# Patient Record
Sex: MALE | Race: WHITE | NOT HISPANIC OR LATINO | Employment: OTHER | URBAN - METROPOLITAN AREA
[De-identification: names, ages, dates, MRNs, and addresses within clinical notes are randomized per-mention and may not be internally consistent; named-entity substitution may affect disease eponyms.]

---

## 2017-01-28 ENCOUNTER — GENERIC CONVERSION - ENCOUNTER (OUTPATIENT)
Dept: OTHER | Facility: OTHER | Age: 72
End: 2017-01-28

## 2017-01-28 ENCOUNTER — APPOINTMENT (EMERGENCY)
Dept: NON INVASIVE DIAGNOSTICS | Facility: HOSPITAL | Age: 72
DRG: 247 | End: 2017-01-28
Payer: MEDICARE

## 2017-01-28 ENCOUNTER — APPOINTMENT (EMERGENCY)
Dept: RADIOLOGY | Facility: HOSPITAL | Age: 72
DRG: 247 | End: 2017-01-28
Payer: MEDICARE

## 2017-01-28 ENCOUNTER — HOSPITAL ENCOUNTER (INPATIENT)
Facility: HOSPITAL | Age: 72
LOS: 6 days | Discharge: HOME | DRG: 247 | End: 2017-02-03
Attending: EMERGENCY MEDICINE | Admitting: INTERNAL MEDICINE
Payer: MEDICARE

## 2017-01-28 DIAGNOSIS — I21.3 STEMI (ST ELEVATION MYOCARDIAL INFARCTION) (HCC): Primary | ICD-10-CM

## 2017-01-28 PROBLEM — Z86.79 S/P AAA (ABDOMINAL AORTIC ANEURYSM) REPAIR: Chronic | Status: ACTIVE | Noted: 2017-01-28

## 2017-01-28 PROBLEM — I10 HTN (HYPERTENSION): Chronic | Status: ACTIVE | Noted: 2017-01-28

## 2017-01-28 PROBLEM — Z98.890 S/P AAA (ABDOMINAL AORTIC ANEURYSM) REPAIR: Chronic | Status: ACTIVE | Noted: 2017-01-28

## 2017-01-28 PROBLEM — E78.5 HLD (HYPERLIPIDEMIA): Chronic | Status: ACTIVE | Noted: 2017-01-28

## 2017-01-28 PROBLEM — F17.200 SMOKING: Chronic | Status: ACTIVE | Noted: 2017-01-28

## 2017-01-28 PROBLEM — G40.909 SEIZURE DISORDER (HCC): Chronic | Status: ACTIVE | Noted: 2017-01-28

## 2017-01-28 PROBLEM — Z91.148 NON COMPLIANCE W MEDICATION REGIMEN: Chronic | Status: ACTIVE | Noted: 2017-01-28

## 2017-01-28 PROBLEM — I25.10 CAD (CORONARY ARTERY DISEASE): Status: ACTIVE | Noted: 2017-01-28

## 2017-01-28 PROBLEM — Z91.14 NON COMPLIANCE W MEDICATION REGIMEN: Chronic | Status: ACTIVE | Noted: 2017-01-28

## 2017-01-28 LAB
ANION GAP BLD CALC-SCNC: 16 MMOL/L (ref 4–13)
ANION GAP SERPL CALCULATED.3IONS-SCNC: 8 MMOL/L (ref 4–13)
APTT PPP: 28 SECONDS (ref 24–36)
ATRIAL RATE: 340 BPM
ATRIAL RATE: 340 BPM
BASOPHILS # BLD AUTO: 0.01 THOUSANDS/ΜL (ref 0–0.1)
BASOPHILS NFR BLD AUTO: 0 % (ref 0–1)
BUN BLD-MCNC: 13 MG/DL (ref 5–25)
BUN SERPL-MCNC: 12 MG/DL (ref 5–25)
CA-I BLD-SCNC: 1.04 MMOL/L (ref 1.12–1.32)
CALCIUM SERPL-MCNC: 8.3 MG/DL (ref 8.3–10.1)
CHLORIDE BLD-SCNC: 107 MMOL/L (ref 100–108)
CHLORIDE SERPL-SCNC: 109 MMOL/L (ref 100–108)
CO2 SERPL-SCNC: 26 MMOL/L (ref 21–32)
CREAT BLD-MCNC: 1 MG/DL (ref 0.6–1.3)
CREAT SERPL-MCNC: 1.09 MG/DL (ref 0.6–1.3)
EOSINOPHIL # BLD AUTO: 0.14 THOUSAND/ΜL (ref 0–0.61)
EOSINOPHIL NFR BLD AUTO: 3 % (ref 0–6)
ERYTHROCYTE [DISTWIDTH] IN BLOOD BY AUTOMATED COUNT: 13.9 % (ref 11.6–15.1)
GFR SERPL CREATININE-BSD FRML MDRD: >60 ML/MIN/1.73SQ M
GFR SERPL CREATININE-BSD FRML MDRD: >60 ML/MIN/1.73SQ M
GLUCOSE SERPL-MCNC: 120 MG/DL (ref 65–140)
GLUCOSE SERPL-MCNC: 125 MG/DL (ref 65–140)
HCT VFR BLD AUTO: 40.1 % (ref 36.5–49.3)
HCT VFR BLD CALC: 38 % (ref 36.5–49.3)
HGB BLD-MCNC: 13.3 G/DL (ref 12–17)
HGB BLDA-MCNC: 12.9 G/DL (ref 12–17)
HOLD SPECIMEN: NORMAL
INR PPP: 1.04 (ref 0.86–1.16)
LYMPHOCYTES # BLD AUTO: 2.06 THOUSANDS/ΜL (ref 0.6–4.47)
LYMPHOCYTES NFR BLD AUTO: 38 % (ref 14–44)
MAGNESIUM SERPL-MCNC: 2 MG/DL (ref 1.6–2.6)
MCH RBC QN AUTO: 33 PG (ref 26.8–34.3)
MCHC RBC AUTO-ENTMCNC: 33.2 G/DL (ref 31.4–37.4)
MCV RBC AUTO: 100 FL (ref 82–98)
MONOCYTES # BLD AUTO: 0.47 THOUSAND/ΜL (ref 0.17–1.22)
MONOCYTES NFR BLD AUTO: 9 % (ref 4–12)
NEUTROPHILS # BLD AUTO: 2.75 THOUSANDS/ΜL (ref 1.85–7.62)
NEUTS SEG NFR BLD AUTO: 50 % (ref 43–75)
NRBC BLD AUTO-RTO: 0 /100 WBCS
NT-PROBNP SERPL-MCNC: 110 PG/ML
PCO2 BLD: 25 MMOL/L (ref 21–32)
PLATELET # BLD AUTO: 130 THOUSANDS/UL (ref 149–390)
PLATELET # BLD AUTO: 144 THOUSANDS/UL (ref 149–390)
PMV BLD AUTO: 10 FL (ref 8.9–12.7)
PMV BLD AUTO: 10.2 FL (ref 8.9–12.7)
POTASSIUM BLD-SCNC: 4.2 MMOL/L (ref 3.5–5.3)
POTASSIUM SERPL-SCNC: 4 MMOL/L (ref 3.5–5.3)
PROTHROMBIN TIME: 13.7 SECONDS (ref 12–14.3)
QRS AXIS: 51 DEGREES
QRS AXIS: 51 DEGREES
QRSD INTERVAL: 76 MS
QRSD INTERVAL: 78 MS
QT INTERVAL: 458 MS
QT INTERVAL: 482 MS
QTC INTERVAL: 405 MS
QTC INTERVAL: 421 MS
RBC # BLD AUTO: 4.03 MILLION/UL (ref 3.88–5.62)
SODIUM BLD-SCNC: 143 MMOL/L (ref 136–145)
SODIUM SERPL-SCNC: 143 MMOL/L (ref 136–145)
SPECIMEN SOURCE: ABNORMAL
SPECIMEN SOURCE: NORMAL
T WAVE AXIS: 120 DEGREES
T WAVE AXIS: 92 DEGREES
TROPONIN I BLD-MCNC: 0 NG/ML (ref 0–0.08)
VENTRICULAR RATE: 46 BPM
VENTRICULAR RATE: 47 BPM
WBC # BLD AUTO: 5.43 THOUSAND/UL (ref 4.31–10.16)

## 2017-01-28 PROCEDURE — 80048 BASIC METABOLIC PNL TOTAL CA: CPT | Performed by: EMERGENCY MEDICINE

## 2017-01-28 PROCEDURE — C1725 CATH, TRANSLUMIN NON-LASER: HCPCS | Performed by: STUDENT IN AN ORGANIZED HEALTH CARE EDUCATION/TRAINING PROGRAM

## 2017-01-28 PROCEDURE — 85730 THROMBOPLASTIN TIME PARTIAL: CPT | Performed by: EMERGENCY MEDICINE

## 2017-01-28 PROCEDURE — 83880 ASSAY OF NATRIURETIC PEPTIDE: CPT | Performed by: EMERGENCY MEDICINE

## 2017-01-28 PROCEDURE — 85014 HEMATOCRIT: CPT

## 2017-01-28 PROCEDURE — 93005 ELECTROCARDIOGRAM TRACING: CPT | Performed by: EMERGENCY MEDICINE

## 2017-01-28 PROCEDURE — 84484 ASSAY OF TROPONIN QUANT: CPT

## 2017-01-28 PROCEDURE — C1769 GUIDE WIRE: HCPCS | Performed by: STUDENT IN AN ORGANIZED HEALTH CARE EDUCATION/TRAINING PROGRAM

## 2017-01-28 PROCEDURE — 93005 ELECTROCARDIOGRAM TRACING: CPT | Performed by: STUDENT IN AN ORGANIZED HEALTH CARE EDUCATION/TRAINING PROGRAM

## 2017-01-28 PROCEDURE — 96374 THER/PROPH/DIAG INJ IV PUSH: CPT

## 2017-01-28 PROCEDURE — 85049 AUTOMATED PLATELET COUNT: CPT | Performed by: STUDENT IN AN ORGANIZED HEALTH CARE EDUCATION/TRAINING PROGRAM

## 2017-01-28 PROCEDURE — 93454 CORONARY ARTERY ANGIO S&I: CPT | Performed by: STUDENT IN AN ORGANIZED HEALTH CARE EDUCATION/TRAINING PROGRAM

## 2017-01-28 PROCEDURE — 85610 PROTHROMBIN TIME: CPT | Performed by: EMERGENCY MEDICINE

## 2017-01-28 PROCEDURE — C9600 PERC DRUG-EL COR STENT SING: HCPCS | Performed by: STUDENT IN AN ORGANIZED HEALTH CARE EDUCATION/TRAINING PROGRAM

## 2017-01-28 PROCEDURE — C1894 INTRO/SHEATH, NON-LASER: HCPCS | Performed by: STUDENT IN AN ORGANIZED HEALTH CARE EDUCATION/TRAINING PROGRAM

## 2017-01-28 PROCEDURE — C1874 STENT, COATED/COV W/DEL SYS: HCPCS

## 2017-01-28 PROCEDURE — 85025 COMPLETE CBC W/AUTO DIFF WBC: CPT | Performed by: EMERGENCY MEDICINE

## 2017-01-28 PROCEDURE — 96376 TX/PRO/DX INJ SAME DRUG ADON: CPT

## 2017-01-28 PROCEDURE — B2111ZZ FLUOROSCOPY OF MULTIPLE CORONARY ARTERIES USING LOW OSMOLAR CONTRAST: ICD-10-PCS | Performed by: INTERNAL MEDICINE

## 2017-01-28 PROCEDURE — 99285 EMERGENCY DEPT VISIT HI MDM: CPT

## 2017-01-28 PROCEDURE — 93005 ELECTROCARDIOGRAM TRACING: CPT

## 2017-01-28 PROCEDURE — 80047 BASIC METABLC PNL IONIZED CA: CPT

## 2017-01-28 PROCEDURE — 83735 ASSAY OF MAGNESIUM: CPT | Performed by: STUDENT IN AN ORGANIZED HEALTH CARE EDUCATION/TRAINING PROGRAM

## 2017-01-28 PROCEDURE — 36415 COLL VENOUS BLD VENIPUNCTURE: CPT | Performed by: EMERGENCY MEDICINE

## 2017-01-28 PROCEDURE — 027034Z DILATION OF CORONARY ARTERY, ONE ARTERY WITH DRUG-ELUTING INTRALUMINAL DEVICE, PERCUTANEOUS APPROACH: ICD-10-PCS | Performed by: INTERNAL MEDICINE

## 2017-01-28 PROCEDURE — 96375 TX/PRO/DX INJ NEW DRUG ADDON: CPT

## 2017-01-28 PROCEDURE — C1887 CATHETER, GUIDING: HCPCS | Performed by: STUDENT IN AN ORGANIZED HEALTH CARE EDUCATION/TRAINING PROGRAM

## 2017-01-28 RX ORDER — ASPIRIN 81 MG/1
81 TABLET, CHEWABLE ORAL DAILY
Status: DISCONTINUED | OUTPATIENT
Start: 2017-01-29 | End: 2017-02-03 | Stop reason: HOSPADM

## 2017-01-28 RX ORDER — CLOPIDOGREL BISULFATE 75 MG/1
75 TABLET ORAL DAILY
Status: DISCONTINUED | OUTPATIENT
Start: 2017-01-29 | End: 2017-02-01

## 2017-01-28 RX ORDER — FENTANYL CITRATE 50 UG/ML
INJECTION, SOLUTION INTRAMUSCULAR; INTRAVENOUS
Status: DISCONTINUED
Start: 2017-01-28 | End: 2017-01-28 | Stop reason: WASHOUT

## 2017-01-28 RX ORDER — MAGNESIUM HYDROXIDE/ALUMINUM HYDROXICE/SIMETHICONE 120; 1200; 1200 MG/30ML; MG/30ML; MG/30ML
30 SUSPENSION ORAL EVERY 6 HOURS PRN
Status: DISCONTINUED | OUTPATIENT
Start: 2017-01-28 | End: 2017-02-03 | Stop reason: HOSPADM

## 2017-01-28 RX ORDER — HEPARIN SODIUM 5000 [USP'U]/ML
5000 INJECTION, SOLUTION INTRAVENOUS; SUBCUTANEOUS EVERY 8 HOURS SCHEDULED
Status: DISCONTINUED | OUTPATIENT
Start: 2017-01-29 | End: 2017-02-03 | Stop reason: HOSPADM

## 2017-01-28 RX ORDER — NITROGLYCERIN 0.4 MG/1
0.4 TABLET SUBLINGUAL
Status: DISCONTINUED | OUTPATIENT
Start: 2017-01-28 | End: 2017-02-03 | Stop reason: HOSPADM

## 2017-01-28 RX ORDER — NITROGLYCERIN 20 MG/100ML
INJECTION INTRAVENOUS CODE/TRAUMA/SEDATION MEDICATION
Status: COMPLETED | OUTPATIENT
Start: 2017-01-28 | End: 2017-01-28

## 2017-01-28 RX ORDER — SODIUM CHLORIDE 9 MG/ML
75 INJECTION, SOLUTION INTRAVENOUS CONTINUOUS
Status: DISPENSED | OUTPATIENT
Start: 2017-01-28 | End: 2017-01-29

## 2017-01-28 RX ORDER — PHENOBARBITAL 64.8 MG/1
64.8 TABLET ORAL 2 TIMES DAILY
Status: DISCONTINUED | OUTPATIENT
Start: 2017-01-28 | End: 2017-02-03 | Stop reason: HOSPADM

## 2017-01-28 RX ORDER — HEPARIN SODIUM 1000 [USP'U]/ML
INJECTION, SOLUTION INTRAVENOUS; SUBCUTANEOUS CODE/TRAUMA/SEDATION MEDICATION
Status: COMPLETED | OUTPATIENT
Start: 2017-01-28 | End: 2017-01-28

## 2017-01-28 RX ORDER — SODIUM CHLORIDE 9 MG/ML
250 INJECTION, SOLUTION INTRAVENOUS CONTINUOUS
Status: DISPENSED | OUTPATIENT
Start: 2017-01-28 | End: 2017-01-28

## 2017-01-28 RX ORDER — LIDOCAINE HYDROCHLORIDE 10 MG/ML
INJECTION, SOLUTION INFILTRATION; PERINEURAL CODE/TRAUMA/SEDATION MEDICATION
Status: COMPLETED | OUTPATIENT
Start: 2017-01-28 | End: 2017-01-28

## 2017-01-28 RX ORDER — PHENOBARBITAL 64.8 MG/1
64.8 TABLET ORAL 2 TIMES DAILY
COMMUNITY
End: 2018-02-27 | Stop reason: SDUPTHER

## 2017-01-28 RX ORDER — ATORVASTATIN CALCIUM 80 MG/1
80 TABLET, FILM COATED ORAL
Status: DISCONTINUED | OUTPATIENT
Start: 2017-01-28 | End: 2017-02-02

## 2017-01-28 RX ORDER — HEPARIN SODIUM 5000 [USP'U]/ML
5000 INJECTION, SOLUTION INTRAVENOUS; SUBCUTANEOUS EVERY 8 HOURS SCHEDULED
Status: CANCELLED | OUTPATIENT
Start: 2017-01-29

## 2017-01-28 RX ADMIN — IOHEXOL 100 ML: 350 INJECTION, SOLUTION INTRAVENOUS at 17:59

## 2017-01-28 RX ADMIN — NITROGLYCERIN 200 MCG: 20 INJECTION INTRAVENOUS at 17:31

## 2017-01-28 RX ADMIN — SODIUM CHLORIDE 250 ML/HR: 0.9 INJECTION, SOLUTION INTRAVENOUS at 18:25

## 2017-01-28 RX ADMIN — PHENOBARBITAL 64.8 MG: 64.8 TABLET ORAL at 21:36

## 2017-01-28 RX ADMIN — HEPARIN SODIUM 8000 UNITS: 1000 INJECTION INTRAVENOUS; SUBCUTANEOUS at 17:33

## 2017-01-28 RX ADMIN — BIVALIRUDIN 1.75 MG/KG/HR: 250 INJECTION, POWDER, LYOPHILIZED, FOR SOLUTION INTRAVENOUS at 17:41

## 2017-01-28 RX ADMIN — ATORVASTATIN CALCIUM 80 MG: 80 TABLET, FILM COATED ORAL at 20:34

## 2017-01-28 RX ADMIN — LIDOCAINE HYDROCHLORIDE 1 ML: 10 INJECTION, SOLUTION INFILTRATION; PERINEURAL at 17:30

## 2017-01-28 RX ADMIN — SODIUM CHLORIDE 75 ML/HR: 0.9 INJECTION, SOLUTION INTRAVENOUS at 22:37

## 2017-01-29 ENCOUNTER — GENERIC CONVERSION - ENCOUNTER (OUTPATIENT)
Dept: OTHER | Facility: OTHER | Age: 72
End: 2017-01-29

## 2017-01-29 ENCOUNTER — APPOINTMENT (INPATIENT)
Dept: NON INVASIVE DIAGNOSTICS | Facility: HOSPITAL | Age: 72
DRG: 247 | End: 2017-01-29
Payer: MEDICARE

## 2017-01-29 LAB
ANION GAP SERPL CALCULATED.3IONS-SCNC: 9 MMOL/L (ref 4–13)
BUN SERPL-MCNC: 12 MG/DL (ref 5–25)
CALCIUM SERPL-MCNC: 7.7 MG/DL (ref 8.3–10.1)
CHLORIDE SERPL-SCNC: 111 MMOL/L (ref 100–108)
CHOLEST SERPL-MCNC: 219 MG/DL (ref 50–200)
CO2 SERPL-SCNC: 22 MMOL/L (ref 21–32)
CREAT SERPL-MCNC: 0.76 MG/DL (ref 0.6–1.3)
ERYTHROCYTE [DISTWIDTH] IN BLOOD BY AUTOMATED COUNT: 13.9 % (ref 11.6–15.1)
GFR SERPL CREATININE-BSD FRML MDRD: >60 ML/MIN/1.73SQ M
GLUCOSE SERPL-MCNC: 102 MG/DL (ref 65–140)
HCT VFR BLD AUTO: 39.8 % (ref 36.5–49.3)
HDLC SERPL-MCNC: 45 MG/DL (ref 40–60)
HGB BLD-MCNC: 13.2 G/DL (ref 12–17)
LDLC SERPL CALC-MCNC: 155 MG/DL (ref 0–100)
MAGNESIUM SERPL-MCNC: 2.5 MG/DL (ref 1.6–2.6)
MCH RBC QN AUTO: 32.8 PG (ref 26.8–34.3)
MCHC RBC AUTO-ENTMCNC: 33.2 G/DL (ref 31.4–37.4)
MCV RBC AUTO: 99 FL (ref 82–98)
PLATELET # BLD AUTO: 127 THOUSANDS/UL (ref 149–390)
PMV BLD AUTO: 10 FL (ref 8.9–12.7)
POTASSIUM SERPL-SCNC: 3.8 MMOL/L (ref 3.5–5.3)
RBC # BLD AUTO: 4.02 MILLION/UL (ref 3.88–5.62)
SODIUM SERPL-SCNC: 142 MMOL/L (ref 136–145)
TRIGL SERPL-MCNC: 97 MG/DL
WBC # BLD AUTO: 7.78 THOUSAND/UL (ref 4.31–10.16)

## 2017-01-29 PROCEDURE — 83735 ASSAY OF MAGNESIUM: CPT | Performed by: STUDENT IN AN ORGANIZED HEALTH CARE EDUCATION/TRAINING PROGRAM

## 2017-01-29 PROCEDURE — 85027 COMPLETE CBC AUTOMATED: CPT | Performed by: STUDENT IN AN ORGANIZED HEALTH CARE EDUCATION/TRAINING PROGRAM

## 2017-01-29 PROCEDURE — 93306 TTE W/DOPPLER COMPLETE: CPT

## 2017-01-29 PROCEDURE — 80061 LIPID PANEL: CPT | Performed by: STUDENT IN AN ORGANIZED HEALTH CARE EDUCATION/TRAINING PROGRAM

## 2017-01-29 PROCEDURE — 80048 BASIC METABOLIC PNL TOTAL CA: CPT | Performed by: STUDENT IN AN ORGANIZED HEALTH CARE EDUCATION/TRAINING PROGRAM

## 2017-01-29 RX ORDER — DIPHENHYDRAMINE HCL 25 MG
12.5 TABLET ORAL ONCE
Status: COMPLETED | OUTPATIENT
Start: 2017-01-29 | End: 2017-01-29

## 2017-01-29 RX ORDER — MAGNESIUM SULFATE HEPTAHYDRATE 40 MG/ML
2 INJECTION, SOLUTION INTRAVENOUS ONCE
Status: COMPLETED | OUTPATIENT
Start: 2017-01-29 | End: 2017-01-29

## 2017-01-29 RX ORDER — MAGNESIUM SULFATE HEPTAHYDRATE 40 MG/ML
INJECTION, SOLUTION INTRAVENOUS
Status: COMPLETED
Start: 2017-01-29 | End: 2017-01-29

## 2017-01-29 RX ORDER — POTASSIUM CHLORIDE 14.9 MG/ML
20 INJECTION INTRAVENOUS ONCE
Status: COMPLETED | OUTPATIENT
Start: 2017-01-29 | End: 2017-01-29

## 2017-01-29 RX ADMIN — DIPHENHYDRAMINE HCL 12.5 MG: 25 TABLET ORAL at 18:10

## 2017-01-29 RX ADMIN — MAGNESIUM SULFATE IN WATER 2 G: 40 INJECTION, SOLUTION INTRAVENOUS at 02:24

## 2017-01-29 RX ADMIN — ATORVASTATIN CALCIUM 80 MG: 80 TABLET, FILM COATED ORAL at 16:34

## 2017-01-29 RX ADMIN — MAGNESIUM SULFATE HEPTAHYDRATE 2 G: 40 INJECTION, SOLUTION INTRAVENOUS at 02:24

## 2017-01-29 RX ADMIN — CLOPIDOGREL BISULFATE 75 MG: 75 TABLET ORAL at 09:26

## 2017-01-29 RX ADMIN — PHENOBARBITAL 64.8 MG: 64.8 TABLET ORAL at 17:44

## 2017-01-29 RX ADMIN — HEPARIN SODIUM 5000 UNITS: 5000 INJECTION, SOLUTION INTRAVENOUS; SUBCUTANEOUS at 13:24

## 2017-01-29 RX ADMIN — PHENOBARBITAL 64.8 MG: 64.8 TABLET ORAL at 09:26

## 2017-01-29 RX ADMIN — POTASSIUM CHLORIDE 20 MEQ: 200 INJECTION, SOLUTION INTRAVENOUS at 20:48

## 2017-01-29 RX ADMIN — HEPARIN SODIUM 5000 UNITS: 5000 INJECTION, SOLUTION INTRAVENOUS; SUBCUTANEOUS at 22:04

## 2017-01-29 RX ADMIN — HEPARIN SODIUM 5000 UNITS: 5000 INJECTION, SOLUTION INTRAVENOUS; SUBCUTANEOUS at 05:15

## 2017-01-29 RX ADMIN — ASPIRIN 81 MG 81 MG: 81 TABLET ORAL at 09:26

## 2017-01-30 LAB
ATRIAL RATE: 61 BPM
P AXIS: 68 DEGREES
PR INTERVAL: 200 MS
QRS AXIS: -32 DEGREES
QRSD INTERVAL: 83 MS
QT INTERVAL: 404 MS
QTC INTERVAL: 407 MS
T WAVE AXIS: 51 DEGREES
VENTRICULAR RATE: 61 BPM

## 2017-01-30 PROCEDURE — 87040 BLOOD CULTURE FOR BACTERIA: CPT | Performed by: INTERNAL MEDICINE

## 2017-01-30 RX ORDER — DIPHENHYDRAMINE HCL 25 MG
25 TABLET ORAL EVERY 6 HOURS PRN
Status: DISCONTINUED | OUTPATIENT
Start: 2017-01-30 | End: 2017-01-31

## 2017-01-30 RX ORDER — ACETAMINOPHEN 325 MG/1
650 TABLET ORAL EVERY 6 HOURS PRN
Status: DISCONTINUED | OUTPATIENT
Start: 2017-01-30 | End: 2017-02-03 | Stop reason: HOSPADM

## 2017-01-30 RX ORDER — SODIUM CHLORIDE 9 MG/ML
100 INJECTION, SOLUTION INTRAVENOUS CONTINUOUS
Status: DISCONTINUED | OUTPATIENT
Start: 2017-01-30 | End: 2017-01-31

## 2017-01-30 RX ADMIN — HEPARIN SODIUM 5000 UNITS: 5000 INJECTION, SOLUTION INTRAVENOUS; SUBCUTANEOUS at 21:51

## 2017-01-30 RX ADMIN — PHENOBARBITAL 64.8 MG: 64.8 TABLET ORAL at 08:51

## 2017-01-30 RX ADMIN — HEPARIN SODIUM 5000 UNITS: 5000 INJECTION, SOLUTION INTRAVENOUS; SUBCUTANEOUS at 13:04

## 2017-01-30 RX ADMIN — ACETAMINOPHEN 650 MG: 325 TABLET, FILM COATED ORAL at 23:27

## 2017-01-30 RX ADMIN — CLOPIDOGREL BISULFATE 75 MG: 75 TABLET ORAL at 08:51

## 2017-01-30 RX ADMIN — ASPIRIN 81 MG 81 MG: 81 TABLET ORAL at 08:51

## 2017-01-30 RX ADMIN — ATORVASTATIN CALCIUM 80 MG: 80 TABLET, FILM COATED ORAL at 16:53

## 2017-01-30 RX ADMIN — PHENOBARBITAL 64.8 MG: 64.8 TABLET ORAL at 16:54

## 2017-01-30 RX ADMIN — ACETAMINOPHEN 650 MG: 325 TABLET, FILM COATED ORAL at 12:31

## 2017-01-30 RX ADMIN — SODIUM CHLORIDE 100 ML/HR: 0.9 INJECTION, SOLUTION INTRAVENOUS at 15:22

## 2017-01-30 RX ADMIN — DIPHENHYDRAMINE HCL 25 MG: 25 TABLET ORAL at 13:00

## 2017-01-30 RX ADMIN — HEPARIN SODIUM 5000 UNITS: 5000 INJECTION, SOLUTION INTRAVENOUS; SUBCUTANEOUS at 05:56

## 2017-01-30 RX ADMIN — ACETAMINOPHEN 650 MG: 325 TABLET, FILM COATED ORAL at 00:48

## 2017-01-31 LAB — GLUCOSE SERPL-MCNC: 119 MG/DL (ref 65–140)

## 2017-01-31 PROCEDURE — 87493 C DIFF AMPLIFIED PROBE: CPT | Performed by: INTERNAL MEDICINE

## 2017-01-31 PROCEDURE — 82948 REAGENT STRIP/BLOOD GLUCOSE: CPT

## 2017-01-31 RX ORDER — PREDNISONE 10 MG/1
10 TABLET ORAL ONCE
Status: COMPLETED | OUTPATIENT
Start: 2017-01-31 | End: 2017-01-31

## 2017-01-31 RX ORDER — DIPHENHYDRAMINE HCL 25 MG
25 TABLET ORAL EVERY 6 HOURS PRN
Status: DISCONTINUED | OUTPATIENT
Start: 2017-01-31 | End: 2017-02-03 | Stop reason: HOSPADM

## 2017-01-31 RX ADMIN — ATORVASTATIN CALCIUM 80 MG: 80 TABLET, FILM COATED ORAL at 17:23

## 2017-01-31 RX ADMIN — HEPARIN SODIUM 5000 UNITS: 5000 INJECTION, SOLUTION INTRAVENOUS; SUBCUTANEOUS at 13:27

## 2017-01-31 RX ADMIN — CLOPIDOGREL BISULFATE 75 MG: 75 TABLET ORAL at 08:02

## 2017-01-31 RX ADMIN — SODIUM CHLORIDE 100 ML/HR: 0.9 INJECTION, SOLUTION INTRAVENOUS at 20:52

## 2017-01-31 RX ADMIN — HEPARIN SODIUM 5000 UNITS: 5000 INJECTION, SOLUTION INTRAVENOUS; SUBCUTANEOUS at 05:28

## 2017-01-31 RX ADMIN — PHENOBARBITAL 64.8 MG: 64.8 TABLET ORAL at 08:02

## 2017-01-31 RX ADMIN — SODIUM CHLORIDE 100 ML/HR: 0.9 INJECTION, SOLUTION INTRAVENOUS at 10:34

## 2017-01-31 RX ADMIN — SODIUM CHLORIDE 100 ML/HR: 0.9 INJECTION, SOLUTION INTRAVENOUS at 00:44

## 2017-01-31 RX ADMIN — ASPIRIN 81 MG 81 MG: 81 TABLET ORAL at 08:02

## 2017-01-31 RX ADMIN — PHENOBARBITAL 64.8 MG: 64.8 TABLET ORAL at 17:23

## 2017-01-31 RX ADMIN — PREDNISONE 10 MG: 10 TABLET ORAL at 10:33

## 2017-02-01 LAB
C DIFF TOX GENS STL QL NAA+PROBE: NORMAL
GLUCOSE SERPL-MCNC: 117 MG/DL (ref 65–140)

## 2017-02-01 PROCEDURE — 82948 REAGENT STRIP/BLOOD GLUCOSE: CPT

## 2017-02-01 RX ORDER — FUROSEMIDE 10 MG/ML
40 INJECTION INTRAMUSCULAR; INTRAVENOUS
Status: DISCONTINUED | OUTPATIENT
Start: 2017-02-01 | End: 2017-02-03

## 2017-02-01 RX ORDER — METHYLPREDNISOLONE SODIUM SUCCINATE 125 MG/2ML
125 INJECTION, POWDER, LYOPHILIZED, FOR SOLUTION INTRAMUSCULAR; INTRAVENOUS ONCE
Status: COMPLETED | OUTPATIENT
Start: 2017-02-01 | End: 2017-02-01

## 2017-02-01 RX ORDER — PRASUGREL 10 MG/1
10 TABLET, FILM COATED ORAL DAILY
Status: DISCONTINUED | OUTPATIENT
Start: 2017-02-02 | End: 2017-02-03 | Stop reason: HOSPADM

## 2017-02-01 RX ADMIN — FUROSEMIDE 40 MG: 10 INJECTION, SOLUTION INTRAMUSCULAR; INTRAVENOUS at 11:45

## 2017-02-01 RX ADMIN — HEPARIN SODIUM 5000 UNITS: 5000 INJECTION, SOLUTION INTRAVENOUS; SUBCUTANEOUS at 21:56

## 2017-02-01 RX ADMIN — HEPARIN SODIUM 5000 UNITS: 5000 INJECTION, SOLUTION INTRAVENOUS; SUBCUTANEOUS at 14:02

## 2017-02-01 RX ADMIN — PHENOBARBITAL 64.8 MG: 64.8 TABLET ORAL at 08:36

## 2017-02-01 RX ADMIN — PHENOBARBITAL 64.8 MG: 64.8 TABLET ORAL at 17:38

## 2017-02-01 RX ADMIN — METHYLPREDNISOLONE SODIUM SUCCINATE 125 MG: 125 INJECTION, POWDER, FOR SOLUTION INTRAMUSCULAR; INTRAVENOUS at 11:44

## 2017-02-01 RX ADMIN — ASPIRIN 81 MG 81 MG: 81 TABLET ORAL at 08:36

## 2017-02-01 RX ADMIN — FUROSEMIDE 40 MG: 10 INJECTION, SOLUTION INTRAMUSCULAR; INTRAVENOUS at 17:37

## 2017-02-01 RX ADMIN — HEPARIN SODIUM 5000 UNITS: 5000 INJECTION, SOLUTION INTRAVENOUS; SUBCUTANEOUS at 05:12

## 2017-02-01 RX ADMIN — CLOPIDOGREL BISULFATE 75 MG: 75 TABLET ORAL at 08:36

## 2017-02-02 LAB
ANION GAP SERPL CALCULATED.3IONS-SCNC: 10 MMOL/L (ref 4–13)
ANION GAP SERPL CALCULATED.3IONS-SCNC: 9 MMOL/L (ref 4–13)
BASOPHILS # BLD AUTO: 0.02 THOUSANDS/ΜL (ref 0–0.1)
BASOPHILS NFR BLD AUTO: 0 % (ref 0–1)
BUN SERPL-MCNC: 15 MG/DL (ref 5–25)
BUN SERPL-MCNC: 16 MG/DL (ref 5–25)
CALCIUM SERPL-MCNC: 7.8 MG/DL (ref 8.3–10.1)
CALCIUM SERPL-MCNC: 8.2 MG/DL (ref 8.3–10.1)
CHLORIDE SERPL-SCNC: 106 MMOL/L (ref 100–108)
CHLORIDE SERPL-SCNC: 107 MMOL/L (ref 100–108)
CO2 SERPL-SCNC: 25 MMOL/L (ref 21–32)
CO2 SERPL-SCNC: 27 MMOL/L (ref 21–32)
CREAT SERPL-MCNC: 0.85 MG/DL (ref 0.6–1.3)
CREAT SERPL-MCNC: 1.08 MG/DL (ref 0.6–1.3)
EOSINOPHIL # BLD AUTO: 0.04 THOUSAND/ΜL (ref 0–0.61)
EOSINOPHIL NFR BLD AUTO: 1 % (ref 0–6)
ERYTHROCYTE [DISTWIDTH] IN BLOOD BY AUTOMATED COUNT: 14.1 % (ref 11.6–15.1)
GFR SERPL CREATININE-BSD FRML MDRD: >60 ML/MIN/1.73SQ M
GFR SERPL CREATININE-BSD FRML MDRD: >60 ML/MIN/1.73SQ M
GLUCOSE SERPL-MCNC: 102 MG/DL (ref 65–140)
GLUCOSE SERPL-MCNC: 145 MG/DL (ref 65–140)
HCT VFR BLD AUTO: 32.9 % (ref 36.5–49.3)
HGB BLD-MCNC: 11.1 G/DL (ref 12–17)
LYMPHOCYTES # BLD AUTO: 1.19 THOUSANDS/ΜL (ref 0.6–4.47)
LYMPHOCYTES NFR BLD AUTO: 18 % (ref 14–44)
MAGNESIUM SERPL-MCNC: 2.1 MG/DL (ref 1.6–2.6)
MCH RBC QN AUTO: 33 PG (ref 26.8–34.3)
MCHC RBC AUTO-ENTMCNC: 33.7 G/DL (ref 31.4–37.4)
MCV RBC AUTO: 98 FL (ref 82–98)
MONOCYTES # BLD AUTO: 0.59 THOUSAND/ΜL (ref 0.17–1.22)
MONOCYTES NFR BLD AUTO: 9 % (ref 4–12)
NEUTROPHILS # BLD AUTO: 4.91 THOUSANDS/ΜL (ref 1.85–7.62)
NEUTS SEG NFR BLD AUTO: 72 % (ref 43–75)
NRBC BLD AUTO-RTO: 0 /100 WBCS
PLATELET # BLD AUTO: 160 THOUSANDS/UL (ref 149–390)
PMV BLD AUTO: 10.6 FL (ref 8.9–12.7)
POTASSIUM SERPL-SCNC: 2.9 MMOL/L (ref 3.5–5.3)
POTASSIUM SERPL-SCNC: 3.5 MMOL/L (ref 3.5–5.3)
RBC # BLD AUTO: 3.36 MILLION/UL (ref 3.88–5.62)
SODIUM SERPL-SCNC: 142 MMOL/L (ref 136–145)
SODIUM SERPL-SCNC: 142 MMOL/L (ref 136–145)
WBC # BLD AUTO: 6.77 THOUSAND/UL (ref 4.31–10.16)

## 2017-02-02 PROCEDURE — 80048 BASIC METABOLIC PNL TOTAL CA: CPT | Performed by: INTERNAL MEDICINE

## 2017-02-02 PROCEDURE — 85025 COMPLETE CBC W/AUTO DIFF WBC: CPT | Performed by: INTERNAL MEDICINE

## 2017-02-02 PROCEDURE — 83735 ASSAY OF MAGNESIUM: CPT | Performed by: INTERNAL MEDICINE

## 2017-02-02 RX ORDER — POTASSIUM CHLORIDE 20 MEQ/1
40 TABLET, EXTENDED RELEASE ORAL ONCE
Status: COMPLETED | OUTPATIENT
Start: 2017-02-02 | End: 2017-02-02

## 2017-02-02 RX ORDER — POTASSIUM CHLORIDE 14.9 MG/ML
20 INJECTION INTRAVENOUS
Status: DISPENSED | OUTPATIENT
Start: 2017-02-02 | End: 2017-02-02

## 2017-02-02 RX ORDER — METHYLPREDNISOLONE SODIUM SUCCINATE 125 MG/2ML
125 INJECTION, POWDER, LYOPHILIZED, FOR SOLUTION INTRAMUSCULAR; INTRAVENOUS ONCE
Status: COMPLETED | OUTPATIENT
Start: 2017-02-02 | End: 2017-02-02

## 2017-02-02 RX ADMIN — POTASSIUM CHLORIDE 20 MEQ: 200 INJECTION, SOLUTION INTRAVENOUS at 08:53

## 2017-02-02 RX ADMIN — POTASSIUM CHLORIDE 40 MEQ: 1500 TABLET, EXTENDED RELEASE ORAL at 08:52

## 2017-02-02 RX ADMIN — HEPARIN SODIUM 5000 UNITS: 5000 INJECTION, SOLUTION INTRAVENOUS; SUBCUTANEOUS at 22:01

## 2017-02-02 RX ADMIN — HEPARIN SODIUM 5000 UNITS: 5000 INJECTION, SOLUTION INTRAVENOUS; SUBCUTANEOUS at 14:36

## 2017-02-02 RX ADMIN — PHENOBARBITAL 64.8 MG: 64.8 TABLET ORAL at 17:55

## 2017-02-02 RX ADMIN — ASPIRIN 81 MG 81 MG: 81 TABLET ORAL at 08:53

## 2017-02-02 RX ADMIN — METHYLPREDNISOLONE SODIUM SUCCINATE 125 MG: 125 INJECTION, POWDER, FOR SOLUTION INTRAMUSCULAR; INTRAVENOUS at 11:58

## 2017-02-02 RX ADMIN — POTASSIUM CHLORIDE 40 MEQ: 1500 TABLET, EXTENDED RELEASE ORAL at 17:55

## 2017-02-02 RX ADMIN — PRASUGREL HYDROCHLORIDE 10 MG: 10 TABLET, FILM COATED ORAL at 08:57

## 2017-02-02 RX ADMIN — HEPARIN SODIUM 5000 UNITS: 5000 INJECTION, SOLUTION INTRAVENOUS; SUBCUTANEOUS at 05:27

## 2017-02-02 RX ADMIN — FUROSEMIDE 40 MG: 10 INJECTION, SOLUTION INTRAMUSCULAR; INTRAVENOUS at 08:53

## 2017-02-02 RX ADMIN — PHENOBARBITAL 64.8 MG: 64.8 TABLET ORAL at 08:52

## 2017-02-02 RX ADMIN — FUROSEMIDE 40 MG: 10 INJECTION, SOLUTION INTRAMUSCULAR; INTRAVENOUS at 17:55

## 2017-02-03 VITALS
HEART RATE: 66 BPM | SYSTOLIC BLOOD PRESSURE: 107 MMHG | OXYGEN SATURATION: 94 % | BODY MASS INDEX: 28.31 KG/M2 | RESPIRATION RATE: 18 BRPM | WEIGHT: 209 LBS | DIASTOLIC BLOOD PRESSURE: 54 MMHG | HEIGHT: 72 IN | TEMPERATURE: 98 F

## 2017-02-03 LAB
ANION GAP SERPL CALCULATED.3IONS-SCNC: 10 MMOL/L (ref 4–13)
BASOPHILS # BLD AUTO: 0.06 THOUSANDS/ΜL (ref 0–0.1)
BASOPHILS NFR BLD AUTO: 1 % (ref 0–1)
BUN SERPL-MCNC: 17 MG/DL (ref 5–25)
CALCIUM SERPL-MCNC: 7.9 MG/DL (ref 8.3–10.1)
CHLORIDE SERPL-SCNC: 107 MMOL/L (ref 100–108)
CO2 SERPL-SCNC: 22 MMOL/L (ref 21–32)
CREAT SERPL-MCNC: 0.83 MG/DL (ref 0.6–1.3)
EOSINOPHIL # BLD AUTO: 0.04 THOUSAND/ΜL (ref 0–0.61)
EOSINOPHIL NFR BLD AUTO: 1 % (ref 0–6)
ERYTHROCYTE [DISTWIDTH] IN BLOOD BY AUTOMATED COUNT: 14.1 % (ref 11.6–15.1)
GFR SERPL CREATININE-BSD FRML MDRD: >60 ML/MIN/1.73SQ M
GLUCOSE SERPL-MCNC: 77 MG/DL (ref 65–140)
HCT VFR BLD AUTO: 32.8 % (ref 36.5–49.3)
HGB BLD-MCNC: 11.2 G/DL (ref 12–17)
LYMPHOCYTES # BLD AUTO: 2.11 THOUSANDS/ΜL (ref 0.6–4.47)
LYMPHOCYTES NFR BLD AUTO: 29 % (ref 14–44)
MAGNESIUM SERPL-MCNC: 2.3 MG/DL (ref 1.6–2.6)
MCH RBC QN AUTO: 33.4 PG (ref 26.8–34.3)
MCHC RBC AUTO-ENTMCNC: 34.1 G/DL (ref 31.4–37.4)
MCV RBC AUTO: 98 FL (ref 82–98)
MONOCYTES # BLD AUTO: 0.74 THOUSAND/ΜL (ref 0.17–1.22)
MONOCYTES NFR BLD AUTO: 10 % (ref 4–12)
NEUTROPHILS # BLD AUTO: 4.18 THOUSANDS/ΜL (ref 1.85–7.62)
NEUTS SEG NFR BLD AUTO: 59 % (ref 43–75)
NRBC BLD AUTO-RTO: 0 /100 WBCS
PLATELET # BLD AUTO: 165 THOUSANDS/UL (ref 149–390)
PMV BLD AUTO: 10 FL (ref 8.9–12.7)
POTASSIUM SERPL-SCNC: 3.5 MMOL/L (ref 3.5–5.3)
RBC # BLD AUTO: 3.35 MILLION/UL (ref 3.88–5.62)
SODIUM SERPL-SCNC: 139 MMOL/L (ref 136–145)
WBC # BLD AUTO: 7.25 THOUSAND/UL (ref 4.31–10.16)

## 2017-02-03 PROCEDURE — 83735 ASSAY OF MAGNESIUM: CPT | Performed by: INTERNAL MEDICINE

## 2017-02-03 PROCEDURE — 85025 COMPLETE CBC W/AUTO DIFF WBC: CPT | Performed by: INTERNAL MEDICINE

## 2017-02-03 PROCEDURE — 80048 BASIC METABOLIC PNL TOTAL CA: CPT | Performed by: INTERNAL MEDICINE

## 2017-02-03 RX ORDER — PRASUGREL 10 MG/1
10 TABLET, FILM COATED ORAL DAILY
Qty: 30 TABLET | Refills: 0 | Status: SHIPPED | OUTPATIENT
Start: 2017-02-03 | End: 2018-02-27 | Stop reason: ALTCHOICE

## 2017-02-03 RX ORDER — FUROSEMIDE 20 MG/1
20 TABLET ORAL DAILY
Qty: 3 TABLET | Refills: 0 | Status: SHIPPED | OUTPATIENT
Start: 2017-02-04 | End: 2017-06-25

## 2017-02-03 RX ORDER — ASPIRIN 81 MG/1
81 TABLET, CHEWABLE ORAL DAILY
Qty: 30 TABLET | Refills: 0 | Status: SHIPPED | OUTPATIENT
Start: 2017-02-03 | End: 2018-02-27 | Stop reason: SDUPTHER

## 2017-02-03 RX ORDER — POTASSIUM CHLORIDE 20 MEQ/1
20 TABLET, EXTENDED RELEASE ORAL DAILY
Status: DISCONTINUED | OUTPATIENT
Start: 2017-02-03 | End: 2017-02-03 | Stop reason: HOSPADM

## 2017-02-03 RX ORDER — FUROSEMIDE 20 MG/1
20 TABLET ORAL DAILY
Status: DISCONTINUED | OUTPATIENT
Start: 2017-02-04 | End: 2017-02-03 | Stop reason: HOSPADM

## 2017-02-03 RX ORDER — POTASSIUM CHLORIDE 20 MEQ/1
20 TABLET, EXTENDED RELEASE ORAL DAILY
Qty: 30 TABLET | Refills: 0 | Status: SHIPPED | OUTPATIENT
Start: 2017-02-03 | End: 2017-06-25

## 2017-02-03 RX ORDER — POTASSIUM CHLORIDE 20 MEQ/1
20 TABLET, EXTENDED RELEASE ORAL DAILY
Status: DISCONTINUED | OUTPATIENT
Start: 2017-02-03 | End: 2017-02-03

## 2017-02-03 RX ORDER — NITROGLYCERIN 0.4 MG/1
0.4 TABLET SUBLINGUAL
Qty: 90 TABLET | Refills: 0 | Status: SHIPPED | OUTPATIENT
Start: 2017-02-03 | End: 2017-06-25

## 2017-02-03 RX ORDER — FUROSEMIDE 20 MG/1
20 TABLET ORAL DAILY
Status: DISCONTINUED | OUTPATIENT
Start: 2017-02-03 | End: 2017-02-03

## 2017-02-03 RX ORDER — PREDNISONE 1 MG/1
5 TABLET ORAL DAILY
Qty: 3 TABLET | Refills: 0 | Status: SHIPPED | OUTPATIENT
Start: 2017-02-03 | End: 2017-02-06

## 2017-02-03 RX ORDER — PREDNISONE 1 MG/1
5 TABLET ORAL DAILY
Status: DISCONTINUED | OUTPATIENT
Start: 2017-02-03 | End: 2017-02-03 | Stop reason: HOSPADM

## 2017-02-03 RX ORDER — PREDNISONE 1 MG/1
5 TABLET ORAL DAILY
Status: DISCONTINUED | OUTPATIENT
Start: 2017-02-03 | End: 2017-02-03

## 2017-02-03 RX ADMIN — PREDNISONE 5 MG: 5 TABLET ORAL at 11:22

## 2017-02-03 RX ADMIN — ASPIRIN 81 MG 81 MG: 81 TABLET ORAL at 09:29

## 2017-02-03 RX ADMIN — PRASUGREL HYDROCHLORIDE 10 MG: 10 TABLET, FILM COATED ORAL at 09:29

## 2017-02-03 RX ADMIN — POTASSIUM CHLORIDE 20 MEQ: 1500 TABLET, EXTENDED RELEASE ORAL at 11:22

## 2017-02-03 RX ADMIN — PHENOBARBITAL 64.8 MG: 64.8 TABLET ORAL at 09:29

## 2017-02-03 RX ADMIN — PHENOBARBITAL 64.8 MG: 64.8 TABLET ORAL at 17:40

## 2017-02-03 RX ADMIN — FUROSEMIDE 40 MG: 10 INJECTION, SOLUTION INTRAMUSCULAR; INTRAVENOUS at 07:56

## 2017-02-03 RX ADMIN — HEPARIN SODIUM 5000 UNITS: 5000 INJECTION, SOLUTION INTRAVENOUS; SUBCUTANEOUS at 05:19

## 2017-02-04 LAB
BACTERIA BLD CULT: NORMAL
BACTERIA BLD CULT: NORMAL

## 2017-02-09 ENCOUNTER — ALLSCRIPTS OFFICE VISIT (OUTPATIENT)
Dept: OTHER | Facility: OTHER | Age: 72
End: 2017-02-09

## 2017-02-09 DIAGNOSIS — I25.10 ATHEROSCLEROTIC HEART DISEASE OF NATIVE CORONARY ARTERY WITHOUT ANGINA PECTORIS: ICD-10-CM

## 2017-02-09 DIAGNOSIS — I21.3 ST ELEVATION (STEMI) MYOCARDIAL INFARCTION (HCC): ICD-10-CM

## 2017-02-09 DIAGNOSIS — Z95.5 PRESENCE OF CORONARY ANGIOPLASTY IMPLANT AND GRAFT: ICD-10-CM

## 2017-02-14 ENCOUNTER — APPOINTMENT (OUTPATIENT)
Dept: LAB | Facility: CLINIC | Age: 72
End: 2017-02-14
Payer: MEDICARE

## 2017-02-14 DIAGNOSIS — Z95.5 PRESENCE OF CORONARY ANGIOPLASTY IMPLANT AND GRAFT: ICD-10-CM

## 2017-02-14 DIAGNOSIS — I25.10 ATHEROSCLEROTIC HEART DISEASE OF NATIVE CORONARY ARTERY WITHOUT ANGINA PECTORIS: ICD-10-CM

## 2017-02-14 DIAGNOSIS — I21.3 ST ELEVATION (STEMI) MYOCARDIAL INFARCTION (HCC): ICD-10-CM

## 2017-02-14 LAB
ANION GAP SERPL CALCULATED.3IONS-SCNC: 9 MMOL/L (ref 4–13)
BUN SERPL-MCNC: 10 MG/DL (ref 5–25)
CALCIUM SERPL-MCNC: 8.7 MG/DL (ref 8.3–10.1)
CHLORIDE SERPL-SCNC: 107 MMOL/L (ref 100–108)
CO2 SERPL-SCNC: 25 MMOL/L (ref 21–32)
CREAT SERPL-MCNC: 0.98 MG/DL (ref 0.6–1.3)
ERYTHROCYTE [DISTWIDTH] IN BLOOD BY AUTOMATED COUNT: 14.2 % (ref 11.6–15.1)
GFR SERPL CREATININE-BSD FRML MDRD: >60 ML/MIN/1.73SQ M
GLUCOSE SERPL-MCNC: 111 MG/DL (ref 65–140)
HCT VFR BLD AUTO: 38.9 % (ref 36.5–49.3)
HGB BLD-MCNC: 12.7 G/DL (ref 12–17)
MAGNESIUM SERPL-MCNC: 2.3 MG/DL (ref 1.6–2.6)
MCH RBC QN AUTO: 32.8 PG (ref 26.8–34.3)
MCHC RBC AUTO-ENTMCNC: 32.6 G/DL (ref 31.4–37.4)
MCV RBC AUTO: 101 FL (ref 82–98)
PLATELET # BLD AUTO: 300 THOUSANDS/UL (ref 149–390)
PMV BLD AUTO: 9.9 FL (ref 8.9–12.7)
POTASSIUM SERPL-SCNC: 4 MMOL/L (ref 3.5–5.3)
RBC # BLD AUTO: 3.87 MILLION/UL (ref 3.88–5.62)
SODIUM SERPL-SCNC: 141 MMOL/L (ref 136–145)
WBC # BLD AUTO: 5.89 THOUSAND/UL (ref 4.31–10.16)

## 2017-02-14 PROCEDURE — 80048 BASIC METABOLIC PNL TOTAL CA: CPT

## 2017-02-14 PROCEDURE — 36415 COLL VENOUS BLD VENIPUNCTURE: CPT

## 2017-02-14 PROCEDURE — 83735 ASSAY OF MAGNESIUM: CPT

## 2017-02-14 PROCEDURE — 85027 COMPLETE CBC AUTOMATED: CPT

## 2017-02-17 ENCOUNTER — GENERIC CONVERSION - ENCOUNTER (OUTPATIENT)
Dept: OTHER | Facility: OTHER | Age: 72
End: 2017-02-17

## 2017-02-21 ENCOUNTER — GENERIC CONVERSION - ENCOUNTER (OUTPATIENT)
Dept: OTHER | Facility: OTHER | Age: 72
End: 2017-02-21

## 2017-03-02 ENCOUNTER — ALLSCRIPTS OFFICE VISIT (OUTPATIENT)
Dept: OTHER | Facility: OTHER | Age: 72
End: 2017-03-02

## 2017-03-03 ENCOUNTER — GENERIC CONVERSION - ENCOUNTER (OUTPATIENT)
Dept: OTHER | Facility: OTHER | Age: 72
End: 2017-03-03

## 2017-04-06 ENCOUNTER — ALLSCRIPTS OFFICE VISIT (OUTPATIENT)
Dept: OTHER | Facility: OTHER | Age: 72
End: 2017-04-06

## 2017-06-08 ENCOUNTER — ALLSCRIPTS OFFICE VISIT (OUTPATIENT)
Dept: OTHER | Facility: OTHER | Age: 72
End: 2017-06-08

## 2017-06-25 ENCOUNTER — HOSPITAL ENCOUNTER (EMERGENCY)
Facility: HOSPITAL | Age: 72
Discharge: HOME | End: 2017-06-25
Attending: EMERGENCY MEDICINE
Payer: MEDICARE

## 2017-06-25 VITALS
RESPIRATION RATE: 17 BRPM | SYSTOLIC BLOOD PRESSURE: 118 MMHG | OXYGEN SATURATION: 96 % | BODY MASS INDEX: 27.94 KG/M2 | TEMPERATURE: 97.6 F | WEIGHT: 206 LBS | HEART RATE: 85 BPM | DIASTOLIC BLOOD PRESSURE: 56 MMHG

## 2017-06-25 DIAGNOSIS — L30.2 ID REACTION: ICD-10-CM

## 2017-06-25 DIAGNOSIS — B35.3 TINEA PEDIS OF BOTH FEET: Primary | ICD-10-CM

## 2017-06-25 PROCEDURE — 99282 EMERGENCY DEPT VISIT SF MDM: CPT

## 2017-06-25 RX ORDER — PREDNISONE 20 MG/1
TABLET ORAL
Qty: 20 TABLET | Refills: 0 | Status: SHIPPED | OUTPATIENT
Start: 2017-06-25 | End: 2018-02-27

## 2017-06-25 RX ORDER — CLOTRIMAZOLE 1 %
CREAM (GRAM) TOPICAL
Qty: 15 G | Refills: 0 | Status: SHIPPED | OUTPATIENT
Start: 2017-06-25 | End: 2020-01-23 | Stop reason: SDUPTHER

## 2017-07-20 ENCOUNTER — ALLSCRIPTS OFFICE VISIT (OUTPATIENT)
Dept: OTHER | Facility: OTHER | Age: 72
End: 2017-07-20

## 2017-09-21 ENCOUNTER — GENERIC CONVERSION - ENCOUNTER (OUTPATIENT)
Dept: OTHER | Facility: OTHER | Age: 72
End: 2017-09-21

## 2018-01-10 NOTE — PROGRESS NOTES
History of Present Illness  Care Coordination Encounter Information:   Type of Encounter: Telephonic    Spoke to Patient   Called number in chart, it's to [facility]  Care Coordination SL Nurse [employer]:   The reason for call is to discuss outreach for follow up/needed services  Attempted to make a follow up call to patient for hospital discharge 2/3 and cardiology office visit 2/9  Will mail out a letter  Active Problems    1  Acute MI (410 90) (I21 3)   2  Afib (427 31) (I48 91)   3  Atherosclerosis (440 9) (I70 90)   4  Colonoscopy refused (V64 2) (Z53 20)   5  Coronary artery disease (414 00) (I25 10)   6  Eczema (692 9) (L30 9)   7  Hyperlipemia (272 4) (E78 5)   8  Refused influenza vaccine (V64 06) (Z28 21)   9  Refused pneumococcal vaccination (V49 89) (Z78 9)   10  S/P drug eluting coronary stent placement (V45 82) (Z95 5)   11  STEMI (ST elevation myocardial infarction) (410 90) (I21 3)   12  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)   13  Ventral hernia (553 20) (K43 9)    Past Medical History    1  Past myocardial infarction (412) (I25 2)    Surgical History    1  History of Abdominal Aortic Aneurysm Repair For Dilation Or Occlusion    Family History  Father    1  Family history of hypertension (V17 49) (Z82 49)    Social History    · Former smoker (T82 87) (Z80 783)    Current Meds    1  Nitrostat 0 4 MG Sublingual Tablet Sublingual (Nitroglycerin); PLACE 1 TABLET UNDER   THE TONGUE EVERY 5 MINUTES FOR UP TO 3 DOSES AS NEEDED   FOR CHEST PAIN  CALL 911 IF PAIN PERSISTS; Therapy: 88MNE6421 to (Brianne Hawthorne)  Requested for: 06HFI9354; Last   Rx:06Qxy0151 Ordered    2  Pravastatin Sodium 40 MG Oral Tablet; TAKE 1 TABLET AT BEDTIME; Therapy: 40OQQ7922 to (Evaluate:54Shn6589)  Requested for: 47IMT0324; Last   Rx:62Suf7148 Ordered    3  Effient 10 MG Oral Tablet; TAKE 1 TABLET EVERY OTHER  DAIY  Requested for:   12OQZ3609; Last Rx:76Xuj8334 Ordered    4   Nitroglycerin 0 4 MG Sublingual Tablet Sublingual; place 1 tablet under the tongue every   5 minutes for up to 3 doses as needed for chest pain  call 911 if pain   persists; Therapy: 11GFM2642 to (Evaluate:09Jun2017); Last Rx:25Ymp3176 Ordered    5  Metoprolol Tartrate 25 MG Oral Tablet; TAKE 0 5 TABLET Every twelve hours; Therapy: 73MDY5521 to (Evaluate:04Feb2018)  Requested for: 56LNY1995; Last   Rx:09Feb2017 Ordered    6  PHENobarbital 64 8 MG Oral Tablet; one tablet twice a day; Therapy: 27XHA5045 to (Evaluate:23Oct2016)  Requested for: 23Qki0330; Last   Rx:62Rve6523 Ordered    7  Aspirin 81 MG Oral Tablet Delayed Release; TAKE 1 TABLET DAILY; Therapy: (Recorded:09Feb2017) to Recorded    Allergies    1  atorvastatin   2  Plavix TABS    End of Encounter Meds    1  Nitrostat 0 4 MG Sublingual Tablet Sublingual (Nitroglycerin); PLACE 1 TABLET UNDER   THE TONGUE EVERY 5 MINUTES FOR UP TO 3 DOSES AS NEEDED   FOR CHEST PAIN  CALL 911 IF PAIN PERSISTS; Therapy: 59KVE1851 to (Hosey Lesch)  Requested for: 84KJU2293; Last   Rx:51Msr3435 Ordered    2  Pravastatin Sodium 40 MG Oral Tablet; TAKE 1 TABLET AT BEDTIME; Therapy: 10KVB0582 to (Evaluate:09Jun2017)  Requested for: 00CTD3485; Last   Rx:95Xzt9818 Ordered    3  Effient 10 MG Oral Tablet; TAKE 1 TABLET EVERY OTHER  DAIY  Requested for:   31XWD4679; Last Rx:48Dsd5351 Ordered    4  Nitroglycerin 0 4 MG Sublingual Tablet Sublingual; place 1 tablet under the tongue every   5 minutes for up to 3 doses as needed for chest pain  call 911 if pain   persists; Therapy: 70SWL4071 to (Evaluate:09Jun2017); Last Rx:64Oqk5681 Ordered    5  Metoprolol Tartrate 25 MG Oral Tablet; TAKE 0 5 TABLET Every twelve hours; Therapy: 87URX7650 to (Evaluate:04Feb2018)  Requested for: 28ENE9119; Last   Rx:61Jwz1129 Ordered    6  PHENobarbital 64 8 MG Oral Tablet; one tablet twice a day; Therapy: 89QKT4515 to (Evaluate:23Oct2016)  Requested for: 26Apr2016; Last   Rx:26Apr2016 Ordered    7  Aspirin 81 MG Oral Tablet Delayed Release; TAKE 1 TABLET DAILY; Therapy: (Recorded:32Jxl5427) to Recorded    Future Appointments    Date/Time Provider Specialty Site   03/02/2017 10:40 AM ROSITA Fernandez   Cardiology [facility]   04/06/2017 03:00 PM Tor Litten, DO Internal Medicine [address] [place] [address] PCP     Patient Care Team    Care Team Member Role Specialty Office Number   Verona Mathis  Resident Internal Medicine [phone] [address]  Certified Clinical Nurse Specialist [phone]     Signatures   Electronically signed by : Doc Israel, ; Feb 17 2017 10:36AM EST                       (Author)

## 2018-01-11 NOTE — PROGRESS NOTES
History of Present Illness  Care Coordination Encounter Information:   Type of Encounter: Telephonic    Spoke to Patient  Care Coordination SL Nurse Luis Dickson: Patient called me due to letter being sent to home to contact me  He states he is doing well, offers no complaints  We reviewed his medications and dosages: pravastatin, metoprolol, aspirin, and effient  He reports he is taking them as prescribed  Blood work required before cardiology office visit 3/2 has been completed  No further questions or concerns at the moment  Active Problems    1  Acute MI (410 90) (I21 3)   2  Afib (427 31) (I48 91)   3  Atherosclerosis (440 9) (I70 90)   4  Colonoscopy refused (V64 2) (Z53 20)   5  Coronary artery disease (414 00) (I25 10)   6  Eczema (692 9) (L30 9)   7  Hyperlipemia (272 4) (E78 5)   8  Refused influenza vaccine (V64 06) (Z28 21)   9  Refused pneumococcal vaccination (V49 89) (Z78 9)   10  S/P drug eluting coronary stent placement (V45 82) (Z95 5)   11  STEMI (ST elevation myocardial infarction) (410 90) (I21 3)   12  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)   13  Ventral hernia (553 20) (K43 9)    Past Medical History    1  Past myocardial infarction (412) (I25 2)    Surgical History    1  History of Abdominal Aortic Aneurysm Repair For Dilation Or Occlusion    Family History  Father    1  Family history of hypertension (V17 49) (Z82 49)    Social History    · Former smoker (O73 20) (S85 962)    Current Meds    1  Nitrostat 0 4 MG Sublingual Tablet Sublingual (Nitroglycerin); PLACE 1 TABLET UNDER   THE TONGUE EVERY 5 MINUTES FOR UP TO 3 DOSES AS NEEDED   FOR CHEST PAIN  CALL 911 IF PAIN PERSISTS; Therapy: 08PYM9998 to ([facility])  Requested for: 55VRV9690; Last   Rx:26Rmu2884 Ordered    2  Pravastatin Sodium 40 MG Oral Tablet; TAKE 1 TABLET AT BEDTIME; Therapy: 36MJK5942 to (Evaluate:09Jun2017)  Requested for: 30WNL4669; Last   Rx:25Tnb0983 Ordered    3   Effient 10 MG Oral Tablet; TAKE 1 TABLET EVERY OTHER  DAIY  Requested for:   67UBT4478; Last Rx:99Iuj1614 Ordered    4  Nitroglycerin 0 4 MG Sublingual Tablet Sublingual; place 1 tablet under the tongue every   5 minutes for up to 3 doses as needed for chest pain  call 911 if pain   persists; Therapy: 95SAZ4970 to (Evaluate:09Jun2017); Last Rx:09Feb2017 Ordered    5  Metoprolol Tartrate 25 MG Oral Tablet; TAKE 0 5 TABLET Every twelve hours; Therapy: 51SPH3953 to (Evaluate:04Feb2018)  Requested for: 51NWB7575; Last   Rx:09Feb2017 Ordered    6  PHENobarbital 64 8 MG Oral Tablet; one tablet twice a day; Therapy: 44GYR5112 to (Evaluate:23Oct2016)  Requested for: 71Idp3604; Last   Rx:23Ngf0334 Ordered    7  Aspirin 81 MG Oral Tablet Delayed Release; TAKE 1 TABLET DAILY; Therapy: (Recorded:09Feb2017) to Recorded    Allergies    1  atorvastatin   2  Plavix TABS    End of Encounter Meds    1  Nitrostat 0 4 MG Sublingual Tablet Sublingual (Nitroglycerin); PLACE 1 TABLET UNDER   THE TONGUE EVERY 5 MINUTES FOR UP TO 3 DOSES AS NEEDED   FOR CHEST PAIN  CALL 911 IF PAIN PERSISTS; Therapy: 78YWK2085 to (Franklin Erwin)  Requested for: 83AOZ4632; Last   Rx:34Det1028 Ordered    2  Pravastatin Sodium 40 MG Oral Tablet; TAKE 1 TABLET AT BEDTIME; Therapy: 00TRZ0164 to (Evaluate:09Jun2017)  Requested for: 11FXI9374; Last   Rx:09Feb2017 Ordered    3  Effient 10 MG Oral Tablet; TAKE 1 TABLET EVERY OTHER  DAIY  Requested for:   56XBB3475; Last Rx:54Oin8223 Ordered    4  Nitroglycerin 0 4 MG Sublingual Tablet Sublingual; place 1 tablet under the tongue every   5 minutes for up to 3 doses as needed for chest pain  call 911 if pain   persists; Therapy: 78VXT2208 to (Evaluate:09Jun2017); Last Rx:09Feb2017 Ordered    5  Metoprolol Tartrate 25 MG Oral Tablet; TAKE 0 5 TABLET Every twelve hours; Therapy: 17BLS8389 to (Evaluate:04Feb2018)  Requested for: 22MDU9390; Last   Rx:09Feb2017 Ordered    6   PHENobarbital 64 8 MG Oral Tablet; one tablet twice a day; Therapy: 17OOZ3805 to (Evaluate:23Oct2016)  Requested for: 26Apr2016; Last   Rx:26Apr2016 Ordered    7  Aspirin 81 MG Oral Tablet Delayed Release; TAKE 1 TABLET DAILY; Therapy: (Recorded:35Grk9839) to Recorded    Future Appointments    Date/Time Provider Specialty Site   03/02/2017 10:40 AM ROSITA Estevez   Cardiology [facility]   04/06/2017 03:00 PM Julia Amato DO Internal Medicine [address] PCP     Patient Care Team    Care Team Member Role Specialty Office Number   Lykens Tung  Resident Internal Medicine [phone]   [address]  Certified Clinical Nurse Specialist [phone]     Signatures   Electronically signed by : Eva Lee, ; Feb 21 2017  3:59PM EST                       (Author)

## 2018-01-12 VITALS
SYSTOLIC BLOOD PRESSURE: 104 MMHG | HEART RATE: 71 BPM | HEIGHT: 69 IN | BODY MASS INDEX: 30.36 KG/M2 | DIASTOLIC BLOOD PRESSURE: 68 MMHG | WEIGHT: 205 LBS

## 2018-01-12 VITALS
DIASTOLIC BLOOD PRESSURE: 64 MMHG | HEIGHT: 69 IN | TEMPERATURE: 97.5 F | WEIGHT: 205.47 LBS | HEART RATE: 64 BPM | BODY MASS INDEX: 30.43 KG/M2 | SYSTOLIC BLOOD PRESSURE: 102 MMHG

## 2018-01-12 NOTE — PROGRESS NOTES
History of Present Illness  Care Coordination Encounter Information:   Type of Encounter: Telephonic    Spoke to Patient  Care Coordination SL Nurse [facility]: Patient placed a call to me, he was having difficulty getting Effient savings card activated  I looked online on the web site he gave me off of the card  He stated he didn't understand the opening questions and wanted to speak to an actual person  Reviewed questions as on the web site for Mi  He thinks he might have answered incorrectly, he will call the phone number provided back  He also will seek his pharmacist help with this reduction card  Active Problems    1  Acute MI (410 90) (I21 3)   2  Afib (427 31) (I48 91)   3  Atherosclerosis (440 9) (I70 90)   4  Colonoscopy refused (V64 2) (Z53 20)   5  Coronary artery disease (414 00) (I25 10)   6  Eczema (692 9) (L30 9)   7  Hyperlipemia (272 4) (E78 5)   8  Refused influenza vaccine (V64 06) (Z28 21)   9  Refused pneumococcal vaccination (V64 06) (Z28 21)   10  S/P drug eluting coronary stent placement (V45 82) (Z95 5)   11  STEMI (ST elevation myocardial infarction) (410 90) (I21 3)   12  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)   13  Ventral hernia (553 20) (K43 9)    Past Medical History    1  Past myocardial infarction (412) (I25 2)    Surgical History    1  History of Abdominal Aortic Aneurysm Repair For Dilation Or Occlusion    Family History  Father    1  Family history of hypertension (V17 49) (Z82 49)    Social History    · Former smoker (W41 17) (Z61 925)    Current Meds    1  Nitrostat 0 4 MG Sublingual Tablet Sublingual (Nitroglycerin); PLACE 1 TABLET UNDER   THE TONGUE EVERY 5 MINUTES FOR UP TO 3 DOSES AS NEEDED   FOR CHEST PAIN  CALL 911 IF PAIN PERSISTS; Therapy: 59DKT7195 to (Humble Coffey)  Requested for: 66IJG8078; Last   Rx:64Mba6028 Ordered    2  Pravastatin Sodium 40 MG Oral Tablet; TAKE 1 TABLET AT BEDTIME;    Therapy: 18PJP6591 to (Evaluate:09Jun2017) Requested for: 58NFA9840; Last   Rx:80Xey7555 Ordered    3  Effient 10 MG Oral Tablet; TAKE 1 TABLET EVERY OTHER  DAIY  Requested for:   37WFJ2059; Last Rx:49Wpw6202 Ordered    4  Nitroglycerin 0 4 MG Sublingual Tablet Sublingual; place 1 tablet under the tongue every   5 minutes for up to 3 doses as needed for chest pain  call 911 if pain   persists; Therapy: 77AWE0077 to (Evaluate:09Jun2017); Last Rx:09Feb2017 Ordered    5  Metoprolol Tartrate 25 MG Oral Tablet; TAKE 0 5 TABLET Every twelve hours; Therapy: 83UFR6878 to (Evaluate:04Feb2018)  Requested for: 48ZRX8373; Last   Rx:09Feb2017 Ordered    6  PHENobarbital 64 8 MG Oral Tablet; one tablet twice a day; Therapy: 95IOK3420 to (Evaluate:23Oct2016)  Requested for: 01Hxo8194; Last   Rx:09Vgr2108 Ordered    7  Aspirin 81 MG Oral Tablet Delayed Release; TAKE 1 TABLET DAILY; Therapy: (Recorded:09Feb2017) to Recorded    Allergies    1  atorvastatin   2  Plavix TABS    End of Encounter Meds    1  Nitrostat 0 4 MG Sublingual Tablet Sublingual (Nitroglycerin); PLACE 1 TABLET UNDER   THE TONGUE EVERY 5 MINUTES FOR UP TO 3 DOSES AS NEEDED   FOR CHEST PAIN  CALL 911 IF PAIN PERSISTS; Therapy: 68SVQ2381 to (Charles Lundborg)  Requested for: 42PCM1706; Last   Rx:51Vcy8015 Ordered    2  Pravastatin Sodium 40 MG Oral Tablet; TAKE 1 TABLET AT BEDTIME; Therapy: 81AVS8411 to (Evaluate:09Jun2017)  Requested for: 00ULO0967; Last   Rx:09Feb2017 Ordered    3  Effient 10 MG Oral Tablet; TAKE 1 TABLET EVERY OTHER  DAIY  Requested for:   01WXO3108; Last Rx:78Sej1917 Ordered    4  Nitroglycerin 0 4 MG Sublingual Tablet Sublingual; place 1 tablet under the tongue every   5 minutes for up to 3 doses as needed for chest pain  call 911 if pain   persists; Therapy: 73IJC7802 to (Evaluate:09Jun2017); Last Rx:55Rfv6934 Ordered    5  Metoprolol Tartrate 25 MG Oral Tablet; TAKE 0 5 TABLET Every twelve hours;    Therapy: 31BBR3325 to (Evaluate:93Ugq5553)  Requested for: 25UZP0586; Last   Rx:09Feb2017 Ordered    6  PHENobarbital 64 8 MG Oral Tablet; one tablet twice a day; Therapy: 42XHS6997 to (Evaluate:23Oct2016)  Requested for: 26Apr2016; Last   Rx:26Apr2016 Ordered    7  Aspirin 81 MG Oral Tablet Delayed Release; TAKE 1 TABLET DAILY;    Therapy: (Recorded:09Feb2017) to Recorded    Future Appointments    Date/Time Provider Specialty Site   04/06/2017 03:00 PM Chloe Roca, DO Internal Medicine [address] PCP     Patient Care Team    Care Team Member Role Specialty Office Number   Christopher Ellison  Resident Internal Medicine [phone]  Certified Clinical Nurse Specialist [phone]     Signatures   Electronically signed by : Evon Chamberlain, ; Mar  3 2017  1:06PM EST                       (Author)

## 2018-01-12 NOTE — PROCEDURES
Procedures by Eden Heart MD at 1/28/2017   6:07 PM      Author:  Eden Heart MD  Service:  Cardiology  Author Type:  Fellow     Filed:  1/28/2017  6:18 PM  Date of Service:  1/28/2017  6:07 PM  Status:  Attested Addendum     :  Eden Heart MD (Fellow)         Related Notes: Original Note by Eden Heart MD (Fellow) filed at 1/28/2017  6:16 PM        Cosigner:  Eli Vasquez MD at 1/29/2017  3:42 PM         Attestation signed by Eli Vasquez MD at 1/29/2017  3:42 PM                  Teaching Physician Statement  I performed history and exam of patient  I discussed with the Resident  I agree with the resident's documented findings and plan of care  Procedures  Procedure: Left heart catheterization with PCI in the setting of STEMI  Attending: Eli Vasquez    Fellow: Eden Heart    Indication: STEMI Inferior    Findings:    Left main: normal-sized, 30% ostial stenosis    LAD: medium-sized, luminal irregularities throughout with 30% proximal in stent restenosis  CX: normal size, luminal irregularities throughout with 40% proximal and 40% mid vessel lesions  RCA: dominant, with proximal 100% stenosis  This lesion was successfully treated with a 3 25 x 38 mm Xience AGUSTIN and post dilated with 3 5 x20 mm balloon with excellent fluoroscopic result  Patient was hypotensive in the beginning of the procedure  He was fluid resuscitated with 2L of NS through the procedure with improvement in hemodynamic status  He also was found to be in atrial fibrillation which is new per records  Access: Right radial access,  Hemostasis achieved with radial band  Complications: None    Impression: Patient was treated for acute inferior STEMI with AGUSTIN to LAD  He will be started on dual antiplatelet therapy with ASA and Plavix for at least 1 year  Patient will be started on high potency statin   He had episodes of bradycardia and given  hypotension we will avoid beta blockade at this time  He will be evaluated for new onset AFib for need for chronic anticoagulation  Plan: Admit to CCU for observation  DC in am if patients status is satisfactory  The findings of the catheterization were discussed with the patient and attempt was made to inform his family members but there was no answer to phone call                 Received for:Lauro Keane MD  Jan 29 2017  3:42PM [facility] Standard Time

## 2018-01-13 VITALS
WEIGHT: 208.19 LBS | DIASTOLIC BLOOD PRESSURE: 80 MMHG | BODY MASS INDEX: 28.2 KG/M2 | HEART RATE: 66 BPM | SYSTOLIC BLOOD PRESSURE: 120 MMHG | HEIGHT: 72 IN

## 2018-01-13 VITALS
OXYGEN SATURATION: 96 % | SYSTOLIC BLOOD PRESSURE: 120 MMHG | DIASTOLIC BLOOD PRESSURE: 70 MMHG | HEART RATE: 80 BPM | HEIGHT: 69 IN | BODY MASS INDEX: 30.81 KG/M2 | WEIGHT: 208 LBS

## 2018-01-14 VITALS
SYSTOLIC BLOOD PRESSURE: 122 MMHG | BODY MASS INDEX: 27.63 KG/M2 | WEIGHT: 204 LBS | DIASTOLIC BLOOD PRESSURE: 70 MMHG | HEIGHT: 72 IN | HEART RATE: 70 BPM

## 2018-01-18 ENCOUNTER — ALLSCRIPTS OFFICE VISIT (OUTPATIENT)
Dept: OTHER | Facility: OTHER | Age: 73
End: 2018-01-18

## 2018-01-20 NOTE — PROGRESS NOTES
Assessment   Assessed    1  Coronary artery disease (414 00) (I25 10)   2  Hyperlipemia (272 4) (E78 5)   3  S/P drug eluting coronary stent placement (V45 82) (Z95 5)    Plan   Afib    · EKG/ECG- POC; Status:Complete;   Done: 62QQN0094   Perform: In Office; [facility]:13TKK1382; Last Updated By:Mario Tuttle; 1/18/2018 9:59:31 AM;Ordered; For:Afib; Ordered By:Elsa Aguilera;  S/P drug eluting coronary stent placement    · Follow-up visit in 6 months Evaluation and Treatment  Follow-up  Status: Hold For -    Scheduling  Requested for: 58YMV7529   Ordered; For: S/P drug eluting coronary stent placement; Ordered By: Olivia Williamson Performed:  Due: 72XWM3228    Discussion/Summary   Cardiology Discussion Summary Free Text Note Form [facility]:    Asymptomatic in functional class 1  Approximately 1 year since his drug-eluting stent right coronary artery  History of drug-eluting stent in the LAD also  Continues on aspirin and Effient  He can discontinue Effient at this time  I would increase aspirin to 325 mg daily  He refuses statin  He had previously been on Plavix and metoprolol and developed a rash to discontinue both  He does not desire to take metoprolol as blood pressure is controlled  We will continue him on office  I have made no other changes will see him again in 6 months  Chief Complaint   Chief Complaint Free Text Note Form: patient at the office for 6 months follow up, denied any symptoms  History of Present Illness   Cardiology HPI Free Text Note Form [facility]: Jakejorge alberto Yousifch is no complaints  He denies chest pain shortness of breath orthopnea paroxysmal nocturnal dyspnea or syncope  Review of Systems   Cardiology Male ROS:         Cardiac: as noted in HPI  Skin: no rashes seen      Genitourinary: No complaints of recurrent urinary tract infections, frequent urination at night, difficult urination, blood in urine, kidney stones, loss of bladder control, no kidney or prostate problems, no erectile dysfunction  Psychological: No complaints of feeling depressed, anxiety, panic attacks, or difficulty concentrating  General: No complaints of trouble sleeping, lack of energy, fatigue, appetite changes, weight changes, fever, frequent infections, or night sweats  Respiratory: No complaints of shortness of breath, cough with sputum, or wheezing  HEENT: No complaints of serious problems, hearing problems, nose problems, throat problems, or snoring  Gastrointestinal: No complaints of liver problems, nausea, vomiting, heartburn, constipation, bloody stools, diarrhea, problems swallowing, adbominal pain, or rectal bleeding  Hematologic: No complaints of bleeding disorders, anemia, blood clots, or excessive brusing  Neurological: No complaints of numbness, tingling, dizziness, weakness, seizures, headaches, syncope or fainting, AM fatigue, daytime sleepiness, no witnessed apnea episodes  Musculoskeletal: No complaints of arthritis, back pain, or painfull swelling  ROS Reviewed:    ROS reviewed  Active Problems   Problems    1  Acute MI (410 90) (I21 9)   2  Afib (427 31) (I48 91)   3  Atherosclerosis (440 9) (I70 90)   4  Colonoscopy refused (V64 2) (Z53 20)   5  Coronary artery disease (414 00) (I25 10)   6  Eczema (692 9) (L30 9)   7  Hyperlipemia (272 4) (E78 5)   8  Refused influenza vaccine (V64 06) (Z28 21)   9  Refused pneumococcal vaccination (V64 06) (Z28 21)   10  S/P drug eluting coronary stent placement (V45 82) (Z95 5)   11  Screening for AAA (aortic abdominal aneurysm) (V81 2) (Z13 6)   12  STEMI (ST elevation myocardial infarction) (410 90) (I21 3)   13  Tinea pedis of both feet (110 4) (B35 3)   14  Tonic-clonic seizures (345 10) (G40 409)   15  Ventral hernia (553 20) (K43 9)    Past Medical History   Problems    1  Past myocardial infarction (412) (I25 2)  Active Problems And Past Medical History Reviewed:     The active problems and past medical history were reviewed and updated today  Surgical History   Problems    1  History of Abdominal Aortic Aneurysm Repair For Dilation Or Occlusion  Surgical History Reviewed: The surgical history was reviewed and updated today  Family History   Father    1  Family history of hypertension (V17 49) (Z82 49)  Family History Reviewed: The family history was reviewed and updated today  Social History   Problems    · Former smoker (X91 16) (B98 846)  Social History Reviewed: The social history was reviewed and updated today  The social history was reviewed and is unchanged  Current Meds    1  Aspirin 81 MG Oral Tablet Delayed Release; TAKE 1 TABLET DAILY; Therapy: (Recorded:91Sin0147) to Recorded   2  Clotrimazole 1 % External Cream; APPLY SPARINGLY TO AFFECTED AREA(S) 2 TO 3     TIMES DAILY; Therapy: 77PSL0828 to (Crystal Iroquois)  Requested for: 87RER3309; Last     Rx:88Ldu3407 Ordered   3  Effient 10 MG Oral Tablet; take 1 tablet by mouth every morning  Requested for:     02EVL5876; Last Rx:14Oju0932 Ordered   4  Nitroglycerin 0 4 MG Sublingual Tablet Sublingual; place 1 tablet under the tongue every     5 minutes for up to 3 doses as needed for chest pain  call 911 if pain     persists; Therapy: 42MYT8411 to (Evaluate:00Duy7759)  Requested for: 27Oct2017; Last     Rx:18Skv1950 Ordered   5  PHENobarbital 64 8 MG Oral Tablet; one tablet twice a day; Therapy: 05LVN2303 to (Nancy Rajput)  Requested for: 66Des4390; Last     Rx:62Uec2821 Ordered  Medication List Reviewed: The medication list was reviewed and updated today  Allergies   Medication    1  atorvastatin   2   Plavix TABS    Vitals   Vital Signs    Recorded: 73DQE6820 09:51AM   Heart Rate 87   Systolic 801, RUE, Sitting   Diastolic 80, RUE, Sitting   Height 6 ft    Weight 216 lb 9 oz   BMI Calculated 29 37   BSA Calculated 2 2     Physical Exam        Constitutional - General appearance: No acute distress, well appearing and well nourished  Eyes - Conjunctiva and Sclera examination: Conjunctiva pink, sclera anicteric  Neck - Normal, no JVD   Pulmonary - Respiratory effort: No signs of respiratory distress  -- Auscultation of lungs: Clear to auscultation  Cardiovascular - Auscultation of heart: Normal rate and rhythm, normal S1 and S2, no murmurs  -- Pedal pulses: Normal, 2+ bilaterally  -- Examination of extremities for edema and/or varicosities: Normal        Abdomen - Soft  Musculoskeletal - Gait and station: Normal gait  Skin -      Skin and subcutaneous tissue: Abnormal-- rash  Neurologic - Speech normal  No focal deficits        Psychiatric - Orientation to person, place, and time: Normal       Signatures    Electronically signed by : ROSITA Berg ; Jan 19 2018  4:33PM EST                       (Author)

## 2018-01-22 VITALS
WEIGHT: 211.2 LBS | HEIGHT: 72 IN | TEMPERATURE: 97.4 F | SYSTOLIC BLOOD PRESSURE: 118 MMHG | DIASTOLIC BLOOD PRESSURE: 62 MMHG | HEART RATE: 64 BPM | BODY MASS INDEX: 28.61 KG/M2

## 2018-01-22 VITALS
DIASTOLIC BLOOD PRESSURE: 80 MMHG | HEART RATE: 87 BPM | SYSTOLIC BLOOD PRESSURE: 120 MMHG | WEIGHT: 216.56 LBS | BODY MASS INDEX: 29.33 KG/M2 | HEIGHT: 72 IN

## 2018-02-26 RX ORDER — ASPIRIN 81 MG/1
1 TABLET ORAL DAILY
COMMUNITY
End: 2018-02-27

## 2018-02-26 RX ORDER — PRASUGREL 10 MG/1
1 TABLET, FILM COATED ORAL
COMMUNITY
End: 2018-05-22 | Stop reason: ALTCHOICE

## 2018-02-26 RX ORDER — NITROGLYCERIN 0.4 MG/1
1 TABLET SUBLINGUAL
COMMUNITY
Start: 2017-02-09 | End: 2018-02-27 | Stop reason: SDUPTHER

## 2018-02-26 RX ORDER — CLOTRIMAZOLE 1 %
CREAM (GRAM) TOPICAL
COMMUNITY
Start: 2017-09-21 | End: 2018-02-27

## 2018-02-26 RX ORDER — PHENOBARBITAL 64.8 MG/1
1 TABLET ORAL 2 TIMES DAILY
COMMUNITY
Start: 2013-05-08 | End: 2018-02-27 | Stop reason: SDUPTHER

## 2018-02-27 ENCOUNTER — OFFICE VISIT (OUTPATIENT)
Dept: INTERNAL MEDICINE CLINIC | Facility: CLINIC | Age: 73
End: 2018-02-27
Payer: MEDICARE

## 2018-02-27 VITALS
DIASTOLIC BLOOD PRESSURE: 60 MMHG | SYSTOLIC BLOOD PRESSURE: 108 MMHG | WEIGHT: 218.26 LBS | TEMPERATURE: 97.5 F | HEART RATE: 72 BPM | BODY MASS INDEX: 29.6 KG/M2

## 2018-02-27 DIAGNOSIS — Z23 NEED FOR PROPHYLACTIC VACCINATION AND INOCULATION AGAINST INFLUENZA: Primary | ICD-10-CM

## 2018-02-27 DIAGNOSIS — G40.909 SEIZURE DISORDER (HCC): Chronic | ICD-10-CM

## 2018-02-27 DIAGNOSIS — I25.10 CAD (CORONARY ARTERY DISEASE): ICD-10-CM

## 2018-02-27 DIAGNOSIS — I10 ESSENTIAL HYPERTENSION: Chronic | ICD-10-CM

## 2018-02-27 DIAGNOSIS — E78.2 MIXED HYPERLIPIDEMIA: Chronic | ICD-10-CM

## 2018-02-27 DIAGNOSIS — Z23 NEED FOR DIPHTHERIA-TETANUS-PERTUSSIS (TDAP) VACCINE: ICD-10-CM

## 2018-02-27 PROBLEM — F17.200 SMOKING: Chronic | Status: RESOLVED | Noted: 2017-01-28 | Resolved: 2018-02-27

## 2018-02-27 PROBLEM — Z91.148 NON COMPLIANCE W MEDICATION REGIMEN: Chronic | Status: RESOLVED | Noted: 2017-01-28 | Resolved: 2018-02-27

## 2018-02-27 PROBLEM — Z91.14 NON COMPLIANCE W MEDICATION REGIMEN: Chronic | Status: RESOLVED | Noted: 2017-01-28 | Resolved: 2018-02-27

## 2018-02-27 PROCEDURE — 99213 OFFICE O/P EST LOW 20 MIN: CPT | Performed by: INTERNAL MEDICINE

## 2018-02-27 PROCEDURE — 90471 IMMUNIZATION ADMIN: CPT | Performed by: INTERNAL MEDICINE

## 2018-02-27 PROCEDURE — 90715 TDAP VACCINE 7 YRS/> IM: CPT | Performed by: INTERNAL MEDICINE

## 2018-02-27 RX ORDER — PHENOBARBITAL 64.8 MG/1
64.8 TABLET ORAL 2 TIMES DAILY
Qty: 60 TABLET | Refills: 2 | Status: SHIPPED | OUTPATIENT
Start: 2018-02-27 | End: 2018-05-24 | Stop reason: SDUPTHER

## 2018-02-27 RX ORDER — NITROGLYCERIN 0.4 MG/1
0.4 TABLET SUBLINGUAL
Qty: 90 TABLET | Refills: 5 | Status: SHIPPED | OUTPATIENT
Start: 2018-02-27 | End: 2018-05-22 | Stop reason: SDUPTHER

## 2018-02-27 RX ORDER — ASPIRIN 81 MG/1
81 TABLET ORAL DAILY
Qty: 30 TABLET | Refills: 5 | Status: CANCELLED | OUTPATIENT
Start: 2018-02-27 | End: 2018-08-26

## 2018-02-27 RX ORDER — PRASUGREL 10 MG/1
10 TABLET, FILM COATED ORAL DAILY
Qty: 30 TABLET | Refills: 5 | Status: CANCELLED | OUTPATIENT
Start: 2018-02-27 | End: 2018-08-26

## 2018-02-27 RX ORDER — ASPIRIN 325 MG
325 TABLET, DELAYED RELEASE (ENTERIC COATED) ORAL DAILY
Qty: 30 TABLET | Refills: 0 | Status: SHIPPED | OUTPATIENT
Start: 2018-02-27 | End: 2020-01-23

## 2018-02-27 NOTE — ASSESSMENT & PLAN NOTE
CAD w/ STEMI (1/28/17) s/p PCI with AGUSTIN to LAD  Completed 1 year ASA and Effient (Plavix caused rash), Effient DC by Cardiology last month now on ASA 325mg daily  Keep regular follow-up with Dr Barbee Frankel, next due 7/2018

## 2018-02-27 NOTE — PROGRESS NOTES
INTERNAL MEDICINE FOLLOW-UP OFFICE VISIT  [facility]  [address] [place], [place] [address]    NAME: Tiffany Lorenzana  AGE: 67 y o   SEX: male    DATE OF ENCOUNTER: 2/27/2018    Assessment and Plan     Problem List Items Addressed This Visit        Cardiovascular and Mediastinum    HTN (hypertension) (Chronic)     Well controlled off medication, previously on Metoprolol, d/c due to rash, per Cardiology ok to monitor off meds as BP at goal         CAD (coronary artery disease)     CAD w/ STEMI (1/28/17) s/p PCI with AGUSTIN to LAD  Completed 1 year ASA and Effient (Plavix caused rash), Effient DC by Cardiology last month now on ASA 325mg daily  Keep regular follow-up with Dr Tim Andrade, next due 7/2018         Relevant Medications    prasugrel (EFFIENT) tablet    nitroglycerin (NITROSTAT) 0 4 mg SL tablet    aspirin (ECOTRIN) 325 mg EC tablet       Nervous and Auditory    Seizure disorder (HCC) (Chronic)     Last seizure >10 years ago   Patient wishes to continue current Phenobarb regimen         Relevant Medications    PHENobarbital 64 8 mg tablet       Other    HLD (hyperlipidemia) (Chronic)     Patient declines statin, continue dietary control            Other Visit Diagnoses     Need for prophylactic vaccination and inoculation against influenza    -  Primary    Need for diphtheria-tetanus-pertussis (Tdap) vaccine        Relevant Orders    TDAP VACCINE GREATER THAN OR EQUAL TO 8YO IM (Completed)        Orders Placed This Encounter   Procedures    TDAP VACCINE GREATER THAN OR EQUAL TO 8YO IM     - Counseling Documentation: patient was counseled regarding: diagnostic results, instructions for management, risk factor reductions, prognosis, patient and family education, impressions, risks and benefits of treatment options and importance of compliance with treatment    Chief Complaint     Chief Complaint   Patient presents with    Follow-up     routine     History of Present Illness Micha Ochoa is here today for follow-up of chronic conditions including HTN, HLD, CAD with STEMI s/p PCI (AGUSTIN)  He was recently seen by Dr Javier Garcia who advised him to stop Effient and increase ASA to 325mg daily  He reports that he has been feeling well, has no chest pain and does not use his SL nitro frequently and cannot recall the last time he required its use  He denies no N/V/diarrhea or other complaints today  He is feeling well overall  He has been seizure free for over ten years and would like to stay on his Phenobarbital regimen as this has helped keep his seizures at [place] [id number]  Heart Problem   This is a chronic problem  The current episode started more than 1 year ago  The problem occurs rarely  The problem has been resolved  Pertinent negatives include no abdominal pain, chest pain, fatigue or fever  Nothing aggravates the symptoms  Hypertension   This is a chronic problem  The current episode started more than 1 year ago  The problem is unchanged  The problem is controlled  Pertinent negatives include no chest pain  There are no associated agents to hypertension  Risk factors for coronary artery disease include dyslipidemia, male gender and smoking/tobacco exposure  Past treatments include beta blockers  The current treatment provides mild improvement  There are no compliance problems  Hypertensive end-organ damage includes CAD/MI  The following portions of the patient's history were reviewed and updated as appropriate: allergies, current medications, past family history, past medical history, past social history, past surgical history and problem list     Review of Systems     Review of Systems   Constitutional: Negative for fatigue and fever  HENT: Negative  Eyes: Negative  Respiratory: Negative  Cardiovascular: Negative for chest pain  Gastrointestinal: Negative for abdominal pain  Endocrine: Negative  Genitourinary: Negative  Musculoskeletal: Negative  Skin: Negative  Allergic/Immunologic: Negative  Neurological: Negative  Hematological: Negative  All other systems reviewed and are negative  Active Problem List     Patient Active Problem List   Diagnosis    CAD (coronary artery disease)    HTN (hypertension)    HLD (hyperlipidemia)    [facility] ([facility])    S/P AAA (abdominal aortic aneurysm) repair    STEMI (ST elevation myocardial infarction) ([facility])     Objective     /60   Pulse 72   Temp 97 5 °F (36 4 °C)   Wt 99 kg (218 lb 4 1 oz)   BMI 29 60 kg/m²     Physical Exam   Constitutional: He is oriented to person, place, and time  He appears well-developed and well-nourished  No distress  HENT:   Head: Normocephalic and atraumatic  Right Ear: External ear normal    Mouth/Throat: Oropharynx is clear and moist  No oropharyngeal exudate  Eyes: Pupils are equal, round, and reactive to light  Right eye exhibits no discharge  Left eye exhibits no discharge  No scleral icterus  Neck: Normal range of motion  No JVD present  No tracheal deviation present  No thyromegaly present  Cardiovascular: Normal rate, regular rhythm, normal heart sounds and intact distal pulses  Exam reveals no gallop and no friction rub  No murmur heard  Pulmonary/Chest: Effort normal  He has no wheezes  Abdominal: Soft  Ventral hernia, readily reducible, no pain    Musculoskeletal: Normal range of motion  He exhibits no edema or deformity  Neurological: He is alert and oriented to person, place, and time  Skin: Skin is warm and dry  He is not diaphoretic  Psychiatric: He has a normal mood and affect  Nursing note and vitals reviewed      Pertinent Laboratory/Diagnostic Studies:    Current Medications     Current Outpatient Prescriptions:     clotrimazole (LOTRIMIN) 1 % cream, Apply to feet/toes 2 times daily, Disp: 15 g, Rfl: 0    nitroglycerin (NITROSTAT) 0 4 mg SL tablet, Place 1 tablet (0 4 mg total) under the tongue every 5 (five) minutes as needed for chest pain for up to 30 days, Disp: 90 tablet, Rfl: 5    PHENobarbital 64 8 mg tablet, Take 1 tablet (64 8 mg total) by mouth 2 (two) times a day for 180 days, Disp: 60 tablet, Rfl: 2    prasugrel (EFFIENT) tablet, Take 1 tablet by mouth, Disp: , Rfl:     aspirin (ECOTRIN) 325 mg EC tablet, Take 1 tablet (325 mg total) by mouth daily, Disp: 30 tablet, Rfl: 0    Health Maintenance     There are no preventive care reminders to display for this patient  Immunization History   Administered Date(s) Administered    Influenza TIV (IM) 10/22/2009    Td (adult), adsorbed 10/16/2007    Tdap 02/27/2018     Jamari NAGEL    Internal Medicine PGY-3  2/27/2018 11:18 AM

## 2018-03-07 NOTE — PROGRESS NOTES
February 17, 2017    Dear  Jake,       My name is Briant Goldberg  Im a registered nurse with [employer]  I have been trying to reach you via phone to do a follow up on your recent hospital discharge on February 3 rd 2017  The reason for my call is to be a resource to you  Im making sure everything is going well with your medications and physician instructions at home   Please contact me with any questions or concerns, Im available Monday  to Friday from 8am to 430pm     Sincerely,   Briant Goldberg, [facility]  [phone]        Electronically signed by:Lexy  [facility]   Feb 17 2017 10:49AM EST

## 2018-05-03 NOTE — ASSESSMENT & PLAN NOTE
Daily Note     Today's date: 5/3/2018  Patient name: Jie Hendrickson  : 1993  MRN: [id number]  Referring provider: Sruthi Carmona PA-C  Dx:   Encounter Diagnosis     ICD-10-CM    1  Cervicalgia M54 2                   Subjective: Pt reports no change in pain  He brought in MRI results which revealed decreased cervical lordosis and mild bulges in several cervical segments  Objective: See treatment diary below  Precautions: none     Daily Treatment Diary      Manual  4/24  4/30  5/3                 IASTM L Ut, scalenes performed  performed  10 mins                 R mid cervical side glides grade 2 performed    NP                 Manual cervical distraction performed  performed  5 mins                                                                       Exercise Diary  4/24  4/30  5/3                 L UT stretch 30"x3  30"x3  30"x3                 L levator stretch 30"x3  30"x3  3-"x3                 ube bwd nv  5'  5'                 Rhomboid stretch nv  10"x10  NP                 Tband rows nv  GTB 2x10  GTB 2x10                 Tband extensions nv  GTB 2x10  GTB x20                                                                                                                                                                                                                                                                                                                                                                       Modalities       5/3                        MHP pre tx 10 mins                                                                        Assessment: Tolerated treatment well, even with newly added exercises  Patient exhibited good technique with therapeutic exercises and would benefit from continued PT  Trialed IASTM today with sig redness and restriction noted in L UT region and scalenes  Pt reports relief of pain during manual traction however results are short lived   Pt reported feeling "a Well controlled off medication, previously on Metoprolol, d/c due to rash, per Cardiology ok to monitor off meds as BP at goal little looser afterward"  Plan: Continue per plan of care

## 2018-05-22 ENCOUNTER — OFFICE VISIT (OUTPATIENT)
Dept: INTERNAL MEDICINE CLINIC | Facility: CLINIC | Age: 73
End: 2018-05-22
Payer: MEDICARE

## 2018-05-22 VITALS
BODY MASS INDEX: 29.12 KG/M2 | SYSTOLIC BLOOD PRESSURE: 114 MMHG | DIASTOLIC BLOOD PRESSURE: 70 MMHG | TEMPERATURE: 97.3 F | WEIGHT: 214.73 LBS | HEART RATE: 68 BPM

## 2018-05-22 DIAGNOSIS — G40.909 SEIZURE DISORDER (HCC): Chronic | ICD-10-CM

## 2018-05-22 DIAGNOSIS — E78.2 MIXED HYPERLIPIDEMIA: Chronic | ICD-10-CM

## 2018-05-22 DIAGNOSIS — I10 ESSENTIAL HYPERTENSION: Chronic | ICD-10-CM

## 2018-05-22 DIAGNOSIS — Z12.11 SCREENING FOR COLON CANCER: Primary | ICD-10-CM

## 2018-05-22 DIAGNOSIS — I21.02 ST ELEVATION MYOCARDIAL INFARCTION INVOLVING LEFT ANTERIOR DESCENDING (LAD) CORONARY ARTERY (HCC): ICD-10-CM

## 2018-05-22 DIAGNOSIS — I25.10 CAD (CORONARY ARTERY DISEASE): ICD-10-CM

## 2018-05-22 PROCEDURE — 99213 OFFICE O/P EST LOW 20 MIN: CPT | Performed by: INTERNAL MEDICINE

## 2018-05-22 RX ORDER — NITROGLYCERIN 0.4 MG/1
0.4 TABLET SUBLINGUAL
Qty: 90 TABLET | Refills: 0 | Status: SHIPPED | OUTPATIENT
Start: 2018-05-22 | End: 2019-01-10 | Stop reason: SDUPTHER

## 2018-05-22 NOTE — ASSESSMENT & PLAN NOTE
CAD w/ STEMI (1/28/17) s/p PCI with AGUSTIN to LAD  Completed 1 year ASA and Effient (Plavix caused rash), Effient DC by Cardiology last month now on ASA 325mg daily

## 2018-05-22 NOTE — PATIENT INSTRUCTIONS
Coronary Artery Disease, Ambulatory Care   GENERAL INFORMATION:   Coronary artery disease (CAD)  is narrowing of the arteries to your heart caused by a buildup of plaque  Plaque is made up of cholesterol and other substances  The narrowing in your arteries decreases the amount of blood that can flow to your heart  This causes your heart to get less oxygen  You may not have any symptoms of CAD  It is important for you to manage CAD even if you feel well  CAD can lead to a heart attack if it is not managed  Common symptoms include the following:   · Chest pain (angina), causing burning, squeezing, or crushing tightness in your chest    · Pain that spreads to your neck, jaw, or shoulder blade    · Nausea, vomiting, sweating, fainting, and hands and feet that are cold to the touch  Seek immediate care for any of the following symptoms of a heart attack:   · Squeezing, pressure, fullness, or pain in your chest that lasts longer than a few minutes or returns     · Discomfort or pain in your back, neck, jaw, stomach, or arm    · Shortness of breath or breathing problems    · A sudden cold sweat, lightheadedness, dizziness, or nausea, especially with chest pain or trouble breathing  Treatment for CAD  may include any of the following:  · Blood pressure medicines  are given to lower your blood pressure  ACE inhibitors help keep your blood vessels relaxed and open to help keep blood flowing into your heart  Beta-blockers keep your heart pumping strongly and regularly so it does not work too hard to get oxygen  · Cholesterol medicines  help lower high blood cholesterol levels  · Nitrates , such as nitroglycerin, relax the arteries of your heart so it gets more oxygen  They help to relieve your chest pain  · Antiplatelet medicines , such as aspirin, keep platelets from sticking to a damaged part of your artery  Platelets are a part of your blood that stick together to help heal injuries   Platelets may cause a blockage in your artery and keep blood from flowing to your heart  · Blood thinners  keep clots from forming in your blood  Clots may cause heart attacks, strokes, or death  This medicine makes it more likely for you to bleed or bruise  · Angioplasty  may be done to open an artery blocked by plaque  A tube with a balloon on the end is threaded into the blocked artery  Once the tube is in the artery, the balloon is inflated  As the balloon inflates, it presses the plaque against the artery wall to open the artery  A stent may be placed in your artery to keep it open  · Coronary artery bypass graft surgery (CABG)  is open heart surgery  Caregivers take arteries or veins from other areas in your body and use them to bypass or go around the blocked arteries of your heart  Cardiac rehabilitation (rehab): Your healthcare provider may recommend that you attend cardiac rehab  This is a program run by specialists who will help you safely strengthen your heart and reduce the risk of more heart disease  The plan includes exercise, relaxation, stress management, and heart-healthy nutrition  The specialists will also check to make sure any medicines you are taking are working  Manage CAD to prevent a heart attack:   · Exercise regularly  Follow the exercise plan created during cardiac rehab  Your plan may include exercise for at least 30 minutes each day, on most days of the week  Exercise helps to lower high cholesterol and high blood pressure  It can also help you maintain a healthy weight  · Maintain a healthy weight  If you are overweight, talk to your healthcare provider about how to lose weight  A weight loss of 10% can improve your heart health  · Eat heart-healthy foods  Include fresh fruits and vegetables in your meal plan  Choose low-fat foods, such as skim or 1% fat milk, low-fat cheese and yogurt, fish, chicken (without skin), and lean meats   Eat two 4-ounce servings of fish high in omega-3 fats each week, such as salmon, fresh tuna, and herring  Do not eat foods that are high in sodium, such as canned foods, potato chips, salty snacks, and cold cuts  Put less table salt on your food  · Do not smoke  Smoking increases your risk of a heart attack  If you smoke, it is never too late to quit  Ask your healthcare provider for information if you need help quitting  · Limit alcohol  A drink of alcohol is 12 ounces of beer, 5 ounces of wine, or 1½ ounces of liquor  · Manage other health conditions  Follow your healthcare provider's advice on how to manage other conditions that can affect your heart health  These include diabetes, high blood pressure, and high cholesterol  You may need to take medicines for these conditions and make other lifestyle changes  Talk to your healthcare provider if you are depressed  He may recommend treatment for your depression  · Ask if you should have a flu vaccine  The flu can be dangerous for a person who has CAD  The flu vaccine is available every year in the fall  Follow up with your healthcare provider as directed:  Write down your questions so you remember to ask them during your visits  CARE AGREEMENT:   You have the right to help plan your care  Learn about your health condition and how it may be treated  Discuss treatment options with your caregivers to decide what care you want to receive  You always have the right to refuse treatment  The above information is an  only  It is not intended as medical advice for individual conditions or treatments  Talk to your doctor, nurse or pharmacist before following any medical regimen to see if it is safe and effective for you  © 2014 [address] is for End User's use only and may not be sold, redistributed or otherwise used for commercial purposes   All illustrations and images included in CareNotes® are the copyrighted property of A D A M [employer] Inc  or [facility] [employer]

## 2018-05-22 NOTE — ASSESSMENT & PLAN NOTE
Well controlled off medication, previously on Metoprolol, d/c due to rash, per Cardiology ok to monitor off meds as BP at goal

## 2018-05-22 NOTE — PROGRESS NOTES
INTERNAL MEDICINE FOLLOW-UP OFFICE VISIT  [facility]  [address] [place] [address]    NAME: Dale Cristina  AGE: 67 y o  SEX: male    DATE OF ENCOUNTER: 5/21/2018    Assessment and Plan     Problem List Items Addressed This Visit        Cardiovascular and Mediastinum    HTN (hypertension) (Chronic)     Well controlled off medication, previously on Metoprolol, d/c due to rash, per Cardiology ok to monitor off meds as BP at goal         CAD (coronary artery disease)     CAD w/ STEMI (1/28/17) s/p PCI with AGUSTIN to LAD  Completed 1 year ASA and Effient (Plavix caused rash), Effient DC by Cardiology last month now on ASA 325mg daily         STEMI (ST elevation myocardial infarction) ([facility] [address] )       Nervous and Auditory    Seizure disorder ([facility] [address] ) (Chronic)     Last seizure >10 years ago   Patient wishes to continue current Phenobarb regimen            Other    HLD (hyperlipidemia) (Chronic)     Patient defers statin           Other Visit Diagnoses     Screening for colon cancer    -  Primary        No orders of the defined types were placed in this encounter     - Counseling Documentation: patient was counseled regarding: diagnostic results, instructions for management, risk factor reductions, prognosis, patient and family education, impressions, risks and benefits of treatment options and importance of compliance with treatment    Chief Complaint     No chief complaint on file  History of Present Illness     Jese Velez is here today for follow-up of chronic conditions including HTN, HLD, CAD s/p PCI and history of seizures well controlled on Phenobarb  He is feeling well overall today and has no acute complaints  Hypertension   This is a chronic problem  The current episode started more than 1 year ago  The problem is unchanged  The problem is controlled  Pertinent negatives include no chest pain or shortness of breath  There are no associated agents to hypertension   Risk factors for coronary artery disease include dyslipidemia, male gender and sedentary lifestyle  Past treatments include beta blockers and lifestyle changes  The current treatment provides significant improvement  There are no compliance problems  Hypertensive end-organ damage includes CAD/MI  Hyperlipidemia   This is a chronic problem  The current episode started more than 1 year ago  The problem is uncontrolled  Recent lipid tests were reviewed and are high  There are no known factors aggravating his hyperlipidemia  Pertinent negatives include no chest pain, myalgias or shortness of breath  He is currently on no antihyperlipidemic treatment (patient refuses)  There are no compliance problems  Risk factors for coronary artery disease include dyslipidemia, male sex and a sedentary lifestyle  The following portions of the patient's history were reviewed and updated as appropriate: allergies, current medications, past family history, past medical history, past social history, past surgical history and problem list     Review of Systems     Review of Systems   Constitutional: Negative for chills and fever  HENT: Negative  Eyes: Negative  Respiratory: Negative  Negative for shortness of breath  Cardiovascular: Negative for chest pain  Gastrointestinal: Negative  Endocrine: Negative  Musculoskeletal: Negative for myalgias  Skin: Negative  Neurological: Negative  Psychiatric/Behavioral: Negative  All other systems reviewed and are negative  Active Problem List     Patient Active Problem List   Diagnosis    CAD (coronary artery disease)    HTN (hypertension)    HLD (hyperlipidemia)    Seizure disorder (HCC)    S/P AAA (abdominal aortic aneurysm) repair    STEMI (ST elevation myocardial infarction) ([facility] [address] )       Objective     There were no vitals taken for this visit  Physical Exam   Constitutional: He is oriented to person, place, and time   He appears well-developed and well-nourished  No distress  HENT:   Head: Normocephalic and atraumatic  Right Ear: External ear normal    Left Ear: External ear normal    Mouth/Throat: Oropharynx is clear and moist    Eyes: Conjunctivae and EOM are normal  Pupils are equal, round, and reactive to light  No scleral icterus  Neck: Normal range of motion  Neck supple  No tracheal deviation present  Cardiovascular: Normal rate, regular rhythm, normal heart sounds and intact distal pulses  Exam reveals no gallop and no friction rub  No murmur heard  Pulmonary/Chest: Effort normal and breath sounds normal  No respiratory distress  He has no wheezes  Abdominal: Soft  Bowel sounds are normal    Large midline abdominal hernia, no tenderness, stable from prior exams   Musculoskeletal: Normal range of motion  He exhibits no edema  Neurological: He is alert and oriented to person, place, and time  Skin: Skin is warm and dry  He is not diaphoretic  Psychiatric: He has a normal mood and affect  Nursing note and vitals reviewed      Pertinent Laboratory/Diagnostic Studies:    Current Medications     Current Outpatient Prescriptions:     aspirin (ECOTRIN) 325 mg EC tablet, Take 1 tablet (325 mg total) by mouth daily, Disp: 30 tablet, Rfl: 0    clotrimazole (LOTRIMIN) 1 % cream, Apply to feet/toes 2 times daily, Disp: 15 g, Rfl: 0    nitroglycerin (NITROSTAT) 0 4 mg SL tablet, Place 1 tablet (0 4 mg total) under the tongue every 5 (five) minutes as needed for chest pain for up to 30 days, Disp: 90 tablet, Rfl: 5    PHENobarbital 64 8 mg tablet, Take 1 tablet (64 8 mg total) by mouth 2 (two) times a day for 180 days, Disp: 60 tablet, Rfl: 2    prasugrel (EFFIENT) tablet, Take 1 tablet by mouth, Disp: , Rfl:     Health Maintenance     Health Maintenance   Topic Date Due    COLONOSCOPY  1945    Depression Screening PHQ-9  12/28/1957    Fall Risk  12/28/2009    PNEUMOCOCCAL POLYSACCHARIDE VACCINE AGE 65 AND OVER  12/28/2009    GLAUCOMA SCREENING 67+ YR  12/28/2011    INFLUENZA VACCINE  09/01/2018    DTaP,Tdap,and Td Vaccines (2 - Td) 02/27/2028     Immunization History   Administered Date(s) Administered    Influenza TIV (IM) 10/22/2009    Td (adult), adsorbed 10/16/2007    Tdap 02/27/2018     Jose NAGEL    Internal Medicine PGY-3  5/21/2018 9:07 PM

## 2018-05-23 DIAGNOSIS — G40.909 SEIZURE DISORDER (HCC): Chronic | ICD-10-CM

## 2018-05-23 RX ORDER — PHENOBARBITAL 64.8 MG/1
64.8 TABLET ORAL 2 TIMES DAILY
Qty: 60 TABLET | Refills: 0 | Status: CANCELLED | OUTPATIENT
Start: 2018-05-23 | End: 2018-11-19

## 2018-05-24 DIAGNOSIS — G40.909 SEIZURE DISORDER (HCC): Chronic | ICD-10-CM

## 2018-05-24 RX ORDER — PHENOBARBITAL 64.8 MG/1
64.8 TABLET ORAL 2 TIMES DAILY
Qty: 60 TABLET | Refills: 5 | Status: SHIPPED | OUTPATIENT
Start: 2018-05-24 | End: 2018-08-23 | Stop reason: SDUPTHER

## 2018-07-26 ENCOUNTER — OFFICE VISIT (OUTPATIENT)
Dept: CARDIOLOGY CLINIC | Facility: CLINIC | Age: 73
End: 2018-07-26
Payer: MEDICARE

## 2018-07-26 VITALS
HEART RATE: 67 BPM | SYSTOLIC BLOOD PRESSURE: 128 MMHG | WEIGHT: 213 LBS | HEIGHT: 72 IN | BODY MASS INDEX: 28.85 KG/M2 | OXYGEN SATURATION: 95 % | DIASTOLIC BLOOD PRESSURE: 82 MMHG

## 2018-07-26 DIAGNOSIS — I25.10 CORONARY ARTERY DISEASE INVOLVING NATIVE CORONARY ARTERY OF NATIVE HEART WITHOUT ANGINA PECTORIS: Primary | ICD-10-CM

## 2018-07-26 DIAGNOSIS — E78.2 MIXED HYPERLIPIDEMIA: ICD-10-CM

## 2018-07-26 PROCEDURE — 99214 OFFICE O/P EST MOD 30 MIN: CPT | Performed by: INTERNAL MEDICINE

## 2018-07-26 RX ORDER — ATORVASTATIN CALCIUM 40 MG/1
40 TABLET, FILM COATED ORAL DAILY
Qty: 3 TABLET | Refills: 11 | Status: SHIPPED | OUTPATIENT
Start: 2018-07-26 | End: 2019-03-21 | Stop reason: ALTCHOICE

## 2018-07-26 RX ORDER — PRASUGREL 10 MG/1
10 TABLET, FILM COATED ORAL DAILY
COMMUNITY
End: 2018-08-23 | Stop reason: ALTCHOICE

## 2018-07-26 NOTE — PROGRESS NOTES
Cardiology Follow Up    Alvin Chaney  1945  [id number]  [address] [facility] [address] [place] [id number] [phone]    1  Coronary artery disease involving native coronary artery of native heart without angina pectoris     2  Mixed hyperlipidemia  atorvastatin (LIPITOR) 40 mg tablet    Hepatic function panel    Lipid panel       Interval History: Feels well  Denies chest pain shortness of breath orthopnea paroxysmal nocturnal dyspnea or syncope    Patient Active Problem List   Diagnosis    CAD (coronary artery disease)    HTN (hypertension)    HLD (hyperlipidemia)    Seizure disorder (HCC)    S/P AAA (abdominal aortic aneurysm) repair    STEMI (ST elevation myocardial infarction) ([facility] [address] )     Past Medical History:   Diagnosis Date    Cardiac disease     CHF (congestive heart failure) ([facility] )     Myocardial infarction ([facility] )     last assessed 7/31/14, past, Pt had MI s/p AGUSTIN placed in 2001, refuses to take any other medications for his cardiac disease, only will take seizure med  Understands possible complications from this decision     Social History     Social History    Marital status: Single     Spouse name: N/A    Number of children: N/A    Years of education: N/A     Occupational History    Not on file       Social History Main Topics    Smoking status: Former Smoker    Smokeless tobacco: Never Used    Alcohol use No    Drug use: No    Sexual activity: No     Other Topics Concern    Not on file     Social History Narrative    No narrative on file      Family History   Problem Relation Age of Onset    Hypertension Father     Kidney disease Brother      Past Surgical History:   Procedure Laterality Date    ABDOMINAL AORTIC ANEURYSM REPAIR      for dialation or occulsion    CATARACT EXTRACTION         Current Outpatient Prescriptions:     aspirin (ECOTRIN) 325 mg EC tablet, Take 1 tablet (325 mg total) by mouth daily (Patient taking differently: Take 81 mg by mouth  ), Disp: 30 tablet, Rfl: 0    PHENobarbital 64 8 mg tablet, Take 1 tablet (64 8 mg total) by mouth 2 (two) times a day for 180 days, Disp: 60 tablet, Rfl: 5    prasugrel (EFFIENT) tablet, Take 10 mg by mouth daily, Disp: , Rfl:     atorvastatin (LIPITOR) 40 mg tablet, Take 1 tablet (40 mg total) by mouth daily, Disp: 3 tablet, Rfl: 11    clotrimazole (LOTRIMIN) 1 % cream, Apply to feet/toes 2 times daily, Disp: 15 g, Rfl: 0    nitroglycerin (NITROSTAT) 0 4 mg SL tablet, Place 1 tablet (0 4 mg total) under the tongue every 5 (five) minutes as needed for chest pain for up to 30 days, Disp: 90 tablet, Rfl: 0  Allergies   Allergen Reactions    Iodinated Diagnostic Agents Rash     Pt's skin get very red and he gets hot and chills    Atorvastatin     Clopidogrel        Labs:  No visits with results within 2 Month(s) from this visit  Latest known visit with results is:   Appointment on 02/14/2017   Component Date Value    Sodium 02/14/2017 141     Potassium 02/14/2017 4 0     Chloride 02/14/2017 107     CO2 02/14/2017 25     Anion Gap 02/14/2017 9     BUN 02/14/2017 10     Creatinine 02/14/2017 0 98     Glucose 02/14/2017 111     Calcium 02/14/2017 8 7     eGFR 02/14/2017 >60 0     Magnesium 02/14/2017 2 3     WBC 02/14/2017 5 89     RBC 02/14/2017 3 87*    Hemoglobin 02/14/2017 12 7     Hematocrit 02/14/2017 38 9     MCV 02/14/2017 101*    MCH 02/14/2017 32 8     MCHC 02/14/2017 32 6     RDW 02/14/2017 14 2     Platelets 31/84/0735 300     MPV 02/14/2017 9 9      Imaging: No results found  Review of Systems:  Review of Systems   Constitutional: Negative for fatigue  HENT: Negative for nosebleeds  Eyes: Negative for redness  Respiratory: Negative for chest tightness and shortness of breath  Cardiovascular: Negative for chest pain, palpitations and leg swelling     Gastrointestinal: Negative for abdominal pain  Endocrine: Negative for polyuria  Genitourinary: Negative for urgency  Musculoskeletal: Negative for arthralgias  Skin: Negative for rash  Neurological: Negative for dizziness and syncope  Psychiatric/Behavioral: Negative for confusion and sleep disturbance  The patient is not nervous/anxious  Physical Exam:  Physical Exam   Constitutional: He is oriented to person, place, and time  He appears well-developed and well-nourished  HENT:   Head: Normocephalic and atraumatic  Nose: Nose normal    Mouth/Throat: Oropharynx is clear and moist    Eyes: Pupils are equal, round, and reactive to light  Neck: Neck supple  Cardiovascular: Normal rate, regular rhythm, normal heart sounds and intact distal pulses  Pulmonary/Chest: Effort normal and breath sounds normal    Abdominal: Soft  Bowel sounds are normal    Musculoskeletal: Normal range of motion  Neurological: He is alert and oriented to person, place, and time  Skin: Skin is warm and dry  Psychiatric: He has a normal mood and affect  His behavior is normal  Judgment and thought content normal        Discussion/Summary:  Stenting of the right coronary artery February of 2017 in the setting of acute inferior wall myocardial infarction  Continues on aspirin and Effient  I told him he can discontinue his Effient  Blood pressure is controlled  Refuses beta blockade  In the past he has review statin in the was a question about statin causing diarrhea  In the setting of his coronary artery disease and elevated LDL we will it reinstitute Lipitor 40 mg daily  If he does not tolerate this he will notify us  Otherwise I will check a fasting lipid profile and LFTs in 6 weeks    I will see him again in 6 month

## 2018-08-23 ENCOUNTER — OFFICE VISIT (OUTPATIENT)
Dept: INTERNAL MEDICINE CLINIC | Facility: CLINIC | Age: 73
End: 2018-08-23
Payer: MEDICARE

## 2018-08-23 VITALS
HEART RATE: 72 BPM | HEIGHT: 72 IN | DIASTOLIC BLOOD PRESSURE: 60 MMHG | SYSTOLIC BLOOD PRESSURE: 110 MMHG | BODY MASS INDEX: 28.67 KG/M2 | WEIGHT: 211.64 LBS | TEMPERATURE: 97.4 F

## 2018-08-23 DIAGNOSIS — G40.909 SEIZURE DISORDER (HCC): Chronic | ICD-10-CM

## 2018-08-23 DIAGNOSIS — I10 ESSENTIAL HYPERTENSION: Chronic | ICD-10-CM

## 2018-08-23 DIAGNOSIS — E78.2 MIXED HYPERLIPIDEMIA: Chronic | ICD-10-CM

## 2018-08-23 DIAGNOSIS — I25.10 CORONARY ARTERY DISEASE INVOLVING NATIVE CORONARY ARTERY OF NATIVE HEART WITHOUT ANGINA PECTORIS: Primary | ICD-10-CM

## 2018-08-23 PROCEDURE — 99213 OFFICE O/P EST LOW 20 MIN: CPT | Performed by: INTERNAL MEDICINE

## 2018-08-23 RX ORDER — PHENOBARBITAL 64.8 MG/1
64.8 TABLET ORAL 2 TIMES DAILY
Qty: 60 TABLET | Refills: 5 | Status: SHIPPED | OUTPATIENT
Start: 2018-08-23 | End: 2019-03-11 | Stop reason: SDUPTHER

## 2018-08-23 NOTE — PROGRESS NOTES
INTERNAL MEDICINE FOLLOW-UP OFFICE VISIT  [facility]  [address] [place] [address]  [address]    NAME: Mckenzie Caldera  AGE: 67 y o  SEX: male    DATE OF ENCOUNTER: 8/23/2018    Assessment and Plan     Problem List Items Addressed This Visit        Cardiovascular and Mediastinum    HTN (hypertension) (Chronic)     Moderate off medications per Cardiology  · Check routine lab CBC, CMP and A1c screen         Relevant Orders    CBC and differential    Comprehensive metabolic panel    Hemoglobin A1C    CAD (coronary artery disease) - Primary     · History of inferior wall MI and stenting to RCA and LAD in January 2017  · The patient was on ASA and Effient for 1 year  He is not on beta-blocker and Plavix as cause rash  Patient was recently seen in Cardiology GI office in July and now he is just on ASA as he was instructed to discontinue Effient  Lipitor 40 mg was restarted at that time  · Follow-up with Cardiology in 6 months  · Continue medical management as outlined above            Nervous and Auditory    Seizure disorder ([address] ) (Chronic)     Stable  Maintained on phenobarbital 64 8 mg twice daily         Relevant Medications    PHENobarbital 64 8 mg tablet       Other    HLD (hyperlipidemia) (Chronic)     Continue statin  Needs repeat lipid panel and LFTs S statin was recently restarted at last cardiology visit in July 2018         Relevant Orders    Comprehensive metabolic panel          Orders Placed This Encounter   Procedures    CBC and differential    Comprehensive metabolic panel    Hemoglobin A1C       - Counseling Documentation: patient was counseled regarding: diagnostic results, instructions for management, risk factor reductions, prognosis, patient and family education, impressions, risks and benefits of treatment options and importance of compliance with treatment  - Counseling Time: not applicable  - Barriers to treatment: None  - Medication Side Effects:  Adverse side effects of medications were reviewed with the patient/guardian today  - Self Referrals: No    Chief Complaint     Chief Complaint   Patient presents with    Follow-up    Medication Refill       History of Present Illness     Patient is here for follow-up today  Patient is doing well  He denies any chest pain  He reports compliant with his aspirin and Plavix  His Effient was stopped last month per Cardiology  He is not on beta-blocker as cause rash in the past   He is having no side effects from starting atorvastatin  He has no lower extremity edema, shortness of breath  He denies fevers, chills, nausea or vomiting  Patient is requesting refill for his phenobarbital today  The following portions of the patient's history were reviewed and updated as appropriate: allergies, current medications, past family history, past medical history, past social history, past surgical history and problem list     Review of Systems     Review of Systems   Constitutional: Negative for activity change, chills, fatigue and fever  HENT: Negative  Eyes: Negative  Respiratory: Negative for shortness of breath and wheezing  Cardiovascular: Negative for chest pain, palpitations and leg swelling  Gastrointestinal: Negative for abdominal distention, constipation, diarrhea, nausea and vomiting  Endocrine: Negative  Genitourinary: Negative  Musculoskeletal: Negative for arthralgias and back pain  Skin: Negative  Neurological: Negative for dizziness, tremors, seizures, syncope, weakness and headaches  Psychiatric/Behavioral: Negative          Active Problem List     Patient Active Problem List   Diagnosis    CAD (coronary artery disease)    HTN (hypertension)    HLD (hyperlipidemia)    Seizure disorder (HCC)    S/P AAA (abdominal aortic aneurysm) repair    STEMI (ST elevation myocardial infarction) ([facility] [address] )       Objective     /60 (BP Location: Right arm, Patient Position: Sitting, Cuff Size: Adult)   Pulse 72   Temp (!) 97 4 °F (36 3 °C) (Oral)   Ht 6' (1 829 m)   Wt 96 kg (211 lb 10 3 oz)   BMI 28 70 kg/m²     Physical Exam   Constitutional: He is oriented to person, place, and time  He appears well-developed and well-nourished  HENT:   Head: Normocephalic and atraumatic  Eyes: Pupils are equal, round, and reactive to light  Neck: Normal range of motion  Cardiovascular: Normal rate and regular rhythm  Exam reveals no gallop and no friction rub  No murmur heard  Pulmonary/Chest: Effort normal  No respiratory distress  He has no wheezes  Abdominal: Soft  Bowel sounds are normal  He exhibits no distension  There is no tenderness  Musculoskeletal: Normal range of motion  He exhibits no edema  Neurological: He is alert and oriented to person, place, and time  No cranial nerve deficit  Skin: Skin is warm and dry  No erythema  Vitals reviewed        Pertinent Laboratory/Diagnostic Studies:  CBC:   Lab Results   Component Value Date/Time    WBC 5 89 02/14/2017 08:52 AM    RBC 3 87 (L) 02/14/2017 08:52 AM    HGB 12 7 02/14/2017 08:52 AM    HCT 38 9 02/14/2017 08:52 AM     (H) 02/14/2017 08:52 AM    MCH 32 8 02/14/2017 08:52 AM    MCHC 32 6 02/14/2017 08:52 AM    RDW 14 2 02/14/2017 08:52 AM    MPV 9 9 02/14/2017 08:52 AM     02/14/2017 08:52 AM    NRBC 0 02/03/2017 05:16 AM    NEUTOPHILPCT 59 02/03/2017 05:16 AM    LYMPHOPCT 29 02/03/2017 05:16 AM    MONOPCT 10 02/03/2017 05:16 AM    EOSPCT 1 02/03/2017 05:16 AM    BASOPCT 1 02/03/2017 05:16 AM    NEUTROABS 4 18 02/03/2017 05:16 AM    LYMPHSABS 2 11 02/03/2017 05:16 AM    MONOSABS 0 74 02/03/2017 05:16 AM    EOSABS 0 04 02/03/2017 05:16 AM     Chemistry Profile:   Lab Results   Component Value Date/Time     02/14/2017 08:52 AM    K 4 0 02/14/2017 08:52 AM     02/14/2017 08:52 AM    CO2 25 02/14/2017 08:52 AM    ANIONGAP 9 02/14/2017 08:52 AM    BUN 10 02/14/2017 08:52 AM    CREATININE 0 98 02/14/2017 08:52 AM    GLUCOSE 111 02/14/2017 08:52 AM    GLUCOSE 125 01/28/2017 05:08 PM    CALCIUM 8 7 02/14/2017 08:52 AM    MG 2 3 02/14/2017 08:52 AM    EGFR >60 0 02/14/2017 08:52 AM    EGFR >60 0 01/28/2017 05:08 PM     Coagulation Studies:   Lab Results   Component Value Date/Time    PROTIME 13 7 01/28/2017 05:23 PM    INR 1 04 01/28/2017 05:23 PM    PTT 28 01/28/2017 05:23 PM         Current Medications     Current Outpatient Prescriptions:     aspirin (ECOTRIN) 325 mg EC tablet, Take 1 tablet (325 mg total) by mouth daily (Patient taking differently: Take 81 mg by mouth  ), Disp: 30 tablet, Rfl: 0    atorvastatin (LIPITOR) 40 mg tablet, Take 1 tablet (40 mg total) by mouth daily, Disp: 3 tablet, Rfl: 11    clotrimazole (LOTRIMIN) 1 % cream, Apply to feet/toes 2 times daily, Disp: 15 g, Rfl: 0    PHENobarbital 64 8 mg tablet, Take 1 tablet (64 8 mg total) by mouth 2 (two) times a day for 180 days, Disp: 60 tablet, Rfl: 5    nitroglycerin (NITROSTAT) 0 4 mg SL tablet, Place 1 tablet (0 4 mg total) under the tongue every 5 (five) minutes as needed for chest pain for up to 30 days, Disp: 90 tablet, Rfl: 0    Health Maintenance     Health Maintenance   Topic Date Due    Depression Screening PHQ-9  1945    Fall Risk  12/28/2009    Pneumococcal PPSV23/PCV13 65+ Years / Low and Medium Risk (1 of 2 - PCV13) 08/23/2019 (Originally 12/28/2009)    INFLUENZA VACCINE  09/01/2018    DTaP,Tdap,and Td Vaccines (2 - Td) 02/27/2028    CRC Screening: Colonoscopy  05/22/2028     Immunization History   Administered Date(s) Administered    Influenza TIV (IM) 10/22/2009    Td (adult), adsorbed 10/16/2007    Tdap 02/27/2018       [facility]yen Clore [facility]  8/23/2018 1:32 PM

## 2018-08-23 NOTE — ASSESSMENT & PLAN NOTE
· History of inferior wall MI and stenting to RCA and LAD in January 2017  · The patient was on ASA and Effient for 1 year  He is not on beta-blocker and Plavix as cause rash  Patient was recently seen in Cardiology GI office in July and now he is just on ASA as he was instructed to discontinue Effient    Lipitor 40 mg was restarted at that time  · Follow-up with Cardiology in 6 months  · Continue medical management as outlined above

## 2018-08-23 NOTE — ASSESSMENT & PLAN NOTE
Continue statin    Needs repeat lipid panel and LFTs S statin was recently restarted at last cardiology visit in July 2018

## 2018-11-27 ENCOUNTER — APPOINTMENT (OUTPATIENT)
Dept: LAB | Facility: CLINIC | Age: 73
End: 2018-11-27
Payer: MEDICARE

## 2018-11-27 DIAGNOSIS — E78.2 MIXED HYPERLIPIDEMIA: ICD-10-CM

## 2018-11-27 DIAGNOSIS — I10 ESSENTIAL HYPERTENSION: ICD-10-CM

## 2018-11-27 LAB
ALBUMIN SERPL BCP-MCNC: 3.6 G/DL (ref 3.5–5)
ALP SERPL-CCNC: 152 U/L (ref 46–116)
ALT SERPL W P-5'-P-CCNC: 12 U/L (ref 12–78)
ANION GAP SERPL CALCULATED.3IONS-SCNC: 6 MMOL/L (ref 4–13)
AST SERPL W P-5'-P-CCNC: 7 U/L (ref 5–45)
BASOPHILS # BLD AUTO: 0.04 THOUSANDS/ΜL (ref 0–0.1)
BASOPHILS NFR BLD AUTO: 1 % (ref 0–1)
BILIRUB DIRECT SERPL-MCNC: 0.12 MG/DL (ref 0–0.2)
BILIRUB SERPL-MCNC: 0.26 MG/DL (ref 0.2–1)
BUN SERPL-MCNC: 16 MG/DL (ref 5–25)
CALCIUM SERPL-MCNC: 9.1 MG/DL (ref 8.3–10.1)
CHLORIDE SERPL-SCNC: 105 MMOL/L (ref 100–108)
CHOLEST SERPL-MCNC: 232 MG/DL (ref 50–200)
CO2 SERPL-SCNC: 26 MMOL/L (ref 21–32)
CREAT SERPL-MCNC: 0.9 MG/DL (ref 0.6–1.3)
EOSINOPHIL # BLD AUTO: 0.14 THOUSAND/ΜL (ref 0–0.61)
EOSINOPHIL NFR BLD AUTO: 3 % (ref 0–6)
ERYTHROCYTE [DISTWIDTH] IN BLOOD BY AUTOMATED COUNT: 15.1 % (ref 11.6–15.1)
GFR SERPL CREATININE-BSD FRML MDRD: 84 ML/MIN/1.73SQ M
GLUCOSE P FAST SERPL-MCNC: 90 MG/DL (ref 65–99)
HCT VFR BLD AUTO: 41.8 % (ref 36.5–49.3)
HDLC SERPL-MCNC: 52 MG/DL (ref 40–60)
HGB BLD-MCNC: 13.5 G/DL (ref 12–17)
IMM GRANULOCYTES # BLD AUTO: 0.01 THOUSAND/UL (ref 0–0.2)
IMM GRANULOCYTES NFR BLD AUTO: 0 % (ref 0–2)
LDLC SERPL CALC-MCNC: 167 MG/DL (ref 0–100)
LYMPHOCYTES # BLD AUTO: 1.14 THOUSANDS/ΜL (ref 0.6–4.47)
LYMPHOCYTES NFR BLD AUTO: 25 % (ref 14–44)
MCH RBC QN AUTO: 33 PG (ref 26.8–34.3)
MCHC RBC AUTO-ENTMCNC: 32.3 G/DL (ref 31.4–37.4)
MCV RBC AUTO: 102 FL (ref 82–98)
MONOCYTES # BLD AUTO: 0.37 THOUSAND/ΜL (ref 0.17–1.22)
MONOCYTES NFR BLD AUTO: 8 % (ref 4–12)
NEUTROPHILS # BLD AUTO: 2.83 THOUSANDS/ΜL (ref 1.85–7.62)
NEUTS SEG NFR BLD AUTO: 63 % (ref 43–75)
NONHDLC SERPL-MCNC: 180 MG/DL
NRBC BLD AUTO-RTO: 0 /100 WBCS
PLATELET # BLD AUTO: 158 THOUSANDS/UL (ref 149–390)
PMV BLD AUTO: 10.4 FL (ref 8.9–12.7)
POTASSIUM SERPL-SCNC: 4.2 MMOL/L (ref 3.5–5.3)
PROT SERPL-MCNC: 7.9 G/DL (ref 6.4–8.2)
RBC # BLD AUTO: 4.09 MILLION/UL (ref 3.88–5.62)
SODIUM SERPL-SCNC: 137 MMOL/L (ref 136–145)
TRIGL SERPL-MCNC: 63 MG/DL
WBC # BLD AUTO: 4.53 THOUSAND/UL (ref 4.31–10.16)

## 2018-11-27 PROCEDURE — 82248 BILIRUBIN DIRECT: CPT

## 2018-11-27 PROCEDURE — 83036 HEMOGLOBIN GLYCOSYLATED A1C: CPT

## 2018-11-27 PROCEDURE — 85025 COMPLETE CBC W/AUTO DIFF WBC: CPT

## 2018-11-27 PROCEDURE — 80053 COMPREHEN METABOLIC PANEL: CPT

## 2018-11-27 PROCEDURE — 36415 COLL VENOUS BLD VENIPUNCTURE: CPT

## 2018-11-27 PROCEDURE — 80061 LIPID PANEL: CPT

## 2018-11-28 LAB
EST. AVERAGE GLUCOSE BLD GHB EST-MCNC: 114 MG/DL
HBA1C MFR BLD: 5.6 % (ref 4.2–6.3)

## 2019-01-10 ENCOUNTER — OFFICE VISIT (OUTPATIENT)
Dept: CARDIOLOGY CLINIC | Facility: CLINIC | Age: 74
End: 2019-01-10
Payer: MEDICARE

## 2019-01-10 VITALS
HEART RATE: 76 BPM | SYSTOLIC BLOOD PRESSURE: 112 MMHG | BODY MASS INDEX: 29.16 KG/M2 | DIASTOLIC BLOOD PRESSURE: 62 MMHG | OXYGEN SATURATION: 99 % | HEIGHT: 72 IN | WEIGHT: 215.3 LBS

## 2019-01-10 DIAGNOSIS — E78.2 MIXED HYPERLIPIDEMIA: Chronic | ICD-10-CM

## 2019-01-10 DIAGNOSIS — R07.9 CHEST PAIN, UNSPECIFIED TYPE: Primary | ICD-10-CM

## 2019-01-10 DIAGNOSIS — I25.10 CORONARY ARTERY DISEASE INVOLVING NATIVE CORONARY ARTERY OF NATIVE HEART WITHOUT ANGINA PECTORIS: Primary | ICD-10-CM

## 2019-01-10 DIAGNOSIS — I10 ESSENTIAL HYPERTENSION: Chronic | ICD-10-CM

## 2019-01-10 PROCEDURE — 99214 OFFICE O/P EST MOD 30 MIN: CPT | Performed by: INTERNAL MEDICINE

## 2019-01-10 RX ORDER — NITROGLYCERIN 0.4 MG/1
0.4 TABLET SUBLINGUAL
Qty: 90 TABLET | Refills: 0 | Status: SHIPPED | OUTPATIENT
Start: 2019-01-10 | End: 2019-07-30 | Stop reason: SDUPTHER

## 2019-01-10 RX ORDER — ROSUVASTATIN CALCIUM 5 MG/1
5 TABLET, COATED ORAL DAILY
Qty: 30 TABLET | Refills: 5 | Status: SHIPPED | OUTPATIENT
Start: 2019-01-10 | End: 2019-07-17

## 2019-01-10 NOTE — PROGRESS NOTES
Cardiology Follow Up    Angle Hogan  1945  [id number]  [address] [facility] [address] [address] [place], [place] [id number] [phone]    1  Coronary artery disease involving native coronary artery of native heart without angina pectoris     2  Essential hypertension     3  Mixed hyperlipidemia  rosuvastatin (CRESTOR) 5 mg tablet    Hepatic function panel    Lipid panel       Interval History:  No complaints  Tolerates all activity  Denies chest pain shortness of breath orthopnea paroxysmal nocturnal dyspnea or syncope  Discontinued atorvastatin secondary to leg pain that resolved quickly with discontinue was a shin  Currently is not on a statin  Patient Active Problem List   Diagnosis    CAD (coronary artery disease)    HTN (hypertension)    HLD (hyperlipidemia)    Seizure disorder (HCC)    S/P AAA (abdominal aortic aneurysm) repair    STEMI (ST elevation myocardial infarction) ([facility] [address] )     Past Medical History:   Diagnosis Date    Cardiac disease     CHF (congestive heart failure) ([facility] [address] )     Myocardial infarction ([facility] )     last assessed 7/31/14, past, Pt had MI s/p AGUSTIN placed in 2001, refuses to take any other medications for his cardiac disease, only will take seizure med  Understands possible complications from this decision     Social History     Social History    Marital status: Single     Spouse name: N/A    Number of children: N/A    Years of education: N/A     Occupational History    Not on file       Social History Main Topics    Smoking status: Former Smoker     Quit date: 6/5/2005    Smokeless tobacco: Never Used    Alcohol use No    Drug use: No    Sexual activity: No     Other Topics Concern    Not on file     Social History Narrative    No narrative on file      Family History   Problem Relation Age of Onset    Hypertension Father     Kidney disease Brother      Past Surgical History: Procedure Laterality Date    ABDOMINAL AORTIC ANEURYSM REPAIR      for dialation or occulsion    CATARACT EXTRACTION         Current Outpatient Prescriptions:     aspirin (ECOTRIN) 325 mg EC tablet, Take 1 tablet (325 mg total) by mouth daily (Patient taking differently: Take 81 mg by mouth daily  ), Disp: 30 tablet, Rfl: 0    clotrimazole (LOTRIMIN) 1 % cream, Apply to feet/toes 2 times daily, Disp: 15 g, Rfl: 0    nitroglycerin (NITROSTAT) 0 4 mg SL tablet, Place 1 tablet (0 4 mg total) under the tongue every 5 (five) minutes as needed for chest pain for up to 30 days, Disp: 90 tablet, Rfl: 0    PHENobarbital 64 8 mg tablet, Take 1 tablet (64 8 mg total) by mouth 2 (two) times a day for 180 days, Disp: 60 tablet, Rfl: 5    atorvastatin (LIPITOR) 40 mg tablet, Take 1 tablet (40 mg total) by mouth daily (Patient not taking: Reported on 1/10/2019 ), Disp: 3 tablet, Rfl: 11    rosuvastatin (CRESTOR) 5 mg tablet, Take 1 tablet (5 mg total) by mouth daily, Disp: 30 tablet, Rfl: 5  Allergies   Allergen Reactions    Iodinated Diagnostic Agents Rash     Pt's skin get very red and he gets hot and chills    Clopidogrel        Labs:  Appointment on 11/27/2018   Component Date Value    WBC 11/27/2018 4 53     RBC 11/27/2018 4 09     Hemoglobin 11/27/2018 13 5     Hematocrit 11/27/2018 41 8     MCV 11/27/2018 102*    MCH 11/27/2018 33 0     MCHC 11/27/2018 32 3     RDW 11/27/2018 15 1     MPV 11/27/2018 10 4     Platelets 68/04/4032 158     nRBC 11/27/2018 0     Neutrophils Relative 11/27/2018 63     Immat GRANS % 11/27/2018 0     Lymphocytes Relative 11/27/2018 25     Monocytes Relative 11/27/2018 8     Eosinophils Relative 11/27/2018 3     Basophils Relative 11/27/2018 1     Neutrophils Absolute 11/27/2018 2 83     Immature Grans Absolute 11/27/2018 0 01     Lymphocytes Absolute 11/27/2018 1 14     Monocytes Absolute 11/27/2018 0 37     Eosinophils Absolute 11/27/2018 0 14     Basophils Absolute 11/27/2018 0 04     Sodium 11/27/2018 137     Potassium 11/27/2018 4 2     Chloride 11/27/2018 105     CO2 11/27/2018 26     ANION GAP 11/27/2018 6     BUN 11/27/2018 16     Creatinine 11/27/2018 0 90     Glucose, Fasting 11/27/2018 90     Calcium 11/27/2018 9 1     AST 11/27/2018 7     ALT 11/27/2018 12     Alkaline Phosphatase 11/27/2018 152*    Total Protein 11/27/2018 7 9     Albumin 11/27/2018 3 6     Total Bilirubin 11/27/2018 0 26     eGFR 11/27/2018 84     Hemoglobin A1C 11/27/2018 5 6     EAG 11/27/2018 114     Cholesterol 11/27/2018 232*    Triglycerides 11/27/2018 63     HDL, Direct 11/27/2018 52     LDL Calculated 11/27/2018 167*    Non-HDL-Chol (CHOL-HDL) 11/27/2018 180     Bilirubin, Direct 11/27/2018 0 12      Imaging: No results found  Review of Systems:  Review of Systems   Constitutional: Negative for fatigue  HENT: Negative for nosebleeds  Eyes: Negative for redness  Respiratory: Negative for chest tightness and shortness of breath  Cardiovascular: Negative for chest pain, palpitations and leg swelling  Gastrointestinal: Negative for abdominal pain  Endocrine: Negative for polyuria  Genitourinary: Negative for urgency  Musculoskeletal: Negative for arthralgias  Skin: Negative for rash  Neurological: Negative for dizziness and syncope  Psychiatric/Behavioral: Negative for confusion and sleep disturbance  The patient is not nervous/anxious  Physical Exam:  Physical Exam   Constitutional: He is oriented to person, place, and time  He appears well-developed and well-nourished  HENT:   Head: Normocephalic and atraumatic  Nose: Nose normal    Mouth/Throat: Oropharynx is clear and moist    Eyes: Pupils are equal, round, and reactive to light  Neck: Neck supple  Cardiovascular: Normal rate, regular rhythm, normal heart sounds and intact distal pulses  Pulmonary/Chest: Effort normal and breath sounds normal    Abdominal: Soft  Bowel sounds are normal    Musculoskeletal: Normal range of motion  Neurological: He is alert and oriented to person, place, and time  Skin: Skin is warm and dry  Psychiatric: He has a normal mood and affect  His behavior is normal  Judgment and thought content normal        Discussion/Summary:  Asymptomatic in functional class 1  Continues on aspirin  Did not tolerate atorvastatin  I will begin him on Crestor 5 mg 3 times weekly which if he tolerates we will increase to once daily  If he is not able stay on it secondary to symptoms he will call us for other options  If he does continue on he will check a fasting lipid profile and LFTs in 3 months  Blood pressure controlled currently normal without medication  I will see him again in 6 months

## 2019-02-13 DIAGNOSIS — G40.909 SEIZURE DISORDER (HCC): Chronic | ICD-10-CM

## 2019-02-13 RX ORDER — PHENOBARBITAL 64.8 MG/1
64.8 TABLET ORAL 2 TIMES DAILY
Qty: 60 TABLET | Refills: 5 | OUTPATIENT
Start: 2019-02-13 | End: 2019-08-12

## 2019-02-13 NOTE — TELEPHONE ENCOUNTER
LMOM THAT RX WAS PICKED UP ON 2/2/19 PER THE PHARMACIST AND HE SHOULD NOT NEED A REFILL UNTIL MARCH   ANY QUESTIONS TO C/B

## 2019-02-13 NOTE — TELEPHONE ENCOUNTER
According to [address] last filled 30 day supply 2-1-2019, so next due 3-3-2019 by my calculations      Dr Monika Og

## 2019-02-13 NOTE — TELEPHONE ENCOUNTER
Hey, I cannot refill his phenobarbital as it is a scheduled drug and needs an attending with [facility] number to prescribe    Thanks

## 2019-03-09 DIAGNOSIS — G40.909 SEIZURE DISORDER (HCC): Chronic | ICD-10-CM

## 2019-03-11 DIAGNOSIS — G40.909 SEIZURE DISORDER (HCC): Chronic | ICD-10-CM

## 2019-03-11 RX ORDER — PHENOBARBITAL 64.8 MG/1
64.8 TABLET ORAL 2 TIMES DAILY
Qty: 60 TABLET | Refills: 5 | Status: SHIPPED | OUTPATIENT
Start: 2019-03-11 | End: 2019-03-11 | Stop reason: SDUPTHER

## 2019-03-11 RX ORDER — PHENOBARBITAL 64.8 MG/1
64.8 TABLET ORAL 2 TIMES DAILY
Qty: 60 TABLET | Refills: 5 | Status: SHIPPED | OUTPATIENT
Start: 2019-03-11 | End: 2019-09-04 | Stop reason: SDUPTHER

## 2019-03-11 RX ORDER — PHENOBARBITAL 64.8 MG/1
TABLET ORAL
Qty: 60 TABLET | OUTPATIENT
Start: 2019-03-11

## 2019-03-21 ENCOUNTER — OFFICE VISIT (OUTPATIENT)
Dept: INTERNAL MEDICINE CLINIC | Facility: CLINIC | Age: 74
End: 2019-03-21

## 2019-03-21 VITALS
HEIGHT: 72 IN | WEIGHT: 221.34 LBS | TEMPERATURE: 97.6 F | BODY MASS INDEX: 29.98 KG/M2 | DIASTOLIC BLOOD PRESSURE: 76 MMHG | SYSTOLIC BLOOD PRESSURE: 120 MMHG | HEART RATE: 72 BPM

## 2019-03-21 DIAGNOSIS — I10 ESSENTIAL HYPERTENSION: Chronic | ICD-10-CM

## 2019-03-21 DIAGNOSIS — E78.2 MIXED HYPERLIPIDEMIA: Primary | Chronic | ICD-10-CM

## 2019-03-21 DIAGNOSIS — I25.10 CORONARY ARTERY DISEASE INVOLVING NATIVE CORONARY ARTERY OF NATIVE HEART WITHOUT ANGINA PECTORIS: ICD-10-CM

## 2019-03-21 PROBLEM — I21.3 STEMI (ST ELEVATION MYOCARDIAL INFARCTION) (HCC): Status: RESOLVED | Noted: 2017-01-28 | Resolved: 2019-03-21

## 2019-03-21 PROCEDURE — 99213 OFFICE O/P EST LOW 20 MIN: CPT | Performed by: INTERNAL MEDICINE

## 2019-03-21 NOTE — ASSESSMENT & PLAN NOTE
Needs repeat lipid panel in 3 months, already ordered by Cardiology    Due next month    Continue Crestor

## 2019-03-21 NOTE — PROGRESS NOTES
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 January 2017  Patient was on ASA nephew x1 year now was just maintained on ASA  No Plavix and beta-blocker per Cardiology as caused rash inpatient  Patient follows with Cardiology and had a recent appointment in January    Continue ASA and statin            Other    HLD (hyperlipidemia) - Primary (Chronic)     Needs repeat lipid panel in 3 months, already ordered by Cardiology  Due next month    Continue Crestor               No orders of the defined types were placed in this encounter       - Counseling Documentation: patient was counseled regarding: diagnostic results, instructions for management, risk factor reductions, prognosis, patient and family education, impressions, risks and benefits of treatment options and importance of compliance with treatment  - Counseling Time: not applicable  - Barriers to treatment: None  - Medication Side Effects: Adverse side effects of medications were reviewed with the patient/guardian today  - Self Referrals: No    Chief Complaint     Chief Complaint   Patient presents with    Follow-up     routine 6 month        History of Present Illness     Mr Shar Vivar is a 19-year-old male with a history of hypertension, hyperlipidemia, coronary artery disease, seizure disorder who is here in clinic today for follow-up  Patient states he is doing well  He is compliant with his medication regimen    He is still taking his rosuvastatin every other day as instructed by Cardiology and has not had any adverse reactions with this statin  He has not increased this to once daily  Patient states he feels well  He offers no complaints  He denies any chest pain or shortness of breath  He denies any lower extremity edema  He denies fevers, chills, nausea or vomiting  Patient was recently seen by call Cardiology in January 2019  Patient had inferior wall MI with stenting to RCA and LAD in January 2017  He was maintained on ASA and Effient for a year and now is just on ASA      The following portions of the patient's history were reviewed and updated as appropriate: allergies, current medications, past family history, past medical history, past social history, past surgical history and problem list     Review of Systems     Review of Systems   Constitutional: Negative for chills, fatigue and fever  HENT: Negative  Respiratory: Negative for shortness of breath and wheezing  Cardiovascular: Negative for chest pain, palpitations and leg swelling  Gastrointestinal: Negative for abdominal pain  Musculoskeletal: Negative for arthralgias  Neurological: Negative for dizziness, tremors, seizures, weakness and light-headedness  Psychiatric/Behavioral: Negative  Active Problem List     Patient Active Problem List   Diagnosis    CAD (coronary artery disease)    HTN (hypertension)    HLD (hyperlipidemia)    Seizure disorder (HCC)    S/P AAA (abdominal aortic aneurysm) repair       Objective     /76   Pulse 72   Temp 97 6 °F (36 4 °C)   Ht 6' (1 829 m)   Wt 100 kg (221 lb 5 5 oz)   BMI 30 02 kg/m²     Physical Exam   Constitutional: He is oriented to person, place, and time  He appears well-developed and well-nourished  HENT:   Head: Normocephalic and atraumatic  Eyes: Pupils are equal, round, and reactive to light  Neck: Normal range of motion  Cardiovascular: Normal rate and regular rhythm   Exam reveals no gallop and no friction rub  No murmur heard  Pulmonary/Chest: Effort normal and breath sounds normal  No respiratory distress  He has no wheezes  Abdominal: Soft  Bowel sounds are normal  He exhibits no distension  There is no tenderness  Musculoskeletal: Normal range of motion  He exhibits no edema  Neurological: He is alert and oriented to person, place, and time  Skin: Skin is warm and dry  Vitals reviewed        Pertinent Laboratory/Diagnostic Studies:  CBC:   Lab Results   Component Value Date/Time    WBC 4 53 11/27/2018 10:24 AM    RBC 4 09 11/27/2018 10:24 AM    HGB 13 5 11/27/2018 10:24 AM    HCT 41 8 11/27/2018 10:24 AM     (H) 11/27/2018 10:24 AM    MCH 33 0 11/27/2018 10:24 AM    MCHC 32 3 11/27/2018 10:24 AM    RDW 15 1 11/27/2018 10:24 AM    MPV 10 4 11/27/2018 10:24 AM     11/27/2018 10:24 AM    NRBC 0 11/27/2018 10:24 AM    NEUTOPHILPCT 63 11/27/2018 10:24 AM    LYMPHOPCT 25 11/27/2018 10:24 AM    MONOPCT 8 11/27/2018 10:24 AM    EOSPCT 3 11/27/2018 10:24 AM    BASOPCT 1 11/27/2018 10:24 AM    NEUTROABS 2 83 11/27/2018 10:24 AM    LYMPHSABS 1 14 11/27/2018 10:24 AM    MONOSABS 0 37 11/27/2018 10:24 AM    EOSABS 0 14 11/27/2018 10:24 AM     Chemistry Profile:   Lab Results   Component Value Date/Time    K 4 2 11/27/2018 10:24 AM     11/27/2018 10:24 AM    CO2 26 11/27/2018 10:24 AM    CO2 25 01/28/2017 05:08 PM    BUN 16 11/27/2018 10:24 AM    CREATININE 0 90 11/27/2018 10:24 AM    GLUC 111 02/14/2017 08:52 AM    GLUF 90 11/27/2018 10:24 AM    GLUCOSE 125 01/28/2017 05:08 PM    CALCIUM 9 1 11/27/2018 10:24 AM    MG 2 3 02/14/2017 08:52 AM    AST 7 11/27/2018 10:24 AM    ALT 12 11/27/2018 10:24 AM    ALKPHOS 152 (H) 11/27/2018 10:24 AM    EGFR 84 11/27/2018 10:24 AM    EGFR >60 0 01/28/2017 05:08 PM     Coagulation Studies:   Lab Results   Component Value Date/Time    PROTIME 13 7 01/28/2017 05:23 PM    INR 1 04 01/28/2017 05:23 PM    PTT 28 01/28/2017 05:23 PM     Cardiac Studies: Lab Results   Component Value Date/Time    NTBNP 110 01/28/2017 05:23 PM    POCTROP 0 00 01/28/2017 05:07 PM         Current Medications     Current Outpatient Medications:     aspirin (ECOTRIN) 325 mg EC tablet, Take 1 tablet (325 mg total) by mouth daily (Patient taking differently: Take 81 mg by mouth daily  ), Disp: 30 tablet, Rfl: 0    clotrimazole (LOTRIMIN) 1 % cream, Apply to feet/toes 2 times daily, Disp: 15 g, Rfl: 0    PHENobarbital 64 8 mg tablet, Take 1 tablet (64 8 mg total) by mouth 2 (two) times a day for 180 days, Disp: 60 tablet, Rfl: 5    rosuvastatin (CRESTOR) 5 mg tablet, Take 1 tablet (5 mg total) by mouth daily, Disp: 30 tablet, Rfl: 5    nitroglycerin (NITROSTAT) 0 4 mg SL tablet, Place 1 tablet (0 4 mg total) under the tongue every 5 (five) minutes as needed for chest pain for up to 30 days, Disp: 90 tablet, Rfl: 0    Health Maintenance     Health Maintenance   Topic Date Due    Medicare Annual Wellness Visit (AWV)  1945    BMI: Followup Plan  12/28/1962    Fall Risk  12/28/2009    INFLUENZA VACCINE  07/01/2018    Pneumococcal PPSV23/PCV13 65+ Years / Low and Medium Risk (1 of 2 - PCV13) 08/23/2019 (Originally 12/28/2009)    Depression Screening PHQ  08/23/2019    BMI: Adult  03/21/2020    DTaP,Tdap,and Td Vaccines (2 - Td) 02/27/2028    CRC Screening: Colonoscopy  05/22/2028    HEPATITIS B VACCINES  Aged Out     Immunization History   Administered Date(s) Administered    Influenza TIV (IM) 10/22/2009    Td (adult), adsorbed 10/16/2007    Tdap 02/27/2018       Jose Hernandez Cardio  3/21/2019 1:55 PM

## 2019-03-21 NOTE — ASSESSMENT & PLAN NOTE
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

## 2019-03-21 NOTE — ASSESSMENT & PLAN NOTE
Blood pressure 120/76  Currently not on any antihypertensive medication regimen    Continue to monitor

## 2019-04-30 ENCOUNTER — APPOINTMENT (OUTPATIENT)
Dept: LAB | Facility: HOSPITAL | Age: 74
End: 2019-04-30
Attending: INTERNAL MEDICINE
Payer: MEDICARE

## 2019-04-30 LAB
ALBUMIN SERPL BCP-MCNC: 3.3 G/DL (ref 3.5–5)
ALP SERPL-CCNC: 136 U/L (ref 46–116)
ALT SERPL W P-5'-P-CCNC: 11 U/L (ref 12–78)
AST SERPL W P-5'-P-CCNC: 10 U/L (ref 5–45)
BILIRUB DIRECT SERPL-MCNC: 0.08 MG/DL (ref 0–0.2)
BILIRUB SERPL-MCNC: 0.33 MG/DL (ref 0.2–1)
CHOLEST SERPL-MCNC: 213 MG/DL (ref 50–200)
HDLC SERPL-MCNC: 43 MG/DL (ref 40–60)
LDLC SERPL CALC-MCNC: 147 MG/DL (ref 0–100)
NONHDLC SERPL-MCNC: 170 MG/DL
PROT SERPL-MCNC: 7.8 G/DL (ref 6.4–8.2)
TRIGL SERPL-MCNC: 117 MG/DL

## 2019-04-30 PROCEDURE — 80076 HEPATIC FUNCTION PANEL: CPT | Performed by: INTERNAL MEDICINE

## 2019-04-30 PROCEDURE — 80061 LIPID PANEL: CPT | Performed by: INTERNAL MEDICINE

## 2019-04-30 PROCEDURE — 36415 COLL VENOUS BLD VENIPUNCTURE: CPT | Performed by: INTERNAL MEDICINE

## 2019-07-17 ENCOUNTER — APPOINTMENT (EMERGENCY)
Dept: RADIOLOGY | Facility: HOSPITAL | Age: 74
DRG: 394 | End: 2019-07-17
Payer: MEDICARE

## 2019-07-17 ENCOUNTER — HOSPITAL ENCOUNTER (INPATIENT)
Facility: HOSPITAL | Age: 74
LOS: 2 days | Discharge: HOME | DRG: 394 | End: 2019-07-19
Attending: EMERGENCY MEDICINE | Admitting: SURGERY
Payer: MEDICARE

## 2019-07-17 DIAGNOSIS — K56.609 SMALL BOWEL OBSTRUCTION (HCC): Primary | ICD-10-CM

## 2019-07-17 DIAGNOSIS — I25.10 CORONARY ARTERY DISEASE INVOLVING NATIVE CORONARY ARTERY OF NATIVE HEART WITHOUT ANGINA PECTORIS: ICD-10-CM

## 2019-07-17 LAB
ALBUMIN SERPL BCP-MCNC: 3.7 G/DL (ref 3.5–5)
ALP SERPL-CCNC: 161 U/L (ref 46–116)
ALT SERPL W P-5'-P-CCNC: 13 U/L (ref 12–78)
ANION GAP SERPL CALCULATED.3IONS-SCNC: 7 MMOL/L (ref 4–13)
AST SERPL W P-5'-P-CCNC: 6 U/L (ref 5–45)
ATRIAL RATE: 74 BPM
BASOPHILS # BLD AUTO: 0.02 THOUSANDS/ΜL (ref 0–0.1)
BASOPHILS NFR BLD AUTO: 0 % (ref 0–1)
BILIRUB SERPL-MCNC: 0.47 MG/DL (ref 0.2–1)
BUN SERPL-MCNC: 13 MG/DL (ref 5–25)
CALCIUM SERPL-MCNC: 8.4 MG/DL (ref 8.3–10.1)
CHLORIDE SERPL-SCNC: 104 MMOL/L (ref 100–108)
CO2 SERPL-SCNC: 24 MMOL/L (ref 21–32)
CREAT SERPL-MCNC: 0.92 MG/DL (ref 0.6–1.3)
EOSINOPHIL # BLD AUTO: 0.08 THOUSAND/ΜL (ref 0–0.61)
EOSINOPHIL NFR BLD AUTO: 1 % (ref 0–6)
ERYTHROCYTE [DISTWIDTH] IN BLOOD BY AUTOMATED COUNT: 14.6 % (ref 11.6–15.1)
GFR SERPL CREATININE-BSD FRML MDRD: 82 ML/MIN/1.73SQ M
GLUCOSE SERPL-MCNC: 145 MG/DL (ref 65–140)
HCT VFR BLD AUTO: 43.2 % (ref 36.5–49.3)
HGB BLD-MCNC: 14.4 G/DL (ref 12–17)
IMM GRANULOCYTES # BLD AUTO: 0.04 THOUSAND/UL (ref 0–0.2)
IMM GRANULOCYTES NFR BLD AUTO: 0 % (ref 0–2)
LACTATE SERPL-SCNC: 2 MMOL/L (ref 0.5–2)
LACTATE SERPL-SCNC: 3.4 MMOL/L (ref 0.5–2)
LIPASE SERPL-CCNC: 76 U/L (ref 73–393)
LYMPHOCYTES # BLD AUTO: 0.95 THOUSANDS/ΜL (ref 0.6–4.47)
LYMPHOCYTES NFR BLD AUTO: 11 % (ref 14–44)
MCH RBC QN AUTO: 31.9 PG (ref 26.8–34.3)
MCHC RBC AUTO-ENTMCNC: 33.3 G/DL (ref 31.4–37.4)
MCV RBC AUTO: 96 FL (ref 82–98)
MONOCYTES # BLD AUTO: 0.65 THOUSAND/ΜL (ref 0.17–1.22)
MONOCYTES NFR BLD AUTO: 7 % (ref 4–12)
NEUTROPHILS # BLD AUTO: 7.32 THOUSANDS/ΜL (ref 1.85–7.62)
NEUTS SEG NFR BLD AUTO: 81 % (ref 43–75)
NRBC BLD AUTO-RTO: 0 /100 WBCS
P AXIS: 59 DEGREES
PLATELET # BLD AUTO: 157 THOUSANDS/UL (ref 149–390)
PLATELET # BLD AUTO: 158 THOUSANDS/UL (ref 149–390)
PMV BLD AUTO: 10.1 FL (ref 8.9–12.7)
PMV BLD AUTO: 10.7 FL (ref 8.9–12.7)
POTASSIUM SERPL-SCNC: 3.6 MMOL/L (ref 3.5–5.3)
PR INTERVAL: 184 MS
PROT SERPL-MCNC: 7.8 G/DL (ref 6.4–8.2)
QRS AXIS: -3 DEGREES
QRSD INTERVAL: 82 MS
QT INTERVAL: 388 MS
QTC INTERVAL: 430 MS
RBC # BLD AUTO: 4.51 MILLION/UL (ref 3.88–5.62)
SODIUM SERPL-SCNC: 135 MMOL/L (ref 136–145)
T WAVE AXIS: 38 DEGREES
TROPONIN I SERPL-MCNC: <0.02 NG/ML
VENTRICULAR RATE: 74 BPM
WBC # BLD AUTO: 9.06 THOUSAND/UL (ref 4.31–10.16)

## 2019-07-17 PROCEDURE — 83605 ASSAY OF LACTIC ACID: CPT | Performed by: SURGERY

## 2019-07-17 PROCEDURE — 99285 EMERGENCY DEPT VISIT HI MDM: CPT

## 2019-07-17 PROCEDURE — 80053 COMPREHEN METABOLIC PANEL: CPT | Performed by: EMERGENCY MEDICINE

## 2019-07-17 PROCEDURE — 93010 ELECTROCARDIOGRAM REPORT: CPT | Performed by: INTERNAL MEDICINE

## 2019-07-17 PROCEDURE — 36415 COLL VENOUS BLD VENIPUNCTURE: CPT

## 2019-07-17 PROCEDURE — 96376 TX/PRO/DX INJ SAME DRUG ADON: CPT

## 2019-07-17 PROCEDURE — 99284 EMERGENCY DEPT VISIT MOD MDM: CPT | Performed by: EMERGENCY MEDICINE

## 2019-07-17 PROCEDURE — 85049 AUTOMATED PLATELET COUNT: CPT | Performed by: SURGERY

## 2019-07-17 PROCEDURE — 85025 COMPLETE CBC W/AUTO DIFF WBC: CPT | Performed by: EMERGENCY MEDICINE

## 2019-07-17 PROCEDURE — 84484 ASSAY OF TROPONIN QUANT: CPT | Performed by: EMERGENCY MEDICINE

## 2019-07-17 PROCEDURE — 74176 CT ABD & PELVIS W/O CONTRAST: CPT

## 2019-07-17 PROCEDURE — 83690 ASSAY OF LIPASE: CPT | Performed by: EMERGENCY MEDICINE

## 2019-07-17 PROCEDURE — 1124F ACP DISCUSS-NO DSCNMKR DOCD: CPT | Performed by: SURGERY

## 2019-07-17 PROCEDURE — 96361 HYDRATE IV INFUSION ADD-ON: CPT

## 2019-07-17 PROCEDURE — 99222 1ST HOSP IP/OBS MODERATE 55: CPT | Performed by: SURGERY

## 2019-07-17 PROCEDURE — 93005 ELECTROCARDIOGRAM TRACING: CPT

## 2019-07-17 PROCEDURE — 96374 THER/PROPH/DIAG INJ IV PUSH: CPT

## 2019-07-17 RX ORDER — DEXTROSE, SODIUM CHLORIDE, AND POTASSIUM CHLORIDE 5; .45; .15 G/100ML; G/100ML; G/100ML
125 INJECTION INTRAVENOUS CONTINUOUS
Status: DISCONTINUED | OUTPATIENT
Start: 2019-07-17 | End: 2019-07-17

## 2019-07-17 RX ORDER — ONDANSETRON 2 MG/ML
4 INJECTION INTRAMUSCULAR; INTRAVENOUS ONCE
Status: COMPLETED | OUTPATIENT
Start: 2019-07-17 | End: 2019-07-17

## 2019-07-17 RX ORDER — NITROGLYCERIN 0.4 MG/1
0.4 TABLET SUBLINGUAL
Status: DISCONTINUED | OUTPATIENT
Start: 2019-07-17 | End: 2019-07-19 | Stop reason: HOSPADM

## 2019-07-17 RX ORDER — PHENOBARBITAL 64.8 MG/1
64.8 TABLET ORAL 2 TIMES DAILY
Status: DISCONTINUED | OUTPATIENT
Start: 2019-07-18 | End: 2019-07-17

## 2019-07-17 RX ORDER — HEPARIN SODIUM 5000 [USP'U]/ML
5000 INJECTION, SOLUTION INTRAVENOUS; SUBCUTANEOUS EVERY 8 HOURS SCHEDULED
Status: DISCONTINUED | OUTPATIENT
Start: 2019-07-17 | End: 2019-07-19 | Stop reason: HOSPADM

## 2019-07-17 RX ORDER — ONDANSETRON 2 MG/ML
4 INJECTION INTRAMUSCULAR; INTRAVENOUS EVERY 6 HOURS PRN
Status: DISCONTINUED | OUTPATIENT
Start: 2019-07-17 | End: 2019-07-19 | Stop reason: HOSPADM

## 2019-07-17 RX ORDER — SODIUM CHLORIDE 9 MG/ML
75 INJECTION, SOLUTION INTRAVENOUS CONTINUOUS
Status: DISCONTINUED | OUTPATIENT
Start: 2019-07-17 | End: 2019-07-19

## 2019-07-17 RX ORDER — MORPHINE SULFATE 10 MG/ML
6 INJECTION, SOLUTION INTRAMUSCULAR; INTRAVENOUS ONCE
Status: DISCONTINUED | OUTPATIENT
Start: 2019-07-17 | End: 2019-07-17

## 2019-07-17 RX ORDER — PHENOBARBITAL SODIUM 65 MG/ML
65 INJECTION INTRAMUSCULAR 2 TIMES DAILY
Status: DISCONTINUED | OUTPATIENT
Start: 2019-07-18 | End: 2019-07-19 | Stop reason: HOSPADM

## 2019-07-17 RX ADMIN — DEXTROSE, SODIUM CHLORIDE, AND POTASSIUM CHLORIDE 125 ML/HR: 5; .45; .15 INJECTION INTRAVENOUS at 21:48

## 2019-07-17 RX ADMIN — IOHEXOL 50 ML: 240 INJECTION, SOLUTION INTRATHECAL; INTRAVASCULAR; INTRAVENOUS; ORAL at 14:10

## 2019-07-17 RX ADMIN — HEPARIN SODIUM 5000 UNITS: 5000 INJECTION INTRAVENOUS; SUBCUTANEOUS at 21:45

## 2019-07-17 RX ADMIN — ONDANSETRON 4 MG: 2 INJECTION INTRAMUSCULAR; INTRAVENOUS at 14:10

## 2019-07-17 RX ADMIN — SODIUM CHLORIDE 500 ML: 0.9 INJECTION, SOLUTION INTRAVENOUS at 13:49

## 2019-07-17 RX ADMIN — ONDANSETRON 4 MG: 2 INJECTION INTRAMUSCULAR; INTRAVENOUS at 15:39

## 2019-07-17 RX ADMIN — SODIUM CHLORIDE 125 ML/HR: 0.9 INJECTION, SOLUTION INTRAVENOUS at 22:39

## 2019-07-17 NOTE — ED ATTENDING ATTESTATION
Paula Bailey MD, saw and evaluated the patient  I have discussed the patient with the resident/non-physician practitioner and agree with the resident's/non-physician practitioner's findings, Plan of Care, and MDM as documented in the resident's/non-physician practitioner's note, except where noted  All available labs and Radiology studies were reviewed  I was present for key portions of any procedure(s) performed by the resident/non-physician practitioner and I was immediately available to provide assistance  At this point I agree with the current assessment done in the Emergency Department  I have conducted an independent evaluation of this patient a history and physical is as follows:      Patient presents to the emergency department the chief complaint of abdominal pain  The patient reports he had a history of an abdominal aortic aneurysm repair in 2005  As he was rehabilitating from that injury he a constipation and was bearing down to have a bowel movement when he developed several postoperative ventral hernias  The patient has had this hernia since the surgery without complication  Yesterday at approximately 5:00 p m  The patient developed mid abdominal pain in the area of 1 of his hernias  The patient reports the pain has been continuous since then  The patient reports he had a bowel movement yesterday that was normal but has not yet had a bowel movement today  The patient had 2 episodes of vomiting yesterday but no episodes of vomiting today  He continues with mild nausea  The pain has been waxing and waning is described as mild to moderate in severity  The patient denies any fever, melena, hematochezia, chest pain, or shortness of breath  Physical exam demonstrates a male in no acute distress  HEENT exam is normal   Lungs are clear with equal breath sounds  The heart had a regular rate rhythm  The abdomen was soft with obvious postoperative ventral hernias     The hernia that was to the right of the patient's prior surgical scar was distended and very tender to palpation  The remainder of the abdomen was nontender  There is no rebound or guarding  The back was nontender  Skin had no rash      Critical Care Time  Procedures

## 2019-07-17 NOTE — ED NOTES
Patient transported to [id number] [facility] [address], RN  07/17/19 [id number]

## 2019-07-17 NOTE — H&P
H&P Exam - General Surgery   Kourtney Bonilla 75 y o  male MRN: [id number]  Unit/Bed#: ED 01 Encounter: [id number]    Assessment/Plan     Assessment:  73M with h/o multiple midline ventral hernias p/w abdominal pain, N/V  CT showing SBO with transition point at hernia #4 (of 6)  Plan:  --admit to red surgery  --NPO  --NGT decompression  --IVF  --pain control  --antiemetics  --serial abdominal exams    History of Present Illness     HPI:  Kourtney Bonilla is a 75 y o  male with h/o multiple midline ventral hernias following AAA repair in 2005, CHF, MI x2 who presents with R-sided abdominal pain, nausea/vomiting for 1 day  He states the pain began yesterday around 1700 while he was sitting on the couch  He describes the pain as a sharp, stabbing pain on the right side of his abdomen that is constant and nonradiating  Although he has had these hernias since 2005, he has never had any episodes such as this current one  He had 2 episodes of emesis last night following the onset of pain as well as 3 normal bowel movements last night  He has had multiple episodes of emesis today but no bowel movements  He has not passed flatus since yesterday  The patient states he has not eaten anything today secondary to nausea and vomiting  He denies any diarrhea, blood in his stool, or any urinary changes  CTAP done in the ED demonstrated 6 midline ventral hernias with SBO with transition point at the linea alba hernia #4 (of 6 total midline ventral hernias) as well as diastasis recti  Staffed with Dr Ladarius Davila  Review of Systems   Constitutional: Positive for appetite change and diaphoresis  Negative for activity change, chills and fever  HENT: Negative  Respiratory: Negative  Cardiovascular: Negative  Gastrointestinal: Positive for abdominal pain, nausea and vomiting  Negative for abdominal distention, anal bleeding, blood in stool, constipation, diarrhea and rectal pain     Genitourinary: Negative for decreased urine volume, difficulty urinating, dysuria, flank pain, frequency, hematuria and urgency  Musculoskeletal: Negative  Skin: Negative  Neurological: Negative  Historical Information   Past Medical History:   Diagnosis Date    Cardiac disease     [facility]est[facility] failure[facility] [address] [facility]     Hernia of abdominal cavity     [facility]     last assessed 14, past, Pt had MI s/p AGUSTIN placed in , refuses to take any other medications for his cardiac disease, only will take seizure med  Understands possible complications from this decision     Past Surgical History:   Procedure Laterality Date    ABDOMINAL AORTIC ANEURYSM REPAIR      for dialation or occulsion    CATARACT EXTRACTION       Social History   Social History     Substance and Sexual Activity   Alcohol Use No     Social History     Substance and Sexual Activity   Drug Use No     Social History     Tobacco Use   Smoking Status Former Smoker    Last attempt to quit: 2005    Years since quittin 1   Smokeless Tobacco Never Used     Family History: non-contributory    Meds/Allergies   PTA meds:   Prior to Admission Medications   Prescriptions Last Dose Informant Patient Reported? Taking?    PHENobarbital 64 8 mg tablet   No Yes   Sig: Take 1 tablet (64 8 mg total) by mouth 2 (two) times a day for 180 days   aspirin (ECOTRIN) 325 mg EC tablet  Self No Yes   Sig: Take 1 tablet (325 mg total) by mouth daily   Patient taking differently: Take 81 mg by mouth daily     clotrimazole (LOTRIMIN) 1 % cream  Self No Yes   Sig: Apply to feet/toes 2 times daily   nitroglycerin (NITROSTAT) 0 4 mg SL tablet   No Yes   Sig: Place 1 tablet (0 4 mg total) under the tongue every 5 (five) minutes as needed for chest pain for up to 30 days      Facility-Administered Medications: None     Allergies   Allergen Reactions    Iodinated Diagnostic Agents Rash     Pt's skin get very red and he gets hot and chills    Clopidogrel        Objective First Vitals:   Blood Pressure: 116/79 (07/17/19 1248)  Pulse: 80 (07/17/19 1248)  Temperature: 98 3 °F (36 8 °C) (07/17/19 1248)  Temp Source: Oral (07/17/19 1248)  Respirations: 18 (07/17/19 1248)  Weight - Scale: 100 kg (221 lb) (07/17/19 1248)  SpO2: 98 % (07/17/19 1248)    Current Vitals:   Blood Pressure: 149/81 (07/17/19 1730)  Pulse: 72 (07/17/19 1730)  Temperature: 98 3 °F (36 8 °C) (07/17/19 1248)  Temp Source: Oral (07/17/19 1248)  Respirations: 17 (07/17/19 1730)  Weight - Scale: 100 kg (221 lb) (07/17/19 1248)  SpO2: 93 % (07/17/19 1730)    No intake or output data in the 24 hours ending 07/17/19 1839    Invasive Devices     Peripheral Intravenous Line            Peripheral IV 07/17/19 Left Antecubital less than 1 day                Physical Exam   Constitutional: He appears well-developed and well-nourished  He appears distressed  HENT:   Head: Normocephalic and atraumatic  Eyes: EOM are normal    Neck: No JVD present  Cardiovascular: Normal rate, regular rhythm and intact distal pulses  Pulmonary/Chest: Effort normal  No respiratory distress  Abdominal: Soft  He exhibits no distension  There is no hepatosplenomegaly  There is tenderness in the right upper quadrant  There is no rigidity, no rebound, no guarding, no CVA tenderness, no tenderness at McBurney's point and negative Woodson's sign  A hernia is present  Hernia confirmed positive in the ventral area  Multiple large ventral hernias noted, with very irregular abdominal contouring  Ventral hernia protruding into RUQ with +TTP, mildly firm, but reducible  All other hernias easily reducible, soft, nontender  Musculoskeletal: Normal range of motion  He exhibits no edema, tenderness or deformity  Skin: Skin is warm  No rash noted  He is diaphoretic  No erythema  No pallor         Lab Results:   CBC:   Lab Results   Component Value Date    WBC 9 06 07/17/2019    HGB 14 4 07/17/2019    HCT 43 2 07/17/2019    MCV 96 07/17/2019     07/17/2019    MCH 31 9 07/17/2019    MCHC 33 3 07/17/2019    RDW 14 6 07/17/2019    MPV 10 1 07/17/2019    NRBC 0 07/17/2019   , CMP:   Lab Results   Component Value Date    SODIUM 135 (L) 07/17/2019    K 3 6 07/17/2019     07/17/2019    CO2 24 07/17/2019    BUN 13 07/17/2019    CREATININE 0 92 07/17/2019    CALCIUM 8 4 07/17/2019    AST 6 07/17/2019    ALT 13 07/17/2019    ALKPHOS 161 (H) 07/17/2019    EGFR 82 07/17/2019   , Lipase:   Lab Results   Component Value Date    LIPASE 76 07/17/2019     Imaging: I have personally reviewed pertinent reports  CT abdomen pelvis wo contrast   Final Result by Lan Bydr MD (07/17 1700)      1  Small bowel obstruction with transition point at one of the patient's linea alba hernias (hernia #4 described above)  Incarceration cannot be diagnosed on the basis of imaging  No CT evidence of bowel ischemia  2   Diastasis recti with multiple ventral abdominal wall hernias, as described  Please note that hernias #3 and #4 are closely apposed and may reflect a single complex hernia  I personally discussed this study with Yinka Harkins on 7/17/2019 at 4:56 PM       Workstation performed: [id number]             EKG, Pathology, and Other Studies: I have personally reviewed pertinent reports        Rosena Hatchet, MD   PGY1, General Surgery

## 2019-07-17 NOTE — ED NOTES
Pt rang call bell d/t having an episode of vomiting  MD notified        Loreto Delgado  07/17/19 [id number]

## 2019-07-17 NOTE — ED PROVIDER NOTES
History  Chief Complaint   Patient presents with    Abdominal Pain     Pt c/o RUQ pain beginning yesterday and vomited x2 and a BM x 3  Pt has hx of AAA repair and multiple hernias     18-year-old male with past medical history of STEMI x2 most recent 2017 with 2 stents, hypertension, seizure disorder, and AAA repair in 2005 standing with a one-day history of acute onset sharp right-sided abdominal pain  Patient stated that he was sitting at rest when he began to experience acute right-sided stabbing abdominal pain associated with vomiting x2 and diarrhea x3  Patient stated at baseline the pain is very severe and sharp but there are instances when it gets extremely sharp with movement  Patient stated he also has intermittent nausea  Patient stated the pain is relatively focal on the right side of his abdomen and does not radiate anywhere  Patient stated he has also not been taking any food or liquid since the pain started  Patient has a history he AAA repair via laparotomy with a midline incision has had trouble with hernias since then  Patient stated he experienced this pain once prior and spontaneously resolved within 24 hours  Patient denied headache, vision changes, lightheadedness, fever/chills, chest pain, shortness of breath, or any urinary changes  History provided by:  Patient   used: No    Abdominal Pain   Pain location:  RLQ  Pain quality: stabbing    Pain radiates to:  Does not radiate  Pain severity:  Severe  Onset quality:  Sudden  Duration:  1 day  Timing:  Constant  Progression:  Waxing and waning  Chronicity:  New  Context: previous surgery    Relieved by:  Nothing  Worsened by:   Movement and palpation  Associated symptoms: diarrhea, nausea and vomiting    Associated symptoms: no chest pain, no chills, no constipation, no cough, no dysuria, no fever, no hematemesis, no hematochezia, no hematuria, no melena and no shortness of breath        Prior to Admission Medications   Prescriptions Last Dose Informant Patient Reported? Taking? PHENobarbital 64 8 mg tablet   No Yes   Sig: Take 1 tablet (64 8 mg total) by mouth 2 (two) times a day for 180 days   aspirin (ECOTRIN) 325 mg EC tablet  Self No Yes   Sig: Take 1 tablet (325 mg total) by mouth daily   Patient taking differently: Take 81 mg by mouth daily     clotrimazole (LOTRIMIN) 1 % cream  Self No Yes   Sig: Apply to feet/toes 2 times daily   nitroglycerin (NITROSTAT) 0 4 mg SL tablet   No Yes   Sig: Place 1 tablet (0 4 mg total) under the tongue every 5 (five) minutes as needed for chest pain for up to 30 days      Facility-Administered Medications: None       Past Medical History:   Diagnosis Date    Cardiac disease     [facility]F [facility]congestive [facility] failure) [facility] [address] [facility]     Hernia of abdominal cavity     Myocardial infarction ([facility] [address] )     last assessed 14, past, Pt had MI s/p AGUSTIN placed in , refuses to take any other medications for his cardiac disease, only will take seizure med  Understands possible complications from this decision    Seizure [facility])        Past Surgical History:   Procedure Laterality Date    ABDOMINAL AORTIC ANEURYSM REPAIR      for dialation or occulsion    CATARACT EXTRACTION         Family History   Problem Relation Age of Onset    Hypertension Father     Kidney disease Brother      I have reviewed and agree with the history as documented  Social History     Tobacco Use    Smoking status: Former Smoker     Last attempt to quit: 2005     Years since quittin 1    Smokeless tobacco: Never Used   Substance Use Topics    Alcohol use: No    Drug use: No        Review of Systems   Constitutional: Negative for appetite change, chills, diaphoresis, fever and unexpected weight change  HENT: Negative for congestion and rhinorrhea  Eyes: Negative for photophobia and visual disturbance  Respiratory: Negative for cough, chest tightness and shortness of breath      Cardiovascular: Negative for chest pain, palpitations and leg swelling  Gastrointestinal: Positive for abdominal pain, diarrhea, nausea and vomiting  Negative for abdominal distention, blood in stool, constipation, hematemesis, hematochezia and melena  Genitourinary: Negative for dysuria and hematuria  Musculoskeletal: Negative for back pain, joint swelling, neck pain and neck stiffness  Skin: Negative for color change, pallor, rash and wound  Neurological: Negative for dizziness, syncope, weakness, light-headedness and headaches  Psychiatric/Behavioral: Negative for agitation  All other systems reviewed and are negative  Physical Exam  ED Triage Vitals [07/17/19 1248]   Temperature Pulse Respirations Blood Pressure SpO2   98 3 °F (36 8 °C) 80 18 116/79 98 %      Temp Source Heart Rate Source Patient Position - Orthostatic VS BP Location FiO2 (%)   Oral Monitor Lying Left arm --      Pain Score       No Pain             Orthostatic Vital Signs  Vitals:    07/19/19 0315 07/19/19 0320 07/19/19 0759 07/19/19 0800   BP: 111/74 111/74 120/69 120/69   Pulse: 65  74    Patient Position - Orthostatic VS: Lying          Physical Exam   Constitutional: He is oriented to person, place, and time  He appears well-developed and well-nourished  HENT:   Head: Normocephalic and atraumatic  Mouth/Throat: Oropharynx is clear and moist    Eyes: Pupils are equal, round, and reactive to light  EOM are normal    Neck: Normal range of motion  Neck supple  Cardiovascular: Normal rate, regular rhythm, normal heart sounds and intact distal pulses  Exam reveals no gallop and no friction rub  No murmur heard  Pulmonary/Chest: Effort normal and breath sounds normal  No respiratory distress  He exhibits no tenderness  Abdominal: Soft  Normal aorta and bowel sounds are normal  He exhibits no distension and no pulsatile midline mass  There is tenderness in the right lower quadrant  There is guarding   There is no rigidity and no CVA tenderness  A hernia is present  Hernia confirmed positive in the ventral area (partially reducible)  Musculoskeletal: Normal range of motion  Neurological: He is alert and oriented to person, place, and time  Skin: Skin is warm and dry  He is not diaphoretic  Psychiatric: He has a normal mood and affect  His behavior is normal  Thought content normal    Nursing note and vitals reviewed        ED Medications  Medications   sodium chloride 0 9 % bolus 500 mL (0 mL Intravenous Stopped 7/17/19 1449)   ondansetron (ZOFRAN) injection 4 mg (4 mg Intravenous Given 7/17/19 1410)   iohexol (OMNIPAQUE) 240 MG/ML solution 50 mL (50 mL Oral Given 7/17/19 1410)   ondansetron (ZOFRAN) injection 4 mg (4 mg Intravenous Given 7/17/19 1539)       Diagnostic Studies  Results Reviewed     Procedure Component Value Units Date/Time    CBC and differential [[id number]]  (Abnormal) Collected:  07/18/19 0527    Lab Status:  Final result Specimen:  Blood from Arm, Right Updated:  07/18/19 0648     WBC 6 59 Thousand/uL      RBC 4 08 Million/uL      Hemoglobin 13 0 g/dL      Hematocrit 39 9 %      MCV 98 fL      MCH 31 9 pg      MCHC 32 6 g/dL      RDW 14 6 %      MPV 11 3 fL      Platelets 854 Thousands/uL      nRBC 0 /100 WBCs      Neutrophils Relative 65 %      Immat GRANS % 0 %      Lymphocytes Relative 20 %      Monocytes Relative 11 %      Eosinophils Relative 3 %      Basophils Relative 1 %      Neutrophils Absolute 4 36 Thousands/µL      Immature Grans Absolute 0 02 Thousand/uL      Lymphocytes Absolute 1 30 Thousands/µL      Monocytes Absolute 0 70 Thousand/µL      Eosinophils Absolute 0 18 Thousand/µL      Basophils Absolute 0 03 Thousands/µL     Basic metabolic panel [[id number]]  (Abnormal) Collected:  07/18/19 0527    Lab Status:  Final result Specimen:  Blood from Arm, Right Updated:  07/18/19 0607     Sodium 138 mmol/L      Potassium 4 9 mmol/L      Chloride 108 mmol/L      CO2 24 mmol/L      ANION GAP 6 mmol/L      BUN 13 mg/dL      Creatinine 0 82 mg/dL      Glucose 101 mg/dL      Calcium 7 8 mg/dL      eGFR 87 ml/min/1 73sq m     Narrative:       [place] guidelines for Chronic Kidney Disease (CKD):     Stage 1 with normal or high GFR (GFR > 90 mL/min/1 73 square meters)    Stage 2 Mild CKD (GFR = 60-89 mL/min/1 73 square meters)    Stage 3A Moderate CKD (GFR = 45-59 mL/min/1 73 square meters)    Stage 3B Moderate CKD (GFR = 30-44 mL/min/1 73 square meters)    Stage 4 Severe CKD (GFR = 15-29 mL/min/1 73 square meters)    Stage 5 End Stage CKD (GFR <15 mL/min/1 73 square meters)  Note: GFR calculation is accurate only with a steady state creatinine    Platelet count [[id number]]  (Normal) Collected:  07/17/19 2058    Lab Status:  Final result Specimen:  Blood from Arm, Right Updated:  07/17/19 2104     Platelets 812 Thousands/uL      MPV 10 7 fL     Lactic acid, plasma [[id number]]  (Abnormal) Collected:  07/17/19 1928    Lab Status:  Final result Specimen:  Blood from Arm, Right Updated:  07/17/19 2038     LACTIC ACID 3 4 mmol/L     Narrative:       Result may be elevated if tourniquet was used during collection      Troponin I [[id number]]  (Normal) Collected:  07/17/19 1350    Lab Status:  Final result Specimen:  Blood from Arm, Left Updated:  07/17/19 1415     Troponin I <0 02 ng/mL     Comprehensive metabolic panel [[id number]]  (Abnormal) Collected:  07/17/19 1257    Lab Status:  Final result Specimen:  Blood from Arm, Left Updated:  07/17/19 1322     Sodium 135 mmol/L      Potassium 3 6 mmol/L      Chloride 104 mmol/L      CO2 24 mmol/L      ANION GAP 7 mmol/L      BUN 13 mg/dL      Creatinine 0 92 mg/dL      Glucose 145 mg/dL      Calcium 8 4 mg/dL      AST 6 U/L      ALT 13 U/L      Alkaline Phosphatase 161 U/L      Total Protein 7 8 g/dL      Albumin 3 7 g/dL      Total Bilirubin 0 47 mg/dL      eGFR 82 ml/min/1 73sq m     Narrative:       [place] guidelines for Chronic Kidney Disease (CKD):     Stage 1 with normal or high GFR (GFR > 90 mL/min/1 73 square meters)    Stage 2 Mild CKD (GFR = 60-89 mL/min/1 73 square meters)    Stage 3A Moderate CKD (GFR = 45-59 mL/min/1 73 square meters)    Stage 3B Moderate CKD (GFR = 30-44 mL/min/1 73 square meters)    Stage 4 Severe CKD (GFR = 15-29 mL/min/1 73 square meters)    Stage 5 End Stage CKD (GFR <15 mL/min/1 73 square meters)  Note: GFR calculation is accurate only with a steady state creatinine    Lipase [[id number]]  (Normal) Collected:  07/17/19 1257    Lab Status:  Final result Specimen:  Blood from Arm, Left Updated:  07/17/19 1322     Lipase 76 u/L     CBC and differential [[id number]]  (Abnormal) Collected:  07/17/19 1257    Lab Status:  Final result Specimen:  Blood from Arm, Left Updated:  07/17/19 1315     WBC 9 06 Thousand/uL      RBC 4 51 Million/uL      Hemoglobin 14 4 g/dL      Hematocrit 43 2 %      MCV 96 fL      MCH 31 9 pg      MCHC 33 3 g/dL      RDW 14 6 %      MPV 10 1 fL      Platelets 652 Thousands/uL      nRBC 0 /100 WBCs      Neutrophils Relative 81 %      Immat GRANS % 0 %      Lymphocytes Relative 11 %      Monocytes Relative 7 %      Eosinophils Relative 1 %      Basophils Relative 0 %      Neutrophils Absolute 7 32 Thousands/µL      Immature Grans Absolute 0 04 Thousand/uL      Lymphocytes Absolute 0 95 Thousands/µL      Monocytes Absolute 0 65 Thousand/µL      Eosinophils Absolute 0 08 Thousand/µL      Basophils Absolute 0 02 Thousands/µL                  CT abdomen pelvis wo contrast   Final Result by Sanchez Falk MD (07/17 1700)      1  Small bowel obstruction with transition point at one of the patient's linea alba hernias (hernia #4 described above)  Incarceration cannot be diagnosed on the basis of imaging  No CT evidence of bowel ischemia  2   Diastasis recti with multiple ventral abdominal wall hernias, as described   Please note that hernias #3 and #4 are closely apposed and may reflect a single complex hernia  I personally discussed this study with Kenyon Deluna on 7/17/2019 at 4:56 PM       Workstation performed: [id number]               Procedures  Procedures        ED Course  ED Course as of Jul 22 0111 Wed Jul 17, 2019   1847 Lactic acid, plasma           Identification of Seniors at Risk      Most Recent Value   (ISAR) Identification of Seniors at Risk   Before the illness or injury that brought you to the Emergency, did you need someone to help you on a regular basis? 0 Filed at: 07/17/2019 1250   In the last 24 hours, have you needed more help than usual?  0 Filed at: 07/17/2019 1250   Have you been hospitalized for one or more nights during the past 6 months? 1 Filed at: 07/17/2019 1250   In general, do you see well?  0 Filed at: 07/17/2019 1250   In general, do you have serious problems with your memory? 0 Filed at: 07/17/2019 1250   Do you take more than three different medications every day? 1 Filed at: 07/17/2019 1250   ISAR Score  2 Filed at: 07/17/2019 1250                          MDM  Number of Diagnoses or Management Options  Small bowel obstruction [facility]):   Diagnosis management comments: 21-year-old male with past medical history of STEMI x2 most recent 2017 with 2 stents, hypertension, seizure disorder, and AAA repair in 2005 standing with a one-day history of acute onset sharp right-sided abdominal pain  Based on history and physical exam pain is likely related to his hernia with focal tenderness and the acute onset of pain  Aortic injury is unlikely despite of the edema to the local tenderness and stable vital signs  Plan to evaluate with CBC, abdominal labs, electrolytes, and CT scan of the abdomen with oral contrast   Will also give some antinausea and pain medicine as well as some fluids  Will keep patient NPO  Patient continued to refuse pain medication  CT showing SBO in ventral hernia without signs of ischemia  Patient still refusing pain medication  NGT per red surgery  Patient admitted to red surgery for OR in a m  Amount and/or Complexity of Data Reviewed  Clinical lab tests: ordered and reviewed  Tests in the radiology section of CPT®: ordered and reviewed  Tests in the medicine section of CPT®: ordered and reviewed    Risk of Complications, Morbidity, and/or Mortality  Presenting problems: moderate  Diagnostic procedures: moderate  Management options: moderate        Disposition  Final diagnoses:   Small bowel obstruction ([address] )     Time reflects when diagnosis was documented in both MDM as applicable and the Disposition within this note     Time User Action Codes Description Comment    7/17/2019  6:01 PM Pamela Ngo Add [K56 609] Small bowel obstruction ([address] )     7/18/2019  9:06 AM Mikki Tal Add [I25 10] Coronary artery disease involving native coronary artery of native heart without angina pectoris       ED Disposition     ED Disposition Condition Date/Time Comment    Admit Stable Wed Jul 17, 2019  5:12 PM Case was discussed with red surgery and the patient's admission status was agreed to be Admission Status: inpatient status to the service of Dr Granados [facility] surgery           Follow-up Information     Follow up With Specialties Details Why Contact Info    Isael Arevalo MD General Surgery, Podiatry Schedule an appointment as soon as possible for a visit in 1 week(s) For surgical planning [address] [place] [address],[address] [facility]  [phone]            Discharge Medication List as of 7/19/2019  5:24 PM      CONTINUE these medications which have NOT CHANGED    Details   aspirin (ECOTRIN) 325 mg EC tablet Take 1 tablet (325 mg total) by mouth daily, Starting Tue 2/27/2018, Normal      clotrimazole (LOTRIMIN) 1 % cream Apply to feet/toes 2 times daily, Print      nitroglycerin (NITROSTAT) 0 4 mg SL tablet Place 1 tablet (0 4 mg total) under the tongue every 5 (five) minutes as needed for chest pain for up to 30 days, Starting Thu 1/10/2019, Until Wed 7/17/2019, Normal      PHENobarbital 64 8 mg tablet Take 1 tablet (64 8 mg total) by mouth 2 (two) times a day for 180 days, Starting Mon 3/11/2019, Until Sat 9/7/2019, Normal           Outpatient Discharge Orders   Discharge Diet     Activity as tolerated     Call provider for:  persistent nausea or vomiting     Call provider for:  severe uncontrolled pain     Call provider for:  redness, tenderness, or signs of infection (pain, swelling, redness, odor or green/yellow discharge around incision site)     No dressing needed       ED Provider  Attending physically available and evaluated Anuja Flatness  I managed the patient along with the ED Attending      Electronically Signed by         Gabe Klinefelter, MD  07/22/19 4080

## 2019-07-18 PROBLEM — K43.6 VENTRAL HERNIA WITH OBSTRUCTION AND WITHOUT GANGRENE: Status: ACTIVE | Noted: 2019-07-18

## 2019-07-18 LAB
ANION GAP SERPL CALCULATED.3IONS-SCNC: 6 MMOL/L (ref 4–13)
BASOPHILS # BLD AUTO: 0.03 THOUSANDS/ΜL (ref 0–0.1)
BASOPHILS NFR BLD AUTO: 1 % (ref 0–1)
BUN SERPL-MCNC: 13 MG/DL (ref 5–25)
CALCIUM SERPL-MCNC: 7.8 MG/DL (ref 8.3–10.1)
CHLORIDE SERPL-SCNC: 108 MMOL/L (ref 100–108)
CO2 SERPL-SCNC: 24 MMOL/L (ref 21–32)
CREAT SERPL-MCNC: 0.82 MG/DL (ref 0.6–1.3)
EOSINOPHIL # BLD AUTO: 0.18 THOUSAND/ΜL (ref 0–0.61)
EOSINOPHIL NFR BLD AUTO: 3 % (ref 0–6)
ERYTHROCYTE [DISTWIDTH] IN BLOOD BY AUTOMATED COUNT: 14.6 % (ref 11.6–15.1)
GFR SERPL CREATININE-BSD FRML MDRD: 87 ML/MIN/1.73SQ M
GLUCOSE SERPL-MCNC: 101 MG/DL (ref 65–140)
HCT VFR BLD AUTO: 39.9 % (ref 36.5–49.3)
HGB BLD-MCNC: 13 G/DL (ref 12–17)
IMM GRANULOCYTES # BLD AUTO: 0.02 THOUSAND/UL (ref 0–0.2)
IMM GRANULOCYTES NFR BLD AUTO: 0 % (ref 0–2)
LACTATE SERPL-SCNC: 0.8 MMOL/L (ref 0.5–2)
LYMPHOCYTES # BLD AUTO: 1.3 THOUSANDS/ΜL (ref 0.6–4.47)
LYMPHOCYTES NFR BLD AUTO: 20 % (ref 14–44)
MCH RBC QN AUTO: 31.9 PG (ref 26.8–34.3)
MCHC RBC AUTO-ENTMCNC: 32.6 G/DL (ref 31.4–37.4)
MCV RBC AUTO: 98 FL (ref 82–98)
MONOCYTES # BLD AUTO: 0.7 THOUSAND/ΜL (ref 0.17–1.22)
MONOCYTES NFR BLD AUTO: 11 % (ref 4–12)
NEUTROPHILS # BLD AUTO: 4.36 THOUSANDS/ΜL (ref 1.85–7.62)
NEUTS SEG NFR BLD AUTO: 65 % (ref 43–75)
NRBC BLD AUTO-RTO: 0 /100 WBCS
PLATELET # BLD AUTO: 133 THOUSANDS/UL (ref 149–390)
PMV BLD AUTO: 11.3 FL (ref 8.9–12.7)
POTASSIUM SERPL-SCNC: 4.9 MMOL/L (ref 3.5–5.3)
RBC # BLD AUTO: 4.08 MILLION/UL (ref 3.88–5.62)
SODIUM SERPL-SCNC: 138 MMOL/L (ref 136–145)
WBC # BLD AUTO: 6.59 THOUSAND/UL (ref 4.31–10.16)

## 2019-07-18 PROCEDURE — 85025 COMPLETE CBC W/AUTO DIFF WBC: CPT | Performed by: SURGERY

## 2019-07-18 PROCEDURE — 99223 1ST HOSP IP/OBS HIGH 75: CPT | Performed by: INTERNAL MEDICINE

## 2019-07-18 PROCEDURE — 80048 BASIC METABOLIC PNL TOTAL CA: CPT | Performed by: SURGERY

## 2019-07-18 PROCEDURE — 99232 SBSQ HOSP IP/OBS MODERATE 35: CPT | Performed by: SURGERY

## 2019-07-18 PROCEDURE — 83605 ASSAY OF LACTIC ACID: CPT | Performed by: SURGERY

## 2019-07-18 RX ORDER — ASPIRIN 81 MG/1
81 TABLET, CHEWABLE ORAL DAILY
Status: DISCONTINUED | OUTPATIENT
Start: 2019-07-18 | End: 2019-07-19 | Stop reason: HOSPADM

## 2019-07-18 RX ADMIN — HEPARIN SODIUM 5000 UNITS: 5000 INJECTION INTRAVENOUS; SUBCUTANEOUS at 13:23

## 2019-07-18 RX ADMIN — PHENOBARBITAL SODIUM 65 MG: 65 INJECTION INTRAMUSCULAR; INTRAVENOUS at 08:19

## 2019-07-18 RX ADMIN — SODIUM CHLORIDE 125 ML/HR: 0.9 INJECTION, SOLUTION INTRAVENOUS at 14:01

## 2019-07-18 RX ADMIN — SODIUM CHLORIDE 125 ML/HR: 0.9 INJECTION, SOLUTION INTRAVENOUS at 22:21

## 2019-07-18 RX ADMIN — HEPARIN SODIUM 5000 UNITS: 5000 INJECTION INTRAVENOUS; SUBCUTANEOUS at 22:22

## 2019-07-18 RX ADMIN — ASPIRIN 81 MG 81 MG: 81 TABLET ORAL at 08:19

## 2019-07-18 RX ADMIN — SODIUM CHLORIDE 125 ML/HR: 0.9 INJECTION, SOLUTION INTRAVENOUS at 06:09

## 2019-07-18 RX ADMIN — PHENOBARBITAL SODIUM 65 MG: 65 INJECTION INTRAMUSCULAR; INTRAVENOUS at 17:31

## 2019-07-18 NOTE — PLAN OF CARE
Problem: GASTROINTESTINAL - ADULT  Goal: Minimal or absence of nausea and/or vomiting  Description  INTERVENTIONS:  - Administer IV fluids as ordered to ensure adequate hydration  - Maintain NPO status until nausea and vomiting are resolved  - Nasogastric tube as ordered  - Administer ordered antiemetic medications as needed  - Provide nonpharmacologic comfort measures as appropriate  - Advance diet as tolerated, if ordered  - Nutrition services referral to assist patient with adequate nutrition and appropriate food choices  Outcome: Progressing  Goal: Maintains or returns to baseline bowel function  Description  INTERVENTIONS:  - Assess bowel function  - Encourage oral fluids to ensure adequate hydration  - Administer IV fluids as ordered to ensure adequate hydration  - Administer ordered medications as needed  - Encourage mobilization and activity  - Nutrition services referral to assist patient with appropriate food choices  Outcome: Progressing  Goal: Maintains adequate nutritional intake  Description  INTERVENTIONS:  - Monitor percentage of each meal consumed  - Identify factors contributing to decreased intake, treat as appropriate  - Assist with meals as needed  - Monitor I&O, WT and lab values  - Obtain nutrition services referral as needed  Outcome: Progressing     Problem: PAIN - ADULT  Goal: Verbalizes/displays adequate comfort level or baseline comfort level  Description  Interventions:  - Encourage patient to monitor pain and request assistance  - Assess pain using appropriate pain scale  - Administer analgesics based on type and severity of pain and evaluate response  - Implement non-pharmacological measures as appropriate and evaluate response  - Consider cultural and social influences on pain and pain management  - Notify physician/advanced practitioner if interventions unsuccessful or patient reports new pain  Outcome: Progressing     Problem: INFECTION - ADULT  Goal: Absence or prevention of progression during hospitalization  Description  INTERVENTIONS:  - Assess and monitor for signs and symptoms of infection  - Monitor lab/diagnostic results  - Monitor all insertion sites, i e  indwelling lines, tubes, and drains  - Monitor endotracheal (as able) and nasal secretions for changes in amount and color  - [place] appropriate cooling/warming therapies per order  - Administer medications as ordered  - Instruct and encourage patient and family to use good hand hygiene technique  - Identify and instruct in appropriate isolation precautions for identified infection/condition  Outcome: Progressing     Problem: SAFETY ADULT  Goal: Patient will remain free of falls  Description  INTERVENTIONS:  - Assess patient frequently for physical needs  -  Identify cognitive and physical deficits and behaviors that affect risk of falls    -  [place] fall precautions as indicated by assessment   - Educate patient/family on patient safety including physical limitations  - Instruct patient to call for assistance with activity based on assessment  - Modify environment to reduce risk of injury  - Consider OT/PT consult to assist with strengthening/mobility  Outcome: Progressing  Goal: Maintain or return to baseline ADL function  Description  INTERVENTIONS:  -  Assess patient's ability to carry out ADLs; assess patient's baseline for ADL function and identify physical deficits which impact ability to perform ADLs (bathing, care of mouth/teeth, toileting, grooming, dressing, etc )  - Assess/evaluate cause of self-care deficits   - Assess range of motion  - Assess patient's mobility; develop plan if impaired  - Assess patient's need for assistive devices and provide as appropriate  - Encourage maximum independence but intervene and supervise when necessary  ¯ Involve family in performance of ADLs  ¯ Assess for home care needs following discharge   ¯ Request OT consult to assist with ADL evaluation and planning for discharge  ¯ Provide patient education as appropriate  Outcome: Progressing  Goal: Maintain or return mobility status to optimal level  Description  INTERVENTIONS:  - Assess patient's baseline mobility status (ambulation, transfers, stairs, etc )    - Identify cognitive and physical deficits and behaviors that affect mobility  - Identify mobility aids required to assist with transfers and/or ambulation (gait belt, sit-to-stand, lift, walker, cane, etc )  - [place] fall precautions as indicated by assessment  - Record patient progress and toleration of activity level on Mobility SBAR; progress patient to next Phase/Stage  - Instruct patient to call for assistance with activity based on assessment  - Request Rehabilitation consult to assist with strengthening/weightbearing, etc   Outcome: Progressing     Problem: DISCHARGE PLANNING  Goal: Discharge to home or other facility with appropriate resources  Description  INTERVENTIONS:  - Identify barriers to discharge w/patient and caregiver  - Arrange for needed discharge resources and transportation as appropriate  - Identify discharge learning needs (meds, wound care, etc )  - Arrange for interpretive services to assist at discharge as needed  - Refer to Case Management Department for coordinating discharge planning if the patient needs post-hospital services based on physician/advanced practitioner order or complex needs related to functional status, cognitive ability, or social support system  Outcome: Progressing     Problem: Knowledge Deficit  Goal: Patient/family/caregiver demonstrates understanding of disease process, treatment plan, medications, and discharge instructions  Description  Complete learning assessment and assess knowledge base    Interventions:  - Provide teaching at level of understanding  - Provide teaching via preferred learning methods  Outcome: Progressing

## 2019-07-18 NOTE — CONSULTS
Consultation - Cardiology   Bev Neely 75 y o  male MRN: [id number]  Unit/Bed#: [facility] 624-01 Encounter: [id number]    Assessment/Plan     Active Problems:    * No active hospital problems  *      Assessment/Plan    1  SBO secondary to an incarcerated complex ventral hernia  No active cardiac symptoms, no angina, heart failure  No cardiac testing anticipated  Will f/u once surgical plan is known  2  CAD with history STEMI 02/2017 with drug-eluting stent to RCA  Residual nonocclusive disease left circ, left main and LAD  Normal LV function  Pt has refused BB  Only takes asa 80, reports intolerance to statins  Echocardiogram- normal LVEF, no significant valve dysfunction    3  PAF- EKG demonstrates NSR    4  History of AAA repair      History of Present Illness   Physician Requesting Consult: Crhis Llamas MD  Reason for Consult / Principal Problem: medical optimization pre op hernia repair/ CAD    HPI: Bev Neely is a 75y o  year old male CAD s/p inferior STEMI/ RCA AGUSTIN 2/2017 with normal LVEF at that time, prior LAD stent 2010, PAF during hospitalization for STEMI , AAA repair, from seizure disorder who presents with right-sided abdominal pain and emesis from SBO secondary to an incarcerated complex ventral hernia  Pt has significant output from NGT once inserted  Being hydrated with IVF  Lactic acidosis resolved  He is followed by Dr Jessica Lizama  Intolerant to atorvastatin  At the time of his MI in 2017 EF normal LV function with mild hypokinesis of the basal inferior and mid inferior walls  Cardiac catheterization at time showed 100% prox RCA for which she underwent trach with stent  Distal left main 20%  Mid LAD 30% at site of a prior stent  Prox circ 30% with mid circ 30%  He has elected not to take BB in the past  He had been advised when he discontinue Effient to increase aspirin to 325 daily  He is currently taking 81mg  He refuses statin  He is independent with his ADL's   He drives, 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  He has no exertional symptoms walking flat surfaces and slight incline  He avoid steps because of leg fatigue/weakness reportedly from previous injury  Inpatient consult to Cardiology  Consult performed by: BRIJESH Cheatham  Consult ordered by: Checo France MD          Review of Systems   HENT: Negative  Eyes: Negative  Respiratory: Negative for shortness of breath  Cardiovascular: Negative for chest pain, palpitations and leg swelling  Gastrointestinal: Positive for abdominal pain and vomiting  Genitourinary: Negative  Musculoskeletal: Negative  Skin: Negative  Neurological: Positive for weakness  Negative for syncope and light-headedness  Hematological: Negative  Psychiatric/Behavioral: Negative  Historical Information   Past Medical History:   Diagnosis Date    Cardiac disease     [facility]estive [facility] [address] [facility]     Hernia of abdominal cavity     Myocardial infarction ([facility] [address] )     last assessed 14, past, Pt had MI s/p AGUSTIN placed in , refuses to take any other medications for his cardiac disease, only will take seizure med   Understands possible complications from this decision    Seizure [facility])      Past Surgical History:   Procedure Laterality Date    ABDOMINAL AORTIC ANEURYSM REPAIR      for dialation or occulsion    CATARACT EXTRACTION       Social History     Substance and Sexual Activity   Alcohol Use No     Social History     Substance and Sexual Activity   Drug Use No     Social History     Tobacco Use   Smoking Status Former Smoker    Last attempt to quit: 2005    Years since quittin 1   Smokeless Tobacco Never Used     Family History:   Family History   Problem Relation Age of Onset    Hypertension Father     Kidney disease Brother        Meds/Allergies   current meds:   Current Facility-Administered Medications   Medication Dose Route Frequency    aspirin chewable tablet 81 mg  81 mg Oral Daily    heparin (porcine) subcutaneous injection 5,000 Units  5,000 Units Subcutaneous Q8H [address]    nitroglycerin (NITROSTAT) SL tablet 0 4 mg  0 4 mg Sublingual Q5 Min PRN    ondansetron (ZOFRAN) injection 4 mg  4 mg Intravenous Q6H PRN    PHENobarbital 65 mg/mL injection 65 mg  65 mg Intravenous BID    sodium chloride 0 9 % infusion  125 mL/hr Intravenous Continuous    and PTA meds:    Medications Prior to Admission   Medication    aspirin (ECOTRIN) 325 mg EC tablet    clotrimazole (LOTRIMIN) 1 % cream    nitroglycerin (NITROSTAT) 0 4 mg SL tablet    PHENobarbital 64 8 mg tablet     Allergies   Allergen Reactions    Iodinated Diagnostic Agents Rash     Pt's skin get very red and he gets hot and chills    Clopidogrel        Objective   Vitals: Blood pressure 112/63, pulse 72, temperature (!) 97 3 °F (36 3 °C), resp  rate 17, weight 100 kg (221 lb), SpO2 91 %    Orthostatic Blood Pressures      Most Recent Value   Blood Pressure  112/63 filed at 07/18/2019 9422   Patient Position - Orthostatic VS  Lying filed at 07/17/2019 2022            Intake/Output Summary (Last 24 hours) at 7/18/2019 1006  Last data filed at 7/18/2019 0900  Gross per 24 hour   Intake 1033 34 ml   Output 2410 ml   Net -1376 66 ml       Invasive Devices     Peripheral Intravenous Line            Peripheral IV 07/17/19 Left Antecubital less than 1 day          Drain            NG/OG/Enteral Tube Nasogastric Right nares less than 1 day                Physical Exam: /63   Pulse 72   Temp (!) 97 3 °F (36 3 °C)   Resp 17   Wt 100 kg (221 lb)   SpO2 91%   BMI 29 97 kg/m²      General Appearance:    Alert, cooperative, no distress, appears stated age   Head:    Normocephalic, no scleral icterus   Eyes:    PERRL   Nose:   Nares normal, septum midline, mucosa normal, no drainage    Throat:   Lips, mucosa, and tongue normal   Neck:   Supple, symmetrical, trachea midline     no JVD       Lungs:     Clear to auscultation bilaterally, respirations unlabored   Chest Wall:    No tenderness or deformity    Heart:    Regular rate and rhythm, S1 and S2 normal, no murmur, rub   or gallop   Abdomen:     NGT in place   Extremities:   Extremities normal, atraumatic, no cyanosis or edema   Pulses:   2+ and symmetric all extremities   Skin:   Skin color, texture, turgor normal, no rashes or lesions   Neurologic:   Alert and oriented to person place and time   No focal deficits       Lab Results:   Recent Results (from the past 72 hour(s))   CBC and differential    Collection Time: 07/17/19 12:57 PM   Result Value Ref Range    WBC 9 06 4 31 - 10 16 Thousand/uL    RBC 4 51 3 88 - 5 62 Million/uL    Hemoglobin 14 4 12 0 - 17 0 g/dL    Hematocrit 43 2 36 5 - 49 3 %    MCV 96 82 - 98 fL    MCH 31 9 26 8 - 34 3 pg    MCHC 33 3 31 4 - 37 4 g/dL    RDW 14 6 11 6 - 15 1 %    MPV 10 1 8 9 - 12 7 fL    Platelets 700 995 - 200 Thousands/uL    nRBC 0 /100 WBCs    Neutrophils Relative 81 (H) 43 - 75 %    Immat GRANS % 0 0 - 2 %    Lymphocytes Relative 11 (L) 14 - 44 %    Monocytes Relative 7 4 - 12 %    Eosinophils Relative 1 0 - 6 %    Basophils Relative 0 0 - 1 %    Neutrophils Absolute 7 32 1 85 - 7 62 Thousands/µL    Immature Grans Absolute 0 04 0 00 - 0 20 Thousand/uL    Lymphocytes Absolute 0 95 0 60 - 4 47 Thousands/µL    Monocytes Absolute 0 65 0 17 - 1 22 Thousand/µL    Eosinophils Absolute 0 08 0 00 - 0 61 Thousand/µL    Basophils Absolute 0 02 0 00 - 0 10 Thousands/µL   Comprehensive metabolic panel    Collection Time: 07/17/19 12:57 PM   Result Value Ref Range    Sodium 135 (L) 136 - 145 mmol/L    Potassium 3 6 3 5 - 5 3 mmol/L    Chloride 104 100 - 108 mmol/L    CO2 24 21 - 32 mmol/L    ANION GAP 7 4 - 13 mmol/L    BUN 13 5 - 25 mg/dL    Creatinine 0 92 0 60 - 1 30 mg/dL    Glucose 145 (H) 65 - 140 mg/dL    Calcium 8 4 8 3 - 10 1 mg/dL    AST 6 5 - 45 U/L    ALT 13 12 - 78 U/L    Alkaline Phosphatase 161 (H) 46 - 116 U/L    Total Protein 7 8 6 4 - 8 2 g/dL    Albumin 3 7 3 5 - 5 0 g/dL    Total Bilirubin 0 47 0 20 - 1 00 mg/dL    eGFR 82 ml/min/1 73sq m   Lipase    Collection Time: 07/17/19 12:57 PM   Result Value Ref Range    Lipase 76 73 - 393 u/L   Troponin I    Collection Time: 07/17/19  1:50 PM   Result Value Ref Range    Troponin I <0 02 <=0 04 ng/mL   ECG 12 lead    Collection Time: 07/17/19  1:50 PM   Result Value Ref Range    Ventricular Rate 74 BPM    Atrial Rate 74 BPM    NH Interval 184 ms    QRSD Interval 82 ms    QT Interval 388 ms    QTC Interval 430 ms    P [place] 59 degrees    QRS Axis -3 degrees    T Wave Axis 38 degrees   Lactic acid, plasma    Collection Time: 07/17/19  7:28 PM   Result Value Ref Range    LACTIC ACID 3 4 (HH) 0 5 - 2 0 mmol/L   Platelet count    Collection Time: 07/17/19  8:58 PM   Result Value Ref Range    Platelets 399 074 - 217 Thousands/uL    MPV 10 7 8 9 - 12 7 fL   Lactic acid, plasma    Collection Time: 07/17/19 11:24 PM   Result Value Ref Range    LACTIC ACID 2 0 0 5 - 2 0 mmol/L   CBC and differential    Collection Time: 07/18/19  5:27 AM   Result Value Ref Range    WBC 6 59 4 31 - 10 16 Thousand/uL    RBC 4 08 3 88 - 5 62 Million/uL    Hemoglobin 13 0 12 0 - 17 0 g/dL    Hematocrit 39 9 36 5 - 49 3 %    MCV 98 82 - 98 fL    MCH 31 9 26 8 - 34 3 pg    MCHC 32 6 31 4 - 37 4 g/dL    RDW 14 6 11 6 - 15 1 %    MPV 11 3 8 9 - 12 7 fL    Platelets 679 (L) 136 - 390 Thousands/uL    nRBC 0 /100 WBCs    Neutrophils Relative 65 43 - 75 %    Immat GRANS % 0 0 - 2 %    Lymphocytes Relative 20 14 - 44 %    Monocytes Relative 11 4 - 12 %    Eosinophils Relative 3 0 - 6 %    Basophils Relative 1 0 - 1 %    Neutrophils Absolute 4 36 1 85 - 7 62 Thousands/µL    Immature Grans Absolute 0 02 0 00 - 0 20 Thousand/uL    Lymphocytes Absolute 1 30 0 60 - 4 47 Thousands/µL    Monocytes Absolute 0 70 0 17 - 1 22 Thousand/µL    Eosinophils Absolute 0 18 0 00 - 0 61 Thousand/µL    Basophils Absolute 0 03 0 00 - 0 10 Thousands/µL   Basic metabolic panel    Collection Time: 19  5:27 AM   Result Value Ref Range    Sodium 138 136 - 145 mmol/L    Potassium 4 9 3 5 - 5 3 mmol/L    Chloride 108 100 - 108 mmol/L    CO2 24 21 - 32 mmol/L    ANION GAP 6 4 - 13 mmol/L    BUN 13 5 - 25 mg/dL    Creatinine 0 82 0 60 - 1 30 mg/dL    Glucose 101 65 - 140 mg/dL    Calcium 7 8 (L) 8 3 - 10 1 mg/dL    eGFR 87 ml/min/1 73sq m   Lactic acid, plasma    Collection Time: 19  5:27 AM   Result Value Ref Range    LACTIC ACID 0 8 0 5 - 2 0 mmol/L       St  [address] [facility] [facility]  [phone]     Transthoracic Echocardiogram  2D, M-mode, Doppler, and Color Doppler     Study date:  2017     Patient: Elda Gonsales  MR number: [id number]  Account number: [de-identified]  : 1945  Age: 66 years  Gender: Male  Status: Inpatient  Location: Bedside  Height: 72 in  Weight: 214 5 lb  BP: 91/ 48 mmHg     Indications: STEMI     Diagnoses: I21 3 - ST elevation (STEMI) myocardial infarction of unspecified site     Sonographer:  RUTH Nagy  Primary Physician:  Anusha Shah MD  Referring Physician:  Nanda Neil MD  Group:  [facility]  Interpreting Physician:  Lucina Durham MD     SUMMARY     LEFT VENTRICLE:  Systolic function was at the lower limits of normal  Ejection fraction was estimated to be 55 %  There was mild hypokinesis of the basal inferior wall(s)  There was mild hypokinesis of the mid inferior wall(s)      MITRAL VALVE:  There was mild annular calcification  There was trace regurgitation      TRICUSPID VALVE:  There was trace regurgitation      HISTORY: PRIOR HISTORY: CAD; HLD; HTN; Smoker; Seizure disorder; AAA; Non compliant with medicine regimen     PROCEDURE: The procedure was performed at the bedside  This was a routine study  The transthoracic approach was used  The study included complete 2D imaging, M-mode, complete spectral Doppler, and color Doppler   Echocardiographic views  were limited due to decreased penetration and lung interference  This was a technically difficult study      LEFT VENTRICLE: Size was normal  Systolic function was at the lower limits of normal  Ejection fraction was estimated to be 55 %  There was mild hypokinesis of the basal inferior wall(s)  There was mild hypokinesis of the mid inferior  wall(s)  Wall thickness was normal  No evidence of apical thrombus  DOPPLER: Left ventricular diastolic function parameters were normal      RIGHT VENTRICLE: The size was normal  Systolic function was normal  Wall thickness was normal      LEFT ATRIUM: Size was normal      RIGHT ATRIUM: Size was normal      MITRAL VALVE: There was mild annular calcification  Valve structure was normal  There was mild thickening  There was normal leaflet separation  DOPPLER: The transmitral velocity was within the normal range  There was no evidence for  stenosis  There was trace regurgitation      AORTIC VALVE: The valve was trileaflet  Leaflets exhibited normal thickness and normal cuspal separation  DOPPLER: Transaortic velocity was within the normal range  There was no evidence for stenosis  There was no significant  regurgitation      TRICUSPID VALVE: The valve structure was normal  There was normal leaflet separation  DOPPLER: The transtricuspid velocity was within the normal range  There was no evidence for stenosis  There was trace regurgitation      PULMONIC VALVE: Leaflets exhibited normal thickness, no calcification, and normal cuspal separation  DOPPLER: The transpulmonic velocity was within the normal range  There was no significant regurgitation      PERICARDIUM: There was no pericardial effusion   The pericardium was normal in appearance      AORTA: The root exhibited normal size      SYSTEMIC VEINS: IVC: The inferior vena cava was normal in size      SYSTEM MEASUREMENT TABLES     2D  %FS: 29 05 %  Ao Diam: 3 33 cm  EDV(Teich): 90 72 ml  EF(Teich): 55 96 %  ESV(Teich): 39 95 ml  IVSd: 0 97 cm  LA Area: 14 55 cm2  LA Diam: 2 75 cm  LVEDV MOD A4C: 107 42 ml  LVEF MOD A4C: 59 76 %  LVESV MOD A4C: 43 23 ml  LVIDd: 4 46 cm  LVIDs: 3 17 cm  LVLd A4C: 7 2 cm  LVLs A4C: 6 17 cm  LVPWd: 1 cm  RA Area: 10 49 cm2  RVIDd: 2 31 cm  SV MOD A4C: 64 2 ml  SV(Teich): 50 77 ml     MM  TAPSE: 1 56 cm     PW  E': 0 14 m/s  E/E': 5 06  MV A Brendon: 0 61 m/s  MV Dec Blanco: 3 8 m/s2  MV DecT: 185 86 ms  MV E Brendon: 0 71 m/s  MV E/A Ratio: 1 16  MV PHT: 53 9 ms  MVA By PHT: 4 08 cm2     [employer] Commission Accredited Echocardiography Laboratory     Prepared and electronically signed by     Ze Schofield MD  Signed 2017 12:41:16     [address]  [phone]     [facility]     Invasive Cardiovascular Lab Complete Report     Patient: Erick Nichols  MR number: [id number]  Account number: [de-identified]  Study date: 2017  Gender: Male  : 1945  Height: 72 in  Weight: 211 2 lb  BSA: 2 18 m squared     Allergies: IODINATED DIAGNOSTIC AGENTS     Diagnostic Cardiologist:  Ren Darling MD  Interventional Cardiologist:  Ren Darling MD     SUMMARY     CORONARY CIRCULATION:  Distal left main: There was a 20 % stenosis  The lesion was mildly calcified  Mid LAD: There was a 30 % stenosis at the site of a prior stent  Proximal circumflex: There was a 30 % stenosis  Mid circumflex: There was a 30 % stenosis  Proximal RCA: There was a 100 % stenosis  There was HELIO grade 0 flow through the vessel (no flow)      1ST LESION INTERVENTIONS:  A drug-eluting stent procedure was performed on the 100 % lesion in the proximal RCA  Following intervention there was an improved angiographic appearance with a 0 % residual stenosis  Balloon angioplasty was performed, using a NC Trek Rx 3 5 x 20mm balloon, with 2 inflations and a maximum inflation pressure of 14 antony  Post Stent placement    A Xience Alpine Rx 3 25 x 38mm drug-eluting stent was placed across the lesion and deployed at a maximum inflation pressure of 12 antony  Imaging: I have personally reviewed pertinent reports  EKG: NSR low voltage  VTE Prophylaxis: Enoxaparin (Lovenox)    Code Status: Prior  Advance Directive and Living Will:      Power of :    POLST:      Counseling / Coordination of Care  Total floor / unit time spent today 45 minutes  Greater than 50% of total time was spent with the patient and / or family counseling and / or coordination of care

## 2019-07-18 NOTE — PLAN OF CARE
Problem: GASTROINTESTINAL - ADULT  Goal: Minimal or absence of nausea and/or vomiting  Description  INTERVENTIONS:  - Administer IV fluids as ordered to ensure adequate hydration  - Maintain NPO status until nausea and vomiting are resolved  - Nasogastric tube as ordered  - Administer ordered antiemetic medications as needed  - Provide nonpharmacologic comfort measures as appropriate  - Advance diet as tolerated, if ordered  - Nutrition services referral to assist patient with adequate nutrition and appropriate food choices  Outcome: Progressing  Goal: Maintains or returns to baseline bowel function  Description  INTERVENTIONS:  - Assess bowel function  - Encourage oral fluids to ensure adequate hydration  - Administer IV fluids as ordered to ensure adequate hydration  - Administer ordered medications as needed  - Encourage mobilization and activity  - Nutrition services referral to assist patient with appropriate food choices  Outcome: Progressing  Goal: Maintains adequate nutritional intake  Description  INTERVENTIONS:  - Monitor percentage of each meal consumed  - Identify factors contributing to decreased intake, treat as appropriate  - Assist with meals as needed  - Monitor I&O, WT and lab values  - Obtain nutrition services referral as needed  Outcome: Progressing     Problem: PAIN - ADULT  Goal: Verbalizes/displays adequate comfort level or baseline comfort level  Description  Interventions:  - Encourage patient to monitor pain and request assistance  - Assess pain using appropriate pain scale  - Administer analgesics based on type and severity of pain and evaluate response  - Implement non-pharmacological measures as appropriate and evaluate response  - Consider cultural and social influences on pain and pain management  - Notify physician/advanced practitioner if interventions unsuccessful or patient reports new pain  Outcome: Progressing     Problem: INFECTION - ADULT  Goal: Absence or prevention of progression during hospitalization  Description  INTERVENTIONS:  - Assess and monitor for signs and symptoms of infection  - Monitor lab/diagnostic results  - Monitor all insertion sites, i e  indwelling lines, tubes, and drains  - Monitor endotracheal (as able) and nasal secretions for changes in amount and color  - [place] appropriate cooling/warming therapies per order  - Administer medications as ordered  - Instruct and encourage patient and family to use good hand hygiene technique  - Identify and instruct in appropriate isolation precautions for identified infection/condition  Outcome: Progressing     Problem: SAFETY ADULT  Goal: Patient will remain free of falls  Description  INTERVENTIONS:  - Assess patient frequently for physical needs  -  Identify cognitive and physical deficits and behaviors that affect risk of falls    -  [place] fall precautions as indicated by assessment   - Educate patient/family on patient safety including physical limitations  - Instruct patient to call for assistance with activity based on assessment  - Modify environment to reduce risk of injury  - Consider OT/PT consult to assist with strengthening/mobility  Outcome: Progressing  Goal: Maintain or return to baseline ADL function  Description  INTERVENTIONS:  -  Assess patient's ability to carry out ADLs; assess patient's baseline for ADL function and identify physical deficits which impact ability to perform ADLs (bathing, care of mouth/teeth, toileting, grooming, dressing, etc )  - Assess/evaluate cause of self-care deficits   - Assess range of motion  - Assess patient's mobility; develop plan if impaired  - Assess patient's need for assistive devices and provide as appropriate  - Encourage maximum independence but intervene and supervise when necessary  ¯ Involve family in performance of ADLs  ¯ Assess for home care needs following discharge   ¯ Request OT consult to assist with ADL evaluation and planning for discharge  ¯ Provide patient education as appropriate  Outcome: Progressing  Goal: Maintain or return mobility status to optimal level  Description  INTERVENTIONS:  - Assess patient's baseline mobility status (ambulation, transfers, stairs, etc )    - Identify cognitive and physical deficits and behaviors that affect mobility  - Identify mobility aids required to assist with transfers and/or ambulation (gait belt, sit-to-stand, lift, walker, cane, etc )  - [place] fall precautions as indicated by assessment  - Record patient progress and toleration of activity level on Mobility SBAR; progress patient to next Phase/Stage  - Instruct patient to call for assistance with activity based on assessment  - Request Rehabilitation consult to assist with strengthening/weightbearing, etc   Outcome: Progressing     Problem: DISCHARGE PLANNING  Goal: Discharge to home or other facility with appropriate resources  Description  INTERVENTIONS:  - Identify barriers to discharge w/patient and caregiver  - Arrange for needed discharge resources and transportation as appropriate  - Identify discharge learning needs (meds, wound care, etc )  - Arrange for interpretive services to assist at discharge as needed  - Refer to Case Management Department for coordinating discharge planning if the patient needs post-hospital services based on physician/advanced practitioner order or complex needs related to functional status, cognitive ability, or social support system  Outcome: Progressing     Problem: Knowledge Deficit  Goal: Patient/family/caregiver demonstrates understanding of disease process, treatment plan, medications, and discharge instructions  Description  Complete learning assessment and assess knowledge base    Interventions:  - Provide teaching at level of understanding  - Provide teaching via preferred learning methods  Outcome: Progressing

## 2019-07-18 NOTE — SOCIAL WORK
CM met with the patient and brother at bedside to review the CM role and discuss possible dc needs  Pt gives verbal permission to speak with brother present  At time of interview pt is AAOx4 lives in a 1st floor apartment with 2 SEEMA in [place]  Pt was IPTA  Pt has DME of commode and commode and ambulates independently   Pt denies any history of drug/etoh abuse, mental illness or inpatient psych admissions  Pt denies any history of SNF/Rehab or VNA  Pt does not have a POA/LW  Preferred Pharmacy:  [address]  Contact: Claribel Doll [phone]  PCP: [facility] on [address] [facility] has appointment on July 25    CM reviewed d/c planning process including the following: identifying help at home, patient preference for d/c planning needs, Discharge Lounge, [facility] Meds to Bed program, availability of treatment team to discuss questions or concerns patient and/or family may have regarding understanding medications and recognizing signs and symptoms once discharged  CM also encouraged patient to follow up with all recommended appointments after discharge  Patient advised of importance for patient and family to participate in managing patients medical well being

## 2019-07-18 NOTE — UTILIZATION REVIEW
Initial Clinical Review    Admission: Date/Time/Statement: 7/17/19 @ 1959    Orders Placed This Encounter   Procedures    Inpatient Admission     Standing Status:   Standing     Number of Occurrences:   1     Order Specific Question:   Admitting Physician     Answer:   Eugene Robles     Order Specific Question:   Level of Care     Answer:   Level 2 Stepdown / HOT [14]     Order Specific Question:   Estimated length of stay     Answer:   More than 2 Midnights     Order Specific Question:   Certification     Answer:   I certify that inpatient services are medically necessary for this patient for a duration of greater than two midnights  See H&P and MD Progress Notes for additional information about the patient's course of treatment  ED Arrival Information     Expected Arrival Acuity Means of Arrival Escorted By Service Admission Type    - 7/17/2019 12:44 [facility]gent [facility] [place] [facility] Surgery-General Urgent    Arrival Complaint    -        Chief Complaint   Patient presents with    Abdominal Pain     Pt c/o RUQ pain beginning yesterday and vomited x2 and a BM x 3  Pt has hx of AAA repair and multiple hernias     Assessment/Plan:   75 y o  male with h/o multiple midline ventral hernias following AAA repair in 2005, CHF, MI x2 who presents with R-sided abdominal pain, nausea/vomiting for 1 day  He states the pain began yesterday around 1700 while he was sitting on the couch  He describes the pain as a sharp, stabbing pain on the right side of his abdomen that is constant and nonradiating  Although he has had these hernias since 2005, he has never had any episodes such as this current one  He had 2 episodes of emesis last night following the onset of pain as well as 3 normal bowel movements last night  He has had multiple episodes of emesis today but no bowel movements  He has not passed flatus since yesterday    The patient states he has not eaten anything today secondary to nausea and vomiting  He denies any diarrhea, blood in his stool, or any urinary changes      CTAP done in the ED demonstrated 6 midline ventral hernias with SBO with transition point at the linea alba hernia #4 (of 6 total midline ventral hernias) as well as diastasis recti  Assessment:  73M with h/o multiple midline ventral hernias p/w abdominal pain, N/V  CT showing SBO with transition point at hernia #4 (of 6)     Plan:  --admit to red surgery  --NPO  --NGT decompression  --IVF  --pain control  --antiemetics  --serial abdominal exams     ED Triage Vitals [07/17/19 1248]   Temperature Pulse Respirations Blood Pressure SpO2   98 3 °F (36 8 °C) 80 18 116/79 98 %      Temp Source Heart Rate Source Patient Position - Orthostatic VS BP Location FiO2 (%)   Oral Monitor Lying Left arm --      Pain Score       No Pain        Wt Readings from Last 1 Encounters:   07/17/19 100 kg (221 lb)     Additional Vital Signs:   Date/Time  Temp  Pulse  Resp  BP  SpO2  O2 Device  Patient Position - Orthostatic VS   07/18/19 1133  99 3 °F (37 4 °C)  74  18  112/64  89 %Abnormal   --  --   07/18/19 0830  --  --  --  --  --  None (Room air)  --   07/18/19 0722  97 3 °F (36 3 °C)Abnormal   72  17  112/63  91 %  --  --   07/18/19 0245  99 5 °F (37 5 °C)  77  15  109/61  90 %  --  --   07/17/19 2343  --  72  17  105/81  90 %  --  --   07/17/19 2022  --  75  18  109/66  95 %  None (Room air)       Pertinent Labs/Diagnostic Test Results:   7/18 CT Abd/ Pelvis -  Small bowel obstruction with transition point at one of the patient's linea alba hernias (hernia #4 described above)  Incarceration cannot be diagnosed on the basis of imaging  No CT evidence of bowel ischemia  Diastasis recti with multiple ventral abdominal wall hernias, as described  Please note that hernias #3 and #4 are closely apposed and may reflect a single complex hernia      Results from last 7 days   Lab Units 07/18/19  0527 07/17/19 2058 07/17/19  1257   WBC Thousand/uL 6 59  -- 9 06   HEMOGLOBIN g/dL 13 0  --  14 4   HEMATOCRIT % 39 9  --  43 2   PLATELETS Thousands/uL 133* 157 158   NEUTROS ABS Thousands/µL 4 36  --  7 32         Results from last 7 days   Lab Units 07/18/19  0527 07/17/19  1257   SODIUM mmol/L 138 135*   POTASSIUM mmol/L 4 9 3 6   CHLORIDE mmol/L 108 104   CO2 mmol/L 24 24   ANION GAP mmol/L 6 7   BUN mg/dL 13 13   CREATININE mg/dL 0 82 0 92   EGFR ml/min/1 73sq m 87 82   CALCIUM mg/dL 7 8* 8 4     Results from last 7 days   Lab Units 07/17/19  1257   AST U/L 6   ALT U/L 13   ALK PHOS U/L 161*   TOTAL PROTEIN g/dL 7 8   ALBUMIN g/dL 3 7   TOTAL BILIRUBIN mg/dL 0 47         Results from last 7 days   Lab Units 07/18/19  0527 07/17/19  1257   GLUCOSE RANDOM mg/dL 101 145*     Results from last 7 days   Lab Units 07/17/19  1350   TROPONIN I ng/mL <0 02     Results from last 7 days   Lab Units 07/18/19  0527 07/17/19  2324 07/17/19  1928   LACTIC ACID mmol/L 0 8 2 0 3 4*     Results from last 7 days   Lab Units 07/17/19  1257   LIPASE u/L 76     ED Treatment:   Medication Administration from 07/17/2019 1244 to 07/17/2019 2122       Date/Time Order Dose Route Action     07/17/2019 1349 sodium chloride 0 9 % bolus 500 mL 500 mL Intravenous New Bag     07/17/2019 1410 ondansetron (ZOFRAN) injection 4 mg 4 mg Intravenous Given     07/17/2019 1410 iohexol (OMNIPAQUE) 240 MG/ML solution 50 mL 50 mL Oral Given     07/17/2019 1539 ondansetron (ZOFRAN) injection 4 mg 4 mg Intravenous Given        Past Medical History:   Diagnosis Date    Cardiac disease     [facility]congestive [facility] failure) [facility] [address] )     Hernia of abdominal cavity     Myocardial infarction ([facility] [address] )     last assessed 7/31/14, past, Pt had MI s/p AGUSTIN placed in 2001, refuses to take any other medications for his cardiac disease, only will take seizure med   Understands possible complications from this decision    [facility])      Present on Admission:   Ventral hernia with obstruction and without gangrene    Admitting Diagnosis: Small bowel obstruction (HCC) [K56 609]  Abdominal pain [R10 9]     Age/Sex: 75 y o  male     Admission Orders:  NPO; Sips with meds  NGT to LIWS  IP CONSULT TO ACUTE CARE SURGERY  IP CONSULT TO CARDIOLOGY    Current Facility-Administered Medications:  aspirin 81 mg Oral Daily    heparin (porcine) 5,000 Units Subcutaneous Q8H [facility] & assisted    nitroglycerin 0 4 mg Sublingual Q5 Min PRN    ondansetron 4 mg Intravenous Q6H PRN    PHENobarbital 65 mg Intravenous BID    sodium chloride 125 mL/hr Intravenous Continuous Last Rate: 125 mL/hr (07/18/19 1401)       Network Utilization Review Department  Phone: [phone]; Fax [phone]  [email or web address]  ATTENTION: Please call with any questions or concerns to [phone]  and carefully listen to the prompts so that you are directed to the right person  Send all requests for admission clinical reviews, approved or denied determinations and any other requests to fax [phone]   All voicemails are confidential

## 2019-07-18 NOTE — PROGRESS NOTES
Progress Note - General Surgery   Flor Anand 75 y o  male MRN: [id number]  [facility] Encounter: [id number]    Assessment:  71M with h/o multiple midline ventral hernias p/w abdominal pain, N/V  CT showing SBO with transition point at hernia #4 (of 6)  Hernia is reducible at bedside  Significant output from NGT post-placement  Lactate initially elevated 2/2 hypovolemia, now trending down  +flatus    Plan:  --NPO  --NGT  --[email or web address]  --pain control PRN  --antiemetics PRN  --continue serial abdominal exams    Subjective/Objective     Subjective:   Patient states he is feeling much better this AM  +flatus, but no BM  No abdominal pain  No N/V  No fevers/chills  Objective:     Blood pressure 109/66, pulse 75, temperature 98 3 °F (36 8 °C), temperature source Oral, resp  rate 18, weight 100 kg (221 lb), SpO2 95 %  ,Body mass index is 29 97 kg/m²  Intake/Output Summary (Last 24 hours) at 7/18/2019 0517  Last data filed at 7/18/2019 0101  Gross per 24 hour   Intake 97 92 ml   Output 1950 ml   Net -1852 08 ml       Invasive Devices     Peripheral Intravenous Line            Peripheral IV 07/17/19 Left Antecubital less than 1 day          Drain            NG/OG/Enteral Tube Nasogastric Right nares less than 1 day                Physical Exam:   Gen: NAD, resting in bed  HEENT: MMM, EOMI  CV: RRR  Lungs: nl effort  Abd: soft, NT, multiple ventral hernias with irregular abdominal contouring, all hernias reducible, well-healed midline laparotomy scar  Ext: no CCE  Skin: no rashes  Neuro: A&Ox3, motor and sensation grossly intact  Psych: appropriate     Lab, Imaging and other studies:I have personally reviewed pertinent lab results  Results Reviewed     Procedure Component Value Units Date/Time    Basic metabolic panel [[id number]] Collected:  07/18/19 0527    Lab Status:   In process Specimen:  Blood from Arm, Right Updated:  07/18/19 0534    CBC and differential [[id number]] Collected:  07/18/19 0527    Lab Status: In process Specimen:  Blood from Arm, Right Updated:  07/18/19 0533    Platelet count [[id number]]  (Normal) Collected:  07/17/19 2058    Lab Status:  Final result Specimen:  Blood from Arm, Right Updated:  07/17/19 2104     Platelets 138 Thousands/uL      MPV 10 7 fL     Lactic acid, plasma [[id number]]  (Abnormal) Collected:  07/17/19 1928    Lab Status:  Final result Specimen:  Blood from Arm, Right Updated:  07/17/19 2038     LACTIC ACID 3 4 mmol/L     Narrative:       Result may be elevated if tourniquet was used during collection      POCT urinalysis dipstick [[id number]]     Lab Status:  No result Specimen:  Urine, Other     Troponin I [[id number]]  (Normal) Collected:  07/17/19 1350    Lab Status:  Final result Specimen:  Blood from Arm, Left Updated:  07/17/19 1415     Troponin I <0 02 ng/mL     Comprehensive metabolic panel [[id number]]  (Abnormal) Collected:  07/17/19 1257    Lab Status:  Final result Specimen:  Blood from Arm, Left Updated:  07/17/19 1322     Sodium 135 mmol/L      Potassium 3 6 mmol/L      Chloride 104 mmol/L      CO2 24 mmol/L      ANION GAP 7 mmol/L      BUN 13 mg/dL      Creatinine 0 92 mg/dL      Glucose 145 mg/dL      Calcium 8 4 mg/dL      AST 6 U/L      ALT 13 U/L      Alkaline Phosphatase 161 U/L      Total Protein 7 8 g/dL      Albumin 3 7 g/dL      Total Bilirubin 0 47 mg/dL      eGFR 82 ml/min/1 73sq m     Narrative:       Anjel guidelines for Chronic Kidney Disease (CKD):     Stage 1 with normal or high GFR (GFR > 90 mL/min/1 73 square meters)    Stage 2 Mild CKD (GFR = 60-89 mL/min/1 73 square meters)    Stage 3A Moderate CKD (GFR = 45-59 mL/min/1 73 square meters)    Stage 3B Moderate CKD (GFR = 30-44 mL/min/1 73 square meters)    Stage 4 Severe CKD (GFR = 15-29 mL/min/1 73 square meters)    Stage 5 End Stage CKD (GFR <15 mL/min/1 73 square meters)  Note: GFR calculation is accurate only with a steady state creatinine    Lipase [[id number]]  (Normal) Collected:  07/17/19 1257    Lab Status:  Final result Specimen:  Blood from Arm, Left Updated:  07/17/19 1322     Lipase 76 u/L     CBC and differential [[id number]]  (Abnormal) Collected:  07/17/19 1257    Lab Status:  Final result Specimen:  Blood from Arm, Left Updated:  07/17/19 1315     WBC 9 06 Thousand/uL      RBC 4 51 Million/uL      Hemoglobin 14 4 g/dL      Hematocrit 43 2 %      MCV 96 fL      MCH 31 9 pg      MCHC 33 3 g/dL      RDW 14 6 %      MPV 10 1 fL      Platelets 924 Thousands/uL      nRBC 0 /100 WBCs      Neutrophils Relative 81 %      Immat GRANS % 0 %      Lymphocytes Relative 11 %      Monocytes Relative 7 %      Eosinophils Relative 1 %      Basophils Relative 0 %      Neutrophils Absolute 7 32 Thousands/µL      Immature Grans Absolute 0 04 Thousand/uL      Lymphocytes Absolute 0 95 Thousands/µL      Monocytes Absolute 0 65 Thousand/µL      Eosinophils Absolute 0 08 Thousand/µL      Basophils Absolute 0 02 Thousands/µL         CT abdomen pelvis wo contrast   Final Result by Doreen Martínez MD (07/17 1700)      1  Small bowel obstruction with transition point at one of the patient's linea alba hernias (hernia #4 described above)  Incarceration cannot be diagnosed on the basis of imaging  No CT evidence of bowel ischemia  2   Diastasis recti with multiple ventral abdominal wall hernias, as described  Please note that hernias #3 and #4 are closely apposed and may reflect a single complex hernia         I personally discussed this study with Georgeann Hammans on 7/17/2019 at 4:56 PM       Workstation performed: [id number]             VTE Pharmacologic Prophylaxis: Heparin  VTE Mechanical Prophylaxis: sequential compression device

## 2019-07-19 VITALS
BODY MASS INDEX: 29.97 KG/M2 | HEART RATE: 74 BPM | SYSTOLIC BLOOD PRESSURE: 120 MMHG | DIASTOLIC BLOOD PRESSURE: 69 MMHG | RESPIRATION RATE: 18 BRPM | OXYGEN SATURATION: 91 % | TEMPERATURE: 97.3 F | WEIGHT: 221 LBS

## 2019-07-19 LAB
ANION GAP SERPL CALCULATED.3IONS-SCNC: 8 MMOL/L (ref 4–13)
BASOPHILS # BLD AUTO: 0.04 THOUSANDS/ΜL (ref 0–0.1)
BASOPHILS NFR BLD AUTO: 1 % (ref 0–1)
BUN SERPL-MCNC: 13 MG/DL (ref 5–25)
CALCIUM SERPL-MCNC: 7.9 MG/DL (ref 8.3–10.1)
CHLORIDE SERPL-SCNC: 110 MMOL/L (ref 100–108)
CO2 SERPL-SCNC: 23 MMOL/L (ref 21–32)
CREAT SERPL-MCNC: 0.67 MG/DL (ref 0.6–1.3)
EOSINOPHIL # BLD AUTO: 0.21 THOUSAND/ΜL (ref 0–0.61)
EOSINOPHIL NFR BLD AUTO: 3 % (ref 0–6)
ERYTHROCYTE [DISTWIDTH] IN BLOOD BY AUTOMATED COUNT: 14.5 % (ref 11.6–15.1)
GFR SERPL CREATININE-BSD FRML MDRD: 95 ML/MIN/1.73SQ M
GLUCOSE SERPL-MCNC: 85 MG/DL (ref 65–140)
HCT VFR BLD AUTO: 38.4 % (ref 36.5–49.3)
HGB BLD-MCNC: 12.7 G/DL (ref 12–17)
IMM GRANULOCYTES # BLD AUTO: 0.02 THOUSAND/UL (ref 0–0.2)
IMM GRANULOCYTES NFR BLD AUTO: 0 % (ref 0–2)
LYMPHOCYTES # BLD AUTO: 0.86 THOUSANDS/ΜL (ref 0.6–4.47)
LYMPHOCYTES NFR BLD AUTO: 14 % (ref 14–44)
MCH RBC QN AUTO: 32.1 PG (ref 26.8–34.3)
MCHC RBC AUTO-ENTMCNC: 33.1 G/DL (ref 31.4–37.4)
MCV RBC AUTO: 97 FL (ref 82–98)
MONOCYTES # BLD AUTO: 0.54 THOUSAND/ΜL (ref 0.17–1.22)
MONOCYTES NFR BLD AUTO: 9 % (ref 4–12)
NEUTROPHILS # BLD AUTO: 4.48 THOUSANDS/ΜL (ref 1.85–7.62)
NEUTS SEG NFR BLD AUTO: 73 % (ref 43–75)
NRBC BLD AUTO-RTO: 0 /100 WBCS
PLATELET # BLD AUTO: 126 THOUSANDS/UL (ref 149–390)
PMV BLD AUTO: 11.2 FL (ref 8.9–12.7)
POTASSIUM SERPL-SCNC: 3.6 MMOL/L (ref 3.5–5.3)
RBC # BLD AUTO: 3.96 MILLION/UL (ref 3.88–5.62)
SODIUM SERPL-SCNC: 141 MMOL/L (ref 136–145)
WBC # BLD AUTO: 6.15 THOUSAND/UL (ref 4.31–10.16)

## 2019-07-19 PROCEDURE — 80048 BASIC METABOLIC PNL TOTAL CA: CPT | Performed by: SURGERY

## 2019-07-19 PROCEDURE — NS001 PR NO SIGNATURE OR ATTESTATION: Performed by: SURGERY

## 2019-07-19 PROCEDURE — 85025 COMPLETE CBC W/AUTO DIFF WBC: CPT | Performed by: SURGERY

## 2019-07-19 PROCEDURE — 87081 CULTURE SCREEN ONLY: CPT | Performed by: SURGERY

## 2019-07-19 PROCEDURE — NC001 PR NO CHARGE: Performed by: SURGERY

## 2019-07-19 RX ADMIN — PHENOBARBITAL SODIUM 65 MG: 65 INJECTION INTRAMUSCULAR; INTRAVENOUS at 08:15

## 2019-07-19 RX ADMIN — ASPIRIN 81 MG 81 MG: 81 TABLET ORAL at 08:16

## 2019-07-19 RX ADMIN — SODIUM CHLORIDE 125 ML/HR: 0.9 INJECTION, SOLUTION INTRAVENOUS at 06:29

## 2019-07-19 RX ADMIN — HEPARIN SODIUM 5000 UNITS: 5000 INJECTION INTRAVENOUS; SUBCUTANEOUS at 05:29

## 2019-07-19 RX ADMIN — HEPARIN SODIUM 5000 UNITS: 5000 INJECTION INTRAVENOUS; SUBCUTANEOUS at 13:29

## 2019-07-19 NOTE — DISCHARGE INSTRUCTIONS
Please make a follow up appointment with surgery by calling [phone]    Return to the hospital for persistent nausea, vomiting, or severe abdominal pain like you experienced before

## 2019-07-19 NOTE — DISCHARGE SUMMARY
Discharge Summary - Arpita Morgan 75 y o  male MRN: [id number]    Unit/Bed#: [facility] 624-01 Encounter: [id number]    Admission Date:   Admission Orders (From admission, onward)    Ordered        07/17/19 1959  Inpatient Admission  Once             Discharge Date: 7/19    Admitting Diagnosis: Small bowel obstruction ([address] ) [K56 609]  Abdominal pain [R10 9]    HPI: 75 y o  male with h/o multiple midline ventral hernias following AAA repair in 2005, CHF, MI x2 who presents with R-sided abdominal pain, nausea/vomiting for 1 day  He states the pain began yesterday around 1700 while he was sitting on the couch  He describes the pain as a sharp, stabbing pain on the right side of his abdomen that is constant and nonradiating  Although he has had these hernias since 2005, he has never had any episodes such as this current one  He had 2 episodes of emesis last night following the onset of pain as well as 3 normal bowel movements last night  He has had multiple episodes of emesis today but no bowel movements  He has not passed flatus since yesterday  The patient states he has not eaten anything today secondary to nausea and vomiting  He denies any diarrhea, blood in his stool, or any urinary changes        Procedures Performed: No orders of the defined types were placed in this encounter  Summary of Hospital Course: A CTAP done in the ED demonstrated 6 midline ventral hernias with SBO with transition point at the linea alba hernia #4 (of 6 total midline ventral hernias) as well as diastasis recti  The patient was admitted to the general surgery service  An NG tube was inserted, and nearly 1 6 L of gastric fluid came out immediately  The following morning, his NG tube output had slowed, and he was passing flatus  His abdominal pain had drastically improved  He heme showed no signs of further obstruction    On hospital day 2, the NG had only put out 150 mL in the past 24 hours, the patient continued to have a benign abdominal exam with flatus  The NG tube was removed, the patient was started on a clear diet  He tolerated this well, and was advanced to regular diet  He ate a regular diet and did not have any recurrent abdominal pain  He continued to have bowel function, and his belly was very soft  Therefore, he was discharged in good condition with instructions to follow up in surgical clinic for elective repair of his multiple ventral/incisional hernias  Significant Findings, Care, Treatment and Services Provided: as above    Complications: None    Discharge Diagnosis: Incisional hernia    Resolved Problems  Date Reviewed: 7/18/2019    None          Condition at Discharge: good         Discharge instructions/Information to patient and family:   See after visit summary for information provided to patient and family  Provisions for Follow-Up Care:  See after visit summary for information related to follow-up care and any pertinent home health orders  PCP: Tremayne Jacob MD    Disposition: Home    Planned Readmission: No    Discharge Statement   I spent 30 minutes discharging the patient  This time was spent on the day of discharge  I had direct contact with the patient on the day of discharge  Additional documentation is required if more than 30 minutes were spent on discharge  Discharge Medications:  See after visit summary for reconciled discharge medications provided to patient and family

## 2019-07-19 NOTE — PROGRESS NOTES
Progress Note - General Surgery   Alexander Child 75 y o  male MRN: [id number]  Unit/Bed#: [facility] 624-01 Encounter: [id number]    Assessment:  71M with multiple midline incisional hernias p/w SBO 2/2 incarcerated hernia  Hernia is reducible at bedside  Now with significant improvement in symptoms,     Plan:  --advance diet as tolerated  --Gogo@[employer]  --discontinue NGT  --if tolerating regular diet, pt stable for discharge and can schedule hernia repair electively      Subjective/Objective     Subjective:   No acute events overnight  Patient states he is feeling much better this morning  +flatus, but no BM yet  No nausea/vomiting  No abdominal pain  Objective:     Blood pressure 111/74, pulse 65, temperature 99 5 °F (37 5 °C), temperature source Oral, resp  rate 17, weight 100 kg (221 lb), SpO2 93 %  ,Body mass index is 29 97 kg/m²  Intake/Output Summary (Last 24 hours) at 7/19/2019 0917  Last data filed at 7/19/2019 2630  Gross per 24 hour   Intake 2875 ml   Output 1140 ml   Net 1735 ml       Invasive Devices     Peripheral Intravenous Line            Peripheral IV 07/17/19 Left Antecubital 1 day          Drain            NG/OG/Enteral Tube Nasogastric Right nares 1 day                Physical Exam:   Physical Exam:   Gen: NAD, resting in bed  HEENT: MMM, EOMI, NGT in place to suction  CV: RRR  Lungs: nl effort  Abd: soft, NT, multiple ventral hernias with irregular abdominal contouring, all hernias reducible, well-healed midline laparotomy scar  Ext: no CCE  Skin: no rashes  Neuro: A&Ox3, motor and sensation grossly intact  Psych: appropriate     Lab, Imaging and other studies:I have personally reviewed pertinent lab results      VTE Pharmacologic Prophylaxis: Heparin  VTE Mechanical Prophylaxis: sequential compression device

## 2019-07-20 LAB — MRSA NOSE QL CULT: NORMAL

## 2019-07-22 ENCOUNTER — TRANSITIONAL CARE MANAGEMENT (OUTPATIENT)
Dept: INTERNAL MEDICINE CLINIC | Facility: CLINIC | Age: 74
End: 2019-07-22

## 2019-07-25 ENCOUNTER — OFFICE VISIT (OUTPATIENT)
Dept: CARDIOLOGY CLINIC | Facility: CLINIC | Age: 74
End: 2019-07-25
Payer: MEDICARE

## 2019-07-25 VITALS
HEIGHT: 72 IN | HEART RATE: 72 BPM | SYSTOLIC BLOOD PRESSURE: 118 MMHG | BODY MASS INDEX: 28.92 KG/M2 | DIASTOLIC BLOOD PRESSURE: 68 MMHG | WEIGHT: 213.5 LBS

## 2019-07-25 DIAGNOSIS — E78.2 MIXED HYPERLIPIDEMIA: Chronic | ICD-10-CM

## 2019-07-25 DIAGNOSIS — Z98.890 S/P AAA (ABDOMINAL AORTIC ANEURYSM) REPAIR: Chronic | ICD-10-CM

## 2019-07-25 DIAGNOSIS — Z86.79 S/P AAA (ABDOMINAL AORTIC ANEURYSM) REPAIR: Chronic | ICD-10-CM

## 2019-07-25 DIAGNOSIS — I10 ESSENTIAL HYPERTENSION: Chronic | ICD-10-CM

## 2019-07-25 DIAGNOSIS — I25.10 CORONARY ARTERY DISEASE INVOLVING NATIVE CORONARY ARTERY OF NATIVE HEART WITHOUT ANGINA PECTORIS: Primary | ICD-10-CM

## 2019-07-25 PROCEDURE — 99214 OFFICE O/P EST MOD 30 MIN: CPT | Performed by: INTERNAL MEDICINE

## 2019-07-30 ENCOUNTER — OFFICE VISIT (OUTPATIENT)
Dept: INTERNAL MEDICINE CLINIC | Facility: CLINIC | Age: 74
End: 2019-07-30

## 2019-07-30 VITALS
BODY MASS INDEX: 29.02 KG/M2 | DIASTOLIC BLOOD PRESSURE: 64 MMHG | HEIGHT: 72 IN | TEMPERATURE: 97.7 F | HEART RATE: 76 BPM | WEIGHT: 214.29 LBS | SYSTOLIC BLOOD PRESSURE: 122 MMHG

## 2019-07-30 DIAGNOSIS — D69.6 THROMBOCYTOPENIA (HCC): ICD-10-CM

## 2019-07-30 DIAGNOSIS — K43.6 VENTRAL HERNIA WITH OBSTRUCTION AND WITHOUT GANGRENE: ICD-10-CM

## 2019-07-30 DIAGNOSIS — R07.9 CHEST PAIN, UNSPECIFIED TYPE: ICD-10-CM

## 2019-07-30 DIAGNOSIS — K56.609 SBO (SMALL BOWEL OBSTRUCTION) (HCC): Primary | ICD-10-CM

## 2019-07-30 PROCEDURE — 99495 TRANSJ CARE MGMT MOD F2F 14D: CPT | Performed by: INTERNAL MEDICINE

## 2019-07-30 RX ORDER — NITROGLYCERIN 0.4 MG/1
0.4 TABLET SUBLINGUAL
Qty: 90 TABLET | Refills: 0 | Status: SHIPPED | OUTPATIENT
Start: 2019-07-30 | End: 2019-08-27 | Stop reason: SDUPTHER

## 2019-07-30 NOTE — PROGRESS NOTES
Assessment/Plan:     Diagnoses and all orders for this visit:    SBO (small bowel obstruction) ([facility] [address] ) in the setting of Ventral hernia with obstruction and without gangrene        -     without abdominal pain, nausea, vomiting, fever, hematochezia, melena since discharge, patient passing flatus and with regular bowel movements        -     patient to follow with General surgery on 08/20/2019 for consideration of elective repair of multiple ventral hernias    Chest pain, unspecified type  -     refill nitroglycerin (NITROSTAT) 0 4 mg SL tablet; Place 1 tablet (0 4 mg total) under the tongue every 5 (five) minutes as needed for chest pain for up to 188 days    Thrombocytopenia (HCC)        -     this appears to be a chronic issue for this patient as his platelet levels have been stable in the 120s to 130s        -     consider possibly in the setting of chronic phenobarbital use        -     with monitor platelet level yearly, and monitor for signs of bruising/bleeding      Patient will need annual wellness visit with primary care provider  Patient will also need follow-up for chroni medical condition maintenance  Patient would benefit from 3 month follow-up at which time an annual wellness visit could be scheduled  Subjective:      Patient ID: Kelli Carias is a 75 y o  male  79-year-old male with past medical history significant for coronary artery disease with MI x2 with stenting to the RCA and LAD in January 2017, hypertension, seizure disorder on phenobarbital, AAA status post repair in 2005, former smoker quit in 2005  Patient was last seen in clinic on 03/21/2019  At this time patient was continued on his aspirin statin  Patient is not on Plavix or beta-blocker secondary to rash inpatient  Patient comes to the clinic this morning for transition of care management  Patient was hospitalized from 07/17/2019 through 07/19/2019 under General surgery    Patient presented to the emergency department with right abdominal pain and nausea and vomiting  CT of the abdomen pelvis at the time showed 6 midline hernias with small bowel obstruction  Patient initially with lactic acidosis of 3 4 which improved to 0 8  General surgery admitted patient in place patient on NG tube which initially put out 1 6 L  On the following day, NG tube output decreased and patient was able to pass flatus  Patient began tolerating diet and it was decided that patient would have outpatient follow-up with surgery for repair of the multiple ventral and incisional hernias  Patient has outpatient follow-up scheduled for 08/20/2019  The following portions of the patient's history were reviewed and updated as appropriate: allergies, current medications, past family history, past medical history, past social history, past surgical history and problem list     Review of Systems   Constitutional: Negative for appetite change, chills, diaphoresis, fatigue, fever and unexpected weight change  HENT: Negative for sore throat  Eyes: Negative for visual disturbance  Respiratory: Negative for cough, chest tightness, shortness of breath and wheezing  Cardiovascular: Negative for chest pain, palpitations and leg swelling  Gastrointestinal: Negative for abdominal distention, abdominal pain, blood in stool, constipation, diarrhea, nausea and vomiting  Hernias   Genitourinary: Negative for difficulty urinating, flank pain and urgency  Musculoskeletal: Negative for arthralgias and myalgias  Skin: Positive for rash (psoriasis)  Negative for pallor  Neurological: Negative for dizziness, weakness, light-headedness and headaches  Objective:      /64   Pulse 76   Temp 97 7 °F (36 5 °C)   Ht 6' (1 829 m)   Wt 97 2 kg (214 lb 4 6 oz)   BMI 29 06 kg/m²          Physical Exam   Constitutional: He is oriented to person, place, and time  He appears well-developed and well-nourished  No distress     HENT:   Head: Normocephalic and atraumatic  Mouth/Throat: Oropharynx is clear and moist  No oropharyngeal exudate  Eyes: Pupils are equal, round, and reactive to light  Conjunctivae and EOM are normal  No scleral icterus  Neck: Normal range of motion  Neck supple  Cardiovascular: Normal rate and regular rhythm  No murmur heard  Pulmonary/Chest: Effort normal and breath sounds normal  No respiratory distress  He has no wheezes  Abdominal:   Multiple large ventral and incisional hernias, bowel sounds positive in all 4 quadrant, no tenderness to palpation, guarding or rebound noted   Musculoskeletal: Normal range of motion  He exhibits no edema or deformity  Neurological: He is alert and oriented to person, place, and time  No cranial nerve deficit  Skin: Skin is warm and dry  He is not diaphoretic  No erythema  Psychiatric: He has a normal mood and affect  His behavior is normal    Nursing note and vitals reviewed  TCM Call (since 6/29/2019)     Hospital care reviewed  Records reviewed    Patient was hospitial[facility] at  [facility]    Date of Admission  07/17/19    Date of discharge  07/19/19    Diagnosis  VENTRAL HERNIA WITHOUT OBSTRUCTION OR GANGRENE - K43 6    Disposition  Home <img src='C:FILES (X86)      TCM Call (since 6/29/2019)     Post hospital issues  None    Should patient be enrolled in anticoag monitoring? No    Patients specialists  Cardiologist; Other (comment)    Cardiologist name  DR Pascual Daugherty  CARDIO    Cardiologist contact #  [phone]    Other specialists names  DR Rosetta Garcia  [facility]    Other specialists contcat #  [phone]    Did you obtain your prescribed medications  -- <img src='C:FILES (X86)    Do you need help managing your prescriptions or medications  No <img src='C:FILES (X86)    Is transportation to your appointment needed  No <img src='C:FILES (X86)    I have advised the patient to call PCP with any new or worsening symptoms Sofía Officer, LPN    Living Arrangements  Family members    Are you recieving any outpatient services  No    Are you recieving home care services  No    Are you using any community resources  No    Have you fallen in the last 12 months  No    Interperter language line needed  No

## 2019-07-31 NOTE — PROGRESS NOTES
Cardiology Follow Up    Thierry Hernandez  1945  [id number]  [facility] CARDIOLOGY [place]  [address] [place] [id number] [phone]    1  Coronary artery disease involving native coronary artery of native heart without angina pectoris     2  Mixed hyperlipidemia     3  S/P AAA (abdominal aortic aneurysm) repair     4  Essential hypertension         Interval History:  Denies chest pain shortness of breath orthopnea paroxysmal nocturnal dyspnea or syncope  Patient Active Problem List   Diagnosis    CAD (coronary artery disease)    HTN (hypertension)    HLD (hyperlipidemia)    Seizure disorder (HCC)    S/P AAA (abdominal aortic aneurysm) repair    Ventral hernia with obstruction and without gangrene     Past Medical History:   Diagnosis Date    Cardiac disease     CHF [facility]congestive heart failure) ([facility] [address] )     Hernia of abdominal cavity     Myocardial infarction ([facility] [address] )     last assessed 14, past, Pt had MI s/p AGUSTIN placed in , refuses to take any other medications for his cardiac disease, only will take seizure med   Understands possible complications from this decision    Seizure [facility])      Social History     Socioeconomic History    Marital status: Single     Spouse name: Not on file    Number of children: Not on file    Years of education: Not on file    Highest education level: Not on file   Occupational History    Not on file   Social Needs    Financial resource strain: Not on file    Food insecurity:     Worry: Not on file     Inability: Not on file    Transportation needs:     Medical: Not on file     Non-medical: Not on file   Tobacco Use    Smoking status: Former Smoker     Last attempt to quit: 2005     Years since quittin 1    Smokeless tobacco: Never Used   Substance and Sexual Activity    Alcohol use: No    Drug use: No    Sexual activity: Never   Lifestyle    Physical activity:     Days per week: Not on file     Minutes per session: Not on file    Stress: Not on file   Relationships    Social connections:     Talks on phone: Not on file     Gets together: Not on file     Attends Judaism service: Not on file     Active member of club or organization: Not on file     Attends meetings of clubs or organizations: Not on file     Relationship status: Not on file    Intimate partner violence:     Fear of current or ex partner: Not on file     Emotionally abused: Not on file     Physically abused: Not on file     Forced sexual activity: Not on file   Other Topics Concern    Not on file   Social History Narrative    Not on file      Family History   Problem Relation Age of Onset    Hypertension Father     Kidney disease Brother      Past Surgical History:   Procedure Laterality Date    ABDOMINAL AORTIC ANEURYSM REPAIR      for dialation or occulsion    CATARACT EXTRACTION         Current Outpatient Medications:     aspirin (ECOTRIN) 325 mg EC tablet, Take 1 tablet (325 mg total) by mouth daily, Disp: 30 tablet, Rfl: 0    clotrimazole (LOTRIMIN) 1 % cream, Apply to feet/toes 2 times daily, Disp: 15 g, Rfl: 0    PHENobarbital 64 8 mg tablet, Take 1 tablet (64 8 mg total) by mouth 2 (two) times a day for 180 days, Disp: 60 tablet, Rfl: 5    nitroglycerin (NITROSTAT) 0 4 mg SL tablet, Place 1 tablet (0 4 mg total) under the tongue every 5 (five) minutes as needed for chest pain for up to 188 days, Disp: 90 tablet, Rfl: 0  Allergies   Allergen Reactions    Iodinated Diagnostic Agents Rash     Pt's skin get very red and he gets hot and chills    Clopidogrel        Labs:  Admission on 07/17/2019, Discharged on 07/19/2019   Component Date Value    WBC 07/17/2019 9 06     RBC 07/17/2019 4 51     Hemoglobin 07/17/2019 14 4     Hematocrit 07/17/2019 43 2     MCV 07/17/2019 96     [facility] 07/17/2019 31 9     MCHC 07/17/2019 33 3     RDW 07/17/2019 14 6     MPV 07/17/2019 10 1     Platelets 18/07/7452 158  nRBC 07/17/2019 0     Neutrophils Relative 07/17/2019 81*    Immat GRANS % 07/17/2019 0     Lymphocytes Relative 07/17/2019 11*    Monocytes Relative 07/17/2019 7     Eosinophils Relative 07/17/2019 1     Basophils Relative 07/17/2019 0     Neutrophils Absolute 07/17/2019 7 32     Immature Grans Absolute 07/17/2019 0 04     Lymphocytes Absolute 07/17/2019 0 95     Monocytes Absolute 07/17/2019 0 65     Eosinophils Absolute 07/17/2019 0 08     Basophils Absolute 07/17/2019 0 02     Sodium 07/17/2019 135*    Potassium 07/17/2019 3 6     Chloride 07/17/2019 104     CO2 07/17/2019 24     ANION GAP 07/17/2019 7     BUN 07/17/2019 13     Creatinine 07/17/2019 0 92     Glucose 07/17/2019 145*    Calcium 07/17/2019 8 4     AST 07/17/2019 6     ALT 07/17/2019 13     Alkaline Phosphatase 07/17/2019 161*    Total Protein 07/17/2019 7 8     Albumin 07/17/2019 3 7     Total Bilirubin 07/17/2019 0 47     eGFR 07/17/2019 82     Lipase 07/17/2019 76     Troponin I 07/17/2019 <0 02     Ventricular Rate 07/17/2019 74     Atrial Rate 07/17/2019 74     CA Interval 07/17/2019 184     QRSD Interval 07/17/2019 82     QT Interval 07/17/2019 388     QTC Interval 07/17/2019 430     P Axis 07/17/2019 59     QRS Axis 07/17/2019 -3     T Wave Axis 07/17/2019 38     LACTIC ACID 07/17/2019 3 4*    Platelets 23/26/8152 157     MPV 07/17/2019 10 7     LACTIC ACID 07/17/2019 2 0     WBC 07/18/2019 6 59     RBC 07/18/2019 4 08     Hemoglobin 07/18/2019 13 0     Hematocrit 07/18/2019 39 9     MCV 07/18/2019 98     MCH 07/18/2019 31 9     MCHC 07/18/2019 32 6     RDW 07/18/2019 14 6     MPV 07/18/2019 11 3     Platelets 98/32/8004 133*    nRBC 07/18/2019 0     Neutrophils Relative 07/18/2019 65     Immat GRANS % 07/18/2019 0     Lymphocytes Relative 07/18/2019 20     Monocytes Relative 07/18/2019 11     Eosinophils Relative 07/18/2019 3     Basophils Relative 07/18/2019 1     Neutrophils Absolute 07/18/2019 4 36     Immature Grans Absolute 07/18/2019 0 02     Lymphocytes Absolute 07/18/2019 1 30     Monocytes Absolute 07/18/2019 0 70     Eosinophils Absolute 07/18/2019 0 18     Basophils Absolute 07/18/2019 0 03     Sodium 07/18/2019 138     Potassium 07/18/2019 4 9     Chloride 07/18/2019 108     CO2 07/18/2019 24     ANION GAP 07/18/2019 6     BUN 07/18/2019 13     Creatinine 07/18/2019 0 82     Glucose 07/18/2019 101     Calcium 07/18/2019 7 8*    eGFR 07/18/2019 87     LACTIC ACID 07/18/2019 0 8     WBC 07/19/2019 6 15     RBC 07/19/2019 3 96     Hemoglobin 07/19/2019 12 7     Hematocrit 07/19/2019 38 4     MCV 07/19/2019 97     MCH 07/19/2019 32 1     MCHC 07/19/2019 33 1     RDW 07/19/2019 14 5     MPV 07/19/2019 11 2     Platelets 80/25/0905 126*    nRBC 07/19/2019 0     Neutrophils Relative 07/19/2019 73     Immat GRANS % 07/19/2019 0     Lymphocytes Relative 07/19/2019 14     Monocytes Relative 07/19/2019 9     Eosinophils Relative 07/19/2019 3     Basophils Relative 07/19/2019 1     Neutrophils Absolute 07/19/2019 4 48     Immature Grans Absolute 07/19/2019 0 02     Lymphocytes Absolute 07/19/2019 0 86     Monocytes Absolute 07/19/2019 0 54     Eosinophils Absolute 07/19/2019 0 21     Basophils Absolute 07/19/2019 0 04     Sodium 07/19/2019 141     Potassium 07/19/2019 3 6     Chloride 07/19/2019 110*    CO2 07/19/2019 23     ANION GAP 07/19/2019 8     BUN 07/19/2019 13     Creatinine 07/19/2019 0 67     Glucose 07/19/2019 85     Calcium 07/19/2019 7 9*    eGFR 07/19/2019 95     MRSA Culture Only 07/19/2019 No Methicillin Resistant Staphlyococcus aureus (MRSA) isolated      Imaging: Ct Abdomen Pelvis Wo Contrast    Result Date: 7/17/2019  Narrative: CT ABDOMEN AND PELVIS WITHOUT IV CONTRAST INDICATION:   Possible hernia, abd pain  COMPARISON:  CT abdomen/pelvis 2/4/2011   TECHNIQUE:  CT examination of the abdomen and pelvis was performed without intravenous contrast   Axial, sagittal, and coronal 2D reformatted images were created from the source data and submitted for interpretation  Radiation dose length product (DLP) for this visit:  1177 mGy-cm   This examination, like all CT scans performed in the [facility], was performed utilizing techniques to minimize radiation dose exposure, including the use of iterative reconstruction and automated exposure control  Enteric contrast was administered  FINDINGS: ABDOMEN LOWER CHEST:  Subsegmental atelectasis and/or scarring at the lung bases  LIVER/BILIARY TREE:  One or more subcentimeter sharply circumscribed low-density hepatic lesion(s) are noted, too small to accurately characterize, but statistically most likely to represent subcentimeter hepatic cysts  No suspicious solid hepatic lesion is identified  Hepatic contours are normal   No biliary dilatation  GALLBLADDER:  No calcified gallstones  No pericholecystic inflammatory change  SPLEEN:  Unremarkable  PANCREAS:  Unremarkable  ADRENAL GLANDS:  Unremarkable  KIDNEYS/URETERS:  Cortical thinning in both kidneys  Stable cyst in the left kidney  No hydronephrosis  STOMACH AND BOWEL:  Small bowel obstruction with transition point at the fourth midline linea alba hernia (2/52 and 602b/51)  The distal small bowel and colon are decompressed  APPENDIX:  No findings to suggest appendicitis  ABDOMINOPELVIC CAVITY:  No ascites or free intraperitoneal air  No lymphadenopathy  VESSELS:  Atherosclerotic changes are present  No evidence of aneurysm  PELVIS REPRODUCTIVE ORGANS:  Unremarkable for patient's age  URINARY BLADDER:  Unremarkable   ABDOMINAL WALL/INGUINAL REGIONS:  There is diastasis recti with 6 separate midline ventral abdominal wall hernias through the linea alba, listed from cranial to caudal: #1 (2/32 and 602b/66): contains fat; neck measures 1 6 x 1 2 cm (transverse x CC) #2 (2/39 and 602b/66): contains fat and decompressed transverse colon; neck measures 3 6 x 3 6 cm (transverse x CC) #3 (2/48 and 602b/65): contains fat, decompressed small bowel and decompressed colon; neck measures 4 1 x 3 8 cm (transverse x CC) #4: (2/53 and 602b/55): TRANSITION POINT; contains fat, dilated small bowel and decompressed small bowel; neck measures 3 8 x 2 5 cm (transverse x CC) #5: (2/60 and 602b/64): contains fat; neck measures 2 4 x 1 3 cm (transverse x CC) #6: (2/71 and 602b/64): contains fat and decompressed small bowel to the right of midline; neck measures 4 5 x 4 4 cm (transverse x CC) OSSEOUS STRUCTURES:  No acute fracture or destructive osseous lesion  Sequelae of prior discitis/osteomyelitis at L3-L4  Other endplate compression fractures are noted, age indeterminate  Impression: 1  Small bowel obstruction with transition point at one of the patient's linea alba hernias (hernia #4 described above)  Incarceration cannot be diagnosed on the basis of imaging  No CT evidence of bowel ischemia  2   Diastasis recti with multiple ventral abdominal wall hernias, as described  Please note that hernias #3 and #4 are closely apposed and may reflect a single complex hernia  I personally discussed this study with Tasia Thompson on 7/17/2019 at 4:56 PM  Workstation performed: [id number]       Review of Systems:  Review of Systems   Constitutional: Negative for fatigue  HENT: Negative for nosebleeds  Eyes: Negative for redness  Respiratory: Negative for chest tightness and shortness of breath  Cardiovascular: Negative for chest pain, palpitations and leg swelling  Gastrointestinal: Negative for abdominal pain  Endocrine: Negative for polyuria  Genitourinary: Negative for urgency  Musculoskeletal: Negative for arthralgias  Skin: Negative for rash  Neurological: Negative for dizziness and syncope  Psychiatric/Behavioral: Negative for confusion and sleep disturbance  The patient is not nervous/anxious          Physical Exam:  Physical Exam   Constitutional: He is oriented to person, place, and time  He appears well-developed and well-nourished  HENT:   Head: Normocephalic and atraumatic  Nose: Nose normal    Mouth/Throat: Oropharynx is clear and moist    Eyes: Pupils are equal, round, and reactive to light  Neck: Neck supple  Cardiovascular: Normal rate, regular rhythm, normal heart sounds and intact distal pulses  Pulmonary/Chest: Effort normal and breath sounds normal    Abdominal: Soft  Bowel sounds are normal  A hernia is present  Hernia confirmed positive in the ventral area  Musculoskeletal: Normal range of motion  Neurological: He is alert and oriented to person, place, and time  Skin: Skin is warm and dry  Psychiatric: He has a normal mood and affect  His behavior is normal  Judgment and thought content normal        Discussion/Summary:  Stenting of the right coronary artery in 2017 into the setting of an acute inferior wall myocardial infarction  Has done well since then  Is asymptomatic from a cardiovascular standpoint  On he has no significant hypertension  He is maintained on aspirin  He is currently tolerating rosuvastatin 5 mg daily as he had been intolerant of statin the past   LDL was 147 in April  I asked him to up titrate his rosuvastatin 10 mg daily  He has a ventral hernia which appears to be in need of repair and was recently hospitalized  He would be cleared to do that  I will see him again in 6 months

## 2019-08-20 ENCOUNTER — CONSULT (OUTPATIENT)
Dept: SURGERY | Facility: CLINIC | Age: 74
End: 2019-08-20
Payer: MEDICARE

## 2019-08-20 VITALS
BODY MASS INDEX: 28.82 KG/M2 | SYSTOLIC BLOOD PRESSURE: 122 MMHG | HEART RATE: 64 BPM | WEIGHT: 212.8 LBS | TEMPERATURE: 98.1 F | DIASTOLIC BLOOD PRESSURE: 78 MMHG | HEIGHT: 72 IN

## 2019-08-20 DIAGNOSIS — K43.2 INCISIONAL HERNIA, WITHOUT OBSTRUCTION OR GANGRENE: Primary | ICD-10-CM

## 2019-08-20 PROCEDURE — 1124F ACP DISCUSS-NO DSCNMKR DOCD: CPT | Performed by: SURGERY

## 2019-08-20 PROCEDURE — 99213 OFFICE O/P EST LOW 20 MIN: CPT | Performed by: SURGERY

## 2019-08-20 NOTE — PROGRESS NOTES
Office Visit - General Surgery  Viry Puckett MRN: [id number]  Encounter: [id number]    Assessment and Plan    Problem List Items Addressed This Visit        Other    Incisional hernia, without obstruction or gangrene - Primary     He has a very large complex incisional hernia  I told him would be a complex repair and some of the risks such as infection or recurrence  He is comfortable at present and does not want to go ahead with any surgery  Will give us call should he have any other problems  Chief Complaint:  Viry Puckett is a 75 y o  male who presents for Hernia (Consult abdominal hernia )    Subjective  78-year-old white male who was recently admitted with small-bowel obstruction found to have complex incisional hernia  In abdominal aortic aneurysm repair in 2005  He had some constipation and problems after that  He has since had a bulging in the midline since that time  He had 1 episode where he was admitted recently with a bowel obstruction  No other complaints or problems    Past Medical History  Past Medical History:   Diagnosis Date    Cardiac disease     [facility]est[facility] [address] [facility]     Hernia of abdominal cavity     Myocardial infarction ([facility] [address] )     last assessed 7/31/14, past, Pt had MI s/p AGUSTIN placed in 2001, refuses to take any other medications for his cardiac disease, only will take seizure med   Understands possible complications from this decision    [facility]izure [facility])        Past Surgical History  Past Surgical History:   Procedure Laterality Date    ABDOMINAL AORTIC ANEURYSM REPAIR      for dialation or occulsion    CATARACT EXTRACTION         Family History  Family History   Problem Relation Age of Onset    Hypertension Father     Kidney disease Brother        Social History  Social History     Socioeconomic History    Marital status: Single     Spouse name: None    Number of children: None    Years of education: None    Highest education level: None Occupational History    None   Social Needs    Financial resource strain: None    Food insecurity:     Worry: None     Inability: None    Transportation needs:     Medical: None     Non-medical: None   Tobacco Use    Smoking status: Former Smoker     Last attempt to quit: 2005     Years since quittin 2    Smokeless tobacco: Never Used   Substance and Sexual Activity    Alcohol use: No    Drug use: No    Sexual activity: Never   Lifestyle    Physical activity:     Days per week: None     Minutes per session: None    Stress: None   Relationships    Social connections:     Talks on phone: None     Gets together: None     Attends Rastafarian service: None     Active member of club or organization: None     Attends meetings of clubs or organizations: None     Relationship status: None    Intimate partner violence:     Fear of current or ex partner: None     Emotionally abused: None     Physically abused: None     Forced sexual activity: None   Other Topics Concern    None   Social History Narrative    None        Medications  Current Outpatient Medications on File Prior to Visit   Medication Sig Dispense Refill    clotrimazole (LOTRIMIN) 1 % cream Apply to feet/toes 2 times daily 15 g 0    nitroglycerin (NITROSTAT) 0 4 mg SL tablet Place 1 tablet (0 4 mg total) under the tongue every 5 (five) minutes as needed for chest pain for up to 188 days 90 tablet 0    PHENobarbital 64 8 mg tablet Take 1 tablet (64 8 mg total) by mouth 2 (two) times a day for 180 days 60 tablet 5    aspirin (ECOTRIN) 325 mg EC tablet Take 1 tablet (325 mg total) by mouth daily 30 tablet 0     No current facility-administered medications on file prior to visit  Allergies  Allergies   Allergen Reactions    Iodinated Diagnostic Agents Rash     Pt's skin get very red and he gets hot and chills    Clopidogrel        Review of Systems   Constitutional: Negative for chills, fatigue and fever     HENT: Negative for congestion, ear pain, sneezing, trouble swallowing and voice change  Eyes: Negative for pain and discharge  Respiratory: Negative for cough, shortness of breath and wheezing  Cardiovascular: Negative for palpitations and leg swelling  Gastrointestinal: Negative for abdominal pain, constipation, diarrhea, nausea and vomiting  Endocrine: Negative for cold intolerance, heat intolerance and polyuria  Genitourinary: Negative for decreased urine volume, difficulty urinating and dysuria  Musculoskeletal: Negative for arthralgias and back pain  Skin: Negative for rash and wound  Allergic/Immunologic: Negative for environmental allergies and food allergies  Neurological: Negative for dizziness and weakness  Hematological: Negative for adenopathy  Does not bruise/bleed easily  Psychiatric/Behavioral: Negative for confusion and sleep disturbance  The patient is not nervous/anxious  All other systems reviewed and are negative  Objective  Vitals:    08/20/19 1107   BP: 122/78   Pulse: 64   Temp: 98 1 °F (36 7 °C)       Physical Exam   Constitutional: He is oriented to person, place, and time  He appears well-developed and well-nourished  No distress  HENT:   Head: Normocephalic and atraumatic  Right Ear: External ear normal    Left Ear: External ear normal    Eyes: Conjunctivae are normal  No scleral icterus  Neck: Normal range of motion  Neck supple  No tracheal deviation present  No thyromegaly present  Cardiovascular: Normal rate, regular rhythm and normal heart sounds  No murmur heard  Pulmonary/Chest: Effort normal and breath sounds normal  No respiratory distress  He has no wheezes  He has no rales  Abdominal: Soft  He exhibits no distension and no mass  There is no tenderness  There is no rebound and no guarding  Long well-healed midline incision  He has markedly protuberant abdomen where there are multiple hernia defects in the fascia  Soft nontender mostly reducible  Musculoskeletal: Normal range of motion  He exhibits no edema  Lymphadenopathy:     He has no cervical adenopathy  Neurological: He is alert and oriented to person, place, and time  Skin: Skin is warm and dry  He is not diaphoretic  Psychiatric: He has a normal mood and affect  His behavior is normal  Judgment and thought content normal    Nursing note and vitals reviewed

## 2019-08-20 NOTE — ASSESSMENT & PLAN NOTE
He has a very large complex incisional hernia  I told him would be a complex repair and some of the risks such as infection or recurrence  He is comfortable at present and does not want to go ahead with any surgery  Will give us call should he have any other problems

## 2019-08-27 DIAGNOSIS — R07.9 CHEST PAIN, UNSPECIFIED TYPE: ICD-10-CM

## 2019-09-02 RX ORDER — NITROGLYCERIN 0.4 MG/1
TABLET SUBLINGUAL
Qty: 100 TABLET | Refills: 0 | Status: SHIPPED | OUTPATIENT
Start: 2019-09-02 | End: 2020-01-23 | Stop reason: SDUPTHER

## 2019-09-04 DIAGNOSIS — G40.909 SEIZURE DISORDER (HCC): Chronic | ICD-10-CM

## 2019-09-04 RX ORDER — PHENOBARBITAL 64.8 MG/1
64.8 TABLET ORAL 2 TIMES DAILY
Qty: 60 TABLET | Refills: 5 | Status: SHIPPED | OUTPATIENT
Start: 2019-09-04 | End: 2020-01-23

## 2020-01-14 NOTE — TELEPHONE ENCOUNTER
Lab work collected- will f/u with the results accordingly. Flu vaccine offered- patient accepted. Influenza vaccine administered- VIS sheet given. Discussed possible side effects-  i.e. soreness at the injection site. Return as needed. Patient Education     Getting a Flu Vaccination  The flu (influenza) is caused by a virus that is easily spread. A flu vaccine is your best chance to avoid the flu. The vaccine is given in the form of a shot (injection) or a nasal spray. Itâs best to get vaccinated each year when the flu vaccine is available in your area. This can be done at your health care providerâs office or a health clinic. Drugstores, senior centers, and workplaces often offer flu vaccinations, too. If you want to know when the vaccine is available or if you have questions about getting vaccinated, ask your health care provider. Flu facts  Â· The flu vaccine will not give you the flu. Â· The flu is caused by a virus. It canât be treated with antibiotics. Â· The flu can be life-threatening, especially for people in high-risk groups. Â· Influenza is not the same as âstomach flu,â the 24-hour bug that causes vomiting and diarrhea. This is most likely due to a GI (gastrointestinal) infection--not the flu. Flu symptoms  Flu symptoms tend to come on quickly. Fever, headache, fatigue, cough, sore throat, runny nose, and muscle aches are symptoms of the flu. Children may have upset stomach or vomiting, but adults usually donât. Some symptoms, such as fatigue and cough, can last a few weeks. How a flu vaccine protects you    There are many strains (types) of flu viruses. Medical experts predict whichÂ strains are most likely to make people sick each year. Flu vaccinesÂ are made from these strains. With the shot, inactivated Ludy Castles) flu viruses are injected into your body. With the nasal spray, live and weakened viruses are sprayed into your nose. The viruses in both vaccines cannot make you sick.  But PT  SEEN YESTERDAY AND SAID HIS SCRIPT FOR PHENOBARBITAL 64 8 MG WAS NOT AT THE PHARMACY, THEY ONLY RECEIVED THE NITRO    HE SAID YOU WERE GOING TO SEND IN WITH 5 REFILLS they do prompt the body to make antibodies to fight these flu strains. If youâre exposed to the same strains later in the flu season, the antibodies will fight off the virus. Your health care provider can tell you which type of flu vaccine is right for you. Who should get the flu vaccination? The Centers for Disease Control and Prevention (CDC) recommends that infants over the age of 7 months and all children and adults should get a flu shot every year. Some people are at an increased risk of developing serious complications from the flu. It's extremely important that these people get the vaccine. They include those with:  Â· Long-term heart and lung conditions  Â· Other serious medical conditions:  Â¨ Endocrine disorders, like diabetes  Â¨ Kidney or liver disorders  Â¨ Weak immune system from disease or medical treatment, for example those with HIV or AIDS or those taking long-term steroids or medications to treat cancer  Â¨ Blood disorders, such as sickle cell disease  It is also very important that others that have an increased risk of being exposed to the flu or are around people with increased risk of complications get the vaccine. They are:  Â· Health care providers and other staff that provide care in hospitals, nursing homes, home health, and other facilities  [employer] members, including children of people in high-risk groups  Types of flu vaccines  The flu vaccine is available as a shot and as a nasal spray. Your health care provider will recommend the vaccine that is best for you. Â· The shot is available in a few different forms. There is a high-dose vaccine for those over age 72 and a vaccine for those with egg allergies. It's safe for most people. Talk with your provider if you have had:  Â¨ A severe allergic reaction to a previous flu vaccine  Â¨ Guillain-barre syndrome (a severe paralyzing condition)  Â· The nasal spray is recommended for people from 3to 52years of age.  It should not be given to adults who:  Â¨ Are pregnant  Â¨ Have weak immune systems  Â¨ Have egg allergies  Â¨ Will be in close contact with someone with a weak immune system  Â¨ Have taken antiviral medication in the past 2 days  Â© 7122-9705 [address]. All rights reserved. This information is not intended as a substitute for professional medical care. Always follow your healthcare professional's instructions.

## 2020-01-23 ENCOUNTER — OFFICE VISIT (OUTPATIENT)
Dept: INTERNAL MEDICINE CLINIC | Facility: CLINIC | Age: 75
End: 2020-01-23

## 2020-01-23 VITALS
SYSTOLIC BLOOD PRESSURE: 132 MMHG | TEMPERATURE: 97.7 F | WEIGHT: 223.11 LBS | DIASTOLIC BLOOD PRESSURE: 84 MMHG | BODY MASS INDEX: 30.22 KG/M2 | HEIGHT: 72 IN | OXYGEN SATURATION: 96 % | HEART RATE: 64 BPM

## 2020-01-23 DIAGNOSIS — G40.909 SEIZURE DISORDER (HCC): Chronic | ICD-10-CM

## 2020-01-23 DIAGNOSIS — R07.9 CHEST PAIN, UNSPECIFIED TYPE: ICD-10-CM

## 2020-01-23 DIAGNOSIS — R21 RASH: ICD-10-CM

## 2020-01-23 DIAGNOSIS — Z00.00 MEDICARE ANNUAL WELLNESS VISIT, SUBSEQUENT: ICD-10-CM

## 2020-01-23 DIAGNOSIS — I25.10 CAD (CORONARY ARTERY DISEASE): Primary | ICD-10-CM

## 2020-01-23 PROCEDURE — G0438 PPPS, INITIAL VISIT: HCPCS | Performed by: INTERNAL MEDICINE

## 2020-01-23 PROCEDURE — 1123F ACP DISCUSS/DSCN MKR DOCD: CPT | Performed by: INTERNAL MEDICINE

## 2020-01-23 RX ORDER — PHENOBARBITAL 64.8 MG/1
64.8 TABLET ORAL 2 TIMES DAILY
Qty: 60 TABLET | Refills: 5 | Status: SHIPPED | OUTPATIENT
Start: 2020-01-23 | End: 2020-03-04 | Stop reason: SDUPTHER

## 2020-01-23 RX ORDER — CLOTRIMAZOLE 1 %
CREAM (GRAM) TOPICAL
Qty: 15 G | Refills: 0 | Status: SHIPPED | OUTPATIENT
Start: 2020-01-23 | End: 2021-04-07 | Stop reason: HOSPADM

## 2020-01-23 RX ORDER — PRAVASTATIN SODIUM 40 MG
40 TABLET ORAL DAILY
Qty: 90 TABLET | Refills: 3 | Status: SHIPPED | OUTPATIENT
Start: 2020-01-23 | End: 2020-02-27 | Stop reason: SDUPTHER

## 2020-01-23 RX ORDER — NITROGLYCERIN 0.4 MG/1
0.4 TABLET SUBLINGUAL
Qty: 30 TABLET | Refills: 1 | Status: SHIPPED | OUTPATIENT
Start: 2020-01-23 | End: 2021-04-07 | Stop reason: HOSPADM

## 2020-01-23 NOTE — PROGRESS NOTES
Assessment and Plan:     Problem List Items Addressed This Visit        Cardiovascular and Mediastinum    CAD (coronary artery disease) - Primary    Relevant Medications    aspirin 81 MG tablet    pravastatin (PRAVACHOL) 40 mg tablet    nitroglycerin (NITROSTAT) 0 4 mg SL tablet       Nervous and Auditory    Seizure disorder (HCC) (Chronic)    Relevant Medications    PHENobarbital 64 8 mg tablet      Other Visit Diagnoses     Medicare annual wellness visit, subsequent        Relevant Orders    Hepatitis C antibody    HIV 1/2 AG-AB combo    Chest pain, unspecified type        Relevant Medications    nitroglycerin (NITROSTAT) 0 4 mg SL tablet    Rash        Relevant Medications    clotrimazole (LOTRIMIN) 1 % cream           Preventive health issues were discussed with patient, and age appropriate screening tests were ordered as noted in patient's After Visit Summary  Personalized health advice and appropriate referrals for health education or preventive services given if needed, as noted in patient's After Visit Summary  History of Present Illness:     Patient presents for Medicare Annual Wellness visit  41-year-old male with past medical history significant for coronary artery disease status post stenting to the RCA/LAD in 2017, hypertension, seizure disorder phenobarbital, AAA status post repair in 2005, former smoker quit in 2005, hyperlipidemia, small bowel obstruction in July 2019 secondary to large incisional hernia  Patient comes to clinic today for annual wellness visit  Patient has never had colonoscopy  Patient is due for Prevnar and would benefit from PPSV 23 in 6m-1y  Patient received POLST and 5 wishes today in clinic  Patient provided with lab orders for HIV and hepatitis-C screening  Patient will need lipid panel  Patient has follow-up appointment with Cardiology on 02/27/2020  No acute complaints today  States trouble going up and down stairs however this has been present for many years  Refuses PT  No falls  Refuses PCV13, flu, Shingrix vaccinations  Will order HIV/HCV testing  Patient Care Team:  Chiqui Lott MD as PCP - General (Internal Medicine)  BRIJESH Smith DO (Internal Medicine)     Problem List:     Patient Active Problem List   Diagnosis    CAD (coronary artery disease)    HTN (hypertension)    HLD (hyperlipidemia)    [facility] disorder [facility] [address] [facility]    S/P AAA (abdominal aortic aneurysm) repair    Ventral hernia with obstruction and without gangrene    Incisional hernia, without obstruction or gangrene      Past Medical and Surgical History:     Past Medical History:   Diagnosis Date    Cardiac disease     CHF [facility]congestive heart failure) [facility] [address] )     Hernia of abdominal cavity     Myocardial infarction ([facility] [address] )     last assessed 14, past, Pt had MI s/p AGUSTIN placed in , refuses to take any other medications for his cardiac disease, only will take seizure med   Understands possible complications from this decision    [facility]izure [facility])      Past Surgical History:   Procedure Laterality Date    ABDOMINAL AORTIC ANEURYSM REPAIR      for dialation or occulsion    CATARACT EXTRACTION        Family History:     Family History   Problem Relation Age of Onset    Hypertension Father     Kidney disease Brother       Social History:     Social History     Socioeconomic History    Marital status: Single     Spouse name: None    Number of children: None    Years of education: None    Highest education level: None   Occupational History    None   Social Needs    Financial resource strain: None    Food insecurity:     Worry: None     Inability: None    Transportation needs:     Medical: None     Non-medical: None   Tobacco Use    Smoking status: Former Smoker     Last attempt to quit: 2005     Years since quittin 6    Smokeless tobacco: Never Used   Substance and Sexual Activity    Alcohol use: No    Drug use: No    Sexual activity: Never   Lifestyle  Physical activity:     Days per week: None     Minutes per session: None    Stress: None   Relationships    Social connections:     Talks on phone: None     Gets together: None     Attends Restorationism service: None     Active member of club or organization: None     Attends meetings of clubs or organizations: None     Relationship status: None    Intimate partner violence:     Fear of current or ex partner: None     Emotionally abused: None     Physically abused: None     Forced sexual activity: None   Other Topics Concern    None   Social History Narrative    None       Medications and Allergies:     Current Outpatient Medications   Medication Sig Dispense Refill    clotrimazole (LOTRIMIN) 1 % cream Apply to feet/toes 2 times daily 15 g 0    nitroglycerin (NITROSTAT) 0 4 mg SL tablet Place 1 tablet (0 4 mg total) under the tongue every 5 (five) minutes as needed for chest pain 30 tablet 1    PHENobarbital 64 8 mg tablet Take 1 tablet (64 8 mg total) by mouth 2 (two) times a day 60 tablet 5    aspirin 81 MG tablet Take 1 tablet (81 mg total) by mouth daily 90 tablet 3    pravastatin (PRAVACHOL) 40 mg tablet Take 1 tablet (40 mg total) by mouth daily 90 tablet 3     No current facility-administered medications for this visit        Allergies   Allergen Reactions    Iodinated Diagnostic Agents Rash     Pt's skin get very red and he gets hot and chills    Clopidogrel       Immunizations:     Immunization History   Administered Date(s) Administered    Influenza TIV (IM) 10/22/2009    Td (adult), adsorbed 10/16/2007    Tdap 02/27/2018      Health Maintenance:         Topic Date Due    CRC Screening: Colonoscopy  05/22/2028         Topic Date Due    Pneumococcal Vaccine: 65+ Years (1 of 2 - PCV13) 12/28/2009    Influenza Vaccine  07/01/2019      Medicare Health Risk Assessment:     /84 (BP Location: Right arm, Patient Position: Sitting, Cuff Size: Adult)   Pulse 64   Temp 97 7 °F (36 5 °C) (Oral)   Ht 6' (1 829 m)   Wt 101 kg (223 lb 1 7 oz)   SpO2 96%   BMI 30 26 kg/m²      Marquez  is here for his Subsequent Wellness visit  Health Risk Assessment:   Patient rates overall health as very good  Patient feels that their physical health rating is same  Eyesight was rated as same  Hearing was rated as same  Patient feels that their emotional and mental health rating is same  Pain experienced in the last 7 days has been none  Patient states that he has experienced no weight loss or gain in last 6 months  Depression Screening:   PHQ-2 Score: 0      Fall Risk Screening: In the past year, patient has experienced: no history of falling in past year      Home Safety:  Patient has trouble with stairs inside or outside of their home  Patient has working smoke alarms and has working carbon monoxide detector  Home safety hazards include: none  Nutrition:   Current diet is Regular  Medications:   Patient is currently taking over-the-counter supplements  OTC medications include: see medication list  Patient is able to manage medications  Activities of Daily Living (ADLs)/Instrumental Activities of Daily Living (IADLs):   Walk and transfer into and out of bed and chair?: Yes  Dress and groom yourself?: Yes    Bathe or shower yourself?: Yes    Feed yourself?  Yes  Do your laundry/housekeeping?: Yes  Manage your money, pay your bills and track your expenses?: Yes  Make your own meals?: Yes    Do your own shopping?: Yes    Previous Hospitalizations:   Any hospitalizations or ED visits within the last 12 months?: Yes    How many hospitalizations have you had in the last year?: 1-2    Advance Care Planning:   Living will: No    Durable POA for healthcare: No    Advanced directive: No      PREVENTIVE SCREENINGS      Cardiovascular Screening:    General: Screening Not Indicated and History Lipid Disorder      Diabetes Screening:     General: Screening Current      Colorectal Cancer Screening: General: Screening Current      Prostate Cancer Screening:    General: Screening Not Indicated      Abdominal Aortic Aneurysm (AAA) Screening:    Risk factors include: age between 73-69 yo and tobacco use        General: Screening Not Indicated and History AAA    Physical Exam   Constitutional: He is oriented to person, place, and time  He appears well-developed and well-nourished  No distress  HENT:   Head: Normocephalic and atraumatic  Mouth/Throat: Oropharynx is clear and moist  No oropharyngeal exudate  Eyes: Pupils are equal, round, and reactive to light  Conjunctivae and EOM are normal  No scleral icterus  Neck: Normal range of motion  Neck supple  Cardiovascular: Normal rate and regular rhythm  No murmur heard  Pulmonary/Chest: Effort normal and breath sounds normal  He has no wheezes  Abdominal: Soft  Bowel sounds are normal    Large ventral hernia, non tender   Musculoskeletal: Normal range of motion  He exhibits no edema or deformity  Neurological: He is alert and oriented to person, place, and time  No cranial nerve deficit  Skin: Skin is warm and dry  No rash noted  He is not diaphoretic  No erythema  Psychiatric: He has a normal mood and affect  His behavior is normal    Nursing note and vitals reviewed          Thomasene Romberg, MD

## 2020-01-23 NOTE — PATIENT INSTRUCTIONS

## 2020-01-25 PROCEDURE — 99284 EMERGENCY DEPT VISIT MOD MDM: CPT

## 2020-01-26 ENCOUNTER — HOSPITAL ENCOUNTER (EMERGENCY)
Facility: HOSPITAL | Age: 75
Discharge: HOME | End: 2020-01-26
Attending: EMERGENCY MEDICINE | Admitting: EMERGENCY MEDICINE
Payer: MEDICARE

## 2020-01-26 ENCOUNTER — HOSPITAL ENCOUNTER (OUTPATIENT)
Facility: HOSPITAL | Age: 75
Setting detail: OBSERVATION
Discharge: HOME | End: 2020-01-27
Attending: EMERGENCY MEDICINE | Admitting: SURGERY
Payer: MEDICARE

## 2020-01-26 ENCOUNTER — APPOINTMENT (EMERGENCY)
Dept: RADIOLOGY | Facility: HOSPITAL | Age: 75
End: 2020-01-26
Payer: MEDICARE

## 2020-01-26 VITALS
OXYGEN SATURATION: 93 % | HEART RATE: 80 BPM | RESPIRATION RATE: 18 BRPM | TEMPERATURE: 97.8 F | DIASTOLIC BLOOD PRESSURE: 61 MMHG | WEIGHT: 212 LBS | SYSTOLIC BLOOD PRESSURE: 119 MMHG | BODY MASS INDEX: 28.75 KG/M2

## 2020-01-26 DIAGNOSIS — K43.9 VENTRAL HERNIA: Primary | ICD-10-CM

## 2020-01-26 PROBLEM — K56.609 SMALL BOWEL OBSTRUCTION (HCC): Status: ACTIVE | Noted: 2020-01-26

## 2020-01-26 PROBLEM — R10.9 ABDOMINAL PAIN: Status: ACTIVE | Noted: 2020-01-26

## 2020-01-26 LAB
ALBUMIN SERPL BCP-MCNC: 3.6 G/DL (ref 3.5–5)
ALP SERPL-CCNC: 164 U/L (ref 46–116)
ALT SERPL W P-5'-P-CCNC: 15 U/L (ref 12–78)
ANION GAP SERPL CALCULATED.3IONS-SCNC: 5 MMOL/L (ref 4–13)
AST SERPL W P-5'-P-CCNC: 7 U/L (ref 5–45)
BASOPHILS # BLD AUTO: 0.03 THOUSANDS/ΜL (ref 0–0.1)
BASOPHILS NFR BLD AUTO: 0 % (ref 0–1)
BILIRUB SERPL-MCNC: 0.3 MG/DL (ref 0.2–1)
BUN SERPL-MCNC: 11 MG/DL (ref 5–25)
CALCIUM SERPL-MCNC: 8.9 MG/DL (ref 8.3–10.1)
CHLORIDE SERPL-SCNC: 111 MMOL/L (ref 100–108)
CO2 SERPL-SCNC: 25 MMOL/L (ref 21–32)
CREAT SERPL-MCNC: 0.92 MG/DL (ref 0.6–1.3)
EOSINOPHIL # BLD AUTO: 0.09 THOUSAND/ΜL (ref 0–0.61)
EOSINOPHIL NFR BLD AUTO: 1 % (ref 0–6)
ERYTHROCYTE [DISTWIDTH] IN BLOOD BY AUTOMATED COUNT: 16.9 % (ref 11.6–15.1)
GFR SERPL CREATININE-BSD FRML MDRD: 81 ML/MIN/1.73SQ M
GLUCOSE SERPL-MCNC: 116 MG/DL (ref 65–140)
HCT VFR BLD AUTO: 42.9 % (ref 36.5–49.3)
HGB BLD-MCNC: 14 G/DL (ref 12–17)
IMM GRANULOCYTES # BLD AUTO: 0.03 THOUSAND/UL (ref 0–0.2)
IMM GRANULOCYTES NFR BLD AUTO: 0 % (ref 0–2)
LACTATE SERPL-SCNC: 1.9 MMOL/L (ref 0.5–2)
LYMPHOCYTES # BLD AUTO: 0.85 THOUSANDS/ΜL (ref 0.6–4.47)
LYMPHOCYTES NFR BLD AUTO: 11 % (ref 14–44)
MCH RBC QN AUTO: 31.9 PG (ref 26.8–34.3)
MCHC RBC AUTO-ENTMCNC: 32.6 G/DL (ref 31.4–37.4)
MCV RBC AUTO: 98 FL (ref 82–98)
MONOCYTES # BLD AUTO: 0.53 THOUSAND/ΜL (ref 0.17–1.22)
MONOCYTES NFR BLD AUTO: 7 % (ref 4–12)
NEUTROPHILS # BLD AUTO: 6.18 THOUSANDS/ΜL (ref 1.85–7.62)
NEUTS SEG NFR BLD AUTO: 81 % (ref 43–75)
NRBC BLD AUTO-RTO: 0 /100 WBCS
PLATELET # BLD AUTO: 160 THOUSANDS/UL (ref 149–390)
PMV BLD AUTO: 11.5 FL (ref 8.9–12.7)
POTASSIUM SERPL-SCNC: 4.5 MMOL/L (ref 3.5–5.3)
PROT SERPL-MCNC: 8 G/DL (ref 6.4–8.2)
RBC # BLD AUTO: 4.39 MILLION/UL (ref 3.88–5.62)
SODIUM SERPL-SCNC: 141 MMOL/L (ref 136–145)
WBC # BLD AUTO: 7.71 THOUSAND/UL (ref 4.31–10.16)

## 2020-01-26 PROCEDURE — 99285 EMERGENCY DEPT VISIT HI MDM: CPT

## 2020-01-26 PROCEDURE — 99285 EMERGENCY DEPT VISIT HI MDM: CPT | Performed by: EMERGENCY MEDICINE

## 2020-01-26 PROCEDURE — 99284 EMERGENCY DEPT VISIT MOD MDM: CPT | Performed by: EMERGENCY MEDICINE

## 2020-01-26 PROCEDURE — 96361 HYDRATE IV INFUSION ADD-ON: CPT

## 2020-01-26 PROCEDURE — NS001 PR NO SIGNATURE OR ATTESTATION: Performed by: SURGERY

## 2020-01-26 PROCEDURE — NC001 PR NO CHARGE: Performed by: SURGERY

## 2020-01-26 PROCEDURE — 80053 COMPREHEN METABOLIC PANEL: CPT | Performed by: EMERGENCY MEDICINE

## 2020-01-26 PROCEDURE — 36415 COLL VENOUS BLD VENIPUNCTURE: CPT | Performed by: EMERGENCY MEDICINE

## 2020-01-26 PROCEDURE — 96360 HYDRATION IV INFUSION INIT: CPT

## 2020-01-26 PROCEDURE — 85025 COMPLETE CBC W/AUTO DIFF WBC: CPT | Performed by: EMERGENCY MEDICINE

## 2020-01-26 PROCEDURE — 83605 ASSAY OF LACTIC ACID: CPT | Performed by: EMERGENCY MEDICINE

## 2020-01-26 PROCEDURE — 99220 PR INITIAL OBSERVATION CARE/DAY 70 MINUTES: CPT | Performed by: SURGERY

## 2020-01-26 PROCEDURE — 74176 CT ABD & PELVIS W/O CONTRAST: CPT

## 2020-01-26 PROCEDURE — 1123F ACP DISCUSS/DSCN MKR DOCD: CPT | Performed by: EMERGENCY MEDICINE

## 2020-01-26 RX ORDER — SODIUM CHLORIDE 9 MG/ML
125 INJECTION, SOLUTION INTRAVENOUS CONTINUOUS
Status: DISCONTINUED | OUTPATIENT
Start: 2020-01-26 | End: 2020-01-27 | Stop reason: HOSPADM

## 2020-01-26 RX ORDER — PHENOBARBITAL 64.8 MG/1
64.8 TABLET ORAL 2 TIMES DAILY
Status: CANCELLED | OUTPATIENT
Start: 2020-01-26

## 2020-01-26 RX ORDER — ONDANSETRON 2 MG/ML
4 INJECTION INTRAMUSCULAR; INTRAVENOUS ONCE
Status: DISCONTINUED | OUTPATIENT
Start: 2020-01-26 | End: 2020-01-26 | Stop reason: HOSPADM

## 2020-01-26 RX ORDER — HEPARIN SODIUM 5000 [USP'U]/ML
5000 INJECTION, SOLUTION INTRAVENOUS; SUBCUTANEOUS EVERY 8 HOURS SCHEDULED
Status: DISCONTINUED | OUTPATIENT
Start: 2020-01-26 | End: 2020-01-27 | Stop reason: HOSPADM

## 2020-01-26 RX ORDER — HEPARIN SODIUM 5000 [USP'U]/ML
5000 INJECTION, SOLUTION INTRAVENOUS; SUBCUTANEOUS EVERY 8 HOURS SCHEDULED
Status: CANCELLED | OUTPATIENT
Start: 2020-01-26

## 2020-01-26 RX ORDER — ONDANSETRON 2 MG/ML
4 INJECTION INTRAMUSCULAR; INTRAVENOUS EVERY 6 HOURS PRN
Status: DISCONTINUED | OUTPATIENT
Start: 2020-01-26 | End: 2020-01-27 | Stop reason: HOSPADM

## 2020-01-26 RX ORDER — SODIUM CHLORIDE, SODIUM LACTATE, POTASSIUM CHLORIDE, CALCIUM CHLORIDE 600; 310; 30; 20 MG/100ML; MG/100ML; MG/100ML; MG/100ML
100 INJECTION, SOLUTION INTRAVENOUS CONTINUOUS
Status: CANCELLED | OUTPATIENT
Start: 2020-01-26

## 2020-01-26 RX ORDER — PRAVASTATIN SODIUM 40 MG
40 TABLET ORAL DAILY
Status: CANCELLED | OUTPATIENT
Start: 2020-01-26

## 2020-01-26 RX ORDER — ONDANSETRON 2 MG/ML
4 INJECTION INTRAMUSCULAR; INTRAVENOUS EVERY 6 HOURS PRN
Status: CANCELLED | OUTPATIENT
Start: 2020-01-26

## 2020-01-26 RX ADMIN — ONDANSETRON 4 MG: 2 INJECTION INTRAMUSCULAR; INTRAVENOUS at 14:51

## 2020-01-26 RX ADMIN — SODIUM CHLORIDE 500 ML: 0.9 INJECTION, SOLUTION INTRAVENOUS at 02:22

## 2020-01-26 RX ADMIN — HEPARIN SODIUM 5000 UNITS: 5000 INJECTION INTRAVENOUS; SUBCUTANEOUS at 22:58

## 2020-01-26 RX ADMIN — SODIUM CHLORIDE 125 ML/HR: 0.9 INJECTION, SOLUTION INTRAVENOUS at 13:49

## 2020-01-26 RX ADMIN — SODIUM CHLORIDE 125 ML/HR: 0.9 INJECTION, SOLUTION INTRAVENOUS at 16:50

## 2020-01-26 NOTE — H&P
Please see General Surgery Consultation note from earlier today       Assessment: 74yM w/ SBO    Plan:  NPO  NGT if vomits  I/O  IVF  AM labs  DVT PPx SQH

## 2020-01-26 NOTE — ED ATTENDING ATTESTATION
1/25/2020  I, Janelle Jenkins DO, saw and evaluated the patient  I have discussed the patient with the resident/non-physician practitioner and agree with the resident's/non-physician practitioner's findings, Plan of Care, and MDM as documented in the resident's/non-physician practitioner's note, except where noted  All available labs and Radiology studies were reviewed  I was present for key portions of any procedure(s) performed by the resident/non-physician practitioner and I was immediately available to provide assistance  At this point I agree with the current assessment done in the Emergency Department  I have conducted an independent evaluation of this patient a history and physical is as follows:    59-year-old male presents with abdominal pain  Patient states that started today  Has ventral hernias and states they are always out but today he had some discomfort in them more than normal   Has had bowel obstructions in the past and was concerned about this  Did have bowel movement prior to coming in that made the pain slightly better  No fevers or chills, no vomiting  On exam-no acute distress, heart regular, lungs clear, abdomen soft with tenderness over hernias    Plan-CT abdomen, check labs    ED Course         Critical Care Time  Procedures

## 2020-01-26 NOTE — ED PROVIDER NOTES
History  Chief Complaint   Patient presents with    Hernia     States he woke up this morning and hernia was popped out, had BM at 2130 tonight, has been having a lot of pain today     76-year-old male with history of multiple ventral abdominal hernias after a AAA repair in 2005 presenting for abdominal pain that began this morning shortly after awakening  Patient states that has been waxing waning throughout the day  Has had a normal bowel movement this evening  Still passing flatus  Patient is not nauseous, no vomiting  No fevers or chills  His hernias are about the size they usually are  Patient is concerned he may have slept on his stomach last night and that is causing the pain  Previously seen Dr Lady Schneider, but declined surgery as he was afraid it would make things worse  History of SBO which required bowel rest and NG tube, resolved without surgery  Denies any chest pain, shortness of breath, lightheadedness, dizziness  History provided by:  Patient   used: No        Prior to Admission Medications   Prescriptions Last Dose Informant Patient Reported? Taking?    PHENobarbital 64 8 mg tablet 1/26/2020 at Unknown time  No Yes   Sig: Take 1 tablet (64 8 mg total) by mouth 2 (two) times a day   aspirin 81 MG tablet 1/26/2020 at Unknown time  No Yes   Sig: Take 1 tablet (81 mg total) by mouth daily   clotrimazole (LOTRIMIN) 1 % cream 1/26/2020 at Unknown time  No Yes   Sig: Apply to feet/toes 2 times daily   nitroglycerin (NITROSTAT) 0 4 mg SL tablet More than a month at Unknown time  No No   Sig: Place 1 tablet (0 4 mg total) under the tongue every 5 (five) minutes as needed for chest pain   pravastatin (PRAVACHOL) 40 mg tablet Not Taking at Unknown time  No No   Sig: Take 1 tablet (40 mg total) by mouth daily   Patient not taking: Reported on 1/26/2020      Facility-Administered Medications: None       Past Medical History:   Diagnosis Date    Cardiac disease     CHF [facility]congestive heart failure) ([facility] [address] )     Hernia of abdominal cavity     Myocardial infarction [facility])     last assessed 14, past, Pt had MI s/p AGUSTIN placed in , refuses to take any other medications for his cardiac disease, only will take seizure med  Understands possible complications from this decision    Seizure [facility])        Past Surgical History:   Procedure Laterality Date    ABDOMINAL AORTIC ANEURYSM REPAIR      for dialation or occulsion    CATARACT EXTRACTION         Family History   Problem Relation Age of Onset    Hypertension Father     Kidney disease Brother      I have reviewed and agree with the history as documented  Social History     Tobacco Use    Smoking status: Former Smoker     Last attempt to quit: 2005     Years since quittin 6    Smokeless tobacco: Never Used   Substance Use Topics    Alcohol use: Not Currently     Frequency: Never     Binge frequency: Never    Drug use: Never        Review of Systems   Constitutional: Positive for appetite change  Negative for chills, fatigue and fever  HENT: Negative for congestion, rhinorrhea and sore throat  Eyes: Negative for redness and visual disturbance  Respiratory: Negative for cough, chest tightness, shortness of breath and wheezing  Cardiovascular: Negative for chest pain, palpitations and leg swelling  Gastrointestinal: Positive for abdominal pain  Negative for blood in stool, constipation, diarrhea, nausea and vomiting  Genitourinary: Negative for dysuria, frequency, hematuria and urgency  Musculoskeletal: Negative for back pain and myalgias  Skin: Negative for pallor and rash  Neurological: Negative for dizziness, syncope, weakness, light-headedness, numbness and headaches  Psychiatric/Behavioral: Negative for confusion  The patient is not nervous/anxious          Physical Exam  ED Triage Vitals   Temperature Pulse Respirations Blood Pressure SpO2   20 2313 20 2313 20 2313 20 2313 01/25/20 2313   97 8 °F (36 6 °C) 78 20 142/86 94 %      Temp Source Heart Rate Source Patient Position - Orthostatic VS BP Location FiO2 (%)   01/25/20 2313 01/26/20 0030 01/26/20 0030 01/26/20 0030 --   Oral Monitor Lying Right arm       Pain Score       01/25/20 2313       5             Orthostatic Vital Signs  Vitals:    01/25/20 2313 01/26/20 0030 01/26/20 0245   BP: 142/86 138/69 119/61   Pulse: 78 78 80   Patient Position - Orthostatic VS:  Lying Lying       Physical Exam   Constitutional: He appears well-developed and well-nourished  No distress  HENT:   Head: Normocephalic and atraumatic  Nose: Nose normal    Mouth/Throat: Oropharynx is clear and moist  No oropharyngeal exudate  Eyes: Pupils are equal, round, and reactive to light  Conjunctivae are normal  Right eye exhibits no discharge  Left eye exhibits no discharge  Neck: Normal range of motion  Cardiovascular: Normal rate and regular rhythm  No murmur heard  Pulmonary/Chest: Effort normal and breath sounds normal  No respiratory distress  He has no wheezes  He has no rales  Abdominal: Soft  Bowel sounds are normal  He exhibits no distension  There is tenderness  Multiple large incisional hernia is present at abdomen  Not reducible  Pain with palpation of both right upper and left upper hernias  No visible peristalsis  Abdomen is otherwise nontender  Musculoskeletal: Normal range of motion  He exhibits no edema or deformity  Neurological: He is alert  Skin: Skin is warm and dry  No rash noted  He is not diaphoretic  No pallor  Psychiatric: He has a normal mood and affect  Nursing note and vitals reviewed        ED Medications  Medications   sodium chloride 0 9 % bolus 500 mL (0 mL Intravenous Stopped 1/26/20 0430)       Diagnostic Studies  Results Reviewed     Procedure Component Value Units Date/Time    Comprehensive metabolic panel [[id number]]  (Abnormal) Collected:  01/26/20 0032    Lab Status:  Final result Specimen: Blood from Arm, Left Updated:  01/26/20 0107     Sodium 141 mmol/L      Potassium 4 5 mmol/L      Chloride 111 mmol/L      CO2 25 mmol/L      ANION GAP 5 mmol/L      BUN 11 mg/dL      Creatinine 0 92 mg/dL      Glucose 116 mg/dL      Calcium 8 9 mg/dL      AST 7 U/L      ALT 15 U/L      Alkaline Phosphatase 164 U/L      Total Protein 8 0 g/dL      Albumin 3 6 g/dL      Total Bilirubin 0 30 mg/dL      eGFR 81 ml/min/1 73sq m     Narrative:       [place] guidelines for Chronic Kidney Disease (CKD):     Stage 1 with normal or high GFR (GFR > 90 mL/min/1 73 square meters)    Stage 2 Mild CKD (GFR = 60-89 mL/min/1 73 square meters)    Stage 3A Moderate CKD (GFR = 45-59 mL/min/1 73 square meters)    Stage 3B Moderate CKD (GFR = 30-44 mL/min/1 73 square meters)    Stage 4 Severe CKD (GFR = 15-29 mL/min/1 73 square meters)    Stage 5 End Stage CKD (GFR <15 mL/min/1 73 square meters)  Note: GFR calculation is accurate only with a steady state creatinine    Lactic acid, plasma [[id number]]  (Normal) Collected:  01/26/20 0032    Lab Status:  Final result Specimen:  Blood from Arm, Left Updated:  01/26/20 0107     LACTIC ACID 1 9 mmol/L     Narrative:       Result may be elevated if tourniquet was used during collection      CBC and differential [[id number]]  (Abnormal) Collected:  01/26/20 0032    Lab Status:  Final result Specimen:  Blood from Arm, Left Updated:  01/26/20 0041     WBC 7 71 Thousand/uL      RBC 4 39 Million/uL      Hemoglobin 14 0 g/dL      Hematocrit 42 9 %      MCV 98 fL      MCH 31 9 pg      MCHC 32 6 g/dL      RDW 16 9 %      MPV 11 5 fL      Platelets 110 Thousands/uL      nRBC 0 /100 WBCs      Neutrophils Relative 81 %      Immat GRANS % 0 %      Lymphocytes Relative 11 %      Monocytes Relative 7 %      Eosinophils Relative 1 %      Basophils Relative 0 %      Neutrophils Absolute 6 18 Thousands/µL      Immature Grans Absolute 0 03 Thousand/uL      Lymphocytes Absolute 0 85 Thousands/µL      Monocytes Absolute 0 53 Thousand/µL      Eosinophils Absolute 0 09 Thousand/µL      Basophils Absolute 0 03 Thousands/µL                  CT abdomen pelvis wo contrast   Final Result by Jerrold Cockayne, MD (01/26 0147)      Reidentified multiple supraumbilical and infraumbilical ventral hernias containing several loops of prominent to mildly dilated small bowel and nondilated colon also described on a CT abdomen/pelvis dated July 17, 2019  Within a supraumbilical hernia    just to the right of midline, the entering small bowel loop is mildly dilated and the exiting distal small bowel loop is relatively collapsed raising the suspicion for small bowel obstruction  Surgical consultation is recommended  A repeat CT scan of    the abdomen and pelvis after the administration of oral contrast could be performed for further evaluation  No free air or free fluid  I personally discussed this study with Sameer Peter on 1/26/2020 at 1:42 AM                   Workstation performed: [id number]               Procedures  Procedures      ED Course  ED Course as of Jan 28 1929   Chriss Sewell Jan 26, 2020   0151 CT: Reidentified multiple supraumbilical and infraumbilical ventral hernias containing several loops of prominent to mildly dilated small bowel and nondilated colon also described on a CT abdomen/pelvis dated July 17, 2019  Within a supraumbilical hernia   just to the right of midline, the entering small bowel loop is mildly dilated and the exiting distal small bowel loop is relatively collapsed raising the suspicion for small bowel obstruction  0302 Will PO challenge and likely discharge         0355 Pt had episode of dry heaving/vomiting in mouth immediately after               Identification of Seniors at Risk      Most Recent Value   (ISAR) Identification of Seniors at Risk   Before the illness or injury that brought you to the Emergency, did you need someone to help you on a regular basis?  0 Filed at: 01/25/2020 2315   In the last 24 hours, have you needed more help than usual?  0 Filed at: 01/25/2020 2315   Have you been hospitalized for one or more nights during the past 6 months? 0 Filed at: 01/25/2020 2315   In general, do you see well?  0 Filed at: 01/25/2020 2315   In general, do you have serious problems with your memory? 0 Filed at: 01/25/2020 2315   Do you take more than three different medications every day? 1 Filed at: 01/25/2020 2315   ISAR Score  1 Filed at: 01/25/2020 2315                          MDM  Number of Diagnoses or Management Options  Ventral hernia:   Diagnosis management comments: 79-year-old male presenting for evaluation of abdominal pain with significant history of multiple ventral hernias that are non reducible at baseline  Patient has been evaluated before for these hernias, declined surgical intervention  Patient initially was not complaining of any nausea or vomiting  Given pain and significant hernias, as well as history of SBO, CT abdomen pelvis was obtained  Patient has a contrast allergy and declined contrast   CT abdomen pelvis non con shows suggestion of possible small bowel obstruction at site of 1 of his hernias  Formal surgical evaluation was obtained in the emergency department  As patient was not currently nauseous, p o  Challenge to determine if he would be amenable for outpatient follow-up versus inpatient  Patient was given ginger ale and crackers  After 1 sip of ginger ale had immediate nausea and dry heaving  Patient states that he threw open his mouth and swallowed it  Concerning for obstruction  Patient refusing to be admitted to the hospital at this time as he says he is fine  Signed out AMA  Discussed return precautions at length with patient  He is understanding and able to make his own medical decisions  Will return if symptoms worsen or he is unable to tolerate p o  At home    Has follow-up with   Sasha         Disposition  Final diagnoses:   Ventral hernia     Time reflects when diagnosis was documented in both MDM as applicable and the Disposition within this note     Time User Action Codes Description Comment    1/26/2020  4:00 AM Ct Rivero Add [K43 9] Ventral hernia       ED Disposition     ED Disposition Condition Date/Time Comment    JESSY Bowens Jan 26, 2020  3:59 AM Date: 1/26/2020  Patient: Manda Ford  Admitted: 1/26/2020 12:01 AM  Attending Provider: Katia Winters DO    Manda Ford or his authorized caregiver has made the decision for the patient to leave the emergency department against the advice of  his attending physician  He or his authorized caregiver has been informed and understands the inherent risks, including death, bowel perforation, dead bowel, sepsis, infection, serious illness requiring surgery  He or his authorized caregiver has d ecided to accept the responsibility for this decision  Manda Ford and all necessary parties have been advised that he may return for further evaluation or treatment  His condition at time of discharge was stable  Manda Ford had current vital sig ns as follows:  /61 (BP Location: Right arm)   Pulse 80   Temp 97 8 °F (36 6 °C) (Oral)   Resp 18   Wt 96 2 kg (212 lb)         Follow-up Information     Follow up With Specialties Details Why Contact Info Additional Information    Mukesh Calloway MD Trauma Surgery, General Surgery Follow up in 1 day(s) Please call to make an appointment with Dr Balbir Rivero at the earliest mutual convenience   [address] Emergency Department Emergency Medicine Go to  As needed [facility] [id number]  [facility], [address], [place], [id number] [phone]          Discharge Medication List as of 1/26/2020  4:01 AM      CONTINUE these medications which have NOT CHANGED    Details aspirin 81 MG tablet Take 1 tablet (81 mg total) by mouth daily, Starting Thu 1/23/2020, Normal      clotrimazole (LOTRIMIN) 1 % cream Apply to feet/toes 2 times daily, Print      PHENobarbital 64 8 mg tablet Take 1 tablet (64 8 mg total) by mouth 2 (two) times a day, Starting Thu 1/23/2020, Until Tue 7/21/2020, Print      nitroglycerin (NITROSTAT) 0 4 mg SL tablet Place 1 tablet (0 4 mg total) under the tongue every 5 (five) minutes as needed for chest pain, Starting Thu 1/23/2020, Normal      pravastatin (PRAVACHOL) 40 mg tablet Take 1 tablet (40 mg total) by mouth daily, Starting Thu 1/23/2020, Normal           No discharge procedures on file  ED Provider  Attending physically available and evaluated Danuta Mazariegos I managed the patient along with the ED Attending      Electronically Signed by         Jimbo Gonzalez MD  01/28/20 9730

## 2020-01-26 NOTE — CONSULTS
Consultation - General Surgery   Manda Ford 75 y o  male MRN: [id number]  Unit/Bed#: ED 08 Encounter: [id number]    Assessment/Plan     Assessment:  75 y o  M w hx of an open AAA repair in 2005 and multiple ventral hernias, presents with epigastric abdominal pain and CT showing signs concerning for SBO      + flatus/BMs, tolerating po intake at home  Lactive acid 1 8   WBC 7    1/26 CTAP: Reidentified multiple supraumbilical and infraumbilical ventral hernias containing several loops of prominent to mildly dilated SB and nondilated colon  R sided supraumbilical hernia with the entering SB loop mildly dilated and the exiting distal small bowel loop relatively collapsed     Plan:  Plan originally was to po trial the patient in the ED and if he passed then d/c home with 2 week f/u in the office with Dr Smiley Persaud  He got nauseous after drinking gingerale in the ED and the recommendation was made to admit him  Patient refused admission and left AMA from the ED  Return precautions given  Patient will call to schedule an appointment with Dr Smiley Persaud for next week to discuss surgical options  History of Present Illness     HPI:  Manda Ford is a 75 y o  male w hx of an open AAA repair in 2005 and multiple ventral hernias, presents with one day of epigastric abdominal pain  States he is passing gas and having BMs  He tolerated all meals yesterday without nausea or vomiting  Patient had a SBO 2/2 ventral hernia in July of 2019 which was treated with NGT decompression  States he was in the hospital for 3 days  States the pain this time is not nearly as severe as the pain was before  Denies any other complaints at this time  Other PMHx includes, CHF, CAD s/p stent placement, and seizures  Inpatient consult to Acute Care Surgery  Consult performed by: Stanley Ramirez DO  Consult ordered by: Katia Winters DO          Review of Systems   Constitutional: Negative for chills and fever  HENT: Negative      Eyes: Negative  Respiratory: Negative for chest tightness, shortness of breath and wheezing  Cardiovascular: Negative for chest pain and leg swelling  Gastrointestinal: Positive for abdominal pain  Negative for blood in stool, constipation, diarrhea, nausea and vomiting  Endocrine: Negative  Genitourinary: Negative  Negative for difficulty urinating and dysuria  Musculoskeletal: Negative  Negative for myalgias  Skin: Negative  Negative for rash and wound  Allergic/Immunologic: Negative  Neurological: Negative  Negative for dizziness  Hematological: Negative  Psychiatric/Behavioral: Negative  Historical Information   Past Medical History:   Diagnosis Date    Cardiac disease     [facility]est[facility]) ([facility] [address] )     Hernia of abdominal cavity     [facility]     last assessed 14, past, Pt had MI s/p AGUSTIN placed in , refuses to take any other medications for his cardiac disease, only will take seizure med   Understands possible complications from this decision    Seizure [facility])      Past Surgical History:   Procedure Laterality Date    ABDOMINAL AORTIC ANEURYSM REPAIR      for dialation or occulsion    CATARACT EXTRACTION       Social History   Social History     Substance and Sexual Activity   Alcohol Use No     Social History     Substance and Sexual Activity   Drug Use No     Social History     Tobacco Use   Smoking Status Former Smoker    Last attempt to quit: 2005    Years since quittin 6   Smokeless Tobacco Never Used     Family History: non-contributory    Meds/Allergies   all current active meds have been reviewed  Allergies   Allergen Reactions    Iodinated Diagnostic Agents Rash     Pt's skin get very red and he gets hot and chills    Clopidogrel        Objective   First Vitals:   Blood Pressure: 142/86 (20 2313)  Pulse: 78 (20 2313)  Temperature: 97 8 °F (36 6 °C) (20 2313)  Temp Source: Oral (20 2313)  Respirations: 20 (01/25/20 2313)  Weight - Scale: 96 2 kg (212 lb) (01/25/20 2313)  SpO2: 94 % (01/25/20 2313)    Current Vitals:   Blood Pressure: 119/61 (01/26/20 0245)  Pulse: 80 (01/26/20 0245)  Temperature: 97 8 °F (36 6 °C) (01/25/20 2313)  Temp Source: Oral (01/25/20 2313)  Respirations: 18 (01/26/20 0245)  Weight - Scale: 96 2 kg (212 lb) (01/25/20 2313)  SpO2: 93 % (01/26/20 0245)    No intake or output data in the 24 hours ending 01/26/20 0312    Invasive Devices     Peripheral Intravenous Line            Peripheral IV 07/17/19 Left Antecubital 192 days    Peripheral IV 01/26/20 Left Antecubital less than 1 day                Physical Exam   Constitutional: He is oriented to person, place, and time  He appears well-developed and well-nourished  No distress  HENT:   Head: Normocephalic and atraumatic  Eyes: Pupils are equal, round, and reactive to light  EOM are normal  No scleral icterus  Neck: Normal range of motion  No JVD present  Cardiovascular: Normal rate  Pulmonary/Chest: Effort normal  No respiratory distress  Abdominal: Soft  He exhibits no distension  There is no rebound and no guarding  Supraumbilical hernia that is reducible with palpation but easily recurs  There is mild tenderness  No skin changes  He has three bugles in his anterior abdominal wall likely representing diastasis recti and possibly another ventral hernia  Musculoskeletal: Normal range of motion  He exhibits no edema or deformity  Neurological: He is alert and oriented to person, place, and time  No cranial nerve deficit  Skin: Skin is warm and dry  He is not diaphoretic  Psychiatric: He has a normal mood and affect  Nursing note and vitals reviewed        Lab Results:   CBC:   Lab Results   Component Value Date    WBC 7 71 01/26/2020    HGB 14 0 01/26/2020    HCT 42 9 01/26/2020    MCV 98 01/26/2020     01/26/2020    MCH 31 9 01/26/2020    MCHC 32 6 01/26/2020    RDW 16 9 (H) 01/26/2020 MPV 11 5 01/26/2020    NRBC 0 01/26/2020   , CMP:   Lab Results   Component Value Date    SODIUM 141 01/26/2020    K 4 5 01/26/2020     (H) 01/26/2020    CO2 25 01/26/2020    BUN 11 01/26/2020    CREATININE 0 92 01/26/2020    CALCIUM 8 9 01/26/2020    AST 7 01/26/2020    ALT 15 01/26/2020    ALKPHOS 164 (H) 01/26/2020    EGFR 81 01/26/2020     Imaging: I have personally reviewed pertinent reports  EKG, Pathology, and Other Studies: I have personally reviewed pertinent reports  Counseling / Coordination of Care  Total floor / unit time spent today 30 minutes  Greater than 50% of total time was spent with the patient and / or family counseling and / or coordination of care    A description of the counseling / coordination of care: discussion with patient and surgical team

## 2020-01-26 NOTE — ED ATTENDING ATTESTATION
1/26/2020  IKerline MD, saw and evaluated the patient  I have discussed the patient with the resident/non-physician practitioner and agree with the resident's/non-physician practitioner's findings, Plan of Care, and MDM as documented in the resident's/non-physician practitioner's note, except where noted  All available labs and Radiology studies were reviewed  I was present for key portions of any procedure(s) performed by the resident/non-physician practitioner and I was immediately available to provide assistance  At this point I agree with the current assessment done in the Emergency Department  I have conducted an independent evaluation of this patient a history and physical is as follows:    ED Course         Critical Care Time  Procedures     76 yo male recently left er [facility] after being dx with ventral hernia and was offered admission but left  Pt with increased pain and vomiting, no bm since leaving, not tolerating po  Pt symptoms have resolved currently  pmh chf, cad, seizures, previous hernia, sbo  Vss, afebrile, lungs cta, rrr, abdomen soft nontender, reducible hernia  Discuss with surgery

## 2020-01-26 NOTE — ED PROVIDER NOTES
History  Chief Complaint   Patient presents with    Abdominal Pain     pt seen yesteryday for abdominal pain from hernia  Pt left early in am, returned in afternoon for nausea/vomiting, and abdominal pain due to hernia  43-year-old male presenting with ventral hernia  Patient was just seen this morning due to ventral hernia and was offered admission by General surgery  Patient left AMA and states after he left he had multiple bouts of bilious vomiting and realized "I made a mistake by leaving"  Denies any current pain or nausea, denies fever chills, denies hematemesis, chest pain, hemoptysis, urinary or bowel symptoms  Prior to Admission Medications   Prescriptions Last Dose Informant Patient Reported? Taking? PHENobarbital 64 8 mg tablet 1/26/2020 at Unknown time  No Yes   Sig: Take 1 tablet (64 8 mg total) by mouth 2 (two) times a day   aspirin 81 MG tablet 1/26/2020 at Unknown time  No Yes   Sig: Take 1 tablet (81 mg total) by mouth daily   clotrimazole (LOTRIMIN) 1 % cream   No No   Sig: Apply to feet/toes 2 times daily   nitroglycerin (NITROSTAT) 0 4 mg SL tablet   No No   Sig: Place 1 tablet (0 4 mg total) under the tongue every 5 (five) minutes as needed for chest pain   pravastatin (PRAVACHOL) 40 mg tablet Not Taking at Unknown time  No No   Sig: Take 1 tablet (40 mg total) by mouth daily   Patient not taking: Reported on 1/26/2020      Facility-Administered Medications: None       Past Medical History:   Diagnosis Date    Cardiac disease     [facility]congestive [facility]) [facility] [address] [facility]     Hernia of abdominal cavity     Myocardial infarction ([facility] [address] )     last assessed 7/31/14, past, Pt had MI s/p AGUSTIN placed in 2001, refuses to take any other medications for his cardiac disease, only will take seizure med   Understands possible complications from this decision    Seizure [facility])        Past Surgical History:   Procedure Laterality Date    ABDOMINAL AORTIC ANEURYSM REPAIR      for dialation or occulsion    CATARACT EXTRACTION         Family History   Problem Relation Age of Onset    Hypertension Father     Kidney disease Brother      I have reviewed and agree with the history as documented  Social History     Tobacco Use    Smoking status: Former Smoker     Last attempt to quit: 2005     Years since quittin 6    Smokeless tobacco: Never Used   Substance Use Topics    Alcohol use: No    Drug use: No        Review of Systems   Constitutional: Negative for fatigue and fever  HENT: Negative for ear pain and hearing loss  Eyes: Negative for pain  Respiratory: Negative for chest tightness and shortness of breath  Cardiovascular: Negative for chest pain  Gastrointestinal: Positive for abdominal distention and vomiting  Negative for abdominal pain and nausea  Genitourinary: Negative for difficulty urinating, dysuria and urgency  Musculoskeletal: Negative for back pain  Skin: Negative for rash and wound  Neurological: Negative for syncope and headaches  Psychiatric/Behavioral: Negative for agitation  All other systems reviewed and are negative  Physical Exam  ED Triage Vitals   Temperature Pulse Respirations Blood Pressure SpO2   20 1242 20 1242 20 1242 20 1242 20 1242   98 °F (36 7 °C) 82 18 120/60 94 %      Temp Source Heart Rate Source Patient Position - Orthostatic VS BP Location FiO2 (%)   20 1242 20 1300 -- -- --   Oral Monitor         Pain Score       20 1242       7             Orthostatic Vital Signs  Vitals:    20 1330 20 1400 20 1430 20 1500   BP: 114/61 119/61 137/73 112/55   Pulse: 95 89 83 83       Physical Exam   Constitutional: He is oriented to person, place, and time  He appears well-developed and well-nourished  No distress  HENT:   Head: Normocephalic and atraumatic  Eyes: Pupils are equal, round, and reactive to light   Conjunctivae and EOM are normal  Right eye exhibits no discharge  Left eye exhibits no discharge  Neck: Normal range of motion  No JVD present  No tracheal deviation present  Cardiovascular: Normal rate, regular rhythm and normal heart sounds  No murmur heard  Pulmonary/Chest: Effort normal and breath sounds normal  He has no wheezes  Abdominal: Soft  Bowel sounds are normal  He exhibits distension  There is no tenderness  A hernia is present  Hernia confirmed positive in the ventral area  Musculoskeletal: Normal range of motion  Neurological: He is alert and oriented to person, place, and time  No sensory deficit  He exhibits normal muscle tone  Skin: Skin is warm and dry  Capillary refill takes less than 2 seconds  No rash noted  He is not diaphoretic  No erythema  No pallor  Psychiatric: He has a normal mood and affect  His speech is normal and behavior is normal  Judgment and thought content normal  Cognition and memory are normal    Nursing note and vitals reviewed  ED Medications  Medications   ondansetron (ZOFRAN) injection 4 mg (4 mg Intravenous Given 1/26/20 1451)   sodium chloride 0 9 % infusion (125 mL/hr Intravenous New Bag 1/26/20 1349)   heparin (porcine) subcutaneous injection 5,000 Units (has no administration in time range)       Diagnostic Studies  Results Reviewed     Procedure Component Value Units Date/Time    Platelet count [[id number]]     Lab Status:  No result Specimen:  Blood                  No orders to display         Procedures  Procedures      ED Course  ED Course as of Jan 26 1511   Sun Jan 26, 2020   1305 General surgery contacted, will see patient              Identification of Seniors at Risk      Most Recent Value   (ISAR) Identification of Seniors at Risk   Before the illness or injury that brought you to the Emergency, did you need someone to help you on a regular basis?   0 Filed at: 01/26/2020 1244   In the last 24 hours, have you needed more help than usual?  0 Filed at: 01/26/2020 1244   Have you been hospitalized for one or more nights during the past 6 months? 0 Filed at: 01/26/2020 1244   In general, do you see well?  --   In general, do you have serious problems with your memory? 0 Filed at: 01/26/2020 1244   Do you take more than three different medications every day? 1 Filed at: 01/26/2020 1244   ISAR Score  1 Filed at: 01/26/2020 1244                          [facility]  Number of Diagnoses or Management Options  Ventral hernia:   Diagnosis management comments: General surgery was consulted on the patient in the ED and admitted to their service        Disposition  Final diagnoses:   Ventral hernia     Time reflects when diagnosis was documented in both MDM as applicable and the Disposition within this note     Time User Action Codes Description Comment    1/26/2020  1:44 PM Mi Cost Add [K43 9] Ventral hernia       ED Disposition     ED Disposition Condition Date/Time Comment    Admit Stable Sun Jan 26, 2020  1:44 PM Case was discussed with Dr Jeffrey Ramsey and the patient's admission status was agreed to be Admission Status: observation status to the service of Dr Jeffrey Ramsey   Follow-up Information    None         Patient's Medications   Discharge Prescriptions    No medications on file     No discharge procedures on file  ED Provider  Attending physically available and evaluated Shay Sifuentes I managed the patient along with the ED Attending      Electronically Signed by         Emigdio Price DO  01/26/20 2805

## 2020-01-26 NOTE — DISCHARGE INSTRUCTIONS
Return immediately to the emergency department if you are unable to eat, drink, have severe nausea or vomiting, unable to have a bowel movement, inability to pass gas  If you have any fevers or chills please also return  If your abdominal pain gets worse return immediately  You should see Dr Tarun Abbott in his office next week to be evaluated for your hernias  You may require surgery

## 2020-01-27 VITALS
RESPIRATION RATE: 20 BRPM | TEMPERATURE: 97.9 F | BODY MASS INDEX: 28.89 KG/M2 | HEIGHT: 72 IN | HEART RATE: 72 BPM | WEIGHT: 213.3 LBS | DIASTOLIC BLOOD PRESSURE: 72 MMHG | SYSTOLIC BLOOD PRESSURE: 102 MMHG | OXYGEN SATURATION: 99 %

## 2020-01-27 PROBLEM — K56.609 SMALL BOWEL OBSTRUCTION (HCC): Status: RESOLVED | Noted: 2020-01-26 | Resolved: 2020-01-27

## 2020-01-27 LAB
ANION GAP SERPL CALCULATED.3IONS-SCNC: 4 MMOL/L (ref 4–13)
BUN SERPL-MCNC: 20 MG/DL (ref 5–25)
CALCIUM SERPL-MCNC: 7.9 MG/DL (ref 8.3–10.1)
CHLORIDE SERPL-SCNC: 114 MMOL/L (ref 100–108)
CO2 SERPL-SCNC: 25 MMOL/L (ref 21–32)
CREAT SERPL-MCNC: 0.97 MG/DL (ref 0.6–1.3)
GFR SERPL CREATININE-BSD FRML MDRD: 76 ML/MIN/1.73SQ M
GLUCOSE P FAST SERPL-MCNC: 119 MG/DL (ref 65–99)
GLUCOSE SERPL-MCNC: 119 MG/DL (ref 65–140)
MAGNESIUM SERPL-MCNC: 2.2 MG/DL (ref 1.6–2.6)
POTASSIUM SERPL-SCNC: 3.9 MMOL/L (ref 3.5–5.3)
SODIUM SERPL-SCNC: 143 MMOL/L (ref 136–145)

## 2020-01-27 PROCEDURE — 83735 ASSAY OF MAGNESIUM: CPT | Performed by: SURGERY

## 2020-01-27 PROCEDURE — NC001 PR NO CHARGE: Performed by: SURGERY

## 2020-01-27 PROCEDURE — 80048 BASIC METABOLIC PNL TOTAL CA: CPT | Performed by: SURGERY

## 2020-01-27 PROCEDURE — 99217 PR OBSERVATION CARE DISCHARGE MANAGEMENT: CPT | Performed by: SURGERY

## 2020-01-27 RX ADMIN — HEPARIN SODIUM 5000 UNITS: 5000 INJECTION INTRAVENOUS; SUBCUTANEOUS at 06:40

## 2020-01-27 NOTE — ASSESSMENT & PLAN NOTE
- Large ventral incisional hernia containing multiple loops of bowel with possible associated partial small bowel obstruction as noted    - Monitor abdominal exam   - Can discuss operative repair on elective basis if patient does not require intervention this hospitalization   - Analgesia as needed

## 2020-01-27 NOTE — PROGRESS NOTES
Pastoral Care Progress Note    2020  Patient: Lucinda Hernandez : 1945  Admission Date & Time: 2020 1244  MRN: [id number] CSN: [id number]                     Chaplaincy Interventions Utilized:   Empowerment: Encouraged self-care    Exploration: Facilitated life review        Relationship Building: Listened empathically    Ritual: Provided prayer            Chaplaincy Outcomes Achieved:  Expressed gratitude and Expressed humor          Spiritual Coping Strategies Utilized:   Spiritual comfort       20 1300   Clinical Encounter Type   Visited With Patient   Routine Visit Introduction   Continue Visiting No   Restorationist Encounters   Restorationist Needs Prayer   Patient Spiritual Encounters   Coping 5   Social Interaction DONAL

## 2020-01-27 NOTE — ASSESSMENT & PLAN NOTE
- Large ventral incisional hernia containing multiple loops of bowel with possible associated partial small bowel obstruction as noted  - Analgesia as needed  - Can discuss operative repair on elective basis if patient does not require intervention this hospitalization

## 2020-01-27 NOTE — H&P
H&P- Ace Reynolds 1945, 75 y o  male MRN: [id number]    Unit/Bed#: [facility] 915-01 Encounter: [id number]    Primary Care Provider: Caro Estes MD   Date and time admitted to hospital: 1/26/2020 12:44 PM        Abdominal pain  Assessment & Plan  - Abdominal pain, likely multifactorial in setting of large incisional ventral hernia as well as suspected partial small bowel obstruction which appears to be resolving   - Continue management of associated diagnoses as noted  - Analgesia as needed  - Monitor abdominal exam     * Small bowel obstruction ([address] )  Assessment & Plan  - Suspected partial small-bowel obstruction based on history and CT scan findings from earlier today  As patient has had improvement in his symptoms as well as bowel function in the form of flatus and bowel movements, suspect his obstruction is resolving   - Keep NPO except for ice chips and sips of liquids overnight   - Continue IV fluids for hydration while NPO   - Continue to monitor abdominal exam and for continued bowel function   - Consider advancing diet tomorrow if continues to do well  - No anticipated operative intervention at this time  Hernia, incisional  Assessment & Plan  - Large ventral incisional hernia containing multiple loops of bowel with possible associated partial small bowel obstruction as noted  - Monitor abdominal exam   - Can discuss operative repair on elective basis if patient does not require intervention this hospitalization   - Analgesia as needed    HTN (hypertension)  Assessment & Plan  - Continue home medication regimen  Acute Care Surgery  History and Physical  Ace Reynolds 75 y o  male MRN: [id number]  Unit/Bed#: [facility] 915-01 Encounter: [id number]      History of Present Illness   HPI:  Ace Reynolds is a 75 y o  male who initially presented to the emergency department complaining of abdominal pain earlier today    After his workup demonstrated findings concerning for partial bowel obstruction, the patient was evaluated by the surgical service and found to have improvement in his presenting symptoms  He underwent a trial of oral diet and developed some nausea, at which point it was recommended he be admitted, but the patient declined and opted to leave from the emergency department  He subsequently developed increasing abdominal pain associated with nausea, diaphoresis, multiple episodes of bilious emesis  Due to his recurrent and worsening symptoms, he returned to the emergency department  At this time, he offers no complaints  He no longer has abdominal pain and has had no further nausea or vomiting  He does state that since his earlier emesis, he has been able to be passing flatus and has had a bowel movement  He denies any fever, chills, chest pain, shortness of breath or difficulty breathing  He relates this episode to an episode he had in July of 2019 when he was admitted for a small-bowel obstruction, but notes he is feeling better at this point  Review of Systems   Constitutional: Positive for diaphoresis (Patient experienced some diaphoresis with episode of emesis earlier today, now resolved  )  Negative for activity change, appetite change, chills, fatigue, fever and unexpected weight change  HENT: Negative for congestion and sore throat  Eyes: Negative  Respiratory: Negative  Negative for cough, chest tightness, shortness of breath and wheezing  Cardiovascular: Negative  Negative for chest pain and leg swelling  Gastrointestinal: Positive for abdominal distention, abdominal pain, nausea and vomiting  Negative for constipation and diarrhea  Endocrine: Negative  Genitourinary: Negative  Negative for difficulty urinating, dysuria, flank pain, frequency and hematuria  Musculoskeletal: Negative  Negative for arthralgias and back pain  Skin: Negative  Negative for color change, pallor, rash and wound  Allergic/Immunologic: Negative  Neurological: Negative  Negative for dizziness, syncope, weakness, light-headedness and headaches  Hematological: Negative  Psychiatric/Behavioral: Negative  Negative for agitation and confusion  Historical Information   Past Medical History:   Diagnosis Date    Cardiac disease     [facility]congestive [facility] failure) [facility] [address] )     Hernia of abdominal cavity     Myocardial infarction ([facility] [address] )     last assessed 14, past, Pt had MI s/p AGUSTIN placed in , refuses to take any other medications for his cardiac disease, only will take seizure med   Understands possible complications from this decision    Seizure [facility])      Past Surgical History:   Procedure Laterality Date    ABDOMINAL AORTIC ANEURYSM REPAIR      for dialation or occulsion    CATARACT EXTRACTION       Social History   Social History     Substance and Sexual Activity   Alcohol Use No    Frequency: Never    Binge frequency: Never     Social History     Substance and Sexual Activity   Drug Use Never     Social History     Tobacco Use   Smoking Status Former Smoker    Last attempt to quit: 2005    Years since quittin 6   Smokeless Tobacco Never Used     Family History: non-contributory    Meds/Allergies   all medications and allergies reviewed  Allergies   Allergen Reactions    Iodinated Diagnostic Agents Rash     Pt's skin get very red and he gets hot and chills    Clopidogrel        Objective   First Vitals:   Blood Pressure: 120/60 (20 1242)  Pulse: 82 (20 1242)  Temperature: 98 °F (36 7 °C) (20 1242)  Temp Source: Oral (20 1242)  Respirations: 18 (20 1242)  Height: 6' (182 9 cm) (20 1602)  Weight - Scale: 96 8 kg (213 lb 4 8 oz) (20 1602)  SpO2: 94 % (20 1242)    Current Vitals:   Blood Pressure: 133/83 (20 1602)  Pulse: 83 (20 1602)  Temperature: 98 6 °F (37 °C) (20 1602)  Temp Source: Oral (20 1242)  Respirations: 18 (20 1602)  Height: 6' (182 9 cm) (20 1602)  Weight - Scale: 96 8 kg (213 lb 4 8 oz) (01/26/20 1602)  SpO2: 94 % (01/26/20 1602)      Intake/Output Summary (Last 24 hours) at 1/26/2020 2020  Last data filed at 1/26/2020 1900  Gross per 24 hour   Intake 0 ml   Output 0 ml   Net 0 ml       Invasive Devices     Peripheral Intravenous Line            Peripheral IV Left Forearm -- days                Physical Exam   Constitutional: He is oriented to person, place, and time  He appears well-developed and well-nourished  He is active  No distress  HENT:   Head: Normocephalic and atraumatic  Right Ear: External ear normal    Left Ear: External ear normal    Mouth/Throat: Oropharynx is clear and moist    Eyes: Pupils are equal, round, and reactive to light  EOM are normal    Neck: Normal range of motion  Neck supple  No tracheal deviation present  Cardiovascular: Normal rate, regular rhythm, normal heart sounds and intact distal pulses  Exam reveals no gallop and no friction rub  No murmur heard  Pulses:       Carotid pulses are 2+ on the right side, and 2+ on the left side  Radial pulses are 2+ on the right side, and 2+ on the left side  Femoral pulses are 2+ on the right side, and 2+ on the left side  Dorsalis pedis pulses are 2+ on the right side, and 2+ on the left side  Pulmonary/Chest: Effort normal and breath sounds normal  No accessory muscle usage or stridor  No tachypnea and no bradypnea  No respiratory distress  He has no decreased breath sounds  He has no wheezes  He has no rhonchi  He has no rales  Abdominal: Soft  Bowel sounds are normal  He exhibits distension  He exhibits no mass  There is no tenderness  There is no rebound and no guarding  A hernia (Large ventral incisional hernia without tenderness or overlying skin changes) is present  Musculoskeletal: He exhibits no edema  Neurological: He is alert and oriented to person, place, and time  GCS eye subscore is 4  GCS verbal subscore is 5  GCS motor subscore is 6     Skin: Skin is warm and dry  No rash noted  He is not diaphoretic  No erythema  No pallor  Psychiatric: He has a normal mood and affect  Nursing note and vitals reviewed  Lab Results:   I have personally reviewed pertinent lab results  , CBC:   Lab Results   Component Value Date    WBC 7 71 01/26/2020    HGB 14 0 01/26/2020    HCT 42 9 01/26/2020    MCV 98 01/26/2020     01/26/2020    MCH 31 9 01/26/2020    MCHC 32 6 01/26/2020    RDW 16 9 (H) 01/26/2020    MPV 11 5 01/26/2020    NRBC 0 01/26/2020   , CMP:   Lab Results   Component Value Date    SODIUM 141 01/26/2020    K 4 5 01/26/2020     (H) 01/26/2020    CO2 25 01/26/2020    BUN 11 01/26/2020    CREATININE 0 92 01/26/2020    CALCIUM 8 9 01/26/2020    AST 7 01/26/2020    ALT 15 01/26/2020    ALKPHOS 164 (H) 01/26/2020    EGFR 81 01/26/2020   , Coagulation: No results found for: PT, INR, APTT, Urinalysis: No results found for: COLORU, CLARITYU, SPECGRAV, PHUR, LEUKOCYTESUR, NITRITE, PROTEINUA, GLUCOSEU, KETONESU, BILIRUBINUR, BLOODU, Lipase: No results found for: LIPASE  Imaging: I have personally reviewed pertinent reports  EKG, Pathology, and Other Studies: I have personally reviewed pertinent reports  Code Status: Level 1 - Full Code  Advance Directive and Living Will:      Power of :    POLST:      Counseling / Coordination of Care  Total floor / unit time spent today 43 minutes  This involved direct patient contact where I performed a full history and physical, reviewed previous records, and reviewed laboratory and other diagnostic studies  Greater than 50% of total time was spent with the patient and / or family counseling and / or coordination of care      Lesly Marie PA-C  1/26/2020 07:20 PM

## 2020-01-27 NOTE — ASSESSMENT & PLAN NOTE
- Abdominal pain, likely multifactorial in setting of large incisional ventral hernia as well as suspected partial small bowel obstruction which has resolved  - Continue diet as tolerated  - Continue management of associated diagnoses as noted  - Analgesia as needed  - Outpatient follow-up with primary care provider

## 2020-01-27 NOTE — ASSESSMENT & PLAN NOTE
- Suspected partial small-bowel obstruction based on history and CT scan findings from earlier today  Suspected obstruction now resolved with toleration of oral diet and return of normal bowel function   - Continue diet as tolerated  - No anticipated operative intervention at this time  May follow up in the [address] [place] [address] the patient wishes to further discuss elective ventral incisional hernia repair

## 2020-01-27 NOTE — PLAN OF CARE
Problem: SAFETY ADULT  Goal: Patient will remain free of falls  Description  INTERVENTIONS:  - Assess patient frequently for physical needs  -  Identify cognitive and physical deficits and behaviors that affect risk of falls  -  [place] fall precautions as indicated by assessment   - Educate patient/family on patient safety including physical limitations  - Instruct patient to call for assistance with activity based on assessment  - Modify environment to reduce risk of injury  - Consider OT/PT consult to assist with strengthening/mobility  Outcome: Progressing     Problem: DISCHARGE PLANNING  Goal: Discharge to home or other facility with appropriate resources  Description  INTERVENTIONS:  - Identify barriers to discharge w/patient and caregiver  - Arrange for needed discharge resources and transportation as appropriate  - Identify discharge learning needs (meds, wound care, etc )  - Arrange for interpretive services to assist at discharge as needed  - Refer to Case Management Department for coordinating discharge planning if the patient needs post-hospital services based on physician/advanced practitioner order or complex needs related to functional status, cognitive ability, or social support system  Outcome: Progressing     Problem: Knowledge Deficit  Goal: Patient/family/caregiver demonstrates understanding of disease process, treatment plan, medications, and discharge instructions  Description  Complete learning assessment and assess knowledge base    Interventions:  - Provide teaching at level of understanding  - Provide teaching via preferred learning methods  Outcome: Progressing     Problem: GASTROINTESTINAL - ADULT  Goal: Minimal or absence of nausea and/or vomiting  Description  INTERVENTIONS:  - Administer IV fluids if ordered to ensure adequate hydration  - Maintain NPO status until nausea and vomiting are resolved  - Nasogastric tube if ordered  - Administer ordered antiemetic medications as needed  - Provide nonpharmacologic comfort measures as appropriate  - Advance diet as tolerated, if ordered  - Consider nutrition services referral to assist patient with adequate nutrition and appropriate food choices  Outcome: Progressing  Goal: Maintains or returns to baseline bowel function  Description  INTERVENTIONS:  - Assess bowel function  - Encourage oral fluids to ensure adequate hydration  - Administer IV fluids if ordered to ensure adequate hydration  - Administer ordered medications as needed  - Encourage mobilization and activity  - Consider nutritional services referral to assist patient with adequate nutrition and appropriate food choices  Outcome: Progressing  Goal: Maintains adequate nutritional intake  Description  INTERVENTIONS:  - Monitor percentage of each meal consumed  - Identify factors contributing to decreased intake, treat as appropriate  - Assist with meals as needed  - Monitor I&O, weight, and lab values if indicated  - Obtain nutrition services referral as needed  Outcome: Progressing

## 2020-01-27 NOTE — PROGRESS NOTES
Progress Note - Allan Cummins 1945, 75 y o  male MRN: [id number]    Unit/Bed#: [facility] 915-01 Encounter: [id number]    Primary Care Provider: Christine Olivier MD   Date and time admitted to hospital: 1/26/2020 12:44 PM        Abdominal pain  Assessment & Plan  - Abdominal pain, likely multifactorial in setting of large incisional ventral hernia as well as suspected partial small bowel obstruction which has resolved  - Continue diet as tolerated  - Continue management of associated diagnoses as noted  - Analgesia as needed  - Outpatient follow-up with primary care provider  * Small bowel obstruction (HCC)  Assessment & Plan  - Suspected partial small-bowel obstruction based on history and CT scan findings from earlier today  Suspected obstruction now resolved with toleration of oral diet and return of normal bowel function   - Continue diet as tolerated  - No anticipated operative intervention at this time  May follow up in the [address] [place] [address] the patient wishes to further discuss elective ventral incisional hernia repair  Hernia, incisional  Assessment & Plan  - Large ventral incisional hernia containing multiple loops of bowel with possible associated partial small bowel obstruction as noted  - Analgesia as needed  - Can discuss operative repair on elective basis if patient does not require intervention this hospitalization  Acute Care Surgery  Progress Note   Allan Cummins 75 y o  male MRN: [id number]  Unit/Bed#: [facility] 915-01 Encounter: [id number]    Nurse-patient-provider rounds completed today with the patient's nurseJake  Subjective/Objective     Subjective:  Patient is feeling much better today  He has no further abdominal pain, nausea and has not vomited any further  He has tolerated some sips of liquids and has had return of bowel function including both flatus and multiple bowel movements  His appetite is also returned      Meds/Allergies   Medications Prior to Admission   Medication Sig Dispense Refill Last Dose    aspirin 81 MG tablet Take 1 tablet (81 mg total) by mouth daily 90 tablet 3 2020 at Unknown time    clotrimazole (LOTRIMIN) 1 % cream Apply to feet/toes 2 times daily 15 g 0 2020 at Unknown time    PHENobarbital 64 8 mg tablet Take 1 tablet (64 8 mg total) by mouth 2 (two) times a day 60 tablet 5 2020 at Unknown time    nitroglycerin (NITROSTAT) 0 4 mg SL tablet Place 1 tablet (0 4 mg total) under the tongue every 5 (five) minutes as needed for chest pain 30 tablet 1 More than a month at Unknown time    pravastatin (PRAVACHOL) 40 mg tablet Take 1 tablet (40 mg total) by mouth daily (Patient not taking: Reported on 2020) 90 tablet 3 Not Taking at Unknown time       Current Facility-Administered Medications:     heparin (porcine) subcutaneous injection 5,000 Units, 5,000 Units, Subcutaneous, Q8H [facility], 5,000 Units at 20 0640 **AND** Platelet count, , , Once, [facility] Tae Marin    ondansetron [facility]) injection 4 mg, 4 mg, Intravenous, Q6H PRN, Coni Jarrett, 4 mg at 20 1451    sodium chloride 0 9 % infusion, 125 mL/hr, Intravenous, Continuous, Coni Jarrett, Stopped at 20 1140    Objective:     Vitals:Blood pressure 103/78, pulse 70, temperature 98 7 °F (37 1 °C), resp  rate 16, height 6' (1 829 m), weight 96 8 kg (213 lb 4 8 oz), SpO2 90 %  ,Body mass index is 28 93 kg/m²  SpO2: SpO2: 90 %    Temp (24hrs), Av °F (37 2 °C), Min:98 6 °F (37 °C), Max:99 8 °F (37 7 °C)  Current: Temperature: 98 7 °F (37 1 °C)      Intake/Output Summary (Last 24 hours) at 2020 1421  Last data filed at 2020 1140  Gross per 24 hour   Intake 2731 25 ml   Output 0 ml   Net 2731 25 ml       Invasive Devices     Peripheral Intravenous Line            Peripheral IV Left Forearm -- days                Nutrition/GI Proph/Bowel Reg:  Regular diet    Physical Exam:     GENERAL APPEARANCE: Patient in no acute distress    HEENT: NCAT; PERRL, EOMs intact; Mucous membranes moist  CV: Regular rate and rhythm; + S1, S2; no murmur/gallops/rubs appreciated  LUNGS: Clear to auscultation; no wheezes/rales/rhonci  ABD: NABS; soft; non-distended; non-tender  Large, nontender ventral abdominal hernia  EXT: +2 pulses bilaterally upper & lower extremities; no clubbing/cyanosis/edema  NEURO: GCS 15; no focal neurologic deficits; neurovascularly intact  SKIN: Warm, dry and well perfused; no rash; no jaundice  Lab Results:   Results: I have personally reviewed pertinent reports   , BMP/CMP:   Lab Results   Component Value Date    SODIUM 143 01/27/2020    K 3 9 01/27/2020     (H) 01/27/2020    CO2 25 01/27/2020    BUN 20 01/27/2020    CREATININE 0 97 01/27/2020    CALCIUM 7 9 (L) 01/27/2020    EGFR 76 01/27/2020    and CBC: No results found for: WBC, HGB, HCT, MCV, PLT, ADJUSTEDWBC, MCH, MCHC, RDW, MPV, NRBC  Imaging/EKG Studies: Results: I have personally reviewed pertinent reports     and I have personally reviewed pertinent films in PACS  Other Studies: N/A  VTE Prophylaxis: Sequential compression device (Venodyne)  and Heparin    Zechairah Valentine PA-C  1/27/2020 10:41 AM

## 2020-01-27 NOTE — ASSESSMENT & PLAN NOTE
- Suspected partial small-bowel obstruction based on history and CT scan findings from earlier today  Suspected obstruction now resolved with toleration of oral diet and return of normal bowel function   - Continue diet as tolerated  - No anticipated operative intervention at this time  May follow up in the [address] [place] [address] the patient wishes to further discuss elective ventral incisional hernia repair

## 2020-01-27 NOTE — UTILIZATION REVIEW
Initial Clinical Review    Admission: Date/Time/Statement:  OBSERVATION 1/26/20 @ 1310    Orders Placed This Encounter   Procedures    Place in Observation     Standing Status:   Standing     Number of Occurrences:   1     Order Specific Question:   Admitting Physician     Answer:   Seble Paniagua [id number]     Order Specific Question:   Level of Care     Answer:   Med Surg [16]     ED Arrival Information     Expected Arrival Acuity Means of Arrival Escorted By Service Admission Type    - 1/26/2020 12:33 Urgent Walk-In Family Member Surgery-General Urgent    Arrival Complaint    abdominal pain- ruptured hernia        Chief Complaint   Patient presents with    Abdominal Pain     pt seen yesteryday for abdominal pain from hernia  Pt left early in am, returned in afternoon for nausea/vomiting, and abdominal pain due to hernia  Assessment/Plan:   Mr Francisco Bañuelos is a 76 yo  Male who presents to the ED from home for a 2nd time on 1/26 c/o worsening abd pain with nausea, diaphoresis, bilious emesis after an earlier presentation to the ED with known partial bowel obstruction found on CT during initial ED visit  After the emesis he was able to pass flatus and have a BM  He is admitted to OBSERVATION status with SBO - NPO with IV fluids and sips, serial abd exams, no operative intervention at this time  Abd pain - analgesia PRN  Large incisional hernia containing multiple loops of bowel with poss associated partial SBO - poss surgical intervention as an OP  HTN - home meds        ED Triage Vitals   Temperature Pulse Respirations Blood Pressure SpO2   01/26/20 1242 01/26/20 1242 01/26/20 1242 01/26/20 1242 01/26/20 1242   98 °F (36 7 °C) 82 18 120/60 94 %      Temp Source Heart Rate Source Patient Position - Orthostatic VS BP Location FiO2 (%)   01/26/20 1242 01/26/20 1300 -- -- --   Oral Monitor         Pain Score       01/26/20 1242       7        Wt Readings from Last 1 Encounters:   01/26/20 96 8 kg (213 lb 4 8 oz) Additional Vital Signs:   01/27/20 06:59:55  98 7 °F (37 1 °C)  70  16  103/78  86  90 %  --   01/26/20 21:43:22  99 8 °F (37 7 °C)  99  20  121/83  96  90 %  --   01/26/20 16:02:14  98 6 °F (37 °C)  83  18  133/83  100  94 %  --   01/26/20 1530  --  85  18  113/61  --  94 %  --   01/26/20 1500  --  83  18  112/55  --  97 %  --   01/26/20 1430  --  83  18  137/73  --  98 %  --   01/26/20 1400  --  89  18  119/61  --  96 %  --   01/26/20 1330  --  95  18  114/61  --  93 %  --   01/26/20 1300  --  87  18  140/69  --  96 %  --   01/26/20 1255  --  --  --  --  --  --  None (Room air)     Pertinent Labs/Diagnostic Test Results:     1/26 CT abd, pelvis -  Reidentified multiple supraumbilical and infraumbilical ventral hernias containing several loops of prominent to mildly dilated small bowel and nondilated colon also described on a CT abdomen/pelvis dated July 17, 2019  Within a supraumbilical hernia just to the right of midline, the entering small bowel loop is mildly dilated and the exiting distal small bowel loop is relatively collapsed raising the suspicion for small bowel obstruction  Surgical consultation is recommended  A repeat CT scan of the abdomen and pelvis after the administration of oral contrast could be performed for further evaluation  No free air or free fluid      Results from last 7 days   Lab Units 01/26/20  0032   WBC Thousand/uL 7 71   HEMOGLOBIN g/dL 14 0   HEMATOCRIT % 42 9   PLATELETS Thousands/uL 160   NEUTROS ABS Thousands/µL 6 18     Results from last 7 days   Lab Units 01/27/20  0507 01/26/20  0032   SODIUM mmol/L 143 141   POTASSIUM mmol/L 3 9 4 5   CHLORIDE mmol/L 114* 111*   CO2 mmol/L 25 25   ANION GAP mmol/L 4 5   BUN mg/dL 20 11   CREATININE mg/dL 0 97 0 92   EGFR ml/min/1 73sq m 76 81   CALCIUM mg/dL 7 9* 8 9   MAGNESIUM mg/dL 2 2  --      Results from last 7 days   Lab Units 01/26/20  0032   AST U/L 7   ALT U/L 15   ALK PHOS U/L 164*   TOTAL PROTEIN g/dL 8 0   ALBUMIN g/dL 3 6 TOTAL BILIRUBIN mg/dL 0 30     Results from last 7 days   Lab Units 01/27/20  0507 01/26/20  0032   GLUCOSE RANDOM mg/dL 119 116     Results from last 7 days   Lab Units 01/26/20  0032   LACTIC ACID mmol/L 1 9     ED Treatment:   Medication Administration from 01/26/2020 1233 to 01/26/2020 1554    Date/Time Order Dose Route Action   01/26/2020 1451 ondansetron (ZOFRAN) injection 4 mg 4 mg Intravenous Given   01/26/2020 1349 sodium chloride 0 9 % infusion 125 mL/hr Intravenous New Bag        Past Medical History:   Diagnosis Date    Cardiac disease     CHF (congestive heart failure) ([address] )     Hernia of abdominal cavity     Myocardial infarction ([address] )     last assessed 7/31/14, past, Pt had MI s/p AGUSTIN placed in 2001, refuses to take any other medications for his cardiac disease, only will take seizure med  Understands possible complications from this decision    Seizure [facility])      Present on Admission:   Small bowel obstruction ([address] )   Abdominal pain   HTN (hypertension)   Hernia, incisional    Admitting Diagnosis: Abdominal pain [R10 9]  Ventral hernia [K43 9]     Age/Sex: 75 y o  male     Admission Orders:  Scheduled Medications:    Medications:  heparin (porcine) 5,000 Units Subcutaneous Q8H [address]     Continuous IV Infusions:    sodium chloride 125 mL/hr Intravenous Continuous     PRN Meds:    ondansetron 4 mg Intravenous Q6H PRN x1 1/26     SCDs  Diet NPO w/ sips  IP CONSULT TO CASE MANAGEMENT    Network Utilization Review Department  [email or web address]  ATTENTION: Please call with any questions or concerns to [phone] and carefully listen to the prompts so that you are directed to the right person  All voicemails are confidential   [employer] all requests for admission clinical reviews, approved or denied determinations and any other requests to dedicated fax number below belonging to the campus where the patient is receiving treatment   List of dedicated fax numbers for the Facilities:  FACILITY NAME UR FAX NUMBER   ADMISSION DENIALS (Administrative/Medical Necessity) [phone]   PARENT CHILD HEALTH (Maternity/NICU/Pediatrics) [phone]   [facility] [phone]     [address] [phone]   [address] [place] [address] [phone]   [address] [facility] [address] [place] [address] [phone]   Latisha Chau [phone]   [address] [facility]  [address] [facility] [address] [facility] [phone]

## 2020-01-27 NOTE — SOCIAL WORK
Met with pt and explained CM program/CM role  Resides with brother in an apartment, 0 SEEMA< main floor set up  Independent prior to admission for ADL's/ambulation  He does not drive  PCP Dr Fatuma Tran he will receive call from PCP office to schedule appt within 3 business days of disch  Has prescription plan, uses [facility] [facility]  Denies utilization DME/HHC  IP Rehab- [facility]  Denies mental health illness, IP or OP psyche care, drug and/or alcohol abuse  Primary contact is brother Noé Constantine, [phone]  No POA/Living Will  Brother will provide transport home    CM reviewed d/c planning process including the following: identifying help at home, patient preference for d/c planning needs, Discharge Lounge, [facility] Meds to Bed program, availability of treatment team to discuss questions or concerns patient and/or family may have regarding understanding medications and recognizing signs and symptoms once discharged  CM also encouraged patient to follow up with all recommended appointments after discharge  Patient advised of importance for patient and family to participate in managing patients medical well being  Patient/caregiver received discharge checklist  Content reviewed  Patient/caregiver encouraged to participate in discharge plan of care prior to discharge home

## 2020-01-27 NOTE — DISCHARGE SUMMARY
Discharge- Keya Gomez 1945, 75 y o  male MRN: [id number]    [facility] Encounter: [id number]    Primary Care Provider: Leonardo Yuen MD   Date and time admitted to hospital: 1/26/2020 12:44 PM        Abdominal pain  Assessment & Plan  - Abdominal pain, likely multifactorial in setting of large incisional ventral hernia as well as suspected partial small bowel obstruction which has resolved  - Continue diet as tolerated  - Continue management of associated diagnoses as noted  - Analgesia as needed  - Outpatient follow-up with primary care provider  * Small bowel obstruction (HCC)-resolved as of 1/27/2020  Assessment & Plan  - Suspected partial small-bowel obstruction based on history and CT scan findings from earlier today  Suspected obstruction now resolved with toleration of oral diet and return of normal bowel function   - Continue diet as tolerated  - No anticipated operative intervention at this time  May follow up in the [address] [place] [address] the patient wishes to further discuss elective ventral incisional hernia repair  Hernia, incisional  Assessment & Plan  - Large ventral incisional hernia containing multiple loops of bowel with possible associated partial small bowel obstruction as noted  - Analgesia as needed  - Can discuss operative repair on elective basis if patient does not require intervention this hospitalization  Discharge Summary - General Surgery   Keya Gomez 75 y o  male MRN: [id number]  [facility] Encounter: [id number]    Admission Date: 1/26/2020     Discharge Date: 1/27/2020    Admitting Diagnosis: Abdominal pain [R10 9]  Ventral hernia [K43 9]    Discharge Diagnosis: See above  Attending and Service: Dr Amrit Hu, Acute Care Surgical Services  Consulting Physician(s): None      Imaging and Procedures Performed:     Ct Abdomen Pelvis Wo Contrast    Result Date: 1/26/2020  Impression: Reidentified multiple supraumbilical and infraumbilical ventral hernias containing several loops of prominent to mildly dilated small bowel and nondilated colon also described on a CT abdomen/pelvis dated July 17, 2019  Within a supraumbilical hernia just to the right of midline, the entering small bowel loop is mildly dilated and the exiting distal small bowel loop is relatively collapsed raising the suspicion for small bowel obstruction  Surgical consultation is recommended  A repeat CT scan of the abdomen and pelvis after the administration of oral contrast could be performed for further evaluation  No free air or free fluid  I personally discussed this study with Paris Wilson on 1/26/2020 at 1:42 AM  Workstation performed: [id number]     Hospital Course: Yamileth Meek is a 39-year-old male who presented with recurrent abdominal pain associated with multiple episodes of nausea and emesis  He was noted to have a large ventral incisional hernia containing multiple loops of small bowel and suspected partial small bowel obstruction  By the time of his presentation, he continued to have some abdominal pain, it but his nausea and vomiting was resolved and he did have 1 bowel movement  He is admitted to the acute care surgery service with abdominal pain and suspected partial small bowel obstruction in setting of large ventral incisional hernia  He was treated conservatively with IV fluids and bowel rest, but did not require NG tube insertion  By 01/27/2020, his abdominal pain completely resolved, his appetite returned, he had no further nausea or vomiting, any did have return of bowel function including both flatus and multiple BMs  He was able to tolerate a regular diet and at this time is deemed appropriate for discharge home  Elective operative intervention for his large ventral incisional hernia was discussed, and he will consider it      On discharge, the patient is instructed to follow-up with the patient's primary care provider within the next 2 weeks to review the events of the recent hospitalization  The patient is instructed to follow-up with the Acute Care Surgical Services as needed to discuss elective incisional hernia repair  The patient is instructed to follow the provided discharge instructions  Condition at Discharge: good     Discharge instructions/Information to patient and family:   See after visit summary for information provided to patient and family  Provisions for Follow-Up Care:  See after visit summary for information related to follow-up care and any pertinent home health orders  Disposition: See After Visit Summary for discharge disposition information  Planned Readmission: No, unless he changes his mind on considering elective incisional hernia repair  Discharge Statement   I spent 21 minutes discharging the patient  This time was spent on the day of discharge  I had direct contact with the patient on the day of discharge  Additional documentation is required if more than 30 minutes were spent on discharge  Discharge Medications:  See after visit summary for reconciled discharge medications provided to patient and family      Kathy Rosa PA-C  1/27/2020  2:24 PM

## 2020-01-27 NOTE — RESTORATIVE TECHNICIAN NOTE
Restorative Specialist Mobility Note       Activity: Other (Comment)(Attempted to see pt, pt is currently being discharged )          Marisol Farrar BS, Restorative Technician, [employer]

## 2020-01-27 NOTE — ASSESSMENT & PLAN NOTE
- Abdominal pain, likely multifactorial in setting of large incisional ventral hernia as well as suspected partial small bowel obstruction which appears to be resolving   - Continue management of associated diagnoses as noted  - Analgesia as needed    - Monitor abdominal exam

## 2020-01-27 NOTE — PROGRESS NOTES
Pastoral Care Progress Note    2020  Patient: Allan Cummins : 1945  Admission Date & Time: 2020 1244  MRN: [id number] [facility]: [id number]                     Chaplaincy Interventions Utilized:   Empowerment: Encouraged self-care    Exploration: Explored hope        Relationship Building: Listened empathically    Ritual: Provided prayer            Chaplaincy Outcomes Achieved:   Identified priorities          Spiritual Coping Strategies Utilized:   Connectedness       [address] [place] Involvement Patient active with [facility]   Spiritual Beliefs/Perceptions   Concept of God Accepting   God's Role in Disease God's will   Relationship with God Close   Support Systems Family members   Stress Factors   Patient Stress Factors None identified   Coping Responses   Patient Coping Other (Comment)  (Pt coping very well)

## 2020-01-27 NOTE — ASSESSMENT & PLAN NOTE
- Suspected partial small-bowel obstruction based on history and CT scan findings from earlier today  As patient has had improvement in his symptoms as well as bowel function in the form of flatus and bowel movements, suspect his obstruction is resolving   - Keep NPO except for ice chips and sips of liquids overnight   - Continue IV fluids for hydration while NPO   - Continue to monitor abdominal exam and for continued bowel function   - Consider advancing diet tomorrow if continues to do well  - No anticipated operative intervention at this time

## 2020-01-28 ENCOUNTER — TRANSITIONAL CARE MANAGEMENT (OUTPATIENT)
Dept: INTERNAL MEDICINE CLINIC | Facility: CLINIC | Age: 75
End: 2020-01-28

## 2020-02-27 ENCOUNTER — OFFICE VISIT (OUTPATIENT)
Dept: CARDIOLOGY CLINIC | Facility: CLINIC | Age: 75
End: 2020-02-27
Payer: MEDICARE

## 2020-02-27 VITALS
BODY MASS INDEX: 30.05 KG/M2 | DIASTOLIC BLOOD PRESSURE: 78 MMHG | HEART RATE: 60 BPM | HEIGHT: 72 IN | WEIGHT: 221.9 LBS | SYSTOLIC BLOOD PRESSURE: 138 MMHG | OXYGEN SATURATION: 95 %

## 2020-02-27 DIAGNOSIS — Z86.79 S/P AAA (ABDOMINAL AORTIC ANEURYSM) REPAIR: Chronic | ICD-10-CM

## 2020-02-27 DIAGNOSIS — I25.10 CAD (CORONARY ARTERY DISEASE): ICD-10-CM

## 2020-02-27 DIAGNOSIS — I25.10 CORONARY ARTERY DISEASE INVOLVING NATIVE CORONARY ARTERY OF NATIVE HEART WITHOUT ANGINA PECTORIS: Primary | ICD-10-CM

## 2020-02-27 DIAGNOSIS — Z98.890 S/P AAA (ABDOMINAL AORTIC ANEURYSM) REPAIR: Chronic | ICD-10-CM

## 2020-02-27 DIAGNOSIS — E78.2 MIXED HYPERLIPIDEMIA: Chronic | ICD-10-CM

## 2020-02-27 DIAGNOSIS — I10 ESSENTIAL HYPERTENSION: Chronic | ICD-10-CM

## 2020-02-27 PROCEDURE — 3008F BODY MASS INDEX DOCD: CPT | Performed by: INTERNAL MEDICINE

## 2020-02-27 PROCEDURE — 3078F DIAST BP <80 MM HG: CPT | Performed by: INTERNAL MEDICINE

## 2020-02-27 PROCEDURE — 1036F TOBACCO NON-USER: CPT | Performed by: INTERNAL MEDICINE

## 2020-02-27 PROCEDURE — 99214 OFFICE O/P EST MOD 30 MIN: CPT | Performed by: INTERNAL MEDICINE

## 2020-02-27 PROCEDURE — 1160F RVW MEDS BY RX/DR IN RCRD: CPT | Performed by: INTERNAL MEDICINE

## 2020-02-27 PROCEDURE — 3075F SYST BP GE 130 - 139MM HG: CPT | Performed by: INTERNAL MEDICINE

## 2020-02-27 RX ORDER — PRAVASTATIN SODIUM 40 MG
40 TABLET ORAL DAILY
Qty: 90 TABLET | Refills: 3 | Status: SHIPPED | OUTPATIENT
Start: 2020-02-27 | End: 2021-04-07 | Stop reason: HOSPADM

## 2020-02-27 NOTE — PROGRESS NOTES
Cardiology Follow Up    Thierry Owen  1945  [id number]  [facility] [address] [facility] [address] [phone]    3  Coronary artery disease involving native coronary artery of native heart without angina pectoris     2  Essential hypertension     3  Mixed hyperlipidemia     4  S/P AAA (abdominal aortic aneurysm) repair         Interval History:  No cardiovascular complaints  No chest pain with exertion  Denies chest pain shortness of breath orthopnea paroxysmal nocturnal dyspnea or syncope  Patient Active Problem List   Diagnosis    CAD (coronary artery disease)    HTN (hypertension)    HLD (hyperlipidemia)    Seizure disorder (HCC)    S/P AAA (abdominal aortic aneurysm) repair    Ventral hernia with obstruction and without gangrene    Hernia, incisional    Abdominal pain     Past Medical History:   Diagnosis Date    Cardiac disease     [facility]est[facility] failure) [facility] [address] [facility]     Hernia of abdominal cavity     Myocardial infarction ([facility] [address] )     last assessed 14, past, Pt had MI s/p AGUSTIN placed in , refuses to take any other medications for his cardiac disease, only will take seizure med   Understands possible complications from this decision    Seizure [facility])      Social History     Socioeconomic History    Marital status: Single     Spouse name: Not on file    Number of children: Not on file    Years of education: Not on file    Highest education level: Not on file   Occupational History    Not on file   Social Needs    Financial resource strain: Not on file    Food insecurity:     Worry: Not on file     Inability: Not on file    Transportation needs:     Medical: Not on file     Non-medical: Not on file   Tobacco Use    Smoking status: Former Smoker     Last attempt to quit: 2005     Years since quittin 7    Smokeless tobacco: Never Used   Substance and Sexual Activity    Alcohol use: Not Currently     Frequency: Never     Binge frequency: Never    Drug use: Never    Sexual activity: Never   Lifestyle    Physical activity:     Days per week: Not on file     Minutes per session: Not on file    Stress: Not on file   Relationships    Social connections:     Talks on phone: Not on file     Gets together: Not on file     Attends Jainism service: Not on file     Active member of club or organization: Not on file     Attends meetings of clubs or organizations: Not on file     Relationship status: Not on file    Intimate partner violence:     Fear of current or ex partner: Not on file     Emotionally abused: Not on file     Physically abused: Not on file     Forced sexual activity: Not on file   Other Topics Concern    Not on file   Social History Narrative    Not on file      Family History   Problem Relation Age of Onset    Hypertension Father     Kidney disease Brother      Past Surgical History:   Procedure Laterality Date    ABDOMINAL AORTIC ANEURYSM REPAIR      for dialation or occulsion    CATARACT EXTRACTION         Current Outpatient Medications:     aspirin 81 MG tablet, Take 1 tablet (81 mg total) by mouth daily, Disp: 90 tablet, Rfl: 3    clotrimazole (LOTRIMIN) 1 % cream, Apply to feet/toes 2 times daily, Disp: 15 g, Rfl: 0    nitroglycerin (NITROSTAT) 0 4 mg SL tablet, Place 1 tablet (0 4 mg total) under the tongue every 5 (five) minutes as needed for chest pain, Disp: 30 tablet, Rfl: 1    PHENobarbital 64 8 mg tablet, Take 1 tablet (64 8 mg total) by mouth 2 (two) times a day, Disp: 60 tablet, Rfl: 5    pravastatin (PRAVACHOL) 40 mg tablet, Take 1 tablet (40 mg total) by mouth daily (Patient not taking: Reported on 1/26/2020), Disp: 90 tablet, Rfl: 3  Allergies   Allergen Reactions    Iodinated Diagnostic Agents Rash     Pt's skin get very red and he gets hot and chills    Clopidogrel        Labs:  Admission on 01/26/2020, Discharged on 01/27/2020   Component Date Value    Sodium 01/27/2020 143     Potassium 01/27/2020 3 9     Chloride 01/27/2020 114*    CO2 01/27/2020 25     ANION GAP 01/27/2020 4     BUN 01/27/2020 20     Creatinine 01/27/2020 0 97     Glucose 01/27/2020 119     Glucose, Fasting 01/27/2020 119*    Calcium 01/27/2020 7 9*    eGFR 01/27/2020 76     Magnesium 01/27/2020 2 2    Admission on 01/26/2020, Discharged on 01/26/2020   Component Date Value    WBC 01/26/2020 7 71     RBC 01/26/2020 4 39     Hemoglobin 01/26/2020 14 0     Hematocrit 01/26/2020 42 9     MCV 01/26/2020 98     MCH 01/26/2020 31 9     MCHC 01/26/2020 32 6     RDW 01/26/2020 16 9*    MPV 01/26/2020 11 5     Platelets 65/39/0930 160     nRBC 01/26/2020 0     Neutrophils Relative 01/26/2020 81*    Immat GRANS % 01/26/2020 0     Lymphocytes Relative 01/26/2020 11*    Monocytes Relative 01/26/2020 7     Eosinophils Relative 01/26/2020 1     Basophils Relative 01/26/2020 0     Neutrophils Absolute 01/26/2020 6 18     Immature Grans Absolute 01/26/2020 0 03     Lymphocytes Absolute 01/26/2020 0 85     Monocytes Absolute 01/26/2020 0 53     Eosinophils Absolute 01/26/2020 0 09     Basophils Absolute 01/26/2020 0 03     Sodium 01/26/2020 141     Potassium 01/26/2020 4 5     Chloride 01/26/2020 111*    CO2 01/26/2020 25     ANION GAP 01/26/2020 5     BUN 01/26/2020 11     Creatinine 01/26/2020 0 92     Glucose 01/26/2020 116     Calcium 01/26/2020 8 9     AST 01/26/2020 7     ALT 01/26/2020 15     Alkaline Phosphatase 01/26/2020 164*    Total Protein 01/26/2020 8 0     Albumin 01/26/2020 3 6     Total Bilirubin 01/26/2020 0 30     eGFR 01/26/2020 81     LACTIC ACID 01/26/2020 1 9      Imaging: No results found  Review of Systems:  Review of Systems   Constitutional: Negative for fatigue  HENT: Negative for nosebleeds  Eyes: Negative for redness  Respiratory: Negative for chest tightness and shortness of breath  Cardiovascular: Negative for chest pain, palpitations and leg swelling  Gastrointestinal: Negative for abdominal pain  Endocrine: Negative for polyuria  Genitourinary: Negative for urgency  Musculoskeletal: Negative for arthralgias  Skin: Negative for rash  Neurological: Negative for dizziness and syncope  Psychiatric/Behavioral: Negative for confusion and sleep disturbance  The patient is not nervous/anxious  Physical Exam:  Physical Exam   Constitutional: He is oriented to person, place, and time  He appears well-developed and well-nourished  HENT:   Head: Normocephalic and atraumatic  Nose: Nose normal    Mouth/Throat: Oropharynx is clear and moist    Eyes: Pupils are equal, round, and reactive to light  Neck: Neck supple  Cardiovascular: Normal rate, regular rhythm, normal heart sounds and intact distal pulses  Pulmonary/Chest: Effort normal and breath sounds normal    Abdominal: Soft  Bowel sounds are normal  A hernia is present  Hernia confirmed positive in the ventral area  Musculoskeletal: Normal range of motion  Neurological: He is alert and oriented to person, place, and time  Skin: Skin is warm and dry  Psychiatric: He has a normal mood and affect  His behavior is normal  Judgment and thought content normal        Discussion/Summary:  Asymptomatic in functional class 1  Stenting of the right coronary artery in 2017 in the setting of an acute inferior wall MI  He takes aspirin daily he has normal blood pressure  He is not on a statin at present he has had diarrhea with Lipitor in the past he was previously on Crestor but has stopped taking that  He cannot tell me why  He has pravastatin on his medication sheet but he told me he is not taking that  He has been tolerant of that in the past   I will start him on 40 milligrams daily  Check a fasting lipid profile in 3 months  I will see him again in 6 months

## 2020-03-04 DIAGNOSIS — G40.909 SEIZURE DISORDER (HCC): Chronic | ICD-10-CM

## 2020-03-04 RX ORDER — PHENOBARBITAL 64.8 MG/1
64.8 TABLET ORAL 2 TIMES DAILY
Qty: 60 TABLET | Refills: 5 | Status: SHIPPED | OUTPATIENT
Start: 2020-03-04 | End: 2020-03-04 | Stop reason: SDUPTHER

## 2020-03-04 RX ORDER — PHENOBARBITAL 64.8 MG/1
64.8 TABLET ORAL 2 TIMES DAILY
Qty: 60 TABLET | Refills: 5 | Status: SHIPPED | OUTPATIENT
Start: 2020-03-04 | End: 2020-09-02

## 2020-03-04 NOTE — TELEPHONE ENCOUNTER
Name of medication, dose, quantity and frequency:    Requested Prescriptions     Pending Prescriptions Disp Refills    PHENobarbital 64 8 mg tablet 60 tablet 5     Sig: Take 1 tablet (64 8 mg total) by mouth 2 (two) times a day       Number of refills left:  0    Amount of medication left:    Pharmacy verified and updated:  yes    Additional information:      Patient called in requesting refills  Dr Clark is not receiving tasks

## 2020-07-21 ENCOUNTER — OFFICE VISIT (OUTPATIENT)
Dept: INTERNAL MEDICINE CLINIC | Facility: CLINIC | Age: 75
End: 2020-07-21

## 2020-07-21 VITALS
WEIGHT: 220.9 LBS | BODY MASS INDEX: 29.96 KG/M2 | HEART RATE: 91 BPM | DIASTOLIC BLOOD PRESSURE: 60 MMHG | TEMPERATURE: 98 F | OXYGEN SATURATION: 95 % | SYSTOLIC BLOOD PRESSURE: 160 MMHG

## 2020-07-21 DIAGNOSIS — I25.10 CORONARY ARTERY DISEASE INVOLVING NATIVE CORONARY ARTERY OF NATIVE HEART WITHOUT ANGINA PECTORIS: Primary | ICD-10-CM

## 2020-07-21 PROCEDURE — 1036F TOBACCO NON-USER: CPT | Performed by: INTERNAL MEDICINE

## 2020-07-21 PROCEDURE — 99213 OFFICE O/P EST LOW 20 MIN: CPT | Performed by: INTERNAL MEDICINE

## 2020-07-21 PROCEDURE — 3077F SYST BP >= 140 MM HG: CPT | Performed by: INTERNAL MEDICINE

## 2020-07-21 PROCEDURE — 1160F RVW MEDS BY RX/DR IN RCRD: CPT | Performed by: INTERNAL MEDICINE

## 2020-07-21 PROCEDURE — 3078F DIAST BP <80 MM HG: CPT | Performed by: INTERNAL MEDICINE

## 2020-07-21 NOTE — PROGRESS NOTES
Assessment/Plan:    Coronary artery disease involving native coronary artery of native heart without angina pectoris         -     stable without any chest pain or shortness of breath  Patient does refer noncompliance with pravastatin as he had has diarrhea  Patient would like to discuss this with Dr Heather Mooney at next cardiology appt    Patient states that he is not interested in ventral hernia repair at this time  Discussion was held on risks and benefits of having ventral hernia repair at this point  Patient also refuses colonoscopy as he feels this is very invasive  Risks and benefits of colonoscopy were also discussed  Patient also is refusing Prevnar at this time however patient is refusing this at this point as well  Risk benefits of Prevnar were discussed  Patient's blood pressure was initially elevated 160/60 today in office however repeat blood pressure was 132/70  Patient is out of window for low-dose CT lung cancer screening  Subjective:      Patient ID: Torrie Weeks is a 75 y o  male  21-year-old male with past medical history significant for coronary artery disease status post stenting to the RCA and LAD in 2017 following with Cardiology, hypertension, seizure disorder on phenobarbital, AAA status post repair in 2005, former smoker quit in 2005, hyperlipidemia and small-bowel obstruction in 2019  Patients last seen in clinic on 01/23/2020 for annual wellness visit  HIV and hepatitis-C screens were ordered however not completed  Patient was admitted to the hospital under surgical service from 01/26/2020 through 01/27/2020 for partial small-bowel obstruction  Surgical team at the time recommended outpatient follow-up for consideration of possible elective  ventral hernia repair  Patient comes to clinic today for scheduled follow-up  Patient states he is feeling well  Patient states he has not been taking his pravastatin secondary to diarrhea    Patient denies any constipation or abdominal pain at this time  The following portions of the patient's history were reviewed and updated as appropriate: allergies, current medications, past family history, past medical history, past social history, past surgical history and problem list     Review of Systems   Constitutional: Negative for appetite change, chills, diaphoresis, fatigue, fever and unexpected weight change  HENT: Negative for sore throat  Eyes: Negative for visual disturbance  Respiratory: Negative for cough, chest tightness, shortness of breath and wheezing  Cardiovascular: Negative for chest pain, palpitations and leg swelling  Gastrointestinal: Negative for abdominal distention, abdominal pain, blood in stool, constipation, diarrhea, nausea and vomiting  Genitourinary: Negative for difficulty urinating, flank pain and urgency  Musculoskeletal: Negative for arthralgias and myalgias  Skin: Negative for pallor and rash  Neurological: Negative for dizziness, weakness, light-headedness and headaches  Objective:      /60 (BP Location: Left arm, Patient Position: Sitting, Cuff Size: Adult)   Pulse 91   Temp 98 °F (36 7 °C) (Temporal)   Wt 100 kg (220 lb 14 4 oz)   SpO2 95%   BMI 29 96 kg/m²        Physical Exam   Constitutional: He is oriented to person, place, and time  He appears well-developed and well-nourished  No distress  HENT:   Head: Normocephalic and atraumatic  Mouth/Throat: Oropharynx is clear and moist  No oropharyngeal exudate  Eyes: Pupils are equal, round, and reactive to light  Conjunctivae and EOM are normal  No scleral icterus  Neck: Normal range of motion  Neck supple  Cardiovascular: Normal rate and regular rhythm  No murmur heard  Pulmonary/Chest: Effort normal and breath sounds normal  He has no wheezes  Abdominal: Soft  Bowel sounds are normal  A hernia (Multiple large ventral hernia, reducible, without pain) is present     Musculoskeletal: Normal range of motion  He exhibits no edema or deformity  Neurological: He is alert and oriented to person, place, and time  No cranial nerve deficit  Skin: Skin is warm and dry  No rash noted  He is not diaphoretic  No erythema  Psychiatric: He has a normal mood and affect  His behavior is normal    Vitals reviewed

## 2020-09-01 DIAGNOSIS — G40.909 SEIZURE DISORDER (HCC): Chronic | ICD-10-CM

## 2020-09-01 RX ORDER — PHENOBARBITAL 64.8 MG/1
64.8 TABLET ORAL 2 TIMES DAILY
Qty: 60 TABLET | Refills: 5 | Status: CANCELLED | OUTPATIENT
Start: 2020-09-01 | End: 2021-02-28

## 2020-09-02 DIAGNOSIS — G40.909 SEIZURE DISORDER (HCC): Chronic | ICD-10-CM

## 2020-09-02 RX ORDER — PHENOBARBITAL 64.8 MG/1
64.8 TABLET ORAL 2 TIMES DAILY
Qty: 60 TABLET | Refills: 2 | Status: SHIPPED | OUTPATIENT
Start: 2020-09-02 | End: 2020-12-02

## 2020-09-24 ENCOUNTER — OFFICE VISIT (OUTPATIENT)
Dept: CARDIOLOGY CLINIC | Facility: CLINIC | Age: 75
End: 2020-09-24
Payer: MEDICARE

## 2020-09-24 VITALS
WEIGHT: 222.6 LBS | HEIGHT: 72 IN | BODY MASS INDEX: 30.15 KG/M2 | TEMPERATURE: 97.3 F | SYSTOLIC BLOOD PRESSURE: 136 MMHG | HEART RATE: 92 BPM | DIASTOLIC BLOOD PRESSURE: 70 MMHG

## 2020-09-24 DIAGNOSIS — Z86.79 S/P AAA (ABDOMINAL AORTIC ANEURYSM) REPAIR: Chronic | ICD-10-CM

## 2020-09-24 DIAGNOSIS — E78.2 MIXED HYPERLIPIDEMIA: Chronic | ICD-10-CM

## 2020-09-24 DIAGNOSIS — I25.10 CORONARY ARTERY DISEASE INVOLVING NATIVE CORONARY ARTERY OF NATIVE HEART WITHOUT ANGINA PECTORIS: Primary | ICD-10-CM

## 2020-09-24 DIAGNOSIS — Z98.890 S/P AAA (ABDOMINAL AORTIC ANEURYSM) REPAIR: Chronic | ICD-10-CM

## 2020-09-24 PROCEDURE — 99214 OFFICE O/P EST MOD 30 MIN: CPT | Performed by: INTERNAL MEDICINE

## 2020-09-24 NOTE — PROGRESS NOTES
Cardiology Follow Up    [place]age South  1945  [id number]  [facility] [address] [facility] [address] [phone]    0  Coronary artery disease involving native coronary artery of native heart without angina pectoris     2  Mixed hyperlipidemia  Lipid panel    Hepatic function panel   3  S/P AAA (abdominal aortic aneurysm) repair         Interval History:  No cardiovascular complaints  He denies chest pain shortness of breath orthopnea paroxysmal nocturnal dyspnea or syncope  Patient Active Problem List   Diagnosis    CAD (coronary artery disease)    HTN (hypertension)    HLD (hyperlipidemia)    Seizure disorder (HCC)    S/P AAA (abdominal aortic aneurysm) repair    Ventral hernia with obstruction and without gangrene    Hernia, incisional    Abdominal pain     Past Medical History:   Diagnosis Date    Cardiac disease     [facility]est[facility] failure) [facility] [address] )     Hernia of abdominal cavity     Myocardial infarction ([facility] [address] )     last assessed 7/31/14, past, Pt had MI s/p AGUSTIN placed in 2001, refuses to take any other medications for his cardiac disease, only will take seizure med   Understands possible complications from this decision    Seizure [facility])      Social History     Socioeconomic History    Marital status: Single     Spouse name: Not on file    Number of children: Not on file    Years of education: Not on file    Highest education level: Not on file   Occupational History    Not on file   Social Needs    Financial resource strain: Not on file    Food insecurity     Worry: Not on file     Inability: Not on file    Transportation needs     Medical: Not on file     Non-medical: Not on file   Tobacco Use    Smoking status: Former Smoker     Last attempt to quit: 6/5/2005     Years since quitting: 15 3    Smokeless tobacco: Never Used   Substance and Sexual Activity    Alcohol use: Not Currently     Frequency: Never     Binge frequency: Never    Drug use: Never    Sexual activity: Never   Lifestyle    Physical activity     Days per week: Not on file     Minutes per session: Not on file    Stress: Not on file   Relationships    Social connections     Talks on phone: Not on file     Gets together: Not on file     Attends Jainism service: Not on file     Active member of club or organization: Not on file     Attends meetings of clubs or organizations: Not on file     Relationship status: Not on file    Intimate partner violence     Fear of current or ex partner: Not on file     Emotionally abused: Not on file     Physically abused: Not on file     Forced sexual activity: Not on file   Other Topics Concern    Not on file   Social History Narrative    Not on file      Family History   Problem Relation Age of Onset    Hypertension Father     Kidney disease Brother      Past Surgical History:   Procedure Laterality Date    ABDOMINAL AORTIC ANEURYSM REPAIR      for dialation or occulsion    CATARACT EXTRACTION         Current Outpatient Medications:     aspirin 81 MG tablet, Take 1 tablet (81 mg total) by mouth daily, Disp: 90 tablet, Rfl: 3    PHENobarbital 64 8 mg tablet, TAKE 1 TABLET (64 8 MG TOTAL) BY MOUTH 2 (TWO) TIMES A DAY, Disp: 60 tablet, Rfl: 2    clotrimazole (LOTRIMIN) 1 % cream, Apply to feet/toes 2 times daily (Patient not taking: Reported on 9/24/2020), Disp: 15 g, Rfl: 0    nitroglycerin (NITROSTAT) 0 4 mg SL tablet, Place 1 tablet (0 4 mg total) under the tongue every 5 (five) minutes as needed for chest pain (Patient not taking: Reported on 9/24/2020), Disp: 30 tablet, Rfl: 1    pravastatin (PRAVACHOL) 40 mg tablet, Take 1 tablet (40 mg total) by mouth daily (Patient not taking: Reported on 7/21/2020), Disp: 90 tablet, Rfl: 3  Allergies   Allergen Reactions    Iodinated Diagnostic Agents Rash     Pt's skin get very red and he gets hot and chills    Clopidogrel Labs:  No visits with results within 2 Month(s) from this visit  Latest known visit with results is:   Admission on 01/26/2020, Discharged on 01/27/2020   Component Date Value    Sodium 01/27/2020 143     Potassium 01/27/2020 3 9     Chloride 01/27/2020 114*    CO2 01/27/2020 25     ANION GAP 01/27/2020 4     BUN 01/27/2020 20     Creatinine 01/27/2020 0 97     Glucose 01/27/2020 119     Glucose, Fasting 01/27/2020 119*    Calcium 01/27/2020 7 9*    eGFR 01/27/2020 76     Magnesium 01/27/2020 2 2      Imaging: No results found  Review of Systems:  Review of Systems   Constitutional: Negative for fatigue  HENT: Negative for nosebleeds  Eyes: Negative for redness  Respiratory: Negative for chest tightness and shortness of breath  Cardiovascular: Negative for chest pain, palpitations and leg swelling  Gastrointestinal: Negative for abdominal pain  Endocrine: Negative for polyuria  Genitourinary: Negative for urgency  Musculoskeletal: Negative for arthralgias  Skin: Negative for rash  Neurological: Negative for dizziness and syncope  Psychiatric/Behavioral: Negative for confusion and sleep disturbance  The patient is not nervous/anxious  Physical Exam:  Physical Exam  Constitutional:       Appearance: He is well-developed  HENT:      Head: Normocephalic and atraumatic  Nose: Nose normal    Eyes:      Pupils: Pupils are equal, round, and reactive to light  Neck:      Musculoskeletal: Neck supple  Cardiovascular:      Rate and Rhythm: Normal rate and regular rhythm  Heart sounds: Normal heart sounds  Pulmonary:      Effort: Pulmonary effort is normal       Breath sounds: Normal breath sounds  Abdominal:      General: Bowel sounds are normal       Palpations: Abdomen is soft  Hernia: A hernia is present  Hernia is present in the ventral area  Musculoskeletal: Normal range of motion  Skin:     General: Skin is warm and dry     Neurological: Mental Status: He is alert and oriented to person, place, and time  Psychiatric:         Behavior: Behavior normal          Thought Content: Thought content normal          Judgment: Judgment normal          Discussion/Summary:  Asymptomatic in functional class 1  He currently takes aspirin 81 mg daily  His blood pressure is normal   He takes no medication for blood pressure and prefers not to be on beta-blockade  He is approximately 3 years out from stenting of his right coronary artery in the setting of an inferior wall myocardial infarction  He has been intolerant to Lipitor and Crestor in the past   At his last visit I put him on pravastatin  He discontinued this secondary to diarrhea  He currently refuses lipid lowering therapy  We will will check a fasting lipid profile  Based on this I told him we can perhaps suggest another class of medication that may not have side effects as he is intolerant to statins and he said he will consider this  Otherwise I will see him again in 6 months

## 2020-11-16 ENCOUNTER — TELEPHONE (OUTPATIENT)
Dept: INTERNAL MEDICINE CLINIC | Facility: CLINIC | Age: 75
End: 2020-11-16

## 2020-12-02 DIAGNOSIS — G40.909 SEIZURE DISORDER (HCC): Chronic | ICD-10-CM

## 2020-12-02 RX ORDER — PHENOBARBITAL 64.8 MG/1
64.8 TABLET ORAL 2 TIMES DAILY
Qty: 60 TABLET | Refills: 2 | Status: SHIPPED | OUTPATIENT
Start: 2020-12-02 | End: 2021-03-04

## 2021-01-21 ENCOUNTER — OFFICE VISIT (OUTPATIENT)
Dept: INTERNAL MEDICINE CLINIC | Facility: CLINIC | Age: 76
End: 2021-01-21

## 2021-01-21 VITALS
DIASTOLIC BLOOD PRESSURE: 74 MMHG | WEIGHT: 226.4 LBS | BODY MASS INDEX: 30.71 KG/M2 | HEART RATE: 73 BPM | TEMPERATURE: 97.7 F | SYSTOLIC BLOOD PRESSURE: 131 MMHG

## 2021-01-21 DIAGNOSIS — K43.6 VENTRAL HERNIA WITH OBSTRUCTION AND WITHOUT GANGRENE: ICD-10-CM

## 2021-01-21 DIAGNOSIS — G40.909 SEIZURE DISORDER (HCC): Chronic | ICD-10-CM

## 2021-01-21 DIAGNOSIS — Z00.00 ANNUAL PHYSICAL EXAM: ICD-10-CM

## 2021-01-21 DIAGNOSIS — I25.10 CORONARY ARTERY DISEASE INVOLVING NATIVE CORONARY ARTERY OF NATIVE HEART WITHOUT ANGINA PECTORIS: Primary | ICD-10-CM

## 2021-01-21 DIAGNOSIS — E78.2 MIXED HYPERLIPIDEMIA: Chronic | ICD-10-CM

## 2021-01-21 PROCEDURE — 99213 OFFICE O/P EST LOW 20 MIN: CPT | Performed by: INTERNAL MEDICINE

## 2021-03-01 DIAGNOSIS — G40.909 SEIZURE DISORDER (HCC): Chronic | ICD-10-CM

## 2021-03-01 RX ORDER — PHENOBARBITAL 64.8 MG/1
64.8 TABLET ORAL 2 TIMES DAILY
Qty: 60 TABLET | Refills: 2 | Status: CANCELLED | OUTPATIENT
Start: 2021-03-01 | End: 2021-08-28

## 2021-03-01 NOTE — TELEPHONE ENCOUNTER
Requested Prescriptions     Pending Prescriptions Disp Refills    PHENobarbital 64 8 mg tablet 60 tablet 2     Sig: Take 1 tablet (64 8 mg total) by mouth 2 (two) times a day

## 2021-03-04 DIAGNOSIS — G40.909 SEIZURE DISORDER (HCC): Chronic | ICD-10-CM

## 2021-03-04 RX ORDER — PHENOBARBITAL 64.8 MG/1
64.8 TABLET ORAL 2 TIMES DAILY
Qty: 60 TABLET | Refills: 2 | Status: SHIPPED | OUTPATIENT
Start: 2021-03-04 | End: 2021-06-03

## 2021-03-25 ENCOUNTER — APPOINTMENT (EMERGENCY)
Dept: RADIOLOGY | Facility: HOSPITAL | Age: 76
End: 2021-03-25
Payer: MEDICARE

## 2021-03-25 ENCOUNTER — HOSPITAL ENCOUNTER (EMERGENCY)
Facility: HOSPITAL | Age: 76
Discharge: HOME | End: 2021-03-25
Attending: EMERGENCY MEDICINE
Payer: MEDICARE

## 2021-03-25 VITALS
DIASTOLIC BLOOD PRESSURE: 68 MMHG | HEIGHT: 72 IN | TEMPERATURE: 97.6 F | BODY MASS INDEX: 30.46 KG/M2 | SYSTOLIC BLOOD PRESSURE: 119 MMHG | RESPIRATION RATE: 14 BRPM | HEART RATE: 58 BPM | WEIGHT: 224.87 LBS | OXYGEN SATURATION: 96 %

## 2021-03-25 DIAGNOSIS — K43.9 VENTRAL HERNIA: ICD-10-CM

## 2021-03-25 DIAGNOSIS — R10.9 ABDOMINAL PAIN: Primary | ICD-10-CM

## 2021-03-25 LAB
ALBUMIN SERPL BCP-MCNC: 3.6 G/DL (ref 3.5–5)
ALP SERPL-CCNC: 153 U/L (ref 46–116)
ALT SERPL W P-5'-P-CCNC: 35 U/L (ref 12–78)
ANION GAP SERPL CALCULATED.3IONS-SCNC: 2 MMOL/L (ref 4–13)
AST SERPL W P-5'-P-CCNC: 67 U/L (ref 5–45)
ATRIAL RATE: 147 BPM
BASOPHILS # BLD AUTO: 0.03 THOUSANDS/ΜL (ref 0–0.1)
BASOPHILS NFR BLD AUTO: 0 % (ref 0–1)
BILIRUB DIRECT SERPL-MCNC: 0.38 MG/DL (ref 0–0.2)
BILIRUB SERPL-MCNC: 1.09 MG/DL (ref 0.2–1)
BUN SERPL-MCNC: 12 MG/DL (ref 5–25)
CALCIUM SERPL-MCNC: 8.7 MG/DL (ref 8.3–10.1)
CHLORIDE SERPL-SCNC: 107 MMOL/L (ref 100–108)
CO2 SERPL-SCNC: 29 MMOL/L (ref 21–32)
CREAT SERPL-MCNC: 0.9 MG/DL (ref 0.6–1.3)
EOSINOPHIL # BLD AUTO: 0.12 THOUSAND/ΜL (ref 0–0.61)
EOSINOPHIL NFR BLD AUTO: 2 % (ref 0–6)
ERYTHROCYTE [DISTWIDTH] IN BLOOD BY AUTOMATED COUNT: 18.6 % (ref 11.6–15.1)
GFR SERPL CREATININE-BSD FRML MDRD: 83 ML/MIN/1.73SQ M
GLUCOSE SERPL-MCNC: 103 MG/DL (ref 65–140)
HCT VFR BLD AUTO: 41.1 % (ref 36.5–49.3)
HGB BLD-MCNC: 13 G/DL (ref 12–17)
IMM GRANULOCYTES # BLD AUTO: 0.09 THOUSAND/UL (ref 0–0.2)
IMM GRANULOCYTES NFR BLD AUTO: 1 % (ref 0–2)
LACTATE SERPL-SCNC: 1.5 MMOL/L (ref 0.5–2)
LIPASE SERPL-CCNC: 130 U/L (ref 73–393)
LYMPHOCYTES # BLD AUTO: 0.79 THOUSANDS/ΜL (ref 0.6–4.47)
LYMPHOCYTES NFR BLD AUTO: 11 % (ref 14–44)
MCH RBC QN AUTO: 30.7 PG (ref 26.8–34.3)
MCHC RBC AUTO-ENTMCNC: 31.6 G/DL (ref 31.4–37.4)
MCV RBC AUTO: 97 FL (ref 82–98)
MONOCYTES # BLD AUTO: 0.63 THOUSAND/ΜL (ref 0.17–1.22)
MONOCYTES NFR BLD AUTO: 9 % (ref 4–12)
NEUTROPHILS # BLD AUTO: 5.47 THOUSANDS/ΜL (ref 1.85–7.62)
NEUTS SEG NFR BLD AUTO: 77 % (ref 43–75)
NRBC BLD AUTO-RTO: 0 /100 WBCS
PLATELET # BLD AUTO: 143 THOUSANDS/UL (ref 149–390)
PMV BLD AUTO: 10.7 FL (ref 8.9–12.7)
POTASSIUM SERPL-SCNC: 4.3 MMOL/L (ref 3.5–5.3)
PROT SERPL-MCNC: 7.6 G/DL (ref 6.4–8.2)
QRS AXIS: -22 DEGREES
QRSD INTERVAL: 90 MS
QT INTERVAL: 410 MS
QTC INTERVAL: 405 MS
RBC # BLD AUTO: 4.23 MILLION/UL (ref 3.88–5.62)
SODIUM SERPL-SCNC: 138 MMOL/L (ref 136–145)
T WAVE AXIS: 56 DEGREES
TROPONIN I SERPL-MCNC: <0.02 NG/ML
VENTRICULAR RATE: 59 BPM
WBC # BLD AUTO: 7.13 THOUSAND/UL (ref 4.31–10.16)

## 2021-03-25 PROCEDURE — 74176 CT ABD & PELVIS W/O CONTRAST: CPT

## 2021-03-25 PROCEDURE — 85025 COMPLETE CBC W/AUTO DIFF WBC: CPT | Performed by: EMERGENCY MEDICINE

## 2021-03-25 PROCEDURE — 83690 ASSAY OF LIPASE: CPT | Performed by: EMERGENCY MEDICINE

## 2021-03-25 PROCEDURE — 36415 COLL VENOUS BLD VENIPUNCTURE: CPT | Performed by: EMERGENCY MEDICINE

## 2021-03-25 PROCEDURE — 93005 ELECTROCARDIOGRAM TRACING: CPT

## 2021-03-25 PROCEDURE — 99285 EMERGENCY DEPT VISIT HI MDM: CPT | Performed by: EMERGENCY MEDICINE

## 2021-03-25 PROCEDURE — 80048 BASIC METABOLIC PNL TOTAL CA: CPT | Performed by: EMERGENCY MEDICINE

## 2021-03-25 PROCEDURE — 84484 ASSAY OF TROPONIN QUANT: CPT | Performed by: EMERGENCY MEDICINE

## 2021-03-25 PROCEDURE — 80076 HEPATIC FUNCTION PANEL: CPT | Performed by: EMERGENCY MEDICINE

## 2021-03-25 PROCEDURE — 93010 ELECTROCARDIOGRAM REPORT: CPT | Performed by: INTERNAL MEDICINE

## 2021-03-25 PROCEDURE — 83605 ASSAY OF LACTIC ACID: CPT | Performed by: EMERGENCY MEDICINE

## 2021-03-25 PROCEDURE — 99284 EMERGENCY DEPT VISIT MOD MDM: CPT

## 2021-03-25 NOTE — ED PROVIDER NOTES
History  Chief Complaint   Patient presents with    Abdominal Pain     pt presents by hospital wheelchair with c/o RUQ, since resolved      19-year-old male presents for evaluation of right upper quadrant abdominal pain  Patient has history of a AAA repair with subsequent ventral hernias and obstructions  Patient reports prior to arrival he was loading light groceries when he felt the onset of right upper quadrant abdominal pain  He describes the pain as sharp and shooting, similar to previous obstructions  Patient made it home and went to the bathroom, he had a normal bowel movement that was formed and brown  Patient reports bowel movement relieved his right upper quadrant pain however states when he sat up from the toilet he began to salivate  He denies actual nausea or vomiting, denies lightheadedness or syncope during this time  He also denies chest pain or dyspnea  Patient went to lie down for a few minutes when he began to experience diaphoresis  Patient and his brother then came to the emergency department  Upon arrival to the ER, he still has sensation of hypersalivation however once in the waiting room patient states that this stopped  Patient has been in his usual state of health recently, denies fevers, chills, cough  Prior to Admission Medications   Prescriptions Last Dose Informant Patient Reported? Taking?    PHENobarbital 64 8 mg tablet   No Yes   Sig: TAKE 1 TABLET (64 8 MG TOTAL) BY MOUTH 2 (TWO) TIMES A DAY   aspirin 81 MG tablet Not Taking at Unknown time Self No No   Sig: Take 1 tablet (81 mg total) by mouth daily   Patient not taking: Reported on 3/25/2021   clotrimazole (LOTRIMIN) 1 % cream Not Taking at Unknown time Self No No   Sig: Apply to feet/toes 2 times daily   Patient not taking: Reported on 9/24/2020   nitroglycerin (NITROSTAT) 0 4 mg SL tablet Not Taking at Unknown time Self No No   Sig: Place 1 tablet (0 4 mg total) under the tongue every 5 (five) minutes as needed for chest pain   Patient not taking: Reported on 9/24/2020   pravastatin (PRAVACHOL) 40 mg tablet Not Taking at Unknown time Self No No   Sig: Take 1 tablet (40 mg total) by mouth daily   Patient not taking: Reported on 7/21/2020      Facility-Administered Medications: None       Past Medical History:   Diagnosis Date    Cardiac disease     [facility]estive [facility] failure[facility] [address] [facility]     Hernia of abdominal cavity     Myocardial infarction ([facility] [address] )     last assessed 7/31/14, past, Pt had MI s/p AGUSTIN placed in 2001, refuses to take any other medications for his cardiac disease, only will take seizure med  Understands possible complications from this decision    Seizure [facility])        Past Surgical History:   Procedure Laterality Date    ABDOMINAL AORTIC ANEURYSM REPAIR      for dialation or occulsion    CATARACT EXTRACTION         Family History   Problem Relation Age of Onset    Hypertension Father     Kidney disease Brother      I have reviewed and agree with the history as documented  E-Cigarette/Vaping    E-Cigarette Use Never User      E-Cigarette/Vaping Substances    Nicotine No     THC No     CBD No     Flavoring No     Other No     Unknown No      Social History     Tobacco Use    Smoking status: Former Smoker     Quit date: 6/5/2005     Years since quitting: 15 8    Smokeless tobacco: Never Used   Substance Use Topics    Alcohol use: Not Currently     Frequency: Never     Binge frequency: Never    Drug use: Never        Review of Systems   Constitutional: Negative for appetite change, chills and fever  Respiratory: Negative for shortness of breath  Cardiovascular: Negative for chest pain  Gastrointestinal: Positive for abdominal pain  Negative for blood in stool, constipation, diarrhea, nausea and vomiting  Genitourinary: Negative for dysuria  Neurological: Negative for syncope, light-headedness and headaches  All other systems reviewed and are negative        Physical Exam  ED Triage Vitals [03/25/21 1337]   Temperature Pulse Respirations Blood Pressure SpO2   97 6 °F (36 4 °C) 77 18 107/70 96 %      Temp Source Heart Rate Source Patient Position - Orthostatic VS BP Location FiO2 (%)   Oral Monitor -- -- --      Pain Score       --             Orthostatic Vital Signs  Vitals:    03/25/21 1337 03/25/21 1500 03/25/21 1630   BP: 107/70 121/67 119/68   Pulse: 77 60 58       Physical Exam  Vitals signs reviewed  Constitutional:       General: He is not in acute distress  Appearance: He is well-developed  He is not ill-appearing, toxic-appearing or diaphoretic  HENT:      Head: Normocephalic and atraumatic  Right Ear: External ear normal       Left Ear: External ear normal    Eyes:      General: No scleral icterus  Right eye: No discharge  Left eye: No discharge  Cardiovascular:      Rate and Rhythm: Normal rate and regular rhythm  Comments: Mild b/l LE edema to ankles  Pulmonary:      Effort: Pulmonary effort is normal  No respiratory distress  Breath sounds: Normal breath sounds  Abdominal:      General: Abdomen is protuberant  Tenderness: There is no abdominal tenderness  There is no right CVA tenderness or left CVA tenderness  Hernia: A hernia is present  Hernia is present in the ventral area  Comments: Multiple large ventral hernias present, no tenderness, no skin discolorations   Musculoskeletal:         General: No deformity or signs of injury  Skin:     General: Skin is warm  Coloration: Skin is not jaundiced or pale  Neurological:      General: No focal deficit present  Mental Status: He is alert  Mental status is at baseline        Comments: No gross CN, motor, sensory deficits         ED Medications  Medications - No data to display    Diagnostic Studies  Results Reviewed     Procedure Component Value Units Date/Time    CBC and differential [[id number]]  (Abnormal) Collected: 03/25/21 1453    Lab Status: Final result Specimen: Blood from Arm, Right Updated: 03/25/21 1541     WBC 7 13 Thousand/uL      RBC 4 23 Million/uL      Hemoglobin 13 0 g/dL      Hematocrit 41 1 %      MCV 97 fL      MCH 30 7 pg      MCHC 31 6 g/dL      RDW 18 6 %      MPV 10 7 fL      Platelets 388 Thousands/uL      nRBC 0 /100 WBCs      Neutrophils Relative 77 %      Immat GRANS % 1 %      Lymphocytes Relative 11 %      Monocytes Relative 9 %      Eosinophils Relative 2 %      Basophils Relative 0 %      Neutrophils Absolute 5 47 Thousands/µL      Immature Grans Absolute 0 09 Thousand/uL      Lymphocytes Absolute 0 79 Thousands/µL      Monocytes Absolute 0 63 Thousand/µL      Eosinophils Absolute 0 12 Thousand/µL      Basophils Absolute 0 03 Thousands/µL     Lactic acid [[id number]]  (Normal) Collected: 03/25/21 1453    Lab Status: Final result Specimen: Blood from Arm, Right Updated: 03/25/21 1534     LACTIC ACID 1 5 mmol/L     Narrative:      Result may be elevated if tourniquet was used during collection      Basic metabolic panel [[id number]]  (Abnormal) Collected: 03/25/21 1453    Lab Status: Final result Specimen: Blood from Arm, Right Updated: 03/25/21 1533     Sodium 138 mmol/L      Potassium 4 3 mmol/L      Chloride 107 mmol/L      CO2 29 mmol/L      ANION GAP 2 mmol/L      BUN 12 mg/dL      Creatinine 0 90 mg/dL      Glucose 103 mg/dL      Calcium 8 7 mg/dL      eGFR 83 ml/min/1 73sq m     Narrative:      [place] guidelines for Chronic Kidney Disease (CKD):     Stage 1 with normal or high GFR (GFR > 90 mL/min/1 73 square meters)    Stage 2 Mild CKD (GFR = 60-89 mL/min/1 73 square meters)    Stage 3A Moderate CKD (GFR = 45-59 mL/min/1 73 square meters)    Stage 3B Moderate CKD (GFR = 30-44 mL/min/1 73 square meters)    Stage 4 Severe CKD (GFR = 15-29 mL/min/1 73 square meters)    Stage 5 End Stage CKD (GFR <15 mL/min/1 73 square meters)  Note: GFR calculation is accurate only with a steady state creatinine Hepatic function panel [[id number]]  (Abnormal) Collected: 03/25/21 1453    Lab Status: Final result Specimen: Blood from Arm, Right Updated: 03/25/21 1533     Total Bilirubin 1 09 mg/dL      Bilirubin, Direct 0 38 mg/dL      Alkaline Phosphatase 153 U/L      AST 67 U/L      ALT 35 U/L      Total Protein 7 6 g/dL      Albumin 3 6 g/dL     Lipase [[id number]]  (Normal) Collected: 03/25/21 1453    Lab Status: Final result Specimen: Blood from Arm, Right Updated: 03/25/21 1533     Lipase 130 u/L     Troponin I [[id number]]  (Normal) Collected: 03/25/21 1453    Lab Status: Final result Specimen: Blood from Arm, Right Updated: 03/25/21 1533     Troponin I <0 02 ng/mL                  CT abdomen pelvis wo contrast   Final Result by Rissa Bernal MD (03/25 1719)      Numerous ventral abdominal and pelvic wall bowel containing hernias  No distended segments of bowel to indicate obstruction  No apparent areas of bowel wall thickening to indicate strangulation  Workstation performed: [id number]               Procedures  Procedures      ED Course  ED Course as of Mar 26 1200   Thu Mar 25, 2021   1639 Dispo pending CT for obstruction/others                                SBIRT 22yo+      Most Recent Value   SBIRT (25 yo +)   In order to provide better care to our patients, we are screening all of our patients for alcohol and drug use  Would it be okay to ask you these screening questions? No Filed at: 03/25/2021 1401                MDM  Number of Diagnoses or Management Options  Abdominal pain:   Ventral hernia:   Diagnosis management comments: 50-year-old male presents for evaluation of right upper quadrant abdominal pain  Patient reports pain resolved prior to arrival with bowel movement however did experience hypersalivation and diaphoresis afterwards  Concern for ACS given patient's history of 2 previous MIs, bowel obstruction with patient's large hernias and history of obstructions    Will obtain abdominal and cardiac labs, will obtain CT abdomen pelvis  Dispo pending laboratory and imaging  Disposition  Final diagnoses:   Abdominal pain   Ventral hernia     Time reflects when diagnosis was documented in both MDM as applicable and the Disposition within this note     Time User Action Codes Description Comment    3/25/2021  4:53 PM Brian Better Add [R10 9] Abdominal pain     3/25/2021  4:54 PM Brian Better Add [K43 9] Ventral hernia       ED Disposition     ED Disposition Condition Date/Time Comment    Discharge Stable u Mar 25, 2021  6:09 PM Lucinda Hernandez discharge to home/self care  Follow-up Information     Follow up With Specialties Details Why Contact Info Additional Information     [facility] General Surgery  If symptoms worsen [address] [id number]  [facility] [address], [address]          Discharge Medication List as of 3/25/2021  6:09 PM      CONTINUE these medications which have NOT CHANGED    Details   PHENobarbital 64 8 mg tablet TAKE 1 TABLET (64 8 MG TOTAL) BY MOUTH 2 (TWO) TIMES A DAY, Starting u 3/4/2021, Until Tue 8/31/2021, Normal      aspirin 81 MG tablet Take 1 tablet (81 mg total) by mouth daily, Starting Thu 1/23/2020, Normal      clotrimazole (LOTRIMIN) 1 % cream Apply to feet/toes 2 times daily, Print      nitroglycerin (NITROSTAT) 0 4 mg SL tablet Place 1 tablet (0 4 mg total) under the tongue every 5 (five) minutes as needed for chest pain, Starting Thu 1/23/2020, Normal      pravastatin (PRAVACHOL) 40 mg tablet Take 1 tablet (40 mg total) by mouth daily, Starting Thu 2/27/2020, Normal           No discharge procedures on file  PDMP Review       Value Time User    PDMP Reviewed  Yes 1/23/2020  1:58 PM Micheline Sosa MD           ED Provider  Attending physically available and evaluated Lucinda Hernandez I managed the patient along with the ED Attending      Electronically Signed by         Kristin Negrete DO  03/26/21 1200

## 2021-03-25 NOTE — DISCHARGE INSTRUCTIONS
Continue symptomatic relief of abdominal symptoms, if abdominal pain becomes severe or persists, return immediately to the emergency department

## 2021-03-26 NOTE — ED ATTENDING ATTESTATION
3/25/2021  ILucy MD, saw and evaluated the patient  I have discussed the patient with the resident and agree with the resident's findings, Plan of Care, and MDM as documented in the resident's note, unless otherwise documented below  All available laboratory and imaging studies were reviewed by myself  I was present for key portions of any procedure(s) performed by the resident and I was immediately available to provide assistance  I agree with the current assessment done in the Emergency Department  I have conducted an independent evaluation of this patient  67year old male with PMH of AAA repair, ventral hernia repair, prior SBO, presenting with an episode of RUQ abdominal pain  Patient was shopping with his brother and was loading light groceries into the trunk of their car when he felt sharp stabbing pains in right upper abdomen  He got home, had a normal bowel movement (no diarrhea, formed stool, no blood in stool) and laid down to rest  While at home, he had an episode of diaphoresis  He also had an episode of spitting up a lot of saliva  No nausea or vomiting, just extra salivation  No chest pain or shortness of breath  Given sharp pains in upper abdomen similar to prior episode of SBO and given diaphoresis, patient is presenting for further evaluation  While in the ER waiting room, he had another episode of extra salivation, however, his abdominal pain completely resolved and he feels back to baseline at present  No fevers or chills  No nausea, vomiting or diarrhea  No chest pain  No shortness of breath  No burning with urination  Patient has not had anything for his symptoms, they gradually resolved spontaneously      ROS:  Constitutional: denies fevers, chills  Cardiac: no chest pain, no lower extremity edema  Respiratory: no shortness of breath, no cough  GI: As above  : no urinary frequency, urgency, or burning with urination  Heme/Onc: no easy bruising  Endocrine: no diabetes  Neuro: no focal weakness or numbness, no headaches    Ten systems reviewed and negative unless otherwise noted in HPI and above      Physical Exam  Vitals:    03/25/21 1337 03/25/21 1500 03/25/21 1630   BP: 107/70 121/67 119/68   TempSrc: Oral     Pulse: 77 60 58   Resp: 18 16 14   Temp: 97 6 °F (36 4 °C)       SPO2 RA Rest      ED from 3/25/2021 in [facility] [address] Emergency Department   SpO2  96 %   SpO2 Activity  At Rest   O2 Device  None (Room air)   O2 Flow Rate  --          Constitutional:  Awake, alert, oriented  No acute distress  HEENT:  Normocephalic, atraumatic  Sclera anicteric, conjunctiva not injected  Moist oral mucosa  Cardiac:  Regular rate and rhythm, no murmurs, rubs, or gallops  2+ radial pulses  Respiratory:  Lungs are clear to auscultation bilaterally, no wheezes or rales  Abdomen: Healed laparotomy, several ventral hernias are present, none erythematous  Bowel sounds present, normoactive, not high-pitched  No tenderness to palpation  No rebound or guarding  Negative Woodson's  Extremities:  No deformities, no edema  Integument:  No rashes over exposed areas, cap refill less than 2 seconds  Neurologic:  Awake, alert, and oriented x3    Nonfocal exam   Psychiatric:  Normal affect    Labs  Labs Reviewed   CBC AND DIFFERENTIAL - Abnormal       Result Value Ref Range Status    WBC 7 13  4 31 - 10 16 Thousand/uL Final    RBC 4 23  3 88 - 5 62 Million/uL Final    Hemoglobin 13 0  12 0 - 17 0 g/dL Final    Hematocrit 41 1  36 5 - 49 3 % Final    MCV 97  82 - 98 fL Final    MCH 30 7  26 8 - 34 3 pg Final    MCHC 31 6  31 4 - 37 4 g/dL Final    RDW 18 6 (*) 11 6 - 15 1 % Final    MPV 10 7  8 9 - 12 7 fL Final    Platelets 045 (*) 756 - 390 Thousands/uL Final    nRBC 0  /100 WBCs Final    Neutrophils Relative 77 (*) 43 - 75 % Final    Immat GRANS % 1  0 - 2 % Final    Lymphocytes Relative 11 (*) 14 - 44 % Final    Monocytes Relative 9  4 - 12 % Final    Eosinophils Relative 2  0 - 6 % Final    Basophils Relative 0  0 - 1 % Final    Neutrophils Absolute 5 47  1 85 - 7 62 Thousands/µL Final    Immature Grans Absolute 0 09  0 00 - 0 20 Thousand/uL Final    Lymphocytes Absolute 0 79  0 60 - 4 47 Thousands/µL Final    Monocytes Absolute 0 63  0 17 - 1 22 Thousand/µL Final    Eosinophils Absolute 0 12  0 00 - 0 61 Thousand/µL Final    Basophils Absolute 0 03  0 00 - 0 10 Thousands/µL Final   BASIC METABOLIC PANEL - Abnormal    Sodium 138  136 - 145 mmol/L Final    Potassium 4 3  3 5 - 5 3 mmol/L Final    Chloride 107  100 - 108 mmol/L Final    CO2 29  21 - 32 mmol/L Final    ANION GAP 2 (*) 4 - 13 mmol/L Final    BUN 12  5 - 25 mg/dL Final    Creatinine 0 90  0 60 - 1 30 mg/dL Final    Comment: Standardized to IDMS reference method    Glucose 103  65 - 140 mg/dL Final    Comment: If the patient is fasting, the ADA then defines impaired fasting glucose as > 100 mg/dL and diabetes as > or equal to 123 mg/dL  Specimen collection should occur prior to Sulfasalazine administration due to the potential for falsely depressed results  Specimen collection should occur prior to Sulfapyridine administration due to the potential for falsely elevated results      Calcium 8 7  8 3 - 10 1 mg/dL Final    eGFR 83  ml/min/1 73sq m Final    Narrative:     [place] guidelines for Chronic Kidney Disease (CKD):     Stage 1 with normal or high GFR (GFR > 90 mL/min/1 73 square meters)    Stage 2 Mild CKD (GFR = 60-89 mL/min/1 73 square meters)    Stage 3A Moderate CKD (GFR = 45-59 mL/min/1 73 square meters)    Stage 3B Moderate CKD (GFR = 30-44 mL/min/1 73 square meters)    Stage 4 Severe CKD (GFR = 15-29 mL/min/1 73 square meters)    Stage 5 End Stage CKD (GFR <15 mL/min/1 73 square meters)  Note: GFR calculation is accurate only with a steady state creatinine   HEPATIC FUNCTION PANEL - Abnormal    Total Bilirubin 1 09 (*) 0 20 - 1 00 mg/dL Final    Comment: Use of this assay is not recommended for patients undergoing treatment with eltrombopag due to the potential for falsely elevated results  Bilirubin, Direct 0 38 (*) 0 00 - 0 20 mg/dL Final    Alkaline Phosphatase 153 (*) 46 - 116 U/L Final    AST 67 (*) 5 - 45 U/L Final    Comment: Specimen collection should occur prior to Sulfasalazine and/or Sulfapyridine administration due to the potential for falsely depressed results  ALT 35  12 - 78 U/L Final    Comment: Specimen collection should occur prior to Sulfasalazine and/or Sulfapyridine administration due to the potential for falsely depressed results  Total Protein 7 6  6 4 - 8 2 g/dL Final    Albumin 3 6  3 5 - 5 0 g/dL Final   LIPASE - Normal    Lipase 130  73 - 393 u/L Final   TROPONIN I - Normal    Troponin I <0 02  <=0 04 ng/mL Final    Comment: Siemens Chemistry analyzer 99% cutoff is > 0 04 ng/mL in network labs     o cTnI 99% cutoff is useful only when applied to patients in the clinical setting of myocardial ischemia   o cTnI 99% cutoff should be interpreted in the context of clinical history, ECG findings and possibly cardiac imaging to establish correct diagnosis  o cTnI 99% cutoff may be suggestive but clearly not indicative of a coronary event without the clinical setting of myocardial ischemia  LACTIC ACID, PLASMA - Normal    LACTIC ACID 1 5  0 5 - 2 0 mmol/L Final    Narrative:     Result may be elevated if tourniquet was used during collection  Tests  CT abdomen pelvis wo contrast   Final Result      Numerous ventral abdominal and pelvic wall bowel containing hernias  No distended segments of bowel to indicate obstruction  No apparent areas of bowel wall thickening to indicate strangulation              Workstation performed: [id number]             Procedures  ECG 12 Lead Documentation Only    Date/Time: 3/25/2021 3:00 PM  Performed by: Pascale Elkins MD  Authorized by: Pascale Elkins MD     Comments:      Sinus bradycardia, VR 59, IL appears to be WNL, QRS 90, QTc 405, normal axis, ST flattening in aVL, no ST/T wave changes to suggest ischemia, no STEMI, other than bradycardia, no significant change from prior EKG dated 7/17/2019  ED Course  Medications - No data to display    67year old male presenting with RUQ abdominal pain and diaphoresis, now resolved  VS reviewed, afebrile, bradycardic, WNL otherwise  DDx includes transient abdominal pain such as due to peristalsis and tenesmus, early SBO, biliary colic, acute cholecystitis/choledocholithiasis, pancreatitis, colitis, versus another etiology of symptoms such as referred pain  I also considered intraabdominal catastrophe such as AAA rupture/leak, ischemia, however, patient's current exam is completely benign  Given diaphoresis and upper abdominal pain episode, EKG obtained, no ischemic changes  CMP is with intact renal function, mild AT elevation of 67 (less than 3x ULN), Alk phos 153, and tBili 1 09 with Direct Bili of 0 38, with the AST and bilirubin elevation new compared to 1/26/2020  Trop x 1 WNL  Lactate normal at 1 5  CBC is without leukocytosis or anemia  Mild thrombocytopenia of 143 is present, patient has had this previously  Lipase not c/w acute pancreatitis  Patient is allergic to CT IV contrast dye  Given current benign exam, I do not believe patient requires angiography of the abdomen  We will cautiously defer IV contrast given that we should be able to assess for SBO on a non-contrasted study in this patient  CT obtained, no calcified gallstones or pericholecystic inflammatory change, numerous ventral abdominal and pelvic wall bowel containing hernias, no distended segments of bowel to indicate obstruction, no apparent areas of bowel wall thickening to indicate strangulation  Given complete resolution of symptoms, patient deemed safe to discharge to home with strict return precautions      Patient discharged to home with return precautions, and plan for follow up       Clinical Impression  Final diagnoses:   Abdominal pain   Ventral hernia       Discharge Medication List as of 3/25/2021  6:09 PM      CONTINUE these medications which have NOT CHANGED    Details   PHENobarbital 64 8 mg tablet TAKE 1 TABLET (64 8 MG TOTAL) BY MOUTH 2 (TWO) TIMES A DAY, Starting u 3/4/2021, Until Tue 8/31/2021, Normal      aspirin 81 MG tablet Take 1 tablet (81 mg total) by mouth daily, Starting Thu 1/23/2020, Normal      clotrimazole (LOTRIMIN) 1 % cream Apply to feet/toes 2 times daily, Print      nitroglycerin (NITROSTAT) 0 4 mg SL tablet Place 1 tablet (0 4 mg total) under the tongue every 5 (five) minutes as needed for chest pain, Starting u 1/23/2020, Normal      pravastatin (PRAVACHOL) 40 mg tablet Take 1 tablet (40 mg total) by mouth daily, Starting Thu 2/27/2020, Normal

## 2021-04-02 ENCOUNTER — HOSPITAL ENCOUNTER (INPATIENT)
Facility: HOSPITAL | Age: 76
LOS: 4 days | Discharge: HOME | DRG: 247 | End: 2021-04-07
Attending: EMERGENCY MEDICINE | Admitting: HOSPITALIST
Payer: MEDICARE

## 2021-04-02 ENCOUNTER — APPOINTMENT (EMERGENCY)
Dept: RADIOLOGY | Facility: HOSPITAL | Age: 76
DRG: 247 | End: 2021-04-02
Payer: MEDICARE

## 2021-04-02 ENCOUNTER — APPOINTMENT (OUTPATIENT)
Dept: NON INVASIVE DIAGNOSTICS | Facility: HOSPITAL | Age: 76
DRG: 247 | End: 2021-04-02
Payer: MEDICARE

## 2021-04-02 DIAGNOSIS — E78.2 MIXED HYPERLIPIDEMIA: Chronic | ICD-10-CM

## 2021-04-02 DIAGNOSIS — I50.42 CHRONIC COMBINED SYSTOLIC AND DIASTOLIC CHF (CONGESTIVE HEART FAILURE) (HCC): ICD-10-CM

## 2021-04-02 DIAGNOSIS — E87.6 HYPOKALEMIA: ICD-10-CM

## 2021-04-02 DIAGNOSIS — I21.4 NSTEMI (NON-ST ELEVATED MYOCARDIAL INFARCTION) (HCC): ICD-10-CM

## 2021-04-02 DIAGNOSIS — R07.9 CHEST PAIN: Primary | ICD-10-CM

## 2021-04-02 DIAGNOSIS — I25.10 CORONARY ARTERY DISEASE INVOLVING NATIVE CORONARY ARTERY OF NATIVE HEART WITHOUT ANGINA PECTORIS: ICD-10-CM

## 2021-04-02 DIAGNOSIS — I10 ESSENTIAL HYPERTENSION: Chronic | ICD-10-CM

## 2021-04-02 DIAGNOSIS — T50.8X5A: ICD-10-CM

## 2021-04-02 DIAGNOSIS — R77.8 ELEVATED TROPONIN: ICD-10-CM

## 2021-04-02 DIAGNOSIS — E78.5 HYPERLIPIDEMIA: ICD-10-CM

## 2021-04-02 DIAGNOSIS — Z86.79 HX OF CORONARY ARTERY DISEASE: ICD-10-CM

## 2021-04-02 LAB
ALBUMIN SERPL BCP-MCNC: 3.4 G/DL (ref 3.5–5)
ALP SERPL-CCNC: 142 U/L (ref 46–116)
ALT SERPL W P-5'-P-CCNC: 18 U/L (ref 12–78)
ANION GAP SERPL CALCULATED.3IONS-SCNC: 6 MMOL/L (ref 4–13)
APTT PPP: 32 SECONDS (ref 23–37)
AST SERPL W P-5'-P-CCNC: 9 U/L (ref 5–45)
ATRIAL RATE: 87 BPM
BASOPHILS # BLD AUTO: 0.03 THOUSANDS/ΜL (ref 0–0.1)
BASOPHILS NFR BLD AUTO: 1 % (ref 0–1)
BILIRUB SERPL-MCNC: 0.36 MG/DL (ref 0.2–1)
BUN SERPL-MCNC: 10 MG/DL (ref 5–25)
CALCIUM ALBUM COR SERPL-MCNC: 9.3 MG/DL (ref 8.3–10.1)
CALCIUM SERPL-MCNC: 8.8 MG/DL (ref 8.3–10.1)
CHLORIDE SERPL-SCNC: 107 MMOL/L (ref 100–108)
CO2 SERPL-SCNC: 24 MMOL/L (ref 21–32)
CREAT SERPL-MCNC: 0.94 MG/DL (ref 0.6–1.3)
EOSINOPHIL # BLD AUTO: 0.13 THOUSAND/ΜL (ref 0–0.61)
EOSINOPHIL NFR BLD AUTO: 3 % (ref 0–6)
ERYTHROCYTE [DISTWIDTH] IN BLOOD BY AUTOMATED COUNT: 18.5 % (ref 11.6–15.1)
GFR SERPL CREATININE-BSD FRML MDRD: 78 ML/MIN/1.73SQ M
GLUCOSE SERPL-MCNC: 171 MG/DL (ref 65–140)
HCT VFR BLD AUTO: 39.4 % (ref 36.5–49.3)
HGB BLD-MCNC: 12.7 G/DL (ref 12–17)
IMM GRANULOCYTES # BLD AUTO: 0.01 THOUSAND/UL (ref 0–0.2)
IMM GRANULOCYTES NFR BLD AUTO: 0 % (ref 0–2)
INR PPP: 1.05 (ref 0.84–1.19)
LYMPHOCYTES # BLD AUTO: 0.7 THOUSANDS/ΜL (ref 0.6–4.47)
LYMPHOCYTES NFR BLD AUTO: 18 % (ref 14–44)
MCH RBC QN AUTO: 31 PG (ref 26.8–34.3)
MCHC RBC AUTO-ENTMCNC: 32.2 G/DL (ref 31.4–37.4)
MCV RBC AUTO: 96 FL (ref 82–98)
MONOCYTES # BLD AUTO: 0.37 THOUSAND/ΜL (ref 0.17–1.22)
MONOCYTES NFR BLD AUTO: 10 % (ref 4–12)
NEUTROPHILS # BLD AUTO: 2.56 THOUSANDS/ΜL (ref 1.85–7.62)
NEUTS SEG NFR BLD AUTO: 68 % (ref 43–75)
NRBC BLD AUTO-RTO: 0 /100 WBCS
NT-PROBNP SERPL-MCNC: 83 PG/ML
P AXIS: 79 DEGREES
PLATELET # BLD AUTO: 135 THOUSANDS/UL (ref 149–390)
PMV BLD AUTO: 10.2 FL (ref 8.9–12.7)
POTASSIUM SERPL-SCNC: 3.9 MMOL/L (ref 3.5–5.3)
PR INTERVAL: 160 MS
PROT SERPL-MCNC: 7.2 G/DL (ref 6.4–8.2)
PROTHROMBIN TIME: 13.7 SECONDS (ref 11.6–14.5)
QRS AXIS: 16 DEGREES
QRSD INTERVAL: 80 MS
QT INTERVAL: 336 MS
QTC INTERVAL: 404 MS
RBC # BLD AUTO: 4.1 MILLION/UL (ref 3.88–5.62)
SODIUM SERPL-SCNC: 137 MMOL/L (ref 136–145)
T WAVE AXIS: 70 DEGREES
TROPONIN I SERPL-MCNC: 0.35 NG/ML
TROPONIN I SERPL-MCNC: 10.9 NG/ML
TROPONIN I SERPL-MCNC: 2.23 NG/ML
TROPONIN I SERPL-MCNC: 5.77 NG/ML
VENTRICULAR RATE: 87 BPM
WBC # BLD AUTO: 3.8 THOUSAND/UL (ref 4.31–10.16)

## 2021-04-02 PROCEDURE — 36415 COLL VENOUS BLD VENIPUNCTURE: CPT

## 2021-04-02 PROCEDURE — 84484 ASSAY OF TROPONIN QUANT: CPT | Performed by: STUDENT IN AN ORGANIZED HEALTH CARE EDUCATION/TRAINING PROGRAM

## 2021-04-02 PROCEDURE — 93005 ELECTROCARDIOGRAM TRACING: CPT

## 2021-04-02 PROCEDURE — 85025 COMPLETE CBC W/AUTO DIFF WBC: CPT | Performed by: EMERGENCY MEDICINE

## 2021-04-02 PROCEDURE — 99284 EMERGENCY DEPT VISIT MOD MDM: CPT | Performed by: EMERGENCY MEDICINE

## 2021-04-02 PROCEDURE — 84484 ASSAY OF TROPONIN QUANT: CPT | Performed by: EMERGENCY MEDICINE

## 2021-04-02 PROCEDURE — 83880 ASSAY OF NATRIURETIC PEPTIDE: CPT | Performed by: STUDENT IN AN ORGANIZED HEALTH CARE EDUCATION/TRAINING PROGRAM

## 2021-04-02 PROCEDURE — 85730 THROMBOPLASTIN TIME PARTIAL: CPT | Performed by: INTERNAL MEDICINE

## 2021-04-02 PROCEDURE — 71045 X-RAY EXAM CHEST 1 VIEW: CPT

## 2021-04-02 PROCEDURE — 99285 EMERGENCY DEPT VISIT HI MDM: CPT

## 2021-04-02 PROCEDURE — 85610 PROTHROMBIN TIME: CPT | Performed by: INTERNAL MEDICINE

## 2021-04-02 PROCEDURE — 93010 ELECTROCARDIOGRAM REPORT: CPT | Performed by: INTERNAL MEDICINE

## 2021-04-02 PROCEDURE — 80053 COMPREHEN METABOLIC PANEL: CPT | Performed by: EMERGENCY MEDICINE

## 2021-04-02 PROCEDURE — 1124F ACP DISCUSS-NO DSCNMKR DOCD: CPT | Performed by: EMERGENCY MEDICINE

## 2021-04-02 PROCEDURE — 84484 ASSAY OF TROPONIN QUANT: CPT | Performed by: INTERNAL MEDICINE

## 2021-04-02 PROCEDURE — 93306 TTE W/DOPPLER COMPLETE: CPT

## 2021-04-02 PROCEDURE — 93306 TTE W/DOPPLER COMPLETE: CPT | Performed by: INTERNAL MEDICINE

## 2021-04-02 RX ORDER — ASPIRIN 325 MG
325 TABLET ORAL ONCE
Status: COMPLETED | OUTPATIENT
Start: 2021-04-02 | End: 2021-04-02

## 2021-04-02 RX ORDER — ATORVASTATIN CALCIUM 80 MG/1
80 TABLET, FILM COATED ORAL
Status: DISCONTINUED | OUTPATIENT
Start: 2021-04-02 | End: 2021-04-07 | Stop reason: HOSPADM

## 2021-04-02 RX ORDER — HEPARIN SODIUM 10000 [USP'U]/100ML
3-20 INJECTION, SOLUTION INTRAVENOUS
Status: DISCONTINUED | OUTPATIENT
Start: 2021-04-02 | End: 2021-04-05

## 2021-04-02 RX ORDER — HEPARIN SODIUM 1000 [USP'U]/ML
4000 INJECTION, SOLUTION INTRAVENOUS; SUBCUTANEOUS ONCE
Status: COMPLETED | OUTPATIENT
Start: 2021-04-02 | End: 2021-04-02

## 2021-04-02 RX ORDER — NITROGLYCERIN 0.4 MG/1
0.4 TABLET SUBLINGUAL
Status: DISCONTINUED | OUTPATIENT
Start: 2021-04-02 | End: 2021-04-07 | Stop reason: HOSPADM

## 2021-04-02 RX ORDER — ATORVASTATIN CALCIUM 20 MG/1
20 TABLET, FILM COATED ORAL
Status: DISCONTINUED | OUTPATIENT
Start: 2021-04-02 | End: 2021-04-02

## 2021-04-02 RX ORDER — PRAVASTATIN SODIUM 40 MG
40 TABLET ORAL DAILY
Status: DISCONTINUED | OUTPATIENT
Start: 2021-04-02 | End: 2021-04-02

## 2021-04-02 RX ORDER — HEPARIN SODIUM 1000 [USP'U]/ML
4000 INJECTION, SOLUTION INTRAVENOUS; SUBCUTANEOUS
Status: DISCONTINUED | OUTPATIENT
Start: 2021-04-02 | End: 2021-04-05

## 2021-04-02 RX ORDER — PHENOBARBITAL 64.8 MG/1
64.8 TABLET ORAL 2 TIMES DAILY
Status: DISCONTINUED | OUTPATIENT
Start: 2021-04-02 | End: 2021-04-07 | Stop reason: HOSPADM

## 2021-04-02 RX ORDER — HEPARIN SODIUM 1000 [USP'U]/ML
2000 INJECTION, SOLUTION INTRAVENOUS; SUBCUTANEOUS
Status: DISCONTINUED | OUTPATIENT
Start: 2021-04-02 | End: 2021-04-05

## 2021-04-02 RX ORDER — ASPIRIN 81 MG/1
81 TABLET ORAL DAILY
Status: DISCONTINUED | OUTPATIENT
Start: 2021-04-03 | End: 2021-04-07 | Stop reason: HOSPADM

## 2021-04-02 RX ADMIN — HEPARIN SODIUM 4000 UNITS: 1000 INJECTION INTRAVENOUS; SUBCUTANEOUS at 19:05

## 2021-04-02 RX ADMIN — ATORVASTATIN CALCIUM 20 MG: 20 TABLET, FILM COATED ORAL at 18:17

## 2021-04-02 RX ADMIN — ENOXAPARIN SODIUM 40 MG: 40 INJECTION SUBCUTANEOUS at 18:17

## 2021-04-02 RX ADMIN — ATORVASTATIN CALCIUM 80 MG: 80 TABLET, FILM COATED ORAL at 19:54

## 2021-04-02 RX ADMIN — METOPROLOL TARTRATE 25 MG: 25 TABLET, FILM COATED ORAL at 19:54

## 2021-04-02 RX ADMIN — HEPARIN SODIUM 11.1 UNITS/KG/HR: 10000 INJECTION, SOLUTION INTRAVENOUS at 19:05

## 2021-04-02 RX ADMIN — PHENOBARBITAL 64.8 MG: 64.8 TABLET ORAL at 18:17

## 2021-04-02 RX ADMIN — ASPIRIN 325 MG ORAL TABLET 325 MG: 325 PILL ORAL at 14:29

## 2021-04-02 NOTE — ASSESSMENT & PLAN NOTE
Patient presented today after right sided chest pain that he describes as dullness/being punched  He stated that that pain started around 10 am and went away at 1130 am  The pain gradually increased before it went away with no intervention  Currently patient is pain free, denies shortness of breath or palpitations      · EKG -borderline ST elevation and T-wave inversions  · Troponin elevated from 0 35-->15 70  · Echo showed EF of 40% with grade 1 diastolic dysfunction  · BNP 83  · Cardiology on board  · Continue aspirin statin  · Continue heparin drip  · Plan cardiac catheterization on Monday  · Continue to monitor on tele

## 2021-04-02 NOTE — ED ATTENDING ATTESTATION
4/2/2021  Jonas Kahn DO, saw and evaluated the patient  I have discussed the patient with the resident/non-physician practitioner and agree with the resident's/non-physician practitioner's findings, Plan of Care, and MDM as documented in the resident's/non-physician practitioner's note, except where noted  All available labs and Radiology studies were reviewed  I was present for key portions of any procedure(s) performed by the resident/non-physician practitioner and I was immediately available to provide assistance  At this point I agree with the current assessment done in the Emergency Department  I have conducted an independent evaluation of this patient a history and physical is as follows:      68 yo male c/o R sided chest pain x 1 hour earlier today  Currently pain is resolved without intervention  States it felt like a pressure/like someone punched him  Denies associated f/c/n/v, diaphoresis, dyspnea, abd pain, leg pain or swelling, focal weakness/numbness/tingling  Hx HTN, HLD, CAD, AAA s/p repair, AGUSTIN remotely  Lungs CTAB  Heart RRR +S1S2 no mcrg  abd sndnt no r/g bs+  Ext no c/c/e calf non TTP B    Imp: R chest pain, atypical plan: cardiac eval, delta ECG+trop at 3 hours   reassess    ED Course         Critical Care Time  Procedures

## 2021-04-02 NOTE — H&P
INTERNAL MEDICINE RESIDENCY ADMISSION H&P     Name: Kaylah Sesay   Age & Sex: 67 y o  male   MRN: [id number]  Unit/Bed#: CRB   Encounter: [id number]  Primary Care Provider: Zeeshan Chapa DO    Code Status: Level 3 - DNAR and DNI  Admission Status: OBSERVATION  Disposition: Patient requires Med/Surg with Telemetry    Admit to team: SOD Team A    ASSESSMENT/PLAN     Principal Problem:    Chest pain  Active Problems:    CAD (coronary artery disease)    HTN (hypertension)    HLD (hyperlipidemia)    Seizure disorder (HCC)    S/P AAA (abdominal aortic aneurysm) repair    Ventral hernia with obstruction and without gangrene      Ventral hernia with obstruction and without gangrene  Assessment & Plan  Per outpatient notes, patient refuses to have surgery  Seizure disorder ([address] )  Assessment & Plan  Continue phenobarbital 64 8 mg BID    HLD (hyperlipidemia)  Assessment & Plan  Will start on lipitor 20 mg daily    CAD (coronary artery disease)  Assessment & Plan  S/p 2 stents with the last being in 2017  * Chest pain  Assessment & Plan  Patient presented today after right sided chest pain that he describes as dullness/being punched  He stated that that pain started around 10 am and went away at 1130 am  The pain gradually increased before it went away with no intervention  Plan:  · Will admit for ACS rule out  · Trop 0 35 on admission - Will obtain troponin x3  · EKG unrevealing - continue to monitor  · Will monitor on telemetry  · Echo ordered  · BNP pending   · Patient states that statins bother him (lipitor caused him to lose control of his bladder and pravastatin made him feel unwell) - he is agreeable to starting atorvastatin 20 mg daily and increasing if he does not have any side effects     · Will obtain lipid panel and HbA1c      CHIEF COMPLAINT     Chief Complaint   Patient presents with    Pain     had a pain in the side of his right chest/rib wall that has since gone away, lasted just a few sneha       HISTORY OF PRESENT ILLNESS     Patient is a 58-year-old male with past medical history significant for coronary artery disease status post stenting to the RCA and LAD in 2017, hypertension, seizure disorder, hyperlipidemia, triple a status post repair in , former tobacco user but quit in , and large ventral hernia  He presented with chest pain that occurred this morning that started at 10:00 a m  and stopped at 11:30 a m  Pain started after he had a shower and ate breakfast which included toast and coffee  He stated that pain was on the right side of his chest and slowly increased in severity until 11:30 am before pain completely went away  He denies radiation up to his neck and jaw and down his arms  Patient denies any chest pain prior to this with the exception of his previous heart attacks  Patient had no associated symptoms  Also denies shortness of breath, nausea, vomiting, dyspnea on exertion, paroxysmal nocturnal dyspnea, diarrhea, constipation  At this time, patient denies any chest pain  He states that he feels well  REVIEW OF SYSTEMS     Review of Systems   Constitutional: Negative  HENT: Negative  Eyes: Negative  Respiratory: Negative  Cardiovascular: Negative  Gastrointestinal: Negative  Genitourinary: Negative  Musculoskeletal: Negative  OBJECTIVE     Vitals:    21 1241 21 1330 21 1430   BP: 134/69 119/58 127/68   Pulse: 77 64 58   Resp: 19 18 17   Temp: 97 9 °F (36 6 °C)     SpO2: 94% 92% 93%   Weight: 102 kg (224 lb 13 9 oz)     Height: 6' (1 829 m)        Temperature:   Temp (24hrs), Av 9 °F (36 6 °C), Min:97 9 °F (36 6 °C), Max:97 9 °F (36 6 °C)    Temperature: 97 9 °F (36 6 °C)  Intake & Output:  I/O     None        Weights:   IBW: 77 6 kg    Body mass index is 30 5 kg/m²    Weight (last 2 days)     Date/Time   Weight    21 1241   102 (224 87)            Physical Exam  Constitutional:       Appearance: Normal appearance  HENT:      Head: Normocephalic and atraumatic  Nose: Nose normal       Mouth/Throat:      Mouth: Mucous membranes are moist       Pharynx: Oropharynx is clear  Eyes:      Extraocular Movements: Extraocular movements intact  Conjunctiva/sclera: Conjunctivae normal    Neck:      Musculoskeletal: Normal range of motion  Cardiovascular:      Rate and Rhythm: Normal rate and regular rhythm  Heart sounds: No murmur  No friction rub  No gallop  Pulmonary:      Effort: Pulmonary effort is normal  No respiratory distress  Breath sounds: Normal breath sounds  No stridor  No wheezing, rhonchi or rales  Chest:      Chest wall: No tenderness  Abdominal:      General: Bowel sounds are normal  There is no distension  Palpations: Abdomen is soft  There is no mass  Tenderness: There is no abdominal tenderness  There is no guarding or rebound  Hernia: A hernia is present  Comments: Large ventral hernia   Musculoskeletal: Normal range of motion  Comments: Trace edema in bilateral lower extremities   Skin:     General: Skin is warm and dry  Neurological:      General: No focal deficit present  Mental Status: He is alert and oriented to person, place, and time  Psychiatric:         Mood and Affect: Mood normal        PAST MEDICAL HISTORY     Past Medical History:   Diagnosis Date    Cardiac disease     [facility]est[facility]) [facility] [facility]     Hernia of abdominal cavity     Myocardial infarction ([facility] [address] [facility] )     last assessed 7/31/14, past, Pt had MI s/p AUGSTIN placed in 2001, refuses to take any other medications for his cardiac disease, only will take seizure med   Understands possible complications from this decision    Seizure [facility])      PAST SURGICAL HISTORY     Past Surgical History:   Procedure Laterality Date    ABDOMINAL AORTIC ANEURYSM REPAIR      for dialation or occulsion    CATARACT EXTRACTION       SOCIAL & FAMILY HISTORY     Social History Substance and Sexual Activity   Alcohol Use Not Currently    Frequency: Never    Binge frequency: Never     Social History     Substance and Sexual Activity   Drug Use Never     Social History     Tobacco Use   Smoking Status Former Smoker    Quit date: 6/5/2005    Years since quitting: 15 8   Smokeless Tobacco Never Used     Family History   Problem Relation Age of Onset    Hypertension Father     Kidney disease Brother      LABORATORY DATA     Labs: I have personally reviewed pertinent reports  Results from last 7 days   Lab Units 04/02/21  1309   WBC Thousand/uL 3 80*   HEMOGLOBIN g/dL 12 7   HEMATOCRIT % 39 4   PLATELETS Thousands/uL 135*   NEUTROS PCT % 68   MONOS PCT % 10      Results from last 7 days   Lab Units 04/02/21  1309   POTASSIUM mmol/L 3 9   CHLORIDE mmol/L 107   CO2 mmol/L 24   BUN mg/dL 10   CREATININE mg/dL 0 94   CALCIUM mg/dL 8 8   ALK PHOS U/L 142*   ALT U/L 18   AST U/L 9                      Results from last 7 days   Lab Units 04/02/21  1309   TROPONIN I ng/mL 0 35*     Micro:  Lab Results   Component Value Date    BLOODCX No Growth After 5 Days  01/30/2017    BLOODCX No Growth After 5 Days  01/30/2017     IMAGING & DIAGNOSTIC TESTS     Imaging: I have personally reviewed pertinent reports  Xr Chest 1 View Portable    Result Date: 4/2/2021  Impression: No acute cardiopulmonary disease  Findings are stable Workstation performed: [id number]     EKG, Pathology, and Other Studies: I have personally reviewed pertinent reports  ALLERGIES     Allergies   Allergen Reactions    Iodinated Diagnostic Agents Rash     Pt's skin get very red and he gets hot and chills    Clopidogrel      MEDICATIONS PRIOR TO ARRIVAL     Prior to Admission medications    Medication Sig Start Date End Date Taking?  Authorizing Provider   aspirin 81 MG tablet Take 1 tablet (81 mg total) by mouth daily  Patient not taking: Reported on 3/25/2021 1/23/20   Joyce Villatoro MD   clotrimazole (LOTRIMIN) 1 % cream Apply to feet/toes 2 times daily  Patient not taking: Reported on 9/24/2020 1/23/20   Melvin Odom MD   nitroglycerin (NITROSTAT) 0 4 mg SL tablet Place 1 tablet (0 4 mg total) under the tongue every 5 (five) minutes as needed for chest pain  Patient not taking: Reported on 9/24/2020 1/23/20   Melvin Odom MD   PHENobarbital 64 8 mg tablet TAKE 1 TABLET (64 8 MG TOTAL) BY MOUTH 2 (TWO) TIMES A DAY 3/4/21 8/31/21  Jessica Patel DO   pravastatin (PRAVACHOL) 40 mg tablet Take 1 tablet (40 mg total) by mouth daily  Patient not taking: Reported on 7/21/2020 2/27/20   Marybeth Abbott MD     MEDICATIONS ADMINISTERED IN LAST 24 HOURS     Medication Administration - last 24 hours from 04/01/2021 1454 to 04/02/2021 1454       Date/Time Order Dose Route Action Action by     04/02/2021 1429 aspirin tablet 325 mg 325 mg Oral Given Karine Miranda RN        CURRENT MEDICATIONS     Current Facility-Administered Medications   Medication Dose Route Frequency Provider Last Rate    [START ON 4/3/2021] aspirin  81 mg Oral Daily Felix Peterson MD      atorvastatin  20 mg Oral Daily With Hyacinth Cranker, MD      enoxaparin  40 mg Subcutaneous Daily Felix Peterson MD      nitroglycerin  0 4 mg Sublingual Q5 Min PRN Felix Peterson MD      PHENobarbital  64 8 mg Oral BID Felix Peterson MD          nitroglycerin, 0 4 mg, Q5 Min PRN        Admission Time  I spent 30 minutes admitting the patient  This involved direct patient contact where I performed a full history and physical, reviewing previous records, and reviewing laboratory and other diagnostic studies  Portions of the record may have been created with voice recognition software  Occasional wrong word or "sound a like" substitutions may have occurred due to the inherent limitations of voice recognition software    Read the chart carefully and recognize, using context, where substitutions have occurred     ==  Felix Peterson MD  [facility]  Internal Medicine Residency PGY-2

## 2021-04-02 NOTE — ED PROVIDER NOTES
History  Chief Complaint   Patient presents with    Pain     had a pain in the side of his right chest/rib wall that has since gone away, lasted just a few mintues      67-year-old male past medical history significant for coronary artery disease, hyperlipidemia, drug-eluting stents in the past presents ED today for right-sided chest pain that lasted about 1 hour  Patient said that he was doing his normal daily activities and was sitting around drinking coffee with his brother when he noted right-sided chest pain  He says that it was dull in nature and lasted for about 1 hour  He does have nitroglycerin at home however he did not take any  He said that the time the ambulance arrived his pain had dissipated  He denies any associated nausea, vomiting, diaphoresis with this event  He was concerned because this was how he presented with his previous infarctions in the past   Currently in the ED he has no chest pain  Prior to Admission Medications   Prescriptions Last Dose Informant Patient Reported? Taking?    PHENobarbital 64 8 mg tablet   No No   Sig: TAKE 1 TABLET (64 8 MG TOTAL) BY MOUTH 2 (TWO) TIMES A DAY   aspirin 81 MG tablet  Self No No   Sig: Take 1 tablet (81 mg total) by mouth daily   Patient not taking: Reported on 3/25/2021   clotrimazole (LOTRIMIN) 1 % cream  Self No No   Sig: Apply to feet/toes 2 times daily   Patient not taking: Reported on 9/24/2020   nitroglycerin (NITROSTAT) 0 4 mg SL tablet  Self No No   Sig: Place 1 tablet (0 4 mg total) under the tongue every 5 (five) minutes as needed for chest pain   Patient not taking: Reported on 9/24/2020   pravastatin (PRAVACHOL) 40 mg tablet  Self No No   Sig: Take 1 tablet (40 mg total) by mouth daily   Patient not taking: Reported on 7/21/2020      Facility-Administered Medications: None       Past Medical History:   Diagnosis Date    Cardiac disease     CHF (congestive heart failure) (HCC)     Hernia of abdominal cavity     Myocardial infarction [facility])     last assessed 7/31/14, past, Pt had MI s/p AGUSTIN placed in 2001, refuses to take any other medications for his cardiac disease, only will take seizure med  Understands possible complications from this decision    Seizure [facility])        Past Surgical History:   Procedure Laterality Date    ABDOMINAL AORTIC ANEURYSM REPAIR      for dialation or occulsion    CATARACT EXTRACTION         Family History   Problem Relation Age of Onset    Hypertension Father     Kidney disease Brother      I have reviewed and agree with the history as documented  E-Cigarette/Vaping    E-Cigarette Use Never User      E-Cigarette/Vaping Substances    Nicotine No     THC No     CBD No     Flavoring No     Other No     Unknown No      Social History     Tobacco Use    Smoking status: Former Smoker     Quit date: 6/5/2005     Years since quitting: 15 8    Smokeless tobacco: Never Used   Substance Use Topics    Alcohol use: Not Currently     Frequency: Never     Binge frequency: Never    Drug use: Never        Review of Systems   Constitutional: Negative for chills and fever  HENT: Negative for hearing loss  Eyes: Negative for visual disturbance  Respiratory: Negative for shortness of breath  Cardiovascular: Negative for chest pain  Gastrointestinal: Negative for abdominal pain, constipation, diarrhea, nausea and vomiting  Genitourinary: Negative for difficulty urinating  Musculoskeletal: Negative for myalgias  Skin: Negative for rash  Neurological: Negative for dizziness  Psychiatric/Behavioral: Negative for agitation  All other systems reviewed and are negative        Physical Exam  ED Triage Vitals [04/02/21 1241]   Temperature Pulse Respirations Blood Pressure SpO2   97 9 °F (36 6 °C) 77 19 134/69 94 %      Temp src Heart Rate Source Patient Position - Orthostatic VS BP Location FiO2 (%)   -- -- -- -- --      Pain Score       --             Orthostatic Vital Signs  Vitals: 04/02/21 1241   BP: 134/69   Pulse: 77       Physical Exam  Vitals signs and nursing note reviewed  Constitutional:       General: He is not in acute distress  Appearance: Normal appearance  He is not ill-appearing  HENT:      Head: Normocephalic and atraumatic  Right Ear: External ear normal       Left Ear: External ear normal       Nose: Nose normal       Mouth/Throat:      Mouth: Mucous membranes are moist       Pharynx: Oropharynx is clear  No oropharyngeal exudate  Eyes:      Extraocular Movements: Extraocular movements intact  Conjunctiva/sclera: Conjunctivae normal       Pupils: Pupils are equal, round, and reactive to light  Neck:      Musculoskeletal: Normal range of motion and neck supple  No neck rigidity  Cardiovascular:      Rate and Rhythm: Normal rate and regular rhythm  Pulses: Normal pulses  Heart sounds: Normal heart sounds  Pulmonary:      Effort: Pulmonary effort is normal       Breath sounds: Normal breath sounds  Abdominal:      General: Abdomen is flat  Bowel sounds are normal  There is no distension  Palpations: Abdomen is soft  Tenderness: There is no abdominal tenderness  Musculoskeletal: Normal range of motion  General: No swelling  Skin:     General: Skin is warm and dry  Capillary Refill: Capillary refill takes less than 2 seconds  Neurological:      General: No focal deficit present  Mental Status: He is alert and oriented to person, place, and time  Mental status is at baseline  Motor: No weakness     Psychiatric:         Mood and Affect: Mood normal          Behavior: Behavior normal          ED Medications  Medications   aspirin tablet 325 mg (has no administration in time range)       Diagnostic Studies  Results Reviewed     Procedure Component Value Units Date/Time    Troponin I [[id number]]  (Abnormal) Collected: 04/02/21 1309    Lab Status: Final result Specimen: Blood from Arm, Left Updated: 04/02/21 2955 Troponin I 0 35 ng/mL     Comprehensive metabolic panel [[id number]]  (Abnormal) Collected: 04/02/21 1309    Lab Status: Final result Specimen: Blood from Arm, Left Updated: 04/02/21 1342     Sodium 137 mmol/L      Potassium 3 9 mmol/L      Chloride 107 mmol/L      CO2 24 mmol/L      ANION GAP 6 mmol/L      BUN 10 mg/dL      Creatinine 0 94 mg/dL      Glucose 171 mg/dL      Calcium 8 8 mg/dL      Corrected Calcium 9 3 mg/dL      AST 9 U/L      ALT 18 U/L      Alkaline Phosphatase 142 U/L      Total Protein 7 2 g/dL      Albumin 3 4 g/dL      Total Bilirubin 0 36 mg/dL      eGFR 78 ml/min/1 73sq m     Narrative:      National Kidney Disease Foundation guidelines for Chronic Kidney Disease (CKD):     Stage 1 with normal or high GFR (GFR > 90 mL/min/1 73 square meters)    Stage 2 Mild CKD (GFR = 60-89 mL/min/1 73 square meters)    Stage 3A Moderate CKD (GFR = 45-59 mL/min/1 73 square meters)    Stage 3B Moderate CKD (GFR = 30-44 mL/min/1 73 square meters)    Stage 4 Severe CKD (GFR = 15-29 mL/min/1 73 square meters)    Stage 5 End Stage CKD (GFR <15 mL/min/1 73 square meters)  Note: GFR calculation is accurate only with a steady state creatinine    CBC and differential [[id number]]  (Abnormal) Collected: 04/02/21 1309    Lab Status: Final result Specimen: Blood from Arm, Left Updated: 04/02/21 1325     WBC 3 80 Thousand/uL      RBC 4 10 Million/uL      Hemoglobin 12 7 g/dL      Hematocrit 39 4 %      MCV 96 fL      MCH 31 0 pg      MCHC 32 2 g/dL      RDW 18 5 %      MPV 10 2 fL      Platelets 701 Thousands/uL      nRBC 0 /100 WBCs      Neutrophils Relative 68 %      Immat GRANS % 0 %      Lymphocytes Relative 18 %      Monocytes Relative 10 %      Eosinophils Relative 3 %      Basophils Relative 1 %      Neutrophils Absolute 2 56 Thousands/µL      Immature Grans Absolute 0 01 Thousand/uL      Lymphocytes Absolute 0 70 Thousands/µL      Monocytes Absolute 0 37 Thousand/µL      Eosinophils Absolute 0 13 Thousand/µL      Basophils Absolute 0 03 Thousands/µL                  XR chest 1 view portable    (Results Pending)         Procedures  Procedures      ED Course  ED Course as of Apr 02 1418   Fri Apr 02, 2021   1402 Will give the patient 325 aspirin     Troponin I(!): 0 35   1416 Admitted to SOD to Dr Claudette Monas, obs with Tele  Identification of Seniors at Risk      Most Recent Value   (ISAR) Identification of Seniors at Risk   Before the illness or injury that brought you to the Emergency, did you need someone to help you on a regular basis? 0 Filed at: 04/02/2021 1244   In the last 24 hours, have you needed more help than usual?  0 Filed at: 04/02/2021 1244   Have you been hospitalized for one or more nights during the past 6 months? 0 Filed at: 04/02/2021 1244   In general, do you see well?  0 Filed at: 04/02/2021 1244   In general, do you have serious problems with your memory? 0 Filed at: 04/02/2021 1244   Do you take more than three different medications every day? 1 Filed at: 04/02/2021 1244   ISAR Score  1 Filed at: 04/02/2021 1244                              MDM  Number of Diagnoses or Management Options  Chest pain:   Elevated troponin:   Hx of coronary artery disease:   Hyperlipidemia:   Diagnosis management comments: 69-year-old male past medical history of coronary artery disease who presents ED today with 1 hour of right-sided chest pain  At this time will evaluate with a cardiac workup including a CBC, CMP, troponin, 12 lead EKG, a chest x-ray  Patient will likely require admission due to multiple cardiac risk factors  Ultimately patient was admitted to a study for elevated troponin and for ACS rule out        Disposition  Final diagnoses:   Chest pain   Elevated troponin   Hx of coronary artery disease   Hyperlipidemia     Time reflects when diagnosis was documented in both MDM as applicable and the Disposition within this note     Time User Action Codes Description Comment 4/2/2021  2:10 PM Ralph Elisa Add [R07 9] Chest pain     4/2/2021  2:10 PM Ralph Elisa Add [R77 8] Elevated troponin     4/2/2021  2:10 PM Ralph Elisa Add [Z86 79] Hx of coronary artery disease     4/2/2021  2:11 PM Ralph Elisa Add [E78 5] Hyperlipidemia       ED Disposition     ED Disposition Condition Date/Time Comment    Admit Stable Fri Apr 2, 2021  2:17 PM Case was discussed with Dr Audrey Mcfarlane of SOD and the patient's admission status was agreed to be Admission Status: observation status to the service of Dr Yariel Rice   Follow-up Information    None         Patient's Medications   Discharge Prescriptions    No medications on file     No discharge procedures on file  PDMP Review       Value Time User    PDMP Reviewed  Yes 1/23/2020  1:58 PM Aki Maya MD           ED Provider  Attending physically available and evaluated Kirk Salcido I managed the patient along with the ED Attending      Electronically Signed by         Mackenzie Das MD  04/02/21 5809 radiology results need for outpatient follow-up/radiology results

## 2021-04-02 NOTE — PLAN OF CARE
Problem: PAIN - ADULT  Goal: Verbalizes/displays adequate comfort level or baseline comfort level  Description: Interventions:  - Encourage patient to monitor pain and request assistance  - Assess pain using appropriate pain scale  - Administer analgesics based on type and severity of pain and evaluate response  - Implement non-pharmacological measures as appropriate and evaluate response  - Consider cultural and social influences on pain and pain management  - Notify physician/advanced practitioner if interventions unsuccessful or patient reports new pain  Outcome: Progressing     Problem: INFECTION - ADULT  Goal: Absence or prevention of progression during hospitalization  Description: INTERVENTIONS:  - Assess and monitor for signs and symptoms of infection  - Monitor lab/diagnostic results  - Monitor all insertion sites, i e  indwelling lines, tubes, and drains  - Monitor endotracheal if appropriate and nasal secretions for changes in amount and color  - [place] appropriate cooling/warming therapies per order  - Administer medications as ordered  - Instruct and encourage patient and family to use good hand hygiene technique  - Identify and instruct in appropriate isolation precautions for identified infection/condition  Outcome: Progressing  Goal: Absence of fever/infection during neutropenic period  Description: INTERVENTIONS:  - Monitor WBC    Outcome: Progressing     Problem: SAFETY ADULT  Goal: Patient will remain free of falls  Description: INTERVENTIONS:  - Assess patient frequently for physical needs  -  Identify cognitive and physical deficits and behaviors that affect risk of falls    -  [place] fall precautions as indicated by assessment   - Educate patient/family on patient safety including physical limitations  - Instruct patient to call for assistance with activity based on assessment  - Modify environment to reduce risk of injury  - Consider OT/PT consult to assist with strengthening/mobility  Outcome: Progressing  Goal: Maintain or return to baseline ADL function  Description: INTERVENTIONS:  -  Assess patient's ability to carry out ADLs; assess patient's baseline for ADL function and identify physical deficits which impact ability to perform ADLs (bathing, care of mouth/teeth, toileting, grooming, dressing, etc )  - Assess/evaluate cause of self-care deficits   - Assess range of motion  - Assess patient's mobility; develop plan if impaired  - Assess patient's need for assistive devices and provide as appropriate  - Encourage maximum independence but intervene and supervise when necessary  - Involve family in performance of ADLs  - Assess for home care needs following discharge   - Consider OT consult to assist with ADL evaluation and planning for discharge  - Provide patient education as appropriate  Outcome: Progressing  Goal: Maintain or return mobility status to optimal level  Description: INTERVENTIONS:  - Assess patient's baseline mobility status (ambulation, transfers, stairs, etc )    - Identify cognitive and physical deficits and behaviors that affect mobility  - Identify mobility aids required to assist with transfers and/or ambulation (gait belt, sit-to-stand, lift, walker, cane, etc )  - [place] fall precautions as indicated by assessment  - Record patient progress and toleration of activity level on Mobility SBAR; progress patient to next Phase/Stage  - Instruct patient to call for assistance with activity based on assessment  - Consider rehabilitation consult to assist with strengthening/weightbearing, etc   Outcome: Progressing     Problem: DISCHARGE PLANNING  Goal: Discharge to home or other facility with appropriate resources  Description: INTERVENTIONS:  - Identify barriers to discharge w/patient and caregiver  - Arrange for needed discharge resources and transportation as appropriate  - Identify discharge learning needs (meds, wound care, etc )  - Arrange for interpretive services to assist at discharge as needed  - Refer to Case Management Department for coordinating discharge planning if the patient needs post-hospital services based on physician/advanced practitioner order or complex needs related to functional status, cognitive ability, or social support system  Outcome: Progressing     Problem: Knowledge Deficit  Goal: Patient/family/caregiver demonstrates understanding of disease process, treatment plan, medications, and discharge instructions  Description: Complete learning assessment and assess knowledge base    Interventions:  - Provide teaching at level of understanding  - Provide teaching via preferred learning methods  Outcome: Progressing     Problem: CARDIOVASCULAR - ADULT  Goal: Maintains optimal cardiac output and hemodynamic stability  Description: INTERVENTIONS:  - Monitor I/O, vital signs and rhythm  - Monitor for S/S and trends of decreased cardiac output  - Administer and titrate ordered vasoactive medications to optimize hemodynamic stability  - Assess quality of pulses, skin color and temperature  - Assess for signs of decreased coronary artery perfusion  - Instruct patient to report change in severity of symptoms  Outcome: Progressing  Goal: Absence of cardiac dysrhythmias or at baseline rhythm  Description: INTERVENTIONS:  - Continuous cardiac monitoring, vital signs, obtain 12 lead EKG if ordered  - Administer antiarrhythmic and heart rate control medications as ordered  - Monitor electrolytes and administer replacement therapy as ordered  Outcome: Progressing

## 2021-04-03 PROBLEM — I21.4 NSTEMI (NON-ST ELEVATED MYOCARDIAL INFARCTION) (HCC): Status: ACTIVE | Noted: 2021-04-02

## 2021-04-03 LAB
APTT PPP: 53 SECONDS (ref 23–37)
APTT PPP: 79 SECONDS (ref 23–37)
APTT PPP: 83 SECONDS (ref 23–37)
ATRIAL RATE: 100 BPM
ATRIAL RATE: 61 BPM
ATRIAL RATE: 64 BPM
CHOLEST SERPL-MCNC: 228 MG/DL (ref 50–200)
EST. AVERAGE GLUCOSE BLD GHB EST-MCNC: 114 MG/DL
HBA1C MFR BLD: 5.6 %
HDLC SERPL-MCNC: 51 MG/DL
LDLC SERPL CALC-MCNC: 162 MG/DL (ref 0–100)
NONHDLC SERPL-MCNC: 177 MG/DL
P AXIS: 23 DEGREES
P AXIS: 48 DEGREES
PR INTERVAL: 184 MS
PR INTERVAL: 194 MS
QRS AXIS: -2 DEGREES
QRS AXIS: -2 DEGREES
QRS AXIS: -25 DEGREES
QRSD INTERVAL: 68 MS
QRSD INTERVAL: 82 MS
QRSD INTERVAL: 90 MS
QT INTERVAL: 342 MS
QT INTERVAL: 388 MS
QT INTERVAL: 394 MS
QTC INTERVAL: 349 MS
QTC INTERVAL: 390 MS
QTC INTERVAL: 406 MS
T WAVE AXIS: -7 DEGREES
T WAVE AXIS: 39 DEGREES
T WAVE AXIS: 64 DEGREES
TRIGL SERPL-MCNC: 77 MG/DL
TROPONIN I SERPL-MCNC: 15.7 NG/ML
TROPONIN I SERPL-MCNC: 15.8 NG/ML
VENTRICULAR RATE: 61 BPM
VENTRICULAR RATE: 63 BPM
VENTRICULAR RATE: 64 BPM

## 2021-04-03 PROCEDURE — 83036 HEMOGLOBIN GLYCOSYLATED A1C: CPT | Performed by: STUDENT IN AN ORGANIZED HEALTH CARE EDUCATION/TRAINING PROGRAM

## 2021-04-03 PROCEDURE — 93005 ELECTROCARDIOGRAM TRACING: CPT

## 2021-04-03 PROCEDURE — 84484 ASSAY OF TROPONIN QUANT: CPT | Performed by: INTERNAL MEDICINE

## 2021-04-03 PROCEDURE — 99223 1ST HOSP IP/OBS HIGH 75: CPT | Performed by: INTERNAL MEDICINE

## 2021-04-03 PROCEDURE — 85730 THROMBOPLASTIN TIME PARTIAL: CPT | Performed by: HOSPITALIST

## 2021-04-03 PROCEDURE — NC001 PR NO CHARGE: Performed by: HOSPITALIST

## 2021-04-03 PROCEDURE — 93010 ELECTROCARDIOGRAM REPORT: CPT | Performed by: INTERNAL MEDICINE

## 2021-04-03 PROCEDURE — 80061 LIPID PANEL: CPT | Performed by: STUDENT IN AN ORGANIZED HEALTH CARE EDUCATION/TRAINING PROGRAM

## 2021-04-03 PROCEDURE — 99221 1ST HOSP IP/OBS SF/LOW 40: CPT | Performed by: HOSPITALIST

## 2021-04-03 RX ADMIN — HEPARIN SODIUM 13.1 UNITS/KG/HR: 10000 INJECTION, SOLUTION INTRAVENOUS at 15:39

## 2021-04-03 RX ADMIN — HEPARIN SODIUM 2000 UNITS: 1000 INJECTION INTRAVENOUS; SUBCUTANEOUS at 01:49

## 2021-04-03 RX ADMIN — TICAGRELOR 180 MG: 90 TABLET ORAL at 14:04

## 2021-04-03 RX ADMIN — TICAGRELOR 90 MG: 90 TABLET ORAL at 22:32

## 2021-04-03 RX ADMIN — ASPIRIN 81 MG: 81 TABLET, COATED ORAL at 08:19

## 2021-04-03 RX ADMIN — PHENOBARBITAL 64.8 MG: 64.8 TABLET ORAL at 22:32

## 2021-04-03 RX ADMIN — METOPROLOL TARTRATE 25 MG: 25 TABLET, FILM COATED ORAL at 08:19

## 2021-04-03 RX ADMIN — PHENOBARBITAL 64.8 MG: 64.8 TABLET ORAL at 08:19

## 2021-04-03 RX ADMIN — ATORVASTATIN CALCIUM 80 MG: 80 TABLET, FILM COATED ORAL at 15:38

## 2021-04-03 RX ADMIN — METOPROLOL TARTRATE 25 MG: 25 TABLET, FILM COATED ORAL at 22:32

## 2021-04-03 NOTE — CONSULTS
Consultation - Cardiology   Steph Blunt 67 y o  male MRN: [id number]  Unit/Bed#: [facility] 704-01 Encounter: [id number]      Assessment and Plan:  Principal Problem:    Chest pain  Active Problems:    CAD (coronary artery disease)    HTN (hypertension)    HLD (hyperlipidemia)    Seizure disorder (HCC)    S/P AAA (abdominal aortic aneurysm) repair    Ventral hernia with obstruction and without gangrene      # CAD  # NSTEMI  Presentation of chest pain  Cardiac catheterization 2017:   20% distal left main stenosis, 30% stenosis at the side of Mid LAD stent, 30% prox and mid circ stenosis,  100% prox RCA stenosis status post AGUSTIN placement  TTE 04/02 EF 40 % with distinct regional wall motion abnormalities which is reduced from 55% from prior echo in 2017  - chest x-ray with mild pulmonary vascular congestion  - troponin peak at 15 8  EKG with dynamic ST T wave changes  New biphasic T-waves in anterior precordial leads  The above findings are concerning for LAD distribution ACS  Takotsubo is unlikely differential given the echo  - status post aspirin load on 2nd  Continue with aspirin 81 mg once a day  - Load with brillinta f/b DAPT  - continue with heparin drip  - patient was on pravastatin 40 mg at home  LDL on admission was 160 daily which is above goal   Currently on 80 mg once a day atorvastatin which should be continued  Reassess LDL outpatient  - continue metoprolol 25 mg twice daily  Escalate as tolerated  - cardiac catheterization on Monday  And P2 Sunday overnight    # abdominal aortic aneurysm status post repair  # hypertension  # dyslipidemia  # seizure disorder    History of Present Illness   Physician Requesting Consult: Stan Tanner MD  Reason for Consult / Principal Problem:  Chest pain  HPI: Steph Blunt is a 67y o  year old male with above-mentioned cardiac history start of chest pain yesterday after taking a shower    It was as if someone punched him in the chest   It was on the right side of the chest   It lasted for 45 minutes  Due to nonresolution he was prompted to come to the emergency room  He has had 2 heart attacks in the past which presented as indigestion  He lives with his brother and his generally active  He denies any active chest pain shortness of breath or palpitations  Telemetry shows infrequent NSVTs  He is hemodynamically stable  Inpatient consult to Cardiology     Performed by  Sarah Ordonez MD     Authorized by Andrea Diallo DO              Review of Systems:  Review of Systems  All negative except as mentioned above    Historical Information   Past Medical History:   Diagnosis Date    Cardiac disease     CHF (congestive heart failure) ([address] )     Hernia of abdominal cavity     Myocardial infarction [facility])     last assessed 7/31/14, past, Pt had MI s/p AGUSTIN placed in 2001, refuses to take any other medications for his cardiac disease, only will take seizure med  Understands possible complications from this decision    [facility])      Past Surgical History:   Procedure Laterality Date    ABDOMINAL AORTIC ANEURYSM REPAIR      for dialation or occulsion    CATARACT EXTRACTION       Social History     Substance and Sexual Activity   Alcohol Use Not Currently    Frequency: Never    Binge frequency: Never     Social History     Substance and Sexual Activity   Drug Use Never     Social History     Tobacco Use   Smoking Status Former Smoker    Quit date: 6/5/2005    Years since quitting: 15 8   Smokeless Tobacco Never Used     Family History: non-contributory    Meds/Allergies   all current active meds have been reviewed  Allergies   Allergen Reactions    Iodinated Diagnostic Agents Rash     Pt's skin get very red and he gets hot and chills    Clopidogrel        Objective   Vitals: Blood pressure 117/65, pulse 66, temperature 98 2 °F (36 8 °C), resp  rate 18, height 6' (1 829 m), weight 102 kg (225 lb 8 5 oz), SpO2 93 %  , Body mass index is 30 59 kg/m² ,   Orthostatic Blood Pressures      Most Recent Value   Blood Pressure  117/65 filed at 04/03/2021 0750          No intake or output data in the 24 hours ending 04/03/21 0804    Invasive Devices     Peripheral Intravenous Line            Peripheral IV 04/02/21 Right Forearm less than 1 day                    Physical Exam:  Physical Exam  General: alert, oriented X 3 , comfortable  Neck: No JVD  Cardiac: normal S1, S2, no m/r/g  Respiratory: normal breath sounds, no wheezes or crackles  Abdomen: soft and non-tender  Extremities: warm, no cyanosis, no lower extremity edema      Lab Results:     Lab Results   Component Value Date    TROPONINI 15 70 (H) 04/03/2021    TROPONINI 15 80 (H) 04/03/2021    TROPONINI 10 90 (H) 04/02/2021       Lab Results   Component Value Date    GLUCOSE 125 01/28/2017    CALCIUM 8 8 04/02/2021    K 3 9 04/02/2021    CO2 24 04/02/2021     04/02/2021    BUN 10 04/02/2021    CREATININE 0 94 04/02/2021       Lab Results   Component Value Date    WBC 3 80 (L) 04/02/2021    HGB 12 7 04/02/2021    HCT 39 4 04/02/2021    MCV 96 04/02/2021     (L) 04/02/2021       No results found for: CHOL  Lab Results   Component Value Date    HDL 51 04/03/2021    HDL 43 04/30/2019    HDL 52 11/27/2018     Lab Results   Component Value Date    LDLCALC 162 (H) 04/03/2021    LDLCALC 147 (H) 04/30/2019    LDLCALC 167 (H) 11/27/2018     Lab Results   Component Value Date    TRIG 77 04/03/2021    TRIG 117 04/30/2019    TRIG 63 11/27/2018       Lab Results   Component Value Date    ALT 18 04/02/2021    AST 9 04/02/2021       Results from last 7 days   Lab Units 04/02/21  1916   INR  1 05         Imaging: I have personally reviewed pertinent reports

## 2021-04-03 NOTE — PLAN OF CARE
Problem: PAIN - ADULT  Goal: Verbalizes/displays adequate comfort level or baseline comfort level  Description: Interventions:  - Encourage patient to monitor pain and request assistance  - Assess pain using appropriate pain scale  - Administer analgesics based on type and severity of pain and evaluate response  - Implement non-pharmacological measures as appropriate and evaluate response  - Consider cultural and social influences on pain and pain management  - Notify physician/advanced practitioner if interventions unsuccessful or patient reports new pain  Outcome: Progressing     Problem: INFECTION - ADULT  Goal: Absence or prevention of progression during hospitalization  Description: INTERVENTIONS:  - Assess and monitor for signs and symptoms of infection  - Monitor lab/diagnostic results  - Monitor all insertion sites, i e  indwelling lines, tubes, and drains  - Monitor endotracheal if appropriate and nasal secretions for changes in amount and color  - [place] appropriate cooling/warming therapies per order  - Administer medications as ordered  - Instruct and encourage patient and family to use good hand hygiene technique  - Identify and instruct in appropriate isolation precautions for identified infection/condition  Outcome: Progressing  Goal: Absence of fever/infection during neutropenic period  Description: INTERVENTIONS:  - Monitor WBC    Outcome: Progressing     Problem: SAFETY ADULT  Goal: Patient will remain free of falls  Description: INTERVENTIONS:  - Assess patient frequently for physical needs  -  Identify cognitive and physical deficits and behaviors that affect risk of falls    -  [place] fall precautions as indicated by assessment   - Educate patient/family on patient safety including physical limitations  - Instruct patient to call for assistance with activity based on assessment  - Modify environment to reduce risk of injury  - Consider OT/PT consult to assist with strengthening/mobility  Outcome: Progressing  Goal: Maintain or return to baseline ADL function  Description: INTERVENTIONS:  -  Assess patient's ability to carry out ADLs; assess patient's baseline for ADL function and identify physical deficits which impact ability to perform ADLs (bathing, care of mouth/teeth, toileting, grooming, dressing, etc )  - Assess/evaluate cause of self-care deficits   - Assess range of motion  - Assess patient's mobility; develop plan if impaired  - Assess patient's need for assistive devices and provide as appropriate  - Encourage maximum independence but intervene and supervise when necessary  - Involve family in performance of ADLs  - Assess for home care needs following discharge   - Consider OT consult to assist with ADL evaluation and planning for discharge  - Provide patient education as appropriate  Outcome: Progressing  Goal: Maintain or return mobility status to optimal level  Description: INTERVENTIONS:  - Assess patient's baseline mobility status (ambulation, transfers, stairs, etc )    - Identify cognitive and physical deficits and behaviors that affect mobility  - Identify mobility aids required to assist with transfers and/or ambulation (gait belt, sit-to-stand, lift, walker, cane, etc )  - [place] fall precautions as indicated by assessment  - Record patient progress and toleration of activity level on Mobility SBAR; progress patient to next Phase/Stage  - Instruct patient to call for assistance with activity based on assessment  - Consider rehabilitation consult to assist with strengthening/weightbearing, etc   Outcome: Progressing     Problem: DISCHARGE PLANNING  Goal: Discharge to home or other facility with appropriate resources  Description: INTERVENTIONS:  - Identify barriers to discharge w/patient and caregiver  - Arrange for needed discharge resources and transportation as appropriate  - Identify discharge learning needs (meds, wound care, etc )  - Arrange for interpretive services to assist at discharge as needed  - Refer to Case Management Department for coordinating discharge planning if the patient needs post-hospital services based on physician/advanced practitioner order or complex needs related to functional status, cognitive ability, or social support system  Outcome: Progressing     Problem: Knowledge Deficit  Goal: Patient/family/caregiver demonstrates understanding of disease process, treatment plan, medications, and discharge instructions  Description: Complete learning assessment and assess knowledge base  Interventions:  - Provide teaching at level of understanding  - Provide teaching via preferred learning methods  Outcome: Progressing     Problem: CARDIOVASCULAR - ADULT  Goal: Maintains optimal cardiac output and hemodynamic stability  Description: INTERVENTIONS:  - Monitor I/O, vital signs and rhythm  - Monitor for S/S and trends of decreased cardiac output  - Administer and titrate ordered vasoactive medications to optimize hemodynamic stability  - Assess quality of pulses, skin color and temperature  - Assess for signs of decreased coronary artery perfusion  - Instruct patient to report change in severity of symptoms  Outcome: Progressing  Goal: Absence of cardiac dysrhythmias or at baseline rhythm  Description: INTERVENTIONS:  - Continuous cardiac monitoring, vital signs, obtain 12 lead EKG if ordered  - Administer antiarrhythmic and heart rate control medications as ordered  - Monitor electrolytes and administer replacement therapy as ordered  Outcome: Progressing     Problem: Potential for Falls  Goal: Patient will remain free of falls  Description: INTERVENTIONS:  - Assess patient frequently for physical needs  -  Identify cognitive and physical deficits and behaviors that affect risk of falls    -  [place] fall precautions as indicated by assessment   - Educate patient/family on patient safety including physical limitations  - Instruct patient to call for assistance with activity based on assessment  - Modify environment to reduce risk of injury  - Consider OT/PT consult to assist with strengthening/mobility  Outcome: Progressing

## 2021-04-03 NOTE — UTILIZATION REVIEW
Initial Clinical Review    Admission: Date/Time/Statement:   Admission Orders (From admission, onward)     Ordered        04/02/21 1418  Place in Observation  Once                   Orders Placed This Encounter   Procedures    Place in Observation     Standing Status:   Standing     Number of Occurrences:   1     Order Specific Question:   Level of Care     Answer:   Med Surg [16]     ED Arrival Information     Expected Arrival Acuity Means of Arrival Escorted By Service Admission Type    - 4/2/2021 12:33 Urgent Ambulance [facility] General Medicine Urgent    Arrival Complaint    flank pain        Chief Complaint   Patient presents with    Pain     had a pain in the side of his right chest/rib wall that has since gone away, lasted just a few mintues      Assessment/Plan:  66 y/o male with PMHx for CAD s/p stenting to the RCA and LAD in 2017, hypertension, seizure disorder, hyperlipidemia, triple a status post repair in 2005, former tobacco user but quit in 2005, and large ventral hernia  He presented with chest pain that occurred this morning that started at 10:00 a m  and stopped at 11:30 a m  Pain started after he had a shower and ate breakfast which included toast and coffee  He stated that pain was on the right side of his chest and slowly increased in severity until 11:30 am before pain completely went away  He denies radiation up to his neck and jaw and down his arms  Patient denies any chest pain prior to this with the exception of his previous heart attacks  Patient had no associated symptoms  Also denies shortness of breath, nausea, vomiting, dyspnea on exertion, paroxysmal nocturnal dyspnea, diarrhea, constipation    Admit observation to M/S/Tele unit --   * Chest pain  Assessment & Plan  Patient presented today after right sided chest pain that he describes as dullness/being punched   He stated that that pain started around 10 am and went away at 1130 am  The pain gradually increased before it went away with no intervention       Plan:  · Will admit for ACS rule out  · Trop 0 35 on admission - Will obtain troponin x3  · EKG unrevealing - continue to monitor  · Will monitor on telemetry  · Echo ordered  · BNP pending   · Patient states that statins bother him (lipitor caused him to lose control of his bladder and pravastatin made him feel unwell) - he is agreeable to starting atorvastatin 20 mg daily and increasing if he does not have any side effects     · Will obtain lipid panel and HbA1c      ED Triage Vitals   Temperature Pulse Respirations Blood Pressure SpO2   04/02/21 1241 04/02/21 1241 04/02/21 1241 04/02/21 1241 04/02/21 1241   97 9 °F (36 6 °C) 77 19 134/69 94 %      Temp src Heart Rate Source Patient Position - Orthostatic VS BP Location FiO2 (%)   -- 04/02/21 1330 -- -- --    Monitor         Pain Score       04/02/21 1747       No Pain          Wt Readings from Last 1 Encounters:   04/02/21 102 kg (225 lb 8 5 oz)     Additional Vital Signs:   Date/Time  Temp  Pulse  Resp  BP  MAP (mmHg)  SpO2  O2 Device   04/03/21 07:50:13  98 2 °F (36 8 °C)  66  18  117/65  82  93 %  --   04/03/21 0735  --  --  --  --  --  --  None (Room air)   04/03/21 03:12:08  97 6 °F (36 4 °C)  64  --  118/65  83  94 %  --   04/02/21 2143  97 2 °F (36 2 °C)Abnormal   82  --  114/65  81  94 %  --   04/02/21 2000  --  --  --  --  --  --  None (Room air)   04/02/21 19:56:08  --  75  --  112/64  80  91 %  --   04/02/21 1759  --  --  --  --  --  --  None (Room air)   04/02/21 1752  --  60  17  147/63  91  96 %  --   04/02/21 1600  --  58  14  103/55  76  93 %  --   04/02/21 1430  --  58  17  127/68  89  93 %  --   04/02/21 1330  --  64  18  119/58  --  92 %  None (Room air)   04/02/21 1241  97 9 °F (36 6 °C)  77  19  134/69  --  94 %  None (Room air)       Pertinent Labs/Diagnostic Test Results:   EKG 4/2 -- Normal sinus rhythm  Low voltage QRS  Cannot rule out Anterior infarct (cited on or before 25-MAR-2021)  Abnormal ECG    CXR 4/2 -- No acute cardiopulmonary disease  Results from last 7 days   Lab Units 04/02/21  1309   WBC Thousand/uL 3 80*   HEMOGLOBIN g/dL 12 7   HEMATOCRIT % 39 4   PLATELETS Thousands/uL 135*   NEUTROS ABS Thousands/µL 2 56     Results from last 7 days   Lab Units 04/02/21  1309   SODIUM mmol/L 137   POTASSIUM mmol/L 3 9   CHLORIDE mmol/L 107   CO2 mmol/L 24   ANION GAP mmol/L 6   BUN mg/dL 10   CREATININE mg/dL 0 94   EGFR ml/min/1 73sq m 78   CALCIUM mg/dL 8 8     Results from last 7 days   Lab Units 04/02/21  1309   AST U/L 9   ALT U/L 18   ALK PHOS U/L 142*   TOTAL PROTEIN g/dL 7 2   ALBUMIN g/dL 3 4*   TOTAL BILIRUBIN mg/dL 0 36     Results from last 7 days   Lab Units 04/02/21  1309   GLUCOSE RANDOM mg/dL 171*       Results from last 7 days   Lab Units 04/03/21  0437   HEMOGLOBIN A1C % 5 6   EAG mg/dl 114     Results from last 7 days   Lab Units 04/03/21  0437 04/03/21  0103 04/02/21  2201 04/02/21  1916 04/02/21  1628 04/02/21  1309   TROPONIN I ng/mL 15 70* 15 80* 10 90* 5 77* 2 23* 0 35*     Results from last 7 days   Lab Units 04/03/21  0103 04/02/21  1916   PROTIME seconds  --  13 7   INR   --  1 05   PTT seconds 53* 32     Results from last 7 days   Lab Units 04/02/21  1309   NT-PRO BNP pg/mL 83           ED Treatment:   Medication Administration from 04/02/2021 1233 to 04/02/2021 1742       Date/Time Order Dose Route Action     04/02/2021 1429 aspirin tablet 325 mg 325 mg Oral Given        Past Medical History:   Diagnosis Date    Cardiac disease     CHF [facility]congestive heart failure) ([facility] [address] )     Hernia of abdominal cavity     Myocardial infarction ([facility] [address] )     last assessed 7/31/14, past, Pt had MI s/p AGUSTIN placed in 2001, refuses to take any other medications for his cardiac disease, only will take seizure med   Understands possible complications from this decision    [facility])      Present on Admission:   HTN (hypertension)   HLD (hyperlipidemia)   Se[facility] disorder ([facility] [address] )   CAD (coronary artery disease)   Ventral hernia with obstruction and without gangrene   Chest pain      Admitting Diagnosis: Hyperlipidemia [E78 5]  Chest pain [R07 9]  Elevated troponin [R77 8]  Flank pain [R10 9]  Hx of coronary artery disease [Z86 79]  Age/Sex: 67 y o  male  Admission Orders:  Scheduled Medications:  aspirin, 81 mg, Oral, Daily  atorvastatin, 80 mg, Oral, Daily With Dinner  metoprolol tartrate, 25 mg, Oral, Q12H ADENIKE  PHENobarbital, 64 8 mg, Oral, BID      Continuous IV Infusions:  heparin (porcine), 3-20 Units/kg/hr (Order-Specific), Intravenous, Titrated      PRN Meds:  heparin (porcine), 2,000 Units, Intravenous, Q1H PRN  heparin (porcine), 4,000 Units, Intravenous, Q1H PRN  nitroglycerin, 0 4 mg, Sublingual, Q5 Min PRN        IP CONSULT TO CASE MANAGEMENT  IP CONSULT TO CARDIOLOGY    Network Utilization Review Department  ATTENTION: Please call with any questions or concerns to [phone] and carefully listen to the prompts so that you are directed to the right person  All voicemails are confidential   [facility] all requests for admission clinical reviews, approved or denied determinations and any other requests to dedicated fax number below belonging to the campus where the patient is receiving treatment   List of dedicated fax numbers for the Facilities:  [address] [place] [address] DENIALS (Administrative/Medical Necessity) [phone]   [address] (Maternity/NICU/Pediatrics) [phone]   [address] [place] [address] 40 [id number] [facility] Aminah [place] 1274 (Aleisha Lunger) [address] [facility] [address] [place] [address] [address] [phone]   [place] [address] [phone]

## 2021-04-03 NOTE — PROGRESS NOTES
[address] [facility]  Progress Note - Angel Addison 1945, 67 y o  male MRN: [id number]  Unit/Bed#: [facility] 704-01 Encounter: [id number]  Primary Care Provider: Phil Pendleton DO   Date and time admitted to hospital: 2021 12:34 PM    * NSTEMI (non-ST elevated myocardial infarction) [facility])  Assessment & Plan  Patient presented today after right sided chest pain that he describes as dullness/being punched  He stated that that pain started around 10 am and went away at 1130 am  The pain gradually increased before it went away with no intervention  Currently patient is pain free, denies shortness of breath or palpitations  · EKG -borderline ST elevation and T-wave inversions  · Troponin elevated from 0 35-->15 70  · Echo showed EF of 40% with grade 1 diastolic dysfunction  · BNP 83  · Cardiology on board  · Continue aspirin statin  · Continue heparin drip  · Plan cardiac catheterization on Monday  · Continue to monitor on tele    Ventral hernia with obstruction and without gangrene  Assessment & Plan  Per outpatient notes, patient refuses to have surgery  Seizure disorder (HCC)  Assessment & Plan  Continue phenobarbital 64 8 mg BID    HLD (hyperlipidemia)  Assessment & Plan  Continue statin    HTN (hypertension)  Assessment & Plan  Continue metoprolol, blood pressure is stable  CAD (coronary artery disease)  Assessment & Plan  S/p 2 stents with the last being in 2017  VTE Pharmacologic Prophylaxis:   Pharmacologic: Heparin drip  Mechanical VTE Prophylaxis in Place: Yes        Current Length of Stay: 0 day(s)    Current Patient Status: Observation     Code Status: Level 3 - DNAR and DNI      Subjective: Today I saw and examined the patient bedside  Patient denied chest pain  He is breathing on room air  Vital signs stable  No acute event occurred overnight  Patient denied nausea vomiting, sweating, palpitations      Objective:     Vitals:   Temp (24hrs), Av 7 °F (36 5 °C), Min:97 2 °F (36 2 °C), Max:98 2 °F (36 8 °C)    Temp:  [97 2 °F (36 2 °C)-98 2 °F (36 8 °C)] 98 2 °F (36 8 °C)  HR:  [58-82] 66  Resp:  [14-19] 18  BP: (103-147)/(55-69) 117/65  SpO2:  [91 %-96 %] 93 %  Body mass index is 30 59 kg/m²  Input and Output Summary (last 24 hours): Intake/Output Summary (Last 24 hours) at 4/3/2021 1143  Last data filed at 4/3/2021 0750  Gross per 24 hour   Intake 0 ml   Output --   Net 0 ml       Physical Exam:     Physical Exam  Constitutional:       Appearance: Normal appearance  HENT:      Head: Normocephalic and atraumatic  Nose: Nose normal    Neck:      Musculoskeletal: Normal range of motion and neck supple  Cardiovascular:      Rate and Rhythm: Normal rate and regular rhythm  Pulmonary:      Effort: Pulmonary effort is normal       Breath sounds: Normal breath sounds  Abdominal:      General: Abdomen is flat  Bowel sounds are normal       Palpations: Abdomen is soft  Hernia: A hernia is present  Musculoskeletal: Normal range of motion  Right lower leg: Edema present  Left lower leg: Edema present  Skin:     General: Skin is warm  Capillary Refill: Capillary refill takes less than 2 seconds  Neurological:      General: No focal deficit present  Mental Status: He is alert     Psychiatric:         Mood and Affect: Mood normal          Behavior: Behavior normal              Additional Data:     Labs:    Results from last 7 days   Lab Units 04/02/21  1309   WBC Thousand/uL 3 80*   HEMOGLOBIN g/dL 12 7   HEMATOCRIT % 39 4   PLATELETS Thousands/uL 135*   NEUTROS PCT % 68   LYMPHS PCT % 18   MONOS PCT % 10   EOS PCT % 3     Results from last 7 days   Lab Units 04/02/21  1309   POTASSIUM mmol/L 3 9   CHLORIDE mmol/L 107   CO2 mmol/L 24   BUN mg/dL 10   CREATININE mg/dL 0 94   CALCIUM mg/dL 8 8   ALK PHOS U/L 142*   ALT U/L 18   AST U/L 9     Results from last 7 days   Lab Units 04/02/21  1916   INR  1 05       * I Have Reviewed All Lab Data Listed Above  * Additional Pertinent Lab Tests Reviewed: All [place] Admission Reviewed      Recent Cultures (last 7 days):           Last 24 Hours Medication List:   Current Facility-Administered Medications   Medication Dose Route Frequency Provider Last Rate    aspirin  81 mg Oral Daily Isabel Reynaga MD      atorvastatin  80 mg Oral Daily With [employer], DO      heparin (porcine)  3-20 Units/kg/hr (Order-Specific) Intravenous Titrated Nehemiah Banai, DO 13 1 Units/kg/hr (04/03/21 0150)    heparin (porcine)  2,000 Units Intravenous Q1H PRN Nehemiah Banai, DO      heparin (porcine)  4,000 Units Intravenous Q1H PRN Nehemiah Banai, DO      metoprolol tartrate  25 mg Oral Q12H [facility] Nehemiah Banai, DO      nitroglycerin  0 4 mg Sublingual Q5 Min PRN Isabel Reynaga MD      PHENobarbital  64 8 mg Oral BID Isabel Reynaga MD          Today, Patient Was Seen By: Wilhelmina Alpers, MD    ** Please Note: This note has been constructed using a voice recognition system   **

## 2021-04-04 LAB
ALBUMIN SERPL BCP-MCNC: 3.2 G/DL (ref 3.5–5)
ALP SERPL-CCNC: 139 U/L (ref 46–116)
ALT SERPL W P-5'-P-CCNC: 15 U/L (ref 12–78)
ANION GAP SERPL CALCULATED.3IONS-SCNC: 5 MMOL/L (ref 4–13)
APTT PPP: 67 SECONDS (ref 23–37)
AST SERPL W P-5'-P-CCNC: 17 U/L (ref 5–45)
ATRIAL RATE: 60 BPM
ATRIAL RATE: 69 BPM
ATRIAL RATE: 84 BPM
BASOPHILS # BLD AUTO: 0.04 THOUSANDS/ΜL (ref 0–0.1)
BASOPHILS NFR BLD AUTO: 1 % (ref 0–1)
BILIRUB SERPL-MCNC: 0.39 MG/DL (ref 0.2–1)
BUN SERPL-MCNC: 10 MG/DL (ref 5–25)
CALCIUM ALBUM COR SERPL-MCNC: 8.8 MG/DL (ref 8.3–10.1)
CALCIUM SERPL-MCNC: 8.2 MG/DL (ref 8.3–10.1)
CHLORIDE SERPL-SCNC: 111 MMOL/L (ref 100–108)
CO2 SERPL-SCNC: 26 MMOL/L (ref 21–32)
CREAT SERPL-MCNC: 0.85 MG/DL (ref 0.6–1.3)
EOSINOPHIL # BLD AUTO: 0.26 THOUSAND/ΜL (ref 0–0.61)
EOSINOPHIL NFR BLD AUTO: 5 % (ref 0–6)
ERYTHROCYTE [DISTWIDTH] IN BLOOD BY AUTOMATED COUNT: 18.8 % (ref 11.6–15.1)
GFR SERPL CREATININE-BSD FRML MDRD: 85 ML/MIN/1.73SQ M
GLUCOSE SERPL-MCNC: 92 MG/DL (ref 65–140)
HCT VFR BLD AUTO: 36.6 % (ref 36.5–49.3)
HGB BLD-MCNC: 11.7 G/DL (ref 12–17)
IMM GRANULOCYTES # BLD AUTO: 0.03 THOUSAND/UL (ref 0–0.2)
IMM GRANULOCYTES NFR BLD AUTO: 1 % (ref 0–2)
LYMPHOCYTES # BLD AUTO: 1.63 THOUSANDS/ΜL (ref 0.6–4.47)
LYMPHOCYTES NFR BLD AUTO: 32 % (ref 14–44)
MCH RBC QN AUTO: 30.9 PG (ref 26.8–34.3)
MCHC RBC AUTO-ENTMCNC: 32 G/DL (ref 31.4–37.4)
MCV RBC AUTO: 97 FL (ref 82–98)
MONOCYTES # BLD AUTO: 0.6 THOUSAND/ΜL (ref 0.17–1.22)
MONOCYTES NFR BLD AUTO: 12 % (ref 4–12)
NEUTROPHILS # BLD AUTO: 2.5 THOUSANDS/ΜL (ref 1.85–7.62)
NEUTS SEG NFR BLD AUTO: 49 % (ref 43–75)
NRBC BLD AUTO-RTO: 0 /100 WBCS
P AXIS: 34 DEGREES
P AXIS: 34 DEGREES
PLATELET # BLD AUTO: 137 THOUSANDS/UL (ref 149–390)
PMV BLD AUTO: 10.7 FL (ref 8.9–12.7)
POTASSIUM SERPL-SCNC: 3.8 MMOL/L (ref 3.5–5.3)
PR INTERVAL: 160 MS
PR INTERVAL: 184 MS
PROT SERPL-MCNC: 6.8 G/DL (ref 6.4–8.2)
QRS AXIS: -37 DEGREES
QRS AXIS: -4 DEGREES
QRS AXIS: 0 DEGREES
QRSD INTERVAL: 74 MS
QRSD INTERVAL: 86 MS
QRSD INTERVAL: 94 MS
QT INTERVAL: 384 MS
QT INTERVAL: 392 MS
QT INTERVAL: 394 MS
QTC INTERVAL: 394 MS
QTC INTERVAL: 405 MS
QTC INTERVAL: 420 MS
RBC # BLD AUTO: 3.79 MILLION/UL (ref 3.88–5.62)
SODIUM SERPL-SCNC: 142 MMOL/L (ref 136–145)
T WAVE AXIS: 38 DEGREES
T WAVE AXIS: 56 DEGREES
T WAVE AXIS: 66 DEGREES
VENTRICULAR RATE: 60 BPM
VENTRICULAR RATE: 67 BPM
VENTRICULAR RATE: 69 BPM
WBC # BLD AUTO: 5.06 THOUSAND/UL (ref 4.31–10.16)

## 2021-04-04 PROCEDURE — 99232 SBSQ HOSP IP/OBS MODERATE 35: CPT | Performed by: HOSPITALIST

## 2021-04-04 PROCEDURE — 80053 COMPREHEN METABOLIC PANEL: CPT | Performed by: INTERNAL MEDICINE

## 2021-04-04 PROCEDURE — 85730 THROMBOPLASTIN TIME PARTIAL: CPT | Performed by: HOSPITALIST

## 2021-04-04 PROCEDURE — 93010 ELECTROCARDIOGRAM REPORT: CPT | Performed by: INTERNAL MEDICINE

## 2021-04-04 PROCEDURE — 99233 SBSQ HOSP IP/OBS HIGH 50: CPT | Performed by: INTERNAL MEDICINE

## 2021-04-04 PROCEDURE — 85025 COMPLETE CBC W/AUTO DIFF WBC: CPT | Performed by: INTERNAL MEDICINE

## 2021-04-04 RX ADMIN — PHENOBARBITAL 64.8 MG: 64.8 TABLET ORAL at 22:24

## 2021-04-04 RX ADMIN — METOPROLOL TARTRATE 25 MG: 25 TABLET, FILM COATED ORAL at 09:03

## 2021-04-04 RX ADMIN — ATORVASTATIN CALCIUM 80 MG: 80 TABLET, FILM COATED ORAL at 15:51

## 2021-04-04 RX ADMIN — HEPARIN SODIUM 13.1 UNITS/KG/HR: 10000 INJECTION, SOLUTION INTRAVENOUS at 11:54

## 2021-04-04 RX ADMIN — PHENOBARBITAL 64.8 MG: 64.8 TABLET ORAL at 09:03

## 2021-04-04 RX ADMIN — TICAGRELOR 90 MG: 90 TABLET ORAL at 09:03

## 2021-04-04 RX ADMIN — TICAGRELOR 90 MG: 90 TABLET ORAL at 22:24

## 2021-04-04 RX ADMIN — ASPIRIN 81 MG: 81 TABLET, COATED ORAL at 09:03

## 2021-04-04 NOTE — PLAN OF CARE
Problem: PAIN - ADULT  Goal: Verbalizes/displays adequate comfort level or baseline comfort level  Description: Interventions:  - Encourage patient to monitor pain and request assistance  - Assess pain using appropriate pain scale  - Administer analgesics based on type and severity of pain and evaluate response  - Implement non-pharmacological measures as appropriate and evaluate response  - Consider cultural and social influences on pain and pain management  - Notify physician/advanced practitioner if interventions unsuccessful or patient reports new pain  Outcome: Progressing     Problem: INFECTION - ADULT  Goal: Absence or prevention of progression during hospitalization  Description: INTERVENTIONS:  - Assess and monitor for signs and symptoms of infection  - Monitor lab/diagnostic results  - Monitor all insertion sites, i e  indwelling lines, tubes, and drains  - Monitor endotracheal if appropriate and nasal secretions for changes in amount and color  - [place] appropriate cooling/warming therapies per order  - Administer medications as ordered  - Instruct and encourage patient and family to use good hand hygiene technique  - Identify and instruct in appropriate isolation precautions for identified infection/condition  Outcome: Progressing  Goal: Absence of fever/infection during neutropenic period  Description: INTERVENTIONS:  - Monitor WBC    Outcome: Progressing     Problem: SAFETY ADULT  Goal: Patient will remain free of falls  Description: INTERVENTIONS:  - Assess patient frequently for physical needs  -  Identify cognitive and physical deficits and behaviors that affect risk of falls    -  [place] fall precautions as indicated by assessment   - Educate patient/family on patient safety including physical limitations  - Instruct patient to call for assistance with activity based on assessment  - Modify environment to reduce risk of injury  - Consider OT/PT consult to assist with strengthening/mobility  Outcome: Progressing  Goal: Maintain or return to baseline ADL function  Description: INTERVENTIONS:  -  Assess patient's ability to carry out ADLs; assess patient's baseline for ADL function and identify physical deficits which impact ability to perform ADLs (bathing, care of mouth/teeth, toileting, grooming, dressing, etc )  - Assess/evaluate cause of self-care deficits   - Assess range of motion  - Assess patient's mobility; develop plan if impaired  - Assess patient's need for assistive devices and provide as appropriate  - Encourage maximum independence but intervene and supervise when necessary  - Involve family in performance of ADLs  - Assess for home care needs following discharge   - Consider OT consult to assist with ADL evaluation and planning for discharge  - Provide patient education as appropriate  Outcome: Progressing  Goal: Maintain or return mobility status to optimal level  Description: INTERVENTIONS:  - Assess patient's baseline mobility status (ambulation, transfers, stairs, etc )    - Identify cognitive and physical deficits and behaviors that affect mobility  - Identify mobility aids required to assist with transfers and/or ambulation (gait belt, sit-to-stand, lift, walker, cane, etc )  - [place] fall precautions as indicated by assessment  - Record patient progress and toleration of activity level on Mobility SBAR; progress patient to next Phase/Stage  - Instruct patient to call for assistance with activity based on assessment  - Consider rehabilitation consult to assist with strengthening/weightbearing, etc   Outcome: Progressing     Problem: DISCHARGE PLANNING  Goal: Discharge to home or other facility with appropriate resources  Description: INTERVENTIONS:  - Identify barriers to discharge w/patient and caregiver  - Arrange for needed discharge resources and transportation as appropriate  - Identify discharge learning needs (meds, wound care, etc )  - Arrange for interpretive services to assist at discharge as needed  - Refer to Case Management Department for coordinating discharge planning if the patient needs post-hospital services based on physician/advanced practitioner order or complex needs related to functional status, cognitive ability, or social support system  Outcome: Progressing     Problem: Knowledge Deficit  Goal: Patient/family/caregiver demonstrates understanding of disease process, treatment plan, medications, and discharge instructions  Description: Complete learning assessment and assess knowledge base  Interventions:  - Provide teaching at level of understanding  - Provide teaching via preferred learning methods  Outcome: Progressing     Problem: CARDIOVASCULAR - ADULT  Goal: Maintains optimal cardiac output and hemodynamic stability  Description: INTERVENTIONS:  - Monitor I/O, vital signs and rhythm  - Monitor for S/S and trends of decreased cardiac output  - Administer and titrate ordered vasoactive medications to optimize hemodynamic stability  - Assess quality of pulses, skin color and temperature  - Assess for signs of decreased coronary artery perfusion  - Instruct patient to report change in severity of symptoms  Outcome: Progressing  Goal: Absence of cardiac dysrhythmias or at baseline rhythm  Description: INTERVENTIONS:  - Continuous cardiac monitoring, vital signs, obtain 12 lead EKG if ordered  - Administer antiarrhythmic and heart rate control medications as ordered  - Monitor electrolytes and administer replacement therapy as ordered  Outcome: Progressing     Problem: Potential for Falls  Goal: Patient will remain free of falls  Description: INTERVENTIONS:  - Assess patient frequently for physical needs  -  Identify cognitive and physical deficits and behaviors that affect risk of falls    -  [place] fall precautions as indicated by assessment   - Educate patient/family on patient safety including physical limitations  - Instruct patient to call for assistance with activity based on assessment  - Modify environment to reduce risk of injury  - Consider OT/PT consult to assist with strengthening/mobility  Outcome: Progressing

## 2021-04-04 NOTE — PROGRESS NOTES
Cardiology Progress Note - Shelly Nicholson 67 y o  male MRN: [id number]    Unit/Bed#: [facility] 704-01 Encounter: [id number]        Subjective:    No significant events overnight  No chest pain      Review of Systems   Constitution: Negative for malaise/fatigue  Cardiovascular: Negative for chest pain  Respiratory: Negative for shortness of breath  Objective:   Vitals: Blood pressure 97/61, pulse 80, temperature 98 °F (36 7 °C), resp  rate 16, height 6' (1 829 m), weight 102 kg (225 lb 8 5 oz), SpO2 91 % , Body mass index is 30 59 kg/m² ,   Orthostatic Blood Pressures      Most Recent Value   Blood Pressure  97/61 filed at 04/04/2021 2312         Systolic (76FQZ), HGO:713 , Min:87 , SVI:505     Diastolic (91GNE), DZS:25, Min:60, Max:65      Intake/Output Summary (Last 24 hours) at 4/4/2021 1029  Last data filed at 4/4/2021 0920  Gross per 24 hour   Intake 803 18 ml   Output --   Net 803 18 ml     Weight (last 2 days)     Date/Time   Weight    04/02/21 1752   102 (225 53)    04/02/21 1241   102 (224 87)                    Physical Exam  Vitals signs reviewed  Constitutional:       Appearance: Normal appearance  HENT:      Head: Normocephalic  Nose: Nose normal       Mouth/Throat:      Mouth: Mucous membranes are moist       Pharynx: Oropharynx is clear  Eyes:      General: No scleral icterus  Conjunctiva/sclera: Conjunctivae normal    Neck:      Musculoskeletal: Normal range of motion and neck supple  Cardiovascular:      Rate and Rhythm: Normal rate and regular rhythm  Heart sounds: No murmur  No friction rub  No gallop  Pulmonary:      Effort: Pulmonary effort is normal  No respiratory distress  Breath sounds: Normal breath sounds  No wheezing or rales  Abdominal:      General: Abdomen is flat  Bowel sounds are normal  There is no distension  Palpations: Abdomen is soft  Tenderness: There is no abdominal tenderness  There is no guarding     Musculoskeletal:      Right lower leg: No edema  Left lower leg: No edema  Skin:     General: Skin is warm and dry  Neurological:      General: No focal deficit present  Mental Status: He is alert and oriented to person, place, and time  Psychiatric:         Mood and Affect: Mood normal          Behavior: Behavior normal            Laboratory Results:  Results from last 7 days   Lab Units 04/03/21  0437 04/03/21  0103 04/02/21  2201   TROPONIN I ng/mL 15 70* 15 80* 10 90*       CBC with diff:   Results from last 7 days   Lab Units 04/04/21  0446 04/02/21  1309   WBC Thousand/uL 5 06 3 80*   HEMOGLOBIN g/dL 11 7* 12 7   HEMATOCRIT % 36 6 39 4   MCV fL 97 96   PLATELETS Thousands/uL 137* 135*   MCH pg 30 9 31 0   MCHC g/dL 32 0 32 2   RDW % 18 8* 18 5*   MPV fL 10 7 10 2   NRBC AUTO /100 WBCs 0 0         CMP:  Results from last 7 days   Lab Units 04/04/21  0446 04/02/21  1309   POTASSIUM mmol/L 3 8 3 9   CHLORIDE mmol/L 111* 107   CO2 mmol/L 26 24   BUN mg/dL 10 10   CREATININE mg/dL 0 85 0 94   CALCIUM mg/dL 8 2* 8 8   AST U/L 17 9   ALT U/L 15 18   ALK PHOS U/L 139* 142*   EGFR ml/min/1 73sq m 85 78         BMP:  Results from last 7 days   Lab Units 04/04/21  0446 04/02/21  1309   POTASSIUM mmol/L 3 8 3 9   CHLORIDE mmol/L 111* 107   CO2 mmol/L 26 24   BUN mg/dL 10 10   CREATININE mg/dL 0 85 0 94   CALCIUM mg/dL 8 2* 8 8       BNP: No results for input(s): BNP in the last 72 hours      Magnesium:       Coags:   Results from last 7 days   Lab Units 04/04/21  0446 04/03/21  1443 04/03/21  0844 04/03/21  0103 04/02/21  1916   PTT seconds 67* 79* 83* 53* 32   INR   --   --   --   --  1 05       TSH: No results found for: TSH    Hemoglobin A1C   Results from last 7 days   Lab Units 04/03/21  0437   HEMOGLOBIN A1C % 5 6       Lipid Profile:   Results from last 7 days   Lab Units 04/03/21  0437   TRIGLYCERIDES mg/dL 77   HDL mg/dL 51       Cardiac testing:   Results for orders placed during the hospital encounter of 04/02/21   Echo complete with contrast if indicated    Narrative [address]  [facility], [address] [facility]  [phone]    Transthoracic Echocardiogram  2D, M-mode, Doppler, and Color Doppler    Study date:  2021    Patient: Nicholas Waddell  MR number: [id number]  Account number: [de-identified]  : 1945  Age: 67 years  Gender: Male  Status: Outpatient  Location:  Height: 72 in  Weight: 227 lb  BP: 103/ 55 mmHg    Indications: Cardiomyopathy    Diagnoses: I42 9 - Cardiomyopathy, unspecified    Sonographer:  Anastacio Johnson RDCS  Primary Physician:  Pavel Brown DO  Referring Physician:  Robert Astorga MD  Group:  [facility]  Cardiology Fellow:  Cristela Rajput MD  Interpreting Physician:  Endy Kilpatrick MD    SUMMARY    LEFT VENTRICLE:  The ventricle was mildly dilated  Systolic function was moderately to markedly reduced  Ejection fraction was estimated to be 40 %  There was akinesis of the apex and the mid to apical walls of the heart  The basal segments were preserved  The changes were consistent with eccentric hypertrophy  Doppler parameters were consistent with abnormal left ventricular relaxation (grade 1 diastolic dysfunction)  RIGHT VENTRICLE:  The size was at the upper limits of normal   Systolic function was mildly reduced  LEFT ATRIUM:  The atrium was mildly dilated  MITRAL VALVE:  There was mild annular calcification  TRICUSPID VALVE:  There was mild regurgitation  PULMONIC VALVE:  There was mild regurgitation  AORTA:  There was mild dilatation of the ascending aorta (36 mm)  RECOMMENDATIONS:  Repeat study with commercial contrast administration is recommended to rule out apical thrombus  HISTORY: PRIOR HISTORY: CAD, AAA, HTN, CHF    PROCEDURE: This was a routine study  The transthoracic approach was used  The study included complete 2D imaging, M-mode, complete spectral Doppler, and color Doppler   The heart rate was 58 bpm, at the start of the study  Images were  obtained from the parasternal, apical, subcostal, and suprasternal notch acoustic windows  Image quality was adequate  LEFT VENTRICLE: The ventricle was mildly dilated  Systolic function was moderately to markedly reduced  Ejection fraction was estimated to be 40 %  There was akinesis of the apex and the mid to apical walls of the heart  The basal segments  were preserved  Wall thickness was normal  The changes were consistent with eccentric hypertrophy  No obvious apical thrombus was noted, however this study is inadequate to rule out thrombus  DOPPLER: Doppler parameters were consistent  with abnormal left ventricular relaxation (grade 1 diastolic dysfunction)  RIGHT VENTRICLE: The size was at the upper limits of normal  Systolic function was mildly reduced  LEFT ATRIUM: The atrium was mildly dilated  RIGHT ATRIUM: Size was normal     MITRAL VALVE: There was mild annular calcification  There was normal leaflet separation  DOPPLER: The transmitral velocity was within the normal range  There was no evidence for stenosis  There was no regurgitation  AORTIC VALVE: The valve was trileaflet  Leaflets exhibited mildly increased thickness  DOPPLER: Transaortic velocity was within the normal range  There was no evidence for stenosis  There was no regurgitation  TRICUSPID VALVE: The valve structure was normal  There was normal leaflet separation  DOPPLER: The transtricuspid velocity was within the normal range  There was no evidence for stenosis  There was mild regurgitation  Estimated peak PA  pressure was 33 mmHg  PULMONIC VALVE: Not well visualized  DOPPLER: The transpulmonic velocity was within the normal range  There was mild regurgitation  PERICARDIUM: There was no pericardial effusion  AORTA: The root exhibited normal size  There was mild dilatation of the ascending aorta (36 mm)      SYSTEM MEASUREMENT TABLES    2D  %FS: 15 85 %  Ao Diam: 3 61 cm  Ao asc: 3 57 cm  EDV(Teich): 142 03 ml  EF Biplane: 42 67 %  EF(Teich): 33 06 %  ESV(Teich): 95 08 ml  IVSd: 1 cm  LA Diam: 4 27 cm  LAAs A4C: 14 41 cm2  LAESV A-L A4C: 38 83 ml  LAESV MOD A4C: 35 04 ml  LALs A4C: 4 54 cm  LVEDV MOD A2C: 105 16 ml  LVEDV MOD A4C: 82 78 ml  LVEDV MOD BP: 93 9 ml  LVEF MOD A2C: 51 2 %  LVEF MOD A4C: 34 96 %  LVESV MOD A2C: 51 31 ml  LVESV MOD A4C: 53 84 ml  LVESV MOD BP: 53 83 ml  LVIDd: 5 41 cm  LVIDs: 4 55 cm  LVLd A2C: 7 69 cm  LVLd A4C: 7 85 cm  LVLs A2C: 7 17 cm  LVLs A4C: 7 64 cm  LVPWd: 0 95 cm  RAEDV A-L: 11 75 ml  RAEDV MOD: 11 87 ml  RALd: 4 12 cm  RVIDd: 3 12 cm  SV MOD A2C: 53 85 ml  SV MOD A4C: 28 94 ml  SV(Teich): 46 95 ml    CW  AV Env  Ti: 380 59 ms  AV VTI: 25 87 cm  AV Vmax: 0 99 m/s  AV Vmean: 0 68 m/s  AV maxPG: 3 92 mmHg  AV meanP 09 mmHg  TR Vmax: 2 64 m/s  TR maxP 95 mmHg    MM  TAPSE: 1 81 cm    PW  E' Sept: 0 06 m/s  E/E' Sept: 10 18  LVOT Env  Ti: 328 26 ms  LVOT VTI: 19 16 cm  LVOT Vmax: 0 93 m/s  LVOT Vmean: 0 58 m/s  LVOT maxPG: 3 46 mmHg  LVOT meanP 61 mmHg  MV A Brendon: 0 78 m/s  MV Dec Cochran: 1 72 m/s2  MV DecT: 346 9 ms  MV E Brendon: 0 6 m/s  MV E/A Ratio: 0 77  MV PHT: 100 6 ms  MVA By PHT: 2 19 cm2    [employer] Accredited Echocardiography Laboratory    Prepared and electronically signed by    Kayla Ferreira MD  Signed 2021 17:59:49       No results found for this or any previous visit  No results found for this or any previous visit  No results found for this or any previous visit      Meds/Allergies   current meds:   Current Facility-Administered Medications   Medication Dose Route Frequency    aspirin (ECOTRIN LOW STRENGTH) EC tablet 81 mg  81 mg Oral Daily    atorvastatin (LIPITOR) tablet 80 mg  80 mg Oral Daily With Dinner    heparin (porcine) 25,000 units in 0 45% NaCl 250 mL infusion (premix)  3-20 Units/kg/hr (Order-Specific) Intravenous Titrated    heparin (porcine) injection 2,000 Units  2,000 Units Intravenous Q1H PRN    heparin (porcine) injection 4,000 Units  4,000 Units Intravenous Q1H PRN    metoprolol tartrate (LOPRESSOR) tablet 25 mg  25 mg Oral Q12H [address]    nitroglycerin (NITROSTAT) SL tablet 0 4 mg  0 4 mg Sublingual Q5 Min PRN    PHENobarbital tablet 64 8 mg  64 8 mg Oral BID    ticagrelor (BRILINTA) tablet 90 mg  90 mg Oral Q12H ADENIKE     Medications Prior to Admission   Medication    aspirin 81 MG tablet    clotrimazole (LOTRIMIN) 1 % cream    nitroglycerin (NITROSTAT) 0 4 mg SL tablet    PHENobarbital 64 8 mg tablet    pravastatin (PRAVACHOL) 40 mg tablet       heparin (porcine), 3-20 Units/kg/hr (Order-Specific), Last Rate: 13 1 Units/kg/hr (04/03/21 1539)      Assessment:  Principal Problem:    NSTEMI (non-ST elevated myocardial infarction) ([facility])  Active Problems:    CAD (coronary artery disease)    HTN (hypertension)    HLD (hyperlipidemia)    Seizure disorder (HCC)    S/P AAA (abdominal aortic aneurysm) repair    Ventral hernia with obstruction and without gangrene      Plan:    Echo EF 40%  Trop 15     Plan for cardiac cath +/- PCi on Monday       Continue dapt, IV heparin and remainder of medical therapy for CAD  Counseling / Coordination of Care  Total floor / unit time spent today 25 minutes  Greater than 50% of total time was spent with the patient and / or family counseling and / or coordination of care  A description of the counseling / coordination of care

## 2021-04-04 NOTE — ASSESSMENT & PLAN NOTE
S/p 2 stents RCA and LAD, with the last being in 2017     · Continue aspirin, ticagrelor, statin, and metoprolol

## 2021-04-04 NOTE — PLAN OF CARE
Problem: PAIN - ADULT  Goal: Verbalizes/displays adequate comfort level or baseline comfort level  Description: Interventions:  - Encourage patient to monitor pain and request assistance  - Assess pain using appropriate pain scale  - Administer analgesics based on type and severity of pain and evaluate response  - Implement non-pharmacological measures as appropriate and evaluate response  - Consider cultural and social influences on pain and pain management  - Notify physician/advanced practitioner if interventions unsuccessful or patient reports new pain  4/4/2021 1439 by Simone Ramires RN  Outcome: Progressing  4/4/2021 1032 by Simone Ramires RN  Outcome: Progressing     Problem: INFECTION - ADULT  Goal: Absence or prevention of progression during hospitalization  Description: INTERVENTIONS:  - Assess and monitor for signs and symptoms of infection  - Monitor lab/diagnostic results  - Monitor all insertion sites, i e  indwelling lines, tubes, and drains  - Monitor endotracheal if appropriate and nasal secretions for changes in amount and color  - [place] appropriate cooling/warming therapies per order  - Administer medications as ordered  - Instruct and encourage patient and family to use good hand hygiene technique  - Identify and instruct in appropriate isolation precautions for identified infection/condition  4/4/2021 1439 by Simone Ramires RN  Outcome: Progressing  4/4/2021 1032 by Simone Ramires RN  Outcome: Progressing  Goal: Absence of fever/infection during neutropenic period  Description: INTERVENTIONS:  - Monitor WBC    4/4/2021 1439 by Simone Ramires RN  Outcome: Progressing  4/4/2021 1032 by Simone Ramires RN  Outcome: Progressing     Problem: SAFETY ADULT  Goal: Patient will remain free of falls  Description: INTERVENTIONS:  - Assess patient frequently for physical needs  -  Identify cognitive and physical deficits and behaviors that affect risk of falls    -  [place] fall precautions as indicated by assessment   - Educate patient/family on patient safety including physical limitations  - Instruct patient to call for assistance with activity based on assessment  - Modify environment to reduce risk of injury  - Consider OT/PT consult to assist with strengthening/mobility  4/4/2021 1439 by Casandra Peralta RN  Outcome: Progressing  4/4/2021 1032 by Casandra Peralta RN  Outcome: Progressing  Goal: Maintain or return to baseline ADL function  Description: INTERVENTIONS:  -  Assess patient's ability to carry out ADLs; assess patient's baseline for ADL function and identify physical deficits which impact ability to perform ADLs (bathing, care of mouth/teeth, toileting, grooming, dressing, etc )  - Assess/evaluate cause of self-care deficits   - Assess range of motion  - Assess patient's mobility; develop plan if impaired  - Assess patient's need for assistive devices and provide as appropriate  - Encourage maximum independence but intervene and supervise when necessary  - Involve family in performance of ADLs  - Assess for home care needs following discharge   - Consider OT consult to assist with ADL evaluation and planning for discharge  - Provide patient education as appropriate  4/4/2021 1439 by Casandra Peralta RN  Outcome: Progressing  4/4/2021 1032 by Casandra Peralta RN  Outcome: Progressing  Goal: Maintain or return mobility status to optimal level  Description: INTERVENTIONS:  - Assess patient's baseline mobility status (ambulation, transfers, stairs, etc )    - Identify cognitive and physical deficits and behaviors that affect mobility  - Identify mobility aids required to assist with transfers and/or ambulation (gait belt, sit-to-stand, lift, walker, cane, etc )  - [place] fall precautions as indicated by assessment  - Record patient progress and toleration of activity level on Mobility SBAR; progress patient to next Phase/Stage  - Instruct patient to call for assistance with activity based on assessment  - Consider rehabilitation consult to assist with strengthening/weightbearing, etc   4/4/2021 1439 by Maryanne Schirmer, RN  Outcome: Progressing  4/4/2021 1032 by Maryanne Schirmer, RN  Outcome: Progressing     Problem: DISCHARGE PLANNING  Goal: Discharge to home or other facility with appropriate resources  Description: INTERVENTIONS:  - Identify barriers to discharge w/patient and caregiver  - Arrange for needed discharge resources and transportation as appropriate  - Identify discharge learning needs (meds, wound care, etc )  - Arrange for interpretive services to assist at discharge as needed  - Refer to Case Management Department for coordinating discharge planning if the patient needs post-hospital services based on physician/advanced practitioner order or complex needs related to functional status, cognitive ability, or social support system  4/4/2021 1439 by Maryanne Schirmer, RN  Outcome: Progressing  4/4/2021 1032 by Maryanne Schirmer, RN  Outcome: Progressing     Problem: Knowledge Deficit  Goal: Patient/family/caregiver demonstrates understanding of disease process, treatment plan, medications, and discharge instructions  Description: Complete learning assessment and assess knowledge base    Interventions:  - Provide teaching at level of understanding  - Provide teaching via preferred learning methods  4/4/2021 1439 by Maryanne Schirmer, RN  Outcome: Progressing  4/4/2021 1032 by Maryanne Schirmer, RN  Outcome: Progressing     Problem: CARDIOVASCULAR - ADULT  Goal: Maintains optimal cardiac output and hemodynamic stability  Description: INTERVENTIONS:  - Monitor I/O, vital signs and rhythm  - Monitor for S/S and trends of decreased cardiac output  - Administer and titrate ordered vasoactive medications to optimize hemodynamic stability  - Assess quality of pulses, skin color and temperature  - Assess for signs of decreased coronary artery perfusion  - Instruct patient to report change in severity of symptoms  4/4/2021 1439 by See Tyler RN  Outcome: Progressing  4/4/2021 1032 by See Tyler RN  Outcome: Progressing  Goal: Absence of cardiac dysrhythmias or at baseline rhythm  Description: INTERVENTIONS:  - Continuous cardiac monitoring, vital signs, obtain 12 lead EKG if ordered  - Administer antiarrhythmic and heart rate control medications as ordered  - Monitor electrolytes and administer replacement therapy as ordered  4/4/2021 1439 by See Tlyer RN  Outcome: Progressing  4/4/2021 1032 by See Tyler RN  Outcome: Progressing     Problem: Potential for Falls  Goal: Patient will remain free of falls  Description: INTERVENTIONS:  - Assess patient frequently for physical needs  -  Identify cognitive and physical deficits and behaviors that affect risk of falls    -  [place] fall precautions as indicated by assessment   - Educate patient/family on patient safety including physical limitations  - Instruct patient to call for assistance with activity based on assessment  - Modify environment to reduce risk of injury  - Consider OT/PT consult to assist with strengthening/mobility  4/4/2021 1439 by See Tyler RN  Outcome: Progressing  4/4/2021 1032 by See Tyler RN  Outcome: Progressing

## 2021-04-04 NOTE — ASSESSMENT & PLAN NOTE
Patient has history of adverse effects with multiple statins:  Atorvastatin, rosuvastatin, and pravastatin more recently  Has complained of myalgia, lightheadedness, headache, and diarrhea  · Continue atorvastatin for now--> will likely discharge with the different statin (simvastatin?) or 1 with the lower dose    Outpatient plan:  · We strongly recommended patient continue 80 mg of atorvastatin    However he adamantly refused due to his fear of side effects and has agreed to try simvastatin 20 mg
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
## 2021-04-05 ENCOUNTER — APPOINTMENT (INPATIENT)
Dept: NON INVASIVE DIAGNOSTICS | Facility: HOSPITAL | Age: 76
DRG: 247 | End: 2021-04-05
Payer: MEDICARE

## 2021-04-05 LAB
ALBUMIN SERPL BCP-MCNC: 3.1 G/DL (ref 3.5–5)
ALP SERPL-CCNC: 135 U/L (ref 46–116)
ALT SERPL W P-5'-P-CCNC: 15 U/L (ref 12–78)
ANION GAP SERPL CALCULATED.3IONS-SCNC: 5 MMOL/L (ref 4–13)
APTT PPP: 65 SECONDS (ref 23–37)
AST SERPL W P-5'-P-CCNC: 12 U/L (ref 5–45)
BASOPHILS # BLD AUTO: 0.06 THOUSANDS/ΜL (ref 0–0.1)
BASOPHILS NFR BLD AUTO: 1 % (ref 0–1)
BILIRUB SERPL-MCNC: 0.44 MG/DL (ref 0.2–1)
BUN SERPL-MCNC: 14 MG/DL (ref 5–25)
CALCIUM ALBUM COR SERPL-MCNC: 8.5 MG/DL (ref 8.3–10.1)
CALCIUM SERPL-MCNC: 7.8 MG/DL (ref 8.3–10.1)
CHLORIDE SERPL-SCNC: 109 MMOL/L (ref 100–108)
CO2 SERPL-SCNC: 26 MMOL/L (ref 21–32)
CREAT SERPL-MCNC: 0.93 MG/DL (ref 0.6–1.3)
EOSINOPHIL # BLD AUTO: 0.41 THOUSAND/ΜL (ref 0–0.61)
EOSINOPHIL NFR BLD AUTO: 9 % (ref 0–6)
ERYTHROCYTE [DISTWIDTH] IN BLOOD BY AUTOMATED COUNT: 18.7 % (ref 11.6–15.1)
GFR SERPL CREATININE-BSD FRML MDRD: 79 ML/MIN/1.73SQ M
GLUCOSE SERPL-MCNC: 96 MG/DL (ref 65–140)
HCT VFR BLD AUTO: 36.3 % (ref 36.5–49.3)
HGB BLD-MCNC: 11.7 G/DL (ref 12–17)
IMM GRANULOCYTES # BLD AUTO: 0.02 THOUSAND/UL (ref 0–0.2)
IMM GRANULOCYTES NFR BLD AUTO: 0 % (ref 0–2)
KCT BLD-ACNC: 267 SEC (ref 89–137)
LYMPHOCYTES # BLD AUTO: 1.58 THOUSANDS/ΜL (ref 0.6–4.47)
LYMPHOCYTES NFR BLD AUTO: 33 % (ref 14–44)
MAGNESIUM SERPL-MCNC: 2.2 MG/DL (ref 1.6–2.6)
MCH RBC QN AUTO: 31.3 PG (ref 26.8–34.3)
MCHC RBC AUTO-ENTMCNC: 32.2 G/DL (ref 31.4–37.4)
MCV RBC AUTO: 97 FL (ref 82–98)
MONOCYTES # BLD AUTO: 0.56 THOUSAND/ΜL (ref 0.17–1.22)
MONOCYTES NFR BLD AUTO: 12 % (ref 4–12)
NEUTROPHILS # BLD AUTO: 2.19 THOUSANDS/ΜL (ref 1.85–7.62)
NEUTS SEG NFR BLD AUTO: 45 % (ref 43–75)
NRBC BLD AUTO-RTO: 0 /100 WBCS
PLATELET # BLD AUTO: 138 THOUSANDS/UL (ref 149–390)
PMV BLD AUTO: 10.7 FL (ref 8.9–12.7)
POTASSIUM SERPL-SCNC: 3.6 MMOL/L (ref 3.5–5.3)
PROT SERPL-MCNC: 6.7 G/DL (ref 6.4–8.2)
RBC # BLD AUTO: 3.74 MILLION/UL (ref 3.88–5.62)
SODIUM SERPL-SCNC: 140 MMOL/L (ref 136–145)
SPECIMEN SOURCE: ABNORMAL
WBC # BLD AUTO: 4.82 THOUSAND/UL (ref 4.31–10.16)

## 2021-04-05 PROCEDURE — 85730 THROMBOPLASTIN TIME PARTIAL: CPT | Performed by: HOSPITALIST

## 2021-04-05 PROCEDURE — 027034Z DILATION OF CORONARY ARTERY, ONE ARTERY WITH DRUG-ELUTING INTRALUMINAL DEVICE, PERCUTANEOUS APPROACH: ICD-10-PCS | Performed by: INTERNAL MEDICINE

## 2021-04-05 PROCEDURE — C1887 CATHETER, GUIDING: HCPCS

## 2021-04-05 PROCEDURE — C9600 PERC DRUG-EL COR STENT SING: HCPCS

## 2021-04-05 PROCEDURE — 93454 CORONARY ARTERY ANGIO S&I: CPT

## 2021-04-05 PROCEDURE — B216YZZ FLUOROSCOPY OF RIGHT AND LEFT HEART USING OTHER CONTRAST: ICD-10-PCS | Performed by: INTERNAL MEDICINE

## 2021-04-05 PROCEDURE — 85025 COMPLETE CBC W/AUTO DIFF WBC: CPT | Performed by: STUDENT IN AN ORGANIZED HEALTH CARE EDUCATION/TRAINING PROGRAM

## 2021-04-05 PROCEDURE — C1894 INTRO/SHEATH, NON-LASER: HCPCS

## 2021-04-05 PROCEDURE — C1725 CATH, TRANSLUMIN NON-LASER: HCPCS

## 2021-04-05 PROCEDURE — 99153 MOD SED SAME PHYS/QHP EA: CPT

## 2021-04-05 PROCEDURE — C1769 GUIDE WIRE: HCPCS

## 2021-04-05 PROCEDURE — 99152 MOD SED SAME PHYS/QHP 5/>YRS: CPT

## 2021-04-05 PROCEDURE — 99232 SBSQ HOSP IP/OBS MODERATE 35: CPT | Performed by: INTERNAL MEDICINE

## 2021-04-05 PROCEDURE — 83735 ASSAY OF MAGNESIUM: CPT | Performed by: STUDENT IN AN ORGANIZED HEALTH CARE EDUCATION/TRAINING PROGRAM

## 2021-04-05 PROCEDURE — C1874 STENT, COATED/COV W/DEL SYS: HCPCS

## 2021-04-05 PROCEDURE — 80053 COMPREHEN METABOLIC PANEL: CPT | Performed by: STUDENT IN AN ORGANIZED HEALTH CARE EDUCATION/TRAINING PROGRAM

## 2021-04-05 PROCEDURE — 93454 CORONARY ARTERY ANGIO S&I: CPT | Performed by: INTERNAL MEDICINE

## 2021-04-05 PROCEDURE — 99152 MOD SED SAME PHYS/QHP 5/>YRS: CPT | Performed by: INTERNAL MEDICINE

## 2021-04-05 PROCEDURE — 92928 PRQ TCAT PLMT NTRAC ST 1 LES: CPT | Performed by: INTERNAL MEDICINE

## 2021-04-05 PROCEDURE — 85347 COAGULATION TIME ACTIVATED: CPT

## 2021-04-05 RX ORDER — DIPHENHYDRAMINE HYDROCHLORIDE 50 MG/ML
25 INJECTION INTRAMUSCULAR; INTRAVENOUS ONCE
Status: COMPLETED | OUTPATIENT
Start: 2021-04-05 | End: 2021-04-05

## 2021-04-05 RX ORDER — LIDOCAINE HYDROCHLORIDE 10 MG/ML
INJECTION, SOLUTION EPIDURAL; INFILTRATION; INTRACAUDAL; PERINEURAL CODE/TRAUMA/SEDATION MEDICATION
Status: COMPLETED | OUTPATIENT
Start: 2021-04-05 | End: 2021-04-05

## 2021-04-05 RX ORDER — FENTANYL CITRATE 50 UG/ML
INJECTION, SOLUTION INTRAMUSCULAR; INTRAVENOUS CODE/TRAUMA/SEDATION MEDICATION
Status: COMPLETED | OUTPATIENT
Start: 2021-04-05 | End: 2021-04-05

## 2021-04-05 RX ORDER — VERAPAMIL HYDROCHLORIDE 2.5 MG/ML
INJECTION, SOLUTION INTRAVENOUS CODE/TRAUMA/SEDATION MEDICATION
Status: COMPLETED | OUTPATIENT
Start: 2021-04-05 | End: 2021-04-05

## 2021-04-05 RX ORDER — POTASSIUM CHLORIDE 14.9 MG/ML
20 INJECTION INTRAVENOUS
Status: COMPLETED | OUTPATIENT
Start: 2021-04-05 | End: 2021-04-06

## 2021-04-05 RX ORDER — DEXTROSE AND SODIUM CHLORIDE 5; .9 G/100ML; G/100ML
125 INJECTION, SOLUTION INTRAVENOUS CONTINUOUS
Status: DISPENSED | OUTPATIENT
Start: 2021-04-05 | End: 2021-04-05

## 2021-04-05 RX ORDER — NITROGLYCERIN 20 MG/100ML
INJECTION INTRAVENOUS CODE/TRAUMA/SEDATION MEDICATION
Status: COMPLETED | OUTPATIENT
Start: 2021-04-05 | End: 2021-04-05

## 2021-04-05 RX ORDER — ONDANSETRON 2 MG/ML
4 INJECTION INTRAMUSCULAR; INTRAVENOUS EVERY 6 HOURS PRN
Status: DISCONTINUED | OUTPATIENT
Start: 2021-04-05 | End: 2021-04-07 | Stop reason: HOSPADM

## 2021-04-05 RX ORDER — MIDAZOLAM HYDROCHLORIDE 2 MG/2ML
INJECTION, SOLUTION INTRAMUSCULAR; INTRAVENOUS CODE/TRAUMA/SEDATION MEDICATION
Status: COMPLETED | OUTPATIENT
Start: 2021-04-05 | End: 2021-04-05

## 2021-04-05 RX ORDER — ACETAMINOPHEN 325 MG/1
650 TABLET ORAL EVERY 4 HOURS PRN
Status: DISCONTINUED | OUTPATIENT
Start: 2021-04-05 | End: 2021-04-07 | Stop reason: HOSPADM

## 2021-04-05 RX ORDER — HEPARIN SODIUM 1000 [USP'U]/ML
INJECTION, SOLUTION INTRAVENOUS; SUBCUTANEOUS CODE/TRAUMA/SEDATION MEDICATION
Status: COMPLETED | OUTPATIENT
Start: 2021-04-05 | End: 2021-04-05

## 2021-04-05 RX ADMIN — Medication 200 MCG: at 13:43

## 2021-04-05 RX ADMIN — VERAPAMIL HYDROCHLORIDE 2.5 MG: 2.5 INJECTION, SOLUTION INTRAVENOUS at 13:08

## 2021-04-05 RX ADMIN — DEXTROSE AND SODIUM CHLORIDE 125 ML/HR: 5; .9 INJECTION, SOLUTION INTRAVENOUS at 16:44

## 2021-04-05 RX ADMIN — IOHEXOL 120 ML: 350 INJECTION, SOLUTION INTRAVENOUS at 13:46

## 2021-04-05 RX ADMIN — FAMOTIDINE 20 MG: 10 INJECTION INTRAVENOUS at 12:50

## 2021-04-05 RX ADMIN — HEPARIN SODIUM 4000 UNITS: 1000 INJECTION INTRAVENOUS; SUBCUTANEOUS at 13:08

## 2021-04-05 RX ADMIN — ASPIRIN 81 MG: 81 TABLET, COATED ORAL at 09:24

## 2021-04-05 RX ADMIN — PHENOBARBITAL 64.8 MG: 64.8 TABLET ORAL at 21:38

## 2021-04-05 RX ADMIN — MIDAZOLAM 2 MG: 1 INJECTION INTRAMUSCULAR; INTRAVENOUS at 13:02

## 2021-04-05 RX ADMIN — FENTANYL CITRATE 50 MCG: 50 INJECTION INTRAMUSCULAR; INTRAVENOUS at 13:02

## 2021-04-05 RX ADMIN — TICAGRELOR 90 MG: 90 TABLET ORAL at 09:24

## 2021-04-05 RX ADMIN — ATORVASTATIN CALCIUM 80 MG: 80 TABLET, FILM COATED ORAL at 16:44

## 2021-04-05 RX ADMIN — POTASSIUM CHLORIDE 20 MEQ: 14.9 INJECTION, SOLUTION INTRAVENOUS at 14:42

## 2021-04-05 RX ADMIN — HYDROCORTISONE SODIUM SUCCINATE 100 MG: 100 INJECTION, POWDER, FOR SOLUTION INTRAMUSCULAR; INTRAVENOUS at 12:50

## 2021-04-05 RX ADMIN — PHENOBARBITAL 64.8 MG: 64.8 TABLET ORAL at 09:24

## 2021-04-05 RX ADMIN — Medication 200 MCG: at 13:08

## 2021-04-05 RX ADMIN — POTASSIUM CHLORIDE 20 MEQ: 14.9 INJECTION, SOLUTION INTRAVENOUS at 14:30

## 2021-04-05 RX ADMIN — HEPARIN SODIUM 6000 UNITS: 1000 INJECTION INTRAVENOUS; SUBCUTANEOUS at 13:21

## 2021-04-05 RX ADMIN — DIPHENHYDRAMINE HYDROCHLORIDE 25 MG: 50 INJECTION, SOLUTION INTRAMUSCULAR; INTRAVENOUS at 12:50

## 2021-04-05 RX ADMIN — LIDOCAINE HYDROCHLORIDE 1 ML: 10 INJECTION, SOLUTION EPIDURAL; INFILTRATION; INTRACAUDAL; PERINEURAL at 13:02

## 2021-04-05 NOTE — CASE MANAGEMENT
Pt is not a 30 day readmission  Pt is not a bundle  Unplanned readmission risk color- Green  CM met pt at bedside, introduce self and made aware of CM role at dc  Pt denied having a LW and POA  Primary contact is his brother Maximilian Jarquin- [phone]  Pt lives with his brother Maximilian Jarquin in a 1st flr apt with no SEEMA  Pt was IPTA with all ADL's, does not drive and retired  Pt does not use any AD but has a RW at home  Pt's brother transport pt to his appts  PCP is Dr Irish Awan  Pharmacy is [facility] [address]   Pt has hx with [facility]  Pt denied hx with [facility], alc drug and IP psych tx   Pt's brother will transport pt home when dc  CM reviewed d/c planning process including the following: identifying help at home, patient preference for d/c planning needs, Discharge Lounge, [facility] Meds to Bed program, availability of treatment team to discuss questions or concerns patient and/or family may have regarding understanding medications and recognizing signs and symptoms once discharged  CM also encouraged patient to follow up with all recommended appointments after discharge  Patient advised of importance for patient and family to participate in managing patients medical well being

## 2021-04-05 NOTE — PLAN OF CARE
Problem: PAIN - ADULT  Goal: Verbalizes/displays adequate comfort level or baseline comfort level  Description: Interventions:  - Encourage patient to monitor pain and request assistance  - Assess pain using appropriate pain scale  - Administer analgesics based on type and severity of pain and evaluate response  - Implement non-pharmacological measures as appropriate and evaluate response  - Consider cultural and social influences on pain and pain management  - Notify physician/advanced practitioner if interventions unsuccessful or patient reports new pain  Outcome: Progressing     Problem: INFECTION - ADULT  Goal: Absence or prevention of progression during hospitalization  Description: INTERVENTIONS:  - Assess and monitor for signs and symptoms of infection  - Monitor lab/diagnostic results  - Monitor all insertion sites, i e  indwelling lines, tubes, and drains  - Monitor endotracheal if appropriate and nasal secretions for changes in amount and color  - [place] appropriate cooling/warming therapies per order  - Administer medications as ordered  - Instruct and encourage patient and family to use good hand hygiene technique  - Identify and instruct in appropriate isolation precautions for identified infection/condition  Outcome: Progressing  Goal: Absence of fever/infection during neutropenic period  Description: INTERVENTIONS:  - Monitor WBC    Outcome: Progressing     Problem: SAFETY ADULT  Goal: Patient will remain free of falls  Description: INTERVENTIONS:  - Assess patient frequently for physical needs  -  Identify cognitive and physical deficits and behaviors that affect risk of falls    -  [place] fall precautions as indicated by assessment   - Educate patient/family on patient safety including physical limitations  - Instruct patient to call for assistance with activity based on assessment  - Modify environment to reduce risk of injury  - Consider OT/PT consult to assist with strengthening/mobility  Outcome: Progressing  Goal: Maintain or return to baseline ADL function  Description: INTERVENTIONS:  -  Assess patient's ability to carry out ADLs; assess patient's baseline for ADL function and identify physical deficits which impact ability to perform ADLs (bathing, care of mouth/teeth, toileting, grooming, dressing, etc )  - Assess/evaluate cause of self-care deficits   - Assess range of motion  - Assess patient's mobility; develop plan if impaired  - Assess patient's need for assistive devices and provide as appropriate  - Encourage maximum independence but intervene and supervise when necessary  - Involve family in performance of ADLs  - Assess for home care needs following discharge   - Consider OT consult to assist with ADL evaluation and planning for discharge  - Provide patient education as appropriate  Outcome: Progressing  Goal: Maintain or return mobility status to optimal level  Description: INTERVENTIONS:  - Assess patient's baseline mobility status (ambulation, transfers, stairs, etc )    - Identify cognitive and physical deficits and behaviors that affect mobility  - Identify mobility aids required to assist with transfers and/or ambulation (gait belt, sit-to-stand, lift, walker, cane, etc )  - [place] fall precautions as indicated by assessment  - Record patient progress and toleration of activity level on Mobility SBAR; progress patient to next Phase/Stage  - Instruct patient to call for assistance with activity based on assessment  - Consider rehabilitation consult to assist with strengthening/weightbearing, etc   Outcome: Progressing     Problem: DISCHARGE PLANNING  Goal: Discharge to home or other facility with appropriate resources  Description: INTERVENTIONS:  - Identify barriers to discharge w/patient and caregiver  - Arrange for needed discharge resources and transportation as appropriate  - Identify discharge learning needs (meds, wound care, etc )  - Arrange for interpretive services to assist at discharge as needed  - Refer to Case Management Department for coordinating discharge planning if the patient needs post-hospital services based on physician/advanced practitioner order or complex needs related to functional status, cognitive ability, or social support system  Outcome: Progressing     Problem: Knowledge Deficit  Goal: Patient/family/caregiver demonstrates understanding of disease process, treatment plan, medications, and discharge instructions  Description: Complete learning assessment and assess knowledge base  Interventions:  - Provide teaching at level of understanding  - Provide teaching via preferred learning methods  Outcome: Progressing     Problem: CARDIOVASCULAR - ADULT  Goal: Maintains optimal cardiac output and hemodynamic stability  Description: INTERVENTIONS:  - Monitor I/O, vital signs and rhythm  - Monitor for S/S and trends of decreased cardiac output  - Administer and titrate ordered vasoactive medications to optimize hemodynamic stability  - Assess quality of pulses, skin color and temperature  - Assess for signs of decreased coronary artery perfusion  - Instruct patient to report change in severity of symptoms  Outcome: Progressing  Goal: Absence of cardiac dysrhythmias or at baseline rhythm  Description: INTERVENTIONS:  - Continuous cardiac monitoring, vital signs, obtain 12 lead EKG if ordered  - Administer antiarrhythmic and heart rate control medications as ordered  - Monitor electrolytes and administer replacement therapy as ordered  Outcome: Progressing     Problem: Potential for Falls  Goal: Patient will remain free of falls  Description: INTERVENTIONS:  - Assess patient frequently for physical needs  -  Identify cognitive and physical deficits and behaviors that affect risk of falls    -  [place] fall precautions as indicated by assessment   - Educate patient/family on patient safety including physical limitations  - Instruct patient to call for assistance with activity based on assessment  - Modify environment to reduce risk of injury  - Consider OT/PT consult to assist with strengthening/mobility  Outcome: Progressing

## 2021-04-05 NOTE — RESTORATIVE TECHNICIAN NOTE
Restorative Specialist Mobility Note       Activity: Ambulate in regan, Ambulate in room, Bathroom privileges, Chair, Dangle, Stand at bedside(Educated/encouraged pt to ambulate with assistance 3-4 x's/day   Pt callbell, phone/tray within reach )     Assistive Device: None       Ashley CRUMP, Restorative Technician, [facility]
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
## 2021-04-06 DIAGNOSIS — I25.10 CORONARY ARTERY DISEASE INVOLVING NATIVE CORONARY ARTERY OF NATIVE HEART WITHOUT ANGINA PECTORIS: ICD-10-CM

## 2021-04-06 PROBLEM — I50.42 CHRONIC COMBINED SYSTOLIC AND DIASTOLIC CHF (CONGESTIVE HEART FAILURE) (HCC): Status: ACTIVE | Noted: 2021-04-06

## 2021-04-06 PROBLEM — T50.8X5A: Status: ACTIVE | Noted: 2021-04-06

## 2021-04-06 LAB
ANION GAP SERPL CALCULATED.3IONS-SCNC: 5 MMOL/L (ref 4–13)
ANISOCYTOSIS BLD QL SMEAR: PRESENT
ATRIAL RATE: 99 BPM
BASOPHILS # BLD MANUAL: 0 THOUSAND/UL (ref 0–0.1)
BASOPHILS NFR MAR MANUAL: 0 % (ref 0–1)
BUN SERPL-MCNC: 14 MG/DL (ref 5–25)
CALCIUM SERPL-MCNC: 8.2 MG/DL (ref 8.3–10.1)
CHLORIDE SERPL-SCNC: 109 MMOL/L (ref 100–108)
CO2 SERPL-SCNC: 23 MMOL/L (ref 21–32)
CREAT SERPL-MCNC: 1.19 MG/DL (ref 0.6–1.3)
EOSINOPHIL # BLD MANUAL: 0.56 THOUSAND/UL (ref 0–0.4)
EOSINOPHIL NFR BLD MANUAL: 4 % (ref 0–6)
ERYTHROCYTE [DISTWIDTH] IN BLOOD BY AUTOMATED COUNT: 18.8 % (ref 11.6–15.1)
GFR SERPL CREATININE-BSD FRML MDRD: 59 ML/MIN/1.73SQ M
GLUCOSE SERPL-MCNC: 140 MG/DL (ref 65–140)
HCT VFR BLD AUTO: 39.5 % (ref 36.5–49.3)
HGB BLD-MCNC: 13 G/DL (ref 12–17)
LYMPHOCYTES # BLD AUTO: 0.42 THOUSAND/UL (ref 0.6–4.47)
LYMPHOCYTES # BLD AUTO: 3 % (ref 14–44)
MCH RBC QN AUTO: 31.6 PG (ref 26.8–34.3)
MCHC RBC AUTO-ENTMCNC: 32.9 G/DL (ref 31.4–37.4)
MCV RBC AUTO: 96 FL (ref 82–98)
MONOCYTES # BLD AUTO: 0.7 THOUSAND/UL (ref 0–1.22)
MONOCYTES NFR BLD: 5 % (ref 4–12)
NEUTROPHILS # BLD MANUAL: 12.34 THOUSAND/UL (ref 1.85–7.62)
NEUTS BAND NFR BLD MANUAL: 18 % (ref 0–8)
NEUTS SEG NFR BLD AUTO: 70 % (ref 43–75)
NRBC BLD AUTO-RTO: 0 /100 WBCS
P AXIS: 74 DEGREES
PLATELET # BLD AUTO: 149 THOUSANDS/UL (ref 149–390)
PLATELET BLD QL SMEAR: ABNORMAL
PMV BLD AUTO: 11.2 FL (ref 8.9–12.7)
POTASSIUM SERPL-SCNC: 5.3 MMOL/L (ref 3.5–5.3)
PR INTERVAL: 168 MS
QRS AXIS: 35 DEGREES
QRSD INTERVAL: 82 MS
QT INTERVAL: 304 MS
QTC INTERVAL: 390 MS
RBC # BLD AUTO: 4.11 MILLION/UL (ref 3.88–5.62)
RBC MORPH BLD: PRESENT
SODIUM SERPL-SCNC: 137 MMOL/L (ref 136–145)
T WAVE AXIS: 74 DEGREES
VENTRICULAR RATE: 99 BPM
WBC # BLD AUTO: 14.02 THOUSAND/UL (ref 4.31–10.16)

## 2021-04-06 PROCEDURE — 99233 SBSQ HOSP IP/OBS HIGH 50: CPT | Performed by: INTERNAL MEDICINE

## 2021-04-06 PROCEDURE — 93010 ELECTROCARDIOGRAM REPORT: CPT | Performed by: INTERNAL MEDICINE

## 2021-04-06 PROCEDURE — 85027 COMPLETE CBC AUTOMATED: CPT | Performed by: STUDENT IN AN ORGANIZED HEALTH CARE EDUCATION/TRAINING PROGRAM

## 2021-04-06 PROCEDURE — 80048 BASIC METABOLIC PNL TOTAL CA: CPT | Performed by: STUDENT IN AN ORGANIZED HEALTH CARE EDUCATION/TRAINING PROGRAM

## 2021-04-06 PROCEDURE — 85007 BL SMEAR W/DIFF WBC COUNT: CPT | Performed by: STUDENT IN AN ORGANIZED HEALTH CARE EDUCATION/TRAINING PROGRAM

## 2021-04-06 PROCEDURE — 99232 SBSQ HOSP IP/OBS MODERATE 35: CPT | Performed by: INTERNAL MEDICINE

## 2021-04-06 PROCEDURE — 93005 ELECTROCARDIOGRAM TRACING: CPT

## 2021-04-06 RX ORDER — DIPHENHYDRAMINE HYDROCHLORIDE 50 MG/ML
25 INJECTION INTRAMUSCULAR; INTRAVENOUS EVERY 6 HOURS PRN
Status: DISCONTINUED | OUTPATIENT
Start: 2021-04-06 | End: 2021-04-07 | Stop reason: HOSPADM

## 2021-04-06 RX ORDER — PREDNISONE 20 MG/1
40 TABLET ORAL DAILY
Qty: 4 TABLET | Refills: 0 | Status: CANCELLED | OUTPATIENT
Start: 2021-04-07 | End: 2021-04-09

## 2021-04-06 RX ORDER — TICAGRELOR 90 MG/1
TABLET ORAL
Qty: 1 TABLET | Refills: 0 | OUTPATIENT
Start: 2021-04-06

## 2021-04-06 RX ORDER — PREDNISONE 20 MG/1
40 TABLET ORAL ONCE
Status: DISCONTINUED | OUTPATIENT
Start: 2021-04-07 | End: 2021-04-07

## 2021-04-06 RX ORDER — PREDNISONE 20 MG/1
40 TABLET ORAL ONCE
Status: COMPLETED | OUTPATIENT
Start: 2021-04-06 | End: 2021-04-06

## 2021-04-06 RX ADMIN — TICAGRELOR 90 MG: 90 TABLET ORAL at 08:55

## 2021-04-06 RX ADMIN — PHENOBARBITAL 64.8 MG: 64.8 TABLET ORAL at 08:51

## 2021-04-06 RX ADMIN — ASPIRIN 81 MG: 81 TABLET, COATED ORAL at 08:51

## 2021-04-06 RX ADMIN — DIPHENHYDRAMINE HYDROCHLORIDE 25 MG: 50 INJECTION, SOLUTION INTRAMUSCULAR; INTRAVENOUS at 02:08

## 2021-04-06 RX ADMIN — PREDNISONE 40 MG: 20 TABLET ORAL at 12:16

## 2021-04-06 RX ADMIN — TICAGRELOR 90 MG: 90 TABLET ORAL at 21:05

## 2021-04-06 RX ADMIN — PHENOBARBITAL 64.8 MG: 64.8 TABLET ORAL at 21:04

## 2021-04-06 RX ADMIN — SODIUM CHLORIDE 500 ML: 0.9 INJECTION, SOLUTION INTRAVENOUS at 05:15

## 2021-04-06 NOTE — PLAN OF CARE
Problem: PAIN - ADULT  Goal: Verbalizes/displays adequate comfort level or baseline comfort level  Description: Interventions:  - Encourage patient to monitor pain and request assistance  - Assess pain using appropriate pain scale  - Administer analgesics based on type and severity of pain and evaluate response  - Implement non-pharmacological measures as appropriate and evaluate response  - Consider cultural and social influences on pain and pain management  - Notify physician/advanced practitioner if interventions unsuccessful or patient reports new pain  Outcome: Progressing     Problem: INFECTION - ADULT  Goal: Absence or prevention of progression during hospitalization  Description: INTERVENTIONS:  - Assess and monitor for signs and symptoms of infection  - Monitor lab/diagnostic results  - Monitor all insertion sites, i e  indwelling lines, tubes, and drains  - Monitor endotracheal if appropriate and nasal secretions for changes in amount and color  - [place] appropriate cooling/warming therapies per order  - Administer medications as ordered  - Instruct and encourage patient and family to use good hand hygiene technique  - Identify and instruct in appropriate isolation precautions for identified infection/condition  Outcome: Progressing  Goal: Absence of fever/infection during neutropenic period  Description: INTERVENTIONS:  - Monitor WBC    Outcome: Progressing     Problem: SAFETY ADULT  Goal: Patient will remain free of falls  Description: INTERVENTIONS:  - Assess patient frequently for physical needs  -  Identify cognitive and physical deficits and behaviors that affect risk of falls    -  [place] fall precautions as indicated by assessment   - Educate patient/family on patient safety including physical limitations  - Instruct patient to call for assistance with activity based on assessment  - Modify environment to reduce risk of injury  - Consider OT/PT consult to assist with strengthening/mobility  Outcome: Progressing  Goal: Maintain or return to baseline ADL function  Description: INTERVENTIONS:  -  Assess patient's ability to carry out ADLs; assess patient's baseline for ADL function and identify physical deficits which impact ability to perform ADLs (bathing, care of mouth/teeth, toileting, grooming, dressing, etc )  - Assess/evaluate cause of self-care deficits   - Assess range of motion  - Assess patient's mobility; develop plan if impaired  - Assess patient's need for assistive devices and provide as appropriate  - Encourage maximum independence but intervene and supervise when necessary  - Involve family in performance of ADLs  - Assess for home care needs following discharge   - Consider OT consult to assist with ADL evaluation and planning for discharge  - Provide patient education as appropriate  Outcome: Progressing  Goal: Maintain or return mobility status to optimal level  Description: INTERVENTIONS:  - Assess patient's baseline mobility status (ambulation, transfers, stairs, etc )    - Identify cognitive and physical deficits and behaviors that affect mobility  - Identify mobility aids required to assist with transfers and/or ambulation (gait belt, sit-to-stand, lift, walker, cane, etc )  - [employer] fall precautions as indicated by assessment  - Record patient progress and toleration of activity level on Mobility SBAR; progress patient to next Phase/Stage  - Instruct patient to call for assistance with activity based on assessment  - Consider rehabilitation consult to assist with strengthening/weightbearing, etc   Outcome: Progressing     Problem: DISCHARGE PLANNING  Goal: Discharge to home or other facility with appropriate resources  Description: INTERVENTIONS:  - Identify barriers to discharge w/patient and caregiver  - Arrange for needed discharge resources and transportation as appropriate  - Identify discharge learning needs (meds, wound care, etc )  - Arrange for interpretive services to assist at discharge as needed  - Refer to Case Management Department for coordinating discharge planning if the patient needs post-hospital services based on physician/advanced practitioner order or complex needs related to functional status, cognitive ability, or social support system  Outcome: Progressing     Problem: Knowledge Deficit  Goal: Patient/family/caregiver demonstrates understanding of disease process, treatment plan, medications, and discharge instructions  Description: Complete learning assessment and assess knowledge base  Interventions:  - Provide teaching at level of understanding  - Provide teaching via preferred learning methods  Outcome: Progressing     Problem: CARDIOVASCULAR - ADULT  Goal: Maintains optimal cardiac output and hemodynamic stability  Description: INTERVENTIONS:  - Monitor I/O, vital signs and rhythm  - Monitor for S/S and trends of decreased cardiac output  - Administer and titrate ordered vasoactive medications to optimize hemodynamic stability  - Assess quality of pulses, skin color and temperature  - Assess for signs of decreased coronary artery perfusion  - Instruct patient to report change in severity of symptoms  Outcome: Progressing  Goal: Absence of cardiac dysrhythmias or at baseline rhythm  Description: INTERVENTIONS:  - Continuous cardiac monitoring, vital signs, obtain 12 lead EKG if ordered  - Administer antiarrhythmic and heart rate control medications as ordered  - Monitor electrolytes and administer replacement therapy as ordered  Outcome: Progressing     Problem: Potential for Falls  Goal: Patient will remain free of falls  Description: INTERVENTIONS:  - Assess patient frequently for physical needs  -  Identify cognitive and physical deficits and behaviors that affect risk of falls    -  [place] fall precautions as indicated by assessment   - Educate patient/family on patient safety including physical limitations  - Instruct patient to call for assistance with activity based on assessment  - Modify environment to reduce risk of injury  - Consider OT/PT consult to assist with strengthening/mobility  Outcome: Progressing

## 2021-04-06 NOTE — PROGRESS NOTES
INTERNAL MEDICINE RESIDENCY PROGRESS NOTE     Name: Tiffany Lorenzana   Age & Sex: 67 y o  male   MRN: [id number]  Unit/Bed#: [facility] 704-01   Encounter: [id number]  Team: SOD Team A    PATIENT INFORMATION     Name: Tiffany Lorenzana   Age & Sex: 67 y o  male   MRN: [id number]  Hospital Stay Days: 3    ASSESSMENT/PLAN     Principal Problem:    NSTEMI (non-ST elevated myocardial infarction) [facility])  Active Problems:    CAD (coronary artery disease)    HTN (hypertension)    HLD (hyperlipidemia)    Seizure disorder (HCC)    S/P AAA (abdominal aortic aneurysm) repair    Ventral hernia with obstruction and without gangrene    Chronic combined systolic and diastolic CHF (congestive heart failure) ([facility])    Allergic reaction to contrast media      * NSTEMI (non-ST elevated myocardial infarction) [facility])  Assessment & Plan  Patient presented today after right sided chest pain that he describes as dullness/being punched  He stated that that pain started around 10 am and went away at 1130 am  The pain gradually increased before it went away with no intervention  Had 1 episode of chest pain overnight 4/4-5, relieved with nitroglycerin  EKG -borderline ST elevation and T-wave inversions  Troponin elevated from 0 35-->15 70  Echo showed EF of 40% with grade 1 diastolic dysfunction  BNP 83  Cardiac catheterization done yesterday 4/5--> PCI with AGUSTIN stent placed in mid LAD    · Cardiology on board--follow their recommendations  · Continue aspirin, ticagrelor, metoprolol, and statin  · Continue to monitor on tele    Outpatient plan:  · Follow-up already scheduled with cardiologist Dr Tim Andrade 4/15  · Continued DAPT, metoprolol, and statin therapy  · Nitrostat PRN    Allergic reaction to contrast media  Assessment & Plan  Patient has a known allergy to iodinated contrast   In a prior CT scan the patient became feverish, flushed, and hypotensive    Although he was pre treated with a steroid and Benadryl prior to cardiac catheterization on 04/05, he developed diffuse red skin, fever, chills, malaise, and hypotension overnight  P O  Benadryl did not help his symptoms  · Will give a 3 day course of prednisone 40 mg daily and monitor his response    Chronic combined systolic and diastolic CHF (congestive heart failure) (HCC)  Assessment & Plan  Wt Readings from Last 3 Encounters:   04/02/21 102 kg (225 lb 8 5 oz)   03/25/21 102 kg (224 lb 13 9 oz)   01/21/21 103 kg (226 lb 6 4 oz)     Echocardiogram from 04/02 revealed left ventricle mildly dilated  Systolic function was moderately to markedly reduced  Ejection fraction was estimated to be 40 %  There was akinesis of the apex and the mid to apical walls of the heart  The basal segments were preserved  The changes were consistent with eccentric hypertrophy  Doppler parameters were consistent with abnormal left ventricular relaxation (grade 1 diastolic dysfunction)    · Will continue beta blocker and initiate lisinopril + switch to metoprolol succinate therapy upon discharge  · No symptoms of fluid overload; no diuretic prescribed      Ventral hernia with obstruction and without gangrene  Assessment & Plan  Per outpatient notes, patient refuses to have surgery  Likely second to AAA repair surgery  Seizure disorder (HCC)  Assessment & Plan  · Continue phenobarbital 64 8 mg BID    HLD (hyperlipidemia)  Assessment & Plan  Patient has history of adverse effects with multiple statins:  Atorvastatin, rosuvastatin, and pravastatin more recently  Has complained of myalgia, lightheadedness, headache, and diarrhea  · Continue atorvastatin for now--> will likely discharge with the different statin (simvastatin?) or 1 with the lower dose    Outpatient plan:  · Patient has agreed to try simvastatin    HTN (hypertension)  Assessment & Plan  Blood pressure is stable  · Continue metoprolol and to monitor  CAD (coronary artery disease)  Assessment & Plan  S/p 2 stents RCA and LAD, with the last being in 2017  · Continue aspirin, ticagrelor, statin, and metoprolol      Disposition:  Will monitor the patient overnight to see response his allergic reaction to oral steroids  Will tentatively plan for discharge tomorrow  SUBJECTIVE     Patient seen and examined  Overnight, the patient became hypotensive with associated symptoms such as flushing of the skin, fever, chills, malaise, labored breathing, and soft bowel movements  He was evaluated by the night team and was found to be tachycardic in the 110s  A dose of oral Benadryl was given without relief  This morning and speaking to the patient, he feels better, but still hot and flushed  He is breathing fine and denies any lightheadedness, chest pain, palpitations, N/V/D  He states that he has had allergic reactions to contrast in the past and that this is a similar experience with his current symptoms  My team and I took opportunity to educate him on why hypersensitivity reaction can cause when he felt  He was amenable to this  OBJECTIVE     Vitals:    21 0418 21 0713 21 0853 21 1225   BP: (!) 86/60 90/62 97/63 105/67   Pulse: 102 (!) 106 (!) 115 100   Resp:    16   Temp: 100 2 °F (37 9 °C) 99 2 °F (37 3 °C)     SpO2: 91% 92% 95% 96%   Weight:       Height:          Temperature:   Temp (24hrs), Av 5 °F (37 5 °C), Min:99 °F (37 2 °C), Max:100 2 °F (37 9 °C)    Temperature: 99 2 °F (37 3 °C)  Intake & Output:  I/O       / 0701 - 04/05 0700 04/ 07 - / 0700 04/ 07 - / 0700    P  O  1200 400 420    I V  (mL/kg) 286 2 (2 8) 783 3 (7 7)     Total Intake(mL/kg) 1486 2 (14 6) 1183 3 (11 6) 420 (4 1)    Urine (mL/kg/hr)  300 (0 1)     Total Output  300     Net +1486 2 +883 3 +420           Unmeasured Urine Occurrence   1 x        Weights:   IBW: 77 6 kg    Body mass index is 30 59 kg/m²  Weight (last 2 days)     None        Physical Exam  Vitals signs reviewed     Constitutional:       General: He is not in acute distress  Appearance: He is well-developed  He is not diaphoretic  HENT:      Head: Normocephalic and atraumatic  Mouth/Throat:      Mouth: Mucous membranes are moist       Pharynx: Oropharynx is clear  Eyes:      General: No scleral icterus  Conjunctiva/sclera: Conjunctivae normal    Neck:      Musculoskeletal: Neck supple  Thyroid: No thyromegaly  Trachea: No tracheal deviation  Cardiovascular:      Rate and Rhythm: Normal rate and regular rhythm  Heart sounds: Normal heart sounds  No murmur  No gallop  Pulmonary:      Effort: Pulmonary effort is normal       Breath sounds: Normal breath sounds  Abdominal:      General: Bowel sounds are normal  There is no distension  Palpations: Abdomen is soft  Tenderness: There is no abdominal tenderness  Hernia: A hernia (ventral) is present  Musculoskeletal:         General: No tenderness  Lymphadenopathy:      Cervical: No cervical adenopathy  Skin:     General: Skin is warm  Coloration: Skin is not pale  Findings: No erythema  Comments: Skin all over is completely flushed right red and warm to the touch   Neurological:      Mental Status: He is alert and oriented to person, place, and time  Psychiatric:         Mood and Affect: Mood normal          Behavior: Behavior normal          Thought Content: Thought content normal          Judgment: Judgment normal        LABORATORY DATA     Labs: I have personally reviewed pertinent reports    Results from last 7 days   Lab Units 04/06/21  0506 04/05/21  0637 04/04/21 0446 04/02/21  1309   WBC Thousand/uL 14 02* 4 82 5 06 3 80*   HEMOGLOBIN g/dL 13 0 11 7* 11 7* 12 7   HEMATOCRIT % 39 5 36 3* 36 6 39 4   PLATELETS Thousands/uL 149 138* 137* 135*   NEUTROS PCT %  --  45 49 68   MONOS PCT %  --  12 12 10   MONO PCT % 5  --   --   --       Results from last 7 days   Lab Units 04/06/21  0506 04/05/21  0637 04/04/21 0446 04/02/21  1309   POTASSIUM mmol/L 5 3 3 6 3 8 3 9   CHLORIDE mmol/L 109* 109* 111* 107   CO2 mmol/L 23 26 26 24   BUN mg/dL 14 14 10 10   CREATININE mg/dL 1 19 0 93 0 85 0 94   CALCIUM mg/dL 8 2* 7 8* 8 2* 8 8   ALK PHOS U/L  --  135* 139* 142*   ALT U/L  --  15 15 18   AST U/L  --  12 17 9     Results from last 7 days   Lab Units 04/05/21  0637   MAGNESIUM mg/dL 2 2          Results from last 7 days   Lab Units 04/05/21  0455 04/04/21  0446 04/03/21  1443  04/02/21  1916   INR   --   --   --   --  1 05   PTT seconds 65* 67* 79*   < > 32    < > = values in this interval not displayed  Results from last 7 days   Lab Units 04/03/21  0437 04/03/21  0103 04/02/21  2201   TROPONIN I ng/mL 15 70* 15 80* 10 90*       IMAGING & DIAGNOSTIC TESTING     Radiology Results: I have personally reviewed pertinent reports  Xr Chest 1 View Portable    Result Date: 4/2/2021  Impression: No acute cardiopulmonary disease  Findings are stable Workstation performed: [id number]     Other Diagnostic Testing: I have personally reviewed pertinent reports      ACTIVE MEDICATIONS     Current Facility-Administered Medications   Medication Dose Route Frequency    acetaminophen (TYLENOL) tablet 650 mg  650 mg Oral Q4H PRN    aspirin (ECOTRIN LOW STRENGTH) EC tablet 81 mg  81 mg Oral Daily    atorvastatin (LIPITOR) tablet 80 mg  80 mg Oral Daily With Dinner    diphenhydrAMINE (BENADRYL) injection 25 mg  25 mg Intravenous Q6H PRN    metoprolol tartrate (LOPRESSOR) tablet 25 mg  25 mg Oral Q12H [facility]    nitroglycerin (NITROSTAT) SL tablet 0 4 mg  0 4 mg Sublingual Q5 Min PRN    ondansetron (ZOFRAN) injection 4 mg  4 mg Intravenous Q6H PRN    PHENobarbital tablet 64 8 mg  64 8 mg Oral BID    [START ON 4/7/2021] predniSONE tablet 40 mg  40 mg Oral Once    ticagrelor (BRILINTA) tablet 90 mg  90 mg Oral Q12H [facility]       VTE Pharmacologic Prophylaxis: RX contraindicated due to: on DAPT  VTE Mechanical Prophylaxis: sequential compression device    Portions of the record may have been created with voice recognition software  Occasional wrong word or "sound a like" substitutions may have occurred due to the inherent limitations of voice recognition software    Read the chart carefully and recognize, using context, where substitutions have occurred   ==  Heather Mackenzie DO  [facility]  Internal Medicine Residency PGY-1

## 2021-04-06 NOTE — ASSESSMENT & PLAN NOTE
Patient has a known allergy to iodinated contrast   In a prior CT scan the patient became feverish, flushed, and hypotensive  Although he was pre treated with a steroid and Benadryl prior to cardiac catheterization on 04/05, he developed diffuse red skin, fever, chills, malaise, and hypotension overnight  P O  Benadryl did not help his symptoms  Patient's symptoms improved with prednisone, though the day of discharge, he felt dizzy and warm  Orthostatic blood pressures at that time were negative for any changes, and he was initially tachycardic in the 120s--> vital signs improved after giving prednisone    Outpatient plan:  · Patient is to continue a short 3-4 day course of prednisone 40 mg daily and steroid cream help with his symptoms  · Planning for internal medicine clinic follow-up within 1-2 weeks of discharge

## 2021-04-06 NOTE — RESTORATIVE TECHNICIAN NOTE
Restorative Specialist Mobility Note       Activity: Ambulate in regan, Ambulate in room, Bathroom privileges, Chair, Dangle, Stand at bedside(Educated/encouraged pt to ambulate with assistance 3-4 x's/day   Pt callbell, phone/tray within reach )     Assistive Device: None       Rosa CRUMP, Restorative Technician, [employer] [facility] Discharge Note

## 2021-04-06 NOTE — PROGRESS NOTES
Team 2 Cardiology - Progress Note  Lindsay Merida 67 y o  male MRN: [id number]  Unit/Bed#: [facility] 704-01 Encounter: [id number]    Assessment:  Principal Problem:    NSTEMI (non-ST elevated myocardial infarction) ([facility] [address] )  Active Problems:    CAD (coronary artery disease)    HTN (hypertension)    HLD (hyperlipidemia)    Seizure disorder (HCC)    S/P AAA (abdominal aortic aneurysm) repair    Ventral hernia with obstruction and without gangrene    # NSTEMI type 1  -[facility] 4/5: radial access; pLM 25%, mLAD 90% ISR s/p AGUSTIN, mLCX 50%, mRCA 50%  # CAD  -on ASA, Brilinta, metoprolol 25mg BID, Lipitor 80mg  # HFrEF 40%; compensated  -decreased from 55% in 2017  # HTN  # HLD  -LDL[facility] 162 but in setting of MI; needs repeat as OP in 2-3 months  # Hx of AAA repair  # Hypotension, tachycardia; fever/chills, erythroderma  -suspect slightly delayed reaction to iodinated contrast; he has had similar reactions when he had CT scans with IV contrast but not with C before      Plan:  1  Treatment of possible allergic reaction per medicine team  Recommend observing patient overnight  2  Continue DAPT, statin, BB  Will plan to add ACEi/ARB for HFrEF when BP improves  3  Will follow with you  4  Follow up scheduled with Dr Theone Bamberger 4/15  Subjective/Objective     Subjective: He states he had fever (T 100 2F) and chills overnight, nausea without vomiting and malaise  He is still feeling those symptoms  He has had relative hypotension and progressive tachycardia in the last 12-24 hours        Objective:  Vitals: BP 97/63   Pulse (!) 115   Temp 99 2 °F (37 3 °C)   Resp 17   Ht 6' (1 829 m)   Wt 102 kg (225 lb 8 5 oz)   SpO2 95%   BMI 30 59 kg/m²   Vitals:    04/02/21 1241 04/02/21 1752   Weight: 102 kg (224 lb 13 9 oz) 102 kg (225 lb 8 5 oz)     Orthostatic Blood Pressures      Most Recent Value   Blood Pressure  97/63 filed at 04/06/2021 0853            Intake/Output Summary (Last 24 hours) at 4/6/2021 1151  Last data filed at 4/6/2021 0713  Gross per 24 hour   Intake 1603 33 ml   Output 300 ml   Net 1303 33 ml       Physical Exam:   General appearance: alert, +ve distress/wrapped in blanket, +ve generalized erythroderma predominantly in face/chest/back  Head: Normocephalic, without obvious abnormality, atraumatic  Neck: no JVD and supple, symmetrical, trachea midline  Lungs: clear to auscultation bilaterally, Normal air entry, Normal effort, No rales, wheezing  Heart: S1, S2 normal and no S3 or S4, No murmur, No gallop, No rub  Extremities: extremities normal, atraumatic, no cyanosis, no edema  Pulses: 2+ and symmetric bilaterally  Skin: Skin color, texture, turgor normal; No rashes or lesions  Neurologic: Grossly normal, Alert and oriented      Medications:    Current Facility-Administered Medications:     acetaminophen (TYLENOL) tablet 650 mg, 650 mg, Oral, Q4H PRN, Maynor Quintero MD    aspirin (ECOTRIN LOW STRENGTH) EC tablet 81 mg, 81 mg, Oral, Daily, Shannan Aviles MD, 81 mg at 04/06/21 0851    atorvastatin (LIPITOR) tablet 80 mg, 80 mg, Oral, Daily With Litzy Blades, DO, 80 mg at 04/05/21 1644    diphenhydrAMINE (BENADRYL) injection 25 mg, 25 mg, Intravenous, Q6H PRN, Fabiano Adjutant, DO, 25 mg at 04/06/21 0208    metoprolol tartrate (LOPRESSOR) tablet 25 mg, 25 mg, Oral, Q12H ADENIKE, Nehemiah Banai, DO, 25 mg at 04/04/21 0903    nitroglycerin (NITROSTAT) SL tablet 0 4 mg, 0 4 mg, Sublingual, Q5 Min PRN, Shannan Aviles MD    ondansetron (ZOFRAN) injection 4 mg, 4 mg, Intravenous, Q6H PRN, Maynor Quintero MD    PHENobarbital tablet 64 8 mg, 64 8 mg, Oral, BID, Shannan Aviles MD, 64 8 mg at 04/06/21 0851    predniSONE tablet 40 mg, 40 mg, Oral, Once, Ralph Woods DO    ticagrelor (BRILINTA) tablet 90 mg, 90 mg, Oral, Q12H [facility], Maynor Quintero MD, 90 mg at 04/06/21 0855    Lab Results:  Results from last 7 days   Lab Units 04/03/21  0437 04/03/21  0103 04/02/21  2201   TROPONIN I ng/mL 15 70* 15 80* 10 90*     Results from last 7 days   Lab Units 04/06/21  0506 04/05/21  0637 04/04/21  0446   WBC Thousand/uL 14 02* 4 82 5 06   HEMOGLOBIN g/dL 13 0 11 7* 11 7*   HEMATOCRIT % 39 5 36 3* 36 6   PLATELETS Thousands/uL 149 138* 137*     Results from last 7 days   Lab Units 04/03/21  0437   TRIGLYCERIDES mg/dL 77   HDL mg/dL 51     Results from last 7 days   Lab Units 04/06/21  0506 04/05/21  0637 04/04/21  0446 04/02/21  1309   POTASSIUM mmol/L 5 3 3 6 3 8 3 9   CHLORIDE mmol/L 109* 109* 111* 107   CO2 mmol/L 23 26 26 24   BUN mg/dL 14 14 10 10   CREATININE mg/dL 1 19 0 93 0 85 0 94   CALCIUM mg/dL 8 2* 7 8* 8 2* 8 8   ALK PHOS U/L  --  135* 139* 142*   ALT U/L  --  15 15 18   AST U/L  --  12 17 9     Results from last 7 days   Lab Units 04/05/21  0455 04/04/21  0446 04/03/21  1443  04/02/21  1916   INR   --   --   --   --  1 05   PTT seconds 65* 67* 79*   < > 32    < > = values in this interval not displayed  Results from last 7 days   Lab Units 04/05/21  0637   MAGNESIUM mg/dL 2 2       Telemetry: Personally reviewed  -100 BPM  Imaging: Personally reviewed  EKG: Personally reviewed       Rozina Givens MD  Cardiology Fellow

## 2021-04-06 NOTE — QUICK NOTE
SOD notified by RN for low blood pressure 86/60 with manual repeat of 90/60  Patient asymptomatic without any changes in vision, lightheadedness, shortness of breath or chest pain  Patient evaluated at bedside  No acute complaints and physical exam unremarkable  No JVD, distant heart sounds or diminished breath sounds  Sinus tachycardia into the 110s  Saturating well on room air greater than 92%  Diffuse erythema and hot to touch on back and arms b/l    Will get stat EKG, H&H and follow-up    Concern for PE or delayed hypersensitivity reaction considering slightly labored breathing on physical exam and recent cath; consider adding CTA PE and heparin/anticoagulation held for cardiac cath and/or epinephrine

## 2021-04-06 NOTE — CASE MANAGEMENT
CM was requested to check the cost of [address]; script sent to pt's [facility] pharmacy  CM called [facility] and spoke to Nathan Dumont in pharmacy who states copay is $4  Nathan Dumont confirmed [address] is in stock and available for   Pt is agreeable to copay

## 2021-04-06 NOTE — ASSESSMENT & PLAN NOTE
Wt Readings from Last 3 Encounters:   04/02/21 102 kg (225 lb 8 5 oz)   03/25/21 102 kg (224 lb 13 9 oz)   01/21/21 103 kg (226 lb 6 4 oz)     Echocardiogram from 04/02 revealed left ventricle mildly dilated  Systolic function was moderately to markedly reduced  Ejection fraction was estimated to be 40 %  There was akinesis of the apex and the mid to apical walls of the heart  The basal segments were preserved  The changes were consistent with eccentric hypertrophy  Doppler parameters were consistent with abnormal left ventricular relaxation (grade 1 diastolic dysfunction)    · Will continue beta blocker and initiate lisinopril + switch to metoprolol succinate therapy upon discharge  · No symptoms of fluid overload; no diuretic prescribed    Outpatient plan:  · Continue metoprolol succinate 50 mg q d , lisinopril 5 mg q d    · Patient has follow-up appointment with cardiologist 4/15

## 2021-04-06 NOTE — PLAN OF CARE
Problem: PAIN - ADULT  Goal: Verbalizes/displays adequate comfort level or baseline comfort level  Description: Interventions:  - Encourage patient to monitor pain and request assistance  - Assess pain using appropriate pain scale  - Administer analgesics based on type and severity of pain and evaluate response  - Implement non-pharmacological measures as appropriate and evaluate response  - Consider cultural and social influences on pain and pain management  - Notify physician/advanced practitioner if interventions unsuccessful or patient reports new pain  Outcome: Progressing     Problem: INFECTION - ADULT  Goal: Absence or prevention of progression during hospitalization  Description: INTERVENTIONS:  - Assess and monitor for signs and symptoms of infection  - Monitor lab/diagnostic results  - Monitor all insertion sites, i e  indwelling lines, tubes, and drains  - Monitor endotracheal if appropriate and nasal secretions for changes in amount and color  - [place] appropriate cooling/warming therapies per order  - Administer medications as ordered  - Instruct and encourage patient and family to use good hand hygiene technique  - Identify and instruct in appropriate isolation precautions for identified infection/condition  Outcome: Progressing  Goal: Absence of fever/infection during neutropenic period  Description: INTERVENTIONS:  - Monitor WBC    Outcome: Progressing     Problem: SAFETY ADULT  Goal: Patient will remain free of falls  Description: INTERVENTIONS:  - Assess patient frequently for physical needs  -  Identify cognitive and physical deficits and behaviors that affect risk of falls    -  [place] fall precautions as indicated by assessment   - Educate patient/family on patient safety including physical limitations  - Instruct patient to call for assistance with activity based on assessment  - Modify environment to reduce risk of injury  - Consider OT/PT consult to assist with strengthening/mobility  Outcome: Progressing  Goal: Maintain or return to baseline ADL function  Description: INTERVENTIONS:  -  Assess patient's ability to carry out ADLs; assess patient's baseline for ADL function and identify physical deficits which impact ability to perform ADLs (bathing, care of mouth/teeth, toileting, grooming, dressing, etc )  - Assess/evaluate cause of self-care deficits   - Assess range of motion  - Assess patient's mobility; develop plan if impaired  - Assess patient's need for assistive devices and provide as appropriate  - Encourage maximum independence but intervene and supervise when necessary  - Involve family in performance of ADLs  - Assess for home care needs following discharge   - Consider OT consult to assist with ADL evaluation and planning for discharge  - Provide patient education as appropriate  Outcome: Progressing  Goal: Maintain or return mobility status to optimal level  Description: INTERVENTIONS:  - Assess patient's baseline mobility status (ambulation, transfers, stairs, etc )    - Identify cognitive and physical deficits and behaviors that affect mobility  - Identify mobility aids required to assist with transfers and/or ambulation (gait belt, sit-to-stand, lift, walker, cane, etc )  - [place] fall precautions as indicated by assessment  - Record patient progress and toleration of activity level on Mobility SBAR; progress patient to next Phase/Stage  - Instruct patient to call for assistance with activity based on assessment  - Consider rehabilitation consult to assist with strengthening/weightbearing, etc   Outcome: Progressing     Problem: DISCHARGE PLANNING  Goal: Discharge to home or other facility with appropriate resources  Description: INTERVENTIONS:  - Identify barriers to discharge w/patient and caregiver  - Arrange for needed discharge resources and transportation as appropriate  - Identify discharge learning needs (meds, wound care, etc )  - Arrange for interpretive services to assist at discharge as needed  - Refer to Case Management Department for coordinating discharge planning if the patient needs post-hospital services based on physician/advanced practitioner order or complex needs related to functional status, cognitive ability, or social support system  Outcome: Progressing     Problem: Knowledge Deficit  Goal: Patient/family/caregiver demonstrates understanding of disease process, treatment plan, medications, and discharge instructions  Description: Complete learning assessment and assess knowledge base  Interventions:  - Provide teaching at level of understanding  - Provide teaching via preferred learning methods  Outcome: Progressing     Problem: CARDIOVASCULAR - ADULT  Goal: Maintains optimal cardiac output and hemodynamic stability  Description: INTERVENTIONS:  - Monitor I/O, vital signs and rhythm  - Monitor for S/S and trends of decreased cardiac output  - Administer and titrate ordered vasoactive medications to optimize hemodynamic stability  - Assess quality of pulses, skin color and temperature  - Assess for signs of decreased coronary artery perfusion  - Instruct patient to report change in severity of symptoms  Outcome: Progressing  Goal: Absence of cardiac dysrhythmias or at baseline rhythm  Description: INTERVENTIONS:  - Continuous cardiac monitoring, vital signs, obtain 12 lead EKG if ordered  - Administer antiarrhythmic and heart rate control medications as ordered  - Monitor electrolytes and administer replacement therapy as ordered  Outcome: Progressing     Problem: Potential for Falls  Goal: Patient will remain free of falls  Description: INTERVENTIONS:  - Assess patient frequently for physical needs  -  Identify cognitive and physical deficits and behaviors that affect risk of falls    -  [place] fall precautions as indicated by assessment   - Educate patient/family on patient safety including physical limitations  - Instruct patient to call for assistance with activity based on assessment  - Modify environment to reduce risk of injury  - Consider OT/PT consult to assist with strengthening/mobility  Outcome: Progressing

## 2021-04-07 VITALS
DIASTOLIC BLOOD PRESSURE: 63 MMHG | HEIGHT: 72 IN | RESPIRATION RATE: 16 BRPM | OXYGEN SATURATION: 93 % | HEART RATE: 103 BPM | WEIGHT: 225.53 LBS | BODY MASS INDEX: 30.55 KG/M2 | TEMPERATURE: 98.9 F | SYSTOLIC BLOOD PRESSURE: 101 MMHG

## 2021-04-07 LAB
ANION GAP SERPL CALCULATED.3IONS-SCNC: 9 MMOL/L (ref 4–13)
BASOPHILS # BLD AUTO: 0.02 THOUSANDS/ΜL (ref 0–0.1)
BASOPHILS NFR BLD AUTO: 0 % (ref 0–1)
BUN SERPL-MCNC: 17 MG/DL (ref 5–25)
CALCIUM SERPL-MCNC: 8.1 MG/DL (ref 8.3–10.1)
CHLORIDE SERPL-SCNC: 110 MMOL/L (ref 100–108)
CO2 SERPL-SCNC: 23 MMOL/L (ref 21–32)
CREAT SERPL-MCNC: 0.99 MG/DL (ref 0.6–1.3)
EOSINOPHIL # BLD AUTO: 0.83 THOUSAND/ΜL (ref 0–0.61)
EOSINOPHIL NFR BLD AUTO: 6 % (ref 0–6)
ERYTHROCYTE [DISTWIDTH] IN BLOOD BY AUTOMATED COUNT: 19 % (ref 11.6–15.1)
GFR SERPL CREATININE-BSD FRML MDRD: 74 ML/MIN/1.73SQ M
GLUCOSE SERPL-MCNC: 115 MG/DL (ref 65–140)
HCT VFR BLD AUTO: 35 % (ref 36.5–49.3)
HGB BLD-MCNC: 11.5 G/DL (ref 12–17)
IMM GRANULOCYTES # BLD AUTO: 0.28 THOUSAND/UL (ref 0–0.2)
IMM GRANULOCYTES NFR BLD AUTO: 2 % (ref 0–2)
LYMPHOCYTES # BLD AUTO: 0.5 THOUSANDS/ΜL (ref 0.6–4.47)
LYMPHOCYTES NFR BLD AUTO: 4 % (ref 14–44)
MCH RBC QN AUTO: 31.8 PG (ref 26.8–34.3)
MCHC RBC AUTO-ENTMCNC: 32.9 G/DL (ref 31.4–37.4)
MCV RBC AUTO: 97 FL (ref 82–98)
MONOCYTES # BLD AUTO: 0.39 THOUSAND/ΜL (ref 0.17–1.22)
MONOCYTES NFR BLD AUTO: 3 % (ref 4–12)
NEUTROPHILS # BLD AUTO: 11.88 THOUSANDS/ΜL (ref 1.85–7.62)
NEUTS SEG NFR BLD AUTO: 85 % (ref 43–75)
NRBC BLD AUTO-RTO: 0 /100 WBCS
PLATELET # BLD AUTO: 128 THOUSANDS/UL (ref 149–390)
PMV BLD AUTO: 11.1 FL (ref 8.9–12.7)
POTASSIUM SERPL-SCNC: 3.3 MMOL/L (ref 3.5–5.3)
RBC # BLD AUTO: 3.62 MILLION/UL (ref 3.88–5.62)
SODIUM SERPL-SCNC: 142 MMOL/L (ref 136–145)
WBC # BLD AUTO: 13.9 THOUSAND/UL (ref 4.31–10.16)

## 2021-04-07 PROCEDURE — 85027 COMPLETE CBC AUTOMATED: CPT | Performed by: STUDENT IN AN ORGANIZED HEALTH CARE EDUCATION/TRAINING PROGRAM

## 2021-04-07 PROCEDURE — 80048 BASIC METABOLIC PNL TOTAL CA: CPT | Performed by: STUDENT IN AN ORGANIZED HEALTH CARE EDUCATION/TRAINING PROGRAM

## 2021-04-07 PROCEDURE — 99238 HOSP IP/OBS DSCHRG MGMT 30/<: CPT | Performed by: INTERNAL MEDICINE

## 2021-04-07 PROCEDURE — 99232 SBSQ HOSP IP/OBS MODERATE 35: CPT | Performed by: INTERNAL MEDICINE

## 2021-04-07 RX ORDER — PREDNISONE 20 MG/1
40 TABLET ORAL DAILY
Qty: 4 TABLET | Refills: 0 | Status: SHIPPED | OUTPATIENT
Start: 2021-04-07 | End: 2021-04-09

## 2021-04-07 RX ORDER — NITROGLYCERIN 0.4 MG/1
0.4 TABLET SUBLINGUAL
Qty: 30 TABLET | Refills: 1 | Status: SHIPPED | OUTPATIENT
Start: 2021-04-07 | End: 2022-07-15

## 2021-04-07 RX ORDER — LISINOPRIL 5 MG/1
5 TABLET ORAL DAILY
Qty: 30 TABLET | Refills: 2 | Status: SHIPPED | OUTPATIENT
Start: 2021-04-07 | End: 2021-06-30

## 2021-04-07 RX ORDER — TRIAMCINOLONE ACETONIDE 0.25 MG/G
CREAM TOPICAL 2 TIMES DAILY
Qty: 30 G | Refills: 0 | Status: SHIPPED | OUTPATIENT
Start: 2021-04-07 | End: 2021-12-13

## 2021-04-07 RX ORDER — PREDNISONE 20 MG/1
40 TABLET ORAL DAILY
Qty: 4 TABLET | Refills: 0 | Status: CANCELLED | OUTPATIENT
Start: 2021-04-07 | End: 2021-04-09

## 2021-04-07 RX ORDER — LISINOPRIL 5 MG/1
5 TABLET ORAL DAILY
Qty: 30 TABLET | Refills: 2 | Status: CANCELLED | OUTPATIENT
Start: 2021-04-07

## 2021-04-07 RX ORDER — PREDNISONE 20 MG/1
40 TABLET ORAL ONCE
Status: COMPLETED | OUTPATIENT
Start: 2021-04-07 | End: 2021-04-07

## 2021-04-07 RX ORDER — TRIAMCINOLONE ACETONIDE 0.25 MG/G
CREAM TOPICAL 2 TIMES DAILY
Status: DISCONTINUED | OUTPATIENT
Start: 2021-04-07 | End: 2021-04-07 | Stop reason: HOSPADM

## 2021-04-07 RX ORDER — METOPROLOL SUCCINATE 50 MG/1
50 TABLET, EXTENDED RELEASE ORAL DAILY
Qty: 30 TABLET | Refills: 2 | Status: SHIPPED | OUTPATIENT
Start: 2021-04-07 | End: 2021-06-30

## 2021-04-07 RX ORDER — POTASSIUM CHLORIDE 20 MEQ/1
40 TABLET, EXTENDED RELEASE ORAL ONCE
Qty: 2 TABLET | Refills: 0 | Status: SHIPPED | OUTPATIENT
Start: 2021-04-07 | End: 2021-11-16 | Stop reason: SDDI

## 2021-04-07 RX ORDER — POTASSIUM CHLORIDE 20 MEQ/1
40 TABLET, EXTENDED RELEASE ORAL ONCE
Status: COMPLETED | OUTPATIENT
Start: 2021-04-07 | End: 2021-04-07

## 2021-04-07 RX ORDER — SIMVASTATIN 20 MG
20 TABLET ORAL
Qty: 30 TABLET | Refills: 2 | Status: SHIPPED | OUTPATIENT
Start: 2021-04-07 | End: 2021-06-30

## 2021-04-07 RX ADMIN — POTASSIUM CHLORIDE 40 MEQ: 1500 TABLET, EXTENDED RELEASE ORAL at 10:20

## 2021-04-07 RX ADMIN — PREDNISONE 40 MG: 20 TABLET ORAL at 10:20

## 2021-04-07 RX ADMIN — PHENOBARBITAL 64.8 MG: 64.8 TABLET ORAL at 08:13

## 2021-04-07 RX ADMIN — TICAGRELOR 90 MG: 90 TABLET ORAL at 08:13

## 2021-04-07 RX ADMIN — DIPHENHYDRAMINE HYDROCHLORIDE 25 MG: 50 INJECTION, SOLUTION INTRAMUSCULAR; INTRAVENOUS at 10:20

## 2021-04-07 RX ADMIN — ASPIRIN 81 MG: 81 TABLET, COATED ORAL at 08:13

## 2021-04-07 NOTE — PROGRESS NOTES
PROGRESS NOTE - Cardiology  Chloe Summers 67 y o  male MRN: [id number]  Unit/Bed#: [facility] 704-01 Encounter: [id number]      Assessment/Plan   # NSTEMI type 1/CAD  - [facility] : radial access; pLM 25%, mLAD 90% ISR s/p AGUSTIN, mLCX 50%, mRCA 50%  - Continue DAPT, statin, BB  Started on low-dose lisinopril given depressed EF with a plan to uptitrate goal-directed therapy on outpatient basis  - Follow up scheduled with Dr Aki Berry 4/15  # HFrEF 40%; compensated  -decreased from 55% in 2017  -cont with meds as above    # HTN  # HLD  # Hx of AAA repair  # Hypotension, tachycardia; fever/chills, erythroderma  -suspect slightly delayed reaction to iodinated contrast; he has had similar reactions when he had CT scans with IV contrast but not with [facility] before  Treated with Benadryl, prednisone with improvement in the symptoms  Interval history:  No acute events overnight  Patient has no acute complaints today  TELE:  Normal sinus rhythm with episodes of sinus tachycardia    Cardiac testing:   ECHO:   Results for orders placed during the hospital encounter of 21   Echo complete with contrast if indicated    [facility] [address]  [facility], [address] [facility]  [phone]    Transthoracic Echocardiogram  2D, M-mode, Doppler, and Color Doppler    Study date:  2021    Patient: Vitor Huntley  MR number: [id number]  Account number: [de-identified]  : 1945  Age: 67 years  Gender: Male  Status: Outpatient  Location:  Height: 72 in  Weight: 227 lb  BP: 103/ 55 mmHg    Indications: Cardiomyopathy    Diagnoses: I42 9 - Cardiomyopathy, unspecified    Sonographer:  Tyrone Burton RDCS  Primary Physician:  Lamar Montes De Oca DO  Referring Physician:  Basim Bedolla MD  Group:  [id number]  Cardiology Fellow:  Yusef Hillman MD  Interpreting Physician:  Pascale Hernandez MD    SUMMARY    LEFT VENTRICLE:  The ventricle was mildly dilated    Systolic function was moderately to markedly reduced  Ejection fraction was estimated to be 40 %  There was akinesis of the apex and the mid to apical walls of the heart  The basal segments were preserved  The changes were consistent with eccentric hypertrophy  Doppler parameters were consistent with abnormal left ventricular relaxation (grade 1 diastolic dysfunction)  RIGHT VENTRICLE:  The size was at the upper limits of normal   Systolic function was mildly reduced  LEFT ATRIUM:  The atrium was mildly dilated  MITRAL VALVE:  There was mild annular calcification  TRICUSPID VALVE:  There was mild regurgitation  PULMONIC VALVE:  There was mild regurgitation  AORTA:  There was mild dilatation of the ascending aorta (36 mm)  RECOMMENDATIONS:  Repeat study with commercial contrast administration is recommended to rule out apical thrombus  HISTORY: PRIOR HISTORY: CAD, AAA, HTN, CHF    PROCEDURE: This was a routine study  The transthoracic approach was used  The study included complete 2D imaging, M-mode, complete spectral Doppler, and color Doppler  The heart rate was 58 bpm, at the start of the study  Images were  obtained from the parasternal, apical, subcostal, and suprasternal notch acoustic windows  Image quality was adequate  LEFT VENTRICLE: The ventricle was mildly dilated  Systolic function was moderately to markedly reduced  Ejection fraction was estimated to be 40 %  There was akinesis of the apex and the mid to apical walls of the heart  The basal segments  were preserved  Wall thickness was normal  The changes were consistent with eccentric hypertrophy  No obvious apical thrombus was noted, however this study is inadequate to rule out thrombus  DOPPLER: Doppler parameters were consistent  with abnormal left ventricular relaxation (grade 1 diastolic dysfunction)  RIGHT VENTRICLE: The size was at the upper limits of normal  Systolic function was mildly reduced      LEFT ATRIUM: The atrium was mildly dilated  RIGHT ATRIUM: Size was normal     MITRAL VALVE: There was mild annular calcification  There was normal leaflet separation  DOPPLER: The transmitral velocity was within the normal range  There was no evidence for stenosis  There was no regurgitation  AORTIC VALVE: The valve was trileaflet  Leaflets exhibited mildly increased thickness  DOPPLER: Transaortic velocity was within the normal range  There was no evidence for stenosis  There was no regurgitation  TRICUSPID VALVE: The valve structure was normal  There was normal leaflet separation  DOPPLER: The transtricuspid velocity was within the normal range  There was no evidence for stenosis  There was mild regurgitation  Estimated peak PA  pressure was 33 mmHg  PULMONIC VALVE: Not well visualized  DOPPLER: The transpulmonic velocity was within the normal range  There was mild regurgitation  PERICARDIUM: There was no pericardial effusion  AORTA: The root exhibited normal size  There was mild dilatation of the ascending aorta (36 mm)  SYSTEM MEASUREMENT TABLES    2D  %FS: 15 85 %  Ao Diam: 3 61 cm  Ao asc: 3 57 cm  EDV(Teich): 142 03 ml  EF Biplane: 42 67 %  EF(Teich): 33 06 %  ESV(Teich): 95 08 ml  IVSd: 1 cm  LA Diam: 4 27 cm  LAAs A4C: 14 41 cm2  LAESV A-L A4C: 38 83 ml  LAESV MOD A4C: 35 04 ml  LALs A4C: 4 54 cm  LVEDV MOD A2C: 105 16 ml  LVEDV MOD A4C: 82 78 ml  LVEDV MOD BP: 93 9 ml  LVEF MOD A2C: 51 2 %  LVEF MOD A4C: 34 96 %  LVESV MOD A2C: 51 31 ml  LVESV MOD A4C: 53 84 ml  LVESV MOD BP: 53 83 ml  LVIDd: 5 41 cm  LVIDs: 4 55 cm  LVLd A2C: 7 69 cm  LVLd A4C: 7 85 cm  LVLs A2C: 7 17 cm  LVLs A4C: 7 64 cm  LVPWd: 0 95 cm  RAEDV A-L: 11 75 ml  RAEDV MOD: 11 87 ml  RALd: 4 12 cm  RVIDd: 3 12 cm  SV MOD A2C: 53 85 ml  SV MOD A4C: 28 94 ml  SV(Teich): 46 95 ml    CW  AV Env  Ti: 380 59 ms  AV VTI: 25 87 cm  AV Vmax: 0 99 m/s  AV Vmean: 0 68 m/s  AV maxPG: 3 92 mmHg  AV meanP 09 mmHg  TR Vmax: 2 64 m/s  TR maxP 95 mmHg    MM  TAPSE: 1 81 cm    PW  E' Sept: 0 06 m/s  E/E' Sept: 10 18  LVOT Env  Ti: 328 26 ms  LVOT VTI: 19 16 cm  LVOT Vmax: 0 93 m/s  LVOT Vmean: 0 58 m/s  LVOT maxPG: 3 46 mmHg  LVOT meanP 61 mmHg  MV A [place]: 0 78 m/s  MV Dec [place]: 1 72 m/s2  MV DecT: 346 9 ms  MV E [place]: 0 6 m/s  MV E/A Ratio: 0 77  MV PHT: 100 6 ms  MVA By PHT: 2 19 cm2    [employer] Accredited Echocardiography Laboratory    Prepared and electronically signed by    Roberta Herring MD  Signed 2021 17:59:49       Review of Systems  ROS as noted above, otherwise 12 point review of systems was performed and is negative  Historical Information   Past Medical History:   Diagnosis Date    Cardiac disease     [facility]est[facility] [address] [facility]     Hernia of abdominal cavity     Myocardial infarction ([facility] [address] )     last assessed 14, past, Pt had MI s/p AGUSTIN placed in , refuses to take any other medications for his cardiac disease, only will take seizure med   Understands possible complications from this decision    Seizure [facility])      Past Surgical History:   Procedure Laterality Date    ABDOMINAL AORTIC ANEURYSM REPAIR      for dialation or occulsion    CATARACT EXTRACTION       Social History     Substance and Sexual Activity   Alcohol Use Not Currently    Frequency: Never    Binge frequency: Never     Social History     Substance and Sexual Activity   Drug Use Never     Social History     Tobacco Use   Smoking Status Former Smoker    Quit date: 2005    Years since quitting: 15 8   Smokeless Tobacco Never Used     Meds/Allergies   Hospital Medications:   Current Facility-Administered Medications   Medication Dose Route Frequency    acetaminophen (TYLENOL) tablet 650 mg  650 mg Oral Q4H PRN    aspirin (ECOTRIN LOW STRENGTH) EC tablet 81 mg  81 mg Oral Daily    atorvastatin (LIPITOR) tablet 80 mg  80 mg Oral Daily With Dinner    diphenhydrAMINE (BENADRYL) injection 25 mg  25 mg Intravenous Q6H PRN    metoprolol tartrate (LOPRESSOR) tablet 25 mg  25 mg Oral Q12H [address]    nitroglycerin (NITROSTAT) SL tablet 0 4 mg  0 4 mg Sublingual Q5 Min PRN    ondansetron (ZOFRAN) injection 4 mg  4 mg Intravenous Q6H PRN    PHENobarbital tablet 64 8 mg  64 8 mg Oral BID    ticagrelor (BRILINTA) tablet 90 mg  90 mg Oral Q12H ADENIKE    triamcinolone (KENALOG) 0 025 % cream   Topical BID     Home Medications:   No medications prior to admission  Allergies   Allergen Reactions    Iodinated Diagnostic Agents Rash     Pt's skin get very red and he gets hot and chills    Clopidogrel      Objective   Vitals: Blood pressure 101/63, pulse 103, temperature 98 9 °F (37 2 °C), resp  rate 16, height 6' (1 829 m), weight 102 kg (225 lb 8 5 oz), SpO2 93 %  Orthostatic Blood Pressures      Most Recent Value   Blood Pressure  101/63 filed at 04/07/2021 1100          Intake/Output Summary (Last 24 hours) at 4/7/2021 1305  Last data filed at 4/7/2021 0919  Gross per 24 hour   Intake 480 ml   Output 400 ml   Net 80 ml     Physical Exam   GEN: NAD, alert and oriented, well appearing  SKIN:Mild erythema on the chest and neck  HEENT: NCAT, PERRL, EOMs intact  NECK: No JVD or carotid bruits appreciated  CARDIOVASCULAR:  Regular rhythm, tachycardia, normal S1, S2 without murmurs, rubs, or gallops appreciated  LUNGS: Clear to auscultation bilaterally without wheezes, rhonchi, or rales  ABDOMEN: Soft, nontender, nondistended  EXTREMITIES/VASCULAR: perfused without clubbing, cyanosis, trace edema b/l    Lab Results: I have personally reviewed pertinent lab results      Results from last 7 days   Lab Units 04/07/21  0617 04/06/21  0506 04/05/21  0637   WBC Thousand/uL 13 90* 14 02* 4 82   HEMOGLOBIN g/dL 11 5* 13 0 11 7*   HEMATOCRIT % 35 0* 39 5 36 3*   PLATELETS Thousands/uL 128* 149 138*     Results from last 7 days   Lab Units 04/07/21  0617 04/06/21  0506 04/05/21  0637   POTASSIUM mmol/L 3 3* 5 3 3 6   CHLORIDE mmol/L 110* 109* 109*   CO2 mmol/L 23 23 26   BUN mg/dL 17 14 14   CREATININE mg/dL 0 99 1 19 0 93   CALCIUM mg/dL 8 1* 8 2* 7 8*     Results from last 7 days   Lab Units 04/05/21  0455 04/04/21  0446 04/03/21  1443  04/02/21  1916   INR   --   --   --   --  1 05   PTT seconds 65* 67* 79*   < > 32    < > = values in this interval not displayed  Results from last 7 days   Lab Units 04/05/21  0637   MAGNESIUM mg/dL 2 2     Imaging: I have personally reviewed pertinent reports

## 2021-04-07 NOTE — PROGRESS NOTES
Pastoral Care Progress Note    2021  Patient: Farhana Mac : 1945  Admission Date & Time: 2021 1234  MRN: [id number] CSN: [id number]               Chaplaincy Interventions Utilized:   Ritual: Rob Inman provided sacrament of the sick and Provided prayer       21 1200   Clinical Encounter Type   Visited With Patient   Yarsani Encounters   Yarsani Needs Prayer   Sacramental Encounters   Sacrament of Sick-Anointing Anointed

## 2021-04-07 NOTE — PLAN OF CARE
Problem: PAIN - ADULT  Goal: Verbalizes/displays adequate comfort level or baseline comfort level  Description: Interventions:  - Encourage patient to monitor pain and request assistance  - Assess pain using appropriate pain scale  - Administer analgesics based on type and severity of pain and evaluate response  - Implement non-pharmacological measures as appropriate and evaluate response  - Consider cultural and social influences on pain and pain management  - Notify physician/advanced practitioner if interventions unsuccessful or patient reports new pain  Outcome: Progressing     Problem: INFECTION - ADULT  Goal: Absence or prevention of progression during hospitalization  Description: INTERVENTIONS:  - Assess and monitor for signs and symptoms of infection  - Monitor lab/diagnostic results  - Monitor all insertion sites, i e  indwelling lines, tubes, and drains  - Monitor endotracheal if appropriate and nasal secretions for changes in amount and color  - [place] appropriate cooling/warming therapies per order  - Administer medications as ordered  - Instruct and encourage patient and family to use good hand hygiene technique  - Identify and instruct in appropriate isolation precautions for identified infection/condition  Outcome: Progressing  Goal: Absence of fever/infection during neutropenic period  Description: INTERVENTIONS:  - Monitor WBC    Outcome: Progressing     Problem: SAFETY ADULT  Goal: Patient will remain free of falls  Description: INTERVENTIONS:  - Assess patient frequently for physical needs  -  Identify cognitive and physical deficits and behaviors that affect risk of falls    -  [place] fall precautions as indicated by assessment   - Educate patient/family on patient safety including physical limitations  - Instruct patient to call for assistance with activity based on assessment  - Modify environment to reduce risk of injury  - Consider OT/PT consult to assist with strengthening/mobility  Outcome: Progressing  Goal: Maintain or return to baseline ADL function  Description: INTERVENTIONS:  -  Assess patient's ability to carry out ADLs; assess patient's baseline for ADL function and identify physical deficits which impact ability to perform ADLs (bathing, care of mouth/teeth, toileting, grooming, dressing, etc )  - Assess/evaluate cause of self-care deficits   - Assess range of motion  - Assess patient's mobility; develop plan if impaired  - Assess patient's need for assistive devices and provide as appropriate  - Encourage maximum independence but intervene and supervise when necessary  - Involve family in performance of ADLs  - Assess for home care needs following discharge   - Consider OT consult to assist with ADL evaluation and planning for discharge  - Provide patient education as appropriate  Outcome: Progressing  Goal: Maintain or return mobility status to optimal level  Description: INTERVENTIONS:  - Assess patient's baseline mobility status (ambulation, transfers, stairs, etc )    - Identify cognitive and physical deficits and behaviors that affect mobility  - Identify mobility aids required to assist with transfers and/or ambulation (gait belt, sit-to-stand, lift, walker, cane, etc )  - [place] fall precautions as indicated by assessment  - Record patient progress and toleration of activity level on Mobility SBAR; progress patient to next Phase/Stage  - Instruct patient to call for assistance with activity based on assessment  - Consider rehabilitation consult to assist with strengthening/weightbearing, etc   Outcome: Progressing     Problem: DISCHARGE PLANNING  Goal: Discharge to home or other facility with appropriate resources  Description: INTERVENTIONS:  - Identify barriers to discharge w/patient and caregiver  - Arrange for needed discharge resources and transportation as appropriate  - Identify discharge learning needs (meds, wound care, etc )  - Arrange for interpretive services to assist at discharge as needed  - Refer to Case Management Department for coordinating discharge planning if the patient needs post-hospital services based on physician/advanced practitioner order or complex needs related to functional status, cognitive ability, or social support system  Outcome: Progressing     Problem: Knowledge Deficit  Goal: Patient/family/caregiver demonstrates understanding of disease process, treatment plan, medications, and discharge instructions  Description: Complete learning assessment and assess knowledge base  Interventions:  - Provide teaching at level of understanding  - Provide teaching via preferred learning methods  Outcome: Progressing     Problem: CARDIOVASCULAR - ADULT  Goal: Maintains optimal cardiac output and hemodynamic stability  Description: INTERVENTIONS:  - Monitor I/O, vital signs and rhythm  - Monitor for S/S and trends of decreased cardiac output  - Administer and titrate ordered vasoactive medications to optimize hemodynamic stability  - Assess quality of pulses, skin color and temperature  - Assess for signs of decreased coronary artery perfusion  - Instruct patient to report change in severity of symptoms  Outcome: Progressing  Goal: Absence of cardiac dysrhythmias or at baseline rhythm  Description: INTERVENTIONS:  - Continuous cardiac monitoring, vital signs, obtain 12 lead EKG if ordered  - Administer antiarrhythmic and heart rate control medications as ordered  - Monitor electrolytes and administer replacement therapy as ordered  Outcome: Progressing     Problem: Potential for Falls  Goal: Patient will remain free of falls  Description: INTERVENTIONS:  - Assess patient frequently for physical needs  -  Identify cognitive and physical deficits and behaviors that affect risk of falls    -  [place] fall precautions as indicated by assessment   - Educate patient/family on patient safety including physical limitations  - Instruct patient to call for assistance with activity based on assessment  - Modify environment to reduce risk of injury  - Consider OT/PT consult to assist with strengthening/mobility  Outcome: Progressing

## 2021-04-07 NOTE — RESTORATIVE TECHNICIAN NOTE
Restorative Specialist Mobility Note       Activity: Ambulate in regan, Ambulate in room, Bathroom privileges, Chair, Dangle, Stand at bedside(Educated/encouraged pt to ambulate with assistance 3-4 x's/day   Pt callbell, phone/tray within reach )     Assistive Device: None       Shraddha CRUMP, Restorative Technician, [employer]

## 2021-04-07 NOTE — DISCHARGE SUMMARY
INTERNAL MEDICINE RESIDENCY DISCHARGE SUMMARY     Sang Quiroz   67 y o  male  MRN: [id number]  Room/Bed: [facility] 704/[facility] [address] [id number] [facility]    Encounter: [id number]    Principal Problem:    NSTEMI (non-ST elevated myocardial infarction) [facility])  Active Problems:    CAD (coronary artery disease)    HTN (hypertension)    HLD (hyperlipidemia)    Seizure disorder (HCC)    S/P AAA (abdominal aortic aneurysm) repair    Ventral hernia with obstruction and without gangrene    Chronic combined systolic and diastolic CHF (congestive heart failure) ([facility])    Allergic reaction to contrast media      * NSTEMI (non-ST elevated myocardial infarction) [facility])  Assessment & Plan  Patient presented today after right sided chest pain that he describes as dullness/being punched  He stated that that pain started around 10 am and went away at 1130 am  The pain gradually increased before it went away with no intervention  Had 1 episode of chest pain overnight 4/4-5, relieved with nitroglycerin  EKG -borderline ST elevation and T-wave inversions  Troponin elevated from 0 35-->15 70  Echo showed EF of 40% with grade 1 diastolic dysfunction  BNP 83  Cardiac catheterization done yesterday 4/5--> PCI with AGUSTIN stent placed in mid LAD    · Cardiology on board--follow their recommendations  · Continue aspirin, ticagrelor, metoprolol, and statin  · Continue to monitor on tele    Outpatient plan:  · Follow-up already scheduled with cardiologist Dr Mayela Nascimento 4/15  · Continued DAPT, metoprolol, and statin therapy  · Nitrostat PRN    Allergic reaction to contrast media  Assessment & Plan  Patient has a known allergy to iodinated contrast   In a prior CT scan the patient became feverish, flushed, and hypotensive  Although he was pre treated with a steroid and Benadryl prior to cardiac catheterization on 04/05, he developed diffuse red skin, fever, chills, malaise, and hypotension overnight  P O   Benadryl did not help his symptoms  Patient's symptoms improved with prednisone, though the day of discharge, he felt dizzy and warm  Orthostatic blood pressures at that time were negative for any changes, and he was initially tachycardic in the 120s--> vital signs improved after giving prednisone  Outpatient plan:  · Patient is to continue a short 3-4 day course of prednisone 40 mg daily and steroid cream help with his symptoms  · Planning for internal medicine clinic follow-up within 1-2 weeks of discharge    Chronic combined systolic and diastolic CHF (congestive heart failure) (HCC)  Assessment & Plan  Wt Readings from Last 3 Encounters:   04/02/21 102 kg (225 lb 8 5 oz)   03/25/21 102 kg (224 lb 13 9 oz)   01/21/21 103 kg (226 lb 6 4 oz)     Echocardiogram from 04/02 revealed left ventricle mildly dilated  Systolic function was moderately to markedly reduced  Ejection fraction was estimated to be 40 %  There was akinesis of the apex and the mid to apical walls of the heart  The basal segments were preserved  The changes were consistent with eccentric hypertrophy  Doppler parameters were consistent with abnormal left ventricular relaxation (grade 1 diastolic dysfunction)    · Will continue beta blocker and initiate lisinopril + switch to metoprolol succinate therapy upon discharge  · No symptoms of fluid overload; no diuretic prescribed    Outpatient plan:  · Continue metoprolol succinate 50 mg q d , lisinopril 5 mg q d  · Patient has follow-up appointment with cardiologist 4/15      Ventral hernia with obstruction and without gangrene  Assessment & Plan  Per outpatient notes, patient refuses to have surgery  Likely second to AAA repair surgery  Seizure disorder (HCC)  Assessment & Plan  · Continue phenobarbital 64 8 mg BID    HLD (hyperlipidemia)  Assessment & Plan  Patient has history of adverse effects with multiple statins:  Atorvastatin, rosuvastatin, and pravastatin more recently    Has complained of myalgia, lightheadedness, headache, and diarrhea  · Continue atorvastatin for now--> will likely discharge with the different statin (simvastatin?) or 1 with the lower dose    Outpatient plan:  · We strongly recommended patient continue 80 mg of atorvastatin  However he adamantly refused due to his fear of side effects and has agreed to try simvastatin 20 mg    HTN (hypertension)  Assessment & Plan  Blood pressure is stable  · Continue metoprolol and to monitor  CAD (coronary artery disease)  Assessment & Plan  S/p 2 stents RCA and LAD, with the last being in 2017  · Continue aspirin, ticagrelor, statin, and metoprolol      [address] COURSE     Patient is a 43-year-old male with a PMH of CAD s/p stents, hypertension, seizure disorder on phenobarbital, hyperlipidemia, and AAA s/p repair 2015 and large ventral hernia who presented to have [facility] ED on 04/02/2021 with the chief complaint of chest pain  His symptoms lasted approximately an hour and a half after eating breakfast, was located on the right side of his chest, and slowly increased in severity before completely subsiding  He denied any radiation of the pain, but felt that it was similar to his previous heart attacks  No other associated symptoms  Upon presentation to the ED, VS were stable  Lab work was significant for elevated troponin 0 35 (peak of 15), mild leukopenia 3 80, and mild thrombocytopenia 135  CXR was unremarkable and ECG found concern for anterior infarct  Echocardiogram done revealed LVEF 40% and grade 1 diastolic dysfunction  Cardiology was immediately consulted and diagnosed him with NSTEMI  He was prepped for early cardiac catheterization with IV heparin, aspirin, ticagrelor, statin, and beta-blocker  Cardiac catheterization was performed on 4/5 and a AGUSTIN stent was placed in the patient's mid LAD    Based on catheterization report, he was pretreated with Solu-Cortef IV and Benadryl IV, but experienced an allergic reaction (diffuse red skin, low-grade fever, chills, hypotension, tachycardia) overnight 4/5-6  Patient remarked that he has had reactions like this in the past to contrast media and denied any infectious symptoms  He was given a short course of oral steroids and steroid cream for his symptoms with instructions to follow-up with PCP Clinic  Medications were also optimized for CAD and chronic heart failure with plans to follow-up with cardiologist in about a week  He was medically cleared for discharge the morning of 04/07/2021  On the day of discharge, the patient felt much better  His appetite was back and had been having normal bowel and bladder movements  He denied any fever, malaise, chills, shortness of breath, chest pain, N/V  Later in the morning, he felt dizzy upon standing  However, a dose of prednisone improved his symptoms  Physical Exam  Vitals signs reviewed  Constitutional:       Appearance: Normal appearance  He is not ill-appearing, toxic-appearing or diaphoretic  HENT:      Head: Normocephalic and atraumatic  Mouth/Throat:      Mouth: Mucous membranes are moist       Pharynx: Oropharynx is clear  Eyes:      General: No scleral icterus  Conjunctiva/sclera: Conjunctivae normal    Cardiovascular:      Rate and Rhythm: Regular rhythm  Tachycardia present  Heart sounds: No murmur  No gallop  Pulmonary:      Effort: Pulmonary effort is normal       Breath sounds: Normal breath sounds  No wheezing, rhonchi or rales  Abdominal:      General: Bowel sounds are normal  There is no distension  Palpations: Abdomen is soft  Tenderness: There is no abdominal tenderness  Hernia: A hernia (Large ventral) is present  Musculoskeletal:         General: No swelling or tenderness  Right lower leg: No edema  Left lower leg: No edema  Skin:     General: Skin is warm  Capillary Refill: Capillary refill takes less than 2 seconds        Coloration: Skin is not pale  Findings: Erythema (Patient's back is still erythematous, however the rest of his body including head, and extremities were much improved and more normal in tone ) present  No rash  Neurological:      General: No focal deficit present  Mental Status: He is alert and oriented to person, place, and time  Psychiatric:         Mood and Affect: Mood normal          Behavior: Behavior normal          Thought Content: Thought content normal          Judgment: Judgment normal          DISCHARGE INFORMATION     PCP at Discharge: Alvis Closs, DO    Admitting Provider: Bonnie Ngo MD  Admission Date: 4/2/2021    Discharge Provider: No att  providers found  Discharge Date: 04/07/2021    Discharge Disposition: Home/Self Care  Discharge Condition: fair  Discharge with Lines: no    Discharge Diet: cardiac diet  Activity Restrictions: No strenuous activity  Test Results Pending at Discharge: None    Discharge Diagnoses:  Principal Problem:    NSTEMI (non-ST elevated myocardial infarction) ([facility] )  Active Problems:    CAD (coronary artery disease)    HTN (hypertension)    HLD (hyperlipidemia)    Seizure disorder (HCC)    S/P AAA (abdominal aortic aneurysm) repair    Ventral hernia with obstruction and without gangrene    Chronic combined systolic and diastolic CHF (congestive heart failure) ([facility] )    Allergic reaction to contrast media  Resolved Problems:    * No resolved hospital problems  *      Consulting Providers:      Diagnostic & Therapeutic Procedures Performed:  Xr Chest 1 View Portable    Result Date: 4/2/2021  Impression: No acute cardiopulmonary disease   Findings are stable Workstation performed: [id number]       Code Status: Level 3 - DNAR and DNI  Advance Directive & Living Will: <no information>  Power of :    POLST:      Medications:  Current Discharge Medication List      STOP taking these medications       clotrimazole (LOTRIMIN) 1 % cream Comments:   Reason for Stopping: pravastatin (PRAVACHOL) 40 mg tablet Comments:   Reason for Stopping:             Current Discharge Medication List      START taking these medications    Details   lisinopril (ZESTRIL) 5 mg tablet Take 1 tablet (5 mg total) by mouth daily  Qty: 30 tablet, Refills: 2    Associated Diagnoses: Essential hypertension; Chronic combined systolic and diastolic CHF (congestive heart failure) ([facility])      metoprolol succinate (TOPROL-XL) 50 mg 24 hr tablet Take 1 tablet (50 mg total) by mouth daily  Qty: 30 tablet, Refills: 2    Associated Diagnoses: Coronary artery disease involving native coronary artery of native heart without angina pectoris; Chronic combined systolic and diastolic CHF (congestive heart failure) ([facility])      potassium chloride (K-DUR,KLOR-CON) 20 mEq tablet Take 2 tablets (40 mEq total) by mouth once for 1 dose  Qty: 2 tablet, Refills: 0    Associated Diagnoses: Hypokalemia      predniSONE 20 mg tablet Take 2 tablets (40 mg total) by mouth daily for 2 doses  Qty: 4 tablet, Refills: 0    Associated Diagnoses: Allergic reaction to contrast media      simvastatin (ZOCOR) 20 mg tablet Take 1 tablet (20 mg total) by mouth daily at bedtime  Qty: 30 tablet, Refills: 2    Associated Diagnoses: Coronary artery disease involving native coronary artery of native heart without angina pectoris; Mixed hyperlipidemia      !! ticagrelor (Brilinta) 90 MG Take 1 tablet (90 mg total) by mouth every 12 (twelve) hours  Qty: 1 tablet, Refills: 0    Comments:  For price check only  Associated Diagnoses: Coronary artery disease involving native coronary artery of native heart without angina pectoris      !! ticagrelor (BRILINTA) 90 MG Take 1 tablet (90 mg total) by mouth every 12 (twelve) hours  Qty: 60 tablet, Refills: 2    Associated Diagnoses: Coronary artery disease involving native coronary artery of native heart without angina pectoris      triamcinolone (KENALOG) 0 025 % cream Apply topically 2 (two) times a day  Qty: 30 g, Refills: 0    Associated Diagnoses: Allergic reaction to contrast media       !! - Potential duplicate medications found  Please discuss with provider  Current Discharge Medication List      CONTINUE these medications which have NOT CHANGED    Details   aspirin 81 MG tablet Take 1 tablet (81 mg total) by mouth daily  Qty: 90 tablet, Refills: 3    Associated Diagnoses: CAD (coronary artery disease)      PHENobarbital 64 8 mg tablet TAKE 1 TABLET (64 8 MG TOTAL) BY MOUTH 2 (TWO) TIMES A DAY  Qty: 60 tablet, Refills: 2    Comments: Not to exceed 3 additional fills before 05/31/2021  Associated Diagnoses: Seizure disorder ([facility] )             Allergies: Allergies   Allergen Reactions    Iodinated Diagnostic Agents Rash     Pt's skin get very red and he gets hot and chills    Clopidogrel        FOLLOW-UP     PCP Outpatient Follow-up:  yes  with [facility] Providers Follow-up:  Has follow-up scheduled with cardiologist Dr Farzad Martinez 4/15     Active Issues Requiring Follow-up:   yes  allergic reaction to contrast dye; on prednisone    Discharge Statement:   I spent 30 minutes minutes discharging the patient  This time was spent on the day of discharge  I had direct contact with the patient on the day of discharge  Additional documentation is required if more than 30 minutes were spent on discharge  Portions of the record may have been created with voice recognition software  Occasional wrong word or "sound a like" substitutions may have occurred due to the inherent limitations of voice recognition software    Read the chart carefully and recognize, using context, where substitutions have occurred     ==  Shae Winters,   [address]  Internal Medicine Resident PGY-1

## 2021-04-08 ENCOUNTER — TRANSITIONAL CARE MANAGEMENT (OUTPATIENT)
Dept: INTERNAL MEDICINE CLINIC | Facility: CLINIC | Age: 76
End: 2021-04-08

## 2021-04-09 ENCOUNTER — PATIENT OUTREACH (OUTPATIENT)
Dept: INTERNAL MEDICINE CLINIC | Facility: CLINIC | Age: 76
End: 2021-04-09

## 2021-04-09 NOTE — PROGRESS NOTES
Outpatient Care Management Note:    Patient was inpatient at [facility] from 4/2-4/7/21 for NSTEMI  Patient did have a cardiac cath and stent placed  Patient had reaction from contrast and prednisone given  Patient was discharged home and to follow up with Dr Liliana Duval (cardiology on 4/15 @ 10 am and to follow up with PCP office  Patient was referred to cardiac rehab  Patient referred to outpatient nurse care management as patient is in a bundle episode from 4/2-7/5/21 for -AMI  Patient discharged on Brilinta/ticagrelor 90 mg every 12 hours  Patient was discharged on simvastatin 20 mg daily  Patient refused to take Atorvastatin due to fear of side effects  I called patient, no answer  Message left this is Lawyer Braxton the nurse calling with his PCP office to follow up with him regarding his hospital stay  I requested a call back and left PCP office number and my contact number

## 2021-04-12 ENCOUNTER — PATIENT OUTREACH (OUTPATIENT)
Dept: INTERNAL MEDICINE CLINIC | Facility: CLINIC | Age: 76
End: 2021-04-12

## 2021-04-12 NOTE — PROGRESS NOTES
Outpatient Care Management Note:       Patient was inpatient at [facility] from 4/2-4/7/21 for NSTEMI  Patient did have a cardiac cath and stent placed  Patient had reaction from contrast and prednisone given  Patient was discharged home and to follow up with Dr Rodolfo Perez (cardiology on 4/15 @ 10 am and to follow up with PCP office  Patient was referred to cardiac rehab  Patient referred to outpatient nurse care management as patient is in a bundle episode from 4/2-7/5/21 for -AMI       Patient discharged on Brilinta/ticagrelor 90 mg every 12 hours  Patient was discharged on simvastatin 20 mg daily  Patient refused to take Atorvastatin due to fear of side effects       I called patient, no answer  2nd attempt  Message left this is Evin Samaniego the nurse calling with his PCP office to follow up with him regarding his hospital stay  I requested a call back and left PCP office number and my contact number

## 2021-04-13 ENCOUNTER — EPISODE CHANGES (OUTPATIENT)
Dept: CASE MANAGEMENT | Facility: HOSPITAL | Age: 76
End: 2021-04-13

## 2021-04-13 ENCOUNTER — PATIENT OUTREACH (OUTPATIENT)
Dept: INTERNAL MEDICINE CLINIC | Facility: CLINIC | Age: 76
End: 2021-04-13

## 2021-04-13 NOTE — PROGRESS NOTES
Received inbasket message that patient is no longer in a bundle episode due to final DRG of 247  -  PERCUTANEOUS CARDIOVASCULAR PROCEDURES WITH DRUG-ELUTING STENT WITHOUT [place]

## 2021-04-13 NOTE — PROGRESS NOTES
Outpatient Care Management Note:       Patient was inpatient at [facility] from 4/2-4/7/21 for NSTEMI  Patient did have a cardiac cath and stent placed  Patient had reaction from contrast and prednisone given  Patient was discharged home and to follow up with Dr Marvin Boyle (cardiology on 4/15 @ 10 am and to follow up with PCP office  Patient was referred to cardiac rehab  Patient referred to outpatient nurse care management as patient is in a bundle episode from 4/2-7/5/21 for -AMI       Patient discharged on Brilinta/ticagrelor 90 mg every 12 hours  Patient was discharged on simvastatin 20 mg daily  Patient refused to take Atorvastatin due to fear of side effects       I called patient, no answer  3rd attempt  Message left this is Yojana Shaw the nurse calling with his PCP office to follow up with him regarding his hospital stay  I requested a call back and left PCP office number and my contact number      Will mail unable to reach letter

## 2021-04-15 ENCOUNTER — OFFICE VISIT (OUTPATIENT)
Dept: CARDIOLOGY CLINIC | Facility: CLINIC | Age: 76
End: 2021-04-15
Payer: MEDICARE

## 2021-04-15 VITALS
HEART RATE: 100 BPM | WEIGHT: 220.6 LBS | DIASTOLIC BLOOD PRESSURE: 60 MMHG | OXYGEN SATURATION: 97 % | SYSTOLIC BLOOD PRESSURE: 98 MMHG | HEIGHT: 72 IN | BODY MASS INDEX: 29.88 KG/M2

## 2021-04-15 DIAGNOSIS — I25.10 CORONARY ARTERY DISEASE INVOLVING NATIVE CORONARY ARTERY OF NATIVE HEART WITHOUT ANGINA PECTORIS: Primary | ICD-10-CM

## 2021-04-15 DIAGNOSIS — E78.2 MIXED HYPERLIPIDEMIA: Chronic | ICD-10-CM

## 2021-04-15 DIAGNOSIS — Z86.79 S/P AAA (ABDOMINAL AORTIC ANEURYSM) REPAIR: Chronic | ICD-10-CM

## 2021-04-15 DIAGNOSIS — Z98.890 S/P AAA (ABDOMINAL AORTIC ANEURYSM) REPAIR: Chronic | ICD-10-CM

## 2021-04-15 DIAGNOSIS — I10 ESSENTIAL HYPERTENSION: Chronic | ICD-10-CM

## 2021-04-15 PROCEDURE — 99214 OFFICE O/P EST MOD 30 MIN: CPT | Performed by: INTERNAL MEDICINE

## 2021-04-15 RX ORDER — CLOPIDOGREL BISULFATE 75 MG/1
75 TABLET ORAL DAILY
Qty: 30 TABLET | Refills: 11 | Status: SHIPPED | OUTPATIENT
Start: 2021-04-15 | End: 2021-05-07 | Stop reason: ALTCHOICE

## 2021-04-19 NOTE — PROGRESS NOTES
Cardiology Follow Up    [facility]  1945  [id number]  [facility] [address] [facility] [address] [phone]    4  Coronary artery disease involving native coronary artery of native heart without angina pectoris  clopidogrel (PLAVIX) 75 mg tablet   2  S/P AAA (abdominal aortic aneurysm) repair     3  Essential hypertension     4  Mixed hyperlipidemia         Interval History:    Erik Moulton presented to the hospital in early April with a non STEMI  He was found to have a 90% left anterior descending artery obstruction that was stented with a 3 0 stent  He has been intolerant to statins in the past and since his discharge tells me he has discontinued all his medications with the exception of phenobarbital   He denies chest pain shortness of breath orthopnea paroxysmal nocturnal dyspnea or syncope  Patient Active Problem List   Diagnosis    CAD (coronary artery disease)    HTN (hypertension)    HLD (hyperlipidemia)    Seizure disorder (HCC)    S/P AAA (abdominal aortic aneurysm) repair    STEMI [facility] elevation myocardial infarction) [facility]    Ventral hernia with obstruction and without gangrene    Hernia, incisional    Abdominal pain    NSTEMI (non-ST elevated myocardial infarction) ([address] )    Chronic combined systolic and diastolic CHF (congestive heart failure) ([facility])    Allergic reaction to contrast media     Past Medical History:   Diagnosis Date    Cardiac disease     CHF (congestive heart failure) ([facility] [address] [facility]     Hernia of abdominal cavity     Myocardial infarction ([facility] [address] )     last assessed 7/31/14, past, Pt had MI s/p AGUSTIN placed in 2001, refuses to take any other medications for his cardiac disease, only will take seizure med   Understands possible complications from this decision    Seizure [facility])      Social History     Socioeconomic History    Marital status: Single     Spouse name: Not on file  Number of children: Not on file    Years of education: Not on file    Highest education level: Not on file   Occupational History    Not on file   Social Needs    Financial resource strain: Not on file    Food insecurity     Worry: Not on file     Inability: Not on file    Transportation needs     Medical: Not on file     Non-medical: Not on file   Tobacco Use    Smoking status: Former Smoker     Quit date: 6/5/2005     Years since quitting: 15 8    Smokeless tobacco: Never Used   Substance and Sexual Activity    Alcohol use: Not Currently     Frequency: Never     Binge frequency: Never    Drug use: Never    Sexual activity: Never   Lifestyle    Physical activity     Days per week: Not on file     Minutes per session: Not on file    Stress: Not on file   Relationships    Social connections     Talks on phone: Not on file     Gets together: Not on file     Attends Yazdanism service: Not on file     Active member of club or organization: Not on file     Attends meetings of clubs or organizations: Not on file     Relationship status: Not on file    Intimate partner violence     Fear of current or ex partner: Not on file     Emotionally abused: Not on file     Physically abused: Not on file     Forced sexual activity: Not on file   Other Topics Concern    Not on file   Social History Narrative    Not on file      Family History   Problem Relation Age of Onset    Hypertension Father     Kidney disease Brother      Past Surgical History:   Procedure Laterality Date    ABDOMINAL AORTIC ANEURYSM REPAIR      for dialation or occulsion    CATARACT EXTRACTION         Current Outpatient Medications:     PHENobarbital 64 8 mg tablet, TAKE 1 TABLET (64 8 MG TOTAL) BY MOUTH 2 (TWO) TIMES A DAY, Disp: 60 tablet, Rfl: 2    aspirin 81 MG tablet, Take 1 tablet (81 mg total) by mouth daily (Patient not taking: Reported on 3/25/2021), Disp: 90 tablet, Rfl: 3    clopidogrel (PLAVIX) 75 mg tablet, Take 1 tablet (75 mg total) by mouth daily, Disp: 30 tablet, Rfl: 11    lisinopril (ZESTRIL) 5 mg tablet, Take 1 tablet (5 mg total) by mouth daily (Patient not taking: Reported on 4/15/2021), Disp: 30 tablet, Rfl: 2    metoprolol succinate (TOPROL-XL) 50 mg 24 hr tablet, Take 1 tablet (50 mg total) by mouth daily (Patient not taking: Reported on 4/15/2021), Disp: 30 tablet, Rfl: 2    nitroglycerin (NITROSTAT) 0 4 mg SL tablet, Place 1 tablet (0 4 mg total) under the tongue every 5 (five) minutes as needed for chest pain (Patient not taking: Reported on 4/15/2021), Disp: 30 tablet, Rfl: 1    potassium chloride (K-DUR,KLOR-CON) 20 mEq tablet, Take 2 tablets (40 mEq total) by mouth once for 1 dose (Patient not taking: Reported on 4/15/2021), Disp: 2 tablet, Rfl: 0    simvastatin (ZOCOR) 20 mg tablet, Take 1 tablet (20 mg total) by mouth daily at bedtime (Patient not taking: Reported on 4/15/2021), Disp: 30 tablet, Rfl: 2    ticagrelor (Brilinta) 90 MG, Take 1 tablet (90 mg total) by mouth every 12 (twelve) hours (Patient not taking: Reported on 4/15/2021), Disp: 1 tablet, Rfl: 0    ticagrelor (BRILINTA) 90 MG, Take 1 tablet (90 mg total) by mouth every 12 (twelve) hours (Patient not taking: Reported on 4/15/2021), Disp: 60 tablet, Rfl: 2    triamcinolone (KENALOG) 0 025 % cream, Apply topically 2 (two) times a day (Patient not taking: Reported on 4/15/2021), Disp: 30 g, Rfl: 0  Allergies   Allergen Reactions    Iodinated Diagnostic Agents Rash     Pt's skin get very red and he gets hot and chills    Clopidogrel        Labs:  No results displayed because visit has over 200 results        Admission on 03/25/2021, Discharged on 03/25/2021   Component Date Value    WBC 03/25/2021 7 13     RBC 03/25/2021 4 23     Hemoglobin 03/25/2021 13 0     Hematocrit 03/25/2021 41 1     MCV 03/25/2021 97     MCH 03/25/2021 30 7     MCHC 03/25/2021 31 6     RDW 03/25/2021 18 6*    MPV 03/25/2021 10 7     Platelets 33/60/6496 143*  nRBC 03/25/2021 0     Neutrophils Relative 03/25/2021 77*    Immat GRANS % 03/25/2021 1     Lymphocytes Relative 03/25/2021 11*    Monocytes Relative 03/25/2021 9     Eosinophils Relative 03/25/2021 2     Basophils Relative 03/25/2021 0     Neutrophils Absolute 03/25/2021 5 47     Immature Grans Absolute 03/25/2021 0 09     Lymphocytes Absolute 03/25/2021 0 79     Monocytes Absolute 03/25/2021 0 63     Eosinophils Absolute 03/25/2021 0 12     Basophils Absolute 03/25/2021 0 03     Sodium 03/25/2021 138     Potassium 03/25/2021 4 3     Chloride 03/25/2021 107     CO2 03/25/2021 29     ANION GAP 03/25/2021 2*    BUN 03/25/2021 12     Creatinine 03/25/2021 0 90     Glucose 03/25/2021 103     Calcium 03/25/2021 8 7     eGFR 03/25/2021 83     Total Bilirubin 03/25/2021 1 09*    Bilirubin, Direct 03/25/2021 0 38*    Alkaline Phosphatase 03/25/2021 153*    AST 03/25/2021 67*    ALT 03/25/2021 35     Total Protein 03/25/2021 7 6     Albumin 03/25/2021 3 6     Lipase 03/25/2021 130     Troponin I 03/25/2021 <0 02     LACTIC ACID 03/25/2021 1 5     Ventricular Rate 03/25/2021 59     Atrial Rate 03/25/2021 147     QRSD Interval 03/25/2021 90     QT Interval 03/25/2021 410     QTC Interval 03/25/2021 405     QRS Axis 03/25/2021 -22     T Wave Axis 03/25/2021 56      Imaging: Ct Abdomen Pelvis Wo Contrast    Result Date: 3/25/2021  Narrative: CT ABDOMEN AND PELVIS WITHOUT IV CONTRAST INDICATION:   r/o obstruction  "41-year-old male presents for evaluation of right upper quadrant abdominal pain  Patient has history of a AAA repair with subsequent ventral hernia and obstructions  Patient reports prior to arrival he was loading light  groceries into his of vehicle when he felt the onset of right upper quadrant abdominal pain   " COMPARISON:  None   TECHNIQUE:  CT examination of the abdomen and pelvis was performed without intravenous contrast   Axial, sagittal, and coronal 2D reformatted images were created from the source data and submitted for interpretation  Radiation dose length product (DLP) for this visit:  1139 72 mGy-cm   This examination, like all CT scans performed in the [facility], was [id number] utilizing techniques to minimize radiation dose exposure, including the use of iterative reconstruction and automated exposure control  Enteric contrast was administered  FINDINGS: ABDOMEN LOWER CHEST:  No clinically significant abnormality identified in the visualized lower chest  LIVER/BILIARY TREE:  Scattered simple appearing cyst  GALLBLADDER:  No calcified gallstones  No pericholecystic inflammatory change  SPLEEN:  Unremarkable  PANCREAS:  Unremarkable  ADRENAL GLANDS:  Unremarkable  KIDNEYS/URETERS:  Unremarkable  No hydronephrosis  STOMACH AND BOWEL:  There is colonic diverticulosis without evidence of acute diverticulitis  Bowel in the multiple ventral wall hernias described above without obstruction  APPENDIX:  No findings to suggest appendicitis  ABDOMINOPELVIC CAVITY:  No ascites  No pneumoperitoneum  No lymphadenopathy  VESSELS:  Atherosclerotic changes are present  Stable infrarenal abdominal aortic postoperative changes  PELVIS REPRODUCTIVE ORGANS:  Unremarkable for patient's age  URINARY BLADDER:  Unremarkable  ABDOMINAL WALL/INGUINAL REGIONS:  Multiple large bowel containing hernias: Midline superior ventral hernia measuring 11 4 x 5 6 x 7 2 cm  More inferior lobulated 20 7 x 7 7 x 11 4 cm midline ventral wall bowel containing hernia  Right anterior pelvic wall bowel containing hernia measuring 10 4 x 11 8 x 9 9 cm  None of these hernias cause bowel obstruction  No areas of apparent bowel wall thickening to indicate strangulation  OSSEOUS STRUCTURES:  No acute fracture or destructive osseous lesion  Impression: Numerous ventral abdominal and pelvic wall bowel containing hernias  No distended segments of bowel to indicate obstruction   No apparent areas of bowel wall thickening to indicate strangulation  Workstation performed: [id number]     Xr Chest 1 View Portable    Result Date: 4/2/2021  Narrative: CHEST INDICATION: Chest discomfort COMPARISON:  10/20/2011 EXAM PERFORMED/VIEWS:  XR CHEST PORTABLE Single view FINDINGS: Cardiomediastinal silhouette appears unremarkable  The lungs are clear  No pneumothorax or pleural effusion  Osseous structures appear within normal limits for patient age  Impression: No acute cardiopulmonary disease  Findings are stable Workstation performed: [id number]       Review of Systems:  Review of Systems   Constitutional: Negative for fatigue  HENT: Negative for nosebleeds  Eyes: Negative for redness  Respiratory: Negative for chest tightness and shortness of breath  Cardiovascular: Negative for chest pain, palpitations and leg swelling  Gastrointestinal: Negative for abdominal pain  Endocrine: Negative for polyuria  Genitourinary: Negative for urgency  Musculoskeletal: Negative for arthralgias  Skin: Negative for rash  Neurological: Negative for dizziness and syncope  Psychiatric/Behavioral: Negative for confusion and sleep disturbance  The patient is not nervous/anxious  Physical Exam:  Physical Exam  Constitutional:       Appearance: He is well-developed  HENT:      Head: Normocephalic and atraumatic  Nose: Nose normal    Eyes:      Pupils: Pupils are equal, round, and reactive to light  Neck:      Musculoskeletal: Neck supple  Cardiovascular:      Rate and Rhythm: Normal rate and regular rhythm  Heart sounds: Normal heart sounds  Pulmonary:      Effort: Pulmonary effort is normal       Breath sounds: Normal breath sounds  Abdominal:      General: Bowel sounds are normal       Palpations: Abdomen is soft  Hernia: A hernia is present  Hernia is present in the ventral area  Musculoskeletal: Normal range of motion  Skin:     General: Skin is warm and dry     Neurological: Mental Status: He is alert and oriented to person, place, and time  Psychiatric:         Behavior: Behavior normal          Thought Content: Thought content normal          Judgment: Judgment normal          Discussion/Summary:   Coronary artery disease  On 04/05/2021 he had a 3 mm stent placed in a 90% lad lesion  Residual 25% left main 50% circumflex and 50% RCA lesion  He in the past Fab Lopez has been noncompliant and does not like to take medications  In particular he feels statins make him nauseated and sore  Because he did want to be on medications post discharge he discontinued everything except his phenobarbital   I explained to him that following stent placement dual anti-platelet therapy with aspirin and another medication is very important  He has agreed to go home and start taking an aspirin tablet daily  He was discharged on Brilinta  I am not confident that he will take this twice daily  I did get him to agree to take Plavix once daily  There was a question of a Plavix allergy in the past but I strongly suspect it was a dye allergy following the catheterization and I will give him a prescription for Plavix  Ideally in the setting of an acute coronary syndrome he would be on dual anti-platelet therapy for a year and hopefully we can get him to do this did tell him that it was imperative at least for the 1st few months to take both of these medications and never stop aspirin  As sinus he is somewhat upset today and his blood pressure is well controlled we will hold off on any further a blood pressure medication or beta blockade  Also I will hold off on statin  And I will see him in 3 months and we will rediscuss this

## 2021-05-07 ENCOUNTER — TELEPHONE (OUTPATIENT)
Dept: CARDIOLOGY CLINIC | Facility: CLINIC | Age: 76
End: 2021-05-07

## 2021-05-07 DIAGNOSIS — I25.10 CORONARY ARTERY DISEASE INVOLVING NATIVE CORONARY ARTERY OF NATIVE HEART WITHOUT ANGINA PECTORIS: ICD-10-CM

## 2021-05-07 NOTE — TELEPHONE ENCOUNTER
Micha Ochoa called and left a message on nurse line requesting a call back  I returned his call  C/o generalized rash & itching since 4/16  Saw Dr Javier Garcia on 4/15 w/o complaints  Stated he started Plavix 4/20-- unsure if info accurate  Micha Ochoa primarily called requesting steroids for the rash & itching, in which he felt was due to a dye allergy  S/P cath 4/5  I advised Micha Ochoa most likely it was not r/t dye allergy  Plavix is on allergy list    Patient was d/c'd on Brilinta but not taking  He is non-compliant w/ medications and Dr Javier Garcia felt he would not take due to BID dosing so Plavix was ordered  I reviewed note from Dr Javier Garcia 3/2017, "He had a rash while hospitalized and was felt that it might be secondary to the Plavix thus he continues on Effient "    Can you please send a script to [facility] [address] for Effient? Patient has not taken Plavix yet today  I advised him, he can not miss any does of ASA & Plavix  If medication changed he no longer wants a steroid  Only meds he is taking is ASA, Plavix & Phenobarbital    Dr Javier Garcia is off

## 2021-05-07 NOTE — PROGRESS NOTES
Rash suspected to be to plavix, similar history of rash suspected to be from plavix in the past      Had been on Effient but now over 75 yrs (increased bleeding risk with effient)    Will prescribe Brillinta - advised compliance  No other good options

## 2021-05-09 ENCOUNTER — HOSPITAL ENCOUNTER (EMERGENCY)
Facility: HOSPITAL | Age: 76
Discharge: HOME | End: 2021-05-09
Attending: EMERGENCY MEDICINE | Admitting: EMERGENCY MEDICINE
Payer: MEDICARE

## 2021-05-09 VITALS
OXYGEN SATURATION: 95 % | BODY MASS INDEX: 29.86 KG/M2 | HEIGHT: 72 IN | HEART RATE: 87 BPM | SYSTOLIC BLOOD PRESSURE: 128 MMHG | RESPIRATION RATE: 17 BRPM | TEMPERATURE: 98 F | WEIGHT: 220.46 LBS | DIASTOLIC BLOOD PRESSURE: 58 MMHG

## 2021-05-09 DIAGNOSIS — L30.9 DERMATITIS: Primary | ICD-10-CM

## 2021-05-09 PROCEDURE — 99283 EMERGENCY DEPT VISIT LOW MDM: CPT

## 2021-05-09 PROCEDURE — 99284 EMERGENCY DEPT VISIT MOD MDM: CPT | Performed by: EMERGENCY MEDICINE

## 2021-05-09 RX ORDER — PREDNISONE 20 MG/1
40 TABLET ORAL DAILY
Qty: 4 TABLET | Refills: 0 | Status: SHIPPED | OUTPATIENT
Start: 2021-05-10 | End: 2021-08-02 | Stop reason: HOSPADM

## 2021-05-09 RX ORDER — PREDNISONE 20 MG/1
40 TABLET ORAL ONCE
Status: COMPLETED | OUTPATIENT
Start: 2021-05-09 | End: 2021-05-09

## 2021-05-09 RX ADMIN — PREDNISONE 40 MG: 20 TABLET ORAL at 13:01

## 2021-05-09 NOTE — ED PROVIDER NOTES
History  Chief Complaint   Patient presents with    Rash     Patient reports generalized rash starting 4/16 after cardiology appt where he was prescribed Plavix  Patient is a 62-year-old male with recent admission for with stent placed that presents for evaluation of rash  Patient says that during his most recent admission, he had allergic reaction after contrast dye was administered during cardiac catheterization  He says that he had turned red all over but improved after 2 days of steroids  Patient says starting on 04/15, he was given Plavix by his cardiologist   He had a previous allergy known to Plavix  He says on 04/16, he began to have a maculopapular rash diffusely across his body  He took the Plavix for another week before stopping it  He has continued to have this pruritic rash diffusely  Rash spares oropharynx, mucosal surfaces, palms or soles  He denies any sloughing of the skin, fevers, p o  Intolerance  Patient also notes that he changed his detergent during this time as well  Patient has not been taking anything for the pruritus or rash  He otherwise denies symptoms including chest pain, dyspnea, abdominal pain and feels well  Prior to Admission Medications   Prescriptions Last Dose Informant Patient Reported? Taking?    PHENobarbital 64 8 mg tablet  Self No No   Sig: TAKE 1 TABLET (64 8 MG TOTAL) BY MOUTH 2 (TWO) TIMES A DAY   aspirin 81 MG tablet  Self No No   Sig: Take 1 tablet (81 mg total) by mouth daily   Patient not taking: Reported on 3/25/2021   lisinopril (ZESTRIL) 5 mg tablet  Self No No   Sig: Take 1 tablet (5 mg total) by mouth daily   Patient not taking: Reported on 4/15/2021   metoprolol succinate (TOPROL-XL) 50 mg 24 hr tablet  Self No No   Sig: Take 1 tablet (50 mg total) by mouth daily   Patient not taking: Reported on 4/15/2021   nitroglycerin (NITROSTAT) 0 4 mg SL tablet  Self No No   Sig: Place 1 tablet (0 4 mg total) under the tongue every 5 (five) minutes as needed for chest pain   Patient not taking: Reported on 4/15/2021   potassium chloride (K-DUR,KLOR-CON) 20 mEq tablet   No No   Sig: Take 2 tablets (40 mEq total) by mouth once for 1 dose   Patient not taking: Reported on 4/15/2021   simvastatin (ZOCOR) 20 mg tablet  Self No No   Sig: Take 1 tablet (20 mg total) by mouth daily at bedtime   Patient not taking: Reported on 4/15/2021   ticagrelor (Brilinta) 90 MG   No No   Sig: Take 1 tablet (90 mg total) by mouth every 12 (twelve) hours Take 2 tablets (180 mg)for the first dose, then 90 mg (1 tablet) twice daily   triamcinolone (KENALOG) 0 025 % cream  Self No No   Sig: Apply topically 2 (two) times a day   Patient not taking: Reported on 4/15/2021      Facility-Administered Medications: None       Past Medical History:   Diagnosis Date    Cardiac disease     [facility]est[facility]) [facility] [address] [facility]     Hernia of abdominal cavity     Myocardial infarction ([facility] [address] )     last assessed 7/31/14, past, Pt had MI s/p AGUSTIN placed in 2001, refuses to take any other medications for his cardiac disease, only will take seizure med  Understands possible complications from this decision    Seizure [facility])        Past Surgical History:   Procedure Laterality Date    ABDOMINAL AORTIC ANEURYSM REPAIR      for dialation or occulsion    CATARACT EXTRACTION         Family History   Problem Relation Age of Onset    Hypertension Father     Kidney disease Brother      I have reviewed and agree with the history as documented      E-Cigarette/Vaping    E-Cigarette Use Never User      E-Cigarette/Vaping Substances    Nicotine No     THC No     CBD No     Flavoring No     Other No     Unknown No      Social History     Tobacco Use    Smoking status: Former Smoker     Quit date: 6/5/2005     Years since quitting: 15 9    Smokeless tobacco: Never Used   Substance Use Topics    Alcohol use: Not Currently     Frequency: Never     Binge frequency: Never    Drug use: Never Review of Systems   Constitutional: Negative for fever  HENT: Negative for sore throat  Eyes: Negative for photophobia  Respiratory: Negative for shortness of breath  Cardiovascular: Negative for chest pain  Gastrointestinal: Negative for abdominal pain  Genitourinary: Negative for dysuria  Musculoskeletal: Negative for back pain  Skin: Positive for rash  Neurological: Negative for light-headedness  Hematological: Negative for adenopathy  Psychiatric/Behavioral: Negative for agitation  All other systems reviewed and are negative  Physical Exam  ED Triage Vitals   Temperature Pulse Respirations Blood Pressure SpO2   05/09/21 1213 05/09/21 1135 05/09/21 1135 05/09/21 1135 05/09/21 1135   98 °F (36 7 °C) 87 17 128/58 95 %      Temp Source Heart Rate Source Patient Position - Orthostatic VS BP Location FiO2 (%)   05/09/21 1213 05/09/21 1135 05/09/21 1135 05/09/21 1135 --   Oral Monitor Sitting Left arm       Pain Score       --                    Orthostatic Vital Signs  Vitals:    05/09/21 1135   BP: 128/58   Pulse: 87   Patient Position - Orthostatic VS: Sitting       Physical Exam  Vitals signs reviewed  Constitutional:       General: He is not in acute distress  Appearance: He is well-developed  HENT:      Head: Normocephalic  Eyes:      Pupils: Pupils are equal, round, and reactive to light  Neck:      Musculoskeletal: Normal range of motion and neck supple  Cardiovascular:      Rate and Rhythm: Normal rate and regular rhythm  Heart sounds: Normal heart sounds  No murmur  No friction rub  No gallop  Pulmonary:      Effort: Pulmonary effort is normal       Breath sounds: Normal breath sounds  Abdominal:      General: Bowel sounds are normal  There is no distension  Palpations: Abdomen is soft  Tenderness: There is no abdominal tenderness  There is no guarding  Musculoskeletal: Normal range of motion     Skin:     Capillary Refill: Capillary refill takes less than 2 seconds  Comments: Maculopapular rash noted over trunk, extremities sparing palms and soles  Oropharynx clear  Blanching rash  Nikolsky sign negative  Neurological:      Mental Status: He is alert and oriented to person, place, and time  Cranial Nerves: No cranial nerve deficit  Sensory: No sensory deficit  Motor: No abnormal muscle tone  Psychiatric:         Behavior: Behavior normal          Thought Content: Thought content normal          Judgment: Judgment normal          ED Medications  Medications   predniSONE tablet 40 mg (40 mg Oral Given 5/9/21 1301)       Diagnostic Studies  Results Reviewed     None                 No orders to display         Procedures  Procedures      ED Course               Identification of Seniors at Risk      Most Recent Value   (ISAR) Identification of Seniors at Risk   Before the illness or injury that brought you to the Emergency, did you need someone to help you on a regular basis? 0 Filed at: 05/09/2021 1135   In the last 24 hours, have you needed more help than usual?  0 Filed at: 05/09/2021 1135   Have you been hospitalized for one or more nights during the past 6 months? 1 Filed at: 05/09/2021 1135   In general, do you see well?  0 Filed at: 05/09/2021 1135   In general, do you have serious problems with your memory? 0 Filed at: 05/09/2021 1135   Do you take more than three different medications every day? 1 Filed at: 05/09/2021 1135   ISAR Score  2 Filed at: 05/09/2021 1135                              MDM  Number of Diagnoses or Management Options  Dermatitis:   Diagnosis management comments: Patient is a 58-year-old male who presents with maculopapular rash that is pruritic without red flags  Patient with recent Plavix administration with known allergy, recent detergent change    Patient will be treated with several days of steroids and advised to follow up Dermatology return to care if develops any or worsening symptoms  Disposition  Final diagnoses:   Dermatitis     Time reflects when diagnosis was documented in both MDM as applicable and the Disposition within this note     Time User Action Codes Description Comment    5/9/2021  1:03 PM Anitha Simons Add [L30 9] Dermatitis       ED Disposition     ED Disposition Condition Date/Time Comment    Discharge Stable Sun May 9, 2021  1:03 PM Bard Espinosa discharge to home/self care              Follow-up Information     Follow up With Specialties Details Why Contact Info Additional [address] Dermatology Schedule an appointment as soon as possible for a visit   [address] [facility], [address], [place], [address] [facility]    [address] [facility] Emergency Medicine  If symptoms worsen [address] [address] [address]  [address] [facility] [address] [facility], [address]          Discharge Medication List as of 5/9/2021  1:07 PM      START taking these medications    Details   predniSONE 20 mg tablet Take 2 tablets (40 mg total) by mouth daily, Starting Mon 5/10/2021, Normal         CONTINUE these medications which have NOT CHANGED    Details   aspirin 81 MG tablet Take 1 tablet (81 mg total) by mouth daily, Starting Thu 1/23/2020, Normal      lisinopril (ZESTRIL) 5 mg tablet Take 1 tablet (5 mg total) by mouth daily, Starting Wed 4/7/2021, Normal      metoprolol succinate (TOPROL-XL) 50 mg 24 hr tablet Take 1 tablet (50 mg total) by mouth daily, Starting Wed 4/7/2021, Normal      nitroglycerin (NITROSTAT) 0 4 mg SL tablet Place 1 tablet (0 4 mg total) under the tongue every 5 (five) minutes as needed for chest pain, Starting Wed 4/7/2021, Normal      PHENobarbital 64 8 mg tablet TAKE 1 TABLET (64 8 MG TOTAL) BY MOUTH 2 (TWO) TIMES A DAY, Starting Thu 3/4/2021, Until Tue 8/31/2021, Normal      potassium chloride (K-DUR,KLOR-CON) 20 mEq tablet Take 2 tablets (40 mEq total) by mouth once for 1 dose, Starting Wed 4/7/2021, Normal      simvastatin (ZOCOR) 20 mg tablet Take 1 tablet (20 mg total) by mouth daily at bedtime, Starting Wed 4/7/2021, Normal      ticagrelor (Brilinta) 90 MG Take 1 tablet (90 mg total) by mouth every 12 (twelve) hours Take 2 tablets (180 mg)for the first dose, then 90 mg (1 tablet) twice daily, Starting Fri 5/7/2021, Normal      triamcinolone (KENALOG) 0 025 % cream Apply topically 2 (two) times a day, Starting Wed 4/7/2021, Normal           No discharge procedures on file  PDMP Review       Value Time User    PDMP Reviewed  Yes 1/23/2020  1:58 PM Justus Pederson MD           ED Provider  Attending physically available and evaluated Edenilson Pereira  JORGE managed the patient along with the ED Attending      Electronically Signed by         Mag Cardoza MD  05/10/21 8754

## 2021-05-09 NOTE — ED ATTENDING ATTESTATION
5/9/2021  IAmanda DO, saw and evaluated the patient  I have discussed the patient with the resident/non-physician practitioner and agree with the resident's/non-physician practitioner's findings, Plan of Care, and MDM as documented in the resident's/non-physician practitioner's note, except where noted  All available labs and Radiology studies were reviewed  I was present for key portions of any procedure(s) performed by the resident/non-physician practitioner and I was immediately available to provide assistance  At this point I agree with the current assessment done in the Emergency Department  I have conducted an independent evaluation of this patient a history and physical is as follows:    Patient is 40-year-old male accompanied by his brother who he lives with  The patient has a history of a non ST segment elevation MI, had been on Brilinta however the that was changed to Plavix by his cardiologist on April 15th out of concerns for the patient not being compliant with the purulent toe which is twice a day and Plavix orally once today  Patient had been on Plavix in the past but had a mild rash to that in the past   After starting on the Plavix April 16th patient noticed diffuse itchy rash throughout the whole body on the arms, legs and trunk  No fever, no chills  No new soaps, clothing or detergents but does say that he accidentally has been using too much detergent because he did not realize that his detergent is concentrated so he has been actually using 4 times the amount of detergent over the last month or so  His brother does not have any rash  Last week the patient called the cardiologist regarding the rash, was instructed to stop the Plavix and start taking Brilinta  He has not started taking Brilinta, and did stop the Plavix about 2 weeks ago already  No fevers or chills  No difficulty swallowing      General:  Patient is well-appearing  Head:  Atraumatic  Eyes:  Conjunctiva pink  ENT:  No or pharyngeal involvement or lesions  Extremities: Normal range of motion  Neurologic:  Awake, fluent speech, normal comprehension, AAOx3  Skin:  The patient's, arms, legs, and trunk is a diffuse irregular patchy rash, blanches, slightly erythematous  slightly excoriated from scratching  Also some slight scaling psoriasis changes on his lower ankles which are chronic per his report  There is no petechia or purpura  Psychiatric:  Alert, pleasant, cooperative      ED Course     Suspect the patient most likely has drug rash from the Plavix  Is stressed the importance of taking his Brilinta as recommended, avoidance of Plavix, following up with dermatology  Supportive care, importance of follow-up and return precautions were discussed with the patient, who expressed understanding        Critical Care Time  Procedures

## 2021-05-12 ENCOUNTER — HOSPITAL ENCOUNTER (EMERGENCY)
Facility: HOSPITAL | Age: 76
Discharge: HOME | End: 2021-05-12
Attending: EMERGENCY MEDICINE | Admitting: EMERGENCY MEDICINE
Payer: MEDICARE

## 2021-05-12 VITALS
RESPIRATION RATE: 18 BRPM | SYSTOLIC BLOOD PRESSURE: 120 MMHG | HEART RATE: 74 BPM | OXYGEN SATURATION: 94 % | DIASTOLIC BLOOD PRESSURE: 58 MMHG | TEMPERATURE: 97.9 F

## 2021-05-12 DIAGNOSIS — R21 RASH: Primary | ICD-10-CM

## 2021-05-12 PROCEDURE — 99285 EMERGENCY DEPT VISIT HI MDM: CPT | Performed by: EMERGENCY MEDICINE

## 2021-05-12 PROCEDURE — 99282 EMERGENCY DEPT VISIT SF MDM: CPT

## 2021-05-12 RX ORDER — PREDNISONE 20 MG/1
40 TABLET ORAL ONCE
Status: COMPLETED | OUTPATIENT
Start: 2021-05-12 | End: 2021-05-12

## 2021-05-12 RX ORDER — PREDNISONE 1 MG/1
TABLET ORAL
Refills: 0 | Status: CANCELLED | OUTPATIENT
Start: 2021-05-13 | End: 2021-05-21

## 2021-05-12 RX ORDER — METHYLPREDNISOLONE 4 MG/1
TABLET ORAL
Qty: 21 TABLET | Refills: 0 | Status: SHIPPED | OUTPATIENT
Start: 2021-05-13 | End: 2021-08-02 | Stop reason: HOSPADM

## 2021-05-12 RX ADMIN — TICAGRELOR 180 MG: 90 TABLET ORAL at 17:36

## 2021-05-12 RX ADMIN — PREDNISONE 40 MG: 20 TABLET ORAL at 17:35

## 2021-05-12 NOTE — ED PROVIDER NOTES
History  Chief Complaint   Patient presents with    Rash     pt reports generalized rash after starting plavix  seen 5/9 and sent home with cream      HPI  68 yo male with PMH HTN, HLD, CAD, CHF, seizure disorder, psoriasis, presents to the ED accompanied by his brother with concern for pruritic rash on his chest, back, and extremities for approximately 2 5 weeks  Pt reports he had cardiac cath at the beginning of April  Pt thinks rash may be due to allergy to contrast dye during the cath, however rash did not appear until several weeks after being discharged  He did have report hx rash with plavix in the past, so 1 week ago cardiology discontinued plavix and prescribed Brillinta  Pt has not yet picked up Linda Mitchell from the pharmacy, but he has stopped taking plavix  Pt was evaluated in the ED for his rash on 5/9 and prescribed 2 days 40mg PO prednisone  Pt reports he had significant improvement of redness and itching with prednisone, but rash returned this evening since taking last dose this morning  Pt also reports he has been putting alcohol on the rash to improve the itching  States he saw a dermatologist many years ago for his psoriasis, but does not like them so has not been recently  Pt denies chest pain, SOB, nausea, vomiting, fever, chills, mucosal involvement, other complaints at this time  States he did shed some skin from his palms and soles yesterday but did not have any rash on the hands or feet  Prior to Admission Medications   Prescriptions Last Dose Informant Patient Reported? Taking?    PHENobarbital 64 8 mg tablet  Self No No   Sig: TAKE 1 TABLET (64 8 MG TOTAL) BY MOUTH 2 (TWO) TIMES A DAY   aspirin 81 MG tablet  Self No No   Sig: Take 1 tablet (81 mg total) by mouth daily   Patient not taking: Reported on 3/25/2021   lisinopril (ZESTRIL) 5 mg tablet  Self No No   Sig: Take 1 tablet (5 mg total) by mouth daily   Patient not taking: Reported on 4/15/2021   metoprolol succinate (TOPROL-XL) 50 mg 24 hr tablet  Self No No   Sig: Take 1 tablet (50 mg total) by mouth daily   Patient not taking: Reported on 4/15/2021   nitroglycerin (NITROSTAT) 0 4 mg SL tablet  Self No No   Sig: Place 1 tablet (0 4 mg total) under the tongue every 5 (five) minutes as needed for chest pain   Patient not taking: Reported on 4/15/2021   potassium chloride (K-DUR,KLOR-CON) 20 mEq tablet   No No   Sig: Take 2 tablets (40 mEq total) by mouth once for 1 dose   Patient not taking: Reported on 4/15/2021   predniSONE 20 mg tablet   No No   Sig: Take 2 tablets (40 mg total) by mouth daily   simvastatin (ZOCOR) 20 mg tablet  Self No No   Sig: Take 1 tablet (20 mg total) by mouth daily at bedtime   Patient not taking: Reported on 4/15/2021   ticagrelor (Brilinta) 90 MG   No No   Sig: Take 1 tablet (90 mg total) by mouth every 12 (twelve) hours Take 2 tablets (180 mg)for the first dose, then 90 mg (1 tablet) twice daily   triamcinolone (KENALOG) 0 025 % cream  Self No No   Sig: Apply topically 2 (two) times a day   Patient not taking: Reported on 4/15/2021      Facility-Administered Medications: None       Past Medical History:   Diagnosis Date    Cardiac disease     [facility]est[facility]) [facility] [address] [facility]     Hernia of abdominal cavity     Myocardial infarction ([facility] [address] )     last assessed 7/31/14, past, Pt had MI s/p AGUSTIN placed in 2001, refuses to take any other medications for his cardiac disease, only will take seizure med  Understands possible complications from this decision    Seizure [facility])        Past Surgical History:   Procedure Laterality Date    ABDOMINAL AORTIC ANEURYSM REPAIR      for dialation or occulsion    CATARACT EXTRACTION         Family History   Problem Relation Age of Onset    Hypertension Father     Kidney disease Brother      I have reviewed and agree with the history as documented      E-Cigarette/Vaping    E-Cigarette Use Never User      E-Cigarette/Vaping Substances    Nicotine No     THC No     CBD No     Flavoring No     Other No     Unknown No      Social History     Tobacco Use    Smoking status: Former Smoker     Quit date: 6/5/2005     Years since quitting: 15 9    Smokeless tobacco: Never Used   Substance Use Topics    Alcohol use: Not Currently     Frequency: Never     Binge frequency: Never    Drug use: Never        Review of Systems   Constitutional: Negative for chills and fever  HENT: Negative for congestion, rhinorrhea and sore throat  Respiratory: Negative for cough and shortness of breath  Cardiovascular: Negative for chest pain and palpitations  Gastrointestinal: Negative for abdominal pain, nausea and vomiting  Genitourinary: Negative for dysuria and hematuria  Musculoskeletal: Negative for arthralgias and myalgias  Skin: Positive for rash  Neurological: Negative for dizziness, weakness, light-headedness, numbness and headaches  All other systems reviewed and are negative  Physical Exam  ED Triage Vitals [05/12/21 1628]   Temperature Pulse Respirations Blood Pressure SpO2   97 9 °F (36 6 °C) 74 18 120/58 94 %      Temp Source Heart Rate Source Patient Position - Orthostatic VS BP Location FiO2 (%)   Tympanic Monitor Sitting Left arm --      Pain Score       No Pain             Orthostatic Vital Signs  Vitals:    05/12/21 1628   BP: 120/58   Pulse: 74   Patient Position - Orthostatic VS: Sitting       Physical Exam  Constitutional:       General: He is not in acute distress  Appearance: He is well-developed  He is not diaphoretic  HENT:      Head: Normocephalic and atraumatic  Right Ear: External ear normal       Left Ear: External ear normal       Mouth/Throat:      Mouth: Mucous membranes are moist       Pharynx: No posterior oropharyngeal erythema  Eyes:      Conjunctiva/sclera: Conjunctivae normal       Pupils: Pupils are equal, round, and reactive to light  Neck:      Musculoskeletal: Normal range of motion and neck supple     Cardiovascular:      Rate and Rhythm: Normal rate and regular rhythm  Heart sounds: Normal heart sounds  No murmur  No friction rub  No gallop  Pulmonary:      Effort: Pulmonary effort is normal  No respiratory distress  Breath sounds: Normal breath sounds  No wheezing, rhonchi or rales  Abdominal:      General: Bowel sounds are normal  There is no distension  Palpations: Abdomen is soft  Tenderness: There is no abdominal tenderness  Musculoskeletal: Normal range of motion  Right lower leg: No edema  Left lower leg: No edema  Lymphadenopathy:      Cervical: No cervical adenopathy  Skin:     General: Skin is warm and dry  Findings: Rash present  Comments: Diffuse raised, blanching erythematous maculopapular rash bilateral thighs, upper arms, back, chest   Neurological:      Mental Status: He is alert and oriented to person, place, and time  Cranial Nerves: No cranial nerve deficit  Sensory: No sensory deficit  ED Medications  Medications   ticagrelor (BRILINTA) tablet 180 mg (180 mg Oral Given 5/12/21 1736)   predniSONE tablet 40 mg (40 mg Oral Given 5/12/21 1735)       Diagnostic Studies  Results Reviewed     None                 No orders to display         Procedures  Procedures      ED Course               Identification of Seniors at Risk      Most Recent Value   (ISAR) Identification of Seniors at Risk   Before the illness or injury that brought you to the Emergency, did you need someone to help you on a regular basis? 0 Filed at: 05/12/2021 1626   In the last 24 hours, have you needed more help than usual?  0 Filed at: 05/12/2021 1626   Have you been hospitalized for one or more nights during the past 6 months? 0 Filed at: 05/12/2021 1626   In general, do you see well?  0 Filed at: 05/12/2021 1626   In general, do you have serious problems with your memory? 0 Filed at: 05/12/2021 1626   Do you take more than three different medications every day?   1 Filed at: 05/12/2021 1626   ISAR Score  1 Filed at: 05/12/2021 1626                              MDM  66 yo male with PMH HTN, HLD, CAD, CHF, seizure disorder, psoriasis, presenting with pruritic rash x 2 5 weeks  Will prescribe prednisone taper and discharge with strict return precautions and dermatology follow up  Will give a dose of his brillinta and advise pt to  his prescription from his pharmacy  Disposition  Final diagnoses:   Rash     Time reflects when diagnosis was documented in both MDM as applicable and the Disposition within this note     Time User Action Codes Description Comment    5/12/2021  5:04 PM Mireille, Bowafsanehrenetta Rash       ED Disposition     ED Disposition Condition Date/Time Comment    Discharge Stable Wed May 12, 2021  5:06 PM Spike Crimes discharge to home/self care              Follow-up Information     Follow up With Specialties Details Why [id number] [facility] Call in 1 day to schedule an appointment [address], [address] [facility]    Esau Gomez MD Cardiology Call in 1 day  [place] [phone] [facility]  [phone]             Discharge Medication List as of 5/12/2021  5:06 PM      START taking these medications    Details   methylPREDNISolone 4 MG tablet therapy pack Use as directed on package, Normal         CONTINUE these medications which have NOT CHANGED    Details   aspirin 81 MG tablet Take 1 tablet (81 mg total) by mouth daily, Starting Thu 1/23/2020, Normal      lisinopril (ZESTRIL) 5 mg tablet Take 1 tablet (5 mg total) by mouth daily, Starting Wed 4/7/2021, Normal      metoprolol succinate (TOPROL-XL) 50 mg 24 hr tablet Take 1 tablet (50 mg total) by mouth daily, Starting Wed 4/7/2021, Normal      nitroglycerin (NITROSTAT) 0 4 mg SL tablet Place 1 tablet (0 4 mg total) under the tongue every 5 (five) minutes as needed for chest pain, Starting Wed 4/7/2021, Normal      PHENobarbital 64 8 mg tablet TAKE 1 TABLET (64 8 MG TOTAL) BY MOUTH 2 (TWO) TIMES A DAY, Starting Thu 3/4/2021, Until Tue 8/31/2021, Normal      potassium chloride (K-DUR,KLOR-CON) 20 mEq tablet Take 2 tablets (40 mEq total) by mouth once for 1 dose, Starting Wed 4/7/2021, Normal      predniSONE 20 mg tablet Take 2 tablets (40 mg total) by mouth daily, Starting Mon 5/10/2021, Normal      simvastatin (ZOCOR) 20 mg tablet Take 1 tablet (20 mg total) by mouth daily at bedtime, Starting Wed 4/7/2021, Normal      ticagrelor (Brilinta) 90 MG Take 1 tablet (90 mg total) by mouth every 12 (twelve) hours Take 2 tablets (180 mg)for the first dose, then 90 mg (1 tablet) twice daily, Starting Fri 5/7/2021, Normal      triamcinolone (KENALOG) 0 025 % cream Apply topically 2 (two) times a day, Starting Wed 4/7/2021, Normal           No discharge procedures on file  PDMP Review       Value Time User    PDMP Reviewed  Yes 1/23/2020  1:58 PM Effie Dumont MD           ED Provider  Attending physically available and evaluated Juana Bonillateagan PATEL managed the patient along with the ED Attending      Electronically Signed by         Kevin Cronin MD  05/12/21 3626

## 2021-05-12 NOTE — ED ATTENDING ATTESTATION
5/12/2021  I, Jeanie Billy MD, saw and evaluated the patient  I have discussed the patient with the resident/non-physician practitioner and agree with the resident's/non-physician practitioner's findings, Plan of Care, and MDM as documented in the resident's/non-physician practitioner's note, except where noted  All available labs and Radiology studies were reviewed  I was present for key portions of any procedure(s) performed by the resident/non-physician practitioner and I was immediately available to provide assistance  At this point I agree with the current assessment done in the Emergency Department  I have conducted an independent evaluation of this patient a history and physical is as follows:    ED Course         Critical Care Time  Procedures    68 yo male with cardiac cath in April and had rash soon after, possible to contrast and maybe plavix  Pt was taken off plavix and started on brylinta  Pt seen in ed few days ago due to rash persistent and took prednisone for few days with some improvement  Pt rash came back today after finishing steroids  No cp, no sob, no abdominal pain  Vss, afebrile, lungs cta, rrr, abdomen soft nontender, maculopapular rash diffuse  Steroid taper, derm follow up  Encouraged lotions rather than alcohol

## 2021-05-12 NOTE — DISCHARGE INSTRUCTIONS
Use thick moisturizing lotion to sooth rash  Start prescription tomorrow  Call your cardiologist tomorrow to confirm that your Aris Nian prescription was sent to the pharmacy  Return to the emergency department for fever, difficulty breathing, chest pain, vomiting, worsening rash, any other new or concerning symptoms

## 2021-05-13 ENCOUNTER — TELEPHONE (OUTPATIENT)
Dept: CARDIOLOGY CLINIC | Facility: CLINIC | Age: 76
End: 2021-05-13

## 2021-05-13 NOTE — TELEPHONE ENCOUNTER
P/c'd states he was back in ER yesterday for itching from rash  They switched him from Plavix to Brilinta  He took one in the hospital and  was sob when he got home  He said he will go back to Plavix  They did give him prednisone also  Will only take at last resort  He will call back if he continues with the rash and itching        Please advise

## 2021-06-02 DIAGNOSIS — G40.909 SEIZURE DISORDER (HCC): Chronic | ICD-10-CM

## 2021-06-03 RX ORDER — PHENOBARBITAL 64.8 MG/1
64.8 TABLET ORAL 2 TIMES DAILY
Qty: 60 TABLET | Refills: 5 | Status: SHIPPED | OUTPATIENT
Start: 2021-06-03 | End: 2021-11-16 | Stop reason: SDUPTHER

## 2021-06-05 DIAGNOSIS — I25.10 CORONARY ARTERY DISEASE INVOLVING NATIVE CORONARY ARTERY OF NATIVE HEART WITHOUT ANGINA PECTORIS: ICD-10-CM

## 2021-06-10 RX ORDER — TICAGRELOR 90 MG/1
TABLET ORAL
Qty: 60 TABLET | Refills: 5 | Status: SHIPPED | OUTPATIENT
Start: 2021-06-10 | End: 2021-08-02 | Stop reason: HOSPADM

## 2021-06-30 DIAGNOSIS — I25.10 CORONARY ARTERY DISEASE INVOLVING NATIVE CORONARY ARTERY OF NATIVE HEART WITHOUT ANGINA PECTORIS: ICD-10-CM

## 2021-06-30 DIAGNOSIS — I10 ESSENTIAL HYPERTENSION: Chronic | ICD-10-CM

## 2021-06-30 DIAGNOSIS — E78.2 MIXED HYPERLIPIDEMIA: Chronic | ICD-10-CM

## 2021-06-30 DIAGNOSIS — I50.42 CHRONIC COMBINED SYSTOLIC AND DIASTOLIC CHF (CONGESTIVE HEART FAILURE) (HCC): ICD-10-CM

## 2021-06-30 RX ORDER — SIMVASTATIN 20 MG
TABLET ORAL
Qty: 90 TABLET | Refills: 0 | Status: SHIPPED | OUTPATIENT
Start: 2021-06-30 | End: 2021-08-02 | Stop reason: HOSPADM

## 2021-06-30 RX ORDER — METOPROLOL SUCCINATE 50 MG/1
TABLET, EXTENDED RELEASE ORAL
Qty: 90 TABLET | Refills: 0 | Status: SHIPPED | OUTPATIENT
Start: 2021-06-30 | End: 2022-07-15

## 2021-06-30 RX ORDER — LISINOPRIL 5 MG/1
TABLET ORAL
Qty: 90 TABLET | Refills: 0 | Status: SHIPPED | OUTPATIENT
Start: 2021-06-30 | End: 2021-08-21 | Stop reason: ALTCHOICE

## 2021-07-28 ENCOUNTER — HOSPITAL ENCOUNTER (INPATIENT)
Facility: HOSPITAL | Age: 76
LOS: 3 days | Discharge: HOME | DRG: 247 | End: 2021-08-02
Attending: EMERGENCY MEDICINE | Admitting: INTERNAL MEDICINE
Payer: MEDICARE

## 2021-07-28 ENCOUNTER — APPOINTMENT (OUTPATIENT)
Dept: NON INVASIVE DIAGNOSTICS | Facility: HOSPITAL | Age: 76
DRG: 247 | End: 2021-07-28
Payer: MEDICARE

## 2021-07-28 ENCOUNTER — APPOINTMENT (EMERGENCY)
Dept: RADIOLOGY | Facility: HOSPITAL | Age: 76
DRG: 247 | End: 2021-07-28
Payer: MEDICARE

## 2021-07-28 DIAGNOSIS — I50.42 CHRONIC COMBINED SYSTOLIC AND DIASTOLIC CHF (CONGESTIVE HEART FAILURE) (HCC): ICD-10-CM

## 2021-07-28 DIAGNOSIS — I51.3 APICAL MURAL THROMBUS: ICD-10-CM

## 2021-07-28 DIAGNOSIS — R77.8 ELEVATED TROPONIN: ICD-10-CM

## 2021-07-28 DIAGNOSIS — I45.9 HEART BLOCK: ICD-10-CM

## 2021-07-28 DIAGNOSIS — I21.4 NSTEMI (NON-ST ELEVATED MYOCARDIAL INFARCTION) (HCC): ICD-10-CM

## 2021-07-28 DIAGNOSIS — I25.118 CORONARY ARTERY DISEASE OF NATIVE ARTERY OF NATIVE HEART WITH STABLE ANGINA PECTORIS (HCC): ICD-10-CM

## 2021-07-28 DIAGNOSIS — I10 ESSENTIAL HYPERTENSION: ICD-10-CM

## 2021-07-28 DIAGNOSIS — I21.02 ST ELEVATION MYOCARDIAL INFARCTION INVOLVING LEFT ANTERIOR DESCENDING (LAD) CORONARY ARTERY (HCC): ICD-10-CM

## 2021-07-28 DIAGNOSIS — Z98.890 S/P AAA (ABDOMINAL AORTIC ANEURYSM) REPAIR: ICD-10-CM

## 2021-07-28 DIAGNOSIS — R07.9 CHEST PAIN: Primary | ICD-10-CM

## 2021-07-28 DIAGNOSIS — G40.909 SEIZURE DISORDER (HCC): ICD-10-CM

## 2021-07-28 DIAGNOSIS — Z86.79 S/P AAA (ABDOMINAL AORTIC ANEURYSM) REPAIR: ICD-10-CM

## 2021-07-28 DIAGNOSIS — I25.10 CORONARY ARTERY DISEASE INVOLVING NATIVE CORONARY ARTERY OF NATIVE HEART WITHOUT ANGINA PECTORIS: ICD-10-CM

## 2021-07-28 LAB
ALBUMIN SERPL BCP-MCNC: 3 G/DL (ref 3.5–5)
ALP SERPL-CCNC: 131 U/L (ref 46–116)
ALT SERPL W P-5'-P-CCNC: 12 U/L (ref 12–78)
ANION GAP SERPL CALCULATED.3IONS-SCNC: 6 MMOL/L (ref 4–13)
APTT PPP: 28 SECONDS (ref 23–37)
AST SERPL W P-5'-P-CCNC: 23 U/L (ref 5–45)
ATRIAL RATE: 375 BPM
ATRIAL RATE: 65 BPM
BASOPHILS # BLD AUTO: 0.04 THOUSANDS/ΜL (ref 0–0.1)
BASOPHILS NFR BLD AUTO: 1 % (ref 0–1)
BILIRUB SERPL-MCNC: 0.42 MG/DL (ref 0.2–1)
BUN SERPL-MCNC: 10 MG/DL (ref 5–25)
CALCIUM ALBUM COR SERPL-MCNC: 9.1 MG/DL (ref 8.3–10.1)
CALCIUM SERPL-MCNC: 8.3 MG/DL (ref 8.3–10.1)
CHLORIDE SERPL-SCNC: 111 MMOL/L (ref 100–108)
CO2 SERPL-SCNC: 23 MMOL/L (ref 21–32)
CREAT SERPL-MCNC: 0.86 MG/DL (ref 0.6–1.3)
EOSINOPHIL # BLD AUTO: 0.17 THOUSAND/ΜL (ref 0–0.61)
EOSINOPHIL NFR BLD AUTO: 5 % (ref 0–6)
ERYTHROCYTE [DISTWIDTH] IN BLOOD BY AUTOMATED COUNT: 19.3 % (ref 11.6–15.1)
GFR SERPL CREATININE-BSD FRML MDRD: 84 ML/MIN/1.73SQ M
GLUCOSE SERPL-MCNC: 109 MG/DL (ref 65–140)
HCT VFR BLD AUTO: 37 % (ref 36.5–49.3)
HGB BLD-MCNC: 12.1 G/DL (ref 12–17)
IMM GRANULOCYTES # BLD AUTO: 0.04 THOUSAND/UL (ref 0–0.2)
IMM GRANULOCYTES NFR BLD AUTO: 1 % (ref 0–2)
INR PPP: 1.09 (ref 0.84–1.19)
KCT BLD-ACNC: 225 SEC (ref 89–137)
LYMPHOCYTES # BLD AUTO: 1.19 THOUSANDS/ΜL (ref 0.6–4.47)
LYMPHOCYTES NFR BLD AUTO: 32 % (ref 14–44)
MCH RBC QN AUTO: 31.4 PG (ref 26.8–34.3)
MCHC RBC AUTO-ENTMCNC: 32.7 G/DL (ref 31.4–37.4)
MCV RBC AUTO: 96 FL (ref 82–98)
MONOCYTES # BLD AUTO: 0.31 THOUSAND/ΜL (ref 0.17–1.22)
MONOCYTES NFR BLD AUTO: 8 % (ref 4–12)
NEUTROPHILS # BLD AUTO: 2 THOUSANDS/ΜL (ref 1.85–7.62)
NEUTS SEG NFR BLD AUTO: 53 % (ref 43–75)
NRBC BLD AUTO-RTO: 0 /100 WBCS
P AXIS: 48 DEGREES
PLATELET # BLD AUTO: 118 THOUSANDS/UL (ref 149–390)
PMV BLD AUTO: 11.4 FL (ref 8.9–12.7)
POTASSIUM SERPL-SCNC: 3.7 MMOL/L (ref 3.5–5.3)
PR INTERVAL: 256 MS
PROT SERPL-MCNC: 7.1 G/DL (ref 6.4–8.2)
PROTHROMBIN TIME: 14.1 SECONDS (ref 11.6–14.5)
QRS AXIS: 16 DEGREES
QRS AXIS: 4 DEGREES
QRSD INTERVAL: 82 MS
QRSD INTERVAL: 88 MS
QT INTERVAL: 382 MS
QT INTERVAL: 424 MS
QTC INTERVAL: 397 MS
QTC INTERVAL: 412 MS
RBC # BLD AUTO: 3.85 MILLION/UL (ref 3.88–5.62)
SODIUM SERPL-SCNC: 140 MMOL/L (ref 136–145)
SPECIMEN SOURCE: ABNORMAL
T WAVE AXIS: 22 DEGREES
T WAVE AXIS: 79 DEGREES
TROPONIN I SERPL-MCNC: 3.82 NG/ML
VENTRICULAR RATE: 57 BPM
VENTRICULAR RATE: 65 BPM
WBC # BLD AUTO: 3.75 THOUSAND/UL (ref 4.31–10.16)

## 2021-07-28 PROCEDURE — 84484 ASSAY OF TROPONIN QUANT: CPT | Performed by: EMERGENCY MEDICINE

## 2021-07-28 PROCEDURE — C1894 INTRO/SHEATH, NON-LASER: HCPCS

## 2021-07-28 PROCEDURE — 92928 PRQ TCAT PLMT NTRAC ST 1 LES: CPT | Performed by: INTERNAL MEDICINE

## 2021-07-28 PROCEDURE — B2111ZZ FLUOROSCOPY OF MULTIPLE CORONARY ARTERIES USING LOW OSMOLAR CONTRAST: ICD-10-PCS | Performed by: INTERNAL MEDICINE

## 2021-07-28 PROCEDURE — C1769 GUIDE WIRE: HCPCS

## 2021-07-28 PROCEDURE — 93010 ELECTROCARDIOGRAM REPORT: CPT | Performed by: INTERNAL MEDICINE

## 2021-07-28 PROCEDURE — 85347 COAGULATION TIME ACTIVATED: CPT

## 2021-07-28 PROCEDURE — 99291 CRITICAL CARE FIRST HOUR: CPT | Performed by: EMERGENCY MEDICINE

## 2021-07-28 PROCEDURE — 99152 MOD SED SAME PHYS/QHP 5/>YRS: CPT | Performed by: INTERNAL MEDICINE

## 2021-07-28 PROCEDURE — 93458 L HRT ARTERY/VENTRICLE ANGIO: CPT

## 2021-07-28 PROCEDURE — 85610 PROTHROMBIN TIME: CPT | Performed by: EMERGENCY MEDICINE

## 2021-07-28 PROCEDURE — 027034Z DILATION OF CORONARY ARTERY, ONE ARTERY WITH DRUG-ELUTING INTRALUMINAL DEVICE, PERCUTANEOUS APPROACH: ICD-10-PCS | Performed by: INTERNAL MEDICINE

## 2021-07-28 PROCEDURE — 99153 MOD SED SAME PHYS/QHP EA: CPT

## 2021-07-28 PROCEDURE — 93005 ELECTROCARDIOGRAM TRACING: CPT

## 2021-07-28 PROCEDURE — 96365 THER/PROPH/DIAG IV INF INIT: CPT

## 2021-07-28 PROCEDURE — C1757 CATH, THROMBECTOMY/EMBOLECT: HCPCS

## 2021-07-28 PROCEDURE — 02C03ZZ EXTIRPATION OF MATTER FROM CORONARY ARTERY, ONE ARTERY, PERCUTANEOUS APPROACH: ICD-10-PCS | Performed by: INTERNAL MEDICINE

## 2021-07-28 PROCEDURE — 99152 MOD SED SAME PHYS/QHP 5/>YRS: CPT

## 2021-07-28 PROCEDURE — C1887 CATHETER, GUIDING: HCPCS

## 2021-07-28 PROCEDURE — 99213 OFFICE O/P EST LOW 20 MIN: CPT | Performed by: INTERNAL MEDICINE

## 2021-07-28 PROCEDURE — 4A023N7 MEASUREMENT OF CARDIAC SAMPLING AND PRESSURE, LEFT HEART, PERCUTANEOUS APPROACH: ICD-10-PCS | Performed by: INTERNAL MEDICINE

## 2021-07-28 PROCEDURE — 99285 EMERGENCY DEPT VISIT HI MDM: CPT

## 2021-07-28 PROCEDURE — 93458 L HRT ARTERY/VENTRICLE ANGIO: CPT | Performed by: INTERNAL MEDICINE

## 2021-07-28 PROCEDURE — 85025 COMPLETE CBC W/AUTO DIFF WBC: CPT | Performed by: EMERGENCY MEDICINE

## 2021-07-28 PROCEDURE — C1874 STENT, COATED/COV W/DEL SYS: HCPCS

## 2021-07-28 PROCEDURE — 80053 COMPREHEN METABOLIC PANEL: CPT | Performed by: EMERGENCY MEDICINE

## 2021-07-28 PROCEDURE — C9606 PERC D-E COR REVASC W AMI S: HCPCS

## 2021-07-28 PROCEDURE — 36415 COLL VENOUS BLD VENIPUNCTURE: CPT | Performed by: EMERGENCY MEDICINE

## 2021-07-28 PROCEDURE — 1124F ACP DISCUSS-NO DSCNMKR DOCD: CPT | Performed by: INTERNAL MEDICINE

## 2021-07-28 PROCEDURE — 96375 TX/PRO/DX INJ NEW DRUG ADDON: CPT

## 2021-07-28 PROCEDURE — 85730 THROMBOPLASTIN TIME PARTIAL: CPT | Performed by: EMERGENCY MEDICINE

## 2021-07-28 RX ORDER — DIPHENHYDRAMINE HYDROCHLORIDE 50 MG/ML
50 INJECTION INTRAMUSCULAR; INTRAVENOUS EVERY 6 HOURS PRN
Status: DISCONTINUED | OUTPATIENT
Start: 2021-07-28 | End: 2021-07-29

## 2021-07-28 RX ORDER — MIDAZOLAM HYDROCHLORIDE 2 MG/2ML
INJECTION, SOLUTION INTRAMUSCULAR; INTRAVENOUS CODE/TRAUMA/SEDATION MEDICATION
Status: COMPLETED | OUTPATIENT
Start: 2021-07-28 | End: 2021-07-28

## 2021-07-28 RX ORDER — HEPARIN SODIUM 1000 [USP'U]/ML
INJECTION, SOLUTION INTRAVENOUS; SUBCUTANEOUS CODE/TRAUMA/SEDATION MEDICATION
Status: COMPLETED | OUTPATIENT
Start: 2021-07-28 | End: 2021-07-28

## 2021-07-28 RX ORDER — HEPARIN SODIUM 1000 [USP'U]/ML
2000 INJECTION, SOLUTION INTRAVENOUS; SUBCUTANEOUS
Status: DISCONTINUED | OUTPATIENT
Start: 2021-07-28 | End: 2021-07-28

## 2021-07-28 RX ORDER — NITROGLYCERIN 20 MG/100ML
INJECTION INTRAVENOUS CODE/TRAUMA/SEDATION MEDICATION
Status: COMPLETED | OUTPATIENT
Start: 2021-07-28 | End: 2021-07-28

## 2021-07-28 RX ORDER — ACETAMINOPHEN 325 MG/1
650 TABLET ORAL EVERY 4 HOURS PRN
Status: DISCONTINUED | OUTPATIENT
Start: 2021-07-28 | End: 2021-08-02 | Stop reason: HOSPADM

## 2021-07-28 RX ORDER — FENTANYL CITRATE 50 UG/ML
INJECTION, SOLUTION INTRAMUSCULAR; INTRAVENOUS CODE/TRAUMA/SEDATION MEDICATION
Status: COMPLETED | OUTPATIENT
Start: 2021-07-28 | End: 2021-07-28

## 2021-07-28 RX ORDER — ONDANSETRON 2 MG/ML
4 INJECTION INTRAMUSCULAR; INTRAVENOUS EVERY 6 HOURS PRN
Status: DISCONTINUED | OUTPATIENT
Start: 2021-07-28 | End: 2021-08-02 | Stop reason: HOSPADM

## 2021-07-28 RX ORDER — LIDOCAINE HYDROCHLORIDE 10 MG/ML
INJECTION, SOLUTION EPIDURAL; INFILTRATION; INTRACAUDAL; PERINEURAL CODE/TRAUMA/SEDATION MEDICATION
Status: COMPLETED | OUTPATIENT
Start: 2021-07-28 | End: 2021-07-28

## 2021-07-28 RX ORDER — PRASUGREL 10 MG/1
60 TABLET, FILM COATED ORAL ONCE
Status: DISCONTINUED | OUTPATIENT
Start: 2021-07-28 | End: 2021-07-28

## 2021-07-28 RX ORDER — DIPHENHYDRAMINE HYDROCHLORIDE 50 MG/ML
INJECTION INTRAMUSCULAR; INTRAVENOUS CODE/TRAUMA/SEDATION MEDICATION
Status: COMPLETED | OUTPATIENT
Start: 2021-07-28 | End: 2021-07-28

## 2021-07-28 RX ORDER — HEPARIN SODIUM 1000 [USP'U]/ML
4000 INJECTION, SOLUTION INTRAVENOUS; SUBCUTANEOUS
Status: DISCONTINUED | OUTPATIENT
Start: 2021-07-28 | End: 2021-07-28

## 2021-07-28 RX ORDER — SODIUM CHLORIDE 9 MG/ML
125 INJECTION, SOLUTION INTRAVENOUS CONTINUOUS
Status: DISPENSED | OUTPATIENT
Start: 2021-07-28 | End: 2021-07-28

## 2021-07-28 RX ORDER — VERAPAMIL HYDROCHLORIDE 2.5 MG/ML
INJECTION, SOLUTION INTRAVENOUS CODE/TRAUMA/SEDATION MEDICATION
Status: COMPLETED | OUTPATIENT
Start: 2021-07-28 | End: 2021-07-28

## 2021-07-28 RX ORDER — HEPARIN SODIUM 10000 [USP'U]/100ML
3-20 INJECTION, SOLUTION INTRAVENOUS
Status: DISCONTINUED | OUTPATIENT
Start: 2021-07-28 | End: 2021-07-28

## 2021-07-28 RX ORDER — PHENOBARBITAL 32.4 MG/1
64.8 TABLET ORAL 2 TIMES DAILY
Status: DISCONTINUED | OUTPATIENT
Start: 2021-07-28 | End: 2021-08-02 | Stop reason: HOSPADM

## 2021-07-28 RX ORDER — PHENYLEPHRINE HYDROCHLORIDE 10 MG/ML
INJECTION INTRAVENOUS CODE/TRAUMA/SEDATION MEDICATION
Status: COMPLETED | OUTPATIENT
Start: 2021-07-28 | End: 2021-07-28

## 2021-07-28 RX ORDER — HEPARIN SODIUM 1000 [USP'U]/ML
4000 INJECTION, SOLUTION INTRAVENOUS; SUBCUTANEOUS ONCE
Status: COMPLETED | OUTPATIENT
Start: 2021-07-28 | End: 2021-07-28

## 2021-07-28 RX ADMIN — FAMOTIDINE 20 MG: 10 INJECTION INTRAVENOUS at 15:06

## 2021-07-28 RX ADMIN — HEPARIN SODIUM 11.1 UNITS/KG/HR: 10000 INJECTION, SOLUTION INTRAVENOUS at 13:24

## 2021-07-28 RX ADMIN — VERAPAMIL HYDROCHLORIDE 2.5 MG: 2.5 INJECTION, SOLUTION INTRAVENOUS at 15:18

## 2021-07-28 RX ADMIN — PHENYLEPHRINE HYDROCHLORIDE 100 MCG: 10 INJECTION INTRAVENOUS at 15:39

## 2021-07-28 RX ADMIN — HEPARIN SODIUM 4000 UNITS: 1000 INJECTION INTRAVENOUS; SUBCUTANEOUS at 13:21

## 2021-07-28 RX ADMIN — LIDOCAINE HYDROCHLORIDE 1 ML: 10 INJECTION, SOLUTION EPIDURAL; INFILTRATION; INTRACAUDAL; PERINEURAL at 15:12

## 2021-07-28 RX ADMIN — MIDAZOLAM 2 MG: 1 INJECTION INTRAMUSCULAR; INTRAVENOUS at 15:08

## 2021-07-28 RX ADMIN — PHENOBARBITAL 64.8 MG: 32.4 TABLET ORAL at 21:36

## 2021-07-28 RX ADMIN — FAMOTIDINE 20 MG: 10 INJECTION INTRAVENOUS at 20:44

## 2021-07-28 RX ADMIN — IOHEXOL 100 ML: 350 INJECTION, SOLUTION INTRAVENOUS at 15:43

## 2021-07-28 RX ADMIN — TICAGRELOR 180 MG: 90 TABLET ORAL at 15:06

## 2021-07-28 RX ADMIN — HYDROCORTISONE SODIUM SUCCINATE 100 MG: 100 INJECTION, POWDER, FOR SOLUTION INTRAMUSCULAR; INTRAVENOUS at 15:06

## 2021-07-28 RX ADMIN — DIPHENHYDRAMINE HYDROCHLORIDE 50 MG: 50 INJECTION, SOLUTION INTRAMUSCULAR; INTRAVENOUS at 15:04

## 2021-07-28 RX ADMIN — HEPARIN SODIUM 8000 UNITS: 1000 INJECTION INTRAVENOUS; SUBCUTANEOUS at 15:28

## 2021-07-28 RX ADMIN — SODIUM CHLORIDE 125 ML/HR: 0.9 INJECTION, SOLUTION INTRAVENOUS at 17:01

## 2021-07-28 RX ADMIN — FENTANYL CITRATE 50 MCG: 50 INJECTION INTRAMUSCULAR; INTRAVENOUS at 15:08

## 2021-07-28 RX ADMIN — Medication 200 MCG: at 15:17

## 2021-07-28 NOTE — ED NOTES
Cath lab is ready for patient at this time      Rolando Carpenter, RN  07/28/21 [id number] returns for IVABX. Daptomycin infused as ordered. Patient tolerated well and without incident. PICC line in good condition and has positive blood return. PICC line flushed with saline per protocol, clave changed. Patient DC'd home in good condition. Returns daily.

## 2021-07-28 NOTE — ED PROVIDER NOTES
History  Chief Complaint   Patient presents with    Chest Pain     sudden osent of cp with bradycardia and hypotension  resolved upon arrival       78-year-old male history of prior heart attacks with multiple stents presents the ED for evaluation chest pain  Patient states that around 11:00 a m , began to experience severe substernal chest pressure that was associated with diaphoresis and shortness of breath  The pain was nonradiating  Patient states that his pain feels like his prior heart attacks  Patient called EMS, while in route, patient's symptoms worsened and he was noted to be in complete heart block with associated hypotension  Patient then converted back to normal sinus rhythm on his own with improvement of both his symptoms as well as blood pressure  When patient arrived to the emergency department, he states his chest pain improved and was no longer severe, but a mild 4/10 in intensity  He denies any recent fever or chills  No cough  No abdominal pain, nausea or vomiting  No leg pain or swelling  He was given 324 of aspirin prior to arrival           Prior to Admission Medications   Prescriptions Last Dose Informant Patient Reported? Taking?    Brilinta 90 MG Not Taking at Unknown time  No No   Sig: TAKE 1 TABLET (90 MG TOTAL) BY MOUTH EVERY 12 (TWELVE) HOURS TAKE 2 TABLETS (180 MG)FOR THE FIRST DOSE, THEN 90 MG (1 TABLET) TWICE DAILY   Patient not taking: Reported on 7/28/2021   PHENobarbital 64 8 mg tablet 7/28/2021 at Unknown time  No Yes   Sig: TAKE 1 TABLET (64 8 MG TOTAL) BY MOUTH 2 (TWO) TIMES A DAY   aspirin 81 MG tablet 7/28/2021 at Unknown time Self No Yes   Sig: Take 1 tablet (81 mg total) by mouth daily   lisinopril (ZESTRIL) 5 mg tablet Not Taking at Unknown time  No No   Sig: TAKE 1 TABLET BY MOUTH EVERY DAY   Patient not taking: Reported on 7/28/2021   methylPREDNISolone 4 MG tablet therapy pack Not Taking at Unknown time  No No   Sig: Use as directed on package   Patient not taking: Reported on 7/28/2021   metoprolol succinate (TOPROL-XL) 50 mg 24 hr tablet Not Taking at Unknown time  No No   Sig: TAKE 1 TABLET BY MOUTH EVERY DAY   Patient not taking: Reported on 7/28/2021   nitroglycerin (NITROSTAT) 0 4 mg SL tablet  Self No No   Sig: Place 1 tablet (0 4 mg total) under the tongue every 5 (five) minutes as needed for chest pain   Patient not taking: Reported on 4/15/2021   potassium chloride (K-DUR,KLOR-CON) 20 mEq tablet   No No   Sig: Take 2 tablets (40 mEq total) by mouth once for 1 dose   Patient not taking: Reported on 4/15/2021   predniSONE 20 mg tablet Not Taking at Unknown time  No No   Sig: Take 2 tablets (40 mg total) by mouth daily   Patient not taking: Reported on 7/28/2021   simvastatin (ZOCOR) 20 mg tablet Not Taking at Unknown time  No No   Sig: TAKE 1 TABLET BY MOUTH DAILY AT BEDTIME   Patient not taking: Reported on 7/28/2021   triamcinolone (KENALOG) 0 025 % cream Not Taking at Unknown time Self No No   Sig: Apply topically 2 (two) times a day   Patient not taking: Reported on 4/15/2021      Facility-Administered Medications: None       Past Medical History:   Diagnosis Date    Cardiac disease     [facility]congest[facility] failure) ([facility] [address] )     Hernia of abdominal cavity     Myocardial infarction ([facility] [address] )     last assessed 7/31/14, past, Pt had MI s/p AGUSTIN placed in 2001, refuses to take any other medications for his cardiac disease, only will take seizure med  Understands possible complications from this decision    Seizure [facility])        Past Surgical History:   Procedure Laterality Date    ABDOMINAL AORTIC ANEURYSM REPAIR      for dialation or occulsion    CATARACT EXTRACTION         Family History   Problem Relation Age of Onset    Hypertension Father     Kidney disease Brother      I have reviewed and agree with the history as documented      E-Cigarette/Vaping    E-Cigarette Use Never User      E-Cigarette/Vaping Substances    Nicotine No     THC No     CBD No  Flavoring No     Other No     Unknown No      Social History     Tobacco Use    Smoking status: Former Smoker     Quit date: 2005     Years since quittin 1    Smokeless tobacco: Never Used   Vaping Use    Vaping Use: Never used   Substance Use Topics    Alcohol use: Not Currently    Drug use: Never        Review of Systems   Constitutional: Positive for diaphoresis  Negative for chills and fever  Respiratory: Positive for shortness of breath  Negative for cough and chest tightness  Cardiovascular: Positive for chest pain  Negative for palpitations and leg swelling  Gastrointestinal: Negative for abdominal pain, nausea and vomiting  All other systems reviewed and are negative  Physical Exam  ED Triage Vitals   Temperature Pulse Respirations Blood Pressure SpO2   21 1159 21 1149 21 1148 21 1148 21 1149   97 6 °F (36 4 °C) 80 20 118/58 95 %      Temp Source Heart Rate Source Patient Position - Orthostatic VS BP Location FiO2 (%)   21 1159 21 1149 21 1637 21 1637 --   Oral Monitor Lying Left arm       Pain Score       21 1638       No Pain             Orthostatic Vital Signs  Vitals:    21 1500 21 1900 21 2026 21 2300   BP: (!) 82/47 121/75 (!) 84/50 (!) 87/52   Pulse: 78 77 80 93   Patient Position - Orthostatic VS: Sitting Sitting  Lying       Physical Exam  Vitals and nursing note reviewed  Constitutional:       General: He is not in acute distress  HENT:      Head: Normocephalic and atraumatic  Eyes:      Extraocular Movements: Extraocular movements intact  Cardiovascular:      Rate and Rhythm: Normal rate and regular rhythm  Pulses:           Radial pulses are 2+ on the right side and 2+ on the left side  Heart sounds: Normal heart sounds  No murmur heard  Pulmonary:      Effort: Pulmonary effort is normal  No tachypnea or respiratory distress  Breath sounds: No stridor   No wheezing  Chest:      Chest wall: No crepitus  Abdominal:      General: Bowel sounds are normal       Palpations: Abdomen is soft  Tenderness: There is no abdominal tenderness  There is no guarding or rebound  Musculoskeletal:         General: Normal range of motion  Cervical back: Normal range of motion and neck supple  Right lower leg: No tenderness  No edema  Left lower leg: No tenderness  No edema  Skin:     General: Skin is warm and dry  Capillary Refill: Capillary refill takes less than 2 seconds  Neurological:      General: No focal deficit present  Mental Status: He is alert and oriented to person, place, and time     Psychiatric:         Mood and Affect: Mood normal          Behavior: Behavior normal          ED Medications  Medications   ticagrelor (BRILINTA) tablet 180 mg (180 mg Oral Not Given 7/28/21 1703)   sodium chloride 0 9 % infusion (0 mL/hr Intravenous Stopped 7/28/21 2046)   ondansetron (ZOFRAN) injection 4 mg (4 mg Intravenous Given 7/29/21 0014)   acetaminophen (TYLENOL) tablet 650 mg (650 mg Oral Given 7/29/21 2248)   PHENobarbital tablet 64 8 mg (64 8 mg Oral Given 7/29/21 1741)   diphenhydrAMINE (BENADRYL) injection 50 mg (has no administration in time range)   EPINEPHrine PF (ADRENALIN) 1 mg/mL injection 0 3 mg (0 mg Subcutaneous Hold 7/29/21 0336)   sodium chloride 0 9 % infusion (75 mL/hr Intravenous New Bag 7/29/21 0321)   aspirin (ECOTRIN LOW STRENGTH) EC tablet 81 mg (81 mg Oral Given 7/29/21 1133)   ticagrelor (BRILINTA) tablet 90 mg (90 mg Oral Given 7/29/21 2026)   atorvastatin (LIPITOR) tablet 80 mg (80 mg Oral Not Given 7/29/21 1742)   metoprolol tartrate (LOPRESSOR) partial tablet 12 5 mg (0 mg Oral Hold 7/29/21 2026)   heparin (porcine) injection 4,000 Units (4,000 Units Intravenous Given 7/28/21 1321)   diphenhydrAMINE (BENADRYL) injection (50 mg Intravenous Given 7/28/21 1504)   hydrocortisone sodium succinate (PF) (Solu-CORTEF) injection (100 mg  Given 7/28/21 1506)   famotidine (PEPCID) injection (20 mg Intravenous Given 7/28/21 1506)   ticagrelor (BRILINTA) tablet (180 mg Oral Given 7/28/21 1506)   midazolam (VERSED) injection (2 mg Intravenous Given 7/28/21 1508)   fentanyl citrate (PF) 100 MCG/2ML (50 mcg Intravenous Given 7/28/21 1508)   lidocaine (PF) (XYLOCAINE-MPF) 1 % injection (1 mL Infiltration Given 7/28/21 1512)   nitroGLYcerin (TRIDIL) 50 mg in 250 mL infusion (premix) (200 mcg Intra-arterial Given 7/28/21 1517)   verapamil (ISOPTIN) injection (2 5 mg  Given 7/28/21 1518)   heparin (porcine) injection (8,000 Units Intravenous Given 7/28/21 1528)   phenylephrine HCl (VAZCULEP) solution (100 mcg Intravenous Given 7/28/21 1539)   iohexol (OMNIPAQUE) 350 MG/ML injection (SINGLE-DOSE) (100 mL Intravenous Given 7/28/21 1543)   famotidine (PEPCID) injection 20 mg (20 mg Intravenous Given 7/29/21 0350)   hydrocortisone sodium succinate (PF) (Solu-CORTEF) injection 100 mg (100 mg Intravenous Given 7/29/21 0350)       Diagnostic Studies  Results Reviewed     Procedure Component Value Units Date/Time    POCT activated clotting time [[id number]]  (Abnormal) Collected: 07/28/21 1538    Lab Status: Final result Specimen: Venous Updated: 07/28/21 1545     Activated Clotting Time, i-STAT 225 sec      Specimen Type VENOUS    APTT six (6) hours after Heparin bolus/drip initiation or dosing change [[id number]]  (Normal) Collected: 07/28/21 1357    Lab Status: Final result Specimen: Blood from Arm, Left Updated: 07/28/21 1429     PTT 28 seconds     Protime-INR [[id number]]  (Normal) Collected: 07/28/21 1357    Lab Status: Final result Specimen: Blood from Arm, Left Updated: 07/28/21 1429     Protime 14 1 seconds      INR 1 09    Troponin I [[id number]]  (Abnormal) Collected: 07/28/21 1212    Lab Status: Final result Specimen: Blood from Arm, Left Updated: 07/28/21 1256     Troponin I 3 82 ng/mL     Comprehensive metabolic panel [[id number]] (Abnormal) Collected: 07/28/21 1212    Lab Status: Final result Specimen: Blood from Arm, Left Updated: 07/28/21 1254     Sodium 140 mmol/L      Potassium 3 7 mmol/L      Chloride 111 mmol/L      CO2 23 mmol/L      ANION GAP 6 mmol/L      BUN 10 mg/dL      Creatinine 0 86 mg/dL      Glucose 109 mg/dL      Calcium 8 3 mg/dL      Corrected Calcium 9 1 mg/dL      AST 23 U/L      ALT 12 U/L      Alkaline Phosphatase 131 U/L      Total Protein 7 1 g/dL      Albumin 3 0 g/dL      Total Bilirubin 0 42 mg/dL      eGFR 84 ml/min/1 73sq m     Narrative:      National Kid[employer] Disease Foundation guidelines for Chronic Kidney Disease (CKD):     Stage 1 with normal or high GFR (GFR > 90 mL/min/1 73 square meters)    Stage 2 Mild CKD (GFR = 60-89 mL/min/1 73 square meters)    Stage 3A Moderate CKD (GFR = 45-59 mL/min/1 73 square meters)    Stage 3B Moderate CKD (GFR = 30-44 mL/min/1 73 square meters)    Stage 4 Severe CKD (GFR = 15-29 mL/min/1 73 square meters)    Stage 5 End Stage CKD (GFR <15 mL/min/1 73 square meters)  Note: GFR calculation is accurate only with a steady state creatinine    CBC and differential [[id number]]  (Abnormal) Collected: 07/28/21 1212    Lab Status: Final result Specimen: Blood from Arm, Left Updated: 07/28/21 1236     WBC 3 75 Thousand/uL      RBC 3 85 Million/uL      Hemoglobin 12 1 g/dL      Hematocrit 37 0 %      MCV 96 fL      MCH 31 4 pg      MCHC 32 7 g/dL      RDW 19 3 %      MPV 11 4 fL      Platelets 728 Thousands/uL      nRBC 0 /100 WBCs      Neutrophils Relative 53 %      Immat GRANS % 1 %      Lymphocytes Relative 32 %      Monocytes Relative 8 %      Eosinophils Relative 5 %      Basophils Relative 1 %      Neutrophils Absolute 2 00 Thousands/µL      Immature Grans Absolute 0 04 Thousand/uL      Lymphocytes Absolute 1 19 Thousands/µL      Monocytes Absolute 0 31 Thousand/µL      Eosinophils Absolute 0 17 Thousand/µL      Basophils Absolute 0 04 Thousands/µL                  XR chest 1 view portable   Final Result by Kiarra Melendez MD (07/28 1387)      No acute cardiopulmonary disease  Workstation performed: [id number]               Procedures  ECG 12 Lead Documentation Only    Date/Time: 7/28/2021 12:19 PM  Performed by: Zenaida Morocho MD  Authorized by: Zenaida Morocho MD     Indications / Diagnosis:  Chest pain  ECG reviewed by me, the ED Provider: yes    Patient location:  ED  Previous ECG:     Previous ECG:  Compared to current    Similarity:  Changes noted  Interpretation:     Interpretation: abnormal    Rate:     ECG rate:  65    ECG rate assessment: normal    Rhythm:     Rhythm: sinus rhythm    Ectopy:     Ectopy: none    QRS:     QRS axis:  Normal  Conduction:     Conduction: abnormal      Abnormal conduction: 1st degree    ST segments:     ST segments:  Abnormal    Depression:  V2 and V3  T waves:     T waves: inverted      Inverted:  V2 and V3          ED Course  ED Course as of Jul 30 0149   Wed Jul 28, 2021   1206 Cardiology consulted      60 233 28 25 Cardiology to evaluate      1300 Troponin I(!): 3 82             HEART Risk Score      Most Recent Value   Heart Score Risk Calculator   History  2 Filed at: 07/28/2021 1304   ECG  2 Filed at: 07/28/2021 1304   Age  2 Filed at: 07/28/2021 1304   Risk Factors  2 Filed at: 07/28/2021 1304   Troponin  0 Filed at: 07/28/2021 1304   HEART Score  8 Filed at: 07/28/2021 1304                      SBIRT 20yo+      Most Recent Value   SBIRT (25 yo +)   In order to provide better care to our patients, we are screening all of our patients for alcohol and drug use  Would it be okay to ask you these screening questions?   No Filed at: 07/28/2021 1202                MDM  Number of Diagnoses or Management Options  Chest pain  Coronary artery disease involving native coronary artery of native heart without angina pectoris  Elevated troponin  Heart block  Diagnosis management comments: 13-year-old male with multiple prior heart attacks presents to the ED for evaluation of chest pain  In route to the hospital, patient was noted to be in complete heart block with associated hypotension per EMS however converted back to normal sinus rhythm by the time he arrived into the emergency department  EKG captured by EMS was concerning as a demonstrated ST segment depressions  Initial EKG here demonstrated normal sinus rhythm with 1st degree AV block as well as ST segment depressions and low voltage  These EKGs were immediately sent to cardiologist on-call and Cardiology was officially consulted  Cardiac workup was ordered and the patient was started on heparin  Labs demonstrated elevated troponin of 3 82  Upon cardiology evaluation, decision was made to take the patient to the cath lab        Disposition  Final diagnoses:   Coronary artery disease involving native coronary artery of native heart without angina pectoris   Elevated troponin   Chest pain   Heart block     Time reflects when diagnosis was documented in both MDM as applicable and the Disposition within this note     Time User Action Codes Description Comment    7/28/2021 12:13 PM Check, Mehul Mitts Add [I25 10] Coronary artery disease involving native coronary artery of native heart without angina pectoris     7/28/2021  1:03 PM Check, Mehul Mitts Add [R77 8] Elevated troponin     7/28/2021  1:03 PM Check, Mehul Mitts Add [R07 9] Chest pain     7/28/2021  1:03 PM Check, Mehul Mitts Modify [I25 10] Coronary artery disease involving native coronary artery of native heart without angina pectoris     7/28/2021  1:03 PM Check, Mehul Mitts Modify [R07 9] Chest pain     7/28/2021  1:03 PM Check, Mehul Mitts Add [I45 9] Heart block       ED Disposition     ED Disposition Condition Date/Time Comment    Send to Cath Lab  Wed Jul 28, 2021  2:47 PM       Follow-up Information    None         Current Discharge Medication List      CONTINUE these medications which have NOT CHANGED    Details   aspirin 81 MG tablet Take 1 tablet (81 mg total) by mouth daily  Qty: 90 tablet, Refills: 3    Associated Diagnoses: CAD (coronary artery disease)      PHENobarbital 64 8 mg tablet TAKE 1 TABLET (64 8 MG TOTAL) BY MOUTH 2 (TWO) TIMES A DAY  Qty: 60 tablet, Refills: 5    Comments: Not to exceed 3 additional fills before 08/31/2021  Associated Diagnoses: Seizure disorder ([facility])      Brilinta 90 MG TAKE 1 TABLET (90 MG TOTAL) BY MOUTH EVERY 12 (TWELVE) HOURS TAKE 2 TABLETS (180 MG)FOR THE FIRST DOSE, THEN 90 MG (1 TABLET) TWICE DAILY  Qty: 60 tablet, Refills: 5    Associated Diagnoses: Coronary artery disease involving native coronary artery of native heart without angina pectoris      lisinopril (ZESTRIL) 5 mg tablet TAKE 1 TABLET BY MOUTH EVERY DAY  Qty: 90 tablet, Refills: 0    Associated Diagnoses: Essential hypertension; Chronic combined systolic and diastolic CHF (congestive heart failure) ([facility])      methylPREDNISolone 4 MG tablet therapy pack Use as directed on package  Qty: 21 tablet, Refills: 0    Associated Diagnoses: Rash      metoprolol succinate (TOPROL-XL) 50 mg 24 hr tablet TAKE 1 TABLET BY MOUTH EVERY DAY  Qty: 90 tablet, Refills: 0    Associated Diagnoses: Coronary artery disease involving native coronary artery of native heart without angina pectoris; Chronic combined systolic and diastolic CHF (congestive heart failure) ([facility])      nitroglycerin (NITROSTAT) 0 4 mg SL tablet Place 1 tablet (0 4 mg total) under the tongue every 5 (five) minutes as needed for chest pain  Qty: 30 tablet, Refills: 1    Associated Diagnoses: Coronary artery disease involving native coronary artery of native heart without angina pectoris      potassium chloride (K-DUR,KLOR-CON) 20 mEq tablet Take 2 tablets (40 mEq total) by mouth once for 1 dose  Qty: 2 tablet, Refills: 0    Associated Diagnoses: Hypokalemia      predniSONE 20 mg tablet Take 2 tablets (40 mg total) by mouth daily  Qty: 4 tablet, Refills: 0    Associated Diagnoses: Dermatitis      simvastatin (ZOCOR) 20 mg tablet TAKE 1 TABLET BY MOUTH DAILY AT BEDTIME  Qty: 90 tablet, Refills: 0    Associated Diagnoses: Coronary artery disease involving native coronary artery of native heart without angina pectoris; Mixed hyperlipidemia      triamcinolone (KENALOG) 0 025 % cream Apply topically 2 (two) times a day  Qty: 30 g, Refills: 0    Associated Diagnoses: Allergic reaction to contrast media           No discharge procedures on file  PDMP Review       Value Time User    PDMP Reviewed  Yes 1/23/2020  1:58 PM Elliot Lopez MD           ED Provider  Attending physically available and evaluated Emerita Ponce  JORGE managed the patient along with the ED Attending      Electronically Signed by         Elder Osborne MD  07/30/21 7419

## 2021-07-28 NOTE — ED NOTES
Dr Mitchell Armstrong at bedside for patient evaluation upon patients arrival      Conchis Neal  07/28/21 115

## 2021-07-28 NOTE — ED ATTENDING ATTESTATION
7/28/2021  I, Jeremiah Barbour MD, saw and evaluated the patient  I have discussed the patient with the resident/non-physician practitioner and agree with the resident's/non-physician practitioner's findings, Plan of Care, and MDM as documented in the resident's/non-physician practitioner's note, except where noted  All available labs and Radiology studies were reviewed  I was present for key portions of any procedure(s) performed by the resident/non-physician practitioner and I was immediately available to provide assistance  At this point I agree with the current assessment done in the Emergency Department  I have conducted an independent evaluation of this patient a history and physical is as follows:   The patient has history of CAD ST segment MI April he had angioplasty and stent placement in the mid LAD he has a 50% circumflex lesion is 50% RCA lesion  Patient presents with episode of chest discomfort some mild shortness of breath paramedics arrived the patient went into complete heart black for a brief period of time he became very hypotensive sweaty and lightheaded he did not pass out     Patient then converted to a sinus rhythm with subtle ST elevations in 3 AF with ST depression V1 V2 V3  At the present time his chest pain is very minimal describes as 1 to 2/10 he no longer has shortness of breath he no longer is sweaty and no longer feels lightheaded    Exam vital signs are stable he is afebrile  HEENT normal neck no JVD lungs clear heart regular abdomen soft nontender extremities nontender  Impression  Unstable angina   transient complete heart block  Pacer pads placed on patient  Cardiac heparin will be initiated   patient received full-dose aspirin by the EMS  EKG here shows sinus rhythm with very subtle oblique straightening of the ST segment in 3 and F with minor ST depression in V1 V2 V3  Cardiology is being contacted   Cardiology has evaluated the patient they have decided take patient to the catheterization lab  ED Course         Critical Care Time  Procedures  The patient presented with a condition in which there was a high probability of imminent or life-threatening deterioration, and critical care services (excluding separately billable procedures and total teaching time ) total 30 minutes

## 2021-07-29 ENCOUNTER — APPOINTMENT (OUTPATIENT)
Dept: NON INVASIVE DIAGNOSTICS | Facility: HOSPITAL | Age: 76
DRG: 247 | End: 2021-07-29
Payer: MEDICARE

## 2021-07-29 LAB
ANION GAP SERPL CALCULATED.3IONS-SCNC: 8 MMOL/L (ref 4–13)
ANISOCYTOSIS BLD QL SMEAR: PRESENT
BASOPHILS # BLD MANUAL: 0 THOUSAND/UL (ref 0–0.1)
BASOPHILS NFR MAR MANUAL: 0 % (ref 0–1)
BUN SERPL-MCNC: 15 MG/DL (ref 5–25)
CALCIUM SERPL-MCNC: 7.7 MG/DL (ref 8.3–10.1)
CHLORIDE SERPL-SCNC: 110 MMOL/L (ref 100–108)
CO2 SERPL-SCNC: 20 MMOL/L (ref 21–32)
CREAT SERPL-MCNC: 1.17 MG/DL (ref 0.6–1.3)
EOSINOPHIL # BLD MANUAL: 0 THOUSAND/UL (ref 0–0.4)
EOSINOPHIL NFR BLD MANUAL: 0 % (ref 0–6)
ERYTHROCYTE [DISTWIDTH] IN BLOOD BY AUTOMATED COUNT: 19.5 % (ref 11.6–15.1)
GFR SERPL CREATININE-BSD FRML MDRD: 60 ML/MIN/1.73SQ M
GLUCOSE P FAST SERPL-MCNC: 157 MG/DL (ref 65–99)
GLUCOSE SERPL-MCNC: 157 MG/DL (ref 65–140)
HCT VFR BLD AUTO: 36.1 % (ref 36.5–49.3)
HGB BLD-MCNC: 11.7 G/DL (ref 12–17)
LYMPHOCYTES # BLD AUTO: 0.31 THOUSAND/UL (ref 0.6–4.47)
LYMPHOCYTES # BLD AUTO: 2 % (ref 14–44)
MCH RBC QN AUTO: 32.1 PG (ref 26.8–34.3)
MCHC RBC AUTO-ENTMCNC: 32.4 G/DL (ref 31.4–37.4)
MCV RBC AUTO: 99 FL (ref 82–98)
MONOCYTES # BLD AUTO: 0.31 THOUSAND/UL (ref 0–1.22)
MONOCYTES NFR BLD: 2 % (ref 4–12)
NEUTROPHILS # BLD MANUAL: 15.07 THOUSAND/UL (ref 1.85–7.62)
NEUTS BAND NFR BLD MANUAL: 6 % (ref 0–8)
NEUTS SEG NFR BLD AUTO: 90 % (ref 43–75)
NRBC BLD AUTO-RTO: 0 /100 WBCS
PLATELET # BLD AUTO: 112 THOUSANDS/UL (ref 149–390)
PLATELET BLD QL SMEAR: ABNORMAL
PMV BLD AUTO: 11.9 FL (ref 8.9–12.7)
POIKILOCYTOSIS BLD QL SMEAR: PRESENT
POTASSIUM SERPL-SCNC: 4.2 MMOL/L (ref 3.5–5.3)
RBC # BLD AUTO: 3.65 MILLION/UL (ref 3.88–5.62)
RBC MORPH BLD: PRESENT
SODIUM SERPL-SCNC: 138 MMOL/L (ref 136–145)
WBC # BLD AUTO: 15.7 THOUSAND/UL (ref 4.31–10.16)

## 2021-07-29 PROCEDURE — 93325 DOPPLER ECHO COLOR FLOW MAPG: CPT | Performed by: INTERNAL MEDICINE

## 2021-07-29 PROCEDURE — 80048 BASIC METABOLIC PNL TOTAL CA: CPT | Performed by: INTERNAL MEDICINE

## 2021-07-29 PROCEDURE — 93308 TTE F-UP OR LMTD: CPT

## 2021-07-29 PROCEDURE — 93321 DOPPLER ECHO F-UP/LMTD STD: CPT | Performed by: INTERNAL MEDICINE

## 2021-07-29 PROCEDURE — 99213 OFFICE O/P EST LOW 20 MIN: CPT | Performed by: INTERNAL MEDICINE

## 2021-07-29 PROCEDURE — 93308 TTE F-UP OR LMTD: CPT | Performed by: INTERNAL MEDICINE

## 2021-07-29 PROCEDURE — 85027 COMPLETE CBC AUTOMATED: CPT | Performed by: INTERNAL MEDICINE

## 2021-07-29 PROCEDURE — 85007 BL SMEAR W/DIFF WBC COUNT: CPT | Performed by: INTERNAL MEDICINE

## 2021-07-29 RX ORDER — DIPHENHYDRAMINE HYDROCHLORIDE 50 MG/ML
50 INJECTION INTRAMUSCULAR; INTRAVENOUS EVERY 6 HOURS PRN
Status: DISCONTINUED | OUTPATIENT
Start: 2021-07-29 | End: 2021-08-02 | Stop reason: HOSPADM

## 2021-07-29 RX ORDER — EPINEPHRINE 1 MG/ML
0.3 INJECTION, SOLUTION, CONCENTRATE INTRAVENOUS ONCE AS NEEDED
Status: DISCONTINUED | OUTPATIENT
Start: 2021-07-29 | End: 2021-08-02 | Stop reason: HOSPADM

## 2021-07-29 RX ORDER — SODIUM CHLORIDE 9 MG/ML
100 INJECTION, SOLUTION INTRAVENOUS CONTINUOUS
Status: DISCONTINUED | OUTPATIENT
Start: 2021-07-29 | End: 2021-07-29

## 2021-07-29 RX ORDER — ATORVASTATIN CALCIUM 80 MG/1
80 TABLET, FILM COATED ORAL
Status: DISCONTINUED | OUTPATIENT
Start: 2021-07-29 | End: 2021-08-02 | Stop reason: HOSPADM

## 2021-07-29 RX ORDER — SODIUM CHLORIDE 9 MG/ML
75 INJECTION, SOLUTION INTRAVENOUS CONTINUOUS
Status: DISPENSED | OUTPATIENT
Start: 2021-07-29 | End: 2021-07-29

## 2021-07-29 RX ORDER — ASPIRIN 81 MG/1
81 TABLET ORAL DAILY
Status: DISCONTINUED | OUTPATIENT
Start: 2021-07-29 | End: 2021-08-02 | Stop reason: HOSPADM

## 2021-07-29 RX ADMIN — ASPIRIN 81 MG: 81 TABLET, COATED ORAL at 11:33

## 2021-07-29 RX ADMIN — FAMOTIDINE 20 MG: 10 INJECTION INTRAVENOUS at 03:50

## 2021-07-29 RX ADMIN — DIPHENHYDRAMINE HYDROCHLORIDE 50 MG: 50 INJECTION, SOLUTION INTRAMUSCULAR; INTRAVENOUS at 03:00

## 2021-07-29 RX ADMIN — SODIUM CHLORIDE 75 ML/HR: 0.9 INJECTION, SOLUTION INTRAVENOUS at 03:21

## 2021-07-29 RX ADMIN — PHENOBARBITAL 64.8 MG: 32.4 TABLET ORAL at 17:41

## 2021-07-29 RX ADMIN — TICAGRELOR 90 MG: 90 TABLET ORAL at 11:33

## 2021-07-29 RX ADMIN — ACETAMINOPHEN 650 MG: 325 TABLET, FILM COATED ORAL at 00:14

## 2021-07-29 RX ADMIN — SODIUM CHLORIDE 75 ML/HR: 0.9 INJECTION, SOLUTION INTRAVENOUS at 03:19

## 2021-07-29 RX ADMIN — TICAGRELOR 90 MG: 90 TABLET ORAL at 20:26

## 2021-07-29 RX ADMIN — HYDROCORTISONE SODIUM SUCCINATE 100 MG: 100 INJECTION, POWDER, FOR SOLUTION INTRAMUSCULAR; INTRAVENOUS at 03:50

## 2021-07-29 RX ADMIN — ACETAMINOPHEN 650 MG: 325 TABLET, FILM COATED ORAL at 22:48

## 2021-07-29 RX ADMIN — ONDANSETRON 4 MG: 2 INJECTION INTRAMUSCULAR; INTRAVENOUS at 00:14

## 2021-07-29 RX ADMIN — PHENOBARBITAL 64.8 MG: 32.4 TABLET ORAL at 09:54

## 2021-07-29 RX ADMIN — ACETAMINOPHEN 650 MG: 325 TABLET, FILM COATED ORAL at 05:50

## 2021-07-29 NOTE — PROGRESS NOTES
Cardiology Team 2 Progress Note - Thierry Hernandez 67 y o  male MRN: [id number]    Unit/Bed#: [facility] 520-01 Encounter: [id number]          Subjective:   Patient seen and examined  Patient underwent a catheterization yesterday with AGUSTIN to the mid RCA  Overnight, patient was febrile Tmax 102 3F, hypotensive in the 80s/40s, most likely allergic reaction to Cath dye  Pt received Solucortef, 75ml/hr NS, and later 50mg IV diphenhydramine  Pt also reports vomiting overnight  Felling much better this AM, no complaints, though with soft BPs in the 80s still  Denies lightheadedness, dizziness, syncope, headache, vision changes, diaphoresis, chest pain, palpitations, shortness of breath, PND, orthopnea, abdominal pain or lower extremity edema  Hospital Course:   Thierry Hernandez is a 67y o  year old male with a history of  cardiomyopathy, CAD, three myocardial infarctions, (2010 - AGUSTIN to the mid LAD; 2017 - AGUSTIN to RCA;  04/2021 - AGUSTIN to mid LAD at site of previous stent)  He presented to the ED 7/28/2021 with typical chest pain similar to his previous MI, with SOB and diaphoresis, troponins 3 82, ST T wave abnormalities on EKG, and noncompliance with his antiplatelet therapies as he believes he has a clopidogrel allergy and that ticegralor interacts with is phenobarbatol  He was taken to the cath for risk stratification, found to have a 99% stenosis of the mid RCA with AGUSTIN placement x1  Vitals: Blood pressure (!) 88/44, pulse 67, temperature (!) 97 2 °F (36 2 °C), temperature source Oral, resp  rate (!) 24, weight 99 8 kg (220 lb), SpO2 100 %  , Body mass index is 29 84 kg/m² ,   Orthostatic Blood Pressures      Most Recent Value   Blood Pressure  (!) 88/44 filed at 07/29/2021 1100   Patient Position - Orthostatic VS  Sitting filed at 07/29/2021 1100            Intake/Output Summary (Last 24 hours) at 7/29/2021 1235  Last data filed at 7/29/2021 0554  Gross per 24 hour   Intake 566 25 ml   Output 325 ml   Net 241 25 ml Physical Exam:    GEN: Bev Neely appears well, alert and oriented x 3, pleasant and cooperative   HEENT:  Normocephalic, atraumatic, anicteric, moist mucous membranes  NECK: No JVD or carotid bruits   HEART: regular rate and rhythm, normal S1 and S2, no murmurs, clicks, gallops or rubs   LUNGS: Clear to auscultation bilaterally; no wheezes, rales, or rhonchi; respiration nonlabored   ABDOMEN:  Normoactive bowel sounds, soft, no tenderness, no distention  EXTREMITIES: peripheral pulses palpable; no edema  NEURO: no gross focal findings; cranial nerves grossly intact   SKIN:  Dry, intact, warm to touch      Current Facility-Administered Medications:     acetaminophen (TYLENOL) tablet 650 mg, 650 mg, Oral, Q4H PRN, Michelle Daugherty DO, 650 mg at 07/29/21 0550    aspirin (ECOTRIN LOW STRENGTH) EC tablet 81 mg, 81 mg, Oral, Daily, Lashawn Cain MD, 81 mg at 07/29/21 1133    atorvastatin (LIPITOR) tablet 80 mg, 80 mg, Oral, Daily With Dinner, Lashawn Cain MD    diphenhydrAMINE (BENADRYL) injection 50 mg, 50 mg, Intravenous, Q6H PRN, Christiano Harmon DO    EPINEPHrine PF (ADRENALIN) 1 mg/mL injection 0 3 mg, 0 3 mg, Subcutaneous, Once PRN, Christine Puente MD    metoprolol tartrate (LOPRESSOR) partial tablet 12 5 mg, 12 5 mg, Oral, Q12H [address], Lashawn Cain MD    ondansetron [facility]) injection 4 mg, 4 mg, Intravenous, Q6H PRN, Michelle Daugherty DO, 4 mg at 07/29/21 0014    PHENobarbital tablet 64 8 mg, 64 8 mg, Oral, BID, Christine Puente MD, 64 8 mg at 07/29/21 0954    ticagrelor (BRILINTA) tablet 180 mg, 180 mg, Oral, Once, Lashawn Cain MD    ticagrelor [facility]) tablet 90 mg, 90 mg, Oral, Q12H [address], Lashawn Cain MD, 90 mg at 07/29/21 1133    Labs & Results:  Results from last 7 days   Lab Units 07/28/21  1212   TROPONIN I ng/mL 3 82*         Results from last 7 days   Lab Units 07/29/21  0546 07/28/21  1212   POTASSIUM mmol/L 4 2 3 7   CO2 mmol/L 20* 23   CHLORIDE mmol/L 110* 111*   BUN mg/dL 15 10   CREATININE mg/dL 1 17 0 86     Results from last 7 days   Lab Units 21  0546 21  1212   HEMOGLOBIN g/dL 11 7* 12 1   HEMATOCRIT % 36 1* 37 0   PLATELETS Thousands/uL 112* 118*           Cardiac Cath 2021:    [address] [facility], [address] [place] [address]  [phone]        Invasive Cardiovascular Lab Complete Report     Patient: Kraen Powell  MR number: [id number]  Account number: [de-identified]  Study date: 2021  Gender: Male  : 1945  Height: 72 in  Weight: 220 lb  BSA: 2 22 mï¾²     Allergies: IODINATED DIAGNOSTIC AGENTS, CLOPIDOGREL     Diagnostic Cardiologist:  Layton Valiente DO  Interventional Cardiologist:  Layton Valiente DO  Primary Physician:  Joey Batres     SUMMARY     CORONARY CIRCULATION:  Ostial left main: There was a 30 % stenosis, no pressure dampening with 6F guide  Mid LAD: There was a 10 % stenosis at the site of a prior stent  Proximal circumflex: There was a diffuse 40 % stenosis  2nd obtuse marginal: There was a 60 % stenosis at the ostium of the vessel segment  Mid RCA: There was a 99 % stenosis  The lesion was associated with a moderate filling defect consistent with thrombus and HELIO 1 flow     1ST LESION INTERVENTIONS:  A successful thrombectomy with stent and stent procedure was performed on the 99 % lesion in the mid RCA  Following intervention there was a 0 % residual stenosis  A Xience Jennifer Rx 3 5 x 28mm drug-eluting stent was placed across the lesion and deployed at a maximum inflation pressure of 16 antony      INDICATIONS:  --  Possible CAD: myocardial infarction without ST elevation (NSTEMI), acute nstemi, urgent cath lab     PROCEDURES PERFORMED     --  Left heart catheterization without ventriculogram   --  Left coronary angiography  --  Right coronary angiography  --  Acute Myocardial Infarct  --  Mod Sedation Same Physician Initial 15min    --  Mod Sedation Same Physician Add 15min  --  Mod Sedation Same Physician Add 15min  --  Coronary Catheterization (w/ LHC)  --  AMI PCI (AGUSTIN, PTCRA, PTCA) Single  --  Intervention on mid RCA: thrombectomy, stent, stent      PROCEDURE: The risks and alternatives of the procedures and conscious sedation were explained to the patient and informed consent was obtained  The patient was brought to the cath lab and placed on the table  The planned puncture sites  were prepped and draped in the usual sterile fashion      --  Right radial artery access  After performing an Vineet's test to verify adequate ulnar artery supply to the hand, the radial site was prepped  The puncture site was infiltrated with local anesthetic  The vessel was accessed using the  modified Seldinger technique, a wire was advanced into the vessel, and a sheath was advanced over the wire into the vessel      --  Left heart catheterization without ventriculogram  A catheter was advanced over a guidewire into the ascending aorta  After recording ascending aortic pressure, the catheter was advanced across the aortic valve and left ventricular  pressure was recorded  The catheter was pulled back across the aortic valve and into the ascending aorta and pullback pressures were obtained      --  Left coronary artery angiography  A catheter was advanced over a guidewire into the aorta and positioned in the left coronary artery ostium under fluoroscopic guidance  Angiography was performed      --  Right coronary artery angiography  A catheter was advanced over a guidewire into the aorta and positioned in the right coronary artery ostium under fluoroscopic guidance   Angiography was performed      --  Acute Myocardial Infarct      --  Mod Sedation Same Physician Initial 15min      --  Mod Sedation Same Physician Add 15min      --  Mod Sedation Same Physician Add 15min      --  Coronary Catheterization (w/ LHC)      LESION INTERVENTION: A successful thrombectomy with stent and stent procedure was performed on the 99 % lesion in the mid RCA  Following intervention there was a 0 % residual stenosis  There was HELIO 1 flow before the procedure and HELIO 3  flow after the procedure  There was no dissection      --  Vessel setup was performed  A 6Fr  Launcher JR 4 0 100cm guiding catheter was used to cannulate the vessel      --  Vessel setup was performed  A Runthrough NS 180cm wire was used to cross the lesion      --  Mechanical ( 6Fr  [place] AP) thrombectomy was performed, with max duration 25 sec, max volume out 10 ml, and 1 attempt(s)      --  A Xience Jennifer Rx 3 5 x 28mm drug-eluting stent was placed across the lesion and deployed at a maximum inflation pressure of 16 antony      INTERVENTIONS:  --  AMI PCI (AGUSTIN, PTCRA, PTCA) Single      PROCEDURE COMPLETION: The patient tolerated the procedure well and was discharged from the cath lab  TIMING: Test started at 15:07  Test concluded at 15:44  HEMOSTASIS: The sheath was removed  The site was compressed with a Hemoband  device  Hemostasis was obtained  MEDICATIONS GIVEN: Benadryl (50mg/ml), 50 mg, IV, at 15:02  Solucortef (100mg/2ml), 100 mg, IV, at 15:02  Pepcid, 20 mg, IV, at 15:02  Ticagrelor, 180 mg, PO, at 15:03  Versed (2mg/2ml), 2 mg, IV, at 15:08  Fentanyl (1OOmcg/2 ml), 50 mcg, IV, at 15:08  1% Lidocaine, 1 ml, subcutaneously, at 15:12  Nitroglycerin (200mcg/ml), 200 mcg, at 15:13  Verapamil (5mg/2ml), 2 5 mg, IV, at 15:13  Heparin 1000 units/ml, 8,000 units, IV, at 15:27  Neosynephrine, 100 mcg, IV, at 15:38  CONTRAST GIVEN: 100 ml Omnipaque (350mg I /ml)  RADIATION EXPOSURE: Fluoroscopy time: 9 38 min      HEMODYNAMICS: Hemodynamic assessment demonstrated normal LVEDP      CORONARY VESSELS:   --  The coronary circulation is right dominant  --  Ostial left main: There was a 30 % stenosis, no pressure dampening with 6F guide  --  Mid LAD: There was a 10 % stenosis at the site of a prior stent    --  2nd diagonal: The vessel was very small sized atherosclerotic ostium  --  Proximal circumflex: There was a diffuse 40 % stenosis  --  2nd obtuse marginal: There was a 60 % stenosis at the ostium of the vessel segment  --  Proximal RCA: There was a 10 % stenosis at the site of a prior stent  --  Mid RCA: There was a 99 % stenosis  The lesion was associated with a moderate filling defect consistent with thrombus and HELIO 1 flow     IMPRESSIONS:  PCI mid RCA with AGUSTIN  Patent LAD stents  Patient noncompliant with dapt and stopped ticagrelor after lad stent in April of this year      RECOMMENDATIONS  stressed importance of compliance to avoid mi/stent thrombosis/ death with dapt and stopping antiplatelets, recommend dapt x 1 year minimum, gdmt cad      DISPOSITION:  The patient left the catheterization laboratory in stable condition      Prepared and signed by  Alisha Luis DO  Signed 2021 16:07:38     Study diagram     Angiographic findings  Native coronary lesions:  ï¾·Ostial left main: Lesion 1: 30 % stenosis  ï¾·Mid LAD: Lesion 1: 10 % stenosis, site of prior stent  ï¾·Proximal circumflex: Lesion 1: diffuse, 40 % stenosis  ï¾·OM2: Lesion 1: 60 % stenosis  ï¾·Proximal RCA: Lesion 1: 10 % stenosis, site of prior stent  ï¾·Mid RCA: Lesion 1: 99 % stenosis  Intervention results  Native coronary lesions:  ï¾·Successful thrombectomy, stent, and stent of the 99 % stenosis in mid RCA  0 % residual stenosis   Stent: Delle Sample Rx 3 5 x 28mm drug-eluting      Hemodynamic tables     Pressures:  Baseline  Pressures:  - HR: 79  Pressures:  - Rhythm:  Pressures:  -- Aortic Pressure (S/D/M): 86/52/63  Pressures:  -- Left Ventricle (s/edp): 82/16/--     Outputs:  Baseline  Outputs:  -- CALCULATIONS: [age]  Outputs:  -- CALCULATIONS: Body Surface Area: 2 22  Outputs:  -- CALCULATIONS: Height in cm: 183 00  Outputs:  -- CALCULATIONS: Sex: Male  Outputs:  -- CALCULATIONS: Weight in k 00      VTE Prophylaxis: Sequential compression device (Venodyne)        Assessment:  Active Problems:    * No active hospital problems  *        73yo male PMH of CAD s/p 3xDES in the mid LAD x2 and RCA x1, here with typical chest pain and troponins, ST T wave abnormalities on EKG, taken to catheterization and found to have inferior Type 1 STEMI, s/p AGUSTIN in the mid RCA      Plan:  Chest Pain  -Patient presented with typical chest pain similar to his prior Mis, with elevated troponins and ST T wave abnormalities on EKG, cathed and found to have posterior Type 1 STEMI  - Patient currently on ASA, ticegralor, high dose statin  - low dose beta blocker given his low BP today  - Creatinine increase overnight of 0 31, holding lisinopril, to be continued once JUAN improves  Hypotension  -Pt experienced a likely allergic reaction to the catheterization dye over night, improved with hydrocortisone, diphenhydramine, and NS infusion  -BP still labile in the 80s, will continue NS, and low dose metoprolol, with uptitration as tolerated  - holding lisinopril currently  Case discussed and reviewed with Dr Chalo Grace who agrees with my assessment and plan  Thank you for involving us in the care of your patient  Dequan Barkley MD  Internal Medicine PGY1      Epic/ Allscripts/Care Everywhere records reviewed: Yes    ** Please Note: Fluency DirectDictation voice to text software may have been used in the creation of this document   **

## 2021-07-29 NOTE — CASE MANAGEMENT
Not a bundle  Not a readmission  Met with pt & explained CM role  Pt lives with brother in [address] fl[address] apt with 3 ramiro  Reports [employer], retired & does not drive  His brother transports  DME has rw available  H/o SL VNA  No h/o rhb  Denies any MH,D&A tx  Uses [facility] [address]  Emergency contact, brother [facility] [phone]  Brother will transport home  CM reviewed d/c planning process including the following: identifying help at home, patient preference for d/c planning needs, Discharge Lounge, [facility] Meds to Bed program, availability of treatment team to discuss questions or concerns patient and/or family may have regarding understanding medications and recognizing signs and symptoms once discharged  CM also encouraged patient to follow up with all recommended appointments after discharge  Patient advised of importance for patient and family to participate in managing patients medical well being

## 2021-07-29 NOTE — PROGRESS NOTES
Attempted to visit patient and introduced self  Built a relationship of care and support  Patient has visitor and he thanked for stopping by his room       07/29/21 1000   Clinical Encounter Type   Visited With Patient and family together   Routine Visit Introduction

## 2021-07-29 NOTE — PROGRESS NOTES
0001: Pt was noted to have a temp of 101 6 oral, pt was given tylenol and ice packs to help bring down temperature  0244: pt's temp remained elevated with an oral of 102 3, and a rectal of 102 8  As well as a BP of 80/52  Spoke to on call cardiology Demetra Augusta who came and evaluated pt  Possible allergic reaction to dye used in cath earlier  Ordered Pepcid, Solu-cortef and 0 9% NS @ 75mL/hr  No new orders following

## 2021-07-30 LAB
ANION GAP SERPL CALCULATED.3IONS-SCNC: 8 MMOL/L (ref 4–13)
ANION GAP SERPL CALCULATED.3IONS-SCNC: 9 MMOL/L (ref 4–13)
BUN SERPL-MCNC: 25 MG/DL (ref 5–25)
BUN SERPL-MCNC: 27 MG/DL (ref 5–25)
CALCIUM SERPL-MCNC: 8.3 MG/DL (ref 8.3–10.1)
CALCIUM SERPL-MCNC: 8.3 MG/DL (ref 8.3–10.1)
CHLORIDE SERPL-SCNC: 104 MMOL/L (ref 100–108)
CHLORIDE SERPL-SCNC: 108 MMOL/L (ref 100–108)
CO2 SERPL-SCNC: 18 MMOL/L (ref 21–32)
CO2 SERPL-SCNC: 24 MMOL/L (ref 21–32)
CREAT SERPL-MCNC: 1 MG/DL (ref 0.6–1.3)
CREAT SERPL-MCNC: 1.37 MG/DL (ref 0.6–1.3)
GFR SERPL CREATININE-BSD FRML MDRD: 50 ML/MIN/1.73SQ M
GFR SERPL CREATININE-BSD FRML MDRD: 73 ML/MIN/1.73SQ M
GLUCOSE P FAST SERPL-MCNC: 126 MG/DL (ref 65–99)
GLUCOSE SERPL-MCNC: 126 MG/DL (ref 65–140)
GLUCOSE SERPL-MCNC: 139 MG/DL (ref 65–140)
POTASSIUM SERPL-SCNC: 3.9 MMOL/L (ref 3.5–5.3)
POTASSIUM SERPL-SCNC: 4 MMOL/L (ref 3.5–5.3)
SODIUM SERPL-SCNC: 135 MMOL/L (ref 136–145)
SODIUM SERPL-SCNC: 136 MMOL/L (ref 136–145)

## 2021-07-30 PROCEDURE — 80048 BASIC METABOLIC PNL TOTAL CA: CPT

## 2021-07-30 PROCEDURE — 99232 SBSQ HOSP IP/OBS MODERATE 35: CPT | Performed by: INTERNAL MEDICINE

## 2021-07-30 RX ORDER — METHYLPREDNISOLONE SODIUM SUCCINATE 40 MG/ML
40 INJECTION, POWDER, LYOPHILIZED, FOR SOLUTION INTRAMUSCULAR; INTRAVENOUS ONCE
Status: COMPLETED | OUTPATIENT
Start: 2021-07-30 | End: 2021-07-30

## 2021-07-30 RX ORDER — SODIUM CHLORIDE 9 MG/ML
75 INJECTION, SOLUTION INTRAVENOUS CONTINUOUS
Status: DISPENSED | OUTPATIENT
Start: 2021-07-30 | End: 2021-07-31

## 2021-07-30 RX ORDER — SODIUM CHLORIDE 9 MG/ML
100 INJECTION, SOLUTION INTRAVENOUS CONTINUOUS
Status: DISCONTINUED | OUTPATIENT
Start: 2021-07-30 | End: 2021-07-30

## 2021-07-30 RX ORDER — DIPHENHYDRAMINE HYDROCHLORIDE 50 MG/ML
25 INJECTION INTRAMUSCULAR; INTRAVENOUS ONCE
Status: COMPLETED | OUTPATIENT
Start: 2021-07-30 | End: 2021-07-30

## 2021-07-30 RX ORDER — PREDNISONE 20 MG/1
40 TABLET ORAL DAILY
Status: DISCONTINUED | OUTPATIENT
Start: 2021-07-30 | End: 2021-07-31

## 2021-07-30 RX ADMIN — TICAGRELOR 90 MG: 90 TABLET ORAL at 09:42

## 2021-07-30 RX ADMIN — ATORVASTATIN CALCIUM 80 MG: 80 TABLET, FILM COATED ORAL at 17:11

## 2021-07-30 RX ADMIN — SODIUM CHLORIDE 75 ML/HR: 0.9 INJECTION, SOLUTION INTRAVENOUS at 11:19

## 2021-07-30 RX ADMIN — PHENOBARBITAL 64.8 MG: 32.4 TABLET ORAL at 17:15

## 2021-07-30 RX ADMIN — METHYLPREDNISOLONE SODIUM SUCCINATE 40 MG: 40 INJECTION, POWDER, FOR SOLUTION INTRAMUSCULAR; INTRAVENOUS at 09:40

## 2021-07-30 RX ADMIN — Medication 12.5 MG: at 10:51

## 2021-07-30 RX ADMIN — ASPIRIN 81 MG: 81 TABLET, COATED ORAL at 09:42

## 2021-07-30 RX ADMIN — PHENOBARBITAL 64.8 MG: 32.4 TABLET ORAL at 09:57

## 2021-07-30 RX ADMIN — TICAGRELOR 90 MG: 90 TABLET ORAL at 20:07

## 2021-07-30 RX ADMIN — Medication 12.5 MG: at 20:07

## 2021-07-30 RX ADMIN — PREDNISONE 40 MG: 20 TABLET ORAL at 17:11

## 2021-07-30 RX ADMIN — SODIUM CHLORIDE 100 ML/HR: 0.9 INJECTION, SOLUTION INTRAVENOUS at 10:41

## 2021-07-30 RX ADMIN — FAMOTIDINE 20 MG: 10 INJECTION INTRAVENOUS at 10:45

## 2021-07-30 RX ADMIN — DIPHENHYDRAMINE HYDROCHLORIDE 25 MG: 50 INJECTION, SOLUTION INTRAMUSCULAR; INTRAVENOUS at 09:41

## 2021-07-30 RX ADMIN — DIPHENHYDRAMINE HYDROCHLORIDE 50 MG: 50 INJECTION, SOLUTION INTRAMUSCULAR; INTRAVENOUS at 20:07

## 2021-07-30 NOTE — PROGRESS NOTES
Cardiology Team 2 Progress Note - Anuja Piña 67 y o  male MRN: [id number]    Unit/Bed#: [facility] 520-01 Encounter: [id number]          Subjective:   Patient seen and examined  Pt reports feeling fever and chills overnight  It was thought to be due to a hot room  He did have a Tmax of 100 7F overnight, BP still in the 80s/40s  This morning, the patient felt better but did report a red rash over his body  He denies pruritus or difficulty breathing  He states he ate a hamburger yesterday afternoon, but nothing he hasn't eaten before  He states that he had a similar reaction during his last catheterization 04/2021  Patient received his ASA and ticegralor, metoprolol was held due to hypotension, lisinopril being held currently due to JUAN, patient refused his atorvastatin  Denies lightheadedness, dizziness, syncope, headache, vision changes, diaphoresis, chest pain, palpitations, shortness of breath, PND, orthopnea, nausea, vomiting, abdominal pain or lower extremity edema  Hospital Course:   Anuja Piña is a 67y o  year old male with a history of cardiomyopathy, CAD, three myocardial infarctions, (2010 - AGUSTIN to the mid LAD; 2017 - AGUSTIN to RCA;  04/2021 - AGUSTIN to mid LAD at site of previous stent)  Patient came to the ED on 7/28 with with typical chest pain, elevated troponin at 3 82, EKG showed sinus 1st degree AV block, with ST and T wave abnormalities suggestive of STEMI  The patient also stated that he was not taking his dual antiplatelet therapy for the last couple months  Patient was taken to the cath lab and was found to have an 99% stenosis of the mid RCA  He is currently status post 1 drug-eluting stent to the mid RCA  The patient has a documented allergy to the dye used in catheterization, and was given hydrocortisone 100 mg IV during the catheterization    During the night after the stent placement the patient, the patient experienced an elevated temperature of 102 3° and hypotension in the 80s over 45s, he was treated for an iatrogenic allergic reaction to the catheterization dye, then given hydrocortisone, diphenhydramine, and put on normal saline infusion  He improved the following day, but did have an JUAN on morning labs  Last night, the patient again experience fevers and chills with a temperature 100 7° and blood pressures again in the 80s over 50s  She also had a confluent red blanching rash on his torso, neck, and upper and lower extremities sparing his face  He was hot to touch  He was given methylprednisolone and diphenhydramine and PPI, and once again placed on a continuous normal saline infusion  Vitals: Blood pressure 92/51, pulse 82, temperature 98 1 °F (36 7 °C), resp  rate 20, weight 99 8 kg (220 lb), SpO2 95 %  , Body mass index is 29 84 kg/m² ,   Orthostatic Blood Pressures      Most Recent Value   Blood Pressure  92/51 filed at 07/30/2021 3649   Patient Position - Orthostatic VS  Lying filed at 07/30/2021 0700            Intake/Output Summary (Last 24 hours) at 7/30/2021 0953  Last data filed at 7/29/2021 1700  Gross per 24 hour   Intake 600 ml   Output 200 ml   Net 400 ml         Physical Exam:    GEN: Mehul Hanna appears visibly red, but is alert and oriented x 3, pleasant and cooperative   HEENT:  Normocephalic, atraumatic, anicteric, moist mucous membranes  NECK: No JVD or carotid bruits   HEART: regular rate and rhythm, normal S1 and S2, no murmurs, clicks, gallops or rubs   LUNGS: Clear to auscultation bilaterally; no wheezes, rales, or rhonchi; respiration nonlabored   ABDOMEN:  Normoactive bowel sounds, soft, no tenderness, no distention  EXTREMITIES: peripheral pulses palpable; no edema  NEURO: no gross focal findings; cranial nerves grossly intact   SKIN:  +Erythematous blanching rash of the extremities and torso but sparing the head, hot to touch, lacking any borders or raised lesions      Current Facility-Administered Medications:     acetaminophen (TYLENOL) tablet 650 mg, 650 mg, Oral, Q4H PRN, Deshaun Daugherty, DO, 650 mg at 07/29/21 2248    aspirin (ECOTRIN LOW STRENGTH) EC tablet 81 mg, 81 mg, Oral, Daily, Joshua Burr MD, 81 mg at 07/30/21 0942    atorvastatin (LIPITOR) tablet 80 mg, 80 mg, Oral, Daily With Dinner, Joshua Burr MD    diphenhydrAMINE (BENADRYL) injection 50 mg, 50 mg, Intravenous, Q6H PRN, Paralee Boom, DO    EPINEPHrine PF (ADRENALIN) 1 mg/mL injection 0 3 mg, 0 3 mg, Subcutaneous, Once PRN, Adilia Moy MD    famotidine (PEPCID) injection 20 mg, 20 mg, Intravenous, Once, Joshua Burr MD    metoprolol tartrate (LOPRESSOR) partial tablet 12 5 mg, 12 5 mg, Oral, Q12H [address], Joshua Burr MD    ondansetron [facility]) injection 4 mg, 4 mg, Intravenous, Q6H PRN, Paralee Boom, DO, 4 mg at 07/29/21 0014    PHENobarbital tablet 64 8 mg, 64 8 mg, Oral, BID, Adilia Moy MD, 64 8 mg at 07/29/21 1741    sodium chloride 0 9 % infusion, 100 mL/hr, Intravenous, Continuous, Adilia Barkley MD    [facility]) tablet 180 mg, 180 mg, Oral, Once, Joshua Burr MD    [facility]) tablet 90 mg, 90 mg, Oral, Q12H [address], Joshua Burr MD, 90 mg at 07/30/21 0942    Labs & Results:  Results from last 7 days   Lab Units 07/28/21  1212   TROPONIN I ng/mL 3 82*         Results from last 7 days   Lab Units 07/30/21  0815 07/29/21  0546 07/28/21  1212   POTASSIUM mmol/L 4 0 4 2 3 7   CO2 mmol/L 18* 20* 23   CHLORIDE mmol/L 108 110* 111*   BUN mg/dL 27* 15 10   CREATININE mg/dL 1 37* 1 17 0 86     Results from last 7 days   Lab Units 07/29/21  0546 07/28/21  1212   HEMOGLOBIN g/dL 11 7* 12 1   HEMATOCRIT % 36 1* 37 0   PLATELETS Thousands/uL 112* 118*           Telemetry:   Personally reviewed by Adilia Barkley MD: normal sinus rates 70d-100s  With tachycardia associated with physical exertion per the patient            VTE Prophylaxis: DAPT      Assessment:    79-year-old male past medical history of cardiomyopathy, CAD, 3xMI, presenting to ED with atypical chest pain and elevated troponins with ST and T-wave abnormalities suggestive of an STEMI, currently POD 2 AGUSTIN x1 to the mid RCA, now with a likely iatrogenic dye allergy reaction and JUAN, improving on steroids, antihistamines, and normal saline infusion  Plan:  Iatrogenic allergic reaction to catheterization dye  Patient has documented allergies to catheterization by, was given hydrocortisone in the cath lab but has been intermittently febrile and hypotensive with a blanching rash  Patient has responded well to steroids and antihistamines with normal saline infusion     - Will continue with normal saline infusion overnight and will monitor patient    STEMI  - patient is currentlyPOD2 after receiving a DESx1 to the mid RCA, found to be 99% stenosed  - follow-up ago is comparable to previous echo of 04/2021  - patient has a history of DAPT noncompliance  - he is currently on aspirin and ticagrelor and taking them appropriately in the hospital  - patient has refused atorvastatin after communicating with him is open to taking it on further doses  - interval was held today due to his hypotension  - will continue with metoprolol if bp allows  - lisinopril currently being held due to JUAN    JUAN  - patient currently pre renal BUN 27 creatinine 1 37  - most likely due to his iatrogenic cath dye allergic reaction  - he is currently on 75 mL/hr normal saline continuous infusion, with follow-up BMP scheduled for this afternoon   - lisinopril currently being held until resolution of JUAN     Case discussed and reviewed with Dr Bety Issa and is pending agreement of the assessment and plan  Thank you for involving us in the care of your patient        Bhanu Barkley MD  Internal Medicine PGY1      Epic/ Allscripts/Care Everywhere records reviewed: Yes    ** Please Note: Fluency DirectDictation voice to text software may have been used in the creation of this document   ** none

## 2021-07-31 LAB
ANION GAP SERPL CALCULATED.3IONS-SCNC: 7 MMOL/L (ref 4–13)
APTT PPP: 30 SECONDS (ref 23–37)
APTT PPP: 30 SECONDS (ref 23–37)
APTT PPP: 33 SECONDS (ref 23–37)
BUN SERPL-MCNC: 24 MG/DL (ref 5–25)
CALCIUM SERPL-MCNC: 8 MG/DL (ref 8.3–10.1)
CHLORIDE SERPL-SCNC: 108 MMOL/L (ref 100–108)
CO2 SERPL-SCNC: 22 MMOL/L (ref 21–32)
CREAT SERPL-MCNC: 0.95 MG/DL (ref 0.6–1.3)
ERYTHROCYTE [DISTWIDTH] IN BLOOD BY AUTOMATED COUNT: 19.7 % (ref 11.6–15.1)
GFR SERPL CREATININE-BSD FRML MDRD: 77 ML/MIN/1.73SQ M
GLUCOSE SERPL-MCNC: 168 MG/DL (ref 65–140)
HCT VFR BLD AUTO: 33.5 % (ref 36.5–49.3)
HGB BLD-MCNC: 11 G/DL (ref 12–17)
INR PPP: 1.16 (ref 0.84–1.19)
MCH RBC QN AUTO: 32 PG (ref 26.8–34.3)
MCHC RBC AUTO-ENTMCNC: 32.8 G/DL (ref 31.4–37.4)
MCV RBC AUTO: 97 FL (ref 82–98)
PLATELET # BLD AUTO: 98 THOUSANDS/UL (ref 149–390)
PMV BLD AUTO: 11.2 FL (ref 8.9–12.7)
POTASSIUM SERPL-SCNC: 4.2 MMOL/L (ref 3.5–5.3)
PROTHROMBIN TIME: 14.8 SECONDS (ref 11.6–14.5)
RBC # BLD AUTO: 3.44 MILLION/UL (ref 3.88–5.62)
SODIUM SERPL-SCNC: 137 MMOL/L (ref 136–145)
WBC # BLD AUTO: 9.7 THOUSAND/UL (ref 4.31–10.16)

## 2021-07-31 PROCEDURE — 85027 COMPLETE CBC AUTOMATED: CPT | Performed by: NURSE PRACTITIONER

## 2021-07-31 PROCEDURE — 85610 PROTHROMBIN TIME: CPT | Performed by: NURSE PRACTITIONER

## 2021-07-31 PROCEDURE — 80048 BASIC METABOLIC PNL TOTAL CA: CPT | Performed by: INTERNAL MEDICINE

## 2021-07-31 PROCEDURE — 85730 THROMBOPLASTIN TIME PARTIAL: CPT | Performed by: INTERNAL MEDICINE

## 2021-07-31 PROCEDURE — 85730 THROMBOPLASTIN TIME PARTIAL: CPT

## 2021-07-31 PROCEDURE — 85730 THROMBOPLASTIN TIME PARTIAL: CPT | Performed by: NURSE PRACTITIONER

## 2021-07-31 PROCEDURE — 99232 SBSQ HOSP IP/OBS MODERATE 35: CPT | Performed by: INTERNAL MEDICINE

## 2021-07-31 RX ORDER — PREDNISONE 20 MG/1
20 TABLET ORAL DAILY
Status: DISCONTINUED | OUTPATIENT
Start: 2021-08-01 | End: 2021-08-02

## 2021-07-31 RX ORDER — WARFARIN SODIUM 5 MG/1
10 TABLET ORAL
Status: COMPLETED | OUTPATIENT
Start: 2021-07-31 | End: 2021-07-31

## 2021-07-31 RX ORDER — HEPARIN SODIUM 1000 [USP'U]/ML
2000 INJECTION, SOLUTION INTRAVENOUS; SUBCUTANEOUS
Status: DISCONTINUED | OUTPATIENT
Start: 2021-07-31 | End: 2021-08-02

## 2021-07-31 RX ORDER — HEPARIN SODIUM 1000 [USP'U]/ML
4000 INJECTION, SOLUTION INTRAVENOUS; SUBCUTANEOUS
Status: DISCONTINUED | OUTPATIENT
Start: 2021-07-31 | End: 2021-08-02

## 2021-07-31 RX ORDER — HEPARIN SODIUM 1000 [USP'U]/ML
4000 INJECTION, SOLUTION INTRAVENOUS; SUBCUTANEOUS ONCE
Status: COMPLETED | OUTPATIENT
Start: 2021-07-31 | End: 2021-07-31

## 2021-07-31 RX ORDER — HEPARIN SODIUM 10000 [USP'U]/100ML
3-20 INJECTION, SOLUTION INTRAVENOUS
Status: DISCONTINUED | OUTPATIENT
Start: 2021-07-31 | End: 2021-08-02

## 2021-07-31 RX ADMIN — WARFARIN SODIUM 10 MG: 5 TABLET ORAL at 17:19

## 2021-07-31 RX ADMIN — HEPARIN SODIUM 11.1 UNITS/KG/HR: 10000 INJECTION, SOLUTION INTRAVENOUS at 09:59

## 2021-07-31 RX ADMIN — Medication 12.5 MG: at 21:21

## 2021-07-31 RX ADMIN — TICAGRELOR 90 MG: 90 TABLET ORAL at 21:21

## 2021-07-31 RX ADMIN — PREDNISONE 40 MG: 20 TABLET ORAL at 08:48

## 2021-07-31 RX ADMIN — PHENOBARBITAL 64.8 MG: 32.4 TABLET ORAL at 17:19

## 2021-07-31 RX ADMIN — HEPARIN SODIUM 4000 UNITS: 1000 INJECTION INTRAVENOUS; SUBCUTANEOUS at 09:57

## 2021-07-31 RX ADMIN — ATORVASTATIN CALCIUM 80 MG: 80 TABLET, FILM COATED ORAL at 17:17

## 2021-07-31 RX ADMIN — ASPIRIN 81 MG: 81 TABLET, COATED ORAL at 08:49

## 2021-07-31 RX ADMIN — TICAGRELOR 90 MG: 90 TABLET ORAL at 08:48

## 2021-07-31 RX ADMIN — HEPARIN SODIUM 4000 UNITS: 1000 INJECTION INTRAVENOUS; SUBCUTANEOUS at 23:54

## 2021-07-31 RX ADMIN — HEPARIN SODIUM 4000 UNITS: 1000 INJECTION INTRAVENOUS; SUBCUTANEOUS at 17:17

## 2021-07-31 RX ADMIN — Medication 12.5 MG: at 08:49

## 2021-07-31 RX ADMIN — PHENOBARBITAL 64.8 MG: 32.4 TABLET ORAL at 08:49

## 2021-07-31 NOTE — PROGRESS NOTES
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  Will initiate therapy with intravenous heparin as a bridge until anticoagulated appropriately with Coumadin  Will likely need triple therapy for at least a month and then possibly Brilinta and Coumadin ongoing  BMP today with potassium of 4 2 and creatinine of 0 95  Will reduce dose of prednisone to 20 mg per day with quick taper  Subjective:   Patient seen and examined  No significant events overnight   negative  Objective:     Vitals: Blood pressure 96/52, pulse 70, temperature 97 6 °F (36 4 °C), temperature source Oral, resp  rate 18, weight 99 8 kg (220 lb), SpO2 93 %  , Body mass index is 29 84 kg/m² ,   Orthostatic Blood Pressures      Most Recent Value   Blood Pressure  96/52 filed at 07/31/2021 0849   Patient Position - Orthostatic VS  Lying filed at 07/31/2021 0236      ,      Intake/Output Summary (Last 24 hours) at 7/31/2021 0952  Last data filed at 7/31/2021 0801  Gross per 24 hour   Intake 1248 33 ml   Output 1275 ml   Net -26 67 ml       No significant arrhythmias seen on telemetry review         Physical Exam:    GEN: Mary Kilpatrick appears well, alert and oriented x 3, pleasant and cooperative   NECK: supple, no carotid bruits, no JVD or HJR  HEART: normal rate, regular rhythm, normal S1 and S2, no murmurs, clicks, gallops or rubs   LUNGS: clear to auscultation bilaterally; no wheezes, rales, or rhonchi ABDOMEN: normal bowel sounds, soft, no tenderness, no distention  EXTREMITIES: peripheral pulses normal; no clubbing, cyanosis, or edema  SKIN: warm and well perfused, no suspicious lesions on exposed skin    Labs & Results:    Admission on 07/28/2021   Component Date Value    Ventricular Rate 07/28/2021 65     Atrial Rate 07/28/2021 65     NH Interval 07/28/2021 256     QRSD Interval 07/28/2021 88     QT Interval 07/28/2021 382     QTC Interval 07/28/2021 397     P Axis 07/28/2021 48     QRS Axis 07/28/2021 16     T Wave Axis 07/28/2021 79     WBC 07/28/2021 3 75*    RBC 07/28/2021 3 85*    Hemoglobin 07/28/2021 12 1     Hematocrit 07/28/2021 37 0     MCV 07/28/2021 96     MCH 07/28/2021 31 4     MCHC 07/28/2021 32 7     RDW 07/28/2021 19 3*    MPV 07/28/2021 11 4     Platelets 57/65/4835 118*    nRBC 07/28/2021 0     Neutrophils Relative 07/28/2021 53     Immat GRANS % 07/28/2021 1     Lymphocytes Relative 07/28/2021 32     Monocytes Relative 07/28/2021 8     Eosinophils Relative 07/28/2021 5     Basophils Relative 07/28/2021 1     Neutrophils Absolute 07/28/2021 2 00     Immature Grans Absolute 07/28/2021 0 04     Lymphocytes Absolute 07/28/2021 1 19     Monocytes Absolute 07/28/2021 0 31     Eosinophils Absolute 07/28/2021 0 17     Basophils Absolute 07/28/2021 0 04     Sodium 07/28/2021 140     Potassium 07/28/2021 3 7     Chloride 07/28/2021 111*    CO2 07/28/2021 23     ANION GAP 07/28/2021 6     BUN 07/28/2021 10     Creatinine 07/28/2021 0 86     Glucose 07/28/2021 109     Calcium 07/28/2021 8 3     Corrected Calcium 07/28/2021 9 1     AST 07/28/2021 23     ALT 07/28/2021 12     Alkaline Phosphatase 07/28/2021 131*    Total Protein 07/28/2021 7 1     Albumin 07/28/2021 3 0*    Total Bilirubin 07/28/2021 0 42     eGFR 07/28/2021 84     Troponin I 07/28/2021 3 82*    PTT 07/28/2021 28     Protime 07/28/2021 14 1     INR 07/28/2021 1 09     Ventricular Rate 07/28/2021 57     Atrial Rate 07/28/2021 375     QRSD Interval 07/28/2021 82     QT Interval 07/28/2021 424     QTC Interval 07/28/2021 412     QRS Axis 07/28/2021 4     T Wave Axis 07/28/2021 22     Activated Clotting Time,* 07/28/2021 225*    Specimen Type 07/28/2021 VENOUS     Sodium 07/29/2021 138     Potassium 07/29/2021 4 2     Chloride 07/29/2021 110*    CO2 07/29/2021 20*    ANION GAP 07/29/2021 8     BUN 07/29/2021 15     Creatinine 07/29/2021 1 17     Glucose 07/29/2021 157*    Glucose, Fasting 07/29/2021 157*    Calcium 07/29/2021 7 7*    eGFR 07/29/2021 60     WBC 07/29/2021 15 70*    RBC 07/29/2021 3 65*    Hemoglobin 07/29/2021 11 7*    Hematocrit 07/29/2021 36 1*    MCV 07/29/2021 99*    MCH 07/29/2021 32 1     MCHC 07/29/2021 32 4     RDW 07/29/2021 19 5*    MPV 07/29/2021 11 9     Platelets 57/39/6842 112*    nRBC 07/29/2021 0     Segmented % 07/29/2021 90*    Bands % 07/29/2021 6     Lymphocytes % 07/29/2021 2*    Monocytes % 07/29/2021 2*    Eosinophils, % 07/29/2021 0     Basophils % 07/29/2021 0     Absolute Neutrophils 07/29/2021 15 07*    Lymphocytes Absolute 07/29/2021 0 31*    Monocytes Absolute 07/29/2021 0 31     Eosinophils Absolute 07/29/2021 0 00     Basophils Absolute 07/29/2021 0 00     RBC Morphology 07/29/2021 Present     Anisocytosis 07/29/2021 Present     Poikilocytes 07/29/2021 Present     Platelet Estimate 08/74/3137 Decreased*    Sodium 07/30/2021 135*    Potassium 07/30/2021 4 0     Chloride 07/30/2021 108     CO2 07/30/2021 18*    ANION GAP 07/30/2021 9     BUN 07/30/2021 27*    Creatinine 07/30/2021 1 37*    Glucose 07/30/2021 126     Glucose, Fasting 07/30/2021 126*    Calcium 07/30/2021 8 3     eGFR 07/30/2021 50     Sodium 07/30/2021 136     Potassium 07/30/2021 3 9     Chloride 07/30/2021 104     CO2 07/30/2021 24     ANION GAP 07/30/2021 8     BUN 07/30/2021 25     Creatinine 07/30/2021 1 00     Glucose 2021 139     Calcium 2021 8 3     eGFR 2021 73     Sodium 2021 137     Potassium 2021 4 2     Chloride 2021 108     CO2 2021 22     ANION GAP 2021 7     BUN 2021 24     Creatinine 2021 0 95     Glucose 2021 168*    Calcium 2021 8 0*    eGFR 2021 77        XR chest 1 view portable    Result Date: 2021  Narrative: CHEST INDICATION:   chest pain  COMPARISON:  2021 EXAM PERFORMED/VIEWS:  XR CHEST PORTABLE FINDINGS: Cardiomegaly is present  The lungs are clear  No pneumothorax or pleural effusion  Osseous structures appear within normal limits for patient age  Impression: No acute cardiopulmonary disease  Workstation performed: [id number]     Echo limited with contrast if indicated    Result Date: 2021  Narrative: [facility] [address] [address] [facility] [phone] Transthoracic Echocardiogram Limited 2D, M-mode, Doppler, and Color Doppler Study date:  2021 Patient: Keegan Lenz MR number: [id number] Account number: [de-identified] : 1945 Age: 67 years Gender: Male Status: Outpatient Location: Bedside Height: 72 in Weight: 219 6 lb BP: 82/ 47 mmHg Indications: Post MI Diagnoses: I21 3 - ST elevation (STEMI) myocardial infarction of unspecified site Sonographer:  Jennifer Smallwood RDCS Primary Physician:  Kerline Dahl DO Referring Physician:  Mariela Gillis MD Group:  [facility] Interpreting Physician:  Carmita Onofre MD SUMMARY LEFT VENTRICLE: Systolic function was moderately reduced  Ejection fraction was estimated to be 36 %  There was akinesis of the entire inferior, basal-mid inferolateral, apical septal, apical lateral, and apical wall(s)  There was no evidence of concentric hypertrophy  Doppler parameters were consistent with abnormal left ventricular relaxation (grade 1 diastolic dysfunction)   There was a large, sessile, echogenic mass, measuring 16 mm x 24 mm  LEFT ATRIUM: The atrium was mildly dilated  AORTIC VALVE: There was trace regurgitation  TRICUSPID VALVE: There was trace regurgitation  PULMONIC VALVE: There was trace regurgitation  HISTORY: PRIOR HISTORY: Abdominal Aortic Aneurysm, CAD, STEMI, CHF PROCEDURE: The procedure was performed at the bedside  This was a routine study  The transthoracic approach was used  The study included limited 2D imaging, M-mode, limited spectral Doppler, and color Doppler  The heart rate was 70 bpm, at the start of the study  Images were obtained from the parasternal, apical, and subcostal acoustic windows  Echocardiographic views were limited due to decreased penetration  Image quality was adequate  LEFT VENTRICLE: Size was normal  Systolic function was moderately reduced  Ejection fraction was estimated to be 36 %  There was akinesis of the entire inferior, basal-mid inferolateral, apical septal, apical lateral, and apical wall(s)  Wall thickness was normal  There was no evidence of concentric hypertrophy  There was a large, sessile, echogenic mass, measuring 16 mm x 24 mm  DOPPLER: Doppler parameters were consistent with abnormal left ventricular relaxation (grade 1 diastolic dysfunction)  RIGHT VENTRICLE: The size was normal  Systolic function was normal  Wall thickness was normal  LEFT ATRIUM: The atrium was mildly dilated  RIGHT ATRIUM: Size was normal  MITRAL VALVE: Valve structure was normal  There was normal leaflet separation  DOPPLER: The transmitral velocity was within the normal range  There was no evidence for stenosis  There was no significant regurgitation  AORTIC VALVE: The valve was trileaflet  Leaflets exhibited normal thickness and normal cuspal separation  DOPPLER: Transaortic velocity was within the normal range  There was no evidence for stenosis  There was trace regurgitation  TRICUSPID VALVE: The valve structure was normal  There was normal leaflet separation  DOPPLER: The transtricuspid velocity was within the normal range  There was no evidence for stenosis  There was trace regurgitation  PULMONIC VALVE: Leaflets exhibited normal thickness, no calcification, and normal cuspal separation  DOPPLER: The transpulmonic velocity was within the normal range  There was trace regurgitation  PERICARDIUM: There was no pericardial effusion  The pericardium was normal in appearance  AORTA: The root exhibited normal size  SYSTEMIC VEINS: IVC: The inferior vena cava was normal in size  SYSTEM MEASUREMENT TABLES 2D LVEDV MOD A4C: 114 21 ml LVEF MOD A4C: 46 05 % LVESV MOD A4C: 61 62 ml LVLd A4C: 8 34 cm LVLs A4C: 6 82 cm SV MOD A4C: 52 59 ml CW AV Env  Ti: 274 02 ms AV VTI: 17 41 cm AV Vmax: 0 87 m/s AV Vmean: 0 63 m/s AV maxPG: 3 09 mmHg AV meanP 82 mmHg PW E' Sept: 0 06 m/s LVOT Env  Ti: 300 67 ms LVOT VTI: 15 87 cm LVOT Vmax: 0 77 m/s LVOT Vmean: 0 53 m/s LVOT maxP 4 mmHg LVOT meanP 28 mmHg Intersocietal Commission Accredited Echocardiography Laboratory Prepared and electronically signed by Sameera Story MD Signed 2021 18:25:13     Cardiac coronary angio/lhc/pci    Result Date: 2021  Narrative: [address] [place] [address] [phone] [facility] Invasive Cardiovascular Lab Complete Report Patient: Zelda Kumar MR number: [id number] Account number: [de-identified] Study date: 2021 Gender: Male : 1945 Height: 72 in Weight: 220 lb BSA: 2 22 mï¾² Allergies: IODINATED DIAGNOSTIC AGENTS, CLOPIDOGREL Diagnostic Cardiologist:  Jose Decker DO Interventional Cardiologist:  Jose Decker DO Primary Physician:  Samy Beatty SUMMARY CORONARY CIRCULATION: Ostial left main: There was a 30 % stenosis, no pressure dampening with 6F guide Mid LAD: There was a 10 % stenosis at the site of a prior stent  Proximal circumflex: There was a diffuse 40 % stenosis   2nd obtuse marginal: There was a 60 % stenosis at the ostium of the vessel segment  Mid RCA: There was a 99 % stenosis  The lesion was associated with a moderate filling defect consistent with thrombus and HELIO 1 flow 1ST LESION INTERVENTIONS: A successful thrombectomy with stent and stent procedure was performed on the 99 % lesion in the mid RCA  Following intervention there was a 0 % residual stenosis  A Xience Jennifer Rx 3 5 x 28mm drug-eluting stent was placed across the lesion and deployed at a maximum inflation pressure of 16 antony  INDICATIONS: --  Possible CAD: myocardial infarction without ST elevation (NSTEMI), acute nstemi, urgent cath lab PROCEDURES PERFORMED --  Left heart catheterization without ventriculogram  --  Left coronary angiography  --  Right coronary angiography  --  Acute Myocardial Infarct  --  Mod Sedation Same Physician Initial 15min  --  Mod Sedation Same Physician Add 15min  --  Mod Sedation Same Physician Add 15min  --  Coronary Catheterization (w/ LHC)  --  AMI PCI (AGUSTIN, PTCRA, PTCA) Single  --  Intervention on mid RCA: thrombectomy, stent, stent  PROCEDURE: The risks and alternatives of the procedures and conscious sedation were explained to the patient and informed consent was obtained  The patient was brought to the cath lab and placed on the table  The planned puncture sites were prepped and draped in the usual sterile fashion  --  Right radial artery access  After performing an Vineet's test to verify adequate ulnar artery supply to the hand, the radial site was prepped  The puncture site was infiltrated with local anesthetic  The vessel was accessed using the modified Seldinger technique, a wire was advanced into the vessel, and a sheath was advanced over the wire into the vessel  --  Left heart catheterization without ventriculogram  A catheter was advanced over a guidewire into the ascending aorta   After recording ascending aortic pressure, the catheter was advanced across the aortic valve and left ventricular pressure was recorded  The catheter was pulled back across the aortic valve and into the ascending aorta and pullback pressures were obtained  --  Left coronary artery angiography  A catheter was advanced over a guidewire into the aorta and positioned in the left coronary artery ostium under fluoroscopic guidance  Angiography was performed  --  Right coronary artery angiography  A catheter was advanced over a guidewire into the aorta and positioned in the right coronary artery ostium under fluoroscopic guidance  Angiography was performed  --  Acute Myocardial Infarct  --  Mod Sedation Same Physician Initial 15min  --  Mod Sedation Same Physician Add 15min  --  Mod Sedation Same Physician Add 15min  --  Coronary Catheterization (w/ LHC)  LESION INTERVENTION: A successful thrombectomy with stent and stent procedure was performed on the 99 % lesion in the mid RCA  Following intervention there was a 0 % residual stenosis  There was HELIO 1 flow before the procedure and HELIO 3 flow after the procedure  There was no dissection  --  Vessel setup was performed  A 6Fr  Launcher JR 4 0 100cm guiding catheter was used to cannulate the vessel  --  Vessel setup was performed  A Runthrough NS 180cm wire was used to cross the lesion  --  Mechanical ( 6Fr  [place] AP) thrombectomy was performed, with max duration 25 sec, max volume out 10 ml, and 1 attempt(s)  --  A Xience Jennifer Rx 3 5 x 28mm drug-eluting stent was placed across the lesion and deployed at a maximum inflation pressure of 16 antony  INTERVENTIONS: --  AMI PCI (AGUSTIN, PTCRA, PTCA) Single  PROCEDURE COMPLETION: The patient tolerated the procedure well and was discharged from the cath lab  TIMING: Test started at 15:07  Test concluded at 15:44  HEMOSTASIS: The sheath was removed  The site was compressed with a Hemoband device  Hemostasis was obtained  MEDICATIONS GIVEN: Benadryl (50mg/ml), 50 mg, IV, at 15:02  Solucortef (100mg/2ml), 100 mg, IV, at 15:02   Pepcid, 20 mg, IV, at 15:02  Ticagrelor, 180 mg, PO, at 15:03  Versed (2mg/2ml), 2 mg, IV, at 15:08  Fentanyl (1OOmcg/2 ml), 50 mcg, IV, at 15:08  1% Lidocaine, 1 ml, subcutaneously, at 15:12  Nitroglycerin (200mcg/ml), 200 mcg, at 15:13  Verapamil (5mg/2ml), 2 5 mg, IV, at 15:13  Heparin 1000 units/ml, 8,000 units, IV, at 15:27  Neosynephrine, 100 mcg, IV, at 15:38  CONTRAST GIVEN: 100 ml Omnipaque (350mg I /ml)  RADIATION EXPOSURE: Fluoroscopy time: 9 38 min  HEMODYNAMICS: Hemodynamic assessment demonstrated normal LVEDP  CORONARY VESSELS:   --  The coronary circulation is right dominant  --  Ostial left main: There was a 30 % stenosis, no pressure dampening with 6F guide --  Mid LAD: There was a 10 % stenosis at the site of a prior stent  --  2nd diagonal: The vessel was very small sized atherosclerotic ostium --  Proximal circumflex: There was a diffuse 40 % stenosis  --  2nd obtuse marginal: There was a 60 % stenosis at the ostium of the vessel segment  --  Proximal RCA: There was a 10 % stenosis at the site of a prior stent  --  Mid RCA: There was a 99 % stenosis  The lesion was associated with a moderate filling defect consistent with thrombus and HELIO 1 flow IMPRESSIONS: PCI mid RCA with AGUSTIN  Patent LAD stents  Patient noncompliant with dapt and stopped ticagrelor after lad stent in April of this year  RECOMMENDATIONS stressed importance of compliance to avoid mi/stent thrombosis/ death with dapt and stopping antiplatelets, recommend dapt x 1 year minimum, gdmt cad  DISPOSITION: The patient left the catheterization laboratory in stable condition  Prepared and signed by Rosa Rich DO Signed 07/28/2021 16:07:38 Study diagram Angiographic findings Native coronary lesions: ï¾·Ostial left main: Lesion 1: 30 % stenosis  ï¾·Mid LAD: Lesion 1: 10 % stenosis, site of prior stent  ï¾·Proximal circumflex: Lesion 1: diffuse, 40 % stenosis  ï¾·OM2: Lesion 1: 60 % stenosis   ï¾·Proximal RCA: Lesion 1: 10 % stenosis, site of prior stent  ï¾·Mid RCA: Lesion 1: 99 % stenosis  Intervention results Native coronary lesions: ï¾·Successful thrombectomy, stent, and stent of the 99 % stenosis in mid RCA  0 % residual stenosis  Stent: Cricket Berrynert Rx 3 5 x 28mm drug-eluting  Hemodynamic tables Pressures:  Baseline Pressures:  - HR: 79 Pressures:  - Rhythm: Pressures:  -- Aortic Pressure (S/D/M): 86/52/63 Pressures:  -- Left Ventricle (s/edp): 82/16/-- Outputs:  Baseline Outputs:  -- CALCULATIONS[age] Outputs:  -- CALCULATIONS: Body Surface Area: 2 22 Outputs:  -- CALCULATIONS: Height in cm: 183 00 Outputs:  -- CALCULATIONS: Sex: Male Outputs:  -- CALCULATIONS: Weight in k 00       EKG personally reviewed by Price Braxton MD      Counseling / Coordination of Care  Total floor / unit time spent today 30 minutes  Greater than 50% of total time was spent with the patient and / or family counseling and / or coordination of care

## 2021-08-01 LAB
APTT PPP: 101 SECONDS (ref 23–37)
APTT PPP: 75 SECONDS (ref 23–37)
APTT PPP: 95 SECONDS (ref 23–37)
INR PPP: 1.2 (ref 0.84–1.19)
PROTHROMBIN TIME: 15.3 SECONDS (ref 11.6–14.5)

## 2021-08-01 PROCEDURE — 85730 THROMBOPLASTIN TIME PARTIAL: CPT | Performed by: INTERNAL MEDICINE

## 2021-08-01 PROCEDURE — 85610 PROTHROMBIN TIME: CPT | Performed by: NURSE PRACTITIONER

## 2021-08-01 PROCEDURE — 99232 SBSQ HOSP IP/OBS MODERATE 35: CPT | Performed by: INTERNAL MEDICINE

## 2021-08-01 RX ORDER — WARFARIN SODIUM 5 MG/1
10 TABLET ORAL
Status: DISCONTINUED | OUTPATIENT
Start: 2021-08-01 | End: 2021-08-02

## 2021-08-01 RX ADMIN — PHENOBARBITAL 64.8 MG: 32.4 TABLET ORAL at 17:43

## 2021-08-01 RX ADMIN — HEPARIN SODIUM 15.1 UNITS/KG/HR: 10000 INJECTION, SOLUTION INTRAVENOUS at 22:00

## 2021-08-01 RX ADMIN — ATORVASTATIN CALCIUM 80 MG: 80 TABLET, FILM COATED ORAL at 17:43

## 2021-08-01 RX ADMIN — TICAGRELOR 90 MG: 90 TABLET ORAL at 22:02

## 2021-08-01 RX ADMIN — WARFARIN SODIUM 10 MG: 5 TABLET ORAL at 17:43

## 2021-08-01 RX ADMIN — Medication 12.5 MG: at 08:52

## 2021-08-01 RX ADMIN — PHENOBARBITAL 64.8 MG: 32.4 TABLET ORAL at 08:58

## 2021-08-01 RX ADMIN — HEPARIN SODIUM 19.1 UNITS/KG/HR: 10000 INJECTION, SOLUTION INTRAVENOUS at 04:40

## 2021-08-01 RX ADMIN — PREDNISONE 20 MG: 20 TABLET ORAL at 08:52

## 2021-08-01 RX ADMIN — ASPIRIN 81 MG: 81 TABLET, COATED ORAL at 08:51

## 2021-08-01 RX ADMIN — TICAGRELOR 90 MG: 90 TABLET ORAL at 08:51

## 2021-08-01 NOTE — PROGRESS NOTES
Cardiology Progress Note - Rocio Durham 67 y o  male MRN: [id number]    Unit/Bed#: [facility] 520-01 Encounter: [id number]      Assessment:  Coronary artery disease  Myocardial infarction  Hypertension  Hyperlipidemia  Cardiomyopathy    Plan:  Patient is comfortable this morning  He has no chest pain or significant dyspnea  He had no issues overnight  He is in sinus rhythm on telemetry  Patient noted on echocardiogram July 29th to have a large echogenic mass in the left ventricular apex  This is likely thrombus  Patient now on heparin and Coumadin  PT INR is 1 2  Will give 10 mg of Coumadin today  Blood pressure still soft  Concern in reference to delayed contrast reaction  Will continue prednisone  Eventually will need to have close lisinopril restarted in reference to ischemic myopathy  Subjective:   Patient seen and examined  No significant events overnight   negative  Objective:     Vitals: Blood pressure 96/54, pulse 75, temperature 98 1 °F (36 7 °C), temperature source Oral, resp  rate 18, weight 99 8 kg (220 lb), SpO2 92 %  , Body mass index is 29 84 kg/m² ,   Orthostatic Blood Pressures      Most Recent Value   Blood Pressure  96/54 filed at 08/01/2021 0716   Patient Position - Orthostatic VS  Lying filed at 08/01/2021 0716      ,      Intake/Output Summary (Last 24 hours) at 8/1/2021 0936  Last data filed at 8/1/2021 0901  Gross per 24 hour   Intake 281 87 ml   Output 700 ml   Net -418  13 ml       No significant arrhythmias seen on telemetry review         Physical Exam:    GEN: Rocio Durham appears well, alert and oriented x 3, pleasant and cooperative   NECK: supple, no carotid bruits, no JVD or HJR  HEART: normal rate, regular rhythm, normal S1 and S2, no murmurs, clicks, gallops or rubs   LUNGS: clear to auscultation bilaterally; no wheezes, rales, or rhonchi   ABDOMEN: normal bowel sounds, soft, no tenderness, no distention  EXTREMITIES: peripheral pulses normal; no clubbing, cyanosis, or edema  SKIN: warm and well perfused, no suspicious lesions on exposed skin    Labs & Results:    Admission on 07/28/2021   Component Date Value    Ventricular Rate 07/28/2021 65     Atrial Rate 07/28/2021 65     OH Interval 07/28/2021 256     QRSD Interval 07/28/2021 88     QT Interval 07/28/2021 382     QTC Interval 07/28/2021 397     P Axis 07/28/2021 48     QRS Axis 07/28/2021 16     T Wave Axis 07/28/2021 79     WBC 07/28/2021 3 75*    RBC 07/28/2021 3 85*    Hemoglobin 07/28/2021 12 1     Hematocrit 07/28/2021 37 0     MCV 07/28/2021 96     MCH 07/28/2021 31 4     MCHC 07/28/2021 32 7     RDW 07/28/2021 19 3*    MPV 07/28/2021 11 4     Platelets 17/49/7805 118*    nRBC 07/28/2021 0     Neutrophils Relative 07/28/2021 53     Immat GRANS % 07/28/2021 1     Lymphocytes Relative 07/28/2021 32     Monocytes Relative 07/28/2021 8     Eosinophils Relative 07/28/2021 5     Basophils Relative 07/28/2021 1     Neutrophils Absolute 07/28/2021 2 00     Immature Grans Absolute 07/28/2021 0 04     Lymphocytes Absolute 07/28/2021 1 19     Monocytes Absolute 07/28/2021 0 31     Eosinophils Absolute 07/28/2021 0 17     Basophils Absolute 07/28/2021 0 04     Sodium 07/28/2021 140     Potassium 07/28/2021 3 7     Chloride 07/28/2021 111*    CO2 07/28/2021 23     ANION GAP 07/28/2021 6     BUN 07/28/2021 10     Creatinine 07/28/2021 0 86     Glucose 07/28/2021 109     Calcium 07/28/2021 8 3     Corrected Calcium 07/28/2021 9 1     AST 07/28/2021 23     ALT 07/28/2021 12     Alkaline Phosphatase 07/28/2021 131*    Total Protein 07/28/2021 7 1     Albumin 07/28/2021 3 0*    Total Bilirubin 07/28/2021 0 42     eGFR 07/28/2021 84     Troponin I 07/28/2021 3 82*    PTT 07/28/2021 28     Protime 07/28/2021 14 1     INR 07/28/2021 1 09     Ventricular Rate 07/28/2021 57     Atrial Rate 07/28/2021 375     QRSD Interval 07/28/2021 82     QT Interval 07/28/2021 424     QTC Interval 07/28/2021 412     QRS Axis 07/28/2021 4     T Wave Axis 07/28/2021 22     Activated Clotting Time,* 07/28/2021 225*    Specimen Type 07/28/2021 VENOUS     Sodium 07/29/2021 138     Potassium 07/29/2021 4 2     Chloride 07/29/2021 110*    CO2 07/29/2021 20*    ANION GAP 07/29/2021 8     BUN 07/29/2021 15     Creatinine 07/29/2021 1 17     Glucose 07/29/2021 157*    Glucose, Fasting 07/29/2021 157*    Calcium 07/29/2021 7 7*    eGFR 07/29/2021 60     WBC 07/29/2021 15 70*    RBC 07/29/2021 3 65*    Hemoglobin 07/29/2021 11 7*    Hematocrit 07/29/2021 36 1*    MCV 07/29/2021 99*    MCH 07/29/2021 32 1     MCHC 07/29/2021 32 4     RDW 07/29/2021 19 5*    MPV 07/29/2021 11 9     Platelets 58/92/9801 112*    nRBC 07/29/2021 0     Segmented % 07/29/2021 90*    Bands % 07/29/2021 6     Lymphocytes % 07/29/2021 2*    Monocytes % 07/29/2021 2*    Eosinophils, % 07/29/2021 0     Basophils % 07/29/2021 0     Absolute Neutrophils 07/29/2021 15 07*    Lymphocytes Absolute 07/29/2021 0 31*    Monocytes Absolute 07/29/2021 0 31     Eosinophils Absolute 07/29/2021 0 00     Basophils Absolute 07/29/2021 0 00     RBC Morphology 07/29/2021 Present     Anisocytosis 07/29/2021 Present     Poikilocytes 07/29/2021 Present     Platelet Estimate 60/96/1379 Decreased*    Sodium 07/30/2021 135*    Potassium 07/30/2021 4 0     Chloride 07/30/2021 108     CO2 07/30/2021 18*    ANION GAP 07/30/2021 9     BUN 07/30/2021 27*    Creatinine 07/30/2021 1 37*    Glucose 07/30/2021 126     Glucose, Fasting 07/30/2021 126*    Calcium 07/30/2021 8 3     eGFR 07/30/2021 50     Sodium 07/30/2021 136     Potassium 07/30/2021 3 9     Chloride 07/30/2021 104     CO2 07/30/2021 24     ANION GAP 07/30/2021 8     BUN 07/30/2021 25     Creatinine 07/30/2021 1 00     Glucose 07/30/2021 139     Calcium 07/30/2021 8 3     eGFR 07/30/2021 73     Sodium 07/31/2021 137     Potassium 07/31/2021 4 2     Chloride 2021 108     CO2 2021 22     ANION GAP 2021 7     BUN 2021 24     Creatinine 2021 0 95     Glucose 2021 168*    Calcium 2021 8 0*    eGFR 2021 77     PTT 2021 30     WBC 2021 9 70     RBC 2021 3 44*    Hemoglobin 2021 11 0*    Hematocrit 2021 33 5*    MCV 2021 97     MCH 2021 32 0     MCHC 2021 32 8     RDW 2021 19 7*    Platelets  98*    MPV 2021 11 2     Protime 2021 14 8*    INR 2021 1 16     PTT 2021 33     PTT 2021 30     Protime 2021 15 3*    INR 2021 1 20*       XR chest 1 view portable    Result Date: 2021  Narrative: CHEST INDICATION:   chest pain  COMPARISON:  2021 EXAM PERFORMED/VIEWS:  XR CHEST PORTABLE FINDINGS: Cardiomegaly is present  The lungs are clear  No pneumothorax or pleural effusion  Osseous structures appear within normal limits for patient age  Impression: No acute cardiopulmonary disease  Workstation performed: [id number]     Echo limited with contrast if indicated    Result Date: 2021  Narrative: [address], [address] [place] [address] [phone] Transthoracic Echocardiogram Limited 2D, M-mode, Doppler, and Color Doppler Study date:  2021 Patient: Alex Falcon MR number: [id number] Account number: [de-identified] : 1945 Age: 67 years Gender: Male Status: Outpatient Location: Bedside Height: 72 in Weight: 219 6 lb BP: 82/ 47 mmHg Indications: Post MI Diagnoses: I21 3 - ST elevation (STEMI) myocardial infarction of unspecified site Sonographer:  Lucretia Grant RDCS Primary Physician:  Sameera Tristan DO Referring Physician:  Gale Benson MD Group:  [facility] Interpreting Physician:  Rehana Olvera MD SUMMARY LEFT VENTRICLE: Systolic function was moderately reduced  Ejection fraction was estimated to be 36 %  There was akinesis of the entire inferior, basal-mid inferolateral, apical septal, apical lateral, and apical wall(s)  There was no evidence of concentric hypertrophy  Doppler parameters were consistent with abnormal left ventricular relaxation (grade 1 diastolic dysfunction)  There was a large, sessile, echogenic mass, measuring 16 mm x 24 mm  LEFT ATRIUM: The atrium was mildly dilated  AORTIC VALVE: There was trace regurgitation  TRICUSPID VALVE: There was trace regurgitation  PULMONIC VALVE: There was trace regurgitation  HISTORY: PRIOR HISTORY: Abdominal Aortic Aneurysm, CAD, STEMI, CHF PROCEDURE: The procedure was performed at the bedside  This was a routine study  The transthoracic approach was used  The study included limited 2D imaging, M-mode, limited spectral Doppler, and color Doppler  The heart rate was 70 bpm, at the start of the study  Images were obtained from the parasternal, apical, and subcostal acoustic windows  Echocardiographic views were limited due to decreased penetration  Image quality was adequate  LEFT VENTRICLE: Size was normal  Systolic function was moderately reduced  Ejection fraction was estimated to be 36 %  There was akinesis of the entire inferior, basal-mid inferolateral, apical septal, apical lateral, and apical wall(s)  Wall thickness was normal  There was no evidence of concentric hypertrophy  There was a large, sessile, echogenic mass, measuring 16 mm x 24 mm  DOPPLER: Doppler parameters were consistent with abnormal left ventricular relaxation (grade 1 diastolic dysfunction)  RIGHT VENTRICLE: The size was normal  Systolic function was normal  Wall thickness was normal  LEFT ATRIUM: The atrium was mildly dilated  RIGHT ATRIUM: Size was normal  MITRAL VALVE: Valve structure was normal  There was normal leaflet separation  DOPPLER: The transmitral velocity was within the normal range  There was no evidence for stenosis  There was no significant regurgitation   AORTIC VALVE: The valve was trileaflet  Leaflets exhibited normal thickness and normal cuspal separation  DOPPLER: Transaortic velocity was within the normal range  There was no evidence for stenosis  There was trace regurgitation  TRICUSPID VALVE: The valve structure was normal  There was normal leaflet separation  DOPPLER: The transtricuspid velocity was within the normal range  There was no evidence for stenosis  There was trace regurgitation  PULMONIC VALVE: Leaflets exhibited normal thickness, no calcification, and normal cuspal separation  DOPPLER: The transpulmonic velocity was within the normal range  There was trace regurgitation  PERICARDIUM: There was no pericardial effusion  The pericardium was normal in appearance  AORTA: The root exhibited normal size  SYSTEMIC VEINS: IVC: The inferior vena cava was normal in size  SYSTEM MEASUREMENT TABLES 2D LVEDV MOD A4C: 114 21 ml LVEF MOD A4C: 46 05 % LVESV MOD A4C: 61 62 ml LVLd A4C: 8 34 cm LVLs A4C: 6 82 cm SV MOD A4C: 52 59 ml CW AV Env  Ti: 274 02 ms AV VTI: 17 41 cm AV Vmax: 0 87 m/s AV Vmean: 0 63 m/s AV maxPG: 3 09 mmHg AV meanP 82 mmHg PW E' Sept: 0 06 m/s LVOT Env  Ti: 300 67 ms LVOT VTI: 15 87 cm LVOT Vmax: 0 77 m/s LVOT Vmean: 0 53 m/s LVOT maxP 4 mmHg LVOT meanP 28 mmHg Intersocietal Commission Accredited Echocardiography Laboratory Prepared and electronically signed by Bertrand Magana MD Signed 2021 18:25:13     Cardiac coronary angio/lhc/pci    Result Date: 2021  Narrative: [address] [place] [address] [phone] [facility] Invasive Cardiovascular Lab Complete Report Patient: Robles Cintron MR number: [id number] Account number: [de-identified] Study date: 2021 Gender: Male : 1945 Height: 72 in Weight: 220 lb BSA: 2 22 mï¾² Allergies: IODINATED DIAGNOSTIC AGENTS, CLOPIDOGREL Diagnostic Cardiologist:  Aure Brambila DO Interventional Cardiologist:  Aure Brambila DO Primary Physician:  Kartik York SUMMARY CORONARY CIRCULATION: Ostial left main: There was a 30 % stenosis, no pressure dampening with 6F guide Mid LAD: There was a 10 % stenosis at the site of a prior stent  Proximal circumflex: There was a diffuse 40 % stenosis  2nd obtuse marginal: There was a 60 % stenosis at the ostium of the vessel segment  Mid RCA: There was a 99 % stenosis  The lesion was associated with a moderate filling defect consistent with thrombus and HELIO 1 flow 1ST LESION INTERVENTIONS: A successful thrombectomy with stent and stent procedure was performed on the 99 % lesion in the mid RCA  Following intervention there was a 0 % residual stenosis  A Xience Jennifer Rx 3 5 x 28mm drug-eluting stent was placed across the lesion and deployed at a maximum inflation pressure of 16 antony  INDICATIONS: --  Possible CAD: myocardial infarction without ST elevation (NSTEMI), acute nstemi, urgent cath lab PROCEDURES PERFORMED --  Left heart catheterization without ventriculogram  --  Left coronary angiography  --  Right coronary angiography  --  Acute Myocardial Infarct  --  Mod Sedation Same Physician Initial 15min  --  Mod Sedation Same Physician Add 15min  --  Mod Sedation Same Physician Add 15min  --  Coronary Catheterization (w/ LH)  --  AMI PCI (AGUSTIN, PTCRA, PTCA) Single  --  Intervention on mid RCA: thrombectomy, stent, stent  PROCEDURE: The risks and alternatives of the procedures and conscious sedation were explained to the patient and informed consent was obtained  The patient was brought to the cath lab and placed on the table  The planned puncture sites were prepped and draped in the usual sterile fashion  --  Right radial artery access  After performing an Vineet's test to verify adequate ulnar artery supply to the hand, the radial site was prepped  The puncture site was infiltrated with local anesthetic   The vessel was accessed using the modified Seldinger technique, a wire was advanced into the vessel, and a sheath was advanced over the wire into the vessel  --  Left heart catheterization without ventriculogram  A catheter was advanced over a guidewire into the ascending aorta  After recording ascending aortic pressure, the catheter was advanced across the aortic valve and left ventricular pressure was recorded  The catheter was pulled back across the aortic valve and into the ascending aorta and pullback pressures were obtained  --  Left coronary artery angiography  A catheter was advanced over a guidewire into the aorta and positioned in the left coronary artery ostium under fluoroscopic guidance  Angiography was performed  --  Right coronary artery angiography  A catheter was advanced over a guidewire into the aorta and positioned in the right coronary artery ostium under fluoroscopic guidance  Angiography was performed  --  Acute Myocardial Infarct  --  Mod Sedation Same Physician Initial 15min  --  Mod Sedation Same Physician Add 15min  --  Mod Sedation Same Physician Add 15min  --  Coronary Catheterization (w/ LH)  LESION INTERVENTION: A successful thrombectomy with stent and stent procedure was performed on the 99 % lesion in the mid RCA  Following intervention there was a 0 % residual stenosis  There was HELIO 1 flow before the procedure and HELIO 3 flow after the procedure  There was no dissection  --  Vessel setup was performed  A 6Fr  Launcher JR 4 0 100cm guiding catheter was used to cannulate the vessel  --  Vessel setup was performed  A Runthrough NS 180cm wire was used to cross the lesion  --  Mechanical ( 6Fr  [place] AP) thrombectomy was performed, with max duration 25 sec, max volume out 10 ml, and 1 attempt(s)  --  A Xience Jennifer Rx 3 5 x 28mm drug-eluting stent was placed across the lesion and deployed at a maximum inflation pressure of 16 antony  INTERVENTIONS: --  AMI PCI (AGUSTIN, PTCRA, PTCA) Single   PROCEDURE COMPLETION: The patient tolerated the procedure well and was discharged from the cath lab  TIMING: Test started at 15:07  Test concluded at 15:44  HEMOSTASIS: The sheath was removed  The site was compressed with a Hemoband device  Hemostasis was obtained  MEDICATIONS GIVEN: Benadryl (50mg/ml), 50 mg, IV, at 15:02  Solucortef (100mg/2ml), 100 mg, IV, at 15:02  Pepcid, 20 mg, IV, at 15:02  Ticagrelor, 180 mg, PO, at 15:03  Versed (2mg/2ml), 2 mg, IV, at 15:08  Fentanyl (1OOmcg/2 ml), 50 mcg, IV, at 15:08  1% Lidocaine, 1 ml, subcutaneously, at 15:12  Nitroglycerin (200mcg/ml), 200 mcg, at 15:13  Verapamil (5mg/2ml), 2 5 mg, IV, at 15:13  Heparin 1000 units/ml, 8,000 units, IV, at 15:27  Neosynephrine, 100 mcg, IV, at 15:38  CONTRAST GIVEN: 100 ml Omnipaque (350mg I /ml)  RADIATION EXPOSURE: Fluoroscopy time: 9 38 min  HEMODYNAMICS: Hemodynamic assessment demonstrated normal LVEDP  CORONARY VESSELS:   --  The coronary circulation is right dominant  --  Ostial left main: There was a 30 % stenosis, no pressure dampening with 6F guide --  Mid LAD: There was a 10 % stenosis at the site of a prior stent  --  2nd diagonal: The vessel was very small sized atherosclerotic ostium --  Proximal circumflex: There was a diffuse 40 % stenosis  --  2nd obtuse marginal: There was a 60 % stenosis at the ostium of the vessel segment  --  Proximal RCA: There was a 10 % stenosis at the site of a prior stent  --  Mid RCA: There was a 99 % stenosis  The lesion was associated with a moderate filling defect consistent with thrombus and HELIO 1 flow IMPRESSIONS: PCI mid RCA with AGUSTIN  Patent LAD stents  Patient noncompliant with dapt and stopped ticagrelor after lad stent in April of this year  RECOMMENDATIONS stressed importance of compliance to avoid mi/stent thrombosis/ death with dapt and stopping antiplatelets, recommend dapt x 1 year minimum, gdmt cad  DISPOSITION: The patient left the catheterization laboratory in stable condition   Prepared and signed by Renee Vines DO Signed 07/28/2021 16:07:38 Study diagram Angiographic findings Native coronary lesions: ï¾·Ostial left main: Lesion 1: 30 % stenosis  ï¾·Mid LAD: Lesion 1: 10 % stenosis, site of prior stent  ï¾·Proximal circumflex: Lesion 1: diffuse, 40 % stenosis  ï¾·OM2: Lesion 1: 60 % stenosis  ï¾·Proximal RCA: Lesion 1: 10 % stenosis, site of prior stent  ï¾·Mid RCA: Lesion 1: 99 % stenosis  Intervention results Native coronary lesions: ï¾·Successful thrombectomy, stent, and stent of the 99 % stenosis in mid RCA  0 % residual stenosis  Stent: Elvi Love Rx 3 5 x 28mm drug-eluting  Hemodynamic tables Pressures:  Baseline Pressures:  - HR: 79 Pressures:  - Rhythm: Pressures:  -- Aortic Pressure (S/D/M): 86/52/63 Pressures:  -- Left Ventricle (s/edp): 82/16/-- Outputs:  Baseline Outputs:  -- CALCULATIONS[age] Outputs:  -- CALCULATIONS: Body Surface Area: 2 22 Outputs:  -- CALCULATIONS: Height in cm: 183 00 Outputs:  -- CALCULATIONS: Sex: Male Outputs:  -- CALCULATIONS: Weight in k 00       EKG personally reviewed by Anna Stokes MD      Counseling / Coordination of Care  Total floor / unit time spent today 30 minutes  Greater than 50% of total time was spent with the patient and / or family counseling and / or coordination of care

## 2021-08-02 ENCOUNTER — ANTICOAG VISIT (OUTPATIENT)
Dept: CARDIOLOGY CLINIC | Facility: CLINIC | Age: 76
End: 2021-08-02

## 2021-08-02 VITALS
BODY MASS INDEX: 29.84 KG/M2 | DIASTOLIC BLOOD PRESSURE: 77 MMHG | SYSTOLIC BLOOD PRESSURE: 114 MMHG | OXYGEN SATURATION: 97 % | RESPIRATION RATE: 22 BRPM | WEIGHT: 220 LBS | HEART RATE: 71 BPM | TEMPERATURE: 97.6 F

## 2021-08-02 LAB
ALBUMIN SERPL BCP-MCNC: 2.6 G/DL (ref 3.5–5)
ALP SERPL-CCNC: 87 U/L (ref 46–116)
ALT SERPL W P-5'-P-CCNC: 23 U/L (ref 12–78)
ANION GAP SERPL CALCULATED.3IONS-SCNC: 7 MMOL/L (ref 4–13)
APTT PPP: 78 SECONDS (ref 23–37)
APTT PPP: 78 SECONDS (ref 23–37)
AST SERPL W P-5'-P-CCNC: 18 U/L (ref 5–45)
BASOPHILS # BLD AUTO: 0.02 THOUSANDS/ΜL (ref 0–0.1)
BASOPHILS NFR BLD AUTO: 0 % (ref 0–1)
BILIRUB SERPL-MCNC: 0.25 MG/DL (ref 0.2–1)
BUN SERPL-MCNC: 13 MG/DL (ref 5–25)
CALCIUM ALBUM COR SERPL-MCNC: 8.9 MG/DL (ref 8.3–10.1)
CALCIUM SERPL-MCNC: 7.8 MG/DL (ref 8.3–10.1)
CHLORIDE SERPL-SCNC: 107 MMOL/L (ref 100–108)
CO2 SERPL-SCNC: 27 MMOL/L (ref 21–32)
CREAT SERPL-MCNC: 0.8 MG/DL (ref 0.6–1.3)
EOSINOPHIL # BLD AUTO: 0.66 THOUSAND/ΜL (ref 0–0.61)
EOSINOPHIL NFR BLD AUTO: 12 % (ref 0–6)
ERYTHROCYTE [DISTWIDTH] IN BLOOD BY AUTOMATED COUNT: 20 % (ref 11.6–15.1)
GFR SERPL CREATININE-BSD FRML MDRD: 87 ML/MIN/1.73SQ M
GLUCOSE SERPL-MCNC: 132 MG/DL (ref 65–140)
HCT VFR BLD AUTO: 32.8 % (ref 36.5–49.3)
HGB BLD-MCNC: 11.1 G/DL (ref 12–17)
IMM GRANULOCYTES # BLD AUTO: 0.06 THOUSAND/UL (ref 0–0.2)
IMM GRANULOCYTES NFR BLD AUTO: 1 % (ref 0–2)
INR PPP: 2.74 (ref 0.84–1.19)
LYMPHOCYTES # BLD AUTO: 0.9 THOUSANDS/ΜL (ref 0.6–4.47)
LYMPHOCYTES NFR BLD AUTO: 16 % (ref 14–44)
MCH RBC QN AUTO: 33.3 PG (ref 26.8–34.3)
MCHC RBC AUTO-ENTMCNC: 33.8 G/DL (ref 31.4–37.4)
MCV RBC AUTO: 99 FL (ref 82–98)
MONOCYTES # BLD AUTO: 0.63 THOUSAND/ΜL (ref 0.17–1.22)
MONOCYTES NFR BLD AUTO: 11 % (ref 4–12)
NEUTROPHILS # BLD AUTO: 3.38 THOUSANDS/ΜL (ref 1.85–7.62)
NEUTS SEG NFR BLD AUTO: 60 % (ref 43–75)
NRBC BLD AUTO-RTO: 0 /100 WBCS
PLATELET # BLD AUTO: 114 THOUSANDS/UL (ref 149–390)
POTASSIUM SERPL-SCNC: 3.3 MMOL/L (ref 3.5–5.3)
PROT SERPL-MCNC: 6.4 G/DL (ref 6.4–8.2)
PROTHROMBIN TIME: 28.8 SECONDS (ref 11.6–14.5)
RBC # BLD AUTO: 3.33 MILLION/UL (ref 3.88–5.62)
SODIUM SERPL-SCNC: 141 MMOL/L (ref 136–145)
WBC # BLD AUTO: 5.65 THOUSAND/UL (ref 4.31–10.16)

## 2021-08-02 PROCEDURE — 85025 COMPLETE CBC W/AUTO DIFF WBC: CPT | Performed by: STUDENT IN AN ORGANIZED HEALTH CARE EDUCATION/TRAINING PROGRAM

## 2021-08-02 PROCEDURE — 99232 SBSQ HOSP IP/OBS MODERATE 35: CPT | Performed by: INTERNAL MEDICINE

## 2021-08-02 PROCEDURE — NC001 PR NO CHARGE: Performed by: INTERNAL MEDICINE

## 2021-08-02 PROCEDURE — 85730 THROMBOPLASTIN TIME PARTIAL: CPT | Performed by: INTERNAL MEDICINE

## 2021-08-02 PROCEDURE — 85610 PROTHROMBIN TIME: CPT | Performed by: INTERNAL MEDICINE

## 2021-08-02 PROCEDURE — 80053 COMPREHEN METABOLIC PANEL: CPT | Performed by: STUDENT IN AN ORGANIZED HEALTH CARE EDUCATION/TRAINING PROGRAM

## 2021-08-02 RX ORDER — POTASSIUM CHLORIDE 20 MEQ/1
40 TABLET, EXTENDED RELEASE ORAL ONCE
Status: COMPLETED | OUTPATIENT
Start: 2021-08-02 | End: 2021-08-02

## 2021-08-02 RX ORDER — WARFARIN SODIUM 5 MG/1
5 TABLET ORAL
Qty: 1 TABLET | Refills: 0 | Status: SHIPPED | OUTPATIENT
Start: 2021-08-02 | End: 2021-08-24

## 2021-08-02 RX ORDER — ASPIRIN 81 MG/1
81 TABLET ORAL DAILY
Qty: 30 TABLET | Refills: 3 | Status: SHIPPED | OUTPATIENT
Start: 2021-08-03 | End: 2021-10-26

## 2021-08-02 RX ORDER — PREDNISONE 10 MG/1
10 TABLET ORAL ONCE
Status: COMPLETED | OUTPATIENT
Start: 2021-08-02 | End: 2021-08-02

## 2021-08-02 RX ORDER — ATORVASTATIN CALCIUM 80 MG/1
80 TABLET, FILM COATED ORAL
Qty: 30 TABLET | Refills: 3 | Status: SHIPPED | OUTPATIENT
Start: 2021-08-02 | End: 2021-10-19

## 2021-08-02 RX ORDER — WARFARIN SODIUM 5 MG/1
2.5 TABLET ORAL
Qty: 15 TABLET | Refills: 0 | Status: SHIPPED | OUTPATIENT
Start: 2021-08-04 | End: 2021-08-24

## 2021-08-02 RX ORDER — WARFARIN SODIUM 5 MG/1
5 TABLET ORAL
Status: DISCONTINUED | OUTPATIENT
Start: 2021-08-02 | End: 2021-08-02 | Stop reason: HOSPADM

## 2021-08-02 RX ORDER — PREDNISONE 10 MG/1
10 TABLET ORAL DAILY
Status: DISCONTINUED | OUTPATIENT
Start: 2021-08-03 | End: 2021-08-02

## 2021-08-02 RX ORDER — LISINOPRIL 5 MG/1
5 TABLET ORAL DAILY
Status: DISCONTINUED | OUTPATIENT
Start: 2021-08-03 | End: 2021-08-02 | Stop reason: HOSPADM

## 2021-08-02 RX ADMIN — PREDNISONE 20 MG: 20 TABLET ORAL at 09:48

## 2021-08-02 RX ADMIN — TICAGRELOR 90 MG: 90 TABLET ORAL at 09:48

## 2021-08-02 RX ADMIN — PREDNISONE 10 MG: 10 TABLET ORAL at 12:50

## 2021-08-02 RX ADMIN — PHENOBARBITAL 64.8 MG: 32.4 TABLET ORAL at 09:51

## 2021-08-02 RX ADMIN — ASPIRIN 81 MG: 81 TABLET, COATED ORAL at 09:48

## 2021-08-02 RX ADMIN — POTASSIUM CHLORIDE 40 MEQ: 1500 TABLET, EXTENDED RELEASE ORAL at 12:13

## 2021-08-02 NOTE — PROGRESS NOTES
Summary (Last 24 hours) at 8/2/2021 1901  Last data filed at 8/2/2021 0747  Gross per 24 hour   Intake 776 01 ml   Output 2150 ml   Net -1373 99 ml         Physical Exam:    GEN: Rosa Hernandez appears well, alert and oriented x 3, pleasant and cooperative   HEENT:  Normocephalic, atraumatic, anicteric, moist mucous membranes  NECK: No JVD or carotid bruits   HEART: reg rhythm, reg rate, normal S1 and S2, no murmurs, clicks, gallops or rubs   LUNGS: Clear to auscultation bilaterally; no wheezes, rales, or rhonchi; respiration nonlabored   ABDOMEN:  Normoactive bowel sounds, soft, no tenderness, no distention  EXTREMITIES: peripheral pulses palpable; no edema  NEURO: no gross focal findings; cranial nerves grossly intact   SKIN:  Dry, intact, warm to touch      Current Facility-Administered Medications:     acetaminophen (TYLENOL) tablet 650 mg, 650 mg, Oral, Q4H PRN, Farzad Machado DO, 650 mg at 07/29/21 2248    aspirin (ECOTRIN LOW STRENGTH) EC tablet 81 mg, 81 mg, Oral, Daily, Maddy Magana MD, 81 mg at 08/01/21 0851    atorvastatin (LIPITOR) tablet 80 mg, 80 mg, Oral, Daily With Dinner, Maddy Magana MD, 80 mg at 08/01/21 1743    diphenhydrAMINE (BENADRYL) injection 50 mg, 50 mg, Intravenous, Q6H PRN, Farzad Machado DO, 50 mg at 07/30/21 2007    EPINEPHrine PF (ADRENALIN) 1 mg/mL injection 0 3 mg, 0 3 mg, Subcutaneous, Once PRN, Cortez Martínez MD    heparin (porcine) 25,000 units in 0 45% NaCl 250 mL infusion (premix), 3-20 Units/kg/hr (Order-Specific), Intravenous, Titrated, Aniya Barkley MD, Last Rate: 13 6 mL/hr at 08/01/21 2200, 15 1 Units/kg/hr at 08/01/21 2200    heparin (porcine) injection 2,000 Units, 2,000 Units, Intravenous, Q1H PRN, Aniya Barkley MD    heparin (porcine) injection 4,000 Units, 4,000 Units, Intravenous, Q1H PRN, Aniya Barkley MD, 4,000 Units at 07/31/21 1206    metoprolol tartrate (LOPRESSOR) partial tablet 12 5 mg, 12 5 mg, Oral, Q12H [facility], Elliott Wilkinson MD, 12 5 mg at 08/01/21 0852    ondansetron [facility]) injection 4 mg, 4 mg, Intravenous, Q6H PRN, Kari Roberson DO, 4 mg at 07/29/21 0014    PHENobarbital tablet 64 8 mg, 64 8 mg, Oral, BID, Mary Grace Lozada MD, 64 8 mg at 08/01/21 1743    predniSONE tablet 20 mg, 20 mg, Oral, Daily, Germain Cantrell MD, 20 mg at 08/01/21 3485    ticagrelor (BRILINTA) tablet 180 mg, 180 mg, Oral, Once, Elliott Wilkinson MD    ticagrelor [facility]) tablet 90 mg, 90 mg, Oral, Q12H [address], Elliott Wilkinson MD, 90 mg at 08/01/21 2202    warfarin (COUMADIN) tablet 10 mg, 10 mg, Oral, Daily (warfarin), Germain Cantrell MD, 10 mg at 08/01/21 1743    Labs & Results:  Results from last 7 days   Lab Units 07/28/21  1212   TROPONIN I ng/mL 3 82*         Results from last 7 days   Lab Units 07/31/21  0236 07/30/21  2032 07/30/21  0815   POTASSIUM mmol/L 4 2 3 9 4 0   CO2 mmol/L 22 24 18*   CHLORIDE mmol/L 108 104 108   BUN mg/dL 24 25 27*   CREATININE mg/dL 0 95 1 00 1 37*     Results from last 7 days   Lab Units 07/31/21  0954 07/29/21  0546 07/28/21  1212   HEMOGLOBIN g/dL 11 0* 11 7* 12 1   HEMATOCRIT % 33 5* 36 1* 37 0   PLATELETS Thousands/uL 98* 112* 118*           Telemetry:   Personally reviewed by Pati Ricardo MD:     Imaging: Apical Thrombus        VTE Prophylaxis: Heparin        Assessment:  Active Problems:    * No active hospital problems  *        1  Ischemic Cardiomyopathy s/p PCI to mLAD (2010), RCA (2017), mLAD (04/2021), mRCA (08/2021)  --> Patient previously non-compliant with DAPT therapy given concern about side effects and potential interaction with his Phenobarbital  Patient has been counseled on importance of compliance to reduce risk of catastrophic ISR in setting of newly placed stents  2  Apical Thrombus Likely in setting of Ischemic Cardiomyopathy with Reduced Ejection Fraction  3  HTN // HLD  4  Iodine Contrast Dye Allergic Reaction  5  Seizure History on Phenobarbital  6   S/p AAA Repair      Plan:  - c/w Aspirin 81mg PO Daily  - c/w Ticagrelor 90mg PO BID  - c/w Coumadin 5mg PO Daily x1 and then 2 5mg PO Daily thereafter with INR Goal 2-3  --> Triple Therapy x1 month and then can continue Ticagrelor and Coumadin thereafter  --> Will discuss duration of AC with primary Cardiology in outpatient   setting for Apical Thrombus  --> Follow up PT/INR on Thursday  --> Spoke with Coumadin clinic in regards to patient establishing care  Our team will contact patient  - c/w Lipitor 80mg PO QHS  - c/w Metoprolol Tart  12 5mg PO BID  - d/c Prednisone after 10mg dose today  - Resume Lisinopril 5mg PO Daily  - Stable for discharge today with follow up on 8/19/21 @220PM @ [address] with Dr Parekh Washington    Case discussed and reviewed with Dr Lilibeth Prescott who agrees with my assessment and plan  Thank you for involving us in the care of your patient  Paula Almodovar MD  Cardiology Fellow PGY-4      Epic/ Allscripts/Care Everywhere records reviewed:     ** Please Note: Fluency DirectDictation voice to text software may have been used in the creation of this document   **

## 2021-08-02 NOTE — DISCHARGE SUMMARY
Discharge Summary - Bev Neely 67 y o  male MRN: [id number]        Admission Date: 7/28/2021     Date of Discharge:     Admitting Diagnosis: Heart block [I45 9]  Chest pain [R07 9]  Elevated troponin [R77 8]  Coronary artery disease involving native coronary artery of native heart without angina pectoris [I25 10]    Secondary Diagnoses: JUAN 2/2 Contrast Nephropathy    Discharge Diagnosis: MI    Cardiologist:Dr Galo White    Office Cardiologist: Dr Rajinder Nam       [facility]:   67y o  year old male with a history of cardiomyopathy, CAD, three myocardial infarctions, (2010 - AGUSTIN to the mid LAD; 2017 - AGUSTIN to RCA;  04/2021 - AGUSTIN to mid LAD at site of previous stent)    Patient came to the ED on 7/28 with with typical chest pain, elevated troponin at 3 82, EKG showed sinus 1st degree AV block, with ST and T wave abnormalities suggestive of STEMI   The patient also stated that he was not taking his dual antiplatelet therapy for the last couple months  Patient was taken to the cath lab and was found to have an 99% stenosis of the mid RCA  [facility] Blood is currently status post 1 drug-eluting stent to the mid RCA       The patient has a documented allergy to the dye used in catheterization, and was given hydrocortisone 100 mg IV during the catheterization   During the night after the stent placement the patient, the patient experienced an elevated temperature of 102 3° and hypotension in the 80s over 40s, he was treated for an iatrogenic allergic reaction to the catheterization dye, then given hydrocortisone, diphenhydramine, and put on normal saline infusion   He improved the following day  Patient subsequently had a TTE post-cath and was noted to have a apical thrombus and was started on anticoagulation as a result        Procedures Performed:   Orders Placed This Encounter   Procedures    Cardiac coronary angio/lhc/pci    ED ECG Documentation Only       Complications: n/a      Discharge instructions/Information to patient and family:   See after visit summary for information provided to patient and family  Provisions for Follow-Up Care:  - c/w Lipitor 80mg PO QHS  - c/w Metoprolol Succ  50mg PO Daily  - c/w Lisinopril 5mg PO Daily  - c/w Aspirin 81mg PO Daily   - c/w Ticagrelor 90mg PO BID   - c/w Coumadin 5mg PO Daily with INR Goal 2-3  --> Triple Therapy x1 month and then can continue Ticagrelor and Coumadin thereafter  --> Will discuss duration of Coumadin with primary Cardiology in outpatient   setting for Apical Thrombus  - Follow up PT/INR on Thursday as outpatient and establish care with Coumadin clinic  Discussed with Christi  - Follow up appointment with Edmundo Weston 8/19/21 @220PM @ [address] [facility] with Dr Tamanna Blanco      Disposition: Home      Planned Readmission: No    Discharge Statement   I spent 45 minutes discharging the patient  This time was spent on the day of discharge  I had direct contact with the patient on the day of discharge  Additional documentation is required if more than 30 minutes were spent on discharge  Discharge Medications:  Please refer to medication discharge record for current medications             ** Please Note: Fluency Direct Dictation voice to text software may have been used in the creation of this document   **

## 2021-08-02 NOTE — PROGRESS NOTES
Cardiology Progress Note - Christ Treadwell 67 y o  male MRN: [id number]    Unit/Bed#: [facility] 520-01 Encounter: [id number]      Assessment:  Active Problems:    * No active hospital problems  *  Myocardial infarction  Hypertension  Hyperlipidemia  Cardiomyopathy    Plan:  Patient is comfortable this morning  He has no chest pain or significant dyspnea  He is in sinus rhythm on telemetry  PT INR today is pending  Will reduce dose of prednisone to 10 mg per day and likely discontinue tomorrow  Will continue present medical regimen  On PT INR therapeutic will be able to discontinue heparin and arrange discharge planning  Subjective:   Patient seen and examined  No significant events overnight   negative  Objective:     Vitals: Blood pressure 93/53, pulse 75, temperature 97 8 °F (36 6 °C), temperature source Oral, resp  rate 22, weight 99 8 kg (220 lb), SpO2 95 %  , Body mass index is 29 84 kg/m² ,   Orthostatic Blood Pressures      Most Recent Value   Blood Pressure  93/53 filed at 08/02/2021 0747   Patient Position - Orthostatic VS  Lying filed at 08/02/2021 0747      ,      Intake/Output Summary (Last 24 hours) at 8/2/2021 0836  Last data filed at 8/2/2021 0747  Gross per 24 hour   Intake 776 01 ml   Output 2150 ml   Net -1373 99 ml       No significant arrhythmias seen on telemetry review         Physical Exam:    GEN: Christ Treadwell appears well, alert and oriented x 3, pleasant and cooperative   NECK: supple, no carotid bruits, no JVD or HJR  HEART: normal rate, regular rhythm, normal S1 and S2, no murmurs, clicks, gallops or rubs   LUNGS: clear to auscultation bilaterally; no wheezes, rales, or rhonchi   ABDOMEN: normal bowel sounds, soft, no tenderness, no distention  EXTREMITIES: peripheral pulses normal; no clubbing, cyanosis, or edema  SKIN: warm and well perfused, no suspicious lesions on exposed skin    Labs & Results:    Admission on 07/28/2021   Component Date Value    Ventricular Rate 07/28/2021 65  Atrial Rate 07/28/2021 65     CA Interval 07/28/2021 256     QRSD Interval 07/28/2021 88     QT Interval 07/28/2021 382     QTC Interval 07/28/2021 397     P Axis 07/28/2021 48     QRS Axis 07/28/2021 16     T Wave Axis 07/28/2021 79     WBC 07/28/2021 3 75*    RBC 07/28/2021 3 85*    Hemoglobin 07/28/2021 12 1     Hematocrit 07/28/2021 37 0     MCV 07/28/2021 96     MCH 07/28/2021 31 4     MCHC 07/28/2021 32 7     RDW 07/28/2021 19 3*    MPV 07/28/2021 11 4     Platelets 29/35/6119 118*    nRBC 07/28/2021 0     Neutrophils Relative 07/28/2021 53     Immat GRANS % 07/28/2021 1     Lymphocytes Relative 07/28/2021 32     Monocytes Relative 07/28/2021 8     Eosinophils Relative 07/28/2021 5     Basophils Relative 07/28/2021 1     Neutrophils Absolute 07/28/2021 2 00     Immature Grans Absolute 07/28/2021 0 04     Lymphocytes Absolute 07/28/2021 1 19     Monocytes Absolute 07/28/2021 0 31     Eosinophils Absolute 07/28/2021 0 17     Basophils Absolute 07/28/2021 0 04     Sodium 07/28/2021 140     Potassium 07/28/2021 3 7     Chloride 07/28/2021 111*    CO2 07/28/2021 23     ANION GAP 07/28/2021 6     BUN 07/28/2021 10     Creatinine 07/28/2021 0 86     Glucose 07/28/2021 109     Calcium 07/28/2021 8 3     Corrected Calcium 07/28/2021 9 1     AST 07/28/2021 23     ALT 07/28/2021 12     Alkaline Phosphatase 07/28/2021 131*    Total Protein 07/28/2021 7 1     Albumin 07/28/2021 3 0*    Total Bilirubin 07/28/2021 0 42     eGFR 07/28/2021 84     Troponin I 07/28/2021 3 82*    PTT 07/28/2021 28     Protime 07/28/2021 14 1     INR 07/28/2021 1 09     Ventricular Rate 07/28/2021 57     Atrial Rate 07/28/2021 375     QRSD Interval 07/28/2021 82     QT Interval 07/28/2021 424     QTC Interval 07/28/2021 412     QRS Axis 07/28/2021 4     T Wave Axis 07/28/2021 22     Activated Clotting Time,* 07/28/2021 225*    Specimen Type 07/28/2021 VENOUS     Sodium 07/29/2021 138     Potassium 07/29/2021 4 2     Chloride 07/29/2021 110*    CO2 07/29/2021 20*    ANION GAP 07/29/2021 8     BUN 07/29/2021 15     Creatinine 07/29/2021 1 17     Glucose 07/29/2021 157*    Glucose, Fasting 07/29/2021 157*    Calcium 07/29/2021 7 7*    eGFR 07/29/2021 60     WBC 07/29/2021 15 70*    RBC 07/29/2021 3 65*    Hemoglobin 07/29/2021 11 7*    Hematocrit 07/29/2021 36 1*    MCV 07/29/2021 99*    MCH 07/29/2021 32 1     MCHC 07/29/2021 32 4     RDW 07/29/2021 19 5*    MPV 07/29/2021 11 9     Platelets 85/49/2995 112*    nRBC 07/29/2021 0     Segmented % 07/29/2021 90*    Bands % 07/29/2021 6     Lymphocytes % 07/29/2021 2*    Monocytes % 07/29/2021 2*    Eosinophils, % 07/29/2021 0     Basophils % 07/29/2021 0     Absolute Neutrophils 07/29/2021 15 07*    Lymphocytes Absolute 07/29/2021 0 31*    Monocytes Absolute 07/29/2021 0 31     Eosinophils Absolute 07/29/2021 0 00     Basophils Absolute 07/29/2021 0 00     RBC Morphology 07/29/2021 Present     Anisocytosis 07/29/2021 Present     Poikilocytes 07/29/2021 Present     Platelet Estimate 12/80/5076 Decreased*    Sodium 07/30/2021 135*    Potassium 07/30/2021 4 0     Chloride 07/30/2021 108     CO2 07/30/2021 18*    ANION GAP 07/30/2021 9     BUN 07/30/2021 27*    Creatinine 07/30/2021 1 37*    Glucose 07/30/2021 126     Glucose, Fasting 07/30/2021 126*    Calcium 07/30/2021 8 3     eGFR 07/30/2021 50     Sodium 07/30/2021 136     Potassium 07/30/2021 3 9     Chloride 07/30/2021 104     CO2 07/30/2021 24     ANION GAP 07/30/2021 8     BUN 07/30/2021 25     Creatinine 07/30/2021 1 00     Glucose 07/30/2021 139     Calcium 07/30/2021 8 3     eGFR 07/30/2021 73     Sodium 07/31/2021 137     Potassium 07/31/2021 4 2     Chloride 07/31/2021 108     CO2 07/31/2021 22     ANION GAP 07/31/2021 7     BUN 07/31/2021 24     Creatinine 07/31/2021 0 95     Glucose 07/31/2021 168*    Calcium 07/31/2021 8 0*    eGFR 2021 77     PTT 2021 30     WBC 2021 9 70     RBC 2021 3 44*    Hemoglobin 2021 11 0*    Hematocrit 2021 33 5*    MCV 2021 97     MCH 2021 32 0     MCHC 2021 32 8     RDW 2021 19 7*    Platelets  98*    MPV 2021 11 2     Protime 2021 14 8*    INR 2021 1 16     PTT 2021 33     PTT 2021 30     PTT 2021 75*    Protime 2021 15 3*    INR 2021 1 20*    PTT 2021 95*    PTT 2021 101*    PTT 2021 78*       XR chest 1 view portable    Result Date: 2021  Narrative: CHEST INDICATION:   chest pain  COMPARISON:  2021 EXAM PERFORMED/VIEWS:  XR CHEST PORTABLE FINDINGS: Cardiomegaly is present  The lungs are clear  No pneumothorax or pleural effusion  Osseous structures appear within normal limits for patient age  Impression: No acute cardiopulmonary disease  Workstation performed: [id number]     Echo limited with contrast if indicated    Result Date: 2021  Narrative: [facility] [address] [facility], [address] [facility] [phone] Transthoracic Echocardiogram Limited 2D, M-mode, Doppler, and Color Doppler Study date:  2021 Patient: June Gannon MR number: [id number] Account number: [de-identified] : 1945 Age: 67 years Gender: Male Status: Outpatient Location: Bedside Height: 72 in Weight: 219 6 lb BP: 82/ 47 mmHg Indications: Post MI Diagnoses: I21 3 - ST elevation (STEMI) myocardial infarction of unspecified site Sonographer:  Nathalie Medel RDCS Primary Physician:  Hanh Willett DO Referring Physician:  Francesca Clayton MD Group:  [facility] Interpreting Physician:  Raven Andrews MD SUMMARY LEFT VENTRICLE: Systolic function was moderately reduced  Ejection fraction was estimated to be 36 %   There was akinesis of the entire inferior, basal-mid inferolateral, apical septal, apical lateral, and apical wall(s)  There was no evidence of concentric hypertrophy  Doppler parameters were consistent with abnormal left ventricular relaxation (grade 1 diastolic dysfunction)  There was a large, sessile, echogenic mass, measuring 16 mm x 24 mm  LEFT ATRIUM: The atrium was mildly dilated  AORTIC VALVE: There was trace regurgitation  TRICUSPID VALVE: There was trace regurgitation  PULMONIC VALVE: There was trace regurgitation  HISTORY: PRIOR HISTORY: Abdominal Aortic Aneurysm, CAD, STEMI, CHF PROCEDURE: The procedure was performed at the bedside  This was a routine study  The transthoracic approach was used  The study included limited 2D imaging, M-mode, limited spectral Doppler, and color Doppler  The heart rate was 70 bpm, at the start of the study  Images were obtained from the parasternal, apical, and subcostal acoustic windows  Echocardiographic views were limited due to decreased penetration  Image quality was adequate  LEFT VENTRICLE: Size was normal  Systolic function was moderately reduced  Ejection fraction was estimated to be 36 %  There was akinesis of the entire inferior, basal-mid inferolateral, apical septal, apical lateral, and apical wall(s)  Wall thickness was normal  There was no evidence of concentric hypertrophy  There was a large, sessile, echogenic mass, measuring 16 mm x 24 mm  DOPPLER: Doppler parameters were consistent with abnormal left ventricular relaxation (grade 1 diastolic dysfunction)  RIGHT VENTRICLE: The size was normal  Systolic function was normal  Wall thickness was normal  LEFT ATRIUM: The atrium was mildly dilated  RIGHT ATRIUM: Size was normal  MITRAL VALVE: Valve structure was normal  There was normal leaflet separation  DOPPLER: The transmitral velocity was within the normal range  There was no evidence for stenosis  There was no significant regurgitation  AORTIC VALVE: The valve was trileaflet  Leaflets exhibited normal thickness and normal cuspal separation  DOPPLER: Transaortic velocity was within the normal range  There was no evidence for stenosis  There was trace regurgitation  TRICUSPID VALVE: The valve structure was normal  There was normal leaflet separation  DOPPLER: The transtricuspid velocity was within the normal range  There was no evidence for stenosis  There was trace regurgitation  PULMONIC VALVE: Leaflets exhibited normal thickness, no calcification, and normal cuspal separation  DOPPLER: The transpulmonic velocity was within the normal range  There was trace regurgitation  PERICARDIUM: There was no pericardial effusion  The pericardium was normal in appearance  AORTA: The root exhibited normal size  SYSTEMIC VEINS: IVC: The inferior vena cava was normal in size  SYSTEM MEASUREMENT TABLES 2D LVEDV MOD A4C: 114 21 ml LVEF MOD A4C: 46 05 % LVESV MOD A4C: 61 62 ml LVLd A4C: 8 34 cm LVLs A4C: 6 82 cm SV MOD A4C: 52 59 ml CW AV Env  Ti: 274 02 ms AV VTI: 17 41 cm AV Vmax: 0 87 m/s AV Vmean: 0 63 m/s AV maxPG: 3 09 mmHg AV meanP 82 mmHg PW E' Sept: 0 06 m/s LVOT Env  Ti: 300 67 ms LVOT VTI: 15 87 cm LVOT Vmax: 0 77 m/s LVOT Vmean: 0 53 m/s LVOT maxP 4 mmHg LVOT meanP 28 mmHg [facility] Accredited Echocardiography Laboratory Prepared and electronically signed by Felix Strange MD Signed 2021 18:25:13     Cardiac coronary angio/lhc/pci    Result Date: 2021  Narrative: [facility] [address] [place] [address] [phone] [facility] Invasive Cardiovascular Lab Complete Report Patient: Daisha Kumar MR number: [id number] Account number: [de-identified] Study date: 2021 Gender: Male : 1945 Height: 72 in Weight: 220 lb BSA: 2 22 mï¾² Allergies: IODINATED DIAGNOSTIC AGENTS, CLOPIDOGREL Diagnostic Cardiologist:  Hailey Gibbs DO Interventional Cardiologist:  Hailey Gibbs DO Primary Physician:  Edilia Strong SUMMARY CORONARY CIRCULATION: Ostial left main: There was a 30 % stenosis, no pressure dampening with 6F guide Mid LAD: There was a 10 % stenosis at the site of a prior stent  Proximal circumflex: There was a diffuse 40 % stenosis  2nd obtuse marginal: There was a 60 % stenosis at the ostium of the vessel segment  Mid RCA: There was a 99 % stenosis  The lesion was associated with a moderate filling defect consistent with thrombus and HELIO 1 flow 1ST LESION INTERVENTIONS: A successful thrombectomy with stent and stent procedure was performed on the 99 % lesion in the mid RCA  Following intervention there was a 0 % residual stenosis  A Xience Jennifer Rx 3 5 x 28mm drug-eluting stent was placed across the lesion and deployed at a maximum inflation pressure of 16 antony  INDICATIONS: --  Possible CAD: myocardial infarction without ST elevation (NSTEMI), acute nstemi, urgent cath lab PROCEDURES PERFORMED --  Left heart catheterization without ventriculogram  --  Left coronary angiography  --  Right coronary angiography  --  Acute Myocardial Infarct  --  Mod Sedation Same Physician Initial 15min  --  Mod Sedation Same Physician Add 15min  --  Mod Sedation Same Physician Add 15min  --  Coronary Catheterization (w/ [facility])  --  AMI PCI (AGUSTIN, PTCRA, PTCA) Single  --  Intervention on mid RCA: thrombectomy, stent, stent  PROCEDURE: The risks and alternatives of the procedures and conscious sedation were explained to the patient and informed consent was obtained  The patient was brought to the cath lab and placed on the table  The planned puncture sites were prepped and draped in the usual sterile fashion  --  Right radial artery access  After performing an Vineet's test to verify adequate ulnar artery supply to the hand, the radial site was prepped  The puncture site was infiltrated with local anesthetic  The vessel was accessed using the modified Seldinger technique, a wire was advanced into the vessel, and a sheath was advanced over the wire into the vessel   --  Left heart catheterization without ventriculogram  A catheter was advanced over a guidewire into the ascending aorta  After recording ascending aortic pressure, the catheter was advanced across the aortic valve and left ventricular pressure was recorded  The catheter was pulled back across the aortic valve and into the ascending aorta and pullback pressures were obtained  --  Left coronary artery angiography  A catheter was advanced over a guidewire into the aorta and positioned in the left coronary artery ostium under fluoroscopic guidance  Angiography was performed  --  Right coronary artery angiography  A catheter was advanced over a guidewire into the aorta and positioned in the right coronary artery ostium under fluoroscopic guidance  Angiography was performed  --  Acute Myocardial Infarct  --  Mod Sedation Same Physician Initial 15min  --  Mod Sedation Same Physician Add 15min  --  Mod Sedation Same Physician Add 15min  --  Coronary Catheterization (w/ [facility])  LESION INTERVENTION: A successful thrombectomy with stent and stent procedure was performed on the 99 % lesion in the mid RCA  Following intervention there was a 0 % residual stenosis  There was HELIO 1 flow before the procedure and HELIO 3 flow after the procedure  There was no dissection  --  Vessel setup was performed  A 6Fr  Launcher JR 4 0 100cm guiding catheter was used to cannulate the vessel  --  Vessel setup was performed  A Run[place] NS 180cm wire was used to cross the lesion  --  Mechanical ( 6Fr  [place] AP) thrombectomy was performed, with max duration 25 sec, max volume out 10 ml, and 1 attempt(s)  --  A Xience Jennifer Rx 3 5 x 28mm drug-eluting stent was placed across the lesion and deployed at a maximum inflation pressure of 16 antony  INTERVENTIONS: --  AMI PCI (AGUSTIN, PTCRA, PTCA) Single  PROCEDURE COMPLETION: The patient tolerated the procedure well and was discharged from the cath lab  TIMING: Test started at 15:07  Test concluded at 15:44   HEMOSTASIS: The sheath was removed  The site was compressed with a Hemoband device  Hemostasis was obtained  MEDICATIONS GIVEN: Benadryl (50mg/ml), 50 mg, IV, at 15:02  Solucortef (100mg/2ml), 100 mg, IV, at 15:02  Pepcid, 20 mg, IV, at 15:02  Ticagrelor, 180 mg, PO, at 15:03  Versed (2mg/2ml), 2 mg, IV, at 15:08  Fentanyl (1OOmcg/2 ml), 50 mcg, IV, at 15:08  1% Lidocaine, 1 ml, subcutaneously, at 15:12  Nitroglycerin (200mcg/ml), 200 mcg, at 15:13  Verapamil (5mg/2ml), 2 5 mg, IV, at 15:13  Heparin 1000 units/ml, 8,000 units, IV, at 15:27  Neosynephrine, 100 mcg, IV, at 15:38  CONTRAST GIVEN: 100 ml Omnipaque (350mg I /ml)  RADIATION EXPOSURE: Fluoroscopy time: 9 38 min  HEMODYNAMICS: Hemodynamic assessment demonstrated normal LVEDP  CORONARY VESSELS:   --  The coronary circulation is right dominant  --  Ostial left main: There was a 30 % stenosis, no pressure dampening with 6F guide --  Mid LAD: There was a 10 % stenosis at the site of a prior stent  --  2nd diagonal: The vessel was very small sized atherosclerotic ostium --  Proximal circumflex: There was a diffuse 40 % stenosis  --  2nd obtuse marginal: There was a 60 % stenosis at the ostium of the vessel segment  --  Proximal RCA: There was a 10 % stenosis at the site of a prior stent  --  Mid RCA: There was a 99 % stenosis  The lesion was associated with a moderate filling defect consistent with thrombus and HELIO 1 flow IMPRESSIONS: PCI mid RCA with AGUSTIN  Patent LAD stents  Patient noncompliant with dapt and stopped ticagrelor after lad stent in April of this year  RECOMMENDATIONS stressed importance of compliance to avoid mi/stent thrombosis/ death with dapt and stopping antiplatelets, recommend dapt x 1 year minimum, gdmt cad  DISPOSITION: The patient left the catheterization laboratory in stable condition   Prepared and signed by Donaldo Kline DO Signed 07/28/2021 16:07:38 Study diagram Angiographic findings Native coronary lesions: ï¾·Ostial left main: Lesion 1: 30 % stenosis  ï¾·Mid LAD: Lesion 1: 10 % stenosis, site of prior stent  ï¾·Proximal circumflex: Lesion 1: diffuse, 40 % stenosis  ï¾·OM2: Lesion 1: 60 % stenosis  ï¾·Proximal RCA: Lesion 1: 10 % stenosis, site of prior stent  ï¾·Mid RCA: Lesion 1: 99 % stenosis  Intervention results Native coronary lesions: ï¾·Successful thrombectomy, stent, and stent of the 99 % stenosis in mid RCA  0 % residual stenosis  Stent: Dene Humbles Rx 3 5 x 28mm drug-eluting  Hemodynamic tables Pressures:  Baseline Pressures:  - HR: 79 Pressures:  - Rhythm: Pressures:  -- Aortic Pressure (S/D/M): 86/52/63 Pressures:  -- Left Ventricle (s/edp): 82/16/-- Outputs:  Baseline Outputs:  -- CALCULATIONS[age] Outputs:  -- CALCULATIONS: Body Surface Area: 2 22 Outputs:  -- CALCULATIONS: Height in cm: 183 00 Outputs:  -- CALCULATIONS: Sex: Male Outputs:  -- CALCULATIONS: Weight in k 00       EKG personally reviewed by Isac Alpers, MD      Counseling / Coordination of Care  Total floor / unit time spent today 30 minutes  Greater than 50% of total time was spent with the patient and / or family counseling and / or coordination of care

## 2021-08-05 ENCOUNTER — TRANSITIONAL CARE MANAGEMENT (OUTPATIENT)
Dept: INTERNAL MEDICINE CLINIC | Facility: CLINIC | Age: 76
End: 2021-08-05

## 2021-08-12 ENCOUNTER — ANTICOAG VISIT (OUTPATIENT)
Dept: CARDIOLOGY CLINIC | Facility: CLINIC | Age: 76
End: 2021-08-12

## 2021-08-12 ENCOUNTER — APPOINTMENT (OUTPATIENT)
Dept: LAB | Facility: HOSPITAL | Age: 76
End: 2021-08-12
Attending: INTERNAL MEDICINE
Payer: MEDICARE

## 2021-08-19 ENCOUNTER — OFFICE VISIT (OUTPATIENT)
Dept: CARDIOLOGY CLINIC | Facility: CLINIC | Age: 76
End: 2021-08-19
Payer: MEDICARE

## 2021-08-19 VITALS
DIASTOLIC BLOOD PRESSURE: 62 MMHG | OXYGEN SATURATION: 96 % | HEART RATE: 75 BPM | BODY MASS INDEX: 28.31 KG/M2 | SYSTOLIC BLOOD PRESSURE: 106 MMHG | WEIGHT: 209 LBS | HEIGHT: 72 IN

## 2021-08-19 DIAGNOSIS — Z98.890 S/P AAA (ABDOMINAL AORTIC ANEURYSM) REPAIR: Chronic | ICD-10-CM

## 2021-08-19 DIAGNOSIS — I50.42 CHRONIC COMBINED SYSTOLIC AND DIASTOLIC CHF (CONGESTIVE HEART FAILURE) (HCC): ICD-10-CM

## 2021-08-19 DIAGNOSIS — Z86.79 S/P AAA (ABDOMINAL AORTIC ANEURYSM) REPAIR: Chronic | ICD-10-CM

## 2021-08-19 DIAGNOSIS — I25.5 ISCHEMIC CARDIOMYOPATHY: Primary | ICD-10-CM

## 2021-08-19 DIAGNOSIS — I25.10 CORONARY ARTERY DISEASE INVOLVING NATIVE CORONARY ARTERY OF NATIVE HEART WITHOUT ANGINA PECTORIS: ICD-10-CM

## 2021-08-19 DIAGNOSIS — I10 ESSENTIAL HYPERTENSION: Chronic | ICD-10-CM

## 2021-08-19 DIAGNOSIS — E78.2 MIXED HYPERLIPIDEMIA: Chronic | ICD-10-CM

## 2021-08-19 PROCEDURE — 99214 OFFICE O/P EST MOD 30 MIN: CPT | Performed by: INTERNAL MEDICINE

## 2021-08-19 NOTE — PROGRESS NOTES
Cardiology Follow Up    Mallika Larry  1945  [id number]  [facility] [address] [facility] [id number] [phone]    2  Ischemic cardiomyopathy     2  Coronary artery disease involving native coronary artery of native heart without angina pectoris  Ambulatory referral to Cardiology   3  Chronic combined systolic and diastolic CHF (congestive heart failure) ([facility] )     4  Essential hypertension     5  Mixed hyperlipidemia     6  S/P AAA (abdominal aortic aneurysm) repair         Interval History: History of stenting to his LAD in 2010 and April of 2021 history of stenting to his right coronary artery in 2017  Presented in July with an inferior wall myocardial infarction at 99% right lesion that was restenting it  Since his discharge from the hospital no complaints he denies chest pain shortness of breath orthopnea paro    Patient Active Problem List   Diagnosis    CAD (coronary artery disease)    HTN (hypertension)    HLD (hyperlipidemia)    Seizure disorder (HCC)    S/P AAA (abdominal aortic aneurysm) repair    STEMI (ST elevation myocardial infarction) ([facility] )    Ventral hernia with obstruction and without gangrene    Hernia, incisional    Abdominal pain    NSTEMI (non-ST elevated myocardial infarction) ([facility] )    Chronic combined systolic and diastolic CHF (congestive heart failure) ([facility])    Allergic reaction to contrast media    Ischemic cardiomyopathy     Past Medical History:   Diagnosis Date    Cardiac disease     CHF (congestive heart failure) ([facility] )     Hernia of abdominal cavity     Myocardial infarction ([facility] )     last assessed 7/31/14, past, Pt had MI s/p AGUSTIN placed in 2001, refuses to take any other medications for his cardiac disease, only will take seizure med   Understands possible complications from this decision    Seizure [facility])      Social History     Socioeconomic History    Marital status: Single     Spouse name: Not on file    Number of children: Not on file    Years of education: Not on file    Highest education level: Not on file   Occupational History    Not on file   Tobacco Use    Smoking status: Former Smoker     Quit date: 2005     Years since quittin 2    Smokeless tobacco: Never Used   Vaping Use    Vaping Use: Never used   Substance and Sexual Activity    Alcohol use: Not Currently    Drug use: Never    Sexual activity: Never   Other Topics Concern    Not on file   Social History Narrative    Not on file     Social Determinants of Health     Financial Resource Strain:     Difficulty of Paying Living Expenses:    Food Insecurity:     Worried About Running Out of Food in the Last Year:     [address] in the Last Year:    Transportation Needs:     Lack of Transportation (Medical):      Lack of Transportation (Non-Medical):    Physical Activity:     Days of Exercise per Week:     Minutes of Exercise per Session:    Stress:     Feeling of Stress :    Social Connections:     Frequency of Communication with Friends and Family:     Frequency of Social Gatherings with Friends and Family:     Attends Restoration[employer] Services:     Active Member of Clubs or Organizations:     Attends Club or Organization Meetings:     Marital Status:    Intimate Partner Violence:     Fear of Current or Ex-Partner:     Emotionally Abused:     Physically Abused:     Sexually Abused:       Family History   Problem Relation Age of Onset    Hypertension Father     Kidney disease Brother      Past Surgical History:   Procedure Laterality Date    ABDOMINAL AORTIC ANEURYSM REPAIR      for dialation or occulsion    CATARACT EXTRACTION         Current Outpatient Medications:     aspirin (ECOTRIN LOW STRENGTH) 81 mg EC tablet, Take 1 tablet (81 mg total) by mouth daily, Disp: 30 tablet, Rfl: 3    atorvastatin (LIPITOR) 80 mg tablet, Take 1 tablet (80 mg total) by mouth daily with dinner, Disp: 30 tablet, Rfl: 3    PHENobarbital 64 8 mg tablet, TAKE 1 TABLET (64 8 MG TOTAL) BY MOUTH 2 (TWO) TIMES A DAY, Disp: 60 tablet, Rfl: 5    warfarin (COUMADIN) 5 mg tablet, Take 0 5 tablets (2 5 mg total) by mouth daily, Disp: 15 tablet, Rfl: 0    lisinopril (ZESTRIL) 5 mg tablet, TAKE 1 TABLET BY MOUTH EVERY DAY (Patient not taking: TAKE 1 TABLET BY MOUTH EVERY DAY), Disp: 90 tablet, Rfl: 0    metoprolol succinate (TOPROL-XL) 50 mg 24 hr tablet, TAKE 1 TABLET BY MOUTH EVERY DAY (Patient not taking: TAKE 1 TABLET BY MOUTH EVERY DAY), Disp: 90 tablet, Rfl: 0    nitroglycerin (NITROSTAT) 0 4 mg SL tablet, Place 1 tablet (0 4 mg total) under the tongue every 5 (five) minutes as needed for chest pain (Patient not taking: Reported on 4/15/2021), Disp: 30 tablet, Rfl: 1    potassium chloride (K-DUR,KLOR-CON) 20 mEq tablet, Take 2 tablets (40 mEq total) by mouth once for 1 dose (Patient not taking: Reported on 4/15/2021), Disp: 2 tablet, Rfl: 0    ticagrelor (BRILINTA) 90 MG, Take 1 tablet (90 mg total) by mouth every 12 (twelve) hours (Patient not taking: Reported on 8/19/2021), Disp: 60 tablet, Rfl: 3    triamcinolone (KENALOG) 0 025 % cream, Apply topically 2 (two) times a day (Patient not taking: Reported on 4/15/2021), Disp: 30 g, Rfl: 0    warfarin (COUMADIN) 5 mg tablet, Take 1 tablet (5 mg total) by mouth once for 1 dose, Disp: 1 tablet, Rfl: 0  Allergies   Allergen Reactions    Iodinated Diagnostic Agents Rash     Pt's skin get very red and he gets hot and chills    Clopidogrel        Labs:  Admission on 07/28/2021, Discharged on 08/02/2021   Component Date Value    Ventricular Rate 07/28/2021 65     Atrial Rate 07/28/2021 65     OH Interval 07/28/2021 256     QRSD Interval 07/28/2021 88     QT Interval 07/28/2021 382     QTC Interval 07/28/2021 397     P Axis 07/28/2021 48     QRS Axis 07/28/2021 16     T Wave Axis 07/28/2021 79     WBC 07/28/2021 3 75*    RBC 07/28/2021 3 85*    Hemoglobin 07/28/2021 12 1     Hematocrit 07/28/2021 37 0     MCV 07/28/2021 96     MCH 07/28/2021 31 4     MCHC 07/28/2021 32 7     RDW 07/28/2021 19 3*    MPV 07/28/2021 11 4     Platelets 67/72/8108 118*    nRBC 07/28/2021 0     Neutrophils Relative 07/28/2021 53     Immat GRANS % 07/28/2021 1     Lymphocytes Relative 07/28/2021 32     Monocytes Relative 07/28/2021 8     Eosinophils Relative 07/28/2021 5     Basophils Relative 07/28/2021 1     Neutrophils Absolute 07/28/2021 2 00     Immature Grans Absolute 07/28/2021 0 04     Lymphocytes Absolute 07/28/2021 1 19     Monocytes Absolute 07/28/2021 0 31     Eosinophils Absolute 07/28/2021 0 17     Basophils Absolute 07/28/2021 0 04     Sodium 07/28/2021 140     Potassium 07/28/2021 3 7     Chloride 07/28/2021 111*    CO2 07/28/2021 23     ANION GAP 07/28/2021 6     BUN 07/28/2021 10     Creatinine 07/28/2021 0 86     Glucose 07/28/2021 109     Calcium 07/28/2021 8 3     Corrected Calcium 07/28/2021 9 1     AST 07/28/2021 23     ALT 07/28/2021 12     Alkaline Phosphatase 07/28/2021 131*    Total Protein 07/28/2021 7 1     Albumin 07/28/2021 3 0*    Total Bilirubin 07/28/2021 0 42     eGFR 07/28/2021 84     Troponin I 07/28/2021 3 82*    PTT 07/28/2021 28     Protime 07/28/2021 14 1     INR 07/28/2021 1 09     Ventricular Rate 07/28/2021 57     Atrial Rate 07/28/2021 375     QRSD Interval 07/28/2021 82     QT Interval 07/28/2021 424     QTC Interval 07/28/2021 412     QRS Axis 07/28/2021 4     T Wave Axis 07/28/2021 22     Activated Clotting Time,* 07/28/2021 225*    Specimen Type 07/28/2021 VENOUS     Sodium 07/29/2021 138     Potassium 07/29/2021 4 2     Chloride 07/29/2021 110*    CO2 07/29/2021 20*    ANION GAP 07/29/2021 8     BUN 07/29/2021 15     Creatinine 07/29/2021 1 17     Glucose 07/29/2021 157*    Glucose, Fasting 07/29/2021 157*    Calcium 07/29/2021 7 7*    eGFR 07/29/2021 60     WBC 07/29/2021 15 70*    RBC 07/29/2021 3 65*    Hemoglobin 07/29/2021 11 7*    Hematocrit 07/29/2021 36 1*    MCV 07/29/2021 99*    MCH 07/29/2021 32 1     MCHC 07/29/2021 32 4     RDW 07/29/2021 19 5*    MPV 07/29/2021 11 9     Platelets 64/28/1600 112*    nRBC 07/29/2021 0     Segmented % 07/29/2021 90*    Bands % 07/29/2021 6     Lymphocytes % 07/29/2021 2*    Monocytes % 07/29/2021 2*    Eosinophils, % 07/29/2021 0     Basophils % 07/29/2021 0     Absolute Neutrophils 07/29/2021 15 07*    Lymphocytes Absolute 07/29/2021 0 31*    Monocytes Absolute 07/29/2021 0 31     Eosinophils Absolute 07/29/2021 0 00     Basophils Absolute 07/29/2021 0 00     RBC Morphology 07/29/2021 Present     Anisocytosis 07/29/2021 Present     Poikilocytes 07/29/2021 Present     Platelet Estimate 22/50/2971 Decreased*    Sodium 07/30/2021 135*    Potassium 07/30/2021 4 0     Chloride 07/30/2021 108     CO2 07/30/2021 18*    ANION GAP 07/30/2021 9     BUN 07/30/2021 27*    Creatinine 07/30/2021 1 37*    Glucose 07/30/2021 126     Glucose, Fasting 07/30/2021 126*    Calcium 07/30/2021 8 3     eGFR 07/30/2021 50     Sodium 07/30/2021 136     Potassium 07/30/2021 3 9     Chloride 07/30/2021 104     CO2 07/30/2021 24     ANION GAP 07/30/2021 8     BUN 07/30/2021 25     Creatinine 07/30/2021 1 00     Glucose 07/30/2021 139     Calcium 07/30/2021 8 3     eGFR 07/30/2021 73     Sodium 07/31/2021 137     Potassium 07/31/2021 4 2     Chloride 07/31/2021 108     CO2 07/31/2021 22     ANION GAP 07/31/2021 7     BUN 07/31/2021 24     Creatinine 07/31/2021 0 95     Glucose 07/31/2021 168*    Calcium 07/31/2021 8 0*    eGFR 07/31/2021 77     PTT 07/31/2021 30     WBC 07/31/2021 9 70     RBC 07/31/2021 3 44*    Hemoglobin 07/31/2021 11 0*    Hematocrit 07/31/2021 33 5*    MCV 07/31/2021 97     MCH 07/31/2021 32 0     MCHC 07/31/2021 32 8     RDW 07/31/2021 19 7*    Platelets 09/30/6944 98*    MPV 07/31/2021 11 2     Protime 07/31/2021 14 8*    INR 07/31/2021 1 16     PTT 07/31/2021 33     PTT 07/31/2021 30     PTT 08/01/2021 75*    Protime 08/01/2021 15 3*    INR 08/01/2021 1 20*    PTT 08/01/2021 95*    PTT 08/01/2021 101*    PTT 08/02/2021 78*    PTT 08/02/2021 78*    Protime 08/02/2021 28 8*    INR 08/02/2021 2 74*    WBC 08/02/2021 5 65     RBC 08/02/2021 3 33*    Hemoglobin 08/02/2021 11 1*    Hematocrit 08/02/2021 32 8*    MCV 08/02/2021 99*    MCH 08/02/2021 33 3     MCHC 08/02/2021 33 8     RDW 08/02/2021 20 0*    Platelets 75/72/2548 114*    nRBC 08/02/2021 0     Neutrophils Relative 08/02/2021 60     Immat GRANS % 08/02/2021 1     Lymphocytes Relative 08/02/2021 16     Monocytes Relative 08/02/2021 11     Eosinophils Relative 08/02/2021 12*    Basophils Relative 08/02/2021 0     Neutrophils Absolute 08/02/2021 3 38     Immature Grans Absolute 08/02/2021 0 06     Lymphocytes Absolute 08/02/2021 0 90     Monocytes Absolute 08/02/2021 0 63     Eosinophils Absolute 08/02/2021 0 66*    Basophils Absolute 08/02/2021 0 02     Sodium 08/02/2021 141     Potassium 08/02/2021 3 3*    Chloride 08/02/2021 107     CO2 08/02/2021 27     ANION GAP 08/02/2021 7     BUN 08/02/2021 13     Creatinine 08/02/2021 0 80     Glucose 08/02/2021 132     Calcium 08/02/2021 7 8*    Corrected Calcium 08/02/2021 8 9     AST 08/02/2021 18     ALT 08/02/2021 23     Alkaline Phosphatase 08/02/2021 87     Total Protein 08/02/2021 6 4     Albumin 08/02/2021 2 6*    Total Bilirubin 08/02/2021 0 25     eGFR 08/02/2021 87    Orders Only on 08/02/2021   Component Date Value    Protime 08/12/2021 47 8*    INR 08/12/2021 5 26*     Imaging: XR chest 1 view portable    Result Date: 7/28/2021  Narrative: CHEST INDICATION:   chest pain  COMPARISON:  8/2/2021 EXAM PERFORMED/VIEWS:  XR CHEST PORTABLE FINDINGS: Cardiomegaly is present  The lungs are clear    No pneumothorax or pleural effusion  Osseous structures appear within normal limits for patient age  Impression: No acute cardiopulmonary disease  Workstation performed: [id number]     Echo limited with contrast if indicated    Result Date: 2021  Narrative: Karie Lyman, [address] [facility] [phone] Transthoracic Echocardiogram Limited 2D, M-mode, Doppler, and Color Doppler Study date:  2021 Patient: Logan Keane MR number: [id number] Account number: [de-identified] : 1945 Age: 67 years Gender: Male Status: Outpatient Location: Bedside Height: 72 in Weight: 219 6 lb BP: 82/ 47 mmHg Indications: Post MI Diagnoses: I21 3 - ST elevation (STEMI) myocardial infarction of unspecified site Sonographer:  Camila Stack RDCS Primary Physician:  Buster Torres DO Referring Physician:  Selma Garnica MD Group:  [facility] Interpreting Physician:  Juana Haskins MD SUMMARY LEFT VENTRICLE: Systolic function was moderately reduced  Ejection fraction was estimated to be 36 %  There was akinesis of the entire inferior, basal-mid inferolateral, apical septal, apical lateral, and apical wall(s)  There was no evidence of concentric hypertrophy  Doppler parameters were consistent with abnormal left ventricular relaxation (grade 1 diastolic dysfunction)  There was a large, sessile, echogenic mass, measuring 16 mm x 24 mm  LEFT ATRIUM: The atrium was mildly dilated  AORTIC VALVE: There was trace regurgitation  TRICUSPID VALVE: There was trace regurgitation  PULMONIC VALVE: There was trace regurgitation  HISTORY: PRIOR HISTORY: Abdominal Aortic Aneurysm, CAD, STEMI, CHF PROCEDURE: The procedure was performed at the bedside  This was a routine study  The transthoracic approach was used  The study included limited 2D imaging, M-mode, limited spectral Doppler, and color Doppler  The heart rate was 70 bpm, at the start of the study   Images were obtained from the parasternal, apical, and subcostal acoustic windows  Echocardiographic views were limited due to decreased penetration  Image quality was adequate  LEFT VENTRICLE: Size was normal  Systolic function was moderately reduced  Ejection fraction was estimated to be 36 %  There was akinesis of the entire inferior, basal-mid inferolateral, apical septal, apical lateral, and apical wall(s)  Wall thickness was normal  There was no evidence of concentric hypertrophy  There was a large, sessile, echogenic mass, measuring 16 mm x 24 mm  DOPPLER: Doppler parameters were consistent with abnormal left ventricular relaxation (grade 1 diastolic dysfunction)  RIGHT VENTRICLE: The size was normal  Systolic function was normal  Wall thickness was normal  LEFT ATRIUM: The atrium was mildly dilated  RIGHT ATRIUM: Size was normal  MITRAL VALVE: Valve structure was normal  There was normal leaflet separation  DOPPLER: The transmitral velocity was within the normal range  There was no evidence for stenosis  There was no significant regurgitation  AORTIC VALVE: The valve was trileaflet  Leaflets exhibited normal thickness and normal cuspal separation  DOPPLER: Transaortic velocity was within the normal range  There was no evidence for stenosis  There was trace regurgitation  TRICUSPID VALVE: The valve structure was normal  There was normal leaflet separation  DOPPLER: The transtricuspid velocity was within the normal range  There was no evidence for stenosis  There was trace regurgitation  PULMONIC VALVE: Leaflets exhibited normal thickness, no calcification, and normal cuspal separation  DOPPLER: The transpulmonic velocity was within the normal range  There was trace regurgitation  PERICARDIUM: There was no pericardial effusion  The pericardium was normal in appearance  AORTA: The root exhibited normal size  SYSTEMIC VEINS: IVC: The inferior vena cava was normal in size   SYSTEM MEASUREMENT TABLES 2D LVEDV MOD A4C: 114 21 ml LVEF MOD A4C: 46 05 % LVESV MOD A4C: 61 62 ml LVLd A4C: 8 34 cm LVLs A4C: 6 82 cm SV MOD A4C: 52 59 ml CW AV Env  Ti: 274 02 ms AV VTI: 17 41 cm AV Vmax: 0 87 m/s AV Vmean: 0 63 m/s AV maxPG: 3 09 mmHg AV meanP 82 mmHg PW E' Sept: 0 06 m/s LVOT Env  Ti: 300 67 ms LVOT VTI: 15 87 cm LVOT Vmax: 0 77 m/s LVOT Vmean: 0 53 m/s LVOT maxP 4 mmHg LVOT meanP 28 mmHg [facility] Accredited Echocardiography Laboratory Prepared and electronically signed by Cem Malone MD Signed 2021 18:25:13     Cardiac coronary angio/lhc/pci    Result Date: 2021  Narrative: Karie [address] [place] [address] [phone] [facility] Invasive Cardiovascular Lab Complete Report Patient: Sebas Szymanski MR number: [id number] Account number: [de-identified] Study date: 2021 Gender: Male : 1945 Height: 72 in Weight: 220 lb BSA: 2 22 mï¾² Allergies: IODINATED DIAGNOSTIC AGENTS, CLOPIDOGREL Diagnostic Cardiologist:  King Jeannette DO Interventional Cardiologist:  King Jeannette DO Primary Physician:  Ayse Hansen SUMMARY CORONARY CIRCULATION: Ostial left main: There was a 30 % stenosis, no pressure dampening with 6F guide Mid LAD: There was a 10 % stenosis at the site of a prior stent  Proximal circumflex: There was a diffuse 40 % stenosis  2nd obtuse marginal: There was a 60 % stenosis at the ostium of the vessel segment  Mid RCA: There was a 99 % stenosis  The lesion was associated with a moderate filling defect consistent with thrombus and HELIO 1 flow 1ST LESION INTERVENTIONS: A successful thrombectomy with stent and stent procedure was performed on the 99 % lesion in the mid RCA  Following intervention there was a 0 % residual stenosis  A Xience Jennifer Rx 3 5 x 28mm drug-eluting stent was placed across the lesion and deployed at a maximum inflation pressure of 16 antony   INDICATIONS: --  Possible CAD: myocardial infarction without ST elevation (NSTEMI), acute nstemi, urgent cath lab PROCEDURES PERFORMED --  Left heart catheterization without ventriculogram  --  Left coronary angiography  --  Right coronary angiography  --  Acute Myocardial Infarct  --  Mod Sedation Same Physician Initial 15min  --  Mod Sedation Same Physician Add 15min  --  Mod Sedation Same Physician Add 15min  --  Coronary Catheterization (w/ [facility])  --  AMI PCI (AGUSTIN, PTCRA, PTCA) Single  --  Intervention on mid RCA: thrombectomy, stent, stent  PROCEDURE: The risks and alternatives of the procedures and conscious sedation were explained to the patient and informed consent was obtained  The patient was brought to the cath lab and placed on the table  The planned puncture sites were prepped and draped in the usual sterile fashion  --  Right radial artery access  After performing an Vineet's test to verify adequate ulnar artery supply to the hand, the radial site was prepped  The puncture site was infiltrated with local anesthetic  The vessel was accessed using the modified Seldinger technique, a wire was advanced into the vessel, and a sheath was advanced over the wire into the vessel  --  Left heart catheterization without ventriculogram  A catheter was advanced over a guidewire into the ascending aorta  After recording ascending aortic pressure, the catheter was advanced across the aortic valve and left ventricular pressure was recorded  The catheter was pulled back across the aortic valve and into the ascending aorta and pullback pressures were obtained  --  Left coronary artery angiography  A catheter was advanced over a guidewire into the aorta and positioned in the left coronary artery ostium under fluoroscopic guidance  Angiography was performed  --  Right coronary artery angiography  A catheter was advanced over a guidewire into the aorta and positioned in the right coronary artery ostium under fluoroscopic guidance  Angiography was performed  --  Acute Myocardial Infarct  --  Mod Sedation Same Physician Initial 15min  --  Mod Sedation Same Physician Add 15min  --  Mod Sedation Same Physician Add 15min  --  Coronary Catheterization (/ [facility])  LESION INTERVENTION: A successful thrombectomy with stent and stent procedure was performed on the 99 % lesion in the mid RCA  Following intervention there was a 0 % residual stenosis  There was HELIO 1 flow before the procedure and HELIO 3 flow after the procedure  There was no dissection  --  Vessel setup was performed  A 6Fr  Launcher JR 4 0 100cm guiding catheter was used to cannulate the vessel  --  Vessel setup was performed  A Runthrough NS 180cm wire was used to cross the lesion  --  Mechanical ( 6Fr  [place] AP) thrombectomy was performed, with max duration 25 sec, max volume out 10 ml, and 1 attempt(s)  --  A Xience Jennifer Rx 3 5 x 28mm drug-eluting stent was placed across the lesion and deployed at a maximum inflation pressure of 16 antony  INTERVENTIONS: --  AMI PCI (AGUSTIN, PTCRA, PTCA) Single  PROCEDURE COMPLETION: The patient tolerated the procedure well and was discharged from the cath lab  TIMING: Test started at 15:07  Test concluded at 15:44  HEMOSTASIS: The sheath was removed  The site was compressed with a Hemoband device  Hemostasis was obtained  MEDICATIONS GIVEN: Benadryl (50mg/ml), 50 mg, IV, at 15:02  Solucortef (100mg/2ml), 100 mg, IV, at 15:02  Pepcid, 20 mg, IV, at 15:02  Ticagrelor, 180 mg, PO, at 15:03  Versed (2mg/2ml), 2 mg, IV, at 15:08  Fentanyl (1OOmcg/2 ml), 50 mcg, IV, at 15:08  1% Lidocaine, 1 ml, subcutaneously, at 15:12  Nitroglycerin (200mcg/ml), 200 mcg, at 15:13  Verapamil (5mg/2ml), 2 5 mg, IV, at 15:13  Heparin 1000 units/ml, 8,000 units, IV, at 15:27  Neosynephrine, 100 mcg, IV, at 15:38  CONTRAST GIVEN: 100 ml Omnipaque (350mg I /ml)  RADIATION EXPOSURE: Fluoroscopy time: 9 38 min  HEMODYNAMICS: Hemodynamic assessment demonstrated normal LVEDP   CORONARY VESSELS:   --  The coronary circulation is right dominant  --  Ostial left main: There was a 30 % stenosis, no pressure dampening with 6F guide --  Mid LAD: There was a 10 % stenosis at the site of a prior stent  --  2nd diagonal: The vessel was very small sized atherosclerotic ostium --  Proximal circumflex: There was a diffuse 40 % stenosis  --  2nd obtuse marginal: There was a 60 % stenosis at the ostium of the vessel segment  --  Proximal RCA: There was a 10 % stenosis at the site of a prior stent  --  Mid RCA: There was a 99 % stenosis  The lesion was associated with a moderate filling defect consistent with thrombus and HELIO 1 flow IMPRESSIONS: PCI mid RCA with AGUSTIN  Patent LAD stents  Patient noncompliant with dapt and stopped ticagrelor after lad stent in April of this year  RECOMMENDATIONS stressed importance of compliance to avoid mi/stent thrombosis/ death with dapt and stopping antiplatelets, recommend dapt x 1 year minimum, gdmt cad  DISPOSITION: The patient left the catheterization laboratory in stable condition  Prepared and signed by Jose Enrique Shanks DO Signed 07/28/2021 16:07:38 Study diagram Angiographic findings Native coronary lesions: ï¾·Ostial left main: Lesion 1: 30 % stenosis  ï¾·Mid LAD: Lesion 1: 10 % stenosis, site of prior stent  ï¾·Proximal circumflex: Lesion 1: diffuse, 40 % stenosis  ï¾·OM2: Lesion 1: 60 % stenosis  ï¾·Proximal RCA: Lesion 1: 10 % stenosis, site of prior stent  ï¾·Mid RCA: Lesion 1: 99 % stenosis  Intervention results Native coronary lesions: ï¾·Successful thrombectomy, stent, and stent of the 99 % stenosis in mid RCA  0 % residual stenosis  Stent: Jhoan James Rx 3 5 x 28mm drug-eluting   Hemodynamic tables Pressures:  Baseline Pressures:  - HR: 79 Pressures:  - Rhythm: Pressures:  -- Aortic Pressure (S/D/M): 86/52/63 Pressures:  -- Left Ventricle (s/edp): 82/16/-- Outputs:  Baseline Outputs:  -- CALCULATIONS: [age] Outputs:  -- CALCULATIONS: Body Surface Area: 2 22 Outputs:  -- CALCULATIONS: Height in cm: 183 00 Outputs:  -- CALCULATIONS: Sex: Male Outputs:  -- CALCULATIONS: Weight in k 00       Review of Systems:  Review of Systems   Constitutional: Negative for fatigue  HENT: Negative for nosebleeds  Eyes: Negative for redness  Respiratory: Negative for chest tightness and shortness of breath  Cardiovascular: Negative for chest pain, palpitations and leg swelling  Gastrointestinal: Negative for abdominal pain  Endocrine: Negative for polyuria  Genitourinary: Negative for urgency  Musculoskeletal: Negative for arthralgias  Skin: Negative for rash  Neurological: Negative for dizziness and syncope  Psychiatric/Behavioral: Negative for confusion and sleep disturbance  The patient is not nervous/anxious  Physical Exam:  Physical Exam  Constitutional:       Appearance: He is well-developed  HENT:      Head: Normocephalic and atraumatic  Nose: Nose normal    Eyes:      Pupils: Pupils are equal, round, and reactive to light  Cardiovascular:      Rate and Rhythm: Normal rate and regular rhythm  Heart sounds: Normal heart sounds  Pulmonary:      Effort: Pulmonary effort is normal       Breath sounds: Normal breath sounds  Abdominal:      General: Bowel sounds are normal       Palpations: Abdomen is soft  Hernia: A hernia is present  Hernia is present in the ventral area  Musculoskeletal:         General: Normal range of motion  Cervical back: Neck supple  Skin:     General: Skin is warm and dry  Neurological:      Mental Status: He is alert and oriented to person, place, and time  Psychiatric:         Behavior: Behavior normal          Thought Content: Thought content normal          Judgment: Judgment normal          Discussion/Summary:   Stenting of the right coronary artery in July as noted  He continues on aspirin and ticagrelor  His ejection fraction was moderately impaired with evidence possible left ventricular thrombus    He was placed on warfarin  He is on beta blockade and Ace inhibitor  His blood pressure today is good   He is on high-dose statin  I have made no changes today I will see him again in 3 months  I did stress with him the importance of taking his medications and he informed me that he is doing so

## 2021-08-21 ENCOUNTER — HOSPITAL ENCOUNTER (EMERGENCY)
Facility: HOSPITAL | Age: 76
Discharge: HOME | End: 2021-08-21
Attending: EMERGENCY MEDICINE | Admitting: EMERGENCY MEDICINE
Payer: MEDICARE

## 2021-08-21 ENCOUNTER — APPOINTMENT (EMERGENCY)
Dept: RADIOLOGY | Facility: HOSPITAL | Age: 76
End: 2021-08-21
Payer: MEDICARE

## 2021-08-21 VITALS
HEART RATE: 70 BPM | RESPIRATION RATE: 20 BRPM | DIASTOLIC BLOOD PRESSURE: 59 MMHG | SYSTOLIC BLOOD PRESSURE: 102 MMHG | OXYGEN SATURATION: 97 % | TEMPERATURE: 97.6 F

## 2021-08-21 DIAGNOSIS — R42 VERTIGO: Primary | ICD-10-CM

## 2021-08-21 LAB
ANION GAP SERPL CALCULATED.3IONS-SCNC: -1 MMOL/L (ref 4–13)
APTT PPP: 36 SECONDS (ref 23–37)
BASOPHILS # BLD AUTO: 0.08 THOUSANDS/ΜL (ref 0–0.1)
BASOPHILS NFR BLD AUTO: 1 % (ref 0–1)
BUN SERPL-MCNC: 11 MG/DL (ref 5–25)
CALCIUM SERPL-MCNC: 8.2 MG/DL (ref 8.3–10.1)
CHLORIDE SERPL-SCNC: 112 MMOL/L (ref 100–108)
CO2 SERPL-SCNC: 28 MMOL/L (ref 21–32)
CREAT SERPL-MCNC: 0.79 MG/DL (ref 0.6–1.3)
EOSINOPHIL # BLD AUTO: 2.29 THOUSAND/ΜL (ref 0–0.61)
EOSINOPHIL NFR BLD AUTO: 37 % (ref 0–6)
ERYTHROCYTE [DISTWIDTH] IN BLOOD BY AUTOMATED COUNT: 19.6 % (ref 11.6–15.1)
GFR SERPL CREATININE-BSD FRML MDRD: 87 ML/MIN/1.73SQ M
GLUCOSE SERPL-MCNC: 94 MG/DL (ref 65–140)
HCT VFR BLD AUTO: 37.6 % (ref 36.5–49.3)
HGB BLD-MCNC: 11.9 G/DL (ref 12–17)
IMM GRANULOCYTES # BLD AUTO: 0.04 THOUSAND/UL (ref 0–0.2)
IMM GRANULOCYTES NFR BLD AUTO: 1 % (ref 0–2)
INR PPP: 1.47 (ref 0.84–1.19)
LYMPHOCYTES # BLD AUTO: 1.25 THOUSANDS/ΜL (ref 0.6–4.47)
LYMPHOCYTES NFR BLD AUTO: 20 % (ref 14–44)
MCH RBC QN AUTO: 30.7 PG (ref 26.8–34.3)
MCHC RBC AUTO-ENTMCNC: 31.6 G/DL (ref 31.4–37.4)
MCV RBC AUTO: 97 FL (ref 82–98)
MONOCYTES # BLD AUTO: 0.43 THOUSAND/ΜL (ref 0.17–1.22)
MONOCYTES NFR BLD AUTO: 7 % (ref 4–12)
NEUTROPHILS # BLD AUTO: 2.06 THOUSANDS/ΜL (ref 1.85–7.62)
NEUTS SEG NFR BLD AUTO: 34 % (ref 43–75)
NRBC BLD AUTO-RTO: 0 /100 WBCS
PLATELET # BLD AUTO: 142 THOUSANDS/UL (ref 149–390)
PMV BLD AUTO: 11 FL (ref 8.9–12.7)
POTASSIUM SERPL-SCNC: 4.6 MMOL/L (ref 3.5–5.3)
PROTHROMBIN TIME: 17.8 SECONDS (ref 11.6–14.5)
RBC # BLD AUTO: 3.88 MILLION/UL (ref 3.88–5.62)
SODIUM SERPL-SCNC: 139 MMOL/L (ref 136–145)
TROPONIN I SERPL-MCNC: <0.02 NG/ML
WBC # BLD AUTO: 6.15 THOUSAND/UL (ref 4.31–10.16)

## 2021-08-21 PROCEDURE — 85025 COMPLETE CBC W/AUTO DIFF WBC: CPT

## 2021-08-21 PROCEDURE — 36415 COLL VENOUS BLD VENIPUNCTURE: CPT

## 2021-08-21 PROCEDURE — 85610 PROTHROMBIN TIME: CPT

## 2021-08-21 PROCEDURE — 85730 THROMBOPLASTIN TIME PARTIAL: CPT

## 2021-08-21 PROCEDURE — 70450 CT HEAD/BRAIN W/O DYE: CPT

## 2021-08-21 PROCEDURE — G1004 CDSM NDSC: HCPCS

## 2021-08-21 PROCEDURE — 84484 ASSAY OF TROPONIN QUANT: CPT

## 2021-08-21 PROCEDURE — 80048 BASIC METABOLIC PNL TOTAL CA: CPT

## 2021-08-21 PROCEDURE — 93005 ELECTROCARDIOGRAM TRACING: CPT

## 2021-08-21 PROCEDURE — 99284 EMERGENCY DEPT VISIT MOD MDM: CPT

## 2021-08-21 PROCEDURE — 99285 EMERGENCY DEPT VISIT HI MDM: CPT | Performed by: EMERGENCY MEDICINE

## 2021-08-21 RX ADMIN — SODIUM CHLORIDE 250 ML: 0.9 INJECTION, SOLUTION INTRAVENOUS at 18:27

## 2021-08-21 NOTE — ED PROVIDER NOTES
History  Chief Complaint   Patient presents with    Dizziness     "about an hour ago I had an episode where the room started spinning"  Pt states got better, but can't walk like I used to  Patient is a 76-year-old male with a past medical history of CHF, hyperlipidemia, history of multiple myocardial infarctions, any recent cardiac apical thrombus  He is presenting today with sudden onset dizziness that has since subsided  He states that approximately 1 hour ago, he was at home and bent over under his sink and felt a sudden onset "dizziness" sensation  He describes this as the room spinning  He states that the episode lasted for approximately 20 minutes  During which time, he was able to walk over to the bathroom and "hold onto anything in reach"  He seemed to notice that during the episode when he would bend his head down towards his chin it would worsen his symptoms however when he lifted his head backwards the symptoms slightly subsided  He also noticed some sweating during the episode  He states that shortly afterwards he had some gas and a bowel movement that looked normal to him  He states that he had no chest pain, shortness of breath, or difficulty breathing during the event  He did not pass out  His brother witnessed the event did not notice any neurological symptoms, facial droop, or seizure-type activity  He has no headaches or visual changes  Currently, he has no acute symptoms and states that all of his previous symptoms have resolved  Prior to Admission Medications   Prescriptions Last Dose Informant Patient Reported? Taking?    PHENobarbital 64 8 mg tablet 8/20/2021 at Unknown time Self No Yes   Sig: TAKE 1 TABLET (64 8 MG TOTAL) BY MOUTH 2 (TWO) TIMES A DAY   aspirin (ECOTRIN LOW STRENGTH) 81 mg EC tablet 8/21/2021 at Unknown time Self No Yes   Sig: Take 1 tablet (81 mg total) by mouth daily   atorvastatin (LIPITOR) 80 mg tablet 8/20/2021 at Unknown time Self No Yes   Sig: Take 1 tablet (80 mg total) by mouth daily with dinner   metoprolol succinate (TOPROL-XL) 50 mg 24 hr tablet Not Taking at Unknown time Self No No   Sig: TAKE 1 TABLET BY MOUTH EVERY DAY   Patient not taking: TAKE 1 TABLET BY MOUTH EVERY DAY   nitroglycerin (NITROSTAT) 0 4 mg SL tablet More than a month at Unknown time Self No No   Sig: Place 1 tablet (0 4 mg total) under the tongue every 5 (five) minutes as needed for chest pain   Patient not taking: Reported on 4/15/2021   potassium chloride (K-DUR,KLOR-CON) 20 mEq tablet   No No   Sig: Take 2 tablets (40 mEq total) by mouth once for 1 dose   Patient not taking: Reported on 4/15/2021   triamcinolone (KENALOG) 0 025 % cream 8/20/2021 at Unknown time Self No Yes   Sig: Apply topically 2 (two) times a day   warfarin (COUMADIN) 5 mg tablet   No No   Sig: Take 1 tablet (5 mg total) by mouth once for 1 dose   warfarin (COUMADIN) 5 mg tablet 8/21/2021 at Unknown time Self No Yes   Sig: Take 0 5 tablets (2 5 mg total) by mouth daily      Facility-Administered Medications: None       Past Medical History:   Diagnosis Date    Cardiac disease     [facility] [address] [facility]     Hernia of abdominal cavity     Myocardial infarction ([facility] [address] )     last assessed 7/31/14, past, Pt had MI s/p AGUSTIN placed in 2001, refuses to take any other medications for his cardiac disease, only will take seizure med  Understands possible complications from this decision    Seizure [facility])        Past Surgical History:   Procedure Laterality Date    ABDOMINAL AORTIC ANEURYSM REPAIR      for dialation or occulsion    CATARACT EXTRACTION         Family History   Problem Relation Age of Onset    Hypertension Father     Kidney disease Brother      I have reviewed and agree with the history as documented      E-Cigarette/Vaping    E-Cigarette Use Never User      E-Cigarette/Vaping Substances    Nicotine No     THC No     CBD No     Flavoring No     Other No     Unknown No      Social History     Tobacco Use    Smoking status: Former Smoker     Quit date: 2005     Years since quittin 2    Smokeless tobacco: Never Used   Vaping Use    Vaping Use: Never used   Substance Use Topics    Alcohol use: Not Currently    Drug use: Never        Review of Systems   Constitutional: Negative for chills and fever  HENT: Negative for ear pain, rhinorrhea and sore throat  Eyes: Negative for pain  Respiratory: Negative for shortness of breath  Cardiovascular: Negative for chest pain, palpitations and leg swelling  Gastrointestinal: Negative for abdominal pain, constipation, diarrhea, nausea and vomiting  Genitourinary: Negative for dysuria  Musculoskeletal: Negative for myalgias  Skin: Negative for rash  Neurological: Negative for dizziness, syncope, facial asymmetry, light-headedness, numbness and headaches  Psychiatric/Behavioral: Negative for agitation  All other systems reviewed and are negative  Physical Exam  ED Triage Vitals [21 1510]   Temperature Pulse Respirations Blood Pressure SpO2   97 5 °F (36 4 °C) 65 18 109/65 96 %      Temp Source Heart Rate Source Patient Position - Orthostatic VS BP Location FiO2 (%)   Oral Monitor Sitting Left arm --      Pain Score       --             Orthostatic Vital Signs  Vitals:    21 1510 21 1705   BP: 109/65 102/59   Pulse: 65 70   Patient Position - Orthostatic VS: Sitting        Physical Exam  Vitals and nursing note reviewed  Constitutional:       General: He is not in acute distress  Appearance: Normal appearance  He is normal weight  HENT:      Head: Normocephalic and atraumatic  Right Ear: External ear normal       Left Ear: External ear normal       Nose: Nose normal    Eyes:      General: No scleral icterus  Right eye: No discharge  Left eye: No discharge  Extraocular Movements: Extraocular movements intact        Conjunctiva/sclera: Conjunctivae normal  Cardiovascular:      Rate and Rhythm: Normal rate and regular rhythm  Pulses: Normal pulses  Heart sounds: Normal heart sounds  No murmur heard  No friction rub  No gallop  Pulmonary:      Effort: Pulmonary effort is normal  No respiratory distress  Breath sounds: Normal breath sounds  Abdominal:      General: Abdomen is flat  There is no distension  Palpations: Abdomen is soft  There is no mass  Tenderness: There is no abdominal tenderness  Musculoskeletal:         General: Normal range of motion  Cervical back: Normal range of motion  Skin:     General: Skin is warm and dry  Capillary Refill: Capillary refill takes less than 2 seconds  Neurological:      General: No focal deficit present  Mental Status: He is alert and oriented to person, place, and time  Mental status is at baseline  Cranial Nerves: Cranial nerves are intact  No cranial nerve deficit, dysarthria or facial asymmetry  Sensory: Sensation is intact  Motor: Motor function is intact  Coordination: Coordination is intact  Gait: Gait is intact     Psychiatric:         Mood and Affect: Mood normal          ED Medications  Medications   sodium chloride 0 9 % bolus 250 mL (0 mL Intravenous Stopped 8/21/21 1927)       Diagnostic Studies  Results Reviewed     Procedure Component Value Units Date/Time    Protime-INR [[id number]]  (Abnormal) Collected: 08/21/21 1802    Lab Status: Final result Specimen: Blood from Arm, Right Updated: 08/21/21 1835     Protime 17 8 seconds      INR 1 47    APTT [[id number]]  (Normal) Collected: 08/21/21 1802    Lab Status: Final result Specimen: Blood from Arm, Right Updated: 08/21/21 1835     PTT 36 seconds     Troponin I [[id number]]  (Normal) Collected: 08/21/21 1802    Lab Status: Final result Specimen: Blood from Arm, Right Updated: 08/21/21 1834     Troponin I <0 02 ng/mL     Basic metabolic panel [[id number]]  (Abnormal) Collected: 08/21/21 1802 Lab Status: Final result Specimen: Blood from Arm, Right Updated: 08/21/21 1830     Sodium 139 mmol/L      Potassium 4 6 mmol/L      Chloride 112 mmol/L      CO2 28 mmol/L      ANION GAP -1 mmol/L      BUN 11 mg/dL      Creatinine 0 79 mg/dL      Glucose 94 mg/dL      Calcium 8 2 mg/dL      eGFR 87 ml/min/1 73sq m     Narrative:      [place] guidelines for Chronic Kidney Disease (CKD):     Stage 1 with normal or high GFR (GFR > 90 mL/min/1 73 square meters)    Stage 2 Mild CKD (GFR = 60-89 mL/min/1 73 square meters)    Stage 3A Moderate CKD (GFR = 45-59 mL/min/1 73 square meters)    Stage 3B Moderate CKD (GFR = 30-44 mL/min/1 73 square meters)    Stage 4 Severe CKD (GFR = 15-29 mL/min/1 73 square meters)    Stage 5 End Stage CKD (GFR <15 mL/min/1 73 square meters)  Note: GFR calculation is accurate only with a steady state creatinine    CBC and differential [[id number]]  (Abnormal) Collected: 08/21/21 1802    Lab Status: Final result Specimen: Blood from Arm, Right Updated: 08/21/21 1812     WBC 6 15 Thousand/uL      RBC 3 88 Million/uL      Hemoglobin 11 9 g/dL      Hematocrit 37 6 %      MCV 97 fL      MCH 30 7 pg      MCHC 31 6 g/dL      RDW 19 6 %      MPV 11 0 fL      Platelets 843 Thousands/uL      nRBC 0 /100 WBCs      Neutrophils Relative 34 %      Immat GRANS % 1 %      Lymphocytes Relative 20 %      Monocytes Relative 7 %      Eosinophils Relative 37 %      Basophils Relative 1 %      Neutrophils Absolute 2 06 Thousands/µL      Immature Grans Absolute 0 04 Thousand/uL      Lymphocytes Absolute 1 25 Thousands/µL      Monocytes Absolute 0 43 Thousand/µL      Eosinophils Absolute 2 29 Thousand/µL      Basophils Absolute 0 08 Thousands/µL                  CT head without contrast   Final Result by Heidi De Leon MD (08/21 1907)      No acute intracranial abnormality                    Workstation performed: [id number]               Procedures  ECG 12 Lead Documentation Only    Date/Time: 8/21/2021 7:39 PM  Performed by: Nomi Lee DO  Authorized by: Nomi Lee DO     Indications / Diagnosis:  Dizziness  ECG reviewed by me, the ED Provider: yes    Patient location:  ED  Previous ECG:     Previous ECG:  Compared to current    Similarity:  Changes noted    Comparison to cardiac monitor: Yes    Interpretation:     Interpretation: abnormal    Rate:     ECG rate:  59    ECG rate assessment: bradycardic    Rhythm:     Rhythm: sinus bradycardia    Ectopy:     Ectopy: none    QRS:     QRS axis:  Normal  Conduction:     Conduction: normal    ST segments:     ST segments:  Normal  T waves:     T waves: inverted            ED Course  ED Course as of Aug 22 1314   Sat Aug 21, 2021   1730 Patient examined by me  He is in stable condition  Vital signs reviewed  Labs and imaging ordered  1834 Troponin I   1908 Negative for intracranial pathology   CT head without contrast                             SBIRT 20yo+      Most Recent Value   SBIRT (22 yo +)   In order to provide better care to our patients, we are screening all of our patients for alcohol and drug use  Would it be okay to ask you these screening questions? No Filed at: 08/21/2021 1814                MDM  Number of Diagnoses or Management Options  Vertigo  Diagnosis management comments: Patient is a 79-year-old male with a recent medical history of a myocardial infarction and recent diagnosis of cardiac apical thrombus  Based on history and physical exam, differential diagnosis includes:  Vertigo, BPPV, vasovagal or orthostatic near syncope, cardiogenic near syncope, less likely carotid artery dissection or cerebellar stroke  Plan: ECG, troponin, CBC, BMP, coagulation studies, non contrast CT of the head as the patient has severe iodine drug reaction  ECG does not demonstrate any signs of ischemia  Troponin negative  Labs largely within normal limits    CT of the head does not demonstrate any acute intracranial pathology  We discussed with the patient that his symptoms are likely secondary to an acute episode of vertigo  This vertigo is more likely peripheral as it has since resolved, and he is currently having no neurological deficits and has a reassuring exam   However, there is a slight chance that the patient has a central cause of his vertigo  We discussed these possible causes and that the ideal test for this is a MRI  We offered patient either inpatient admission to get an MRI or to follow-up with a neurologist/his primary care physician in order to schedule an MRI  He states that he would prefer to discharge home and schedule an MRI outpatient  Given his reassuring exam, we feel comfortable discharging him home under the close care of his brother, with plans to follow-up with a neurologist on Monday to schedule an MRI  He seems understand is agreeable with this plan  Patient discharged home with return precautions  Amount and/or Complexity of Data Reviewed  Clinical lab tests: ordered and reviewed  Tests in the radiology section of CPT®: ordered and reviewed  Review and summarize past medical records: yes  Independent visualization of images, tracings, or specimens: yes    Risk of Complications, Morbidity, and/or Mortality  Presenting problems: moderate  Diagnostic procedures: low  Management options: low    Patient Progress  Patient progress: stable      Disposition  Final diagnoses:   Vertigo     Time reflects when diagnosis was documented in both MDM as applicable and the Disposition within this note     Time User Action Codes Description Comment    8/21/2021  7:26 PM Yoelia, [address] [facility] [[id number]] Vertigo       ED Disposition     ED Disposition Condition Date/Time Comment    Discharge Stable Sat Aug 21, 2021  7:26 PM Hemant Loya discharge to home/self care              Follow-up Information     Follow up With Specialties Details Why Contact Info Additional [facility] [address] [facility] Schedule an appointment as soon as possible for a visit in 2 days  [facility] [id number]  [phone] [facility] [address], [address] [facility] [place], [address] [place] [address], [address] [place] [address]          Discharge Medication List as of 8/21/2021  7:33 PM      CONTINUE these medications which have NOT CHANGED    Details   aspirin (ECOTRIN LOW STRENGTH) 81 mg EC tablet Take 1 tablet (81 mg total) by mouth daily, Starting Tue 8/3/2021, Normal      atorvastatin (LIPITOR) 80 mg tablet Take 1 tablet (80 mg total) by mouth daily with dinner, Starting Mon 8/2/2021, Normal      PHENobarbital 64 8 mg tablet TAKE 1 TABLET (64 8 MG TOTAL) BY MOUTH 2 (TWO) TIMES A DAY, Starting Thu 6/3/2021, Until Tue 11/30/2021, Normal      triamcinolone (KENALOG) 0 025 % cream Apply topically 2 (two) times a day, Starting Wed 4/7/2021, Normal      warfarin (COUMADIN) 5 mg tablet Take 0 5 tablets (2 5 mg total) by mouth daily, Starting Wed 8/4/2021, Until Fri 9/3/2021, Normal      metoprolol succinate (TOPROL-XL) 50 mg 24 hr tablet TAKE 1 TABLET BY MOUTH EVERY DAY, Normal      nitroglycerin (NITROSTAT) 0 4 mg SL tablet Place 1 tablet (0 4 mg total) under the tongue every 5 (five) minutes as needed for chest pain, Starting Wed 4/7/2021, Normal      potassium chloride (K-DUR,KLOR-CON) 20 mEq tablet Take 2 tablets (40 mEq total) by mouth once for 1 dose, Starting Wed 4/7/2021, Normal           No discharge procedures on file  PDMP Review       Value Time User    PDMP Reviewed  Yes 1/23/2020  1:58 PM Nicole White MD           ED Provider  Attending physically available and evaluated Daytondiana Hernandez  JORGE managed the patient along with the ED Attending      Electronically Signed by         Darlene Randle DO  08/22/21 2281

## 2021-08-21 NOTE — DISCHARGE INSTRUCTIONS
You were seen in the emergency department today with dizziness  After your evaluation, we believe that the cause of the dizziness is vertigo  As we discussed, while we believe the vertigo is likely peripheral in nature, there is a slight chance that it is due to a central cause such as a stroke or vertebral artery insufficiency  Because of your contrast allergy, we were able to do a noncontrast CT, however the preferred imaging for this is an MRI  Because your exam is reassuring, and we did not find anything on your labs or imaging, we feel comfortable discharging home and having a follow-up with a neurologist on Monday  We have provided you the name of the neurologist in this discharge paperwork  Please also follow-up with your primary care physician on Monday  Please return to the emergency department if you experience any of the following:  A reoccurrence of your dizziness, lightheadedness, passing out, headache, facial droop, inability to use the arms or legs, numbness or tingling, or any other neurological symptoms, chest pain, difficulty breathing, or any other concerns

## 2021-08-22 NOTE — ED ATTENDING ATTESTATION
8/21/2021  ILaurita MD, saw and evaluated the patient  I have discussed the patient with the resident/non-physician practitioner and agree with the resident's/non-physician practitioner's findings, Plan of Care, and MDM as documented in the resident's/non-physician practitioner's note, except where noted  All available labs and Radiology studies were reviewed  I was present for key portions of any procedure(s) performed by the resident/non-physician practitioner and I was immediately available to provide assistance  At this point I agree with the current assessment done in the Emergency Department  I have conducted an independent evaluation of this patient a history and physical is as follows:    ED Course     Patient is a 13-year-old male who presents with a brief episode of vertigo lasting approximately 20 minutes prior to arrival   Patient states that he was in the kitchen at the sink when he had sudden onset vertigo symptoms  Requiring in the sit down he is unable to ambulate denies any slurred speech facial droop difficulty using his hands or feet  States symptoms resolved on their own prior to arrival      Past medical history remarkable for recent MI status post catheterization and stent by Cardiology Service  Patient also takes Coumadin for a mural 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  Vitals reviewed  TMs clear bilaterally neck is supple no meningismus  Heart regular rate rhythm without murmurs  Lungs clear to auscultation bilaterally  Abdomen soft nontender nondistended normal bowel sounds no signs of peritonitis    Neuro exam:  Cranial nerves grossly intact no nystagmus  Normal speech normal gait no limb or truncal ataxia unable to reproduce symptoms with head turning or changing positions    Impression:  Vertigo  Will check CT brain ECG labs IV fluids    Findings discussed with patient at bedside    Patient currently asymptomatic I cannot exclude the patient may have had a TIA earlier based on his past medical history offered admission    Patient declines admissions time will go home follow-up with neurology as outpatient     Critical Care Time  Procedures

## 2021-08-23 LAB
ATRIAL RATE: 59 BPM
P AXIS: 48 DEGREES
PR INTERVAL: 170 MS
QRS AXIS: -17 DEGREES
QRSD INTERVAL: 84 MS
QT INTERVAL: 402 MS
QTC INTERVAL: 397 MS
T WAVE AXIS: -46 DEGREES
VENTRICULAR RATE: 59 BPM

## 2021-08-23 PROCEDURE — 93010 ELECTROCARDIOGRAM REPORT: CPT | Performed by: INTERNAL MEDICINE

## 2021-08-24 ENCOUNTER — ANTICOAG VISIT (OUTPATIENT)
Dept: CARDIOLOGY CLINIC | Facility: CLINIC | Age: 76
End: 2021-08-24

## 2021-08-24 DIAGNOSIS — I51.3 APICAL MURAL THROMBUS: ICD-10-CM

## 2021-08-24 RX ORDER — WARFARIN SODIUM 2.5 MG/1
TABLET ORAL
Qty: 90 TABLET | Refills: 3 | Status: SHIPPED | OUTPATIENT
Start: 2021-08-24 | End: 2021-12-09 | Stop reason: SDUPTHER

## 2021-08-26 ENCOUNTER — ANTICOAG VISIT (OUTPATIENT)
Dept: CARDIOLOGY CLINIC | Facility: CLINIC | Age: 76
End: 2021-08-26

## 2021-08-26 ENCOUNTER — APPOINTMENT (OUTPATIENT)
Dept: LAB | Facility: HOSPITAL | Age: 76
End: 2021-08-26
Attending: INTERNAL MEDICINE
Payer: MEDICARE

## 2021-09-02 ENCOUNTER — APPOINTMENT (OUTPATIENT)
Dept: LAB | Facility: HOSPITAL | Age: 76
End: 2021-09-02
Attending: INTERNAL MEDICINE
Payer: MEDICARE

## 2021-09-02 ENCOUNTER — ANTICOAG VISIT (OUTPATIENT)
Dept: CARDIOLOGY CLINIC | Facility: CLINIC | Age: 76
End: 2021-09-02

## 2021-09-02 LAB
ALBUMIN SERPL BCP-MCNC: 2.8 G/DL (ref 3.5–5)
ALP SERPL-CCNC: 199 U/L (ref 46–116)
ALT SERPL W P-5'-P-CCNC: 35 U/L (ref 12–78)
AST SERPL W P-5'-P-CCNC: 19 U/L (ref 5–45)
BILIRUB DIRECT SERPL-MCNC: 0.06 MG/DL (ref 0–0.2)
BILIRUB SERPL-MCNC: 0.31 MG/DL (ref 0.2–1)
PROT SERPL-MCNC: 7.5 G/DL (ref 6.4–8.2)

## 2021-09-02 PROCEDURE — 36415 COLL VENOUS BLD VENIPUNCTURE: CPT | Performed by: INTERNAL MEDICINE

## 2021-09-02 PROCEDURE — 80076 HEPATIC FUNCTION PANEL: CPT | Performed by: INTERNAL MEDICINE

## 2021-09-09 ENCOUNTER — ANTICOAG VISIT (OUTPATIENT)
Dept: CARDIOLOGY CLINIC | Facility: CLINIC | Age: 76
End: 2021-09-09

## 2021-09-09 ENCOUNTER — APPOINTMENT (OUTPATIENT)
Dept: LAB | Facility: HOSPITAL | Age: 76
End: 2021-09-09
Payer: MEDICARE

## 2021-09-09 DIAGNOSIS — Z86.79 S/P AAA (ABDOMINAL AORTIC ANEURYSM) REPAIR: ICD-10-CM

## 2021-09-09 DIAGNOSIS — I21.02 ST ELEVATION MYOCARDIAL INFARCTION INVOLVING LEFT ANTERIOR DESCENDING (LAD) CORONARY ARTERY (HCC): ICD-10-CM

## 2021-09-09 DIAGNOSIS — Z98.890 S/P AAA (ABDOMINAL AORTIC ANEURYSM) REPAIR: ICD-10-CM

## 2021-09-09 DIAGNOSIS — I50.42 CHRONIC COMBINED SYSTOLIC AND DIASTOLIC CHF (CONGESTIVE HEART FAILURE) (HCC): ICD-10-CM

## 2021-09-09 DIAGNOSIS — I25.10 CORONARY ARTERY DISEASE INVOLVING NATIVE CORONARY ARTERY OF NATIVE HEART WITHOUT ANGINA PECTORIS: ICD-10-CM

## 2021-09-09 DIAGNOSIS — I51.3 APICAL MURAL THROMBUS: ICD-10-CM

## 2021-09-16 ENCOUNTER — ANTICOAG VISIT (OUTPATIENT)
Dept: CARDIOLOGY CLINIC | Facility: CLINIC | Age: 76
End: 2021-09-16

## 2021-09-16 ENCOUNTER — APPOINTMENT (OUTPATIENT)
Dept: LAB | Facility: HOSPITAL | Age: 76
End: 2021-09-16
Attending: INTERNAL MEDICINE
Payer: MEDICARE

## 2021-09-23 ENCOUNTER — APPOINTMENT (OUTPATIENT)
Dept: LAB | Facility: HOSPITAL | Age: 76
End: 2021-09-23
Attending: INTERNAL MEDICINE
Payer: MEDICARE

## 2021-09-23 ENCOUNTER — ANTICOAG VISIT (OUTPATIENT)
Dept: CARDIOLOGY CLINIC | Facility: CLINIC | Age: 76
End: 2021-09-23

## 2021-09-23 LAB
CHOLEST SERPL-MCNC: 156 MG/DL (ref 50–200)
HDLC SERPL-MCNC: 55 MG/DL
LDLC SERPL CALC-MCNC: 85 MG/DL (ref 0–100)
NONHDLC SERPL-MCNC: 101 MG/DL
TRIGL SERPL-MCNC: 78 MG/DL

## 2021-09-23 PROCEDURE — 36415 COLL VENOUS BLD VENIPUNCTURE: CPT | Performed by: INTERNAL MEDICINE

## 2021-09-23 PROCEDURE — 80061 LIPID PANEL: CPT | Performed by: INTERNAL MEDICINE

## 2021-09-30 ENCOUNTER — TELEPHONE (OUTPATIENT)
Dept: CARDIOLOGY CLINIC | Facility: CLINIC | Age: 76
End: 2021-09-30

## 2021-09-30 ENCOUNTER — ANTICOAG VISIT (OUTPATIENT)
Dept: CARDIOLOGY CLINIC | Facility: CLINIC | Age: 76
End: 2021-09-30

## 2021-09-30 ENCOUNTER — APPOINTMENT (OUTPATIENT)
Dept: LAB | Facility: HOSPITAL | Age: 76
End: 2021-09-30
Attending: INTERNAL MEDICINE
Payer: MEDICARE

## 2021-10-07 ENCOUNTER — ANTICOAG VISIT (OUTPATIENT)
Dept: CARDIOLOGY CLINIC | Facility: CLINIC | Age: 76
End: 2021-10-07

## 2021-10-07 ENCOUNTER — APPOINTMENT (OUTPATIENT)
Dept: LAB | Facility: HOSPITAL | Age: 76
End: 2021-10-07
Attending: INTERNAL MEDICINE
Payer: MEDICARE

## 2021-10-19 DIAGNOSIS — I10 ESSENTIAL HYPERTENSION: ICD-10-CM

## 2021-10-19 DIAGNOSIS — I50.42 CHRONIC COMBINED SYSTOLIC AND DIASTOLIC CHF (CONGESTIVE HEART FAILURE) (HCC): ICD-10-CM

## 2021-10-19 DIAGNOSIS — I21.4 NSTEMI (NON-ST ELEVATED MYOCARDIAL INFARCTION) (HCC): ICD-10-CM

## 2021-10-19 DIAGNOSIS — G40.909 SEIZURE DISORDER (HCC): ICD-10-CM

## 2021-10-19 RX ORDER — ATORVASTATIN CALCIUM 80 MG/1
TABLET, FILM COATED ORAL
Qty: 90 TABLET | Refills: 1 | Status: SHIPPED | OUTPATIENT
Start: 2021-10-19 | End: 2022-03-11 | Stop reason: SDUPTHER

## 2021-10-21 ENCOUNTER — APPOINTMENT (OUTPATIENT)
Dept: LAB | Facility: HOSPITAL | Age: 76
End: 2021-10-21
Attending: INTERNAL MEDICINE
Payer: MEDICARE

## 2021-10-21 ENCOUNTER — ANTICOAG VISIT (OUTPATIENT)
Dept: CARDIOLOGY CLINIC | Facility: CLINIC | Age: 76
End: 2021-10-21

## 2021-10-26 DIAGNOSIS — I10 ESSENTIAL HYPERTENSION: ICD-10-CM

## 2021-10-26 DIAGNOSIS — I50.42 CHRONIC COMBINED SYSTOLIC AND DIASTOLIC CHF (CONGESTIVE HEART FAILURE) (HCC): ICD-10-CM

## 2021-10-26 DIAGNOSIS — I21.4 NSTEMI (NON-ST ELEVATED MYOCARDIAL INFARCTION) (HCC): ICD-10-CM

## 2021-10-26 DIAGNOSIS — G40.909 SEIZURE DISORDER (HCC): ICD-10-CM

## 2021-10-26 RX ORDER — ASPIRIN 81 MG/1
TABLET, COATED ORAL
Qty: 90 TABLET | Refills: 1 | Status: SHIPPED | OUTPATIENT
Start: 2021-10-26 | End: 2022-01-24 | Stop reason: SDUPTHER

## 2021-11-11 ENCOUNTER — APPOINTMENT (OUTPATIENT)
Dept: LAB | Facility: HOSPITAL | Age: 76
End: 2021-11-11
Attending: INTERNAL MEDICINE
Payer: MEDICARE

## 2021-11-11 ENCOUNTER — ANTICOAG VISIT (OUTPATIENT)
Dept: CARDIOLOGY CLINIC | Facility: CLINIC | Age: 76
End: 2021-11-11

## 2021-11-16 ENCOUNTER — OFFICE VISIT (OUTPATIENT)
Dept: INTERNAL MEDICINE CLINIC | Facility: CLINIC | Age: 76
End: 2021-11-16

## 2021-11-16 VITALS
WEIGHT: 219 LBS | BODY MASS INDEX: 29.66 KG/M2 | SYSTOLIC BLOOD PRESSURE: 115 MMHG | TEMPERATURE: 98 F | HEART RATE: 60 BPM | DIASTOLIC BLOOD PRESSURE: 69 MMHG | HEIGHT: 72 IN

## 2021-11-16 DIAGNOSIS — I10 PRIMARY HYPERTENSION: Primary | Chronic | ICD-10-CM

## 2021-11-16 DIAGNOSIS — G40.909 SEIZURE DISORDER (HCC): Chronic | ICD-10-CM

## 2021-11-16 DIAGNOSIS — I50.42 CHRONIC COMBINED SYSTOLIC AND DIASTOLIC CHF (CONGESTIVE HEART FAILURE) (HCC): ICD-10-CM

## 2021-11-16 DIAGNOSIS — E78.2 MIXED HYPERLIPIDEMIA: Chronic | ICD-10-CM

## 2021-11-16 DIAGNOSIS — I25.10 CORONARY ARTERY DISEASE INVOLVING NATIVE CORONARY ARTERY OF NATIVE HEART WITHOUT ANGINA PECTORIS: ICD-10-CM

## 2021-11-16 PROCEDURE — 1124F ACP DISCUSS-NO DSCNMKR DOCD: CPT | Performed by: HOSPITALIST

## 2021-11-16 PROCEDURE — 99213 OFFICE O/P EST LOW 20 MIN: CPT | Performed by: HOSPITALIST

## 2021-11-16 RX ORDER — PHENOBARBITAL 64.8 MG/1
64.8 TABLET ORAL 2 TIMES DAILY
Qty: 60 TABLET | Refills: 5 | Status: SHIPPED | OUTPATIENT
Start: 2021-11-16 | End: 2022-06-06

## 2021-12-02 ENCOUNTER — ANTICOAG VISIT (OUTPATIENT)
Dept: CARDIOLOGY CLINIC | Facility: CLINIC | Age: 76
End: 2021-12-02

## 2021-12-02 ENCOUNTER — APPOINTMENT (OUTPATIENT)
Dept: LAB | Facility: HOSPITAL | Age: 76
End: 2021-12-02
Attending: INTERNAL MEDICINE
Payer: MEDICARE

## 2021-12-09 DIAGNOSIS — I51.3 APICAL MURAL THROMBUS: ICD-10-CM

## 2021-12-09 RX ORDER — WARFARIN SODIUM 2.5 MG/1
TABLET ORAL
Qty: 180 TABLET | Refills: 3 | Status: SHIPPED | OUTPATIENT
Start: 2021-12-09 | End: 2022-07-15

## 2021-12-30 ENCOUNTER — APPOINTMENT (OUTPATIENT)
Dept: LAB | Facility: HOSPITAL | Age: 76
End: 2021-12-30
Attending: INTERNAL MEDICINE
Payer: MEDICARE

## 2021-12-30 ENCOUNTER — ANTICOAG VISIT (OUTPATIENT)
Dept: CARDIOLOGY CLINIC | Facility: CLINIC | Age: 76
End: 2021-12-30

## 2022-01-01 ENCOUNTER — TELEPHONE (OUTPATIENT)
Dept: HEMATOLOGY ONCOLOGY | Facility: CLINIC | Age: 77
End: 2022-01-01

## 2022-01-01 ENCOUNTER — HOME CARE VISIT (OUTPATIENT)
Dept: HOME HEALTH SERVICES | Facility: HOME HEALTHCARE | Age: 77
End: 2022-01-01

## 2022-01-01 VITALS — RESPIRATION RATE: 12 BRPM | DIASTOLIC BLOOD PRESSURE: 50 MMHG | HEART RATE: 58 BPM | SYSTOLIC BLOOD PRESSURE: 90 MMHG

## 2022-01-01 VITALS — RESPIRATION RATE: 16 BRPM | HEART RATE: 104 BPM

## 2022-01-01 DIAGNOSIS — C15.9 ESOPHAGEAL ADENOCARCINOMA (HCC): Primary | ICD-10-CM

## 2022-01-01 DIAGNOSIS — C15.9 MALIGNANT NEOPLASM OF ESOPHAGUS, UNSPECIFIED LOCATION (HCC): Primary | ICD-10-CM

## 2022-01-13 ENCOUNTER — APPOINTMENT (OUTPATIENT)
Dept: LAB | Facility: HOSPITAL | Age: 77
End: 2022-01-13
Attending: INTERNAL MEDICINE
Payer: MEDICARE

## 2022-01-13 ENCOUNTER — ANTICOAG VISIT (OUTPATIENT)
Dept: CARDIOLOGY CLINIC | Facility: CLINIC | Age: 77
End: 2022-01-13

## 2022-01-24 DIAGNOSIS — G40.909 SEIZURE DISORDER (HCC): ICD-10-CM

## 2022-01-24 DIAGNOSIS — I50.42 CHRONIC COMBINED SYSTOLIC AND DIASTOLIC CHF (CONGESTIVE HEART FAILURE) (HCC): ICD-10-CM

## 2022-01-24 DIAGNOSIS — I10 ESSENTIAL HYPERTENSION: ICD-10-CM

## 2022-01-24 DIAGNOSIS — I21.4 NSTEMI (NON-ST ELEVATED MYOCARDIAL INFARCTION) (HCC): ICD-10-CM

## 2022-01-24 RX ORDER — ASPIRIN 81 MG/1
81 TABLET ORAL DAILY
Qty: 90 TABLET | Refills: 3 | Status: SHIPPED | OUTPATIENT
Start: 2022-01-24

## 2022-01-27 ENCOUNTER — ANTICOAG VISIT (OUTPATIENT)
Dept: CARDIOLOGY CLINIC | Facility: CLINIC | Age: 77
End: 2022-01-27

## 2022-01-27 ENCOUNTER — APPOINTMENT (OUTPATIENT)
Dept: LAB | Facility: HOSPITAL | Age: 77
End: 2022-01-27
Attending: INTERNAL MEDICINE
Payer: MEDICARE

## 2022-02-10 ENCOUNTER — APPOINTMENT (OUTPATIENT)
Dept: LAB | Facility: HOSPITAL | Age: 77
End: 2022-02-10
Attending: INTERNAL MEDICINE
Payer: MEDICARE

## 2022-02-10 ENCOUNTER — ANTICOAG VISIT (OUTPATIENT)
Dept: CARDIOLOGY CLINIC | Facility: CLINIC | Age: 77
End: 2022-02-10

## 2022-02-10 ENCOUNTER — OFFICE VISIT (OUTPATIENT)
Dept: CARDIOLOGY CLINIC | Facility: CLINIC | Age: 77
End: 2022-02-10
Payer: MEDICARE

## 2022-02-10 VITALS
BODY MASS INDEX: 29.96 KG/M2 | OXYGEN SATURATION: 96 % | DIASTOLIC BLOOD PRESSURE: 68 MMHG | SYSTOLIC BLOOD PRESSURE: 112 MMHG | HEIGHT: 72 IN | HEART RATE: 73 BPM | WEIGHT: 221.2 LBS

## 2022-02-10 DIAGNOSIS — I25.10 CORONARY ARTERY DISEASE INVOLVING NATIVE CORONARY ARTERY OF NATIVE HEART WITHOUT ANGINA PECTORIS: Primary | ICD-10-CM

## 2022-02-10 DIAGNOSIS — I25.5 ISCHEMIC CARDIOMYOPATHY: ICD-10-CM

## 2022-02-10 DIAGNOSIS — E78.2 MIXED HYPERLIPIDEMIA: Chronic | ICD-10-CM

## 2022-02-10 DIAGNOSIS — I71.4 ABDOMINAL AORTIC ANEURYSM, WITHOUT RUPTURE (HCC): ICD-10-CM

## 2022-02-10 DIAGNOSIS — I10 PRIMARY HYPERTENSION: Chronic | ICD-10-CM

## 2022-02-10 PROBLEM — I25.118 CORONARY ARTERY DISEASE OF NATIVE ARTERY OF NATIVE HEART WITH STABLE ANGINA PECTORIS (HCC): Status: ACTIVE | Noted: 2022-02-10

## 2022-02-10 PROBLEM — I71.40 ABDOMINAL AORTIC ANEURYSM, WITHOUT RUPTURE: Status: ACTIVE | Noted: 2022-02-10

## 2022-02-10 PROBLEM — I48.0 PAROXYSMAL ATRIAL FIBRILLATION (HCC): Status: ACTIVE | Noted: 2022-02-10

## 2022-02-10 PROCEDURE — 99214 OFFICE O/P EST MOD 30 MIN: CPT | Performed by: INTERNAL MEDICINE

## 2022-02-10 NOTE — PROGRESS NOTES
Cardiology Follow Up    [place]  1945  [phone]  [facility] [address] [facility] [address] [phone]    9  Coronary artery disease involving native coronary artery of native heart without angina pectoris  Echo complete w/ contrast if indicated   2  Ischemic cardiomyopathy  Echo complete w/ contrast if indicated   3  Abdominal aortic aneurysm, without rupture ([address] )     4  Mixed hyperlipidemia     5  Primary hypertension         Interval History:  Had a presents to the office today with no complaints  He tolerates all activity without chest pain shortness of breath orthopnea paroxysmal nocturnal dyspnea or syncope  Patient Active Problem List   Diagnosis    CAD (coronary artery disease)    HTN (hypertension)    HLD (hyperlipidemia)    Seizure disorder (HCC)    S/P AAA (abdominal aortic aneurysm) repair    STEMI (ST elevation myocardial infarction) ([address] )    Ventral hernia with obstruction and without gangrene    Hernia, incisional    Abdominal pain    NSTEMI (non-ST elevated myocardial infarction) ([address] )    Chronic combined systolic and diastolic CHF [facility]congestive heart failure) ([facility])    Allergic reaction to contrast media    Ischemic cardiomyopathy    Abdominal aortic aneurysm, without rupture ([facility])    Paroxysmal atrial fibrillation ([facility] )    Coronary artery disease of native artery of native heart with stable angina pectoris ([facility] )     Past Medical History:   Diagnosis Date    Cardiac disease     CHF (congestive heart failure) ([facility] )     Hernia of abdominal cavity     Myocardial infarction ([facility] )     last assessed 7/31/14, past, Pt had MI s/p AGUSTIN placed in 2001, refuses to take any other medications for his cardiac disease, only will take seizure med   Understands possible complications from this decision    Seizure [facility])      Social History     Socioeconomic History    Marital status: Single     Spouse name: Not on file    Number of children: Not on file    Years of education: Not on file    Highest education level: Not on file   Occupational History    Not on file   Tobacco Use    Smoking status: Former Smoker     Quit date: 2005     Years since quittin 6    Smokeless tobacco: Never Used   Vaping Use    Vaping Use: Never used   Substance and Sexual Activity    Alcohol use: Not Currently    Drug use: Never    Sexual activity: Never   Other Topics Concern    Not on file   Social History Narrative    Not on file     Social Determinants of Health     Financial Resource Strain: Not on file   Food Insecurity: Not on file   Transportation Needs: Not on file   Physical Activity: Not on file   Stress: Not on file   Social Connections: Not on file   Intimate Partner Violence: Not on file   Housing Stability: Not on file      Family History   Problem Relation Age of Onset    Hypertension Father     Kidney disease Brother      Past Surgical History:   Procedure Laterality Date    ABDOMINAL AORTIC ANEURYSM REPAIR      for dialation or occulsion    CATARACT EXTRACTION         Current Outpatient Medications:     aspirin (Aspirin Low Dose) 81 mg EC tablet, Take 1 tablet (81 mg total) by mouth daily, Disp: 90 tablet, Rfl: 3    atorvastatin (LIPITOR) 80 mg tablet, TAKE 1 TABLET BY MOUTH DAILY WITH DINNER, Disp: 90 tablet, Rfl: 1    nitroglycerin (NITROSTAT) 0 4 mg SL tablet, Place 1 tablet (0 4 mg total) under the tongue every 5 (five) minutes as needed for chest pain, Disp: 30 tablet, Rfl: 1    PHENobarbital 64 8 mg tablet, Take 1 tablet (64 8 mg total) by mouth 2 (two) times a day, Disp: 60 tablet, Rfl: 5    triamcinolone (KENALOG) 0 025 % cream, APPLY TO AFFECTED AREA TWICE A DAY, Disp: 30 g, Rfl: 1    warfarin (COUMADIN) 2 5 mg tablet, Take 2 tabs by mouth daily or as directed by physician, Disp: 180 tablet, Rfl: 3    metoprolol succinate (TOPROL-XL) 50 mg 24 hr tablet, TAKE 1 TABLET BY MOUTH EVERY DAY (Patient not taking: TAKE 1 TABLET BY MOUTH EVERY DAY), Disp: 90 tablet, Rfl: 0  Allergies   Allergen Reactions    Iodinated Diagnostic Agents Rash     Pt's skin get very red and he gets hot and chills    Clopidogrel Rash       Labs: Ancillary Orders on 01/14/2022   Component Date Value    Protime 01/27/2022 29 6*    INR 01/27/2022 3 01*   Ancillary Orders on 12/31/2021   Component Date Value    Protime 01/13/2022 21 8*    INR 01/13/2022 2 01*   Ancillary Orders on 12/03/2021   Component Date Value    Protime 12/30/2021 17 3*    INR 12/30/2021 1 48*   Ancillary Orders on 11/12/2021   Component Date Value    Protime 12/02/2021 23 1*    INR 12/02/2021 2 16*   Ancillary Orders on 10/22/2021   Component Date Value    Protime 11/11/2021 28 1*    INR 11/11/2021 2 80*   Ancillary Orders on 10/08/2021   Component Date Value    Protime 10/21/2021 24 6*    INR 10/21/2021 2 36*   Ancillary Orders on 10/01/2021   Component Date Value    Protime 10/07/2021 23 6*    INR 10/07/2021 2 23*   Ancillary Orders on 09/24/2021   Component Date Value    Protime 09/30/2021 22 8*    INR 09/30/2021 2 13*   Ancillary Orders on 09/17/2021   Component Date Value    Protime 09/23/2021 21 2*    INR 09/23/2021 1 93*   Ancillary Orders on 09/10/2021   Component Date Value    Protime 09/16/2021 19 1*    INR 09/16/2021 1 69*   There may be more visits with results that are not included  Imaging: No results found  Review of Systems:  Review of Systems   Constitutional: Negative for fatigue  HENT: Negative for nosebleeds  Eyes: Negative for redness  Respiratory: Negative for chest tightness and shortness of breath  Cardiovascular: Negative for chest pain, palpitations and leg swelling  Gastrointestinal: Negative for abdominal pain  Endocrine: Negative for polyuria  Genitourinary: Negative for urgency  Musculoskeletal: Negative for arthralgias  Skin: Negative for rash  Neurological: Negative for dizziness and syncope  Psychiatric/Behavioral: Negative for confusion and sleep disturbance  The patient is not nervous/anxious  Physical Exam:  Physical Exam  Constitutional:       Appearance: He is well-developed  HENT:      Head: Normocephalic and atraumatic  Nose: Nose normal    Eyes:      Pupils: Pupils are equal, round, and reactive to light  Cardiovascular:      Rate and Rhythm: Normal rate and regular rhythm  Heart sounds: Normal heart sounds  Pulmonary:      Effort: Pulmonary effort is normal       Breath sounds: Normal breath sounds  Abdominal:      General: Bowel sounds are normal       Palpations: Abdomen is soft  Hernia: A hernia is present  Hernia is present in the ventral area  Musculoskeletal:         General: Normal range of motion  Cervical back: Neck supple  Skin:     General: Skin is warm and dry  Neurological:      Mental Status: He is alert and oriented to person, place, and time  Psychiatric:         Behavior: Behavior normal          Thought Content: Thought content normal          Judgment: Judgment normal          Discussion/Summary:  Sofia [place] is asymptomatic  He suffered an MI in April 28 21 with stenting of his LAD and in July of 2021 with stenting of his RCA  At the time of his RCA infarct he was noncompliant with all medication  At his last visit he was taking dual anti-platelet therapy although he has had issues compliant with this in the past   At present he is taking aspirin in addition he is taking warfarin which she has been on for a probable left ventricular thrombus  He discontinued his metoprolol on his own but his blood pressure is well controlled  He is taking 80 mg of atorvastatin daily  At this time I will check a 2D echocardiogram to reassess left ventricular thrombus and wall motion abnormality    In the setting of his ischemic cardiomyopathy I would like to see him on an ACE-inhibitor as well as a beta-blocker that compliance has been an issue and is noted he discontinued his metoprolol on his own  I will see him back following the echocardiogram to make further recommendations on long-term therapy and duration warfarin  Fortunately he is 6 months out from his previous and most recent stent placement so we will continue to maintain him on aspirin I will see him back in 2 months

## 2022-02-24 ENCOUNTER — ANTICOAG VISIT (OUTPATIENT)
Dept: CARDIOLOGY CLINIC | Facility: CLINIC | Age: 77
End: 2022-02-24

## 2022-02-24 ENCOUNTER — APPOINTMENT (OUTPATIENT)
Dept: LAB | Facility: HOSPITAL | Age: 77
End: 2022-02-24
Attending: INTERNAL MEDICINE
Payer: MEDICARE

## 2022-03-07 DIAGNOSIS — T50.8X5A: ICD-10-CM

## 2022-03-07 RX ORDER — TRIAMCINOLONE ACETONIDE 0.25 MG/G
CREAM TOPICAL
Qty: 30 G | Refills: 1 | Status: SHIPPED | OUTPATIENT
Start: 2022-03-07 | End: 2022-07-15

## 2022-03-10 ENCOUNTER — APPOINTMENT (OUTPATIENT)
Dept: LAB | Facility: HOSPITAL | Age: 77
End: 2022-03-10
Attending: INTERNAL MEDICINE
Payer: MEDICARE

## 2022-03-10 ENCOUNTER — ANTICOAG VISIT (OUTPATIENT)
Dept: CARDIOLOGY CLINIC | Facility: CLINIC | Age: 77
End: 2022-03-10

## 2022-03-11 DIAGNOSIS — I10 ESSENTIAL HYPERTENSION: ICD-10-CM

## 2022-03-11 DIAGNOSIS — I50.42 CHRONIC COMBINED SYSTOLIC AND DIASTOLIC CHF (CONGESTIVE HEART FAILURE) (HCC): ICD-10-CM

## 2022-03-11 DIAGNOSIS — G40.909 SEIZURE DISORDER (HCC): ICD-10-CM

## 2022-03-11 DIAGNOSIS — I21.4 NSTEMI (NON-ST ELEVATED MYOCARDIAL INFARCTION) (HCC): ICD-10-CM

## 2022-03-11 RX ORDER — ATORVASTATIN CALCIUM 80 MG/1
80 TABLET, FILM COATED ORAL
Qty: 90 TABLET | Refills: 4 | Status: SHIPPED | OUTPATIENT
Start: 2022-03-11

## 2022-03-15 ENCOUNTER — HOSPITAL ENCOUNTER (OUTPATIENT)
Dept: NON INVASIVE DIAGNOSTICS | Facility: CLINIC | Age: 77
Discharge: HOME | End: 2022-03-15
Payer: MEDICARE

## 2022-03-15 VITALS
SYSTOLIC BLOOD PRESSURE: 112 MMHG | WEIGHT: 221 LBS | HEIGHT: 72 IN | DIASTOLIC BLOOD PRESSURE: 68 MMHG | BODY MASS INDEX: 29.93 KG/M2 | HEART RATE: 80 BPM

## 2022-03-15 DIAGNOSIS — I25.10 CORONARY ARTERY DISEASE INVOLVING NATIVE CORONARY ARTERY OF NATIVE HEART WITHOUT ANGINA PECTORIS: ICD-10-CM

## 2022-03-15 DIAGNOSIS — I25.5 ISCHEMIC CARDIOMYOPATHY: ICD-10-CM

## 2022-03-15 LAB
AORTIC ROOT: 3.4 CM
APICAL FOUR CHAMBER EJECTION FRACTION: 63 %
E WAVE DECELERATION TIME: 281 MS
FRACTIONAL SHORTENING: 28 % (ref 28–44)
INTERVENTRICULAR SEPTUM IN DIASTOLE (PARASTERNAL SHORT AXIS VIEW): 1.1 CM
INTERVENTRICULAR SEPTUM: 1.1 CM (ref 0.56–1.05)
LEFT ATRIUM AREA SYSTOLE SINGLE PLANE A4C: 19.9 CM2
LEFT ATRIUM SIZE: 4.3 CM
LEFT INTERNAL DIMENSION IN SYSTOLE: 3.9 CM (ref 4.22–6.39)
LEFT VENTRICULAR INTERNAL DIMENSION IN DIASTOLE: 5.4 CM (ref 7.04–10.49)
LEFT VENTRICULAR POSTERIOR WALL IN END DIASTOLE: 1.1 CM (ref 0.55–1.04)
LEFT VENTRICULAR STROKE VOLUME: 79 ML
LVSV (TEICH): 79 ML
MV E'TISSUE VEL-SEP: 6 CM/S
MV PEAK A VEL: 0.66 M/S
MV PEAK E VEL: 54 CM/S
MV STENOSIS PRESSURE HALF TIME: 81 MS
MV VALVE AREA P 1/2 METHOD: 2.72 CM2
RIGHT ATRIUM AREA SYSTOLE A4C: 14.1 CM2
RIGHT VENTRICLE ID DIMENSION: 4.1 CM
SL CV LV EF: 50
SL CV PED ECHO LEFT VENTRICLE DIASTOLIC VOLUME (MOD BIPLANE) 2D: 144 ML
SL CV PED ECHO LEFT VENTRICLE SYSTOLIC VOLUME (MOD BIPLANE) 2D: 64 ML
TRICUSPID VALVE PEAK REGURGITATION VELOCITY: 2.49 M/S
Z-SCORE OF INTERVENTRICULAR SEPTUM IN END DIASTOLE: 2.35
Z-SCORE OF LEFT VENTRICULAR DIMENSION IN END DIASTOLE: -4.64
Z-SCORE OF LEFT VENTRICULAR DIMENSION IN END SYSTOLE: -2.26
Z-SCORE OF LEFT VENTRICULAR POSTERIOR WALL IN END DIASTOLE: 2.46

## 2022-03-15 PROCEDURE — 93306 TTE W/DOPPLER COMPLETE: CPT

## 2022-03-15 PROCEDURE — 93306 TTE W/DOPPLER COMPLETE: CPT | Performed by: INTERNAL MEDICINE

## 2022-03-26 ENCOUNTER — HOSPITAL ENCOUNTER (EMERGENCY)
Facility: HOSPITAL | Age: 77
Discharge: HOME | End: 2022-03-26
Attending: EMERGENCY MEDICINE | Admitting: EMERGENCY MEDICINE
Payer: MEDICARE

## 2022-03-26 ENCOUNTER — APPOINTMENT (EMERGENCY)
Dept: RADIOLOGY | Facility: HOSPITAL | Age: 77
End: 2022-03-26
Payer: MEDICARE

## 2022-03-26 VITALS
TEMPERATURE: 98 F | SYSTOLIC BLOOD PRESSURE: 124 MMHG | OXYGEN SATURATION: 90 % | DIASTOLIC BLOOD PRESSURE: 63 MMHG | HEART RATE: 102 BPM | RESPIRATION RATE: 18 BRPM

## 2022-03-26 DIAGNOSIS — M25.462 PAIN AND SWELLING OF LEFT KNEE: ICD-10-CM

## 2022-03-26 DIAGNOSIS — M25.562 ACUTE PAIN OF LEFT KNEE: Primary | ICD-10-CM

## 2022-03-26 DIAGNOSIS — M25.562 PAIN AND SWELLING OF LEFT KNEE: ICD-10-CM

## 2022-03-26 DIAGNOSIS — R79.1 SUPRATHERAPEUTIC INR: ICD-10-CM

## 2022-03-26 LAB
INR PPP: 3.05 (ref 0.84–1.19)
PROTHROMBIN TIME: 30 SECONDS (ref 11.6–14.5)

## 2022-03-26 PROCEDURE — 36415 COLL VENOUS BLD VENIPUNCTURE: CPT | Performed by: EMERGENCY MEDICINE

## 2022-03-26 PROCEDURE — 73564 X-RAY EXAM KNEE 4 OR MORE: CPT

## 2022-03-26 PROCEDURE — 85610 PROTHROMBIN TIME: CPT | Performed by: EMERGENCY MEDICINE

## 2022-03-26 PROCEDURE — 99284 EMERGENCY DEPT VISIT MOD MDM: CPT

## 2022-03-26 PROCEDURE — 99284 EMERGENCY DEPT VISIT MOD MDM: CPT | Performed by: EMERGENCY MEDICINE

## 2022-03-26 RX ORDER — ACETAMINOPHEN 500 MG
500 TABLET ORAL EVERY 6 HOURS PRN
Qty: 30 TABLET | Refills: 0 | Status: SHIPPED | OUTPATIENT
Start: 2022-03-26 | End: 2022-07-15

## 2022-03-26 NOTE — ED PROVIDER NOTES
History  Chief Complaint   Patient presents with    Knee Pain     pt fell and hurt his knee  pt is having trouble walking on knee     14-year-old male, on blood thinners, warfarin and aspirin, who presents for fall on left knee  This happened on Thursday, patient denies any head strike, no neck pain, no loss of consciousness  Patient states that he landed forward on his 2 hands, as well as his left knee  Person did not notice left knee pain home but when getting up he started noticing the pain  Patient ambulates with a walker at baseline, states that he is able to ambulate at baseline and without pain, however he does have difficulty sitting up from a seated position as well as sitting down from standing position  Patient states that the swelling is going down since that happened on Thursday  He is not reporting any neurologic deficits, no sensation changes  Tetanus UTD  ROS otherwise negative  Prior to Admission Medications   Prescriptions Last Dose Informant Patient Reported? Taking?    PHENobarbital 64 8 mg tablet  Self No No   Sig: Take 1 tablet (64 8 mg total) by mouth 2 (two) times a day   aspirin (Aspirin Low Dose) 81 mg EC tablet  Self No No   Sig: Take 1 tablet (81 mg total) by mouth daily   atorvastatin (LIPITOR) 80 mg tablet   No No   Sig: Take 1 tablet (80 mg total) by mouth daily with dinner   metoprolol succinate (TOPROL-XL) 50 mg 24 hr tablet  Self No No   Sig: TAKE 1 TABLET BY MOUTH EVERY DAY   Patient not taking: TAKE 1 TABLET BY MOUTH EVERY DAY   nitroglycerin (NITROSTAT) 0 4 mg SL tablet  Self No No   Sig: Place 1 tablet (0 4 mg total) under the tongue every 5 (five) minutes as needed for chest pain   triamcinolone (KENALOG) 0 025 % cream   No No   Sig: APPLY TO AFFECTED AREA TWICE A DAY   warfarin (COUMADIN) 2 5 mg tablet  Self No No   Sig: Take 2 tabs by mouth daily or as directed by physician      Facility-Administered Medications: None       Past Medical History:   Diagnosis Date    Cardiac disease     CHF (congestive heart failure) ([facility] )     Hernia of abdominal cavity     Myocardial infarction [facility])     last assessed 14, past, Pt had MI s/p AGUSTIN placed in , refuses to take any other medications for his cardiac disease, only will take seizure med  Understands possible complications from this decision    Seizure [facility])        Past Surgical History:   Procedure Laterality Date    ABDOMINAL AORTIC ANEURYSM REPAIR      for dialation or occulsion    CATARACT EXTRACTION         Family History   Problem Relation Age of Onset    Hypertension Father     Kidney disease Brother      I have reviewed and agree with the history as documented  E-Cigarette/Vaping    E-Cigarette Use Never User      E-Cigarette/Vaping Substances    Nicotine No     THC No     CBD No     Flavoring No     Other No     Unknown No      Social History     Tobacco Use    Smoking status: Former Smoker     Quit date: 2005     Years since quittin 8    Smokeless tobacco: Never Used   Vaping Use    Vaping Use: Never used   Substance Use Topics    Alcohol use: Not Currently    Drug use: Never        Review of Systems   Constitutional: Negative for chills, diaphoresis, fatigue and fever  HENT: Negative for congestion and sore throat  Eyes: Negative for visual disturbance  Respiratory: Negative for cough, chest tightness and shortness of breath  Cardiovascular: Negative for chest pain, palpitations and leg swelling  Gastrointestinal: Negative for abdominal distention, abdominal pain, constipation, diarrhea, nausea and vomiting  Genitourinary: Negative for difficulty urinating and dysuria  Musculoskeletal: Positive for arthralgias and joint swelling  Negative for myalgias  Skin: Negative for rash  Neurological: Negative for dizziness, weakness, light-headedness, numbness and headaches  Psychiatric/Behavioral: Negative for agitation, behavioral problems and confusion   The patient is not nervous/anxious  All other systems reviewed and are negative  Physical Exam  ED Triage Vitals [03/26/22 1145]   Temperature Pulse Respirations Blood Pressure SpO2   98 °F (36 7 °C) 102 18 124/63 90 %      Temp Source Heart Rate Source Patient Position - Orthostatic VS BP Location FiO2 (%)   Oral Monitor -- -- --      Pain Score       --             Orthostatic Vital Signs  Vitals:    03/26/22 1145   BP: 124/63   Pulse: 102       Physical Exam  Constitutional:       Appearance: He is well-developed  HENT:      Head: Normocephalic and atraumatic  Cardiovascular:      Rate and Rhythm: Normal rate and regular rhythm  Heart sounds: Normal heart sounds  No murmur heard  Pulmonary:      Effort: Pulmonary effort is normal  No respiratory distress  Breath sounds: Normal breath sounds  Abdominal:      General: Bowel sounds are normal  There is no distension  Palpations: Abdomen is soft  Tenderness: There is no abdominal tenderness  Musculoskeletal:         General: Swelling and tenderness present  No deformity  Comments: Patient has swelling of the left knee joint  It is swollen with abrasions over the patella  Patient has no difficulty extending the knee, normal plantarflexion and dorsiflexion  He can flex the knee to approximately 90 degrees, but past that point begins having pain  Skin:     General: Skin is warm  Findings: Erythema present  No rash  Neurological:      Mental Status: He is alert and oriented to person, place, and time  Cranial Nerves: No cranial nerve deficit  Sensory: No sensory deficit  Psychiatric:         Behavior: Behavior normal          Thought Content:  Thought content normal          Judgment: Judgment normal          ED Medications  Medications - No data to display    Diagnostic Studies  Results Reviewed     Procedure Component Value Units Date/Time    Protime-INR [[id number]]  (Abnormal) Collected: 03/26/22 1241 Lab Status: Final result Specimen: Blood from Arm, Left Updated: 03/26/22 1307     Protime 30 0 seconds      INR 3 05                 XR knee 4+ views left injury   ED Interpretation by Yoandy Mayer MD (03/26 1304)   No acute osseous abnormality  Procedures  Procedures      ED Course                                       MDM  Number of Diagnoses or Management Options  Acute pain of left knee  Pain and swelling of left knee  Supratherapeutic INR  Diagnosis management comments: Patient's presentation is consistent with knee injury versus hemarthrosis  Will obtain x-ray, x-ray did not reveal any fractures  PT INR was performed that was slightly supratherapeutic  Advised that patient hold 1 dose of warfarin, and follow up for repeat labs in conjunction with his PCP  Knee was wrapped  Patient is agreeable with this plan and discharged  Disposition  Final diagnoses:   Acute pain of left knee   Pain and swelling of left knee   Supratherapeutic INR     Time reflects when diagnosis was documented in both MDM as applicable and the Disposition within this note     Time User Action Codes Description Comment    3/26/2022  1:18 PM Blase Story Add [M25 562] Acute pain of left knee     3/26/2022  1:18 PM Blase Story Add [F52 416,  M25 462] Pain and swelling of left knee     3/26/2022  1:18 PM Blase Story Add [R79 1] Supratherapeutic INR       ED Disposition     ED Disposition Condition Date/Time Comment    Discharge Stable Sat Mar 26, 2022  1:18 PM Lethaniel Fee discharge to home/self care              Follow-up Information     Follow up With Specialties Details Why Contact Info Additional Information    [facility]  Call  For re-evaluation [address] [facility]  [phone] [address] [place] [address], [address]   [phone]          Discharge Medication List as of 3/26/2022  1:30 PM START taking these medications    Details   acetaminophen (TYLENOL) 500 mg tablet Take 1 tablet (500 mg total) by mouth every 6 (six) hours as needed for mild pain, Starting Sat 3/26/2022, Normal         CONTINUE these medications which have NOT CHANGED    Details   aspirin (Aspirin Low Dose) 81 mg EC tablet Take 1 tablet (81 mg total) by mouth daily, Starting Mon 1/24/2022, Normal      atorvastatin (LIPITOR) 80 mg tablet Take 1 tablet (80 mg total) by mouth daily with dinner, Starting Fri 3/11/2022, Normal      metoprolol succinate (TOPROL-XL) 50 mg 24 hr tablet TAKE 1 TABLET BY MOUTH EVERY DAY, Normal      nitroglycerin (NITROSTAT) 0 4 mg SL tablet Place 1 tablet (0 4 mg total) under the tongue every 5 (five) minutes as needed for chest pain, Starting Wed 4/7/2021, Normal      PHENobarbital 64 8 mg tablet Take 1 tablet (64 8 mg total) by mouth 2 (two) times a day, Starting Tue 11/16/2021, Until Sun 5/15/2022, Normal      triamcinolone (KENALOG) 0 025 % cream APPLY TO AFFECTED AREA TWICE A DAY, Normal      warfarin (COUMADIN) 2 5 mg tablet Take 2 tabs by mouth daily or as directed by physician, Normal           No discharge procedures on file  PDMP Review       Value Time User    PDMP Reviewed  Yes 11/16/2021  1:20 PM Phil Pendleton DO           ED Provider  Attending physically available and evaluated Angel Hernandezy  I managed the patient along with the ED Attending      Electronically Signed by         Yenni Sloan MD  03/26/22 4651

## 2022-03-26 NOTE — DISCHARGE INSTRUCTIONS
Recommend holding one dose of warfarin today  Please follow up with sports medicine  Keep the joint elevated, compressed  Follow all return precautions  Thank you

## 2022-03-28 NOTE — ED ATTENDING ATTESTATION
3/26/2022  ICharbel MD, saw and evaluated the patient  I have discussed the patient with the resident/non-physician practitioner and agree with the resident's/non-physician practitioner's findings, Plan of Care, and MDM as documented in the resident's/non-physician practitioner's note, except where noted  All available labs and Radiology studies were reviewed  I was present for key portions of any procedure(s) performed by the resident/non-physician practitioner and I was immediately available to provide assistance  At this point I agree with the current assessment done in the Emergency Department    I have conducted an independent evaluation of this patient a history and physical is as follows:  Patient fell and hurt his knees bilaterally left greater than right he is on warfarin last INR was approximately 2 87 this was 2 weeks ago he did not strike his head he fell forward he put both of his arms out and braced  he noticed some abrasions over his left knee and some increased swelling of the left knee happened several days prior  No complaints of headache no neck pain no focal neurologic symptoms  Exam the patient has some swelling of the knee and some edema in his lower leg on the left he has good range of motion is tenderness over the patellar area he does have an effusion there is no instability he is able to extend his leg off the bed patellar quadriceps tendon function intact  X-ray negative for fracture  Some degenerative changes  I suspect the patient has small hemarthrosis however I do not believe it requires arthrocentesis  According the patient is swelling is actually improved since he fell he is able ambulate   ED Course         Critical Care Time  Procedures

## 2022-03-31 ENCOUNTER — ANTICOAG VISIT (OUTPATIENT)
Dept: CARDIOLOGY CLINIC | Facility: CLINIC | Age: 77
End: 2022-03-31

## 2022-04-19 ENCOUNTER — APPOINTMENT (OUTPATIENT)
Dept: LAB | Facility: HOSPITAL | Age: 77
End: 2022-04-19
Attending: INTERNAL MEDICINE
Payer: MEDICARE

## 2022-04-28 ENCOUNTER — APPOINTMENT (OUTPATIENT)
Dept: LAB | Facility: HOSPITAL | Age: 77
End: 2022-04-28
Attending: INTERNAL MEDICINE
Payer: MEDICARE

## 2022-05-02 ENCOUNTER — APPOINTMENT (EMERGENCY)
Dept: NON INVASIVE DIAGNOSTICS | Facility: HOSPITAL | Age: 77
DRG: 375 | End: 2022-05-02
Payer: MEDICARE

## 2022-05-02 ENCOUNTER — APPOINTMENT (EMERGENCY)
Dept: RADIOLOGY | Facility: HOSPITAL | Age: 77
DRG: 375 | End: 2022-05-02
Payer: MEDICARE

## 2022-05-02 ENCOUNTER — HOSPITAL ENCOUNTER (INPATIENT)
Facility: HOSPITAL | Age: 77
LOS: 2 days | Discharge: HOME | DRG: 375 | End: 2022-05-05
Attending: EMERGENCY MEDICINE | Admitting: INTERNAL MEDICINE
Payer: MEDICARE

## 2022-05-02 DIAGNOSIS — K29.80 DUODENITIS: ICD-10-CM

## 2022-05-02 DIAGNOSIS — I25.10 CORONARY ARTERY DISEASE INVOLVING NATIVE CORONARY ARTERY OF NATIVE HEART WITHOUT ANGINA PECTORIS: ICD-10-CM

## 2022-05-02 DIAGNOSIS — K20.90 ESOPHAGITIS DETERMINED BY ENDOSCOPY: ICD-10-CM

## 2022-05-02 DIAGNOSIS — R79.1 SUPRATHERAPEUTIC INR: ICD-10-CM

## 2022-05-02 DIAGNOSIS — I50.42 CHRONIC COMBINED SYSTOLIC AND DIASTOLIC CHF (CONGESTIVE HEART FAILURE) (HCC): ICD-10-CM

## 2022-05-02 DIAGNOSIS — I48.0 PAROXYSMAL ATRIAL FIBRILLATION (HCC): ICD-10-CM

## 2022-05-02 DIAGNOSIS — R13.10 DYSPHAGIA: Primary | ICD-10-CM

## 2022-05-02 DIAGNOSIS — K25.3 ACUTE GASTRIC ULCER: ICD-10-CM

## 2022-05-02 LAB
2HR DELTA HS TROPONIN: -1 NG/L
ALBUMIN SERPL BCP-MCNC: 3.3 G/DL (ref 3.5–5)
ALP SERPL-CCNC: 165 U/L (ref 46–116)
ALT SERPL W P-5'-P-CCNC: 16 U/L (ref 12–78)
ANION GAP SERPL CALCULATED.3IONS-SCNC: 5 MMOL/L (ref 4–13)
AST SERPL W P-5'-P-CCNC: 15 U/L (ref 5–45)
BASOPHILS # BLD AUTO: 0.04 THOUSANDS/ΜL (ref 0–0.1)
BASOPHILS NFR BLD AUTO: 1 % (ref 0–1)
BILIRUB SERPL-MCNC: 0.33 MG/DL (ref 0.2–1)
BUN SERPL-MCNC: 15 MG/DL (ref 5–25)
CALCIUM ALBUM COR SERPL-MCNC: 9 MG/DL (ref 8.3–10.1)
CALCIUM SERPL-MCNC: 8.4 MG/DL (ref 8.3–10.1)
CARDIAC TROPONIN I PNL SERPL HS: 6 NG/L
CARDIAC TROPONIN I PNL SERPL HS: 7 NG/L
CHLORIDE SERPL-SCNC: 109 MMOL/L (ref 100–108)
CO2 SERPL-SCNC: 26 MMOL/L (ref 21–32)
CREAT SERPL-MCNC: 1.2 MG/DL (ref 0.6–1.3)
EOSINOPHIL # BLD AUTO: 0.25 THOUSAND/ΜL (ref 0–0.61)
EOSINOPHIL NFR BLD AUTO: 7 % (ref 0–6)
ERYTHROCYTE [DISTWIDTH] IN BLOOD BY AUTOMATED COUNT: 20.9 % (ref 11.6–15.1)
GFR SERPL CREATININE-BSD FRML MDRD: 57 ML/MIN/1.73SQ M
GLUCOSE SERPL-MCNC: 100 MG/DL (ref 65–140)
HCT VFR BLD AUTO: 34 % (ref 36.5–49.3)
HGB BLD-MCNC: 10.7 G/DL (ref 12–17)
IMM GRANULOCYTES # BLD AUTO: 0.04 THOUSAND/UL (ref 0–0.2)
IMM GRANULOCYTES NFR BLD AUTO: 1 % (ref 0–2)
INR PPP: 3.67 (ref 0.84–1.19)
LYMPHOCYTES # BLD AUTO: 0.98 THOUSANDS/ΜL (ref 0.6–4.47)
LYMPHOCYTES NFR BLD AUTO: 28 % (ref 14–44)
MCH RBC QN AUTO: 30.1 PG (ref 26.8–34.3)
MCHC RBC AUTO-ENTMCNC: 31.5 G/DL (ref 31.4–37.4)
MCV RBC AUTO: 96 FL (ref 82–98)
MONOCYTES # BLD AUTO: 0.4 THOUSAND/ΜL (ref 0.17–1.22)
MONOCYTES NFR BLD AUTO: 12 % (ref 4–12)
NEUTROPHILS # BLD AUTO: 1.78 THOUSANDS/ΜL (ref 1.85–7.62)
NEUTS SEG NFR BLD AUTO: 51 % (ref 43–75)
NRBC BLD AUTO-RTO: 0 /100 WBCS
PHENOBARB SERPL-MCNC: 27.7 UG/ML (ref 15–40)
PLATELET # BLD AUTO: 121 THOUSANDS/UL (ref 149–390)
PMV BLD AUTO: 11.3 FL (ref 8.9–12.7)
POTASSIUM SERPL-SCNC: 4.1 MMOL/L (ref 3.5–5.3)
PROT SERPL-MCNC: 7.2 G/DL (ref 6.4–8.2)
PROTHROMBIN TIME: 34.6 SECONDS (ref 11.6–14.5)
RBC # BLD AUTO: 3.56 MILLION/UL (ref 3.88–5.62)
SODIUM SERPL-SCNC: 140 MMOL/L (ref 136–145)
WBC # BLD AUTO: 3.49 THOUSAND/UL (ref 4.31–10.16)

## 2022-05-02 PROCEDURE — 71250 CT THORAX DX C-: CPT

## 2022-05-02 PROCEDURE — 36415 COLL VENOUS BLD VENIPUNCTURE: CPT | Performed by: EMERGENCY MEDICINE

## 2022-05-02 PROCEDURE — 93971 EXTREMITY STUDY: CPT

## 2022-05-02 PROCEDURE — 80184 ASSAY OF PHENOBARBITAL: CPT | Performed by: STUDENT IN AN ORGANIZED HEALTH CARE EDUCATION/TRAINING PROGRAM

## 2022-05-02 PROCEDURE — 80053 COMPREHEN METABOLIC PANEL: CPT | Performed by: EMERGENCY MEDICINE

## 2022-05-02 PROCEDURE — 84484 ASSAY OF TROPONIN QUANT: CPT | Performed by: EMERGENCY MEDICINE

## 2022-05-02 PROCEDURE — 74176 CT ABD & PELVIS W/O CONTRAST: CPT

## 2022-05-02 PROCEDURE — 73590 X-RAY EXAM OF LOWER LEG: CPT

## 2022-05-02 PROCEDURE — G1004 CDSM NDSC: HCPCS

## 2022-05-02 PROCEDURE — 99285 EMERGENCY DEPT VISIT HI MDM: CPT | Performed by: EMERGENCY MEDICINE

## 2022-05-02 PROCEDURE — 85025 COMPLETE CBC W/AUTO DIFF WBC: CPT | Performed by: EMERGENCY MEDICINE

## 2022-05-02 PROCEDURE — 93005 ELECTROCARDIOGRAM TRACING: CPT

## 2022-05-02 PROCEDURE — NC001 PR NO CHARGE: Performed by: INTERNAL MEDICINE

## 2022-05-02 PROCEDURE — 85610 PROTHROMBIN TIME: CPT | Performed by: EMERGENCY MEDICINE

## 2022-05-02 PROCEDURE — 99285 EMERGENCY DEPT VISIT HI MDM: CPT

## 2022-05-02 RX ORDER — TRIAMCINOLONE ACETONIDE 0.25 MG/G
1 CREAM TOPICAL 2 TIMES DAILY
Status: DISCONTINUED | OUTPATIENT
Start: 2022-05-02 | End: 2022-05-05 | Stop reason: HOSPADM

## 2022-05-02 RX ORDER — ONDANSETRON 2 MG/ML
4 INJECTION INTRAMUSCULAR; INTRAVENOUS EVERY 6 HOURS PRN
Status: DISCONTINUED | OUTPATIENT
Start: 2022-05-02 | End: 2022-05-05 | Stop reason: HOSPADM

## 2022-05-02 RX ORDER — ACETAMINOPHEN 325 MG/1
650 TABLET ORAL EVERY 6 HOURS PRN
Status: DISCONTINUED | OUTPATIENT
Start: 2022-05-02 | End: 2022-05-05 | Stop reason: HOSPADM

## 2022-05-02 RX ORDER — PHENOBARBITAL 64.8 MG/1
64.8 TABLET ORAL 2 TIMES DAILY
Status: DISCONTINUED | OUTPATIENT
Start: 2022-05-02 | End: 2022-05-05 | Stop reason: HOSPADM

## 2022-05-02 RX ORDER — ATORVASTATIN CALCIUM 80 MG/1
80 TABLET, FILM COATED ORAL
Status: DISCONTINUED | OUTPATIENT
Start: 2022-05-03 | End: 2022-05-05 | Stop reason: HOSPADM

## 2022-05-02 RX ORDER — ASPIRIN 81 MG/1
81 TABLET, CHEWABLE ORAL DAILY
Status: DISCONTINUED | OUTPATIENT
Start: 2022-05-03 | End: 2022-05-05 | Stop reason: HOSPADM

## 2022-05-02 NOTE — ED ATTENDING ATTESTATION
5/2/2022  IWilmer MD, saw and evaluated the patient  I have discussed the patient with the resident/non-physician practitioner and agree with the resident's/non-physician practitioner's findings, Plan of Care, and MDM as documented in the resident's/non-physician practitioner's note, except where noted  All available labs and Radiology studies were reviewed  I was present for key portions of any procedure(s) performed by the resident/non-physician practitioner and I was immediately available to provide assistance  At this point I agree with the current assessment done in the Emergency Department  I have conducted an independent evaluation of this patient a history and physical is as follows:    OA:  This is evaluation of a 24-year-old male with multiple medical problems including history of hypertension CAD status post CABG as well as MI who presents to the emergency department complaining of 1 week worth of difficulty swallowing p o  Patient states that he is able to tolerate fluids but unable to tolerate solids  States that when he tries to eat he feels as if the food gets stuck in his esophagus takes a while to pass  He notes that sometimes if he drinks carbonated beverage that does help but mostly because it makes him burp  He notes that he has never had similar symptoms previously  He denies any regurgitation or vomiting otherwise  He notes that it does not matter what kind of food that is as he experiences this with all solids  He otherwise denies any chest pain pressure or palpitations  No shortness of breath  No lightheadedness or dizziness  No nausea no vomiting  No abdominal pain otherwise  No back pain  Of note patient has had multiple abdominal surgeries including aneurysmal repair as well as hernia and bowel obstruction  He also complains of left lower extremity swelling and redness since a fall in March    He had negative imaging at that time and has not yet followed up in regards to his ongoing symptoms  On exam patient is nontoxic appearing  Vital signs currently stable  HEENT is normocephalic and atraumatic  Neck is supple full range of motion  Heart is regular rate  No murmurs  Lungs clear no wheezing rhonchi rales  Abdomen is soft with multiple palpable hernias that are reducible over ventral abdomen  Nontender to palpation no erythema  Patient with intact distal pulses  Left lower extremity with asymmetric edema as well as erythema and mild warmth  Awake alert oriented appropriate  Assessment and plan 43-year-old male with feelings of food impaction that her intermittent with solids and not fluids  Given notable history will check baseline labs as well as EKG and troponin  Will obtain imaging  Patient with allergy to iodinated agents will do noncontrast at this time  Will also do duplex study of lower extremity  Re-evaluate and treat accordingly      ED Course         Critical Care Time  Procedures

## 2022-05-02 NOTE — ED PROVIDER NOTES
History  Chief Complaint   Patient presents with    Difficulty Swallowing     trouble swallowing food for about 1 week, it feels like it stays there and then after awhile itll go down i have no pain but its getting annoying now      HPI     58-year-old male with past medical history of seizures and coronary artery disease status post multiple stents in the past who presents for evaluation of difficulty swallowing  Patient states symptoms started 1 week ago  He states he has difficulty swallowing solids  He feels like food gets stuck in his lower chest   He states eventually, the food passes  Denies any difficulty swallowing liquids  Denies any regurgitation or vomiting  Denies fevers, chills, fatigue, weight loss, abdominal pain, diarrhea, or urinary symptoms  Patient states he does have mild swelling at his left lower extremity since having a fall a few weeks ago  He states he has still been able to walk on the leg  Denies significant pain in the leg  Patient denies alcohol use  Patient states he has a 50 pack year smoking history  Prior to Admission Medications   Prescriptions Last Dose Informant Patient Reported? Taking?    PHENobarbital 64 8 mg tablet 5/2/2022 at Unknown time Self No Yes   Sig: Take 1 tablet (64 8 mg total) by mouth 2 (two) times a day   acetaminophen (TYLENOL) 500 mg tablet 5/1/2022 at Unknown time  No Yes   Sig: Take 1 tablet (500 mg total) by mouth every 6 (six) hours as needed for mild pain   aspirin (Aspirin Low Dose) 81 mg EC tablet 5/1/2022 at Unknown time Self No Yes   Sig: Take 1 tablet (81 mg total) by mouth daily   atorvastatin (LIPITOR) 80 mg tablet 5/1/2022 at Unknown time  No Yes   Sig: Take 1 tablet (80 mg total) by mouth daily with dinner   metoprolol succinate (TOPROL-XL) 50 mg 24 hr tablet Not Taking at Unknown time Self No No   Sig: TAKE 1 TABLET BY MOUTH EVERY DAY   Patient not taking: TAKE 1 TABLET BY MOUTH EVERY DAY   nitroglycerin (NITROSTAT) 0 4 mg SL tablet Past Month at Unknown time Self No Yes   Sig: Place 1 tablet (0 4 mg total) under the tongue every 5 (five) minutes as needed for chest pain   triamcinolone (KENALOG) 0 025 % cream 2022 at Unknown time  No Yes   Sig: APPLY TO AFFECTED AREA TWICE A DAY   warfarin (COUMADIN) 2 5 mg tablet 2022 at Unknown time Self No Yes   Sig: Take 2 tabs by mouth daily or as directed by physician      Facility-Administered Medications: None       Past Medical History:   Diagnosis Date    Cardiac disease     [facility]est[facility]) [facility] [address] [facility]     Hernia of abdominal cavity     Myocardial infarction ([facility] [address] )     last assessed 14, past, Pt had MI s/p AGUSTIN placed in , refuses to take any other medications for his cardiac disease, only will take seizure med  Understands possible complications from this decision    Seizure [facility])        Past Surgical History:   Procedure Laterality Date    ABDOMINAL AORTIC ANEURYSM REPAIR      for dialation or occulsion    CATARACT EXTRACTION         Family History   Problem Relation Age of Onset    Hypertension Father     Kidney disease Brother      I have reviewed and agree with the history as documented  E-Cigarette/Vaping    E-Cigarette Use Never User      E-Cigarette/Vaping Substances    Nicotine No     THC No     CBD No     Flavoring No     Other No     Unknown No      Social History     Tobacco Use    Smoking status: Former Smoker     Quit date: 2005     Years since quittin 9    Smokeless tobacco: Never Used   Vaping Use    Vaping Use: Never used   Substance Use Topics    Alcohol use: Not Currently    Drug use: Never        Review of Systems   Constitutional: Negative for appetite change, chills and fever  HENT: Positive for trouble swallowing  Negative for congestion, rhinorrhea and sore throat  Respiratory: Negative for cough, choking and shortness of breath  Cardiovascular: Negative for chest pain     Gastrointestinal: Negative for abdominal pain, constipation, diarrhea, nausea and vomiting  Genitourinary: Negative for dysuria, frequency, hematuria and urgency  Musculoskeletal: Negative for arthralgias and myalgias  Skin: Negative for rash  Neurological: Negative for dizziness, weakness, light-headedness, numbness and headaches  All other systems reviewed and are negative  Physical Exam  ED Triage Vitals [05/02/22 1521]   Temperature Pulse Respirations Blood Pressure SpO2   98 1 °F (36 7 °C) 76 16 122/79 96 %      Temp Source Heart Rate Source Patient Position - Orthostatic VS BP Location FiO2 (%)   Temporal Monitor Sitting Left arm --      Pain Score       No Pain             Orthostatic Vital Signs  Vitals:    05/04/22 1926 05/04/22 2038 05/04/22 2243 05/05/22 0733   BP: 110/68 113/63 113/63 113/63   Pulse:  64     Patient Position - Orthostatic VS:  Sitting         Physical Exam  Vitals and nursing note reviewed  Constitutional:       General: He is not in acute distress  Appearance: Normal appearance  He is well-developed  He is obese  He is not ill-appearing, toxic-appearing or diaphoretic  HENT:      Head: Normocephalic and atraumatic  Right Ear: External ear normal       Left Ear: External ear normal       Nose: Nose normal       Mouth/Throat:      Mouth: Mucous membranes are moist       Pharynx: Oropharynx is clear  Eyes:      Extraocular Movements: Extraocular movements intact  Conjunctiva/sclera: Conjunctivae normal    Cardiovascular:      Rate and Rhythm: Normal rate and regular rhythm  Pulses: Normal pulses  Heart sounds: Normal heart sounds  No murmur heard  No friction rub  No gallop  Pulmonary:      Effort: Pulmonary effort is normal  No respiratory distress  Breath sounds: Normal breath sounds  No wheezing or rales  Abdominal:      General: There is no distension  Palpations: Abdomen is soft  Tenderness: There is no abdominal tenderness  There is no guarding or rebound  Musculoskeletal:         General: No tenderness  Cervical back: Neck supple  Right lower leg: Edema present  Left lower leg: Edema present  Skin:     General: Skin is warm and dry  Coloration: Skin is not pale  Findings: No rash  Neurological:      General: No focal deficit present  Mental Status: He is alert and oriented to person, place, and time  Cranial Nerves: No cranial nerve deficit  Sensory: No sensory deficit  Motor: No weakness     Psychiatric:         Mood and Affect: Mood normal          Behavior: Behavior normal          ED Medications  Medications   potassium chloride (K-DUR,KLOR-CON) CR tablet 40 mEq (40 mEq Oral Given 5/3/22 2101)   phytonadione (AQUA-MEPHYTON) 10 mg/mL 10 mg in sodium chloride 0 9 % 50 mL IVPB (10 mg Intravenous New Bag 5/3/22 1703)   potassium chloride oral solution 40 mEq (40 mEq Oral Given 5/4/22 1700)   warfarin (COUMADIN) tablet 5 mg (5 mg Oral Given 5/5/22 0818)       Diagnostic Studies  Results Reviewed     Procedure Component Value Units Date/Time    Protime-INR [[id number]]  (Abnormal) Collected: 05/03/22 0601    Lab Status: Final result Specimen: Blood from Arm, Right Updated: 05/03/22 0649     Protime 28 3 seconds      INR 3 74    Basic metabolic panel [[id number]]  (Abnormal) Collected: 05/03/22 0601    Lab Status: Final result Specimen: Blood from Arm, Right Updated: 05/03/22 0640     Sodium 140 mmol/L      Potassium 3 5 mmol/L      Chloride 110 mmol/L      CO2 26 mmol/L      ANION GAP 4 mmol/L      BUN 13 mg/dL      Creatinine 0 84 mg/dL      Glucose 91 mg/dL      Calcium 8 4 mg/dL      eGFR 84 ml/min/1 73sq m     Narrative:      Anjel guidelines for Chronic Kidney Disease (CKD):     Stage 1 with normal or high GFR (GFR > 90 mL/min/1 73 square meters)    Stage 2 Mild CKD (GFR = 60-89 mL/min/1 73 square meters)    Stage 3A Moderate CKD (GFR = 45-59 mL/min/1 73 square meters)    Stage 3B Moderate CKD (GFR = 30-44 mL/min/1 73 square meters)    Stage 4 Severe CKD (GFR = 15-29 mL/min/1 73 square meters)    Stage 5 End Stage CKD (GFR <15 mL/min/1 73 square meters)  Note: GFR calculation is accurate only with a steady state creatinine    CBC and differential [[id number]]  (Abnormal) Collected: 05/03/22 0601    Lab Status: Final result Specimen: Blood from Arm, Right Updated: 05/03/22 0636     WBC 3 26 Thousand/uL      RBC 3 53 Million/uL      Hemoglobin 10 6 g/dL      Hematocrit 33 7 %      MCV 96 fL      MCH 30 0 pg      MCHC 31 5 g/dL      RDW 20 8 %      MPV 10 8 fL      Platelets 445 Thousands/uL      nRBC 0 /100 WBCs      Neutrophils Relative 43 %      Immat GRANS % 1 %      Lymphocytes Relative 37 %      Monocytes Relative 11 %      Eosinophils Relative 7 %      Basophils Relative 1 %      Neutrophils Absolute 1 40 Thousands/µL      Immature Grans Absolute 0 02 Thousand/uL      Lymphocytes Absolute 1 19 Thousands/µL      Monocytes Absolute 0 37 Thousand/µL      Eosinophils Absolute 0 24 Thousand/µL      Basophils Absolute 0 04 Thousands/µL     Phenobarbital level [[id number]]  (Normal) Collected: 05/02/22 1700    Lab Status: Final result Specimen: Blood from Line, Venous Updated: 05/02/22 2135     Phenobarbital Lvl 27 7 ug/mL     HS Troponin I 2hr [[id number]]  (Normal) Collected: 05/02/22 1910    Lab Status: Final result Specimen: Blood from Arm, Right Updated: 05/02/22 2008     hs TnI 2hr 6 ng/L      Delta 2hr hsTnI -1 ng/L     Protime-INR [[id number]]  (Abnormal) Collected: 05/02/22 1737    Lab Status: Final result Specimen: Blood from Arm, Right Updated: 05/02/22 1800     Protime 34 6 seconds      INR 3 67    CBC and differential [[id number]]  (Abnormal) Collected: 05/02/22 1700    Lab Status: Final result Specimen: Blood from Line, Venous Updated: 05/02/22 1750     WBC 3 49 Thousand/uL      RBC 3 56 Million/uL      Hemoglobin 10 7 g/dL      Hematocrit 34 0 %      MCV 96 fL      MCH 30 1 pg MCHC 31 5 g/dL      RDW 20 9 %      MPV 11 3 fL      Platelets 742 Thousands/uL      nRBC 0 /100 WBCs      Neutrophils Relative 51 %      Immat GRANS % 1 %      Lymphocytes Relative 28 %      Monocytes Relative 12 %      Eosinophils Relative 7 %      Basophils Relative 1 %      Neutrophils Absolute 1 78 Thousands/µL      Immature Grans Absolute 0 04 Thousand/uL      Lymphocytes Absolute 0 98 Thousands/µL      Monocytes Absolute 0 40 Thousand/µL      Eosinophils Absolute 0 25 Thousand/µL      Basophils Absolute 0 04 Thousands/µL     HS Troponin 0hr (reflex protocol) [[id number]]  (Normal) Collected: 05/02/22 1700    Lab Status: Final result Specimen: Blood from Line, Venous Updated: 05/02/22 1743     hs TnI 0hr 7 ng/L     Comprehensive metabolic panel [[id number]]  (Abnormal) Collected: 05/02/22 1700    Lab Status: Final result Specimen: Blood from Line, Venous Updated: 05/02/22 1734     Sodium 140 mmol/L      Potassium 4 1 mmol/L      Chloride 109 mmol/L      CO2 26 mmol/L      ANION GAP 5 mmol/L      BUN 15 mg/dL      Creatinine 1 20 mg/dL      Glucose 100 mg/dL      Calcium 8 4 mg/dL      Corrected Calcium 9 0 mg/dL      AST 15 U/L      ALT 16 U/L      Alkaline Phosphatase 165 U/L      Total Protein 7 2 g/dL      Albumin 3 3 g/dL      Total Bilirubin 0 33 mg/dL      eGFR 57 ml/min/1 73sq m     Narrative:      Anjel guidelines for Chronic Kidney Disease (CKD):     Stage 1 with normal or high GFR (GFR > 90 mL/min/1 73 square meters)    Stage 2 Mild CKD (GFR = 60-89 mL/min/1 73 square meters)    Stage 3A Moderate CKD (GFR = 45-59 mL/min/1 73 square meters)    Stage 3B Moderate CKD (GFR = 30-44 mL/min/1 73 square meters)    Stage 4 Severe CKD (GFR = 15-29 mL/min/1 73 square meters)    Stage 5 End Stage CKD (GFR <15 mL/min/1 73 square meters)  Note: GFR calculation is accurate only with a steady state creatinine                 XR tibia fibula 2 views LEFT   Final Result by Va Casey DO Gaurav (05/03 4255)      No acute osseous abnormality  Workstation performed: [id number]         VAS lower limb venous duplex study, unilateral/limited   Final Result by Bennett Collins MD (05/03 4363)      CT chest abdomen pelvis wo contrast   Final Result by Liat Rodriguez MD (05/02 6280)   Luminal narrowing of the distal esophagus may be due to esophageal underdistention, though given patient's symptoms, a functional obstruction cannot be excluded based on this imaging, and an esophagram can be considered for further assessment  Otherwise    no acute thoracic or abdominopelvic pathology with chronic findings as delineated above  Workstation performed: [id number]               Procedures  ECG 12 Lead Documentation Only    Date/Time: 5/3/2022 12:00 AM  Performed by: Ralf Sheldon MD  Authorized by: Ralf Sheldon MD     Indications / Diagnosis:  Dysphagia  ECG reviewed by me, the ED Provider: yes    Patient location:  ED  Previous ECG:     Previous ECG:  Compared to current    Similarity:  Changes noted    Comparison to cardiac monitor: Yes    Interpretation:     Interpretation: non-specific    Rate:     ECG rate:  65    ECG rate assessment: normal    Rhythm:     Rhythm: sinus rhythm    Ectopy:     Ectopy: none    QRS:     QRS axis:  Normal    QRS intervals:  Normal  Conduction:     Conduction: normal    ST segments:     ST segments:  Normal  T waves:     T waves: inverted      Inverted:  V4, V5, V6, II, III and aVF          ED Course                                       MDM     79-year-old male with past medical history of seizures and coronary artery disease status post multiple stents in the past who presents for evaluation of difficulty swallowing for one week, given significant smoking history, concern for mass vs stricture vs motility disorder in esophagus  Will obtain CBC, CMP  Will also obtain CT c/a/p to evaluate for mass     Reviewed labs, no marked abnormalities  CT scan shows possible narrowing of distal esophagus  Offered patient admission for further workup, will admit for further workup of dysphagia,  Discussed with SOD for admission  Disposition  Final diagnoses:   Dysphagia     Time reflects when diagnosis was documented in both MDM as applicable and the Disposition within this note     Time User Action Codes Description Comment    5/2/2022  8:38 PM Syd Mckeonbrayan Add [R13 10] Dysphagia     5/4/2022  1:40 PM Ulices Hernandez Modify [R13 10] Dysphagia     5/4/2022  3:27 PM Aiad, [address] [R13 10] Dysphagia     5/5/2022 11:53 AM Aiad, Wade Idalia Add [I25 10] Coronary artery disease involving native coronary artery of native heart without angina pectoris     5/5/2022 11:53 AM Aiad, Ambrocio Add [I50 42] Chronic combined systolic and diastolic CHF (congestive heart failure) ([facility] [address] )     5/5/2022 11:53 AM Aiad, Ambrocio Modify [I25 10] Coronary artery disease involving native coronary artery of native heart without angina pectoris     5/5/2022 11:53 AM Aiad, Ambrocio Modify [I50 42] Chronic combined systolic and diastolic CHF (congestive heart failure) ([facility] [address] )     5/5/2022 12:02 PM Aiad, Ambrocio Add [K20 90] Esophagitis determined by endoscopy     5/5/2022 12:02 PM Aiad, Ambrocio Add [K29 80] Duodenitis     5/5/2022  3:42 PM Aiad, [address] [K25 3] Acute gastric ulcer     5/5/2022  3:42 PM Aiad, [address] [I48 0] Paroxysmal atrial fibrillation ([facility] [address] )     5/5/2022  3:42 PM Aiad, [address] [R79 1] Supratherapeutic INR       ED Disposition     ED Disposition Condition Date/Time Comment    Admit Stable Mon May 2, 2022  8:45 PM Case was discussed with SOD and the patient's admission status was agreed to be Admission Status: observation status to the service of Dr Ryland Randle          Follow-up Information     Follow up With Specialties Details Why [address] [facility] Follow up in 2 week(s) Schedule appointment with PCP [id number] [facility] [id number]  [facility] [address], [facility], [place], [address] [phone]          Discharge Medication List as of 5/5/2022  1:11 PM      START taking these medications    Details   ! ! pantoprazole (PROTONIX) 40 mg tablet Take 1 tablet (40 mg total) by mouth 2 (two) times a day before meals, Starting Thu 5/5/2022, Until Sat 6/4/2022, Normal      !! pantoprazole (PROTONIX) 40 mg tablet Take 1 tablet (40 mg total) by mouth daily, Starting Sun 6/5/2022, Until Tue 7/5/2022, Normal      sucralfate (CARAFATE) 1 g tablet Take 1 tablet (1 g total) by mouth 3 (three) times a day before meals, Starting Thu 5/5/2022, Until Sat 6/4/2022, Normal       !! - Potential duplicate medications found  Please discuss with provider        CONTINUE these medications which have NOT CHANGED    Details   acetaminophen (TYLENOL) 500 mg tablet Take 1 tablet (500 mg total) by mouth every 6 (six) hours as needed for mild pain, Starting Sat 3/26/2022, Normal      aspirin (Aspirin Low Dose) 81 mg EC tablet Take 1 tablet (81 mg total) by mouth daily, Starting Mon 1/24/2022, Normal      atorvastatin (LIPITOR) 80 mg tablet Take 1 tablet (80 mg total) by mouth daily with dinner, Starting Fri 3/11/2022, Normal      nitroglycerin (NITROSTAT) 0 4 mg SL tablet Place 1 tablet (0 4 mg total) under the tongue every 5 (five) minutes as needed for chest pain, Starting Wed 4/7/2021, Normal      PHENobarbital 64 8 mg tablet Take 1 tablet (64 8 mg total) by mouth 2 (two) times a day, Starting Tue 11/16/2021, Until Sun 5/15/2022, Normal      triamcinolone (KENALOG) 0 025 % cream APPLY TO AFFECTED AREA TWICE A DAY, Normal      warfarin (COUMADIN) 2 5 mg tablet Take 2 tabs by mouth daily or as directed by physician, Normal      metoprolol succinate (TOPROL-XL) 50 mg 24 hr tablet TAKE 1 TABLET BY MOUTH EVERY DAY, Normal           Outpatient Discharge Orders Protime-INR   Standing Status: Future Standing Exp  Date: 05/05/23     CBC and differential   Standing Status: Future Standing Exp  Date: 05/05/23     Discharge Diet     Call provider for:  persistent nausea or vomiting     Call provider for:  severe uncontrolled pain     Call provider for:  persistent dizziness or light-headedness       PDMP Review       Value Time User    PDMP Reviewed  Yes 11/16/2021  1:20 PM Jacob Morgan DO           ED Provider  Attending physically available and evaluated Tabitha Joyner I managed the patient along with the ED Attending      Electronically Signed by         Mckenzie Hernandez MD  05/06/22 7073

## 2022-05-02 NOTE — Clinical Note
Case was discussed with SOD and the patient's admission status was agreed to be Admission Status: observation status to the service of

## 2022-05-03 LAB
ANION GAP SERPL CALCULATED.3IONS-SCNC: 4 MMOL/L (ref 4–13)
ATRIAL RATE: 65 BPM
BASOPHILS # BLD AUTO: 0.04 THOUSANDS/ΜL (ref 0–0.1)
BASOPHILS NFR BLD AUTO: 1 % (ref 0–1)
BUN SERPL-MCNC: 13 MG/DL (ref 5–25)
CALCIUM SERPL-MCNC: 8.4 MG/DL (ref 8.3–10.1)
CHLORIDE SERPL-SCNC: 110 MMOL/L (ref 100–108)
CO2 SERPL-SCNC: 26 MMOL/L (ref 21–32)
CREAT SERPL-MCNC: 0.84 MG/DL (ref 0.6–1.3)
EOSINOPHIL # BLD AUTO: 0.24 THOUSAND/ΜL (ref 0–0.61)
EOSINOPHIL NFR BLD AUTO: 7 % (ref 0–6)
ERYTHROCYTE [DISTWIDTH] IN BLOOD BY AUTOMATED COUNT: 20.8 % (ref 11.6–15.1)
GFR SERPL CREATININE-BSD FRML MDRD: 84 ML/MIN/1.73SQ M
GLUCOSE SERPL-MCNC: 91 MG/DL (ref 65–140)
HCT VFR BLD AUTO: 33.7 % (ref 36.5–49.3)
HGB BLD-MCNC: 10.6 G/DL (ref 12–17)
IMM GRANULOCYTES # BLD AUTO: 0.02 THOUSAND/UL (ref 0–0.2)
IMM GRANULOCYTES NFR BLD AUTO: 1 % (ref 0–2)
INR PPP: 2.82 (ref 0.84–1.19)
LYMPHOCYTES # BLD AUTO: 1.19 THOUSANDS/ΜL (ref 0.6–4.47)
LYMPHOCYTES NFR BLD AUTO: 37 % (ref 14–44)
MCH RBC QN AUTO: 30 PG (ref 26.8–34.3)
MCHC RBC AUTO-ENTMCNC: 31.5 G/DL (ref 31.4–37.4)
MCV RBC AUTO: 96 FL (ref 82–98)
MONOCYTES # BLD AUTO: 0.37 THOUSAND/ΜL (ref 0.17–1.22)
MONOCYTES NFR BLD AUTO: 11 % (ref 4–12)
NEUTROPHILS # BLD AUTO: 1.4 THOUSANDS/ΜL (ref 1.85–7.62)
NEUTS SEG NFR BLD AUTO: 43 % (ref 43–75)
NRBC BLD AUTO-RTO: 0 /100 WBCS
P AXIS: 52 DEGREES
PLATELET # BLD AUTO: 113 THOUSANDS/UL (ref 149–390)
PMV BLD AUTO: 10.8 FL (ref 8.9–12.7)
POTASSIUM SERPL-SCNC: 3.5 MMOL/L (ref 3.5–5.3)
PR INTERVAL: 198 MS
PROTHROMBIN TIME: 28.3 SECONDS (ref 11.6–14.5)
QRS AXIS: 3 DEGREES
QRSD INTERVAL: 80 MS
QT INTERVAL: 380 MS
QTC INTERVAL: 395 MS
RBC # BLD AUTO: 3.53 MILLION/UL (ref 3.88–5.62)
SODIUM SERPL-SCNC: 140 MMOL/L (ref 136–145)
T WAVE AXIS: -40 DEGREES
VENTRICULAR RATE: 65 BPM
WBC # BLD AUTO: 3.26 THOUSAND/UL (ref 4.31–10.16)

## 2022-05-03 PROCEDURE — 80048 BASIC METABOLIC PNL TOTAL CA: CPT | Performed by: STUDENT IN AN ORGANIZED HEALTH CARE EDUCATION/TRAINING PROGRAM

## 2022-05-03 PROCEDURE — NC001 PR NO CHARGE: Performed by: INTERNAL MEDICINE

## 2022-05-03 PROCEDURE — 93971 EXTREMITY STUDY: CPT | Performed by: SURGERY

## 2022-05-03 PROCEDURE — 30233K1 TRANSFUSION OF NONAUTOLOGOUS FROZEN PLASMA INTO PERIPHERAL VEIN, PERCUTANEOUS APPROACH: ICD-10-PCS | Performed by: INTERNAL MEDICINE

## 2022-05-03 PROCEDURE — 97166 OT EVAL MOD COMPLEX 45 MIN: CPT

## 2022-05-03 PROCEDURE — 85610 PROTHROMBIN TIME: CPT | Performed by: STUDENT IN AN ORGANIZED HEALTH CARE EDUCATION/TRAINING PROGRAM

## 2022-05-03 PROCEDURE — 85025 COMPLETE CBC W/AUTO DIFF WBC: CPT | Performed by: STUDENT IN AN ORGANIZED HEALTH CARE EDUCATION/TRAINING PROGRAM

## 2022-05-03 PROCEDURE — 93010 ELECTROCARDIOGRAM REPORT: CPT | Performed by: INTERNAL MEDICINE

## 2022-05-03 PROCEDURE — P9017 PLASMA 1 DONOR FRZ W/IN 8 HR: HCPCS

## 2022-05-03 PROCEDURE — 97162 PT EVAL MOD COMPLEX 30 MIN: CPT

## 2022-05-03 PROCEDURE — 99223 1ST HOSP IP/OBS HIGH 75: CPT | Performed by: INTERNAL MEDICINE

## 2022-05-03 PROCEDURE — 99219 PR INITIAL OBSERVATION CARE/DAY 50 MINUTES: CPT | Performed by: INTERNAL MEDICINE

## 2022-05-03 RX ORDER — POTASSIUM CHLORIDE 20 MEQ/1
40 TABLET, EXTENDED RELEASE ORAL ONCE
Status: COMPLETED | OUTPATIENT
Start: 2022-05-03 | End: 2022-05-03

## 2022-05-03 RX ADMIN — PHYTONADIONE 10 MG: 10 INJECTION, EMULSION INTRAMUSCULAR; INTRAVENOUS; SUBCUTANEOUS at 17:03

## 2022-05-03 RX ADMIN — POTASSIUM CHLORIDE 40 MEQ: 1500 TABLET, EXTENDED RELEASE ORAL at 21:01

## 2022-05-03 RX ADMIN — PHENOBARBITAL 64.8 MG: 64.8 TABLET ORAL at 01:02

## 2022-05-03 RX ADMIN — TRIAMCINOLONE ACETONIDE 1 APPLICATION: 0.25 CREAM TOPICAL at 17:03

## 2022-05-03 RX ADMIN — TRIAMCINOLONE ACETONIDE 1 APPLICATION: 0.25 CREAM TOPICAL at 09:26

## 2022-05-03 RX ADMIN — PHENOBARBITAL 64.8 MG: 64.8 TABLET ORAL at 21:01

## 2022-05-03 RX ADMIN — ATORVASTATIN CALCIUM 80 MG: 80 TABLET, FILM COATED ORAL at 17:03

## 2022-05-03 NOTE — ASSESSMENT & PLAN NOTE
Wt Readings from Last 3 Encounters:   05/02/22 100 kg (220 lb 12 8 oz)   03/15/22 100 kg (221 lb)   02/10/22 100 kg (221 lb 3 2 oz)     Echo 3/22: ejection fraction is 50%  Systolic function is low normal  Diastolic function is mildly abnormal, consistent with grade I (abnormal) relaxation  There is akinesis of the apical inferior, apical lateral, apical septal and apical walls  Left Atrium: The atrium is mildly dilated    Tricuspid Valve: There is mild regurgitation    Pulmonic Valve: There is mild regurgitation      Apical wall motion abnormality    No obvious LV thrombus identified although apex poorly visualized    No mechanical valves    Strict Daily I's and O's and weights  Net negative balance goal daily  Fluid restriction 2 L per day  Cardiac diet in place with low-salt  Continue aspirin, statin, metoprolol  Potassium greater than 4, magnesium greater than 2

## 2022-05-03 NOTE — RESTORATIVE TECHNICIAN NOTE
Restorative Technician Note      Patient Name: Manolo Zamora     Note Type: Mobility  Patient Position Upon Consult: Supine  Activity Performed: Ambulated  Assistive Device: Other (Comment) (None)  Education Provided: Yes  Patient Position at End of Consult: Bedside chair;  All needs within reach; Bed/Chair alarm activated    Dominik CRUMP, Restorative Technician, [employer]

## 2022-05-03 NOTE — OCCUPATIONAL THERAPY NOTE
Occupational Therapy Evaluation     Patient Name: Shay Sifuentes  Today's Date: 5/3/2022  Problem List  Principal Problem:    Dysphagia  Active Problems:    HTN (hypertension)    HLD (hyperlipidemia)    Seizure disorder (HCC)    Chronic combined systolic and diastolic CHF (congestive heart failure) ([facility])    Paroxysmal [facility] fibrillation ([facility] )    Coronary artery disease of native artery of native heart with stable angina pectoris [facility])    Past Medical History  Past Medical History:   Diagnosis Date    Cardiac disease     CHF (congestive heart failure) ([facility] )     Hernia of abdominal cavity     Myocardial infarction ([facility] )     last assessed 7/31/14, past, Pt had MI s/p AGUSTIN placed in 2001, refuses to take any other medications for his cardiac disease, only will take seizure med  Understands possible complications from this decision    Seizure [facility])      Past Surgical History  Past Surgical History:   Procedure Laterality Date    ABDOMINAL AORTIC ANEURYSM REPAIR      for dialation or occulsion    CATARACT EXTRACTION               05/03/22 1308   OT Last Visit   OT Visit Date 05/03/22   Note Type   Note type Evaluation   Restrictions/Precautions   Weight Bearing Precautions Per Order No   Other Precautions Chair Alarm; Bed Alarm   Pain Assessment   Pain Assessment Tool 0-10   Pain Score No Pain   Home Living   Type of Home Apartment   Home Layout Stairs to enter without rails; Able to live on main level with bedroom/bathroom  (2STE)   Bathroom Shower/Tub Tub/shower unit   Bathroom Toilet Standard   Bathroom Equipment   (No DME at baseline)   [address] [facility]  (Does not use it)   Prior Function   Level of [place] Independent with ADLs and functional mobility   Lives With Family  (Brother)   Receives Help From Family  (Brother)   ADL Assistance Independent   IADLs Independent   Falls in the last 6 months 1 to 4  (Pt reports falling approx 1 month ago from his "clunky" shoe)   Vocational Retired   Lifestyle   Autonomy Pt I ADLS/IADLS, -drives, I functional mobility w/o AD   Reciprocal Relationships Pt lives with his Brother Lou Gonzalez who provides transporation  On tuesdays, they grocery shop and go to the laundry mat  Pt reports that he cooks for himself and his brother   Service to Others Retired   [address] Enjoys going to [employer] with his brother   Psychosocial   Psychosocial (WDL) WDL   ADL   Eating Assistance 6  Modified independent   [address] [facility] 5  [address] [facility] 5  Supervision/Setup   [address]  [address]  5  Supervision/Setup    [facility] 5  [address]  6  Modified independent   [address]  Modified independent   Bed Mobility   Supine to [address]   Additional items Increased time required;Verbal cues;HOB elevated; Bedrails   Sit to Supine Unable to assess   Additional Comments Pt remained in recliner w/ alarm on and all needs within reach    Transfers   Sit to Stand 5  Supervision   Stand to Sit 5  Supervision   Stand pivot 5  Supervision   Additional Comments w/o AD   Functional Mobility   Functional Mobility 4  Minimal assistance  (CG with close supervison)   Additional Comments Pt perfomed functional mobility a household distance w/o AD or evidence of dizziness, fatigue, or LOB   Balance   Static Sitting Good   Dynamic Sitting Fair +   Static Standing Fair   Dynamic Standing Fair -   Ambulatory Fair -   Activity Tolerance   Activity Tolerance Patient tolerated treatment well   Medical Staff Made Aware Pt Dortha Fought due to the patient's co-morbidities, clinically unstable presentation, and present impairments which are a regression from the patient's baseline       Nurse Made Aware RN notified and cleared to see and eval pt   RUE Assessment   RUE Assessment WFL  (grossly 5/5 MMT)   LUE Assessment   LUE Assessment WFL  (grossly 5/5 MMT)   Hand Function   Gross Motor Coordination Functional  (Pt is R hand domiant and uses adpative strategies to make it)   Fine Motor Coordination Functional  (L>R  Pt unable to flex digits on R hand from previous injury)   Sensation   Light Touch No apparent deficits   Proprioception   Proprioception No apparent deficits   Vision-Basic Assessment   Current Vision Does not wear glasses   Visual History Cataracts   Vision - Complex Assessment   Ocular Range of Motion WFL   Head Position WDL   Tracking Able to track stimulus in all quads without difficulty   Acuity Able to read employee name badge without difficulty   Cognition   Arousal/Participation Alert;Arousable; Cooperative   Attention Attends with cues to redirect   Orientation Level Oriented X4   Memory Unable to assess   Following Commands Follows one step commands with increased time or repetition   Comments robost with repetitive redirection and mildly tangential;    Assessment   Assessment Pt is a 67 y o  male who was admitted to [facility] on 5/2/2022 with Dysphagia, particularly solid foods, not liquids  Coronary artery disease of native artery of native heart with stable angina pectoris, and Paroxysmal atrial fibrillation  Pt's  has a past medical history of Cardiac disease, CHF (congestive heart failure) ([facility] ), Hernia of abdominal cavity, Myocardial infarction [facility]), and Seizure ([facility] )  At baseline pt was completing ADLs independently  Pt lives in an apartment with his Brother, Marian Driscoll  Currently pt requires supervision to modified independence for overall ADLS and CG with close supervision for functional mobility/transfers  Pt currently presents with impairments in the following categories -difficulty performing ADLS and difficulty performing IADLS  endurance, standing balance/tolerance and sitting balance/tolerance   These impairments, as well as pt's risk for falls  limit pt's ability to safely engage in all baseline areas of occupation, includingbathing, dressing, toileting, functional mobility/transfers, community mobility, house maintenance and leisure activities   The patient's raw score on the AM-PAC Daily Activity inpatient short form is 22, standardized score is 47 1, greater than 39 4  Patients at this level are likely to benefit from discharge to home  Please refer to the recommendation of the Occupational Therapist for safe discharge planning  From OT standpoint, recommend home with increased assistance from brother upon D/C  No skilled OT needs at this time, will discharge from caseload  Please reach out if medical status changes or any questions or concerns arise  Goals   Patient Goals To go home   Recommendation   OT Discharge Recommendation No rehabilitation needs   OT - OK to Discharge Yes   AM-PAC Daily Activity Inpatient   Lower Body Dressing 3   Bathing 3   Toileting 4   Upper Body Dressing 4   Grooming 4   Eating 4   Daily Activity Raw Score 22   Daily Activity Standardized Score (Calc for Raw Score >=11) 47  1   AM-PAC Applied Cognition Inpatient   Following a Speech/Presentation 3   Understanding Ordinary Conversation 4   Taking Medications 3   Remembering Where Things Are Placed or Put Away 3   Remembering List of 4-5 Errands 3   Taking Care of Complicated Tasks 2   Applied Cognition Raw Score 18   Applied Cognition Standardized Score 38 07

## 2022-05-03 NOTE — H&P
INTERNAL MEDICINE RESIDENCY ADMISSION H&P     Name: Karen Sarkar   Age & Sex: 67 y o  male   MRN: [id number]  Unit/Bed#: ED 27   Encounter: [id number]  Primary Care Provider: Jose Becker DO    Code Status: Prior  Admission Status: OBSERVATION  Disposition: Patient requires Med/Surg with Telemetry    Admit to team: SOD Team B     ASSESSMENT/PLAN     Principal Problem:    Dysphagia  Active Problems:    HTN (hypertension)    HLD (hyperlipidemia)    Seizure disorder ([facility])    Chronic combined systolic and diastolic CHF (congestive heart failure) ([facility])    Paroxysmal atrial fibrillation ([facility] )    Coronary artery disease of native artery of native heart with stable angina pectoris ([facility] )        Coronary artery disease of native artery of native heart with stable angina pectoris [facility])  Assessment & Plan  Continue statin  S/p CABG  Nitro p r n  If no relief with nitro, will get EKG, troponins and do workup for ACS    Paroxysmal atrial fibrillation [facility])  Assessment & Plan  Patient previously on metoprolol succinate however non compliant  Follows cardiology outpatient  CHADS vasc 5, anticoagulated on warfarin   PT INR currently supratherapeutic, will recheck in morning and titrate warfarin dosing appropriately, currently on hold  Continue telemetry      Chronic combined systolic and diastolic CHF (congestive heart failure) ([facility])  Assessment & Plan  Wt Readings from Last 3 Encounters:   05/02/22 100 kg (220 lb 12 8 oz)   03/15/22 100 kg (221 lb)   02/10/22 100 kg (221 lb 3 2 oz)     Echo 3/22: ejection fraction is 50%  Systolic function is low normal  Diastolic function is mildly abnormal, consistent with grade I (abnormal) relaxation  There is akinesis of the apical inferior, apical lateral, apical septal and apical walls  Left Atrium: The atrium is mildly dilated    Tricuspid Valve: There is mild regurgitation    Pulmonic Valve: There is mild regurgitation      Apical wall motion abnormality    No obvious LV thrombus identified although apex poorly visualized      Strict Daily I's and O's and weights  Net negative balance goal daily  Fluid restriction 2 L per day  Cardiac diet in place with low-salt  Continue aspirin, statin, metoprolol  Potassium greater than 4, magnesium greater than 2      Seizure disorder (HCC)  Assessment & Plan  Currently on phenobarbital 64 8 mg b i d , continue home regimen  Will get phenobarbital level on follow-up  Consider Neurology consult if phenobarb level low altered mental status/concerns for seizure while inpatient    HLD (hyperlipidemia)  Assessment & Plan  Stable  Continue atorvastatin 80 mg daily  Encourage lifestyle modifications outpatient    HTN (hypertension)  Assessment & Plan  Patient does have significant tobacco history and currently smoking  Cardiac diet in place  P r n  s for hypertension >170  Continue metoprolol  Encouraged smoking cessation    * Dysphagia  Assessment & Plan  Patient presents with dysphagia to both solids and liquids, new onset over 1 week  Patient denies any vomiting but does endorse bolus sensation  Denies any objective or subjective weight loss    CT scan demonstrates diffuse luminal narrowing, particular distal esophagus      Will get bitten barium swallow  Will consult GI for EGD with biopsies and likely esophagram   In setting of extensive tobacco history, concerning for mass  Speech therapy evaluation  Will have on dental soft and modified liquid diet, advance as speech therapy sees fit      VTE Pharmacologic Prophylaxis:  Warfarin  VTE Mechanical Prophylaxis: SCD    CHIEF COMPLAINT     Chief Complaint   Patient presents with    Difficulty Swallowing     trouble swallowing food for about 1 week, it feels like it stays there and then after awhile itll go down i have no pain but its getting annoying now       HISTORY OF PRESENT ILLNESS     This is a 70-year-old male with past medical history of hypertension, CAD with NSTEMI x3 status post AGUSTIN of LAD and RCA, apical thrombosis on aspirin and warfarin INR goal 2-3, combined systolic and diastolic CHF, seizures on phenobarbital, hyperlipidemia, and a 50 pack year smoking history  He has history of abdominal surgery for abdominal aortic aneurysm repair, hernia repair, and bowel obstruction  He complains of 1 week of difficulty swallowing  He reports that he feels as if food gets stuck in his chest before subsequently passing  He experiences this was all solids, not with liquids  He denies any chest pain during any of these episodes  Patient denies fevers, chills, nausea, vomiting, diarrhea, constipation, pain, palpitations, shortness of breath, cough, abdominal pain, dysuria, congestion, sore throat, headaches, or myalgias  He reports no regurgitation, nausea, vomiting, chest pain  He denies any weight loss  Patient also complains of left lower extremity swelling which she has been experiencing after a fall in March 2022  He presents to the ED at that time but imaging was negative for any fracture  On presentation to the emergency department, he is chest pain-free, vital signs within normal limits  CMP is grossly within normal limits  CBC was significant for WBC of 3 49, platelet 365, and hemoglobin of 10 7 from baseline of 11-13  ECG did demonstrate some T-wave inversions in the lateral inferior leads  High sensitivity troponin was negative x2  INR was 3 67  CT chest abdomen pelvis demonstrated narrowing of the distal esophagus concerning for possible functional obstruction  Lower extremity venous Doppler studies demonstrated no thrombus  Left lower extremity plain film radiograph demonstrates no obvious osseous abnormality on wet read  He will be admitted for evaluation by speech and further workup with video barium swallow and GI consult for consideration of EGD  His warfarin will be held for supratherapeutic INR      REVIEW OF SYSTEMS     Review of Systems   Constitutional: Negative for chills, diaphoresis, fatigue and fever  HENT: Negative for ear pain and sore throat  Eyes: Negative for pain and visual disturbance  Respiratory: Negative for cough, shortness of breath and wheezing  Cardiovascular: Negative for chest pain, palpitations and leg swelling  Gastrointestinal: Positive for abdominal distention  Negative for abdominal pain, anal bleeding, blood in stool, constipation, diarrhea, nausea and vomiting  Trouble swallowing  Genitourinary: Negative for dysuria and hematuria  Musculoskeletal: Negative for arthralgias and back pain  Left lower extremity swelling and erythema  Skin: Negative for color change and rash  Neurological: Negative for dizziness, seizures, syncope, weakness, light-headedness, numbness and headaches  All other systems reviewed and are negative  OBJECTIVE     Vitals:    22 1521 22 1900 22 2100   BP: 122/79 133/63 129/60   BP Location: Left arm Right arm Right arm   Pulse: 76 68 66   Resp: 16 16 18   Temp: 98 1 °F (36 7 °C)     TempSrc: Temporal     SpO2: 96% 96% 97%   Weight: 100 kg (220 lb 12 8 oz)        Temperature:   Temp (24hrs), Av 1 °F (36 7 °C), Min:98 1 °F (36 7 °C), Max:98 1 °F (36 7 °C)    Temperature: 98 1 °F (36 7 °C)  Intake & Output:  I/O     None        Weights:        Body mass index is 29 95 kg/m²  Weight (last 2 days)     Date/Time Weight    22 1521 100 (220 8)        Physical Exam  Vitals and nursing note reviewed  Constitutional:       General: He is not in acute distress  Appearance: Normal appearance  He is well-developed  HENT:      Head: Normocephalic and atraumatic  Nose: Nose normal       Mouth/Throat:      Mouth: Mucous membranes are moist       Pharynx: Oropharynx is clear  Eyes:      Extraocular Movements: Extraocular movements intact  Conjunctiva/sclera: Conjunctivae normal       Pupils: Pupils are equal, round, and reactive to light     Cardiovascular:      Rate and Rhythm: Normal rate and regular rhythm  Pulses: Normal pulses  Heart sounds: Normal heart sounds  No murmur heard  Pulmonary:      Effort: Pulmonary effort is normal  No respiratory distress  Breath sounds: Normal breath sounds  No wheezing  Abdominal:      General: Bowel sounds are normal  There is distension  Palpations: Abdomen is soft  Tenderness: There is no abdominal tenderness  There is no guarding  Hernia: A hernia is present  Comments: Ventral hernia  Laparotomy scar noted  Musculoskeletal:      Cervical back: Neck supple  Right lower leg: No edema  Left lower leg: Edema present  Skin:     General: Skin is warm and dry  Capillary Refill: Capillary refill takes less than 2 seconds  Neurological:      General: No focal deficit present  Mental Status: He is alert and oriented to person, place, and time  Psychiatric:         Mood and Affect: Mood normal          Behavior: Behavior normal          Thought Content: Thought content normal        PAST MEDICAL HISTORY     Past Medical History:   Diagnosis Date    Cardiac disease     [facility]estive [facility] )     Hernia of abdominal cavity     Myocardial infarction ([facility] )     last assessed 14, past, Pt had MI s/p AGUSTIN placed in , refuses to take any other medications for his cardiac disease, only will take seizure med   Understands possible complications from this decision    Seizure [facility])      PAST SURGICAL HISTORY     Past Surgical History:   Procedure Laterality Date    ABDOMINAL AORTIC ANEURYSM REPAIR      for dialation or occulsion    CATARACT EXTRACTION       SOCIAL & FAMILY HISTORY     Social History     Substance and Sexual Activity   Alcohol Use Not Currently       Social History     Substance and Sexual Activity   Drug Use Never     Social History     Tobacco Use   Smoking Status Former Smoker    Quit date: 2005    Years since quittin 9   Smokeless Tobacco Never Used     Family History   Problem Relation Age of Onset    Hypertension Father     Kidney disease Brother      LABORATORY DATA     Labs: I have personally reviewed pertinent reports  Results from last 7 days   Lab Units 05/02/22  1700   WBC Thousand/uL 3 49*   HEMOGLOBIN g/dL 10 7*   HEMATOCRIT % 34 0*   PLATELETS Thousands/uL 121*   NEUTROS PCT % 51   MONOS PCT % 12      Results from last 7 days   Lab Units 05/02/22  1700   POTASSIUM mmol/L 4 1   CHLORIDE mmol/L 109*   CO2 mmol/L 26   BUN mg/dL 15   CREATININE mg/dL 1 20   CALCIUM mg/dL 8 4   ALK PHOS U/L 165*   ALT U/L 16   AST U/L 15              Results from last 7 days   Lab Units 05/02/22  1737   INR  3 67*             Micro:  Lab Results   Component Value Date    BLOODCX No Growth After 5 Days  01/30/2017    BLOODCX No Growth After 5 Days  01/30/2017     IMAGING & DIAGNOSTIC TESTS     Imaging: I have personally reviewed pertinent reports  CT chest abdomen pelvis wo contrast    Result Date: 5/2/2022  Impression: Luminal narrowing of the distal esophagus may be due to esophageal underdistention, though given patient's symptoms, a functional obstruction cannot be excluded based on this imaging, and an esophagram can be considered for further assessment  Otherwise  no acute thoracic or abdominopelvic pathology with chronic findings as delineated above  Workstation performed: [id number]     EKG, Pathology, and Other Studies: I have personally reviewed pertinent reports  ALLERGIES     Allergies   Allergen Reactions    Iodinated Diagnostic Agents Rash     Pt's skin get very red and he gets hot and chills    Clopidogrel Rash     MEDICATIONS PRIOR TO ARRIVAL     Prior to Admission medications    Medication Sig Start Date End Date Taking?  Authorizing Provider   acetaminophen (TYLENOL) 500 mg tablet Take 1 tablet (500 mg total) by mouth every 6 (six) hours as needed for mild pain 3/26/22  Yes Luis Nunes MD   aspirin (Aspirin Low Dose) 81 mg EC tablet Take 1 tablet (81 mg total) by mouth daily 1/24/22  Yes [place], DO   atorvastatin (LIPITOR) 80 mg tablet Take 1 tablet (80 mg total) by mouth daily with dinner 3/11/22  Yes Franco Howard MD   nitroglycerin (NITROSTAT) 0 4 mg SL tablet Place 1 tablet (0 4 mg total) under the tongue every 5 (five) minutes as needed for chest pain 4/7/21  Yes Josemanuel Pelayo,    PHENobarbital 64 8 mg tablet Take 1 tablet (64 8 mg total) by mouth 2 (two) times a day 11/16/21 5/15/22 Yes Gabriella Israel MD   triamcinolone (KENALOG) 0 025 % cream APPLY TO AFFECTED AREA TWICE A DAY 3/7/22  Yes Tyrone Bills,    warfarin (COUMADIN) 2 5 mg tablet Take 2 tabs by mouth daily or as directed by physician 12/9/21  Yes Paul Rivera,    metoprolol succinate (TOPROL-XL) 50 mg 24 hr tablet TAKE 1 TABLET BY MOUTH EVERY DAY  Patient not taking: TAKE 1 TABLET BY MOUTH EVERY DAY 6/30/21   Josemanuel Pelayo DO     MEDICATIONS ADMINISTERED IN LAST 24 HOURS     CURRENT MEDICATIONS     No current facility-administered medications for this encounter  Admission Time  I spent 20 minutes admitting the patient  This involved direct patient contact where I performed a full history and physical, reviewing previous records, and reviewing laboratory and other diagnostic studies  Portions of the record may have been created with voice recognition software  Occasional wrong word or "sound a like" substitutions may have occurred due to the inherent limitations of voice recognition software    Read the chart carefully and recognize, using context, where substitutions have occurred     ==  She Ordoñez MD  [address]  Internal Medicine Residency PGY-1

## 2022-05-03 NOTE — PHYSICAL THERAPY NOTE
Physical Therapy Evaluation    Patient Name: Karen TURNER Date: 5/3/2022     Problem List  Principal Problem:    Dysphagia  Active Problems:    HTN (hypertension)    HLD (hyperlipidemia)    Seizure disorder (HCC)    Chronic combined systolic and diastolic CHF (congestive heart failure) ([facility])    Paroxysmal atrial fibrillation ([facility] )    Coronary artery disease of native artery of native heart with stable angina pectoris [facility])       Past Medical History  Past Medical History:   Diagnosis Date    Cardiac disease     CHF (congestive heart failure) ([facility] )     Hernia of abdominal cavity     Myocardial infarction ([facility] )     last assessed 7/31/14, past, Pt had MI s/p AGUSTIN placed in 2001, refuses to take any other medications for his cardiac disease, only will take seizure med  Understands possible complications from this decision    Seizure [facility])         Past Surgical History  Past Surgical History:   Procedure Laterality Date    ABDOMINAL AORTIC ANEURYSM REPAIR      for dialation or occulsion    CATARACT EXTRACTION             05/03/22 0855   PT Last Visit   PT Visit Date 05/03/22   Note Type   Note type Evaluation   Pain Assessment   Pain Assessment Tool 0-10   Pain Score No Pain   Restrictions/Precautions   Weight Bearing Precautions Per Order No   Other Precautions Chair Alarm; Bed Alarm; Fall Risk;Telemetry   Home Living   Type of Home Apartment   Home Layout Stairs to enter with rails; One level; Able to live on main level with bedroom/bathroom; Performs ADLs on one level  (2 SEEMA)   Bathroom Shower/Tub Tub/shower unit   Bathroom Toilet Standard   Home Equipment Walker   Additional Comments pt denies AD use PTA   Prior Function   Level of [place] Independent with ADLs and functional mobility   Lives With Family  (brother)   Receives Help From Family   ADL Assistance Independent   IADLs Independent   Falls in the last 6 months 1 to 4  (1-trip over sole of shoe)   Comments pt reports living with his brother who is currently in hospital for dialysis tx  General   Family/Caregiver Present No   Cognition   Overall Cognitive Status WFL   Arousal/Participation Cooperative   Attention Within functional limits   Orientation Level Oriented X4   Following Commands Follows one step commands without difficulty   Comments pleasant to work with   RLE Assessment   RLE Assessment X   Strength RLE   RLE Overall Strength 4+/5  (functionally)   LLE Assessment   LLE Assessment X   Strength LLE   LLE Overall Strength 4+/5  (functionally)   Bed Mobility   Supine to Sit 5  Supervision   Additional items HOB elevated; Increased time required;Verbal cues   Sit to Supine Unable to assess   Additional Comments remained seated in chair with alarm upon conclusion   Transfers   Sit to Stand 5  Supervision   Stand to Sit 5  Supervision   Stand pivot 5  Supervision   Additional Comments no AD utilized    Ambulation/Elevation   Gait pattern Improper Weight shift;Decreased foot clearance; Short stride   Gait Assistance 5  Supervision  (close S)   Additional items Verbal cues   Assistive Device None   Distance 100'   Stair Management Assistance   (CGA/ S)   Additional items Verbal cues; Increased time required   Stair Management Technique One rail R;Step to pattern; Sideways;Nonreciprocal   Number of Stairs 2   Balance   Static Sitting Good   Dynamic Sitting Fair +   Static Standing Fair   Dynamic Standing Fair -   Ambulatory Fair -   Endurance Deficit   Endurance Deficit No   Activity Tolerance   Activity Tolerance Patient tolerated treatment well   Medical Staff Made Aware co-eval performed with OT, Yumi 2* multiple comorbidities   Nurse Made Aware pt cleared to see and mobilize per nursing    Assessment   Prognosis Good   Problem List Decreased strength; Impaired balance   Assessment Pt is a 67 y o  male admitted to [facility] on 5/2/2022 with primary dx of dysphagia   Pt has the following comorbidities which affect their treatment:  has a past medical history of Cardiac disease, CHF (congestive heart failure) ([facility] ), Hernia of abdominal cavity, Myocardial infarction ([facility] ), and Seizure ([facility] )  as well as personal factors including stairs to access home  Pt has a moderate complexity clinical presentation due to Ongoing medical management for primary dx, Decreased activity tolerance compared to baseline, Fall risk, Ongoing telemetry monitoring, Trending lab values, Diagnostic imaging pending, Continuous pulse oximetry monitoring  , and PMH  PT was consulted to evaluate pt's functional mobility and discharge needs  Upon evaluation, patient required S for bed mobility, S for STS transfers, S for ambulation, and CGA/S for stairs  Pt's functional impairments include: decreased strength and balance  At conclusion of eval, pt remained seated in chair with chair alarm, phone, call bell, and all other personal needs within reach  The patient's AM-PAC Basic Mobility Inpatient Short Form Raw Score is 19  A Raw score of greater than 16 suggests the patient may benefit from discharge to home  Please also refer to the recommendation of the Physical Therapist for safe discharge planning  At this time, pt has no further acute care PT needs 2* being S level for all functional mobility and having a supportive family who can assist as needed  Pt denies any concerns regarding their functional mobility or ability to return home safely at this time  Recommend pt continues to mobilize with nursing and restorative staff during hospital stay  Please consult with any changes in mobility status  D/C recommendations are home with OPPT for further balance and strength training     Goals   Patient Goals to go home   STG Expiration Date 05/17/22   PT Treatment Day 0   Plan   PT Frequency Other (Comment)  (d/c acute care PT)   Recommendation   PT Discharge Recommendation Home with outpatient rehabilitation  (for balance and strength training) Additional Comments pt denies any concerns regarding his current functional mobility or ability to return home safely at this time     AM-PAC Basic Mobility Inpatient   Turning in Bed Without Bedrails 4   Lying on Back to Sitting on Edge of Flat Bed 3   Moving Bed to Chair 3   Standing Up From Chair 3   Walk in Room 3   Climb 3-5 Stairs 3   Basic Mobility Inpatient Raw Score 19   Basic Mobility Standardized Score 42 48   Highest Level Of Mobility   JH-HL Goal 6: Walk 10 steps or more   JH-HLM Highest Level of Mobility 7: Walk 25 feet or more   -HLM Goal Achieved Yes   Modified [place] Scale   Modified [place] Scale 1   Victoriano Rosen, PT, DPT

## 2022-05-03 NOTE — PROGRESS NOTES
INTERNAL MEDICINE RESIDENCY SENIOR ADMISSION NOTE     Name: Pete Pena   Age & Sex: 67 y o  male   MRN: [id number]  Unit/Bed#: ED 27   Encounter: [id number]  Primary Care Provider: Joseph Sandoval,     Admit to team: SOD Team B     Patient seen and examined  Reviewed H&P per Dr Elaine Ramos   Agree with the assessment and plan with any exception/addition as noted below:    Patient is a 69-year-old male with past medical history of hypertension, CAD status post CABG, significant tobacco history, paroxysmal atrial fibrillation anticoagulated on warfarin, who presents with 1 week onset dysphagia  Patient states that he has had dysphagia to particularly solid foods, not liquids  He denies any weight loss, vomiting, nausea, fevers, chills, B symptoms, constipation or diarrhea  Denies any abdominal pain  What prompted him to come in for evaluation as it has been annoying" and unresolved despite trying repositioning or different foods  The only thing that has helped him as been cutting the food in small pieces  Diet mainly consists of meat and potatoes  While in the ED, patient is hemodynamically Stable and saturating well on room air  CT chest abdomen pelvis demonstrated luminal narrowing of the distal esophagus with recent dysphagia and tobacco history, concern for mass/cancer verses achalasia versus esophageal dysmotility  Will admit for observation with telemetry as level 3 code status to be evaluated by GI for possible EGD, esophagram, speech evaluation  Physical Exam  Constitutional:       Appearance: He is not ill-appearing  HENT:      Mouth/Throat:      Mouth: Mucous membranes are dry  Pharynx: No oropharyngeal exudate or posterior oropharyngeal erythema  Comments: No masses or lesions noted  Poor dentition  Cardiovascular:      Rate and Rhythm: Normal rate  Rhythm irregular  Heart sounds: No murmur heard  Pulmonary:      Effort: Pulmonary effort is normal       Breath sounds:  No wheezing or rales  Abdominal:      General: Bowel sounds are normal       Palpations: Abdomen is soft  Tenderness: There is no abdominal tenderness  There is no guarding  Musculoskeletal:      Right lower leg: No edema  Left lower leg: No edema  Lymphadenopathy:      Cervical: No cervical adenopathy  Skin:     General: Skin is warm and dry  Findings: No erythema  Neurological:      Mental Status: He is alert and oriented to person, place, and time     Psychiatric:         Mood and Affect: Mood normal          Behavior: Behavior normal          Principal Problem:    Dysphagia  Active Problems:    HTN (hypertension)    HLD (hyperlipidemia)    Seizure disorder (HCC)    Chronic combined systolic and diastolic CHF (congestive heart failure) (HCC)    Paroxysmal atrial fibrillation (HCC)    Coronary artery disease of native artery of native heart with stable angina pectoris ([facility] )      Code Status: Prior  Admission Status: OBSERVATION  Disposition: Patient requires Med/Surg with Telemetry  Expected Length of Stay: < 2 Midnights

## 2022-05-03 NOTE — ASSESSMENT & PLAN NOTE
Patient previously on metoprolol succinate however non compliant  Follows cardiology outpatient  CHADS vasc 5, anticoagulated on warfarin (most recent regiment outpatient was 5mg M & W ; 3 75 other days)    INR supratheraputice on admission, coumadin held and INR reversed by 1 FFP and Vit K 10 mg for planned EGD 05/04  ; plan to restart on 05/05

## 2022-05-03 NOTE — ASSESSMENT & PLAN NOTE
Continue statin  S/p CABG  Nitro p r n    If no relief with nitro, will get EKG, troponins and do workup for ACS

## 2022-05-03 NOTE — CONSULTS
Consult Service: Gastroenterology      PATIENT INFORMATION      Danuta Mazariegos 67 y o  male MRN: [id number]  Unit/Bed#: [facility] 731-01 Encounter: [id number]  PCP: Nayana Dominguez DO  Date of Admission:  5/2/2022  Date of Consultation: 05/03/22  Requesting Physician: Shraddha Vargas MD       ASSESSMENTS & PLAN   Danuta Mazariegos is a 67 y o  old male with PMH of paroxysmal LV thrombus (on Coumadin), coronary artery disease status post CABG who presented with dysphagia    Dysphagia, abnormal CT scan - constant solid food dysphagia for 1 week  CT of the chest and abdomen shows luminal narrowing of the distal esophagus which may be due to esophageal underdistention versus mechanical obstruction  Differential is broad includes esophagitis, esophageal stricture, malignancy, Schatzki ring, motility disorder among other causes  · Patient will need EGD for further workup  Plan for EGD tomorrow  LV thrombus - noted on echo on 07/2021 for which she was started on Coumadin with supratherapeutic INR  · Spoke with primary who discussed with cardiology  Okay to reverse INR  Plan for EGD tomorrow  IV vitamin K and recheck INR tomorrow morning  REASON FOR CONSULTATION      Dysphagia      HISTORY OF PRESENT ILLNESS      Danuta Mazariegos is a 67 y o  old male with PMH of paroxysmal atrial fibrillation (on Coumadin), coronary artery disease status post CABG who presented with dysphagia  Patient states for the past week he has noticed difficulty swallowing all solids with feels cessation of food getting stuck in his mid sternum with no regurgitation  He has no difficulty with pills or liquids  He has no odynophagia  No significant GERD  No NSAIDs  No previous endoscopic evaluation  He is a former smoker quit in 2015 but has a 50+ pack-year history of smoking prior to this  No NSAIDs or blood thinners  No family history of colon cancer esophageal cancer      REVIEW OF SYSTEMS     A thorough 12-point review of systems has been conducted  Pertinent positives and negatives are mentioned in the history of present illness  PAST MEDICAL & SURGICAL HISTORY      Past Medical History:   Diagnosis Date    Cardiac disease     [facility]congestive [facility] failure) [facility] )     Hernia of abdominal cavity     Myocardial infarction ([facility] )     last assessed 7/31/14, past, Pt had MI s/p AGUSTIN placed in 2001, refuses to take any other medications for his cardiac disease, only will take seizure med  Understands possible complications from this decision    [facility]izure [facility])        Past Surgical History:   Procedure Laterality Date    ABDOMINAL AORTIC ANEURYSM REPAIR      for dialation or occulsion    CATARACT EXTRACTION           MEDICATIONS & ALLERGIES       Medications:   Prior to Admission medications    Medication Sig Start Date End Date Taking?  Authorizing Provider   acetaminophen (TYLENOL) 500 mg tablet Take 1 tablet (500 mg total) by mouth every 6 (six) hours as needed for mild pain 3/26/22  Yes Sajan Monteiro MD   aspirin (Aspirin Low Dose) 81 mg EC tablet Take 1 tablet (81 mg total) by mouth daily 1/24/22  Yes Patricia Jackson DO   atorvastatin (LIPITOR) 80 mg tablet Take 1 tablet (80 mg total) by mouth daily with dinner 3/11/22  Yes Phylliss Halsted, MD   nitroglycerin (NITROSTAT) 0 4 mg SL tablet Place 1 tablet (0 4 mg total) under the tongue every 5 (five) minutes as needed for chest pain 4/7/21  Yes Charla Leonard,    PHENobarbital 64 8 mg tablet Take 1 tablet (64 8 mg total) by mouth 2 (two) times a day 11/16/21 5/15/22 Yes Erasto Rose MD   triamcinolone (KENALOG) 0 025 % cream APPLY TO AFFECTED AREA TWICE A DAY 3/7/22  Yes Kerline David,    warfarin (COUMADIN) 2 5 mg tablet Take 2 tabs by mouth daily or as directed by physician 12/9/21  Yes Paul Rivera,    metoprolol succinate (TOPROL-XL) 50 mg 24 hr tablet TAKE 1 TABLET BY MOUTH EVERY DAY  Patient not taking: TAKE 1 TABLET BY MOUTH EVERY DAY 6/30/21   Charla Leonard DO Allergies: Allergies   Allergen Reactions    Iodinated Diagnostic Agents Rash     Pt's skin get very red and he gets hot and chills    Clopidogrel Rash         SOCIAL HISTORY      Marital Status: Single    Substance Use History:   Social History     Substance and Sexual Activity   Alcohol Use Not Currently     Social History     Tobacco Use   Smoking Status Former Smoker    Quit date: 2005    Years since quittin 9   Smokeless Tobacco Never Used     Social History     Substance and Sexual Activity   Drug Use Never         FAMILY HISTORY      Non-Contributory      PHYSICAL EXAM     Vitals:   Blood Pressure: 119/69 (22)  Pulse: 68 (22)  Temperature: 98 1 °F (36 7 °C) (22)  Temp Source: Temporal (22)  Respirations: 18 (22)  Weight - Scale: 100 kg (220 lb 12 8 oz) (22)  SpO2: 94 % (22)    Physical Exam:   GENERAL: NAD  HEENT:  NC/AT, no scleral icterus  CARDIAC:  RRR, +S1/S2  PULMONARY:  CTA B/L, no wheezing/rales/rhonci, non-labored breathing  ABDOMEN:  Soft, NT/ND, +BS, no rebound/guarding/rigidity  Extremities:  No edema, cyanosis, or clubbing  NEUROLOGIC:  Alert  SKIN:  No rashes or erythema      ADDITIONAL DATA     Lab Results:     Results from last 7 days   Lab Units 22  0601   WBC Thousand/uL 3 26*   HEMOGLOBIN g/dL 10 6*   HEMATOCRIT % 33 7*   PLATELETS Thousands/uL 113*   NEUTROS PCT % 43   LYMPHS PCT % 37   MONOS PCT % 11   EOS PCT % 7*     Results from last 7 days   Lab Units 22  0601 22  1700 22  1700   POTASSIUM mmol/L 3 5   < > 4 1   CHLORIDE mmol/L 110*   < > 109*   CO2 mmol/L 26   < > 26   BUN mg/dL 13   < > 15   CREATININE mg/dL 0 84   < > 1 20   CALCIUM mg/dL 8 4   < > 8 4   ALK PHOS U/L  --   --  165*   ALT U/L  --   --  16   AST U/L  --   --  15    < > = values in this interval not displayed       Results from last 7 days   Lab Units 22  0601   INR  2 82*       Imaging:    CT chest abdomen pelvis wo contrast    Result Date: 5/2/2022  Narrative: CT CHEST, ABDOMEN AND PELVIS WITHOUT IV CONTRAST INDICATION:   difficulty swallowing  COMPARISON: Multiple prior CT examinations of the chest commencing  June 24, 2009 with direct comparison made to February 4, 2011  Multiple prior CT examinations of the abdomen and pelvis commencing  June 19, 2009 with direct comparison made to March 25, 2021  TECHNIQUE: CT examination of the chest, abdomen and pelvis was performed without intravenous contrast   Axial, sagittal, and coronal 2D reformatted images were created from the source data and submitted for interpretation  Radiation dose length product (DLP) for this visit:  1334 02 mGy-cm   This examination, like all CT scans performed in the [facility], was performed utilizing techniques to minimize radiation dose exposure, including the use of iterative reconstruction and automated exposure control  Enteric contrast was not administered  FINDINGS: CHEST LUNGS:  Lungs are clear  There is no tracheal or endobronchial lesion  PLEURA:  Unremarkable  HEART/GREAT VESSELS: Normal heart size  Coronary artery calcifications and atherosclerotic calcifications of the aorta  No thoracic aortic aneurysm  MEDIASTINUM AND VINNIE:  Decreased caliber of the distal esophageal lumen (series 602, image 115) CHEST WALL AND LOWER NECK:  Unremarkable  ABDOMEN LIVER/BILIARY TREE:  There are one or more hepatic simple cyst(s) present  No CT evidence of suspicious solid hepatic mass  Normal hepatic contours  No biliary dilatation  GALLBLADDER:  There are gallstone(s) within the gallbladder, without pericholecystic inflammatory changes  SPLEEN:  Unremarkable  PANCREAS:  Fatty atrophy of the pancreas  ADRENAL GLANDS:  Unremarkable  KIDNEYS/URETERS:  One or more simple renal cyst(s) is noted  Cortical thinning in the right lower kidney  No hydronephrosis   STOMACH AND BOWEL:  There is colonic diverticulosis without evidence of acute diverticulitis  APPENDIX:  No findings to suggest appendicitis  ABDOMINOPELVIC CAVITY:  No ascites  No pneumoperitoneum  No lymphadenopathy  VESSELS:  Atherosclerotic changes are present  No evidence of aneurysm  Surgical clips anterior to the aortic bifurcation indicative of prior surgery  Assessment for aortic patency is limited given lack of intravenous contrast  PELVIS REPRODUCTIVE ORGANS:  Unremarkable for patient's age  URINARY BLADDER:  Unremarkable  ABDOMINAL WALL/INGUINAL REGIONS:  Large supraumbilical and infraumbilical hernias containing nonobstructed small bowel and colon  OSSEOUS STRUCTURES:  No acute fracture or destructive osseous lesion  Spinal degenerative changes are noted  Partial fusion of the L3 and L4 vertebral bodies is unchanged, and is likely post infectious in etiology given the February 4, 2011 findings  Impression: Luminal narrowing of the distal esophagus may be due to esophageal underdistention, though given patient's symptoms, a functional obstruction cannot be excluded based on this imaging, and an esophagram can be considered for further assessment  Otherwise  no acute thoracic or abdominopelvic pathology with chronic findings as delineated above  Workstation performed: [id number]     XR tibia fibula 2 views LEFT    Result Date: 5/3/2022  Narrative: LEFT TIBIA AND FIBULA INDICATION:   swelling  COMPARISON:  3/26/2022 VIEWS:  XR TIBIA FIBULA 2 VW LEFT FINDINGS: There is no acute fracture or dislocation  Calcaneal spur(s) noted  No lytic or blastic osseous lesion  Soft tissues are unremarkable  Impression: No acute osseous abnormality  Workstation performed: [id number]     VAS lower limb venous duplex study, unilateral/limited    Result Date: 5/3/2022  Narrative:  [facility] REPORT CLINICAL: Indications: Patient presents with left lower extremity edema x 6 days   Operative History: AAA repair Risk Factors The patient has history of HTN, Hyperlipidemia, CAD, DVT and previous smoking (quit >10yrs ago)  CONCLUSION:  Impression: RIGHT LOWER LIMB LIMITED: Evaluation shows no evidence of thrombus in the common femoral vein  Doppler evaluation shows a normal response to augmentation maneuvers  LEFT LOWER LIMB: No evidence of acute or chronic deep vein thrombosis No evidence of superficial thrombophlebitis noted  Doppler evaluation shows a normal response to augmentation maneuvers  Popliteal, posterior tibial and anterior tibial arterial Doppler waveforms are triphasic  Technical findings were given to Dr Chanel Blue 5/2/2022  SIGNATURE: Electronically Signed by: Marly Valerio on 2022-05-03 07:53:12 AM      EKG, Pathology, and Other Studies Reviewed on Admission:   · EKG: Reviewed      Counseling / Coordination of Care Time: 30 total mins spent n consult  Greater than 50% of total time spent on patient counseling and coordination of care     ** Please Note: This note is constructed using a voice recognition dictation system   **

## 2022-05-03 NOTE — SPEECH THERAPY NOTE
Dysphagia consult received  Patient also being worked up by GI  Possible EGD today  Will hold on evaluation and VBS until cleared by GI

## 2022-05-03 NOTE — ASSESSMENT & PLAN NOTE
Currently on phenobarbital 64 8 mg b i d , continue home regimen  Will get phenobarbital level on follow-up  Consider Neurology consult if phenobarb level low altered mental status/concerns for seizure while inpatient

## 2022-05-03 NOTE — ASSESSMENT & PLAN NOTE
Patient does have significant tobacco history 50 pack years, quit 2015  Cardiac diet in place  P r n  s for hypertension >170  Continue metoprolol

## 2022-05-03 NOTE — PROGRESS NOTES
INTERNAL MEDICINE RESIDENCY PROGRESS NOTE     Name: Bárbara Trent   Age & Sex: 67 y o  male   MRN: [id number]  Unit/Bed#: [facility] 731-01   Encounter: [id number]  Team: SOD Team B     PATIENT INFORMATION     Name: Bárbara Trent   Age & Sex: 67 y o  male   MRN: [id number]  Hospital Stay Days: 0    ASSESSMENT/PLAN     Principal Problem:    Dysphagia  Active Problems:    HTN (hypertension)    HLD (hyperlipidemia)    Seizure disorder (HCC)    Chronic combined systolic and diastolic CHF (congestive heart failure) (HCC)    Paroxysmal atrial fibrillation ([facility] [address] )    Coronary artery disease of native artery of native heart with stable angina pectoris ([facility] [address] )      * Dysphagia  Assessment & Plan  Patient presents with dysphagia to both solids and liquids, new onset over 1 week  Patient denies any vomiting but does endorse bolus sensation  Denies any objective or subjective weight loss    CT scan demonstrates diffuse luminal narrowing, particular distal esophagus  5/3 GI consulted for EGD with biopsies and likely esophagram (likely 3PM)   Cardiology ok'd patient for Coumadin reversal, transferring 1 unit FFP, recheck INR after noon, preparing 1 unit FFP post-procedure for potential bleeding  In setting of extensive tobacco history, concerning for mass    Speech therapy evaluation   Pending GI clearance   Will get barium swallow, plan for EGD with GI 5/3 for this 5/4 pending INR repeat after FFP  Will have on dental soft and modified liquid diet, advance as speech therapy sees fit    Coronary artery disease of native artery of native heart with stable angina pectoris [facility])  Assessment & Plan  Continue statin  S/p CABG  Nitro p r n    If no relief with nitro, will get EKG, troponins and do workup for ACS    Paroxysmal atrial fibrillation [facility])  Assessment & Plan  Patient previously on metoprolol succinate however non compliant  Follows cardiology outpatient  CHADS vasc 5, anticoagulated on warfarin    INR trending down today to 2 8, confirmed with the and Cardiology on-call, given 1 unit fresh frozen plasma today repeating INR afterward if INR better per GI will undergo endoscopy today versus tomorrow  Continue telemetry      Chronic combined systolic and diastolic CHF (congestive heart failure) (HCC)  Assessment & Plan  Wt Readings from Last 3 Encounters:   05/02/22 100 kg (220 lb 12 8 oz)   03/15/22 100 kg (221 lb)   02/10/22 100 kg (221 lb 3 2 oz)     Echo 3/22: ejection fraction is 50%  Systolic function is low normal  Diastolic function is mildly abnormal, consistent with grade I (abnormal) relaxation  There is akinesis of the apical inferior, apical lateral, apical septal and apical walls  Left Atrium: The atrium is mildly dilated    Tricuspid Valve: There is mild regurgitation    Pulmonic Valve: There is mild regurgitation      Apical wall motion abnormality  No obvious LV thrombus identified although apex poorly visualized    No mechanical valves    Strict Daily I's and O's and weights  Net negative balance goal daily  Fluid restriction 2 L per day  Cardiac diet in place with low-salt  Continue aspirin, statin, metoprolol  Potassium greater than 4, magnesium greater than 2      Seizure disorder (HCC)  Assessment & Plan  Currently on phenobarbital 64 8 mg b i d , continue home regimen  Will get phenobarbital level on follow-up  Consider Neurology consult if phenobarb level low altered mental status/concerns for seizure while inpatient    HLD (hyperlipidemia)  Assessment & Plan  Stable  Continue atorvastatin 80 mg daily  Encourage lifestyle modifications outpatient    HTN (hypertension)  Assessment & Plan  Patient does have significant tobacco history 50 pack years, quit 2015  Cardiac diet in place  P r n  s for hypertension >170  Continue metoprolol          Disposition:  Plan for EGD with GI today versus tomorrow    SUBJECTIVE     Patient seen and examined  No acute events overnight    Patient NPO, denies any new symptoms of concern, confirming presentation with lower esophageal dysphagia feels like food stuck in there, denies any fever chills unintended weight loss diaphoresis or night sweats, confirms no alcohol use, significant history of smoking 50+ pack year however he quit  denies any family history of cancer  Agreeable to above assessment and signed a consent for fresh frozen plasma understand benefits versus risks, confirmed with Cardiology and GI plan for reversing INR was FFP 1 unit should recheck INR for potential EGD today as planned versus tomorrow  OBJECTIVE     Vitals:    22 1207 22 1208 22 1230 22 1240   BP: 120/68 120/68     BP Location:       Pulse: 65 98  68   Resp:  18     Temp: 97 6 °F (36 4 °C) (!) 97 3 °F (36 3 °C)  98 °F (36 7 °C)   TempSrc:       SpO2: 98%  96% 97%   Weight:          Temperature:   Temp (24hrs), Av 8 °F (36 6 °C), Min:97 3 °F (36 3 °C), Max:98 1 °F (36 7 °C)    Temperature: 98 °F (36 7 °C)  Intake & Output:  I/O       / 0701  / 0700 / 0701  / 0700 / 0701  / 0700    Urine (mL/kg/hr)   175 (0 3)    Total Output   175    Net   -175               Weights:        Body mass index is 29 95 kg/m²  Weight (last 2 days)     Date/Time Weight    22 1521 100 (220 8)        Physical Exam   - GEN: Appears well, alert and oriented x 3, pleasant and cooperative, in no acute distress  - HEENT: Anicteric, mucous membranes moist, PERRL and EOMI   - NECK: No lymphadenopathy, JVD or carotid bruits   - HEART: RRR, normal S1 and S2, no murmurs, clicks, gallops or rubs   - LUNGS: Clear to auscultation bilaterally; no wheezes, rales, or rhonchi  - ABDOMEN:  Large supra and infra umbilical hernia, see image below non reducible   Normal bowel sounds, soft, no tenderness, no distention, no organomegaly or masses felt on exam    - EXTREMITIES: Peripheral pulses normal; no clubbing, cyanosis, or edema  - NEURO: No focal findings, CN II-XII are grossly intact  - Musculoskeletal: 5/5 strength, normal ROM, no swollen or erythematous joints  - SKIN: Normal without suspicious lesions on exposed skin      LABORATORY DATA     Labs: I have personally reviewed pertinent reports  Results from last 7 days   Lab Units 05/03/22  0601 05/02/22  1700   WBC Thousand/uL 3 26* 3 49*   HEMOGLOBIN g/dL 10 6* 10 7*   HEMATOCRIT % 33 7* 34 0*   PLATELETS Thousands/uL 113* 121*   NEUTROS PCT % 43 51   MONOS PCT % 11 12      Results from last 7 days   Lab Units 05/03/22  0601 05/02/22  1700   POTASSIUM mmol/L 3 5 4 1   CHLORIDE mmol/L 110* 109*   CO2 mmol/L 26 26   BUN mg/dL 13 15   CREATININE mg/dL 0 84 1 20   CALCIUM mg/dL 8 4 8 4   ALK PHOS U/L  --  165*   ALT U/L  --  16   AST U/L  --  15              Results from last 7 days   Lab Units 05/03/22  0601 05/02/22  1737   INR  2 82* 3 67*               IMAGING & DIAGNOSTIC TESTING     Radiology Results: I have personally reviewed pertinent reports  CT chest abdomen pelvis wo contrast    Result Date: 5/2/2022  Impression: Luminal narrowing of the distal esophagus may be due to esophageal underdistention, though given patient's symptoms, a functional obstruction cannot be excluded based on this imaging, and an esophagram can be considered for further assessment  Otherwise  no acute thoracic or abdominopelvic pathology with chronic findings as delineated above  Workstation performed: [id number]     XR tibia fibula 2 views LEFT    Result Date: 5/3/2022  Impression: No acute osseous abnormality  Workstation performed: [id number]     Other Diagnostic Testing: I have personally reviewed pertinent reports      ACTIVE MEDICATIONS     Current Facility-Administered Medications   Medication Dose Route Frequency    acetaminophen (TYLENOL) tablet 650 mg  650 mg Oral Q6H PRN    aspirin chewable tablet 81 mg  81 mg Oral Daily    atorvastatin (LIPITOR) tablet 80 mg  80 mg Oral Daily With Dinner    ondansetron (ZOFRAN) injection 4 mg  4 mg Intravenous Q6H PRN    PHENobarbital tablet 64 8 mg  64 8 mg Oral BID    potassium chloride (K-DUR,KLOR-CON) CR tablet 40 mEq  40 mEq Oral Once    triamcinolone (KENALOG) 1 491 % cream 1 application  1 application Topical BID       VTE Pharmacologic Prophylaxis: Warfarin (Coumadin) held, supratherapeutic INR, planned EGD  VTE Mechanical Prophylaxis: sequential compression device    Portions of the record may have been created with voice recognition software  Occasional wrong word or "sound a like" substitutions may have occurred due to the inherent limitations of voice recognition software    Read the chart carefully and recognize, using context, where substitutions have occurred   ==  Tien Bolivar, [address] [place] [address]  Internal Medicine Residency PGY-2

## 2022-05-03 NOTE — ASSESSMENT & PLAN NOTE
Patient presents with dysphagia to both solids and liquids, new onset over 1 week  Patient denies any vomiting but does endorse bolus sensation  Denies any objective or subjective weight loss    CT scan demonstrates diffuse luminal narrowing, particular distal esophagus  5/4 GI ok'd patient for EGD with biopsies and likely esophagram following 5/3 GI consult   Following 5/3 Coumadin reversal after 1 unit FFP per Cardiology, 1 unit prepared for potential post-procedure potential bleeding     INR trended down to 1 34 today   In setting of extensive tobacco history, concerning for mass    Speech therapy evaluation  5/4 EGD with GI after INR trended down to 1 34 after FFP

## 2022-05-03 NOTE — PLAN OF CARE
Problem: PAIN - ADULT  Goal: Verbalizes/displays adequate comfort level or baseline comfort level  Description: Interventions:  - Encourage patient to monitor pain and request assistance  - Assess pain using appropriate pain scale  - Administer analgesics based on type and severity of pain and evaluate response  - Implement non-pharmacological measures as appropriate and evaluate response  - Consider cultural and social influences on pain and pain management  - Notify physician/advanced practitioner if interventions unsuccessful or patient reports new pain  Outcome: Progressing     Problem: INFECTION - ADULT  Goal: Absence or prevention of progression during hospitalization  Description: INTERVENTIONS:  - Assess and monitor for signs and symptoms of infection  - Monitor lab/diagnostic results  - Monitor all insertion sites, i e  indwelling lines, tubes, and drains  - Monitor endotracheal if appropriate and nasal secretions for changes in amount and color  - [place] appropriate cooling/warming therapies per order  - Administer medications as ordered  - Instruct and encourage patient and family to use good hand hygiene technique  - Identify and instruct in appropriate isolation precautions for identified infection/condition  Outcome: Progressing  Goal: Absence of fever/infection during neutropenic period  Description: INTERVENTIONS:  - Monitor WBC    Outcome: Progressing     Problem: SAFETY ADULT  Goal: Patient will remain free of falls  Description: INTERVENTIONS:  - Educate patient/family on patient safety including physical limitations  - Instruct patient to call for assistance with activity   - Consult OT/PT to assist with strengthening/mobility   - Keep Call bell within reach  - Keep bed low and locked with side rails adjusted as appropriate  - Keep care items and personal belongings within reach  - Initiate and maintain comfort rounds  - Make Fall Risk Sign visible to staff    - Apply yellow socks and bracelet for high fall risk patients  - Consider moving patient to room near nurses station  Outcome: Progressing  Goal: Maintain or return to baseline ADL function  Description: INTERVENTIONS:  -  Assess patient's ability to carry out ADLs; assess patient's baseline for ADL function and identify physical deficits which impact ability to perform ADLs (bathing, care of mouth/teeth, toileting, grooming, dressing, etc )  - Assess/evaluate cause of self-care deficits   - Assess range of motion  - Assess patient's mobility; develop plan if impaired  - Assess patient's need for assistive devices and provide as appropriate  - Encourage maximum independence but intervene and supervise when necessary  - Involve family in performance of ADLs  - Assess for home care needs following discharge   - Consider OT consult to assist with ADL evaluation and planning for discharge  - Provide patient education as appropriate  Outcome: Progressing  Goal: Maintains/Returns to pre admission functional level  Description: INTERVENTIONS:  - Perform BMAT or MOVE assessment daily    - Set and communicate daily mobility goal to care team and patient/family/caregiver     - Collaborate with rehabilitation services on mobility goals if consulted  - Ambulate patient 3 times a day    - Out of bed for toileting  - Record patient progress and toleration of activity level   Outcome: Progressing     Problem: DISCHARGE PLANNING  Goal: Discharge to home or other facility with appropriate resources  Description: INTERVENTIONS:  - Identify barriers to discharge w/patient and caregiver  - Arrange for needed discharge resources and transportation as appropriate  - Identify discharge learning needs (meds, wound care, etc )  - Arrange for interpretive services to assist at discharge as needed  - Refer to Case Management Department for coordinating discharge planning if the patient needs post-hospital services based on physician/advanced practitioner order or complex needs related to functional status, cognitive ability, or social support system  Outcome: Progressing     Problem: Knowledge Deficit  Goal: Patient/family/caregiver demonstrates understanding of disease process, treatment plan, medications, and discharge instructions  Description: Complete learning assessment and assess knowledge base    Interventions:  - Provide teaching at level of understanding  - Provide teaching via preferred learning methods  Outcome: Progressing     Problem: Potential for Falls  Goal: Patient will remain free of falls  Description: INTERVENTIONS:  - Educate patient/family on patient safety including physical limitations  - Instruct patient to call for assistance with activity   - Consult OT/PT to assist with strengthening/mobility   - Keep Call bell within reach  - Keep bed low and locked with side rails adjusted as appropriate  - Keep care items and personal belongings within reach  - Initiate and maintain comfort rounds  - Make Fall Risk Sign visible to staff  - Offer Toileting every Hours, in advance of need  - Initiate/Maintain alarm  - Obtain necessary fall risk management equipment:   - Apply yellow socks and bracelet for high fall risk patients  - Consider moving patient to room near nurses station  Outcome: Progressing     Problem: Prexisting or High Potential for Compromised Skin Integrity  Goal: Skin integrity is maintained or improved  Description: INTERVENTIONS:  - Identify patients at risk for skin breakdown  - Assess and monitor skin integrity  - Assess and monitor nutrition and hydration status  - Monitor labs   - Assess for incontinence   - Turn and reposition patient  - Assist with mobility/ambulation  - Relieve pressure over bony prominences  - Avoid friction and shearing  - Provide appropriate hygiene as needed including keeping skin clean and dry  - Evaluate need for skin moisturizer/barrier cream  - Collaborate with interdisciplinary team   - Patient/family teaching  - Consider wound care consult   Outcome: Progressing

## 2022-05-04 ENCOUNTER — PREP FOR PROCEDURE (OUTPATIENT)
Dept: GASTROENTEROLOGY | Facility: CLINIC | Age: 77
End: 2022-05-04

## 2022-05-04 ENCOUNTER — ANESTHESIA EVENT (INPATIENT)
Dept: GASTROENTEROLOGY | Facility: HOSPITAL | Age: 77
DRG: 375 | End: 2022-05-04
Payer: MEDICARE

## 2022-05-04 ENCOUNTER — APPOINTMENT (INPATIENT)
Dept: GASTROENTEROLOGY | Facility: HOSPITAL | Age: 77
DRG: 375 | End: 2022-05-04
Payer: MEDICARE

## 2022-05-04 ENCOUNTER — ANESTHESIA (INPATIENT)
Dept: GASTROENTEROLOGY | Facility: HOSPITAL | Age: 77
DRG: 375 | End: 2022-05-04
Payer: MEDICARE

## 2022-05-04 DIAGNOSIS — K20.90 ESOPHAGITIS: Primary | ICD-10-CM

## 2022-05-04 PROBLEM — K25.3 ACUTE GASTRIC ULCER: Status: ACTIVE | Noted: 2022-05-04

## 2022-05-04 LAB
ABO GROUP BLD BPU: NORMAL
ABO GROUP BLD BPU: NORMAL
BASOPHILS # BLD AUTO: 0.04 THOUSANDS/ΜL (ref 0–0.1)
BASOPHILS NFR BLD AUTO: 1 % (ref 0–1)
BPU ID: NORMAL
BPU ID: NORMAL
EOSINOPHIL # BLD AUTO: 0.24 THOUSAND/ΜL (ref 0–0.61)
EOSINOPHIL NFR BLD AUTO: 7 % (ref 0–6)
ERYTHROCYTE [DISTWIDTH] IN BLOOD BY AUTOMATED COUNT: 20.6 % (ref 11.6–15.1)
HCT VFR BLD AUTO: 33.2 % (ref 36.5–49.3)
HGB BLD-MCNC: 10.6 G/DL (ref 12–17)
IMM GRANULOCYTES # BLD AUTO: 0.04 THOUSAND/UL (ref 0–0.2)
IMM GRANULOCYTES NFR BLD AUTO: 1 % (ref 0–2)
INR PPP: 1.34 (ref 0.84–1.19)
INR PPP: 1.37 (ref 0.84–1.19)
LYMPHOCYTES # BLD AUTO: 1.05 THOUSANDS/ΜL (ref 0.6–4.47)
LYMPHOCYTES NFR BLD AUTO: 31 % (ref 14–44)
MCH RBC QN AUTO: 30.4 PG (ref 26.8–34.3)
MCHC RBC AUTO-ENTMCNC: 31.9 G/DL (ref 31.4–37.4)
MCV RBC AUTO: 95 FL (ref 82–98)
MONOCYTES # BLD AUTO: 0.38 THOUSAND/ΜL (ref 0.17–1.22)
MONOCYTES NFR BLD AUTO: 11 % (ref 4–12)
NEUTROPHILS # BLD AUTO: 1.67 THOUSANDS/ΜL (ref 1.85–7.62)
NEUTS SEG NFR BLD AUTO: 49 % (ref 43–75)
NRBC BLD AUTO-RTO: 0 /100 WBCS
PLATELET # BLD AUTO: 110 THOUSANDS/UL (ref 149–390)
PROTHROMBIN TIME: 16 SECONDS (ref 11.6–14.5)
PROTHROMBIN TIME: 16.3 SECONDS (ref 11.6–14.5)
RBC # BLD AUTO: 3.49 MILLION/UL (ref 3.88–5.62)
UNIT DISPENSE STATUS: NORMAL
UNIT DISPENSE STATUS: NORMAL
UNIT PRODUCT CODE: NORMAL
UNIT PRODUCT CODE: NORMAL
UNIT PRODUCT VOLUME: 280 ML
UNIT PRODUCT VOLUME: 280 ML
UNIT RH: NORMAL
UNIT RH: NORMAL
WBC # BLD AUTO: 3.42 THOUSAND/UL (ref 4.31–10.16)

## 2022-05-04 PROCEDURE — 88313 SPECIAL STAINS GROUP 2: CPT | Performed by: PATHOLOGY

## 2022-05-04 PROCEDURE — 88342 IMHCHEM/IMCYTCHM 1ST ANTB: CPT | Performed by: PATHOLOGY

## 2022-05-04 PROCEDURE — 88341 IMHCHEM/IMCYTCHM EA ADD ANTB: CPT | Performed by: PATHOLOGY

## 2022-05-04 PROCEDURE — 85025 COMPLETE CBC W/AUTO DIFF WBC: CPT | Performed by: INTERNAL MEDICINE

## 2022-05-04 PROCEDURE — 88305 TISSUE EXAM BY PATHOLOGIST: CPT | Performed by: PATHOLOGY

## 2022-05-04 PROCEDURE — 0DB38ZX EXCISION OF LOWER ESOPHAGUS, VIA NATURAL OR ARTIFICIAL OPENING ENDOSCOPIC, DIAGNOSTIC: ICD-10-PCS | Performed by: INTERNAL MEDICINE

## 2022-05-04 PROCEDURE — 99233 SBSQ HOSP IP/OBS HIGH 50: CPT | Performed by: INTERNAL MEDICINE

## 2022-05-04 PROCEDURE — 88360 TUMOR IMMUNOHISTOCHEM/MANUAL: CPT | Performed by: PATHOLOGY

## 2022-05-04 PROCEDURE — 0DB68ZX EXCISION OF STOMACH, VIA NATURAL OR ARTIFICIAL OPENING ENDOSCOPIC, DIAGNOSTIC: ICD-10-PCS | Performed by: INTERNAL MEDICINE

## 2022-05-04 PROCEDURE — 85610 PROTHROMBIN TIME: CPT | Performed by: STUDENT IN AN ORGANIZED HEALTH CARE EDUCATION/TRAINING PROGRAM

## 2022-05-04 PROCEDURE — 43239 EGD BIOPSY SINGLE/MULTIPLE: CPT | Performed by: INTERNAL MEDICINE

## 2022-05-04 RX ORDER — WARFARIN SODIUM 5 MG/1
5 TABLET ORAL
Status: DISCONTINUED | OUTPATIENT
Start: 2022-05-05 | End: 2022-05-04

## 2022-05-04 RX ORDER — SODIUM CHLORIDE, SODIUM LACTATE, POTASSIUM CHLORIDE, CALCIUM CHLORIDE 600; 310; 30; 20 MG/100ML; MG/100ML; MG/100ML; MG/100ML
50 INJECTION, SOLUTION INTRAVENOUS CONTINUOUS
Status: DISCONTINUED | OUTPATIENT
Start: 2022-05-04 | End: 2022-05-04

## 2022-05-04 RX ORDER — WARFARIN SODIUM 7.5 MG/1
3.75 TABLET ORAL
Status: DISCONTINUED | OUTPATIENT
Start: 2022-05-05 | End: 2022-05-05 | Stop reason: HOSPADM

## 2022-05-04 RX ORDER — WARFARIN SODIUM 5 MG/1
5 TABLET ORAL
Status: COMPLETED | OUTPATIENT
Start: 2022-05-05 | End: 2022-05-05

## 2022-05-04 RX ORDER — POTASSIUM CHLORIDE 20MEQ/15ML
40 LIQUID (ML) ORAL ONCE
Status: COMPLETED | OUTPATIENT
Start: 2022-05-04 | End: 2022-05-04

## 2022-05-04 RX ORDER — PANTOPRAZOLE SODIUM 40 MG/1
40 TABLET, DELAYED RELEASE ORAL
Status: DISCONTINUED | OUTPATIENT
Start: 2022-05-04 | End: 2022-05-05 | Stop reason: HOSPADM

## 2022-05-04 RX ORDER — SUCRALFATE 1 G/1
1 TABLET ORAL
Status: DISCONTINUED | OUTPATIENT
Start: 2022-05-04 | End: 2022-05-05 | Stop reason: HOSPADM

## 2022-05-04 RX ORDER — LIDOCAINE HYDROCHLORIDE 10 MG/ML
INJECTION, SOLUTION EPIDURAL; INFILTRATION; INTRACAUDAL; PERINEURAL AS NEEDED
Status: DISCONTINUED | OUTPATIENT
Start: 2022-05-04 | End: 2022-05-04

## 2022-05-04 RX ORDER — PROPOFOL 10 MG/ML
INJECTION, EMULSION INTRAVENOUS AS NEEDED
Status: DISCONTINUED | OUTPATIENT
Start: 2022-05-04 | End: 2022-05-04

## 2022-05-04 RX ADMIN — ATORVASTATIN CALCIUM 80 MG: 80 TABLET, FILM COATED ORAL at 17:00

## 2022-05-04 RX ADMIN — PROPOFOL 40 MG: 10 INJECTION, EMULSION INTRAVENOUS at 13:36

## 2022-05-04 RX ADMIN — POTASSIUM CHLORIDE 40 MEQ: 20 SOLUTION ORAL at 17:00

## 2022-05-04 RX ADMIN — PROPOFOL 30 MG: 10 INJECTION, EMULSION INTRAVENOUS at 13:39

## 2022-05-04 RX ADMIN — PHENOBARBITAL 64.8 MG: 64.8 TABLET ORAL at 20:41

## 2022-05-04 RX ADMIN — PROPOFOL 30 MG: 10 INJECTION, EMULSION INTRAVENOUS at 13:34

## 2022-05-04 RX ADMIN — SODIUM CHLORIDE, SODIUM LACTATE, POTASSIUM CHLORIDE, AND CALCIUM CHLORIDE 50 ML/HR: .6; .31; .03; .02 INJECTION, SOLUTION INTRAVENOUS at 00:45

## 2022-05-04 RX ADMIN — PANTOPRAZOLE SODIUM 40 MG: 40 TABLET, DELAYED RELEASE ORAL at 17:00

## 2022-05-04 RX ADMIN — PROPOFOL 50 MG: 10 INJECTION, EMULSION INTRAVENOUS at 13:32

## 2022-05-04 RX ADMIN — SODIUM CHLORIDE, SODIUM LACTATE, POTASSIUM CHLORIDE, AND CALCIUM CHLORIDE: .6; .31; .03; .02 INJECTION, SOLUTION INTRAVENOUS at 13:27

## 2022-05-04 RX ADMIN — SUCRALFATE 1 G: 1 TABLET ORAL at 17:00

## 2022-05-04 RX ADMIN — LIDOCAINE HYDROCHLORIDE 80 MG: 10 INJECTION, SOLUTION EPIDURAL; INFILTRATION; INTRACAUDAL; PERINEURAL at 13:29

## 2022-05-04 RX ADMIN — PROPOFOL 80 MG: 10 INJECTION, EMULSION INTRAVENOUS at 13:30

## 2022-05-04 RX ADMIN — TRIAMCINOLONE ACETONIDE 1 APPLICATION: 0.25 CREAM TOPICAL at 11:20

## 2022-05-04 NOTE — ANESTHESIA POSTPROCEDURE EVALUATION
Post-Op Assessment Note    CV Status:  Stable  Pain Score: 0    Pain management: adequate     Mental Status:  Sleepy and arousable   Hydration Status:  Stable   PONV Controlled:  None   Airway Patency:  Patent      Post Op Vitals Reviewed: Yes      Staff: CRNA         No complications documented      /62 (05/04/22 1345)    Temp 98 3 °F (36 8 °C) (05/04/22 1345)    Pulse 65 (05/04/22 1345)   Resp 18 (05/04/22 1345)    SpO2 96 % (05/04/22 1345)

## 2022-05-04 NOTE — RESTORATIVE TECHNICIAN NOTE
Restorative Technician Note      Patient Name: Nicole Tena     Note Type: Mobility  Patient Position Upon Consult: Bedside chair  Activity Performed: Ambulated; Dangled; Stood  Assistive Device: Other (Comment)  Education Provided: Yes  Patient Position at End of Consult: All needs within reach;  Bedside chair    Melvin CRUMP, Restorative Technician, [employer]

## 2022-05-04 NOTE — PLAN OF CARE
Problem: PAIN - ADULT  Goal: Verbalizes/displays adequate comfort level or baseline comfort level  Description: Interventions:  - Encourage patient to monitor pain and request assistance  - Assess pain using appropriate pain scale  - Administer analgesics based on type and severity of pain and evaluate response  - Implement non-pharmacological measures as appropriate and evaluate response  - Consider cultural and social influences on pain and pain management  - Notify physician/advanced practitioner if interventions unsuccessful or patient reports new pain  Outcome: Progressing     Problem: INFECTION - ADULT  Goal: Absence or prevention of progression during hospitalization  Description: INTERVENTIONS:  - Assess and monitor for signs and symptoms of infection  - Monitor lab/diagnostic results  - Monitor all insertion sites, i e  indwelling lines, tubes, and drains  - Monitor endotracheal if appropriate and nasal secretions for changes in amount and color  - [place] appropriate cooling/warming therapies per order  - Administer medications as ordered  - Instruct and encourage patient and family to use good hand hygiene technique  - Identify and instruct in appropriate isolation precautions for identified infection/condition  Outcome: Progressing  Goal: Absence of fever/infection during neutropenic period  Description: INTERVENTIONS:  - Monitor WBC    Outcome: Progressing     Problem: SAFETY ADULT  Goal: Patient will remain free of falls  Description: INTERVENTIONS:  - Educate patient/family on patient safety including physical limitations  - Instruct patient to call for assistance with activity   - Consult OT/PT to assist with strengthening/mobility   - Keep Call bell within reach  - Keep bed low and locked with side rails adjusted as appropriate  - Keep care items and personal belongings within reach  - Initiate and maintain comfort rounds  - Make Fall Risk Sign visible to staff    - Apply yellow socks and bracelet for high fall risk patients  - Consider moving patient to room near nurses station  Outcome: Progressing  Goal: Maintain or return to baseline ADL function  Description: INTERVENTIONS:  -  Assess patient's ability to carry out ADLs; assess patient's baseline for ADL function and identify physical deficits which impact ability to perform ADLs (bathing, care of mouth/teeth, toileting, grooming, dressing, etc )  - Assess/evaluate cause of self-care deficits   - Assess range of motion  - Assess patient's mobility; develop plan if impaired  - Assess patient's need for assistive devices and provide as appropriate  - Encourage maximum independence but intervene and supervise when necessary  - Involve family in performance of ADLs  - Assess for home care needs following discharge   - Consider OT consult to assist with ADL evaluation and planning for discharge  - Provide patient education as appropriate  Outcome: Progressing  Goal: Maintains/Returns to pre admission functional level  Description: INTERVENTIONS:  - Perform BMAT or MOVE assessment daily    - Set and communicate daily mobility goal to care team and patient/family/caregiver     - Collaborate with rehabilitation services on mobility goals if consulted    - Out of bed for toileting  - Record patient progress and toleration of activity level   Outcome: Progressing     Problem: DISCHARGE PLANNING  Goal: Discharge to home or other facility with appropriate resources  Description: INTERVENTIONS:  - Identify barriers to discharge w/patient and caregiver  - Arrange for needed discharge resources and transportation as appropriate  - Identify discharge learning needs (meds, wound care, etc )  - Arrange for interpretive services to assist at discharge as needed  - Refer to Case Management Department for coordinating discharge planning if the patient needs post-hospital services based on physician/advanced practitioner order or complex needs related to functional status, cognitive ability, or social support system  Outcome: Progressing     Problem: Knowledge Deficit  Goal: Patient/family/caregiver demonstrates understanding of disease process, treatment plan, medications, and discharge instructions  Description: Complete learning assessment and assess knowledge base    Interventions:  - Provide teaching at level of understanding  - Provide teaching via preferred learning methods  Outcome: Progressing     Problem: Potential for Falls  Goal: Patient will remain free of falls  Description: INTERVENTIONS:  - Educate patient/family on patient safety including physical limitations  - Instruct patient to call for assistance with activity   - Consult OT/PT to assist with strengthening/mobility   - Keep Call bell within reach  - Keep bed low and locked with side rails adjusted as appropriate  - Keep care items and personal belongings within reach  - Initiate and maintain comfort rounds  - Make Fall Risk Sign visible to staff    - Apply yellow socks and bracelet for high fall risk patients  - Consider moving patient to room near nurses station  Outcome: Progressing     Problem: Prexisting or High Potential for Compromised Skin Integrity  Goal: Skin integrity is maintained or improved  Description: INTERVENTIONS:  - Identify patients at risk for skin breakdown  - Assess and monitor skin integrity  - Assess and monitor nutrition and hydration status  - Monitor labs   - Assess for incontinence   - Turn and reposition patient  - Assist with mobility/ambulation  - Relieve pressure over bony prominences  - Avoid friction and shearing  - Provide appropriate hygiene as needed including keeping skin clean and dry  - Evaluate need for skin moisturizer/barrier cream  - Collaborate with interdisciplinary team   - Patient/family teaching  - Consider wound care consult   Outcome: Progressing     Problem: MOBILITY - ADULT  Goal: Maintain or return to baseline ADL function  Description: INTERVENTIONS:  -  Assess patient's ability to carry out ADLs; assess patient's baseline for ADL function and identify physical deficits which impact ability to perform ADLs (bathing, care of mouth/teeth, toileting, grooming, dressing, etc )  - Assess/evaluate cause of self-care deficits   - Assess range of motion  - Assess patient's mobility; develop plan if impaired  - Assess patient's need for assistive devices and provide as appropriate  - Encourage maximum independence but intervene and supervise when necessary  - Involve family in performance of ADLs  - Assess for home care needs following discharge   - Consider OT consult to assist with ADL evaluation and planning for discharge  - Provide patient education as appropriate  Outcome: Progressing  Goal: Maintains/Returns to pre admission functional level  Description: INTERVENTIONS:  - Perform BMAT or MOVE assessment daily    - Set and communicate daily mobility goal to care team and patient/family/caregiver  - Collaborate with rehabilitation services on mobility goals if consulted    - Out of bed for toileting  - Record patient progress and toleration of activity level   Outcome: Progressing     Problem: Nutrition/Hydration-ADULT  Goal: Nutrient/Hydration intake appropriate for improving, restoring or maintaining nutritional needs  Description: Monitor and assess patient's nutrition/hydration status for malnutrition  Collaborate with interdisciplinary team and initiate plan and interventions as ordered  Monitor patient's weight and dietary intake as ordered or per policy  Utilize nutrition screening tool and intervene as necessary  Determine patient's food preferences and provide high-protein, high-caloric foods as appropriate       INTERVENTIONS:  - Monitor oral intake, urinary output, labs, and treatment plans  - Assess nutrition and hydration status and recommend course of action  - Evaluate amount of meals eaten  - Assist patient with eating if necessary - Allow adequate time for meals  - Recommend/ encourage appropriate diets, oral nutritional supplements, and vitamin/mineral supplements  - Order, calculate, and assess calorie counts as needed  - Recommend, monitor, and adjust tube feedings and TPN/PPN based on assessed needs  - Assess need for intravenous fluids  - Provide specific nutrition/hydration education as appropriate  - Include patient/family/caregiver in decisions related to nutrition  Outcome: Progressing

## 2022-05-04 NOTE — ANESTHESIA PREPROCEDURE EVALUATION
Procedure:  EGD    Relevant Problems   ANESTHESIA (within normal limits)      CARDIO   (+) Abdominal aortic aneurysm, without rupture ([facility])   (+) CAD (coronary artery disease)   (+) Coronary artery disease of native artery of native heart with stable angina pectoris ([facility])   (+) HLD (hyperlipidemia)   (+) HTN (hypertension)   (+) NSTEMI (non-ST elevated myocardial infarction) ([facility])   (+) Paroxysmal atrial fibrillation ([facility])   (+) STEMI (ST elevation myocardial infarction) ([facility])      ENDO   (-) Diabetes mellitus, type 2 ([facility])   (-) Hyperthyroidism   (-) Hypothyroidism      GI/HEPATIC   (+) Dysphagia      /RENAL (within normal limits)      HEMATOLOGY (within normal limits)      MUSCULOSKELETAL (within normal limits)      NEURO/PSYCH   (+) Seizure disorder ([facility])      PULMONARY   (-) Asthma   (-) Sleep apnea      Other   (+) Chronic combined systolic and diastolic CHF (congestive heart failure) ([facility])   (+) S/P AAA (abdominal aortic aneurysm) repair      TTE 7/29/2021  SUMMARY     LEFT VENTRICLE:  Systolic function was moderately reduced  Ejection fraction was estimated to be 36 %  There was akinesis of the entire inferior, basal-mid inferolateral, apical septal, apical lateral, and apical wall(s)  There was no evidence of concentric hypertrophy  Doppler parameters were consistent with abnormal left ventricular relaxation (grade 1 diastolic dysfunction)  There was a large, sessile, echogenic mass, measuring 16 mm x 24 mm      LEFT ATRIUM:  The atrium was mildly dilated      AORTIC VALVE:  There was trace regurgitation      TRICUSPID VALVE:  There was trace regurgitation      PULMONIC VALVE:  There was trace regurgitation  Cardiac Cath 7/28/2021  IMPRESSIONS:  PCI mid RCA with AGUSTIN  Patent LAD stents   Patient noncompliant with dapt and stopped ticagrelor after lad stent in April of this year      RECOMMENDATIONS  stressed importance of compliance to avoid mi/stent thrombosis/ death with dapt and stopping antiplatelets, recommend dapt x 1 year minimum, gdmt cad  Physical Exam    Airway    Mallampati score: III  TM Distance: >3 FB  Neck ROM: full     Dental   Comment: All teeth removed,     Cardiovascular  Rhythm: regular, Rate: normal, No murmur,     Pulmonary  Breath sounds clear to auscultation,     Other Findings        Anesthesia Plan  ASA Score- 3     Anesthesia Type- IV sedation with anesthesia with ASA Monitors  Additional Monitors:   Airway Plan:           Plan Factors-Exercise tolerance (METS): >4 METS  Chart reviewed  Patient summary reviewed  Patient is not a current smoker  Induction- intravenous  Postoperative Plan-     Informed Consent- Anesthetic plan and risks discussed with patient  I personally reviewed this patient with the CRNA  Discussed and agreed on the Anesthesia Plan with the CRNA  Kartik Quigley

## 2022-05-04 NOTE — SPEECH THERAPY NOTE
Speech Language/Pathology  Speech/Language Pathology  Screen    Patient Name: Shantel Gonzales  Today's Date: 5/4/2022     Problem List  Principal Problem:    Dysphagia  Active Problems:    HTN (hypertension)    HLD (hyperlipidemia)    Seizure disorder (HCC)    Chronic combined systolic and diastolic CHF (congestive heart failure) ([facility])    [facility]    Coronary artery disease of native artery of native heart with stable angina pectoris [facility])    Past Medical History  Past Medical History:   Diagnosis Date    Cardiac disease     CHF (congestive heart failure) ([facility] )     Hernia of abdominal cavity     Myocardial infarction ([facility] )     last assessed 7/31/14, past, Pt had MI s/p AGUSTIN placed in 2001, refuses to take any other medications for his cardiac disease, only will take seizure med  Understands possible complications from this decision    Seizure [facility])      Past Surgical History  Past Surgical History:   Procedure Laterality Date    ABDOMINAL AORTIC ANEURYSM REPAIR      for dialation or occulsion    CATARACT EXTRACTION          Bedside Swallow Evaluation:    Summary:  Pt NPO for EGD today will follow when pt is cleared following procedure  H&P/Admit info/ pertinent provider notes: (PMH noted above)  Pt is a 24-year-old male with past medical history of seizures and coronary artery disease status post multiple stents in the past who presented to [facility] ED 5/2 for evaluation of difficulty swallowing  Patient states symptoms started 1 week ago  He states he has difficulty swallowing solids  He feels like food gets stuck in his lower chest   He states eventually, the food passes  Denies any difficulty swallowing liquids  Denies any regurgitation or vomiting  Denies fevers, chills, fatigue, weight loss, abdominal pain, diarrhea, or urinary symptoms  Patient states he does have mild swelling at his left lower extremity since having a fall a few weeks ago    He states he has still been able to walk on the leg  Denies significant pain in the leg  Patient denies alcohol use  Patient states he has a 50 pack year smoking history  Pt seen by GI 5/3 for dysphagia and abnormal CT scan, constant solid food dysphagia for 1 week  CT of the chest and abdomen shows luminal narrowing of the distal esophagus which may be due to esophageal underdistention versus mechanical obstruction  Differential is broad includes esophagitis, esophageal stricture, malignancy, Schatzki ring, motility disorder among other causes  Pt will go for EGD today (5/4)  Special Studies:  CT Chest/Abdomen/Pelvis 5/2: Luminal narrowing of the distal esophagus may be due to esophageal underdistention, though given patient's symptoms, a functional obstruction cannot be excluded based on this imaging, and an esophagram can be considered for further assessment    Otherwise   no acute thoracic or abdominopelvic pathology with chronic findings as delineated above

## 2022-05-04 NOTE — PROGRESS NOTES
INTERNAL MEDICINE RESIDENCY PROGRESS NOTE     Name: Cathy Burrows   Age & Sex: 67 y o  male   MRN: [id number]  [facility]   Encounter: [id number]  Team: SOD Team B     PATIENT INFORMATION     Name: Cathy Burrows   Age & Sex: 67 y o  male   MRN: [id number]  Hospital Stay Days: 1    ASSESSMENT/PLAN     Principal Problem:    Dysphagia  Active Problems:    HTN (hypertension)    HLD (hyperlipidemia)    Seizure disorder (HCC)    Chronic combined systolic and diastolic CHF (congestive heart failure) ([facility])    [facility]ox[facility]mal [facility] fibrillation [facility] )    Coronary artery disease of native artery of native heart with stable angina pectoris [facility]      * Dysphagia  Assessment & Plan  Patient presents with dysphagia to both solids and liquids, new onset over 1 week  Patient denies any vomiting but does endorse bolus sensation  Denies any objective or subjective weight loss    CT scan demonstrates diffuse luminal narrowing, particular distal esophagus  5/4 GI ok'd patient for EGD with biopsies and likely esophagram following 5/3 GI consult   Following 5/3 Coumadin reversal after 1 unit FFP per Cardiology, 1 unit prepared for potential post-procedure potential bleeding  INR trended down to 1 34 today   In setting of extensive tobacco history, concerning for mass    Speech therapy evaluation  5/4 EGD with GI after INR trended down to 1 34 after FFP      Coronary artery disease of native artery of native heart with stable angina pectoris [facility])  Assessment & Plan  Continue statin  S/p CABG  Nitro p r n    If no relief with nitro, will get EKG, troponins and do workup for ACS    Paroxysmal atrial fibrillation [facility])  Assessment & Plan  Patient previously on metoprolol succinate however non compliant  Follows cardiology outpatient  CHADS vasc 5, anticoagulated on warfarin (most recent regiment outpatient was 5mg M & W ; 3 75 other days)    INR supratheraputice on admission, coumadin held and INR reversed by 1 FFP and Vit K 10 mg for planned EGD 05/04  ; plan to restart on 05/05     Chronic combined systolic and diastolic CHF (congestive heart failure) [facility])  Assessment & Plan  Wt Readings from Last 3 Encounters:   05/02/22 100 kg (220 lb 12 8 oz)   03/15/22 100 kg (221 lb)   02/10/22 100 kg (221 lb 3 2 oz)     Echo 3/22: ejection fraction is 50%  Systolic function is low normal  Diastolic function is mildly abnormal, consistent with grade I (abnormal) relaxation  There is akinesis of the apical inferior, apical lateral, apical septal and apical walls  Left Atrium: The atrium is mildly dilated    Tricuspid Valve: There is mild regurgitation    Pulmonic Valve: There is mild regurgitation      Apical wall motion abnormality  No obvious LV thrombus identified although apex poorly visualized    No mechanical valves    Strict Daily I's and O's and weights  Net negative balance goal daily  Fluid restriction 2 L per day  Cardiac diet in place with low-salt  Continue aspirin, statin, metoprolol  Potassium greater than 4, magnesium greater than 2      Seizure disorder (HCC)  Assessment & Plan  Currently on phenobarbital 64 8 mg b i d , continue home regimen  Will get phenobarbital level on follow-up  Consider Neurology consult if phenobarb level low altered mental status/concerns for seizure while inpatient    HLD (hyperlipidemia)  Assessment & Plan  Stable  Continue atorvastatin 80 mg daily  Encourage lifestyle modifications outpatient    HTN (hypertension)  Assessment & Plan  Patient does have significant tobacco history 50 pack years, quit 2015  Cardiac diet in place  P r n  s for hypertension >170  Continue metoprolol          Disposition: EGD today with GI ; potential plan for DC tomorrow if stable / cleared by GI      SUBJECTIVE     Patient seen and examined  No acute events overnight  NPO for EGD ; denies any new symptoms or concern, agreeable and expecting EGD later today with GI as planned       OBJECTIVE     Vitals:    05/04/22 9522 22 1242 22 1345 22 1519   BP: 107/63 124/83 109/62 116/65   Pulse: 68 70 65    Resp: 17 18 18 16   Temp: 98 2 °F (36 8 °C) (!) 97 3 °F (36 3 °C) 98 3 °F (36 8 °C) 97 6 °F (36 4 °C)   TempSrc:  Tympanic     SpO2: 92% 98% 96%    Weight:          Temperature:   Temp (24hrs), Av 9 °F (36 6 °C), Min:97 3 °F (36 3 °C), Max:98 5 °F (36 9 °C)    Temperature: 97 6 °F (36 4 °C)  Intake & Output:  I/O        07 07 07 07 07 0700    P  O   480     I V  (mL/kg)   510 8 (5 1)    Blood  280     Total Intake(mL/kg)  760 (7 6) 510 8 (5 1)    Urine (mL/kg/hr)  475 (0 2)     Total Output  475     Net  +285 +510 8           Unmeasured Urine Occurrence  1 x         Weights:        Body mass index is 29 95 kg/m²  Weight (last 2 days)     Date/Time Weight    22 1521 100 (220 8)        Physical Exam   - GEN: Appears well, alert and oriented x 3, pleasant and cooperative, in no acute distress  - HEENT: Anicteric, mucous membranes moist, PERRL and EOMI   - NECK: No lymphadenopathy, JVD or carotid bruits   - HEART: RRR, normal S1 and S2, no murmurs, clicks, gallops or rubs   - LUNGS: Clear to auscultation bilaterally; no wheezes, rales, or rhonchi  - ABDOMEN:  Large supra and infra umbilical hernia, see image below non reducible   Normal bowel sounds, soft, no tenderness, no distention, no organomegaly or masses felt on exam    - EXTREMITIES: Peripheral pulses normal; no clubbing, cyanosis, or edema  - NEURO: No focal findings, CN II-XII are grossly intact  - Musculoskeletal: 5/5 strength, normal ROM, no swollen or erythematous joints  - SKIN: Normal without suspicious lesions on exposed skin  LABORATORY DATA     Labs: I have personally reviewed pertinent reports    Results from last 7 days   Lab Units 22  0000 22  0601 22  1700   WBC Thousand/uL 3 42* 3 26* 3 49*   HEMOGLOBIN g/dL 10 6* 10 6* 10 7*   HEMATOCRIT % 33 2* 33 7* 34 0*   PLATELETS Thousands/uL 110* 113* 121*   NEUTROS PCT % 49 43 51   MONOS PCT % 11 11 12      Results from last 7 days   Lab Units 05/03/22  0601 05/02/22  1700   POTASSIUM mmol/L 3 5 4 1   CHLORIDE mmol/L 110* 109*   CO2 mmol/L 26 26   BUN mg/dL 13 15   CREATININE mg/dL 0 84 1 20   CALCIUM mg/dL 8 4 8 4   ALK PHOS U/L  --  165*   ALT U/L  --  16   AST U/L  --  15              Results from last 7 days   Lab Units 05/04/22  0620 05/04/22  0203 05/03/22  0601   INR  1 34* 1 37* 2 82*               IMAGING & DIAGNOSTIC TESTING     Radiology Results: I have personally reviewed pertinent reports  EGD    Result Date: 5/4/2022  Impression: Grade D esophagitis with mucosal breaks measuring 5 mm or more, continuous between folds, covering 75% or more of the circumference, showing edematous, erythematous and ulcerated mucosa in the lower third of the esophagus and GE junction; performed cold forceps biopsy Mild, localized nodular mucosa in the antrum; performed cold forceps biopsy to rule out H  pylori Mild, localized erythematous mucosa in the duodenal bulb  Suggestive of peptic duodenitis  RECOMMENDATION: Await pathology results Schedule repeat EGD in 3 months to assess for healing and underlying cheatham's Start twice daily PPI for 4 weeks, then daily thereafter  ATTENDING ATTESTATION:  I was present throughout the entire procedure from insertion to complete withdrawal of the scope  I performed all interventions myself or oversaw the fellow  Cayden Terrell MD     CT chest abdomen pelvis wo contrast    Result Date: 5/2/2022  Impression: Luminal narrowing of the distal esophagus may be due to esophageal underdistention, though given patient's symptoms, a functional obstruction cannot be excluded based on this imaging, and an esophagram can be considered for further assessment  Otherwise  no acute thoracic or abdominopelvic pathology with chronic findings as delineated above   Workstation performed: [id number]     XR tibia fibula 2 views LEFT    Result Date: 5/3/2022  Impression: No acute osseous abnormality  Workstation performed: [id number]     Other Diagnostic Testing: I have personally reviewed pertinent reports  ACTIVE MEDICATIONS     Current Facility-Administered Medications   Medication Dose Route Frequency    acetaminophen (TYLENOL) tablet 650 mg  650 mg Oral Q6H PRN    aspirin chewable tablet 81 mg  81 mg Oral Daily    atorvastatin (LIPITOR) tablet 80 mg  80 mg Oral Daily With Dinner    ondansetron (ZOFRAN) injection 4 mg  4 mg Intravenous Q6H PRN    pantoprazole (PROTONIX) EC tablet 40 mg  40 mg Oral BID AC    PHENobarbital tablet 64 8 mg  64 8 mg Oral BID    potassium chloride oral solution 40 mEq  40 mEq Oral Once    sucralfate (CARAFATE) tablet 1 g  1 g Oral TID AC    triamcinolone (KENALOG) 8 502 % cream 1 application  1 application Topical BID    [START ON 5/5/2022] warfarin (COUMADIN) tablet 5 mg  5 mg Oral Daily (warfarin)       VTE Pharmacologic Prophylaxis: Reason for no pharmacologic prophylaxis coumading held for EGD planned today  VTE Mechanical Prophylaxis: sequential compression device    Portions of the record may have been created with voice recognition software  Occasional wrong word or "sound a like" substitutions may have occurred due to the inherent limitations of voice recognition software    Read the chart carefully and recognize, using context, where substitutions have occurred   ==  Yuliana Rios, [facility]  Internal Medicine Residency PGY-2

## 2022-05-05 VITALS
DIASTOLIC BLOOD PRESSURE: 63 MMHG | SYSTOLIC BLOOD PRESSURE: 113 MMHG | OXYGEN SATURATION: 96 % | RESPIRATION RATE: 16 BRPM | BODY MASS INDEX: 29.95 KG/M2 | TEMPERATURE: 99.3 F | WEIGHT: 220.8 LBS | HEART RATE: 64 BPM

## 2022-05-05 LAB
ANION GAP SERPL CALCULATED.3IONS-SCNC: 7 MMOL/L (ref 4–13)
BUN SERPL-MCNC: 13 MG/DL (ref 5–25)
CALCIUM SERPL-MCNC: 8.2 MG/DL (ref 8.3–10.1)
CHLORIDE SERPL-SCNC: 108 MMOL/L (ref 100–108)
CO2 SERPL-SCNC: 24 MMOL/L (ref 21–32)
CREAT SERPL-MCNC: 0.83 MG/DL (ref 0.6–1.3)
GFR SERPL CREATININE-BSD FRML MDRD: 84 ML/MIN/1.73SQ M
GLUCOSE SERPL-MCNC: 92 MG/DL (ref 65–140)
HCT VFR BLD AUTO: 31.4 % (ref 36.5–49.3)
HGB BLD-MCNC: 10 G/DL (ref 12–17)
POTASSIUM SERPL-SCNC: 4 MMOL/L (ref 3.5–5.3)
SODIUM SERPL-SCNC: 139 MMOL/L (ref 136–145)

## 2022-05-05 PROCEDURE — 92610 EVALUATE SWALLOWING FUNCTION: CPT

## 2022-05-05 PROCEDURE — 80048 BASIC METABOLIC PNL TOTAL CA: CPT | Performed by: STUDENT IN AN ORGANIZED HEALTH CARE EDUCATION/TRAINING PROGRAM

## 2022-05-05 PROCEDURE — 99239 HOSP IP/OBS DSCHRG MGMT >30: CPT | Performed by: INTERNAL MEDICINE

## 2022-05-05 PROCEDURE — 85014 HEMATOCRIT: CPT | Performed by: STUDENT IN AN ORGANIZED HEALTH CARE EDUCATION/TRAINING PROGRAM

## 2022-05-05 PROCEDURE — 85018 HEMOGLOBIN: CPT | Performed by: STUDENT IN AN ORGANIZED HEALTH CARE EDUCATION/TRAINING PROGRAM

## 2022-05-05 RX ORDER — PANTOPRAZOLE SODIUM 40 MG/1
40 TABLET, DELAYED RELEASE ORAL
Qty: 60 TABLET | Refills: 0 | Status: SHIPPED | OUTPATIENT
Start: 2022-05-05 | End: 2022-05-31

## 2022-05-05 RX ORDER — PANTOPRAZOLE SODIUM 40 MG/1
40 TABLET, DELAYED RELEASE ORAL DAILY
Qty: 30 TABLET | Refills: 0 | Status: SHIPPED | OUTPATIENT
Start: 2022-06-05 | End: 2022-07-01

## 2022-05-05 RX ORDER — SUCRALFATE 1 G/1
1 TABLET ORAL
Qty: 90 TABLET | Refills: 0 | Status: SHIPPED | OUTPATIENT
Start: 2022-05-05 | End: 2022-07-15

## 2022-05-05 RX ADMIN — SUCRALFATE 1 G: 1 TABLET ORAL at 12:46

## 2022-05-05 RX ADMIN — SUCRALFATE 1 G: 1 TABLET ORAL at 06:35

## 2022-05-05 RX ADMIN — ASPIRIN 81 MG CHEWABLE TABLET 81 MG: 81 TABLET CHEWABLE at 08:18

## 2022-05-05 RX ADMIN — WARFARIN SODIUM 5 MG: 5 TABLET ORAL at 08:18

## 2022-05-05 RX ADMIN — PHENOBARBITAL 64.8 MG: 64.8 TABLET ORAL at 08:18

## 2022-05-05 RX ADMIN — PANTOPRAZOLE SODIUM 40 MG: 40 TABLET, DELAYED RELEASE ORAL at 06:35

## 2022-05-05 NOTE — PLAN OF CARE
Problem: PAIN - ADULT  Goal: Verbalizes/displays adequate comfort level or baseline comfort level  Description: Interventions:  - Encourage patient to monitor pain and request assistance  - Assess pain using appropriate pain scale  - Administer analgesics based on type and severity of pain and evaluate response  - Implement non-pharmacological measures as appropriate and evaluate response  - Consider cultural and social influences on pain and pain management  - Notify physician/advanced practitioner if interventions unsuccessful or patient reports new pain  Outcome: Progressing     Problem: INFECTION - ADULT  Goal: Absence or prevention of progression during hospitalization  Description: INTERVENTIONS:  - Assess and monitor for signs and symptoms of infection  - Monitor lab/diagnostic results  - Monitor all insertion sites, i e  indwelling lines, tubes, and drains  - Monitor endotracheal if appropriate and nasal secretions for changes in amount and color  - [place]ide appropriate cooling/warming therapies per order  - Administer medications as ordered  - Instruct and encourage patient and family to use good hand hygiene technique  - Identify and instruct in appropriate isolation precautions for identified infection/condition  Outcome: Progressing  Goal: Absence of fever/infection during neutropenic period  Description: INTERVENTIONS:  - Monitor WBC    Outcome: Progressing     Problem: SAFETY ADULT  Goal: Patient will remain free of falls  Description: INTERVENTIONS:  - Educate patient/family on patient safety including physical limitations  - Instruct patient to call for assistance with activity   - Consult OT/PT to assist with strengthening/mobility   - Keep Call bell within reach  - Keep bed low and locked with side rails adjusted as appropriate  - Keep care items and personal belongings within reach  - Initiate and maintain comfort rounds  - Make Fall Risk Sign visible to staff  - Offer Toileting every 2 Hours, in advance of need  - Initiate/Maintain bed alarm  - Obtain necessary fall risk management equipment: walker  - Apply yellow socks and bracelet for high fall risk patients  - Consider moving patient to room near nurses station  Outcome: Progressing  Goal: Maintain or return to baseline ADL function  Description: INTERVENTIONS:  -  Assess patient's ability to carry out ADLs; assess patient's baseline for ADL function and identify physical deficits which impact ability to perform ADLs (bathing, care of mouth/teeth, toileting, grooming, dressing, etc )  - Assess/evaluate cause of self-care deficits   - Assess range of motion  - Assess patient's mobility; develop plan if impaired  - Assess patient's need for assistive devices and provide as appropriate  - Encourage maximum independence but intervene and supervise when necessary  - Involve family in performance of ADLs  - Assess for home care needs following discharge   - Consider OT consult to assist with ADL evaluation and planning for discharge  - Provide patient education as appropriate  Outcome: Progressing  Goal: Maintains/Returns to pre admission functional level  Description: INTERVENTIONS:  - Perform BMAT or MOVE assessment daily    - Set and communicate daily mobility goal to care team and patient/family/caregiver  - Collaborate with rehabilitation services on mobility goals if consulted  - Perform Range of Motion 3 times a day  - Reposition patient every 2 hours    - Dangle patient 3 times a day  - Stand patient 3 times a day  - Ambulate patient 3 times a day  - Out of bed to chair 3 times a day   - Out of bed for meals 3 times a day  - Out of bed for toileting  - Record patient progress and toleration of activity level   Outcome: Progressing     Problem: DISCHARGE PLANNING  Goal: Discharge to home or other facility with appropriate resources  Description: INTERVENTIONS:  - Identify barriers to discharge w/patient and caregiver  - Arrange for needed discharge resources and transportation as appropriate  - Identify discharge learning needs (meds, wound care, etc )  - Arrange for interpretive services to assist at discharge as needed  - Refer to Case Management Department for coordinating discharge planning if the patient needs post-hospital services based on physician/advanced practitioner order or complex needs related to functional status, cognitive ability, or social support system  Outcome: Progressing     Problem: Knowledge Deficit  Goal: Patient/family/caregiver demonstrates understanding of disease process, treatment plan, medications, and discharge instructions  Description: Complete learning assessment and assess knowledge base    Interventions:  - Provide teaching at level of understanding  - Provide teaching via preferred learning methods  Outcome: Progressing     Problem: Potential for Falls  Goal: Patient will remain free of falls  Description: INTERVENTIONS:  - Educate patient/family on patient safety including physical limitations  - Instruct patient to call for assistance with activity   - Consult OT/PT to assist with strengthening/mobility   - Keep Call bell within reach  - Keep bed low and locked with side rails adjusted as appropriate  - Keep care items and personal belongings within reach  - Initiate and maintain comfort rounds  - Make Fall Risk Sign visible to staff  - Offer Toileting every 2 Hours, in advance of need  - Initiate/Maintain bed alarm  - Obtain necessary fall risk management equipment: walker  - Apply yellow socks and bracelet for high fall risk patients  - Consider moving patient to room near nurses station  Outcome: Progressing     Problem: Prexisting or High Potential for Compromised Skin Integrity  Goal: Skin integrity is maintained or improved  Description: INTERVENTIONS:  - Identify patients at risk for skin breakdown  - Assess and monitor skin integrity  - Assess and monitor nutrition and hydration status  - Monitor labs - Assess for incontinence   - Turn and reposition patient  - Assist with mobility/ambulation  - Relieve pressure over bony prominences  - Avoid friction and shearing  - Provide appropriate hygiene as needed including keeping skin clean and dry  - Evaluate need for skin moisturizer/barrier cream  - Collaborate with interdisciplinary team   - Patient/family teaching  - Consider wound care consult   Outcome: Progressing     Problem: MOBILITY - ADULT  Goal: Maintain or return to baseline ADL function  Description: INTERVENTIONS:  -  Assess patient's ability to carry out ADLs; assess patient's baseline for ADL function and identify physical deficits which impact ability to perform ADLs (bathing, care of mouth/teeth, toileting, grooming, dressing, etc )  - Assess/evaluate cause of self-care deficits   - Assess range of motion  - Assess patient's mobility; develop plan if impaired  - Assess patient's need for assistive devices and provide as appropriate  - Encourage maximum independence but intervene and supervise when necessary  - Involve family in performance of ADLs  - Assess for home care needs following discharge   - Consider OT consult to assist with ADL evaluation and planning for discharge  - Provide patient education as appropriate  Outcome: Progressing  Goal: Maintains/Returns to pre admission functional level  Description: INTERVENTIONS:  - Perform BMAT or MOVE assessment daily    - Set and communicate daily mobility goal to care team and patient/family/caregiver  - Collaborate with rehabilitation services on mobility goals if consulted  - Perform Range of Motion 3 times a day  - Reposition patient every 2 hours    - Dangle patient 3 times a day  - Stand patient 3 times a day  - Ambulate patient 3 times a day  - Out of bed to chair 3 times a day   - Out of bed for meals 3 times a day  - Out of bed for toileting  - Record patient progress and toleration of activity level   Outcome: Progressing     Problem: Nutrition/Hydration-ADULT  Goal: Nutrient/Hydration intake appropriate for improving, restoring or maintaining nutritional needs  Description: Monitor and assess patient's nutrition/hydration status for malnutrition  Collaborate with interdisciplinary team and initiate plan and interventions as ordered  Monitor patient's weight and dietary intake as ordered or per policy  Utilize nutrition screening tool and intervene as necessary  Determine patient's food preferences and provide high-protein, high-caloric foods as appropriate       INTERVENTIONS:  - Monitor oral intake, urinary output, labs, and treatment plans  - Assess nutrition and hydration status and recommend course of action  - Evaluate amount of meals eaten  - Assist patient with eating if necessary   - Allow adequate time for meals  - Recommend/ encourage appropriate diets, oral nutritional supplements, and vitamin/mineral supplements  - Order, calculate, and assess calorie counts as needed  - Recommend, monitor, and adjust tube feedings and TPN/PPN based on assessed needs  - Assess need for intravenous fluids  - Provide specific nutrition/hydration education as appropriate  - Include patient/family/caregiver in decisions related to nutrition  Outcome: Progressing

## 2022-05-05 NOTE — SPEECH THERAPY NOTE
Speech-Language Pathology Bedside Swallow Evaluation      Patient Name: Shantel Gonzales    Today's Date: 5/5/2022     Problem List  Principal Problem:    Dysphagia  Active Problems:    HTN (hypertension)    HLD (hyperlipidemia)    Seizure disorder (HCC)    Chronic combined systolic and diastolic CHF (congestive heart failure) ([facility])    Paroxysmal atrial fibrillation ([facility] [address] )    Coronary artery disease of native artery of native heart with stable angina pectoris ([facility] [address] )    Acute gastric ulcer      Past Medical History  Past Medical History:   Diagnosis Date    Cardiac disease     [facility]est[facility]     Hernia of abdominal cavity     Myocardial infarction ([facility] )     last assessed 7/31/14, past, Pt had MI s/p AGUSTIN placed in 2001, refuses to take any other medications for his cardiac disease, only will take seizure med  Understands possible complications from this decision    Seizure [facility])        Past Surgical History  Past Surgical History:   Procedure Laterality Date    ABDOMINAL AORTIC ANEURYSM REPAIR      for dialation or occulsion    CATARACT EXTRACTION         Summary   Pt presented with functional appearing oral and pharyngeal stage swallowing skills with materials administered today  Patient is on a surgical soft diet post EGD yesterday  He was admitted with c/o food sticking when swallowing  The patient denies s/s aspiration  He is tolerating current diet well  VBS was ordered but does not seem warranted at this time       Risk/s for Aspiration: low      Recommended Diet: surgical soft and thin-advance per GI team    Recommended Form of Meds: whole with liquid   Aspiration precautions and swallowing strategies: alternating bites and sips  Other Recommendations: Continue frequent oral care    Current Medical Status   Difficulty Swallowing       trouble swallowing food for about 1 week, it feels like it stays there and then after awhile itll go down i have no pain but its getting annoying now Current Precautions:  Fall  Aspiration    Allergies:  No known food allergies  Past medical history:  Please see H&P for details    Special Studies:  EGD 5/3/2022:   IMPRESSION:  · Grade D esophagitis with mucosal breaks measuring 5 mm or more, continuous between folds, covering 75% or more of the circumference, showing edematous, erythematous and ulcerated mucosa in the lower third of the esophagus and GE junction; performed cold forceps biopsy  · Mild, localized nodular mucosa in the antrum; performed cold forceps biopsy to rule out H  pylori  · Mild, localized erythematous mucosa in the duodenal bulb  Suggestive of peptic duodenitis  Social/Education/Vocational Hx:  Pt lives with family    Swallow Information   Current Risks for Dysphagia & Aspiration: none   Current Symptoms/Concerns: none   Current Diet: surgical soft and thin    Baseline Diet: regular diet and thin liquids    Baseline Assessment   Behavior/Cognition: alert  Speech/Language Status: able to participate in conversation and able to follow commands  Patient Positioning: upright in chair  Pain Status/Interventions/Response to Interventions: No report of or nonverbal indications of pain  Swallow Mechanism Exam  Facial: symmetrical  Labial: WFL  Lingual: WFL  Velum: unable to visualize  Mandible: adequate ROM  Dentition: edentulous  Vocal quality:clear/adequate     Consistencies Assessed and Performance   Consistencies Administered: thin liquids and soft solids  Materials administered included banana and thin liquids     Oral Stage: WFL  Mastication was adequate with the materials administered today  Bolus formation and transfer were functional with no significant oral residue noted  No overt s/s reduced oral control  Pharyngeal Stage: WFL  Swallow Mechanics:  Swallowing initiation appeared prompt  Laryngeal rise was observed and judged to be within functional limits    No coughing, throat clearing, change in vocal quality or respiratory status noted today       Esophageal Concerns: see EGD report above     Summary and Recommendations (see above)    Results Reviewed with: patient     Treatment Recommended: evaluation only

## 2022-05-05 NOTE — RESTORATIVE TECHNICIAN NOTE
Restorative Technician Note      Patient Name: Pato Garay     Note Type: Mobility  Patient Position Upon Consult: Bedside chair  Activity Performed: Ambulated; Dangled; Stood  Assistive Device: Other (Comment) (none)  Education Provided: Yes  Patient Position at End of Consult: Bedside chair;  All needs within reach; Bed/Chair alarm activated    Patience CRUMP, Restorative Technician, [employer]

## 2022-05-05 NOTE — DISCHARGE INSTRUCTIONS
· Take all medications as listed on discharge material, including Protonix twice daily for 30 days followed by once daily  Take sucralfate as prescribed  · Follow up with PCP and with GI as instructed  Diet for Stomach Ulcers and Gastritis   WHAT YOU NEED TO KNOW:   A diet for stomach ulcers and gastritis is a meal plan that limits foods that irritate your stomach  Certain foods may worsen symptoms such as stomach pain, bloating, heartburn, or indigestion  DISCHARGE INSTRUCTIONS:   Foods to limit or avoid:  You may need to avoid acidic, spicy, or high-fat foods  Not all foods affect everyone the same way  You will need to learn which foods worsen your symptoms and limit those foods  The following are some foods that may worsen ulcer or gastritis symptoms:  · Beverages:      ? Whole milk and chocolate milk    ? Hot cocoa and cola    ? Any beverage with caffeine    ? Regular and decaffeinated coffee    ? Peppermint and spearmint tea    ? Green and black tea, with or without caffeine    ? Orange and grapefruit juices    ? Drinks that contain alcohol    · Spices and seasonings:      ? Black and red pepper    ? Chili powder    ? Mustard seed and nutmeg    · Other foods:      ? Dairy foods made from whole milk or cream    ? Chocolate    ? Spicy or strongly flavored cheeses, such as jalapeno or black pepper    ? Highly seasoned, high-fat meats, such as sausage, salami, song, ham, and cold cuts    ? Hot chiles and peppers    ? Tomato products, such as tomato paste, tomato sauce, or tomato juice    Foods to include:  Eat a variety of healthy foods from all the food groups  Eat fruits, vegetables, whole grains, and fat-free or low-fat dairy foods  Whole grains include whole-wheat breads, cereals, pasta, and brown rice  Choose lean meats, poultry (chicken and turkey), fish, beans, eggs, and nuts  A healthy meal plan is low in unhealthy fats, salt, and added sugar  Healthy fats include olive oil and canola oil   Ask your dietitian for more information about a healthy diet  Other helpful guidelines:   · Do not eat right before bedtime  Stop eating at least 2 hours before bedtime  · Eat small, frequent meals  Your stomach may tolerate small, frequent meals better than large meals  © Copyright [employer] 2022 Information is for End User's use only and may not be sold, redistributed or otherwise used for commercial purposes  All illustrations and images included in CareNotes® are the copyrighted property of [employer]  or [facility] [place] Jenae Fritz   The above information is an  only  It is not intended as medical advice for individual conditions or treatments  Talk to your doctor, nurse or pharmacist before following any medical regimen to see if it is safe and effective for you  Upper Endoscopy   WHAT YOU NEED TO KNOW:   An upper endoscopy is also called an upper gastrointestinal (GI) endoscopy, or an esophagogastroduodenoscopy (EGD)  It is a procedure to examine the inside of your esophagus, stomach, and duodenum (first part of the small intestine) with a scope  You may feel bloated, gassy, or have some abdominal discomfort after your procedure  Your throat may be sore for 24 to 36 hours  You may burp or pass gas from air that is still inside your body  DISCHARGE INSTRUCTIONS:   Seek care immediately if:    You have sudden, severe abdominal pain   You have problems swallowing   You have a large amount of black, sticky bowel movements or blood in your bowel movements   You have sudden trouble breathing   You feel weak, lightheaded, or faint or your heart beats faster than normal for you  Contact your healthcare provider if:    You have a fever and chills   You have nausea or are vomiting   Your abdomen is bloated or feels full and hard   You have abdominal pain   You have black, sticky bowel movements or blood in your bowel movements     You have not had a bowel movement for 3 days after your procedure   You have rash or hives   You have questions or concerns about your procedure  Activity:    Do not lift, strain, or run for 24 hours after your procedure   Rest after your procedure  You have been given medicine to relax you  Do not drive or make important decisions until the day after your procedure  Return to your normal activity as directed   Relieve gas and discomfort from bloating by lying on your right side with a heating pad on your abdomen  You may need to take short walks to help the gas move out  Eat small meals until bloating is relieved  Follow up with your healthcare provider as directed: Write down your questions so you remember to ask them during your visits  If you take a blood thinner, please review the specific instructions from your endoscopist about when you should resume it  These can be found in the Recommendation and Your Medication list sections of this After Visit Summary

## 2022-05-05 NOTE — DISCHARGE SUMMARY
INTERNAL MEDICINE RESIDENCY DISCHARGE SUMMARY     Aren Grimes   67 y o  male  MRN: [id number]  Room/Bed: [facility] 731/[facility] 731-01     [facility]    Encounter: [id number]    Principal Problem:    Dysphagia  Active Problems:    HTN (hypertension)    HLD (hyperlipidemia)    Seizure disorder (HCC)    Chronic combined systolic and diastolic CHF (congestive heart failure) (HCC)    Paroxysmal atrial fibrillation ([facility] )    Coronary artery disease of native artery of native heart with stable angina pectoris ([facility] )    Acute gastric ulcer      * Dysphagia  Assessment & Plan  Patient presents with dysphagia to both solids and liquids, new onset over 1 week  Patient denies any vomiting but does endorse bolus sensation  Denies any objective or subjective weight loss    CT scan demonstrates diffuse luminal narrowing, particular distal esophagus  5/4 GI ok'd patient for EGD with biopsies and likely esophagram following 5/3 GI consult   Following 5/3 Coumadin reversal after 1 unit FFP per Cardiology, 1 unit prepared for potential post-procedure potential bleeding  INR trended down to 1 34 today   In setting of extensive tobacco history, concerning for mass    Speech therapy evaluation  5/4 EGD with GI after INR trended down to 1 34 after FFP      Coronary artery disease of native artery of native heart with stable angina pectoris [facility])  Assessment & Plan  Continue statin  S/p CABG  Nitro p r n    If no relief with nitro, will get EKG, troponins and do workup for ACS    Paroxysmal atrial fibrillation [facility])  Assessment & Plan  Patient previously on metoprolol succinate however non compliant  Follows cardiology outpatient  CHADS vasc 5, anticoagulated on warfarin (most recent regiment outpatient was 5mg M & W ; 3 75 other days)    INR supratheraputice on admission, coumadin held and INR reversed by 1 FFP and Vit K 10 mg for planned EGD 05/04  ; plan to restart on 05/05     Chronic combined systolic and diastolic CHF (congestive heart failure) (HCC)  Assessment & Plan  Wt Readings from Last 3 Encounters:   05/02/22 100 kg (220 lb 12 8 oz)   03/15/22 100 kg (221 lb)   02/10/22 100 kg (221 lb 3 2 oz)     Echo 3/22: ejection fraction is 50%  Systolic function is low normal  Diastolic function is mildly abnormal, consistent with grade I (abnormal) relaxation  There is akinesis of the apical inferior, apical lateral, apical septal and apical walls  Left Atrium: The atrium is mildly dilated    Tricuspid Valve: There is mild regurgitation    Pulmonic Valve: There is mild regurgitation      Apical wall motion abnormality    No obvious LV thrombus identified although apex poorly visualized    No mechanical valves    Strict Daily I's and O's and weights  Net negative balance goal daily  Fluid restriction 2 L per day  Cardiac diet in place with low-salt  Continue aspirin, statin, metoprolol  Potassium greater than 4, magnesium greater than 2      Seizure disorder (HCC)  Assessment & Plan  Currently on phenobarbital 64 8 mg b i d , continue home regimen  Will get phenobarbital level on follow-up  Consider Neurology consult if phenobarb level low altered mental status/concerns for seizure while inpatient    HLD (hyperlipidemia)  Assessment & Plan  Stable  Continue atorvastatin 80 mg daily  Encourage lifestyle modifications outpatient    HTN (hypertension)  Assessment & Plan  Patient does have significant tobacco history 50 pack years, quit 2015  Cardiac diet in place  P r n  s for hypertension >170  Continue metoprolol          [address] Sri Lopez 67years old male with past medical history remarkable for hypertension, hyperlipidemia, disease seizure disorder, chronic combined systolic and diastolic heart failure, paroxysmal AFib, in Coumadin, CABG, on aspirin statin, presented with globus sensation X 1 week, mainly with solid food, evaluated by GI, s/p is EGD remarkable for severe esophagitis and duodenitis, ulcers, see report for details, recommendation for PPI Protonix 40 b i d  X 1 months and sucralfate X 1 month followed by Protonix 40 mg daily, and to follow-up with GI and PCP with potential plan for repeat EGD in 3 months for re-evaluation, biopsy taken and sent for pathology, patient aware that important to follow up with results and recommendations afterward, need to rule out malignancy  No alcohol drinking history, history of smoking 50+ pack year however quit years ago  INR goal is 2-3, on admission 3 67, most recent regimen was Monday Wednesday 5 mg and other days 3 75 mg Coumadin, Coumadin held on admission, INR reversed with vitamin K 10 mg, and 1 unit of FFP, patient was agreeable to the risk and benefit, successfully reversed down to 1 34 INR, on 05/05, giving 5 mg Coumadin with instruction to continue most recent Coumadin regimen, and INR in 45 days with close follow-up with PCP for further management  Vitals:    05/04/22 1926 05/04/22 2038 05/04/22 2243 05/05/22 0733   BP: 110/68 113/63 113/63 113/63   BP Location:  Right arm     Pulse:  64     Resp: 16 15 16    Temp: 98 1 °F (36 7 °C) 97 7 °F (36 5 °C) 98 1 °F (36 7 °C) 99 3 °F (37 4 °C)   TempSrc:  Oral     SpO2:       Weight:         PHYSICAL EXAM  - GEN: Appears well, alert and oriented x 3, pleasant and cooperative, in no acute distress  - HEENT: Anicteric, mucous membranes moist, PERRL and EOMI   - NECK: No lymphadenopathy, JVD or carotid bruits   - HEART: RRR, normal S1 and S2, no murmurs, clicks, gallops or rubs   - LUNGS: Clear to auscultation bilaterally; no wheezes, rales, or rhonchi  - ABDOMEN: Normal bowel sounds, soft, no tenderness, no distention, no organomegaly or masses felt on exam    - EXTREMITIES: Peripheral pulses normal; no clubbing, cyanosis, or edema  - NEURO: No focal findings, CN II-XII are grossly intact  - Musculoskeletal: 5/5 strength, normal ROM, no swollen or erythematous joints     - SKIN: Normal without suspicious lesions on exposed skin    DISCHARGE INFORMATION     PCP at Discharge: Demetrius Lim [facility] 912      Admitting Provider: Paramjit Umaña MD  Admission Date: 5/2/2022    Discharge Provider: No att  providers found  Discharge Date: 05/05/2022    Discharge Disposition: Home/Self Care  Discharge Condition: stable  Discharge with Lines: no    Discharge Diet: Diet for Stomach Ulcers and Gastritis  ; printed and verbal instruction given  Activity Restrictions: none  Test Results Pending at Discharge: PATHOLOGY - EGD     Discharge Diagnoses:  Principal Problem:    Dysphagia  Active Problems:    HTN (hypertension)    HLD (hyperlipidemia)    Seizure disorder (HCC)    Chronic combined systolic and diastolic CHF (congestive heart failure) ([facility])    [facility] )    Coronary artery disease of native artery of native heart with stable angina pectoris ([facility] )    Acute gastric ulcer  Resolved Problems:    * No resolved hospital problems  *      Consulting Providers:      Diagnostic & Therapeutic Procedures Performed:  EGD    Result Date: 5/4/2022  Impression: Grade D esophagitis with mucosal breaks measuring 5 mm or more, continuous between folds, covering 75% or more of the circumference, showing edematous, erythematous and ulcerated mucosa in the lower third of the esophagus and GE junction; performed cold forceps biopsy Mild, localized nodular mucosa in the antrum; performed cold forceps biopsy to rule out H  pylori Mild, localized erythematous mucosa in the duodenal bulb  Suggestive of peptic duodenitis  RECOMMENDATION: Await pathology results Schedule repeat EGD in 3 months to assess for healing and underlying cheatham's Start twice daily PPI for 4 weeks, then daily thereafter  ATTENDING ATTESTATION:  I was present throughout the entire procedure from insertion to complete withdrawal of the scope  I performed all interventions myself or oversaw the fellow  Angeli Edwards MD     CT chest abdomen pelvis wo contrast    Result Date: 5/2/2022  Impression: Luminal narrowing of the distal esophagus may be due to esophageal underdistention, though given patient's symptoms, a functional obstruction cannot be excluded based on this imaging, and an esophagram can be considered for further assessment  Otherwise  no acute thoracic or abdominopelvic pathology with chronic findings as delineated above  Workstation performed: [id number]     XR tibia fibula 2 views LEFT    Result Date: 5/3/2022  Impression: No acute osseous abnormality  Workstation performed: [id number]       Code Status: Level 3 - DNAR and DNI  Advance Directive & Living Will: <no information>  Power of :    POLST:      Medications:  Discharge Medication List as of 5/5/2022  1:11 PM        Discharge Medication List as of 5/5/2022  1:11 PM      START taking these medications    Details   ! ! pantoprazole (PROTONIX) 40 mg tablet Take 1 tablet (40 mg total) by mouth 2 (two) times a day before meals, Starting Thu 5/5/2022, Until Sat 6/4/2022, Normal      !! pantoprazole (PROTONIX) 40 mg tablet Take 1 tablet (40 mg total) by mouth daily, Starting Sun 6/5/2022, Until Tue 7/5/2022, Normal      sucralfate (CARAFATE) 1 g tablet Take 1 tablet (1 g total) by mouth 3 (three) times a day before meals, Starting Thu 5/5/2022, Until Sat 6/4/2022, Normal       !! - Potential duplicate medications found  Please discuss with provider          Discharge Medication List as of 5/5/2022  1:11 PM      CONTINUE these medications which have NOT CHANGED    Details   acetaminophen (TYLENOL) 500 mg tablet Take 1 tablet (500 mg total) by mouth every 6 (six) hours as needed for mild pain, Starting Sat 3/26/2022, Normal      aspirin (Aspirin Low Dose) 81 mg EC tablet Take 1 tablet (81 mg total) by mouth daily, Starting Mon 1/24/2022, Normal      atorvastatin (LIPITOR) 80 mg tablet Take 1 tablet (80 mg total) by mouth daily with dinner, Starting Fri 3/11/2022, Normal      metoprolol succinate (TOPROL-XL) 50 mg 24 hr tablet TAKE 1 TABLET BY MOUTH EVERY DAY, Normal      nitroglycerin (NITROSTAT) 0 4 mg SL tablet Place 1 tablet (0 4 mg total) under the tongue every 5 (five) minutes as needed for chest pain, Starting Wed 4/7/2021, Normal      PHENobarbital 64 8 mg tablet Take 1 tablet (64 8 mg total) by mouth 2 (two) times a day, Starting Tue 11/16/2021, Until Sun 5/15/2022, Normal      triamcinolone (KENALOG) 0 025 % cream APPLY TO AFFECTED AREA TWICE A DAY, Normal      warfarin (COUMADIN) 2 5 mg tablet Take 2 tabs by mouth daily or as directed by physician, Normal             Allergies: Allergies   Allergen Reactions    Iodinated Diagnostic Agents Rash     Pt's skin get very red and he gets hot and chills    Clopidogrel Rash       FOLLOW-UP     PCP Outpatient Follow-up:  Within 2 weeks of discharge    Consulting Providers Follow-up:  Gastroenterology     Active Issues Requiring Follow-up:   Listed above    Discharge Statement:   I spent 1 hour minutes discharging the patient  This time was spent on the day of discharge  I had direct contact with the patient on the day of discharge  Additional documentation is required if more than 30 minutes were spent on discharge  Portions of the record may have been created with voice recognition software  Occasional wrong word or "sound a like" substitutions may have occurred due to the inherent limitations of voice recognition software    Read the chart carefully and recognize, using context, where substitutions have occurred     ==  Sunil Rao, [facility]  Internal Medicine Resident PGY-2

## 2022-05-05 NOTE — CASE MANAGEMENT
Case Management Assessment & Discharge Planning Note    Patient name El Izaguirre  Location [address] 731/[facility] 601-46 MRN [id number]  : 1945 Date 2022       Current Admission Date: 2022  Current Admission Diagnosis:Dysphagia   Patient Active Problem List    Diagnosis Date Noted    Acute gastric ulcer 2022    Dysphagia 2022    Abdominal aortic aneurysm, without rupture ([place] [address] ) 02/10/2022    Paroxysmal atrial fibrillation ([place] [address] ) 02/10/2022    Coronary artery disease of native artery of native heart with stable angina pectoris ([place] [address] ) 02/10/2022    Ischemic cardiomyopathy 2021    Chronic combined systolic and diastolic CHF (congestive heart failure) ([place] [address] ) 2021    Allergic reaction to contrast media 2021    NSTEMI (non-ST elevated myocardial infarction) ([place] [address] ) 2021    Abdominal pain 2020    Hernia, incisional 2019    Ventral hernia with obstruction and without gangrene 2019    CAD (coronary artery disease) 2017    HTN (hypertension) 2017    HLD (hyperlipidemia) 2017    Seizure disorder ([place] [address] ) 2017    S/P AAA (abdominal aortic aneurysm) repair 2017    STEMI (ST elevation myocardial infarction) ([place] [address] ) 2017      LOS (days): 2  Geometric Mean LOS (GMLOS) (days): 2 60  Days to GMLOS:0 7     OBJECTIVE:    Risk of Unplanned Readmission Score: 17     Current admission status: Inpatient  Preferred Pharmacy:   [id number]in  [address] [place] [address]  Phone: [phone] Fax: Humaira Guillermo [address] [place] [address] [address] [facility]  Phone: [phone] Fax: [phone]    Primary Care Provider: Ganesh Victor DO    Primary Insurance: MEDICARE  Secondary Insurance: [address]    ASSESSMENT:  Caren Ramos Proxies    There are no active Health Care Proxies on file  Readmission Root Cause  30 Day Readmission: No    Patient Information  Admitted from[de-identified] Home  Mental Status: Alert  During Assessment patient was accompanied by: Not accompanied during assessment  Assessment information provided by[de-identified] Patient  Primary Caregiver: Self  Support Systems: Family members  [place] of Residence: [address] [place] [address] do you live [facility]?: [facility]   Select all that apply : No steps to enter home  Type of Current Residence: Apartment  Floor Level: 1  Upon entering residence, is there a bedroom on the main floor (no further steps)?: Yes  Upon entering residence, is there a bathroom on the main floor (no further steps)?: Yes  In the last 12 months, was there a time when you were not able to pay the mortgage or rent on time?: No  In the last 12 months, how many places have you lived?: 1  In the last 12 months, was there a time when you did not have a steady place to sleep or slept in a shelter (including now)?: No  Homeless/housing insecurity resource given?: N/A  Living Arrangements: Other (Comment) (lives with brother)  Is patient a ?: Yes  Is patient active with [facility])?: No    Activities of Daily Living Prior to Admission  Functional Status: Independent  Completes ADLs independently?: Yes  Ambulates independently?: Yes  Does patient use assisted devices?: No  Does patient currently own DME?: Yes  What DME does the patient currently own?: [employer]idad [employer]  Does patient have a history of Outpatient Therapy (PT/OT)?: No  Does the patient have a history of Short-Term Rehab?: No  Does patient have a history of HHC?: No  Does patient currently have [facility]?: No    Patient Information Continued  Income Source: Pension/long-term  Does patient have prescription coverage?: Yes  Within the past 12 months, you worried that your food would run out before you got the money to buy more : Never true  Within the past 12 months, the food you bought just didnt last and you didnt have money to get more : Never true  Food insecurity resource given?: N/A  Does patient receive dialysis treatments?: No  Does patient have a history of substance abuse?: No  Does patient have a history of Mental Health Diagnosis?: No    Means of Transportation  Means of Transport to Appts[de-identified] Family transport  In the past 12 months, has lack of transportation kept you from medical appointments or from getting medications?: No  In the past 12 months, has lack of transportation kept you from meetings, work, or from getting things needed for daily living?: No  Was application for public transport provided?: N/A    DISCHARGE DETAILS:    Discharge planning discussed with[de-identified] bedside with pt  Freedom of Choice: Yes  Comments - Freedom of Choice: CM provided OP resource list  CM contacted family/caregiver?: No- see comments (Pt able to make his own choices and decisions)  Were Treatment Team discharge recommendations reviewed with patient/caregiver?: Yes  Did patient/caregiver verbalize understanding of patient care needs?: Yes  Were patient/caregiver advised of the risks associated with not following Treatment Team discharge recommendations?: Yes    Contacts  Patient Contacts: Sinan Rico  Relationship to Patient[de-identified] Family (brother)  Reason/Outcome: Continuity of [address]         Is the patient interested in [facility] at discharge?: No    DME Referral Provided  Referral made for DME?: No    Discharge Destination Plan[de-identified] Home  Transport at Discharge : Ride Share    ETA of Transport (Date): 05/05/22  ETA of Transport (Time): 1500     Transfer Mode: Other (Comment) (automobile)  Accompanied by: Alone

## 2022-05-11 ENCOUNTER — PREP FOR PROCEDURE (OUTPATIENT)
Dept: GASTROENTEROLOGY | Facility: CLINIC | Age: 77
End: 2022-05-11

## 2022-05-11 ENCOUNTER — TELEPHONE (OUTPATIENT)
Dept: GASTROENTEROLOGY | Facility: CLINIC | Age: 77
End: 2022-05-11

## 2022-05-11 NOTE — TELEPHONE ENCOUNTER
----- Message from Steffen Ruff sent at 5/11/2022  8:11 AM EDT -----    ----- Message -----  From: Kimmy Ware MD  Sent: 5/4/2022   1:41 PM EDT  To: Gastroenterology Surgery Coordinator    Please arrange for repeat EGD in 8 weeks to evaluate for Darby's esophagus   I have placed order    Thanks    Kimmy Ware MD  Gastroenterology Fellow

## 2022-05-11 NOTE — TELEPHONE ENCOUNTER
Our mutual patient is scheduled for procedure: EGD    On: _8/4/22 _      With: Dr Hare________    He/She is taking the following blood thinner:   coumadin       Can this be stopped __5____ days prior to the procedure?       Physician Approving clearance: ________________________

## 2022-05-11 NOTE — TELEPHONE ENCOUNTER
TC to pt to schedule repeat EGD in 8 weeks      Scheduled date of EGD(as of today):  8/4/22  Physician performing EGD:  Dr Ariana Rtichie  Location of EGD:  [facility]  Instructions reviewed with patient by:  Nanci Katz  Clearances:  coumadin

## 2022-05-18 ENCOUNTER — TELEPHONE (OUTPATIENT)
Dept: OTHER | Facility: OTHER | Age: 77
End: 2022-05-18

## 2022-05-18 ENCOUNTER — TELEPHONE (OUTPATIENT)
Dept: GASTROENTEROLOGY | Facility: MEDICAL CENTER | Age: 77
End: 2022-05-18

## 2022-05-18 ENCOUNTER — PREP FOR PROCEDURE (OUTPATIENT)
Dept: GASTROENTEROLOGY | Facility: MEDICAL CENTER | Age: 77
End: 2022-05-18

## 2022-05-18 DIAGNOSIS — C15.5 MALIGNANT NEOPLASM OF LOWER THIRD OF ESOPHAGUS (HCC): Primary | ICD-10-CM

## 2022-05-18 NOTE — TELEPHONE ENCOUNTER
Hi,    He has a new diagnosis of esophageal cancer  Can you please help him get set up for an EUS ASAP? He also needs an appointment with thoracic surgery and heme onc  Can you help faciliate this?     Thank you

## 2022-05-18 NOTE — TELEPHONE ENCOUNTER
Called patient and discussed the results of recent diagnosis of esophageal cancer  Will set him up for EUS to help with staging  Also placed referrals for thoracic surgery and heme onc       All questions answered

## 2022-05-19 ENCOUNTER — TELEPHONE (OUTPATIENT)
Dept: GASTROENTEROLOGY | Facility: CLINIC | Age: 77
End: 2022-05-19

## 2022-05-19 ENCOUNTER — TELEPHONE (OUTPATIENT)
Dept: CARDIOLOGY CLINIC | Facility: CLINIC | Age: 77
End: 2022-05-19

## 2022-05-19 NOTE — TELEPHONE ENCOUNTER
Lisa Figueredo MA 10 minutes ago (4:08 PM)     ML       Our mutual patient is scheduled for procedure: EUS     On:5/26/22   With: Dr Meg Romano     He is taking the following blood thinner: Coumadin     Can this be stopped ___5___ days prior to the procedure?       Physician Approving clearance

## 2022-05-19 NOTE — TELEPHONE ENCOUNTER
The patient reviewed the voicemail again and thinks it was Dr Kat Toribio calling  The voicemail asked for the patient to call back ASAP regarding his results

## 2022-05-19 NOTE — TELEPHONE ENCOUNTER
Scheduled date of EUS(as of today): 5/26/2022  Physician performing EUS: Dr Buitrago  Location of EUS: [address] [place] [address]  Instructions reviewed with patient by: Kayla/mailed  Clearances: Med clearance message sent to Neftali Munoz

## 2022-05-19 NOTE — TELEPHONE ENCOUNTER
Fer Anaya([facility])  I tried calling him just now and went to voicemail again  He has a repeat egd scheduled in august  I wanted to cancel that and get him in for an eus instead with Dr Jericho Welsh   I can try telling him this again but any way you could help out?  20 days left  Sent: 5/19/22 1:38PM      TT sent to Dr Sameer Haynes, called pt and LVM advising to call office back to go over recs

## 2022-05-19 NOTE — TELEPHONE ENCOUNTER
Patient is returning a missed call from the office  Patient is not sure who called or what it as regarding  He thinks it was a provider

## 2022-05-19 NOTE — TELEPHONE ENCOUNTER
Reviewed provider recs with pt, pt agreeable to canceling endoscopy in august and instead will have ASAP EUS with Dr Susana Serrano  Surgery schedulers: Please schedule EUS, cancel EGD   FYI pt is on Coumadin

## 2022-05-19 NOTE — TELEPHONE ENCOUNTER
Our mutual patient is scheduled for procedure: EUS    On:5/26/22   With: Dr Jerzy Iglesias    He is taking the following blood thinner: Coumadin    Can this be stopped ___5___ days prior to the procedure?       Physician Approving clearance: ________________________

## 2022-05-20 ENCOUNTER — TELEPHONE (OUTPATIENT)
Dept: GYNECOLOGIC ONCOLOGY | Facility: CLINIC | Age: 77
End: 2022-05-20

## 2022-05-20 ENCOUNTER — TELEPHONE (OUTPATIENT)
Dept: GASTROENTEROLOGY | Facility: CLINIC | Age: 77
End: 2022-05-20

## 2022-05-20 DIAGNOSIS — C15.5 MALIGNANT NEOPLASM OF LOWER THIRD OF ESOPHAGUS (HCC): Primary | ICD-10-CM

## 2022-05-20 NOTE — TELEPHONE ENCOUNTER
Dr Janette Baeza spoke with me directly and OK'd the PET scan to be ordered     Confirmed with thoracic office which type of PET scan they wanted  Called pt and informed him to call and schedule PET scan  Provided him central scheduling number       I reviewed his EUS for next week and explained the necessity of having EUS and PET prior to seeing thoracic surgery so they have a full picture and can make next steps

## 2022-05-20 NOTE — TELEPHONE ENCOUNTER
Thoracic SX Intake Form   Patient Details   Nicole Darinel     1945     Reason For Appointment   Who is Calling? Patient   If not patient, Name? Who is the Referring Doctor? Dr Axel Baxter surgeon is Patient referred to? Dr Krysten Boggs   What is the diagnosis? Malignant neoplasm of lower third of esophagus   Has this diagnosis been confirmed by    imaging/ biopsy/surgery? If yes, what is the date it was done? YES, 5/2/2022   Biopsy done at [place] [address]? If not, where was it done? N/A    Was imaging done, and was it done at [facility]? If not, where was it done? YES, YES   Scheduling Information     What is the best call back number?   [phone]   Miscellaneous Information

## 2022-05-20 NOTE — TELEPHONE ENCOUNTER
----- Message from Tabatha Alexandre PA-C sent at 5/20/2022 11:02 AM EDT -----  He also needs a PET scan before seeing Dr Krysten Boggs  Can you please see if GI can order that   Thank you

## 2022-05-20 NOTE — TELEPHONE ENCOUNTER
Dr Anna Estevez office for GI will place the order for the PET scan and reach out to the patient to schedule the PET scan

## 2022-05-20 NOTE — TELEPHONE ENCOUNTER
Patient was referred to Dr Hardik Ortiz, Thoracic surgeon  The office called and stated the doctor is requesting we put an order in for a PET scan so patient can have this before coming to see him  Office # [phone]    Please call patient when order is entered so he can schedule    Thank you

## 2022-05-24 ENCOUNTER — TELEPHONE (OUTPATIENT)
Dept: GYNECOLOGIC ONCOLOGY | Facility: CLINIC | Age: 77
End: 2022-05-24

## 2022-05-24 NOTE — TELEPHONE ENCOUNTER
Unable to leave message for patient to call the office back to schedule NP appt with Thoracic Surgery before answering machine hung up

## 2022-05-26 ENCOUNTER — ANESTHESIA EVENT (OUTPATIENT)
Dept: GASTROENTEROLOGY | Facility: HOSPITAL | Age: 77
End: 2022-05-26

## 2022-05-26 ENCOUNTER — HOSPITAL ENCOUNTER (OUTPATIENT)
Dept: GASTROENTEROLOGY | Facility: HOSPITAL | Age: 77
Setting detail: OUTPATIENT SURGERY
Discharge: HOME | End: 2022-05-26
Attending: INTERNAL MEDICINE | Admitting: INTERNAL MEDICINE
Payer: MEDICARE

## 2022-05-26 ENCOUNTER — ANESTHESIA (OUTPATIENT)
Dept: GASTROENTEROLOGY | Facility: HOSPITAL | Age: 77
End: 2022-05-26

## 2022-05-26 VITALS
TEMPERATURE: 97.1 F | SYSTOLIC BLOOD PRESSURE: 100 MMHG | RESPIRATION RATE: 16 BRPM | DIASTOLIC BLOOD PRESSURE: 63 MMHG | OXYGEN SATURATION: 94 % | HEART RATE: 79 BPM

## 2022-05-26 DIAGNOSIS — C15.5 MALIGNANT NEOPLASM OF LOWER THIRD OF ESOPHAGUS (HCC): ICD-10-CM

## 2022-05-26 LAB
ATRIAL RATE: 62 BPM
ATRIAL RATE: 69 BPM
P AXIS: 42 DEGREES
P AXIS: 55 DEGREES
PR INTERVAL: 164 MS
PR INTERVAL: 196 MS
QRS AXIS: -10 DEGREES
QRS AXIS: -11 DEGREES
QRSD INTERVAL: 84 MS
QRSD INTERVAL: 90 MS
QT INTERVAL: 378 MS
QT INTERVAL: 386 MS
QTC INTERVAL: 383 MS
QTC INTERVAL: 413 MS
T WAVE AXIS: -10 DEGREES
T WAVE AXIS: -17 DEGREES
VENTRICULAR RATE: 62 BPM
VENTRICULAR RATE: 69 BPM

## 2022-05-26 PROCEDURE — 93010 ELECTROCARDIOGRAM REPORT: CPT | Performed by: INTERNAL MEDICINE

## 2022-05-26 PROCEDURE — 93005 ELECTROCARDIOGRAM TRACING: CPT

## 2022-05-26 PROCEDURE — 43259 EGD US EXAM DUODENUM/JEJUNUM: CPT | Performed by: INTERNAL MEDICINE

## 2022-05-26 RX ORDER — SODIUM CHLORIDE 9 MG/ML
125 INJECTION, SOLUTION INTRAVENOUS CONTINUOUS
Status: CANCELLED | OUTPATIENT
Start: 2022-05-26

## 2022-05-26 RX ORDER — LIDOCAINE HYDROCHLORIDE 10 MG/ML
INJECTION, SOLUTION EPIDURAL; INFILTRATION; INTRACAUDAL; PERINEURAL AS NEEDED
Status: DISCONTINUED | OUTPATIENT
Start: 2022-05-26 | End: 2022-05-26

## 2022-05-26 RX ORDER — ONDANSETRON 2 MG/ML
4 INJECTION INTRAMUSCULAR; INTRAVENOUS ONCE AS NEEDED
Status: CANCELLED | OUTPATIENT
Start: 2022-05-26

## 2022-05-26 RX ORDER — PROPOFOL 10 MG/ML
INJECTION, EMULSION INTRAVENOUS CONTINUOUS PRN
Status: DISCONTINUED | OUTPATIENT
Start: 2022-05-26 | End: 2022-05-26

## 2022-05-26 RX ORDER — SODIUM CHLORIDE 9 MG/ML
20 INJECTION, SOLUTION INTRAVENOUS CONTINUOUS
Status: CANCELLED | OUTPATIENT
Start: 2022-05-26

## 2022-05-26 RX ORDER — SODIUM CHLORIDE 9 MG/ML
INJECTION, SOLUTION INTRAVENOUS CONTINUOUS PRN
Status: DISCONTINUED | OUTPATIENT
Start: 2022-05-26 | End: 2022-05-26

## 2022-05-26 RX ORDER — FENTANYL CITRATE 50 UG/ML
INJECTION, SOLUTION INTRAMUSCULAR; INTRAVENOUS AS NEEDED
Status: DISCONTINUED | OUTPATIENT
Start: 2022-05-26 | End: 2022-05-26

## 2022-05-26 RX ORDER — PROPOFOL 10 MG/ML
INJECTION, EMULSION INTRAVENOUS AS NEEDED
Status: DISCONTINUED | OUTPATIENT
Start: 2022-05-26 | End: 2022-05-26

## 2022-05-26 RX ORDER — GLYCOPYRROLATE 0.2 MG/ML
INJECTION INTRAMUSCULAR; INTRAVENOUS AS NEEDED
Status: DISCONTINUED | OUTPATIENT
Start: 2022-05-26 | End: 2022-05-26

## 2022-05-26 RX ADMIN — FENTANYL CITRATE 25 MCG: 50 INJECTION, SOLUTION INTRAMUSCULAR; INTRAVENOUS at 15:49

## 2022-05-26 RX ADMIN — PROPOFOL 100 MCG/KG/MIN: 10 INJECTION, EMULSION INTRAVENOUS at 15:49

## 2022-05-26 RX ADMIN — LIDOCAINE HYDROCHLORIDE 100 MG: 10 INJECTION, SOLUTION EPIDURAL; INFILTRATION; INTRACAUDAL at 15:49

## 2022-05-26 RX ADMIN — PROPOFOL 20 MG: 10 INJECTION, EMULSION INTRAVENOUS at 15:50

## 2022-05-26 RX ADMIN — SODIUM CHLORIDE: 0.9 INJECTION, SOLUTION INTRAVENOUS at 15:41

## 2022-05-26 RX ADMIN — GLYCOPYRROLATE 0.1 MG: 0.2 INJECTION, SOLUTION INTRAMUSCULAR; INTRAVENOUS at 15:49

## 2022-05-26 RX ADMIN — PROPOFOL 80 MG: 10 INJECTION, EMULSION INTRAVENOUS at 15:49

## 2022-05-26 NOTE — ANESTHESIA POSTPROCEDURE EVALUATION
Post-Op Assessment Note    CV Status:  Stable    Pain management: adequate     Mental Status:  Alert and awake   Hydration Status:  Euvolemic   PONV Controlled:  Controlled   Airway Patency:  Patent      Post Op Vitals Reviewed: Yes      Staff: CRNA         No complications documented      /63 (05/26/22 1607)    Temp (!) 97 1 °F (36 2 °C) (05/26/22 1607)    Pulse 75 (05/26/22 1607)   Resp 18 (05/26/22 1607)    SpO2 91 % (05/26/22 1607)

## 2022-05-26 NOTE — ANESTHESIA PREPROCEDURE EVALUATION
Procedure:  ENDOSCOPIC ULTRASOUND (UPPER)    Relevant Problems   CARDIO   (+) Abdominal aortic aneurysm, without rupture (HCC)   (+) CAD (coronary artery disease)   (+) Coronary artery disease of native artery of native heart with stable angina pectoris (HCC)   (+) HLD (hyperlipidemia)   (+) HTN (hypertension)   (+) NSTEMI (non-ST elevated myocardial infarction) (HCC)   (+) Paroxysmal atrial fibrillation (HCC)   (+) STEMI (ST elevation myocardial infarction) (HCC)      GI/HEPATIC   (+) Acute gastric ulcer   (+) Dysphagia      NEURO/PSYCH   (+) Seizure disorder (HCC)      Cardiovascular and Mediastinum   (+) Chronic combined systolic and diastolic CHF (congestive heart failure) (HCC)   (+) Ischemic cardiomyopathy      Other   (+) S/P AAA (abdominal aortic aneurysm) repair        Physical Exam    Airway    Mallampati score: III  TM Distance: >3 FB  Neck ROM: full     Dental       Cardiovascular      Pulmonary      Other Findings        Anesthesia Plan  ASA Score- 4     Anesthesia Type- IV sedation with anesthesia with ASA Monitors  Additional Monitors:   Airway Plan:     Comment: Last MI 8/2021, AGUSTIN placed at that time  Patient is noncompliant with antiplatelet medications  Discussed high chance of repeat MI since stent is < 3year old  Patient verbalized understanding          Plan Factors-    Chart reviewed  EKG reviewed  Existing labs reviewed  Patient summary reviewed  Induction- intravenous  Postoperative Plan-     Informed Consent- Anesthetic plan and risks discussed with patient  I personally reviewed this patient with the CRNA  Discussed and agreed on the Anesthesia Plan with the CRNA  Kartik Quigley

## 2022-05-27 ENCOUNTER — TELEPHONE (OUTPATIENT)
Dept: CARDIAC SURGERY | Facility: CLINIC | Age: 77
End: 2022-05-27

## 2022-05-27 ENCOUNTER — TELEPHONE (OUTPATIENT)
Dept: GYNECOLOGIC ONCOLOGY | Facility: CLINIC | Age: 77
End: 2022-05-27

## 2022-05-27 NOTE — TELEPHONE ENCOUNTER
Patient's PET scan is scheduled for 6/14/22 and his NP appt with Dr Jay Meza is scheduled for 6/23/22

## 2022-05-27 NOTE — TELEPHONE ENCOUNTER
----- Message from Lazaro Smith PA-C sent at 5/20/2022 11:02 AM EDT -----  He also needs a PET scan before seeing Dr Bryan Cummins  Can you please see if GI can order that   Thank you

## 2022-05-31 NOTE — RESULT ENCOUNTER NOTE
The results of recent endoscopic ultrasound should have been reviewed with the patient by GI, so we do not need to call to inform him of the results  However, the results do show that he has an esophageal mass that likely represents cancer and he is currently pending further workup with imaging and consultation with thoracic surgery in upcoming weeks  He was recently admitted to the hospital for dysphagia and did not come in for a TCM visit  I also notice in his chart that he has no follow-up appointment set up yet with our office  Can we please call Mr Shiraz Quinn to offer him a routine follow-up visit to discuss his recent hospitalization and goals of care? As I am no longer available to see patient in clinic, we can consider providing him an appointment in early July with a new PCP  Thank you

## 2022-06-01 NOTE — RESULT ENCOUNTER NOTE
1st call made out to patient, attempted to schedule him for a follow up in office as advised by PCP but was not able to get a hold of patient nor leave a message  Once prompted to his VM it said " machine off'  Will try again at a later time

## 2022-06-10 ENCOUNTER — TELEPHONE (OUTPATIENT)
Dept: HEMATOLOGY ONCOLOGY | Facility: CLINIC | Age: 77
End: 2022-06-10

## 2022-06-10 NOTE — TELEPHONE ENCOUNTER
Patient was scheduled for a NP appt with Dr Bryan Cummins on 6/23/22  Patient had to be rescheduled with Dr Mariela Herrera on 7/5/22 due to Dr Bryan Cummins being on maternity leave on 6/23/22  Patient was offered appt on Wednesday 6/29/22 with Dr Melanie Cullen, but patient is unable to do Wednesdays due to his brother being his transportation and his brother has dialysis on Wednesdays

## 2022-06-16 ENCOUNTER — OFFICE VISIT (OUTPATIENT)
Dept: CARDIOLOGY CLINIC | Facility: CLINIC | Age: 77
End: 2022-06-16
Payer: MEDICARE

## 2022-06-16 VITALS
OXYGEN SATURATION: 95 % | WEIGHT: 208.6 LBS | SYSTOLIC BLOOD PRESSURE: 118 MMHG | HEART RATE: 85 BPM | DIASTOLIC BLOOD PRESSURE: 70 MMHG | BODY MASS INDEX: 28.29 KG/M2

## 2022-06-16 DIAGNOSIS — Z86.79 S/P AAA (ABDOMINAL AORTIC ANEURYSM) REPAIR: Chronic | ICD-10-CM

## 2022-06-16 DIAGNOSIS — I25.10 CORONARY ARTERY DISEASE INVOLVING NATIVE CORONARY ARTERY OF NATIVE HEART WITHOUT ANGINA PECTORIS: Primary | ICD-10-CM

## 2022-06-16 DIAGNOSIS — I25.5 ISCHEMIC CARDIOMYOPATHY: ICD-10-CM

## 2022-06-16 DIAGNOSIS — Z98.890 S/P AAA (ABDOMINAL AORTIC ANEURYSM) REPAIR: Chronic | ICD-10-CM

## 2022-06-16 DIAGNOSIS — I71.4 ABDOMINAL AORTIC ANEURYSM, WITHOUT RUPTURE (HCC): ICD-10-CM

## 2022-06-16 PROCEDURE — 99214 OFFICE O/P EST MOD 30 MIN: CPT | Performed by: INTERNAL MEDICINE

## 2022-06-17 ENCOUNTER — ANTICOAG VISIT (OUTPATIENT)
Dept: CARDIOLOGY CLINIC | Facility: CLINIC | Age: 77
End: 2022-06-17

## 2022-06-17 NOTE — PROGRESS NOTES
Cardiology Follow Up    Danuta Mazariegos  1945  [id number]  [address] [facility]  [address] [facility] [address] [phone]    6  Coronary artery disease involving native coronary artery of native heart without angina pectoris     2  Ischemic cardiomyopathy     3  Abdominal aortic aneurysm, without rupture ([facility] )     4  S/P AAA (abdominal aortic aneurysm) repair         Interval History:  A Germaine Thompson since his last visit has no cardiovascular symptoms  He tolerates all activity without chest pain shortness of breath orthopnea paroxysmal nocturnal dyspnea or syncope  He has dysphagia and recently has been found to have esophageal cancer  Patient Active Problem List   Diagnosis    CAD (coronary artery disease)    HTN (hypertension)    HLD (hyperlipidemia)    Seizure disorder (HCC)    S/P AAA (abdominal aortic aneurysm) repair    STEMI (ST elevation myocardial infarction) ([facility] )    Ventral hernia with obstruction and without gangrene    Hernia, incisional    Abdominal pain    NSTEMI (non-ST elevated myocardial infarction) ([facility] )    Chronic combined systolic and diastolic CHF (congestive heart failure) ([facility])    Allergic reaction to contrast media    Ischemic cardiomyopathy    Abdominal aortic aneurysm, without rupture ([facility])    Paroxysmal atrial fibrillation ([facility] )    Coronary artery disease of native artery of native heart with stable angina pectoris ([facility] )    Dysphagia    Acute gastric ulcer     Past Medical History:   Diagnosis Date    Cardiac disease     CHF (congestive heart failure) ([facility] )     Hernia of abdominal cavity     Myocardial infarction ([facility] )     last assessed 7/31/14, past, Pt had MI s/p AGUSTIN placed in 2001, refuses to take any other medications for his cardiac disease, only will take seizure med   Understands possible complications from this decision    Seizure [facility])      Social History Socioeconomic History    Marital status: Single     Spouse name: Not on file    Number of children: Not on file    Years of education: Not on file    Highest education level: Not on file   Occupational History    Not on file   Tobacco Use    Smoking status: Former Smoker     Quit date: 2005     Years since quittin 0    Smokeless tobacco: Never Used   Vaping Use    Vaping Use: Never used   Substance and Sexual Activity    Alcohol use: Not Currently    Drug use: Never    Sexual activity: Never   Other Topics Concern    Not on file   Social History Narrative    Not on file     Social Determinants of Health     Financial Resource Strain: Not on file   Food Insecurity: No Food Insecurity    Worried About [employer] [employer] in the Last Year: Never true    [address] in the Last Year: Never true   Transportation Needs: No Transportation Needs    Lack of Transportation (Medical): No    Lack of Transportation (Non-Medical):  No   Physical Activity: Not on file   Stress: Not on file   Social Connections: Not on file   Intimate Partner Violence: Not on file   Housing Stability: Low Risk     Unable to Pay for Housing in the Last Year: No    Number of Places Lived in the Last Year: 1    Unstable Housing in the Last Year: No      Family History   Problem Relation Age of Onset    Hypertension Father     Kidney disease Brother      Past Surgical History:   Procedure Laterality Date    ABDOMINAL AORTIC ANEURYSM REPAIR      for dialation or occulsion    CATARACT EXTRACTION         Current Outpatient Medications:     aspirin (Aspirin Low Dose) 81 mg EC tablet, Take 1 tablet (81 mg total) by mouth daily, Disp: 90 tablet, Rfl: 3    atorvastatin (LIPITOR) 80 mg tablet, Take 1 tablet (80 mg total) by mouth daily with dinner, Disp: 90 tablet, Rfl: 4    pantoprazole (PROTONIX) 40 mg tablet, Take 1 tablet (40 mg total) by mouth daily, Disp: 30 tablet, Rfl: 0    pantoprazole (PROTONIX) 40 mg tablet, TAKE 1 TABLET BY MOUTH 2 TIMES A DAY BEFORE MEALS , Disp: 180 tablet, Rfl: 3    PHENobarbital 64 8 mg tablet, TAKE 1 TABLET (64 8 MG TOTAL) BY MOUTH 2 (TWO) TIMES A DAY, Disp: 180 tablet, Rfl: 1    sucralfate (CARAFATE) 1 g tablet, Take 1 tablet (1 g total) by mouth 3 (three) times a day before meals, Disp: 90 tablet, Rfl: 0    triamcinolone (KENALOG) 0 025 % cream, APPLY TO AFFECTED AREA TWICE A DAY, Disp: 30 g, Rfl: 1    warfarin (COUMADIN) 2 5 mg tablet, Take 2 tabs by mouth daily or as directed by physician, Disp: 180 tablet, Rfl: 3    acetaminophen (TYLENOL) 500 mg tablet, Take 1 tablet (500 mg total) by mouth every 6 (six) hours as needed for mild pain (Patient not taking: Reported on 6/16/2022), Disp: 30 tablet, Rfl: 0    metoprolol succinate (TOPROL-XL) 50 mg 24 hr tablet, TAKE 1 TABLET BY MOUTH EVERY DAY (Patient not taking: No sig reported), Disp: 90 tablet, Rfl: 0    nitroglycerin (NITROSTAT) 0 4 mg SL tablet, Place 1 tablet (0 4 mg total) under the tongue every 5 (five) minutes as needed for chest pain (Patient not taking: Reported on 6/16/2022), Disp: 30 tablet, Rfl: 1  Allergies   Allergen Reactions    Iodinated Diagnostic Agents Rash     Pt's skin get very red and he gets hot and chills    Clopidogrel Rash       Labs:  Hospital Outpatient Visit on 05/26/2022   Component Date Value    Ventricular Rate 05/26/2022 62     Atrial Rate 05/26/2022 62     [place] Interval 05/26/2022 164     QRSD Interval 05/26/2022 84     QT Interval 05/26/2022 378     QTC Interval 05/26/2022 383     P Axis 05/26/2022 42     QRS Axis 05/26/2022 -11     T Wave Axis 05/26/2022 -17     Ventricular Rate 05/26/2022 69     Atrial Rate 05/26/2022 69     NY Interval 05/26/2022 196     QRSD Interval 05/26/2022 90     QT Interval 05/26/2022 386     QTC Interval 05/26/2022 413     P Axis 05/26/2022 55     QRS Axis 05/26/2022 -10     T Wave Axis 05/26/2022 -10    Admission on 05/02/2022, Discharged on 05/05/2022   Component Date Value    WBC 05/02/2022 3 49 (A)    RBC 05/02/2022 3 56 (A)    Hemoglobin 05/02/2022 10 7 (A)    Hematocrit 05/02/2022 34 0 (A)    MCV 05/02/2022 96     MCH 05/02/2022 30 1     MCHC 05/02/2022 31 5     RDW 05/02/2022 20 9 (A)    MPV 05/02/2022 11 3     Platelets 89/29/0943 121 (A)    nRBC 05/02/2022 0     Neutrophils Relative 05/02/2022 51     Immat GRANS % 05/02/2022 1     Lymphocytes Relative 05/02/2022 28     Monocytes Relative 05/02/2022 12     Eosinophils Relative 05/02/2022 7 (A)    Basophils Relative 05/02/2022 1     Neutrophils Absolute 05/02/2022 1 78 (A)    Immature Grans Absolute 05/02/2022 0 04     Lymphocytes Absolute 05/02/2022 0 98     Monocytes Absolute 05/02/2022 0 40     Eosinophils Absolute 05/02/2022 0 25     Basophils Absolute 05/02/2022 0 04     Sodium 05/02/2022 140     Potassium 05/02/2022 4 1     Chloride 05/02/2022 109 (A)    CO2 05/02/2022 26     ANION GAP 05/02/2022 5     BUN 05/02/2022 15     Creatinine 05/02/2022 1 20     Glucose 05/02/2022 100     Calcium 05/02/2022 8 4     Corrected Calcium 05/02/2022 9 0     AST 05/02/2022 15     ALT 05/02/2022 16     Alkaline Phosphatase 05/02/2022 165 (A)    Total Protein 05/02/2022 7 2     Albumin 05/02/2022 3 3 (A)    Total Bilirubin 05/02/2022 0 33     eGFR 05/02/2022 57     hs TnI 0hr 05/02/2022 7     Protime 05/02/2022 34 6 (A)    INR 05/02/2022 3 67 (A)    hs TnI 2hr 05/02/2022 6     Delta 2hr hsTnI 05/02/2022 -1     Phenobarbital Lvl 05/02/2022 27 7     WBC 05/03/2022 3 26 (A)    RBC 05/03/2022 3 53 (A)    Hemoglobin 05/03/2022 10 6 (A)    Hematocrit 05/03/2022 33 7 (A)    MCV 05/03/2022 96     MCH 05/03/2022 30 0     MCHC 05/03/2022 31 5     RDW 05/03/2022 20 8 (A)    MPV 05/03/2022 10 8     Platelets 77/26/1116 113 (A)    nRBC 05/03/2022 0     Neutrophils Relative 05/03/2022 43     Immat GRANS % 05/03/2022 1     Lymphocytes Relative 05/03/2022 37     Monocytes Relative 05/03/2022 11     Eosinophils Relative 05/03/2022 7 (A)    Basophils Relative 05/03/2022 1     Neutrophils Absolute 05/03/2022 1 40 (A)    Immature Grans Absolute 05/03/2022 0 02     Lymphocytes Absolute 05/03/2022 1 19     Monocytes Absolute 05/03/2022 0 37     Eosinophils Absolute 05/03/2022 0 24     Basophils Absolute 05/03/2022 0 04     Sodium 05/03/2022 140     Potassium 05/03/2022 3 5     Chloride 05/03/2022 110 (A)    CO2 05/03/2022 26     ANION GAP 05/03/2022 4     BUN 05/03/2022 13     Creatinine 05/03/2022 0 84     Glucose 05/03/2022 91     Calcium 05/03/2022 8 4     eGFR 05/03/2022 84     Protime 05/03/2022 28 3 (A)    INR 05/03/2022 2 82 (A)    Unit Product Code 05/04/2022 [id number]     Unit Number 05/04/2022 [id number]     Unit ABO 05/04/2022 A     Unit [facility] 05/04/2022 POS     Unit Dispense Status 05/04/2022 Presumed Trans     Unit Product Volume 05/04/2022 280     Unit Product Code 05/04/2022 [id number]     Unit Number 05/04/2022 [id number]     Unit ABO 05/04/2022 A     Unit [facility] 05/04/2022 NEG     Unit Dispense Status 05/04/2022 Return to Inv     Unit Product Volume 05/04/2022 280     Ventricular Rate 05/02/2022 65     Atrial Rate 05/02/2022 65     [place] Interval 05/02/2022 198     QRSD Interval 05/02/2022 80     QT Interval 05/02/2022 380     QTC Interval 05/02/2022 395     P Axis 05/02/2022 52     QRS Axis 05/02/2022 3     T Wave Axis 05/02/2022 -40     Protime 05/04/2022 16 3 (A)    INR 05/04/2022 1 37 (A)    WBC 05/04/2022 3 42 (A)    RBC 05/04/2022 3 49 (A)    Hemoglobin 05/04/2022 10 6 (A)    Hematocrit 05/04/2022 33 2 (A)    MCV 05/04/2022 95     MCH 05/04/2022 30 4     MCHC 05/04/2022 31 9     RDW 05/04/2022 20 6 (A)    Platelets 50/43/8401 110 (A)    nRBC 05/04/2022 0     Neutrophils Relative 05/04/2022 49     Immat GRANS % 05/04/2022 1     Lymphocytes Relative 05/04/2022 31     Monocytes Relative 05/04/2022 11     Eosinophils Relative 05/04/2022 7 (A)    Basophils Relative 05/04/2022 1     Neutrophils Absolute 05/04/2022 1 67 (A)    Immature Grans Absolute 05/04/2022 0 04     Lymphocytes Absolute 05/04/2022 1 05     Monocytes Absolute 05/04/2022 0 38     Eosinophils Absolute 05/04/2022 0 24     Basophils Absolute 05/04/2022 0 04     Protime 05/04/2022 16 0 (A)    INR 05/04/2022 1 34 (A)    Case Report 05/04/2022                      Value:Surgical Pathology Report                         Case: [id number]                                   Authorizing Provider:  Kit Chiang MD        Collected:           05/04/2022 1334              Ordering Location:     [address] [place] [address]      Received:            05/04/2022 [address] [place] [address]                                                              Pathologist:           Ramandeep Red MD                                                                 Specimens:   A) - Stomach, r/o h  pylori                                                                         B) - Esophagus, lower esophageal bx                                                        Addendum 05/04/2022                      Value: This result contains rich text formatting which cannot be displayed here   Final Diagnosis 05/04/2022                      Value: This result contains rich text formatting which cannot be displayed here   Additional Information 05/04/2022                      Value: This result contains rich text formatting which cannot be displayed here  Rosibel Mullet Gross Description 05/04/2022                      Value: This result contains rich text formatting which cannot be displayed here      Hemoglobin 05/05/2022 10 0 (A)    Hematocrit 05/05/2022 31 4 (A)    Sodium 05/05/2022 139     Potassium 05/05/2022 4 0     Chloride 05/05/2022 108     CO2 05/05/2022 24     ANION GAP 05/05/2022 7     BUN 05/05/2022 13     Creatinine 05/05/2022 0 83     Glucose 05/05/2022 92     Calcium 05/05/2022 8 2 (A)    eGFR 05/05/2022 84      Imaging: Endoscopic ultrasonography, GI (Upper)    Result Date: 5/26/2022  Narrative: [address] [place] [id number] [phone] DATE OF SERVICE: 5/26/22 PHYSICIAN(S): Attending: Jovanna Solomon MD Fellow: No Staff Documented INDICATION: Malignant neoplasm of lower third of esophagus ([address] ) POST-OP DIAGNOSIS: See the impression below  PREPROCEDURE: Informed consent was obtained for the procedure, including sedation  Risks of perforation, hemorrhage, adverse drug reaction and aspiration were discussed  The patient was placed in the left lateral decubitus position  Patient was explained about the risks and benefits of the procedure  Risks including but not limited to bleeding, infection, and perforation were explained in detail  Also explained about less than 100% sensitivity with the exam and other alternatives  DETAILS OF PROCEDURE: Patient was taken to the procedure room where a time out was performed to confirm correct patient and correct procedure  The patient underwent monitored anesthesia care, which was administered by an anesthesia professional  The patient's blood pressure, heart rate, oxygen, respirations and level of consciousness were monitored throughout the procedure  The radial scope was introduced through the mouth and advanced to the second part of the duodenum  Retroflexion was performed in the fundus  The patient experienced no blood loss  The procedure was not difficult  The patient tolerated the procedure well  There were no apparent complications   ANESTHESIA INFORMATION: ASA: IV Anesthesia Type: IV Sedation with Anesthesia MEDICATIONS: No administrations occurring from 1542 to 1600 on 05/26/22 FINDINGS: Irregular and hypoechoic mass (traversable) measuring 31 mm x 24 mm with poorly defined and irregular margins in the lower third of the esophagus, observed with the scope at 35 cm from incisors, extending to the muscularis propria with an indeterminate layer origin SPECIMENS: * No specimens in log *      Impression: Distal esophageal mass at the GE junction  Did not appear to involve the proximal stomach (Siewart Type I)  There was a background of what appeared to be Darby's esophagus adjacent to the mass  The mass extended to the muscularis propria and may have involved the adventitia at some areas; therefore is staged at least as a T2  RECOMMENDATION: Follow up with thoracic surgery and oncology   Jed Odonnell MD       Review of Systems:  Review of Systems   Constitutional: Negative for fatigue  HENT: Negative for nosebleeds  Eyes: Negative for redness  Respiratory: Negative for chest tightness and shortness of breath  Cardiovascular: Negative for chest pain, palpitations and leg swelling  Gastrointestinal: Positive for abdominal distention  Negative for abdominal pain  Endocrine: Negative for polyuria  Genitourinary: Negative for urgency  Musculoskeletal: Negative for arthralgias  Skin: Negative for rash  Neurological: Negative for dizziness and syncope  Psychiatric/Behavioral: Negative for confusion and sleep disturbance  The patient is not nervous/anxious  Physical Exam:  Physical Exam  Constitutional:       Appearance: He is well-developed  HENT:      Head: Normocephalic and atraumatic  Nose: Nose normal    Eyes:      Pupils: Pupils are equal, round, and reactive to light  Cardiovascular:      Rate and Rhythm: Normal rate and regular rhythm  Heart sounds: Normal heart sounds  Pulmonary:      Effort: Pulmonary effort is normal       Breath sounds: Normal breath sounds  Abdominal:      General: Bowel sounds are normal       Palpations: Abdomen is soft  Hernia: A hernia is present  Hernia is present in the ventral area  Musculoskeletal:         General: Normal range of motion  Cervical back: Neck supple  Skin:     General: Skin is warm and dry  Neurological:      Mental Status: He is alert and oriented to person, place, and time  Psychiatric:         Behavior: Behavior normal          Thought Content: Thought content normal          Judgment: Judgment normal          Discussion/Summary:  Scarlet Shay is asymptomatic in functional class 1  Is he has a history of coronary artery disease with stent to the LAD in 2010  He had an inferior wall MI with emergent stenting of the right coronary artery in 2017  In April of 2021 he presented with a non STEMI and had a stent placed in his LAD and in August of 2021 he presented with a STEMI and had a stent placed in his right coronary artery  At that time apical thrombus was noted  He was placed on warfarin  Repeat echocardiogram in March of 2022 showed an improvement in ejection fraction to 50% with no further evidence of apical thrombus  In light of this I will discontinue his Coumadin  He will continue on aspirin  He has been relatively noncompliant with medications  He has been resistant to dual anti-platelet therapy in the past and his most recent duration of dual anti-platelet therapy with 6 months  He is on beta blockade  He has been intolerant of Ace inhibitors in the past   As noted he had an improvement in his GI ejection fraction of 50%  He is on high-dose statin  He is in need of a fasting lipid profile which I will order with his next laboratory data  If he remains functional class 1 I would feel he is cleared for esophageal surgery with moderate cardiovascular risk  He has an appointment coming up July 5th with thoracic surgery

## 2022-06-23 ENCOUNTER — HOSPITAL ENCOUNTER (OUTPATIENT)
Dept: RADIOLOGY | Age: 77
Discharge: HOME | End: 2022-06-23
Payer: MEDICARE

## 2022-06-23 DIAGNOSIS — C15.5 MALIGNANT NEOPLASM OF LOWER THIRD OF ESOPHAGUS (HCC): ICD-10-CM

## 2022-06-23 LAB — GLUCOSE SERPL-MCNC: 89 MG/DL (ref 65–140)

## 2022-06-23 PROCEDURE — 82948 REAGENT STRIP/BLOOD GLUCOSE: CPT

## 2022-06-23 PROCEDURE — A9552 F18 FDG: HCPCS

## 2022-06-23 PROCEDURE — G1004 CDSM NDSC: HCPCS

## 2022-06-23 PROCEDURE — 78815 PET IMAGE W/CT SKULL-THIGH: CPT

## 2022-06-29 ENCOUNTER — PATIENT OUTREACH (OUTPATIENT)
Dept: HEMATOLOGY ONCOLOGY | Facility: CLINIC | Age: 77
End: 2022-06-29

## 2022-06-29 DIAGNOSIS — C15.5 MALIGNANT NEOPLASM OF LOWER THIRD OF ESOPHAGUS (HCC): Primary | ICD-10-CM

## 2022-06-29 NOTE — PROGRESS NOTES
Soft Intake Form   Patient Details   Masha Click     1945     Reason For Appointment   Who is Calling? Referring provider Dr Elkin Buck   If not patient, Name? Dr Azra Linares    DID Candiss Mems? Did not speak with patient   Who is the Referring Doctor? Dr Azra Linares   What is the diagnosis? Esophageal cancer   Has this diagnosis been confirmed by a biopsy/surgery? If yes, what is the date it was done? Yes 5/4   Biopsy done at [facility]? If not, where was it done? yes   Was imaging done, and was it done at [address] [place] [address]? If not, where was it done? Yes    Have you been seen by another Oncologist?  If so, who and where (name of facility, city and state) no   For 2nd Opinions Only: Are you currently undergoing treatment, or are you scheduled to start treatment? If yes, name of facility, city and state n/a    For "History Of" only: Have you completed treatment?  n/a   Have you had Genetic Testing done in the past?  If so, advise to bring test results to their visit no   Record Gathering Information   Did you advise to have records faxed to [phone]?  n/a   Did you advise to have disks sent to the proper address with imaging? ("History of" Patients)  5 years of imaging for breast patients-Mammos, US etc n/a   Scheduling Information   Did you send new patient paperwork? Email or mail? mail   What is the best call back number?    (If the [facility] is calling, please use their number) [phone]   Miscellaneous Information    Thoracic already scheduled 7/5  Referral sent to Junie Dakins [facility] 7/12 Kisha Anderson 7/14

## 2022-06-29 NOTE — PROGRESS NOTES
Chart review for work up needed for patient's esophageal cancer  He has had EGD, EUS, Biopsy, PET  A consult is already scheduled with Dr Jose Romano 7/5    I have placed referrals for surgical, medical and radiation oncology and will get patient appropriately scheduled

## 2022-06-29 NOTE — PROGRESS NOTES
Intake received  Pt has active medicare A & B  Pt also has active pa health & wellness  Insurance education outreach not needed at this time

## 2022-07-05 ENCOUNTER — OFFICE VISIT (OUTPATIENT)
Dept: CARDIAC SURGERY | Facility: CLINIC | Age: 77
End: 2022-07-05
Payer: MEDICARE

## 2022-07-05 ENCOUNTER — PATIENT OUTREACH (OUTPATIENT)
Dept: HEMATOLOGY ONCOLOGY | Facility: CLINIC | Age: 77
End: 2022-07-05

## 2022-07-05 VITALS
SYSTOLIC BLOOD PRESSURE: 120 MMHG | WEIGHT: 204.81 LBS | OXYGEN SATURATION: 94 % | TEMPERATURE: 98.4 F | HEIGHT: 72 IN | BODY MASS INDEX: 27.74 KG/M2 | DIASTOLIC BLOOD PRESSURE: 72 MMHG | HEART RATE: 66 BPM | RESPIRATION RATE: 17 BRPM

## 2022-07-05 DIAGNOSIS — C15.9 ESOPHAGEAL ADENOCARCINOMA (HCC): Primary | ICD-10-CM

## 2022-07-05 DIAGNOSIS — C15.5 MALIGNANT NEOPLASM OF LOWER THIRD OF ESOPHAGUS (HCC): ICD-10-CM

## 2022-07-05 PROCEDURE — 99205 OFFICE O/P NEW HI 60 MIN: CPT | Performed by: THORACIC SURGERY (CARDIOTHORACIC VASCULAR SURGERY)

## 2022-07-05 NOTE — PROGRESS NOTES
Phone outreach to the pt, called and introduced myself, completed general assessment with the pt, mary'd info on port placement, gave pt hold date 7/19 [place] @ 130PM (I explained that someone would reach out to him closer to this date to provide further instruction for him on how to prep for his procedure- he understood)  Pt stated that his brother would be taking him to his appt today to see Dr Russ Nim  He declined info on the [facility]- stated his "purple passion" was that he hates meetings  Pt was in good spirits when speaking to him over the phone  I instructed him to save my contact information in case he had any questions or concerns that arise, he wrote it down

## 2022-07-05 NOTE — PROGRESS NOTES
Thoracic Consult  Assessment/Plan:    Esophageal adenocarcinoma [facility])  Mr Jeff Clark has a clinical stage at least T2, N0 distal esophageal adenocarcinoma  I discussed treatment of esophageal adenocarcinoma with him and let him know that this was done with a team approach  I recommend induction chemotherapy and radiation therapy followed by possible transhiatal esophagogastrectomy  He has an appointment with medical oncology and radiation oncology at this point which I encouraged him to keep  He has also been in contact with the GI oncology nurse navigator who will help coordinate his appointments  I would like to see him back near the end of his chemotherapy and radiation therapy to assess surgical candidacy  He will definitely need cardiac risk assessment  His midline ventral hernia will also need to be addressed at the time of esophagectomy  We will also have him see Surgical Oncology  Diagnoses and all orders for this visit:    Malignant neoplasm of lower third of esophagus [facility])  -     Ambulatory Referral to Thoracic Surgery  -     Ambulatory referral to Surgical Oncology          Thoracic History   Diagnosis: Distal esophageal adenocarcinoma   Procedures/Surgeries: EGD on May 4th, EUS on May 18th 2022   Pathology: Poorly differentiated adenocarcinoma   Adjuvant Therapy:        Cancer Staging  Esophageal adenocarcinoma ([facility] [address] )  Staging form: Esophagus - Adenocarcinoma, AJCC 8th Edition  - Clinical stage from 5/18/2022: Stage IIB (cT2, cN0, cM0, G3) - Signed by Hernandez Wilkerson MD on 7/5/2022  Total positive nodes: 0  Histologic grading system: 3 grade system  HER2 status: Negative  Clinical staging modalities: Biopsy, EUS, Endoscopy, PET/CT    Subjective:    Patient ID: Stan Reagan is a 67 y o  male  Mr Jeff Clark underwent an EGD in May of 2022 for dysphagia and was found to have a distal 3rd esophageal mass that was biopsy-proven poorly differentiated adenocarcinoma, her 2 Milvia negative    He then underwent an EUS which demonstrated at least T2 disease without any significant adenopathy  A PET-CT scan performed on June 23, 2022 demonstrated mild hypermetabolism in the distal esophagus but no evidence of either regional hypermetabolic lymphadenopathy or signs of distant metastatic disease  It he was clinically staged as T2, N0 distal esophageal adenocarcinoma, Siewart type 1  Mr Sofie Ridley notes an approximate 20 lb weight loss over the last 2 months  He denies chest pain but notes solid-food dysphagia centered underneath his xiphoid  He denies new headaches, blurry vision, new bone or joint pains, or new numbness or weakness in any extremity  He underwent a open AAA repair in approximately 2005  This was complicated by fascial dehiscence and he has a large ventral hernia that has never undergone repair  He also has a significant history of coronary artery disease and has undergone coronary artery stenting on 4 separate occasions, the last of which was approximately a year and half ago  He currently does not complain of anginal symptoms  His most recent ejection fraction is 50%  The following portions of the patient's history were reviewed and updated as appropriate: allergies, current medications, past family history, past medical history, past social history, past surgical history and problem list     Past Medical History:   Diagnosis Date    Cardiac disease     CHF (congestive heart failure) ([facility] [address] )     Hernia of abdominal cavity     Myocardial infarction ([facility] [address] )     last assessed 7/31/14, past, Pt had MI s/p AGUSTIN placed in 2001, refuses to take any other medications for his cardiac disease, only will take seizure med   Understands possible complications from this decision    Seizure [facility])       Past Surgical History:   Procedure Laterality Date    ABDOMINAL AORTIC ANEURYSM REPAIR      for dialation or occulsion    CATARACT EXTRACTION        Family History   Problem Relation Age of Onset  Hypertension Father     Kidney disease Brother       Social History     Socioeconomic History    Marital status: Single     Spouse name: Not on file    Number of children: Not on file    Years of education: Not on file    Highest education level: Not on file   Occupational History    Not on file   Tobacco Use    Smoking status: Former Smoker     Quit date: 2005     Years since quittin 0    Smokeless tobacco: Never Used   Vaping Use    Vaping Use: Never used   Substance and Sexual Activity    Alcohol use: Not Currently    Drug use: Never    Sexual activity: Never   Other Topics Concern    Not on file   Social History Narrative    Not on file     Social Determinants of Health     Financial Resource Strain: Not on file   Food Insecurity: No Food Insecurity    Worried About [address] in the Last Year: Never true    [address] [employer] in the Last Year: Never true   Transportation Needs: No Transportation Needs    Lack of Transportation (Medical): No    Lack of Transportation (Non-Medical): No   Physical Activity: Not on file   Stress: Not on file   Social Connections: Not on file   Intimate Partner Violence: Not on file   Housing Stability: Low Risk     Unable to Pay for Housing in the Last Year: No    Number of Places Lived in the Last Year: 1    Unstable Housing in the Last Year: No      Review of Systems   Constitutional: Positive for unexpected weight change  Negative for activity change, appetite change, chills and fever  HENT: Negative for ear pain, sore throat and voice change  Eyes: Negative for pain and visual disturbance  Respiratory: Negative for cough, shortness of breath and wheezing  Cardiovascular: Negative for chest pain, palpitations and leg swelling  Gastrointestinal: Negative for abdominal pain, constipation, diarrhea, nausea and vomiting  Genitourinary: Negative for dysuria and hematuria     Musculoskeletal: Negative for arthralgias, back pain and myalgias  Skin: Negative for color change and rash  Neurological: Negative for dizziness, seizures, syncope, weakness, numbness and headaches  Hematological: Negative for adenopathy  Psychiatric/Behavioral: Negative for confusion  All other systems reviewed and are negative  Objective:   Physical Exam  Vitals reviewed  Constitutional:       General: He is not in acute distress  Appearance: He is well-developed  HENT:      Head: Normocephalic and atraumatic  Eyes:      General: No scleral icterus  Conjunctiva/sclera: Conjunctivae normal       Pupils: Pupils are equal, round, and reactive to light  Neck:      Trachea: No tracheal deviation  Cardiovascular:      Rate and Rhythm: Normal rate and regular rhythm  Heart sounds: Normal heart sounds  Pulmonary:      Effort: Pulmonary effort is normal  No respiratory distress  Breath sounds: Normal breath sounds  No wheezing, rhonchi or rales  Abdominal:      General: Bowel sounds are normal  There is no distension  Palpations: Abdomen is soft  Tenderness: There is no abdominal tenderness  Comments: Large midline ventral hernia extending from just below the xiphoid to below the umbilicus  Musculoskeletal:      Cervical back: Normal range of motion and neck supple  Right lower leg: No edema  Left lower leg: No edema  Lymphadenopathy:      Cervical: No cervical adenopathy  Skin:     General: Skin is warm and dry  Neurological:      Mental Status: He is alert and oriented to person, place, and time  Cranial Nerves: No cranial nerve deficit  Psychiatric:         Behavior: Behavior normal          Thought Content:  Thought content normal          Judgment: Judgment normal      /72   Pulse 66   Temp 98 4 °F (36 9 °C)   Resp 17   Ht 6' (1 829 m)   Wt 92 9 kg (204 lb 12 9 oz)   SpO2 94%   BMI 27 78 kg/m²       CT chest abdomen pelvis wo contrast    Result Date: 5/2/2022  Narrative CT CHEST, ABDOMEN AND PELVIS WITHOUT IV CONTRAST INDICATION:   difficulty swallowing  COMPARISON: Multiple prior CT examinations of the chest commencing  June 24, 2009 with direct comparison made to February 4, 2011  Multiple prior CT examinations of the abdomen and pelvis commencing  June 19, 2009 with direct comparison made to March 25, 2021  TECHNIQUE: CT examination of the chest, abdomen and pelvis was performed without intravenous contrast   Axial, sagittal, and coronal 2D reformatted images were created from the source data and submitted for interpretation  Radiation dose length product (DLP) for this visit:  1334 02 mGy-cm   This examination, like all CT scans performed in the [facility], was performed utilizing techniques to minimize radiation dose exposure, including the use of iterative reconstruction and automated exposure control  Enteric contrast was not administered  FINDINGS: CHEST LUNGS:  Lungs are clear  There is no tracheal or endobronchial lesion  PLEURA:  Unremarkable  HEART/GREAT VESSELS: Normal heart size  Coronary artery calcifications and atherosclerotic calcifications of the aorta  No thoracic aortic aneurysm  MEDIASTINUM AND VINNIE:  Decreased caliber of the distal esophageal lumen (series 602, image 115) CHEST WALL AND LOWER NECK:  Unremarkable  ABDOMEN LIVER/BILIARY TREE:  There are one or more hepatic simple cyst(s) present  No CT evidence of suspicious solid hepatic mass  Normal hepatic contours  No biliary dilatation  GALLBLADDER:  There are gallstone(s) within the gallbladder, without pericholecystic inflammatory changes  SPLEEN:  Unremarkable  PANCREAS:  Fatty atrophy of the pancreas  ADRENAL GLANDS:  Unremarkable  KIDNEYS/URETERS:  One or more simple renal cyst(s) is noted  Cortical thinning in the right lower kidney  No hydronephrosis  STOMACH AND BOWEL:  There is colonic diverticulosis without evidence of acute diverticulitis   APPENDIX:  No findings to suggest appendicitis  ABDOMINOPELVIC CAVITY:  No ascites  No pneumoperitoneum  No lymphadenopathy  VESSELS:  Atherosclerotic changes are present  No evidence of aneurysm  Surgical clips anterior to the aortic bifurcation indicative of prior surgery  Assessment for aortic patency is limited given lack of intravenous contrast  PELVIS REPRODUCTIVE ORGANS:  Unremarkable for patient's age  URINARY BLADDER:  Unremarkable  ABDOMINAL WALL/INGUINAL REGIONS:  Large supraumbilical and infraumbilical hernias containing nonobstructed small bowel and colon  OSSEOUS STRUCTURES:  No acute fracture or destructive osseous lesion  Spinal degenerative changes are noted  Partial fusion of the L3 and L4 vertebral bodies is unchanged, and is likely post infectious in etiology given the February 4, 2011 findings  Impression Luminal narrowing of the distal esophagus may be due to esophageal underdistention, though given patient's symptoms, a functional obstruction cannot be excluded based on this imaging, and an esophagram can be considered for further assessment  Otherwise  no acute thoracic or abdominopelvic pathology with chronic findings as delineated above  Workstation performed: [id number]      NM PET CT skull base to mid thigh    Result Date: 6/24/2022  Narrative PET/CT SCAN INDICATION:  C15 5: Malignant neoplasm of lower third of esophagus   , initial staging MODIFIER: PI COMPARISON: CT 5/2/2022 and priors CELL TYPE:  Poorly differentiated adenocarcinoma, lower esophagus biopsy 5/4/2022 TECHNIQUE:   10 6 mCi F-18-FDG administered IV  Multiplanar attenuation corrected and non attenuation corrected PET images were acquired 60 minutes post injection  Contiguous, low dose, axial CT sections were obtained from the vertex through the femurs   Intravenous contrast material was not utilized    This examination, like all CT scans performed in the [facility], was performed utilizing techniques to minimize radiation dose exposure, including the use of iterative reconstruction and automated exposure control  Fasting serum glucose: 89 mg/dl FINDINGS: VISUALIZED BRAIN:   No acute abnormalities are seen  HEAD/NECK:   There is a physiologic distribution of FDG  No FDG avid cervical adenopathy is seen  CT images: Unremarkable  CHEST: Known distal esophageal mass demonstrates only minimal FDG activity, SUV 2 1  For comparison, mediastinal blood pool has an SUV of 3 5  No hypermetabolic adenopathy visualized  Given the low FDG avidity of the primary esophageal mass, evaluation for small metastases is likely limited  No FDG avid soft tissue lesions are seen  CT images: Coronary atherosclerosis  Low-density intracardiac blood pool suggesting underlying anemia  ABDOMEN:   No FDG avid soft tissue lesions are seen  CT images: Multiple ventral hernias in the midline and right lower quadrant, containing loops of unobstructed small and large bowel  Cholelithiasis  Stable small hepatic and left liver hypodensities, favoring cysts  PELVIS: No FDG avid soft tissue lesions are seen  CT images: Unremarkable  OSSEOUS STRUCTURES: No FDG avid lesions are seen  CT images: Stable  Impression 1  Known distal esophageal mass demonstrates only minimal FDG activity, SUV 2 1  Given the low FDG avidity of the primary esophageal mass, evaluation for small metastases is likely limited  2   Otherwise no hypermetabolic lesions are visualized that are concerning for malignancy   Workstation performed: [id number]

## 2022-07-06 ENCOUNTER — TELEPHONE (OUTPATIENT)
Dept: GENETICS | Facility: CLINIC | Age: 77
End: 2022-07-06

## 2022-07-06 NOTE — TELEPHONE ENCOUNTER
I called Martha Menchaca to schedule a new patient appointment with the Cancer [facility]etics [facility]       Outcome:   I left a voice message encouraging the patient to call the genetics team at [phone] to schedule this appointment  Follow-up: We will make one more attempt to reach the patient

## 2022-07-07 ENCOUNTER — TELEPHONE (OUTPATIENT)
Dept: HEMATOLOGY ONCOLOGY | Facility: CLINIC | Age: 77
End: 2022-07-07

## 2022-07-07 NOTE — TELEPHONE ENCOUNTER
Appointment Cancellation Or Reschedule     Person calling in Patient    Provider Dr Michael Marie Date and Time 7/15 at 11:20am   Office Visit Location [place]   Did patient want to reschedule their office appointment? If so, when was it scheduled to? Yes  7/19 at 3:20pm   Is this patient calling to reschedule an infusion appointment? no   When is their next infusion appointment? n/a   Is this patient a Chemo patient? no   Reason for Cancellation or Reschedule Patient request tuesdays or thursdays  If the patient is a treatment patient, please route this to the office nurse  If the patient is not on treatment, please route to the office MA  If the patient is a surgical oncology patient, please route to surg/onc clinical pool

## 2022-07-11 ENCOUNTER — HOSPITAL ENCOUNTER (INPATIENT)
Facility: HOSPITAL | Age: 77
LOS: 4 days | Discharge: HOME | DRG: 375 | End: 2022-07-15
Attending: EMERGENCY MEDICINE | Admitting: INTERNAL MEDICINE
Payer: MEDICARE

## 2022-07-11 ENCOUNTER — TELEPHONE (OUTPATIENT)
Dept: SURGICAL ONCOLOGY | Facility: CLINIC | Age: 77
End: 2022-07-11

## 2022-07-11 ENCOUNTER — TELEPHONE (OUTPATIENT)
Dept: OTHER | Facility: OTHER | Age: 77
End: 2022-07-11

## 2022-07-11 ENCOUNTER — APPOINTMENT (EMERGENCY)
Dept: RADIOLOGY | Facility: HOSPITAL | Age: 77
DRG: 375 | End: 2022-07-11
Payer: MEDICARE

## 2022-07-11 DIAGNOSIS — K20.90 ESOPHAGITIS DETERMINED BY ENDOSCOPY: ICD-10-CM

## 2022-07-11 DIAGNOSIS — M25.462 PAIN AND SWELLING OF LEFT KNEE: ICD-10-CM

## 2022-07-11 DIAGNOSIS — M25.562 ACUTE PAIN OF LEFT KNEE: ICD-10-CM

## 2022-07-11 DIAGNOSIS — R13.19 ESOPHAGEAL DYSPHAGIA: ICD-10-CM

## 2022-07-11 DIAGNOSIS — C15.9 ESOPHAGEAL ADENOCARCINOMA (HCC): ICD-10-CM

## 2022-07-11 DIAGNOSIS — R13.10 DIFFICULTY SWALLOWING: ICD-10-CM

## 2022-07-11 DIAGNOSIS — C15.9 ESOPHAGEAL CANCER (HCC): Primary | ICD-10-CM

## 2022-07-11 DIAGNOSIS — M25.562 PAIN AND SWELLING OF LEFT KNEE: ICD-10-CM

## 2022-07-11 DIAGNOSIS — K29.80 DUODENITIS: ICD-10-CM

## 2022-07-11 PROBLEM — D61.818 PANCYTOPENIA (HCC): Status: ACTIVE | Noted: 2022-07-11

## 2022-07-11 PROBLEM — D64.9 ANEMIA: Status: ACTIVE | Noted: 2022-07-11

## 2022-07-11 LAB
2HR DELTA HS TROPONIN: 0 NG/L
ABO GROUP BLD: NORMAL
ALBUMIN SERPL BCP-MCNC: 3.5 G/DL (ref 3.5–5)
ALP SERPL-CCNC: 174 U/L (ref 46–116)
ALT SERPL W P-5'-P-CCNC: 10 U/L (ref 12–78)
ANION GAP SERPL CALCULATED.3IONS-SCNC: 6 MMOL/L (ref 4–13)
ANISOCYTOSIS BLD QL SMEAR: PRESENT
AST SERPL W P-5'-P-CCNC: 18 U/L (ref 5–45)
BASOPHILS # BLD MANUAL: 0.12 THOUSAND/UL (ref 0–0.1)
BASOPHILS NFR MAR MANUAL: 4 % (ref 0–1)
BILIRUB SERPL-MCNC: 0.61 MG/DL (ref 0.2–1)
BLD GP AB SCN SERPL QL: NEGATIVE
BUN SERPL-MCNC: 12 MG/DL (ref 5–25)
CALCIUM SERPL-MCNC: 8.9 MG/DL (ref 8.3–10.1)
CARDIAC TROPONIN I PNL SERPL HS: 7 NG/L
CARDIAC TROPONIN I PNL SERPL HS: 7 NG/L
CHLORIDE SERPL-SCNC: 109 MMOL/L (ref 100–108)
CO2 SERPL-SCNC: 26 MMOL/L (ref 21–32)
CREAT SERPL-MCNC: 0.98 MG/DL (ref 0.6–1.3)
EOSINOPHIL # BLD MANUAL: 0.09 THOUSAND/UL (ref 0–0.4)
EOSINOPHIL NFR BLD MANUAL: 3 % (ref 0–6)
ERYTHROCYTE [DISTWIDTH] IN BLOOD BY AUTOMATED COUNT: 22.1 % (ref 11.6–15.1)
GFR SERPL CREATININE-BSD FRML MDRD: 74 ML/MIN/1.73SQ M
GLUCOSE SERPL-MCNC: 101 MG/DL (ref 65–140)
HCT VFR BLD AUTO: 34.9 % (ref 36.5–49.3)
HGB BLD-MCNC: 11.3 G/DL (ref 12–17)
LYMPHOCYTES # BLD AUTO: 0.83 THOUSAND/UL (ref 0.6–4.47)
LYMPHOCYTES # BLD AUTO: 27 % (ref 14–44)
MCH RBC QN AUTO: 31.2 PG (ref 26.8–34.3)
MCHC RBC AUTO-ENTMCNC: 32.4 G/DL (ref 31.4–37.4)
MCV RBC AUTO: 96 FL (ref 82–98)
MONOCYTES # BLD AUTO: 0.03 THOUSAND/UL (ref 0–1.22)
MONOCYTES NFR BLD: 1 % (ref 4–12)
NEUTROPHILS # BLD MANUAL: 1.99 THOUSAND/UL (ref 1.85–7.62)
NEUTS SEG NFR BLD AUTO: 65 % (ref 43–75)
PLATELET # BLD AUTO: 107 THOUSANDS/UL (ref 149–390)
PLATELET BLD QL SMEAR: ABNORMAL
POIKILOCYTOSIS BLD QL SMEAR: PRESENT
POLYCHROMASIA BLD QL SMEAR: PRESENT
POTASSIUM SERPL-SCNC: 4 MMOL/L (ref 3.5–5.3)
PROT SERPL-MCNC: 7.9 G/DL (ref 6.4–8.2)
RBC # BLD AUTO: 3.62 MILLION/UL (ref 3.88–5.62)
RH BLD: POSITIVE
SCHISTOCYTES BLD QL SMEAR: PRESENT
SODIUM SERPL-SCNC: 141 MMOL/L (ref 136–145)
SPECIMEN EXPIRATION DATE: NORMAL
TARGETS BLD QL SMEAR: PRESENT
WBC # BLD AUTO: 3.06 THOUSAND/UL (ref 4.31–10.16)

## 2022-07-11 PROCEDURE — 85027 COMPLETE CBC AUTOMATED: CPT

## 2022-07-11 PROCEDURE — NC001 PR NO CHARGE: Performed by: INTERNAL MEDICINE

## 2022-07-11 PROCEDURE — 93005 ELECTROCARDIOGRAM TRACING: CPT

## 2022-07-11 PROCEDURE — 86900 BLOOD TYPING SEROLOGIC ABO: CPT | Performed by: EMERGENCY MEDICINE

## 2022-07-11 PROCEDURE — 86850 RBC ANTIBODY SCREEN: CPT | Performed by: EMERGENCY MEDICINE

## 2022-07-11 PROCEDURE — 71045 X-RAY EXAM CHEST 1 VIEW: CPT

## 2022-07-11 PROCEDURE — 84484 ASSAY OF TROPONIN QUANT: CPT

## 2022-07-11 PROCEDURE — 80053 COMPREHEN METABOLIC PANEL: CPT

## 2022-07-11 PROCEDURE — 99285 EMERGENCY DEPT VISIT HI MDM: CPT

## 2022-07-11 PROCEDURE — 36415 COLL VENOUS BLD VENIPUNCTURE: CPT

## 2022-07-11 PROCEDURE — 85007 BL SMEAR W/DIFF WBC COUNT: CPT

## 2022-07-11 PROCEDURE — 99285 EMERGENCY DEPT VISIT HI MDM: CPT | Performed by: EMERGENCY MEDICINE

## 2022-07-11 PROCEDURE — 86901 BLOOD TYPING SEROLOGIC RH(D): CPT | Performed by: EMERGENCY MEDICINE

## 2022-07-11 PROCEDURE — 96360 HYDRATION IV INFUSION INIT: CPT

## 2022-07-11 RX ORDER — ONDANSETRON 2 MG/ML
4 INJECTION INTRAMUSCULAR; INTRAVENOUS EVERY 6 HOURS PRN
Status: DISCONTINUED | OUTPATIENT
Start: 2022-07-11 | End: 2022-07-15 | Stop reason: HOSPADM

## 2022-07-11 RX ORDER — PHENOBARBITAL 64.8 MG/1
64.8 TABLET ORAL 2 TIMES DAILY
Status: DISCONTINUED | OUTPATIENT
Start: 2022-07-11 | End: 2022-07-15 | Stop reason: HOSPADM

## 2022-07-11 RX ORDER — PANTOPRAZOLE SODIUM 40 MG/10ML
40 INJECTION, POWDER, LYOPHILIZED, FOR SOLUTION INTRAVENOUS
Status: DISCONTINUED | OUTPATIENT
Start: 2022-07-12 | End: 2022-07-13

## 2022-07-11 RX ORDER — DOCUSATE SODIUM 100 MG/1
100 CAPSULE, LIQUID FILLED ORAL 2 TIMES DAILY
Status: DISCONTINUED | OUTPATIENT
Start: 2022-07-12 | End: 2022-07-11

## 2022-07-11 RX ORDER — ACETAMINOPHEN 325 MG/1
487.5 TABLET ORAL EVERY 6 HOURS PRN
Status: DISCONTINUED | OUTPATIENT
Start: 2022-07-11 | End: 2022-07-15 | Stop reason: HOSPADM

## 2022-07-11 RX ORDER — LABETALOL HYDROCHLORIDE 5 MG/ML
10 INJECTION, SOLUTION INTRAVENOUS EVERY 6 HOURS PRN
Status: DISCONTINUED | OUTPATIENT
Start: 2022-07-11 | End: 2022-07-15 | Stop reason: HOSPADM

## 2022-07-11 RX ORDER — SODIUM CHLORIDE, SODIUM GLUCONATE, SODIUM ACETATE, POTASSIUM CHLORIDE, MAGNESIUM CHLORIDE, SODIUM PHOSPHATE, DIBASIC, AND POTASSIUM PHOSPHATE .53; .5; .37; .037; .03; .012; .00082 G/100ML; G/100ML; G/100ML; G/100ML; G/100ML; G/100ML; G/100ML
100 INJECTION, SOLUTION INTRAVENOUS CONTINUOUS
Status: DISPENSED | OUTPATIENT
Start: 2022-07-11 | End: 2022-07-12

## 2022-07-11 RX ORDER — ENOXAPARIN SODIUM 100 MG/ML
40 INJECTION SUBCUTANEOUS DAILY
Status: DISCONTINUED | OUTPATIENT
Start: 2022-07-12 | End: 2022-07-15 | Stop reason: HOSPADM

## 2022-07-11 RX ORDER — ASPIRIN 81 MG/1
81 TABLET ORAL DAILY
Status: DISCONTINUED | OUTPATIENT
Start: 2022-07-12 | End: 2022-07-15 | Stop reason: HOSPADM

## 2022-07-11 RX ORDER — SENNOSIDES 8.6 MG
1 TABLET ORAL DAILY
Status: DISCONTINUED | OUTPATIENT
Start: 2022-07-12 | End: 2022-07-11

## 2022-07-11 RX ADMIN — SODIUM CHLORIDE 1000 ML: 0.9 INJECTION, SOLUTION INTRAVENOUS at 15:14

## 2022-07-11 RX ADMIN — SODIUM CHLORIDE, SODIUM GLUCONATE, SODIUM ACETATE, POTASSIUM CHLORIDE, MAGNESIUM CHLORIDE, SODIUM PHOSPHATE, DIBASIC, AND POTASSIUM PHOSPHATE 100 ML/HR: .53; .5; .37; .037; .03; .012; .00082 INJECTION, SOLUTION INTRAVENOUS at 19:38

## 2022-07-11 RX ADMIN — PHENOBARBITAL 64.8 MG: 64.8 TABLET ORAL at 19:37

## 2022-07-11 NOTE — ASSESSMENT & PLAN NOTE
· Patient diagnosed with T2 N0 distal esophageal adenocarcinoma in May 2022  · Sees Dr Rachel Chamberlain  Recommended to undergo induction chemotherapy and radiation therapy followed by possible transhiatal esophagogastrectomy  Plan was for surgical candidacy assessment following these treatments  · Patient has not yet started treatment       Plan:  · See above A/P for dysphagia

## 2022-07-11 NOTE — H&P
INTERNAL MEDICINE RESIDENCY ADMISSION H&P     Name: Kaylah Sesay   Age & Sex: 67 y o  male   MRN: [id number]  Unit/Bed#: CRB   Encounter: [id number]  Primary Care Provider: Barbara Luna MD    Code Status: DNR/DNI  Admission Status: INPATIENT   Disposition: Patient requires Med/Surg    Admit to team: SOD Team B     ASSESSMENT/PLAN     Principal Problem:    Dysphagia  Active Problems:    Esophageal adenocarcinoma (HCC)    Seizure disorder (HCC)    Chronic combined systolic and diastolic CHF (congestive heart failure) (HCC)    Paroxysmal atrial fibrillation (HCC)    Pancytopenia (HCC)    CAD (coronary artery disease)    Hernia, incisional    HTN (hypertension)    HLD (hyperlipidemia)      * Dysphagia  Assessment & Plan  Patient presents with progressively worsening dysphagia since May 2022  Previously was able to eat a normal diet in the beginning of May, concurrently presenting unable to to swallow any food and his own saliva  Given the temporal relationship of his diagnosis of esophageal adenocarcinoma, tumor mass effect is the most likely cause of his dysphagia  Plan:  - NPO pending speech eval  - IVF @ 100 cc/hr  - Pantoprazole 40mg IV  - Consider esophagram  - Consult GI for alternative nutritional options  - Consider oncology consult  - Consider consult to CT surgery for resection    Esophageal adenocarcinoma [facility])  Assessment & Plan  Patient diagnosed with T2 N0 distal esophageal adenocarcinoma in May 2022  This is likely the source of the patient's dysphagia  Sees Dr Mickael Cowden  Recommended to undergo induction chemotherapy and radiation therapy followed by possible transhiatal esophagogastrectomy  Plan was for surgical candidacy assessment following these treatments  Patient has not started these treatments yet  Plan:  - See above A/P for dysphagia    Seizure disorder [facility])  Assessment & Plan  Patient with history of seizure disorder    Managed outpatient on phenobarbital 64 8 mg qday   Will continue inpatient  Pancytopenia [facility])  Assessment & Plan  Patient presents to the ED with a hemoglobin of 11 3  Multiple CBCs in the past noted hemoglobin in this range  WBC 3 06  Platelets 269  In the setting of known gastric adenocarcinoma  Plan:  - Continue to monitor serial CBCs  - Plan to transfuse for hemoglobin < 7    Paroxysmal atrial fibrillation [facility])  Assessment & Plan  Patient has a history of paroxysmal atrial fibrillation  Patient previously on Coumadin for left apical thrombus however since been discontinued by his cardiologist      Chronic combined systolic and diastolic CHF (congestive heart failure) ([facility] [address] )  Assessment & Plan  Wt Readings from Last 3 Encounters:   07/05/22 92 9 kg (204 lb 12 9 oz)   06/16/22 94 6 kg (208 lb 9 6 oz)   05/02/22 100 kg (220 lb 12 8 oz)     Patient with history of systolic and diastolic heart failure  Last echo in March 2022 with an ejection fraction 50%  Plan:   Strict intake and output monitoring   Daily standing weights    CAD (coronary artery disease)  Assessment & Plan  History of CAD s/p stenting x 4      - On ASA 81 for secondary prevention      Hernia, incisional  Assessment & Plan  Patient with ventral hernia developed following AAA repair in 2005  Hernia has not been surgically repaired at this time  His cardiothoracic surgeon plans to address this surgically if he is deemed to be a surgical candidate for resection of his primary tumor  HLD (hyperlipidemia)  Assessment & Plan  Patient with history of hyperlipidemia coronary artery disease  Currently takes 80 mg atorvastatin  Will continue inpatient  HTN (hypertension)  Assessment & Plan  Patient with history of hypertension however no longer takes antihypertensive medications  Patient is normotensive on admission        VTE Pharmacologic Prophylaxis: Enoxaparin (Lovenox)  VTE Mechanical Prophylaxis: sequential compression device    CHIEF COMPLAINT     Chief Complaint Patient presents with    Difficulty Swallowing     Pt states that he has lost 17lbs in two months  Pt recently dx with throat cancer  Pt having difficulty swallowing, vomiting,and is coughing up blood  HISTORY OF PRESENT ILLNESS     Mr Alli Perla is a 75yoM with PMH of 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, N0, dx 05/2022), AAA repair in 2005 with ventral hernia, coronary artery disease s/p 4 x stents, combined systolic and diastolic heart failure (EF 50% 3/2022), and seizure disorder on phenobarbital presenting with progressive dysphagia since May  In May he was able to eat his regular diet, however as time passed he describes increasing frequency of sensations of food items becoming stuck in his throat  This has progressed to the present where he is currently unable to swallow his own saliva  He described this morning attempting to drink coffee and ultimately spitting it all up  He did spit up pink colored saliva 2 x per the patient  Currently with dysphagia of both solids and liquids  Of note, the patient was diagnosed with distal esophageal adenocarcinoma following hospitalization for similar symptoms of dysphagia  PET CT scan done in June 2022 with no hypermetabolic lesions concerning for malignancy, aside from known esophageal mass  Followed by thoracic surgery  No alcohol drinking history, however with history of smoking half a pack a day for 50+ pack years but has quit  In the ED he was hemodynamically stable, afebrile, saturating well on room air  CBC significant for hemoglobin of 11 3, WBC 3 06, platelets 855  Troponins negative  CMP significant for alkaline phosphatase of 174  Chest x-ray negative for acute cardiopulmonary disease  Patient was spitting green saliva into a bag at bedside  Patient is DNR/DNI  Admitted to [facility] [place] [place] for possible GI or surgical intervention      REVIEW OF SYSTEMS     Review of Systems   Constitutional: Negative for chills, diaphoresis, fatigue and fever    HENT: Positive for trouble swallowing  Negative for congestion, dental problem, facial swelling and hearing loss  Eyes: Negative for pain and redness  Respiratory: Negative for apnea, cough, chest tightness and shortness of breath  Cardiovascular: Positive for leg swelling  Negative for chest pain and palpitations  Gastrointestinal: Negative for abdominal distention, abdominal pain, nausea and vomiting  Endocrine: Negative for polydipsia and polyuria  Genitourinary: Negative for difficulty urinating, dysuria, flank pain, frequency, hematuria and urgency  Musculoskeletal: Negative for arthralgias and back pain  Neurological: Negative for dizziness, light-headedness and headaches  Psychiatric/Behavioral: Negative for agitation, behavioral problems, confusion and dysphoric mood  OBJECTIVE     Vitals:    22 1358 22 1748 22 1943 22 194   BP: 104/69 100/58 134/71 134/71   BP Location:  Right arm Left arm    Pulse: 79 86 64 60   Resp: 20 20 18    Temp: 97 7 °F (36 5 °C)      SpO2: 95% 99% 99% 99%      Temperature:   Temp (24hrs), Av 7 °F (36 5 °C), Min:97 7 °F (36 5 °C), Max:97 7 °F (36 5 °C)    Temperature: 97 7 °F (36 5 °C)  Intake & Output:  I/O        0701  07/10 0700 07/10 07 07 07 0700    IV Piggyback   1000    Total Intake   1000    Net   +1000               Weights: There is no height or weight on file to calculate BMI  Weight (last 2 days)     None        Physical Exam  Vitals and nursing note reviewed  Constitutional:       General: He is not in acute distress  Appearance: Normal appearance  He is not ill-appearing  HENT:      Head: Normocephalic and atraumatic  Eyes:      General: No scleral icterus  Extraocular Movements: Extraocular movements intact  Conjunctiva/sclera: Conjunctivae normal       Pupils: Pupils are equal, round, and reactive to light     Cardiovascular:      Rate and Rhythm: Normal rate and regular rhythm  Pulses: Normal pulses  Heart sounds: Normal heart sounds  No murmur heard  No gallop  Pulmonary:      Effort: Pulmonary effort is normal  No respiratory distress  Breath sounds: Normal breath sounds  No wheezing, rhonchi or rales  Abdominal:      Palpations: Abdomen is soft  Tenderness: There is no abdominal tenderness  There is no guarding or rebound  Hernia: A hernia is present  Comments: Ventral hernia  Musculoskeletal:      Cervical back: Neck supple  Right lower leg: Edema present  Left lower leg: Edema present  Comments: Bilateral dependent edema  Skin:     General: Skin is warm and dry  Neurological:      General: No focal deficit present  Mental Status: He is alert and oriented to person, place, and time  Cranial Nerves: No cranial nerve deficit  Motor: No weakness  Psychiatric:         Mood and Affect: Mood normal          Behavior: Behavior normal        PAST MEDICAL HISTORY     Past Medical History:   Diagnosis Date    Cardiac disease     CHF (congestive heart failure) ([facility] [address] )     Hernia of abdominal cavity     Myocardial infarction ([facility] [address] )     last assessed 14, past, Pt had MI s/p AGUSTIN placed in , refuses to take any other medications for his cardiac disease, only will take seizure med   Understands possible complications from this decision    Seizure [facility])      PAST SURGICAL HISTORY     Past Surgical History:   Procedure Laterality Date    ABDOMINAL AORTIC ANEURYSM REPAIR      for dialation or occulsion    CATARACT EXTRACTION       SOCIAL & FAMILY HISTORY     Social History     Substance and Sexual Activity   Alcohol Use Not Currently     Substance and Sexual Activity   Alcohol Use Not Currently        Substance and Sexual Activity   Drug Use Never     Social History     Tobacco Use   Smoking Status Former Smoker    Quit date: 2005    Years since quittin 1   Smokeless Tobacco Never Used     Family History   Problem Relation Age of Onset    Hypertension Father     Kidney disease Brother      LABORATORY DATA     Labs: I have personally reviewed pertinent reports  Results from last 7 days   Lab Units 07/11/22  1506   WBC Thousand/uL 3 06*   HEMOGLOBIN g/dL 11 3*   HEMATOCRIT % 34 9*   PLATELETS Thousands/uL 107*   MONO PCT % 1*      Results from last 7 days   Lab Units 07/11/22  1506   POTASSIUM mmol/L 4 0   CHLORIDE mmol/L 109*   CO2 mmol/L 26   BUN mg/dL 12   CREATININE mg/dL 0 98   CALCIUM mg/dL 8 9   ALK PHOS U/L 174*   ALT U/L 10*   AST U/L 18                          Micro:  Lab Results   Component Value Date    BLOODCX No Growth After 5 Days  01/30/2017    BLOODCX No Growth After 5 Days  01/30/2017     IMAGING & DIAGNOSTIC TESTS     Imaging: I have personally reviewed pertinent reports  No results found  EKG, Pathology, and Other Studies: I have personally reviewed pertinent reports  ALLERGIES     Allergies   Allergen Reactions    Iodinated Diagnostic Agents Rash     Pt's skin get very red and he gets hot and chills    Clopidogrel Rash     MEDICATIONS PRIOR TO ARRIVAL     Prior to Admission medications    Medication Sig Start Date End Date Taking? Authorizing Provider   aspirin (Aspirin Low Dose) 81 mg EC tablet Take 1 tablet (81 mg total) by mouth daily 1/24/22  Yes Dora Landa, DO   atorvastatin (LIPITOR) 80 mg tablet Take 1 tablet (80 mg total) by mouth daily with dinner 3/11/22  Yes Ginette Silva MD   pantoprazole (PROTONIX) 40 mg tablet TAKE 1 TABLET BY MOUTH 2 TIMES A DAY BEFORE MEALS   5/31/22  Yes Ambrocio Araya,    PHENobarbital 64 8 mg tablet TAKE 1 TABLET (64 8 MG TOTAL) BY MOUTH 2 (TWO) TIMES A DAY 6/6/22 12/3/22 Yes Arline Bhatia,    acetaminophen (TYLENOL) 500 mg tablet Take 1 tablet (500 mg total) by mouth every 6 (six) hours as needed for mild pain  Patient not taking: No sig reported 3/26/22   Benja Chirinos MD   metoprolol succinate (TOPROL-XL) 50 mg 24 hr tablet TAKE 1 TABLET BY MOUTH EVERY DAY  Patient not taking: No sig reported 6/30/21   Andrea Gage DO   nitroglycerin (NITROSTAT) 0 4 mg SL tablet Place 1 tablet (0 4 mg total) under the tongue every 5 (five) minutes as needed for chest pain  Patient not taking: No sig reported 4/7/21   Andrea Gage DO   pantoprazole (PROTONIX) 40 mg tablet TAKE 1 TABLET BY MOUTH EVERY DAY  Patient not taking: Reported on 7/11/2022 7/1/22   Ambrocio Araya DO   sucralfate (CARAFATE) 1 g tablet Take 1 tablet (1 g total) by mouth 3 (three) times a day before meals 5/5/22 6/16/22  Ambrocio Araya DO   triamcinolone (KENALOG) 0 025 % cream APPLY TO AFFECTED AREA TWICE A DAY  Patient not taking: No sig reported 3/7/22   Roxy Cortez DO   warfarin (COUMADIN) 2 5 mg tablet Take 2 tabs by mouth daily or as directed by physician  Patient not taking: No sig reported 12/9/21   Sudhakar Bundy DO     MEDICATIONS ADMINISTERED IN LAST 24 HOURS     Medication Administration - last 24 hours from 07/10/2022 2010 to 07/11/2022 2010       Date/Time Order Dose Route Action Action by     07/11/2022 1624 sodium chloride 0 9 % bolus 1,000 mL 0 mL Intravenous Stopped Tuan Starks RN     07/11/2022 1514 sodium chloride 0 9 % bolus 1,000 mL 1,000 mL Intravenous New Bag Kristine Contreras     07/11/2022 1938 multi-electrolyte (PLASMALYTE-A/ISOLYTE-S PH 7 4) IV solution 100 mL/hr Intravenous New Bag Crescencio Iverson, MC     07/11/2022 1937 PHENobarbital tablet 64 8 mg 64 8 mg Oral Given Crescencio Fill, MC        CURRENT MEDICATIONS     multi-electrolyte, 100 mL/hr, Last Rate: 100 mL/hr (07/11/22 1938)        Admission Time  I spent 45 minutes admitting the patient  This involved direct patient contact where I performed a full history and physical, reviewing previous records, and reviewing laboratory and other diagnostic studies  Portions of the record may have been created with voice recognition software    Occasional wrong word or "sound a like" substitutions may have occurred due to the inherent limitations of voice recognition software    Read the chart carefully and recognize, using context, where substitutions have occurred     ==  Hermelindo Sanchez MD  [address]  Internal Medicine Residency PGY-1

## 2022-07-11 NOTE — ASSESSMENT & PLAN NOTE
· Large ventral hernia developed following AAA repair in 2005  · Hernia has not been surgically repaired  Plan:  · Cardiothoracic surgeon plans to address this surgically if he undergoes resection of his primary tumor

## 2022-07-11 NOTE — ASSESSMENT & PLAN NOTE
· Hx of hypertension however no longer takes antihypertensive medications  · Patient is normotensive on admission      Plan:  · F/u outpatient

## 2022-07-11 NOTE — ASSESSMENT & PLAN NOTE
· Presented to the ED with a hemoglobin of 11 3, WBC 3 06, Platelets 401  Multiple CBCs in the past in this range  · In setting of esophageal adenocarcinoma       Plan:  · F/u outpatient

## 2022-07-11 NOTE — TELEPHONE ENCOUNTER
 Hello, can I please speak to (patient name) this is (enter your name here) calling from [facility]  [facility]) to remind you of your appointment on (7/12 10am) at [facility]  I am calling to review our no-show/cancelation policy and masking policy  Do you have a few minutes? We ask that you come at least 15 minutes early for your appointment to complete all paperwork  However, If you are up to 20 minutes late for your appointment, we may need to reschedule you  Any lateness to an appointment may result in an shorted visit, for our providers to offer you the most out of your consult, please arrive early  We require at least 24-hour notice for cancelations and if you miss your appointment 3 times, we may unfortunately not be able to reschedule any future visits  We ask that you please call our office in the event you are feeling ill as we may need to reschedule your appointment  You are allowed to bring only one visitor with you to your appointment, if your visitor is not feeling well we ask that you don't bring them  Our current masking policy is: if you are vaccinated masking is optional, if you are unvaccinated masking is required  We look forward to seeing you at your upcoming appointment! Attempted to leave message, voicemail message said "machine is off" and call disconnected

## 2022-07-11 NOTE — ASSESSMENT & PLAN NOTE
· History of CAD s/p stenting x 4    · On ASA 81 for secondary prevention    Plan:  · F/u outpatient

## 2022-07-11 NOTE — ASSESSMENT & PLAN NOTE
Wt Readings from Last 3 Encounters:   07/05/22 92 9 kg (204 lb 12 9 oz)   06/16/22 94 6 kg (208 lb 9 6 oz)   05/02/22 100 kg (220 lb 12 8 oz)     · Hx of systolic and diastolic heart failure  · Last echo in March 2022 EF 50%      Plan:   F/u oupatient

## 2022-07-11 NOTE — ED PROVIDER NOTES
History  Chief Complaint   Patient presents with    Difficulty Swallowing     Pt states that he has lost 17lbs in two months  Pt recently dx with throat cancer  Pt having difficulty swallowing, vomiting,and is coughing up blood  65-year-old male patient with history of distal esophageal CA, CAD, CHF on aspirin and Coumadin presenting with difficulty swallowing, worse today  Patient was recently diagnosed with esophageal cancer in May  States he had a EGD with biopsy because he was unable to swallow  Patient states today he was spitting up with blood in his sputum, unable to tolerate secretions, and having chest pain with slow transit when eating  Patient's states that when he ate toast and took his medicine today, was having worsening pain and felt like food was stuck in his chest   Denies fever, vomiting, diarrhea, shortness of breath  Patient is to start chemotherapy and radiation next coming months  Patient states that he has lost 17 lb in the past 2 months  Prior to Admission Medications   Prescriptions Last Dose Informant Patient Reported? Taking?    PHENobarbital 64 8 mg tablet 7/11/2022 at 11:00 Self No Yes   Sig: TAKE 1 TABLET (64 8 MG TOTAL) BY MOUTH 2 (TWO) TIMES A DAY   acetaminophen (TYLENOL) 500 mg tablet Not Taking at Unknown time  No No   Sig: Take 1 tablet (500 mg total) by mouth every 6 (six) hours as needed for mild pain   Patient not taking: No sig reported   aspirin (Aspirin Low Dose) 81 mg EC tablet 7/11/2022 at Unknown time Self No Yes   Sig: Take 1 tablet (81 mg total) by mouth daily   atorvastatin (LIPITOR) 80 mg tablet 7/10/2022 at Unknown time Self No Yes   Sig: Take 1 tablet (80 mg total) by mouth daily with dinner   metoprolol succinate (TOPROL-XL) 50 mg 24 hr tablet Not Taking at Unknown time  No No   Sig: TAKE 1 TABLET BY MOUTH EVERY DAY   Patient not taking: No sig reported   nitroglycerin (NITROSTAT) 0 4 mg SL tablet Not Taking at Unknown time  No No   Sig: Place 1 tablet (0 4 mg total) under the tongue every 5 (five) minutes as needed for chest pain   Patient not taking: No sig reported   pantoprazole (PROTONIX) 40 mg tablet Not Taking at cannot swallow  No No   Sig: TAKE 1 TABLET BY MOUTH 2 TIMES A DAY BEFORE MEALS  Patient not taking: Reported on 7/11/2022   pantoprazole (PROTONIX) 40 mg tablet Not Taking  No No   Sig: TAKE 1 TABLET BY MOUTH EVERY DAY   Patient not taking: Reported on 7/11/2022   sucralfate (CARAFATE) 1 g tablet   No No   Sig: Take 1 tablet (1 g total) by mouth 3 (three) times a day before meals   triamcinolone (KENALOG) 0 025 % cream Not Taking at Unknown time  No No   Sig: APPLY TO AFFECTED AREA TWICE A DAY   Patient not taking: No sig reported   warfarin (COUMADIN) 2 5 mg tablet Not Taking at Unknown time  No No   Sig: Take 2 tabs by mouth daily or as directed by physician   Patient not taking: No sig reported      Facility-Administered Medications: None       Past Medical History:   Diagnosis Date    Cardiac disease     [facility]congestive [facility] failure) ([facility] )     Hernia of abdominal cavity     [facility]     last assessed 7/31/14, past, Pt had MI s/p AGUSTIN placed in 2001, refuses to take any other medications for his cardiac disease, only will take seizure med  Understands possible complications from this decision    Seizure [facility])        Past Surgical History:   Procedure Laterality Date    ABDOMINAL AORTIC ANEURYSM REPAIR      for dialation or occulsion    CATARACT EXTRACTION         Family History   Problem Relation Age of Onset    Hypertension Father     Kidney disease Brother      I have reviewed and agree with the history as documented      E-Cigarette/Vaping    E-Cigarette Use Never User      E-Cigarette/Vaping Substances    Nicotine No     THC No     CBD No     Flavoring No     Other No     Unknown No      Social History     Tobacco Use    Smoking status: Former Smoker     Quit date: 6/5/2005     Years since quittin 1    Smokeless tobacco: Never Used   Vaping Use    Vaping Use: Never used   Substance Use Topics    Alcohol use: Not Currently    Drug use: Never        Review of Systems   Constitutional: Negative for chills and fever  HENT: Positive for trouble swallowing  Negative for congestion, rhinorrhea and sore throat  Respiratory: Negative for cough, chest tightness and shortness of breath  Cardiovascular: Positive for chest pain  Negative for leg swelling  Gastrointestinal: Positive for nausea  Negative for abdominal pain, diarrhea and vomiting  Genitourinary: Negative for difficulty urinating and dysuria  Musculoskeletal: Negative for arthralgias, back pain and myalgias  Skin: Negative for rash and wound  Neurological: Negative for dizziness and headaches  All other systems reviewed and are negative  Physical Exam  ED Triage Vitals   Temperature Pulse Respirations Blood Pressure SpO2   22 1358 22 1358 22 1358 22 1358 22 1358   97 7 °F (36 5 °C) 79 20 104/69 95 %      Temp Source Heart Rate Source Patient Position - Orthostatic VS BP Location FiO2 (%)   22 1945 22 1358 22 1748 22 1748 --   Oral Monitor Lying Right arm       Pain Score       22       No Pain             Orthostatic Vital Signs  Vitals:    22 1502 22 2219 22 0725 22 1515   BP: 109/59 113/61 114/61 114/61   Pulse: 72 72 82 71   Patient Position - Orthostatic VS:           Physical Exam  Vitals reviewed  Constitutional:       Appearance: Normal appearance  HENT:      Head: Normocephalic and atraumatic  Nose: Nose normal       Mouth/Throat:      Mouth: Mucous membranes are moist       Pharynx: Oropharynx is clear  Eyes:      Extraocular Movements: Extraocular movements intact  Conjunctiva/sclera: Conjunctivae normal    Cardiovascular:      Rate and Rhythm: Normal rate and regular rhythm  Pulses: Normal pulses  Heart sounds: Normal heart sounds  Pulmonary:      Effort: Pulmonary effort is normal       Breath sounds: Normal breath sounds  Abdominal:      General: Bowel sounds are normal       Palpations: Abdomen is soft  Tenderness: There is no abdominal tenderness  Hernia: A hernia (hernia present in abdomen) is present  Musculoskeletal:         General: Normal range of motion  Cervical back: Normal range of motion  Skin:     General: Skin is warm and dry  Neurological:      General: No focal deficit present  Mental Status: He is alert and oriented to person, place, and time  Mental status is at baseline           ED Medications  Medications   multi-electrolyte (PLASMALYTE-A/ISOLYTE-S PH 7 4) IV solution (0 mL/hr Intravenous Stopped 7/12/22 0959)   enoxaparin (LOVENOX) subcutaneous injection 40 mg (40 mg Subcutaneous Given 7/14/22 1032)   ondansetron (ZOFRAN) injection 4 mg (4 mg Intravenous Given 7/14/22 0531)   acetaminophen (TYLENOL) tablet 487 5 mg (has no administration in time range)   aspirin (ECOTRIN LOW STRENGTH) EC tablet 81 mg (81 mg Oral Given 7/14/22 1033)   PHENobarbital tablet 64 8 mg (64 8 mg Oral Given 7/14/22 1033)   labetalol (NORMODYNE) injection 10 mg (has no administration in time range)   dextrose 5 % and sodium chloride 0 45 % infusion (150 mL/hr Intravenous New Bag 7/14/22 1230)   atorvastatin (LIPITOR) tablet 80 mg (80 mg Oral Given 7/13/22 1808)   pantoprazole (PROTONIX) injection 40 mg (40 mg Intravenous Given 7/14/22 1033)   Lidocaine Viscous HCl (XYLOCAINE) 2 % mucosal solution 15 mL (has no administration in time range)   sodium chloride 0 9 % bolus 1,000 mL (0 mL Intravenous Stopped 7/11/22 1624)   potassium chloride 20 mEq IVPB (premix) (0 mEq Intravenous Stopped 7/12/22 2150)   potassium chloride 20 mEq IVPB (premix) (0 mEq Intravenous Stopped 7/12/22 2150)       Diagnostic Studies  Results Reviewed     Procedure Component Value Units Date/Time    CBC and differential [[id number]]  (Abnormal) Collected: 07/12/22 0532    Lab Status: Final result Specimen: Blood from Arm, Right Updated: 07/12/22 0749     WBC 3 32 Thousand/uL      RBC 2 98 Million/uL      Hemoglobin 9 4 g/dL      Hematocrit 29 6 %      MCV 99 fL      MCH 31 5 pg      MCHC 31 8 g/dL      RDW 22 1 %      Platelets 87 Thousands/uL      nRBC 0 /100 WBCs      Neutrophils Relative 51 %      Immat GRANS % 1 %      Lymphocytes Relative 28 %      Monocytes Relative 13 %      Eosinophils Relative 6 %      Basophils Relative 1 %      Neutrophils Absolute 1 69 Thousands/µL      Immature Grans Absolute 0 04 Thousand/uL      Lymphocytes Absolute 0 94 Thousands/µL      Monocytes Absolute 0 42 Thousand/µL      Eosinophils Absolute 0 20 Thousand/µL      Basophils Absolute 0 03 Thousands/µL     Comprehensive metabolic panel [[id number]]  (Abnormal) Collected: 07/12/22 0532    Lab Status: Final result Specimen: Blood from Arm, Right Updated: 07/12/22 0630     Sodium 142 mmol/L      Potassium 3 4 mmol/L      Chloride 112 mmol/L      CO2 25 mmol/L      ANION GAP 5 mmol/L      BUN 11 mg/dL      Creatinine 0 71 mg/dL      Glucose 94 mg/dL      Calcium 8 1 mg/dL      Corrected Calcium 8 9 mg/dL      AST 12 U/L      ALT 9 U/L      Alkaline Phosphatase 143 U/L      Total Protein 6 5 g/dL      Albumin 3 0 g/dL      Total Bilirubin 0 46 mg/dL      eGFR 90 ml/min/1 73sq m     Narrative:      [place] guidelines for Chronic Kidney Disease (CKD):     Stage 1 with normal or high GFR (GFR > 90 mL/min/1 73 square meters)    Stage 2 Mild CKD (GFR = 60-89 mL/min/1 73 square meters)    Stage 3A Moderate CKD (GFR = 45-59 mL/min/1 73 square meters)    Stage 3B Moderate CKD (GFR = 30-44 mL/min/1 73 square meters)    Stage 4 Severe CKD (GFR = 15-29 mL/min/1 73 square meters)    Stage 5 End Stage CKD (GFR <15 mL/min/1 73 square meters)  Note: GFR calculation is accurate only with a steady state creatinine Platelet count [[id number]]     Lab Status: No result Specimen: Blood     HS Troponin I 2hr [[id number]]  (Normal) Collected: 07/11/22 1713    Lab Status: Final result Specimen: Blood from Arm, Right Updated: 07/11/22 1753     hs TnI 2hr 7 ng/L      Delta 2hr hsTnI 0 ng/L     CBC and differential [[id number]]  (Abnormal) Collected: 07/11/22 1506    Lab Status: Final result Specimen: Blood from Arm, Left Updated: 07/11/22 1651     WBC 3 06 Thousand/uL      RBC 3 62 Million/uL      Hemoglobin 11 3 g/dL      Hematocrit 34 9 %      MCV 96 fL      MCH 31 2 pg      MCHC 32 4 g/dL      RDW 22 1 %      Platelets 618 Thousands/uL     Narrative: This is an appended report  These results have been appended to a previously verified report      Manual Differential(PHLEBS Do Not Order) [[id number]]  (Abnormal) Collected: 07/11/22 1506    Lab Status: Final result Specimen: Blood from Arm, Left Updated: 07/11/22 1651     Segmented % 65 %      Lymphocytes % 27 %      Monocytes % 1 %      Eosinophils, % 3 %      Basophils % 4 %      Absolute Neutrophils 1 99 Thousand/uL      Lymphocytes Absolute 0 83 Thousand/uL      Monocytes Absolute 0 03 Thousand/uL      Eosinophils Absolute 0 09 Thousand/uL      Basophils Absolute 0 12 Thousand/uL      Total Counted --     Anisocytosis Present     Poikilocytes Present     Polychromasia Present     Schistocytes Present     Target Cells Present     Platelet Estimate Borderline    HS Troponin I 4hr [[id number]]     Lab Status: No result Specimen: Blood     HS Troponin 0hr (reflex protocol) [[id number]]  (Normal) Collected: 07/11/22 1506    Lab Status: Final result Specimen: Blood from Arm, Left Updated: 07/11/22 1558     hs TnI 0hr 7 ng/L     Comprehensive metabolic panel [[id number]]  (Abnormal) Collected: 07/11/22 1506    Lab Status: Final result Specimen: Blood from Arm, Left Updated: 07/11/22 1554     Sodium 141 mmol/L      Potassium 4 0 mmol/L      Chloride 109 mmol/L      CO2 26 mmol/L ANION GAP 6 mmol/L      BUN 12 mg/dL      Creatinine 0 98 mg/dL      Glucose 101 mg/dL      Calcium 8 9 mg/dL      AST 18 U/L      ALT 10 U/L      Alkaline Phosphatase 174 U/L      Total Protein 7 9 g/dL      Albumin 3 5 g/dL      Total Bilirubin 0 61 mg/dL      eGFR 74 ml/min/1 73sq m     Narrative:      [place] guidelines for Chronic Kidney Disease (CKD):     Stage 1 with normal or high GFR (GFR > 90 mL/min/1 73 square meters)    Stage 2 Mild CKD (GFR = 60-89 mL/min/1 73 square meters)    Stage 3A Moderate CKD (GFR = 45-59 mL/min/1 73 square meters)    Stage 3B Moderate CKD (GFR = 30-44 mL/min/1 73 square meters)    Stage 4 Severe CKD (GFR = 15-29 mL/min/1 73 square meters)    Stage 5 End Stage CKD (GFR <15 mL/min/1 73 square meters)  Note: GFR calculation is accurate only with a steady state creatinine                 XR chest 1 view portable   Final Result by Isael Morrow MD (07/11 1912)      No acute cardiopulmonary disease  Workstation performed: [id number]               Procedures  Procedures      ED Course  ED Course as of 07/14/22 1543   Mon Jul 11, 2022   1624 EKG: unchanged from previous tracings  NSR> q waves in leads II, avF  1 degree av block  HEART Risk Score    Flowsheet Row Most Recent Value   Heart Score Risk Calculator    History 0 Filed at: 07/14/2022 1543   ECG 0 Filed at: 07/14/2022 1543   Age 2 Filed at: 07/14/2022 1543   Risk Factors 2 Filed at: 07/14/2022 1543   Troponin 0 Filed at: 07/14/2022 1543   HEART Score 4 Filed at: 07/14/2022 1543        Identification of Seniors at [address] [place] [address] Most Recent Value   (ISAR) Identification of Seniors at Risk    Before the illness or injury that brought you to the Emergency, did you need someone to help you on a regular basis?  0 Filed at: 07/11/2022 1400   In the last 24 hours, have you needed more help than usual? 0 Filed at: 07/11/2022 1400   Have you been hospitalized for one or more nights during the past 6 months? 0 Filed at: 07/11/2022 1400   In general, do you see well? 0 Filed at: 07/11/2022 1400   In general, do you have serious problems with your memory? 0 Filed at: 07/11/2022 1400   Do you take more than three different medications every day? 1 Filed at: 07/11/2022 1400   ISAR Score 1 Filed at: 07/11/2022 1400                    SBIRT 22yo+    Flowsheet Row Most Recent Value   SBIRT (23 yo +)    In order to provide better care to our patients, we are screening all of our patients for alcohol and drug use  Would it be okay to ask you these screening questions? Unable to answer at this time Filed at: 07/11/2022 1417                [facility]  Number of Diagnoses or Management Options  Difficulty swallowing  Esophageal cancer [facility])  Diagnosis management comments: 54-year-old male patient with history of recently diagnosed esophageal cancer, CAD, on Coumadin presenting with difficulty swallowing/eating secondary to pain  Patient has not started treatment for his esophageal cancer at the moment  Patient has also been coughing up blood in his sputum  On exam, oropharynx clear  Labs within normal limits, troponin negative  Will admit to medicine for difficulty swallowing secondary to esophageal cancer         Amount and/or Complexity of Data Reviewed  Clinical lab tests: ordered and reviewed  Tests in the radiology section of CPT®: ordered and reviewed        Disposition  Final diagnoses:   [facility] ([facility]   Difficulty swallowing     Time reflects when diagnosis was documented in both MDM as applicable and the Disposition within this note     Time User Action Codes Description Comment    7/11/2022  5:08 PM Gilmar FELIXTL Add [C15 9] Esophageal cancer ([facility] [address] )     7/11/2022  5:08 PM Jose Brennan Add [R13 10] Difficulty swallowing     7/12/2022  9:04 AM Karine Nye Add [C15 9] Esophageal adenocarcinoma ([facility] )     7/12/2022 12:37 PM Kj Wright Add [R13 19] Esophageal dysphagia       ED Disposition     ED Disposition   Admit    Condition   Stable    Date/Time   Mon Jul 11, 2022  5:08 PM    Comment   Case was discussed with Dr Chalo Iglesias and the patient's admission status was agreed to be Admission Status: inpatient status to the service of Dr Erasmo Mishra   Follow-up Information    None         Current Discharge Medication List      CONTINUE these medications which have NOT CHANGED    Details   aspirin (Aspirin Low Dose) 81 mg EC tablet Take 1 tablet (81 mg total) by mouth daily  Qty: 90 tablet, Refills: 3    Associated Diagnoses: Chronic combined systolic and diastolic CHF (congestive heart failure) ([facility] ); NSTEMI (non-ST elevated myocardial infarction) ([facility] ); Essential hypertension; Seizure disorder (HCC)      atorvastatin (LIPITOR) 80 mg tablet Take 1 tablet (80 mg total) by mouth daily with dinner  Qty: 90 tablet, Refills: 4    Comments: DX Code Needed    Associated Diagnoses: Chronic combined systolic and diastolic CHF (congestive heart failure) ([facility] ); NSTEMI (non-ST elevated myocardial infarction) ([facility] ); Essential hypertension; Seizure disorder (HCC)      PHENobarbital 64 8 mg tablet TAKE 1 TABLET (64 8 MG TOTAL) BY MOUTH 2 (TWO) TIMES A DAY  Qty: 180 tablet, Refills: 1    Comments: This request is for a new prescription for a controlled substance as required by Federal/State law    Associated Diagnoses: Seizure disorder (HCC)      acetaminophen (TYLENOL) 500 mg tablet Take 1 tablet (500 mg total) by mouth every 6 (six) hours as needed for mild pain  Qty: 30 tablet, Refills: 0    Associated Diagnoses: Acute pain of left knee; Pain and swelling of left knee      metoprolol succinate (TOPROL-XL) 50 mg 24 hr tablet TAKE 1 TABLET BY MOUTH EVERY DAY  Qty: 90 tablet, Refills: 0    Associated Diagnoses: Coronary artery disease involving native coronary artery of native heart without angina pectoris; Chronic combined systolic and diastolic CHF (congestive heart failure) ([facility]      nitroglycerin (NITROSTAT) 0 4 mg SL tablet Place 1 tablet (0 4 mg total) under the tongue every 5 (five) minutes as needed for chest pain  Qty: 30 tablet, Refills: 1    Associated Diagnoses: Coronary artery disease involving native coronary artery of native heart without angina pectoris      ! ! pantoprazole (PROTONIX) 40 mg tablet TAKE 1 TABLET BY MOUTH 2 TIMES A DAY BEFORE MEALS  Qty: 180 tablet, Refills: 3    Associated Diagnoses: Esophagitis determined by endoscopy; Duodenitis      ! ! pantoprazole (PROTONIX) 40 mg tablet TAKE 1 TABLET BY MOUTH EVERY DAY  Qty: 90 tablet, Refills: 3    Associated Diagnoses: Esophagitis determined by endoscopy; Duodenitis      sucralfate (CARAFATE) 1 g tablet Take 1 tablet (1 g total) by mouth 3 (three) times a day before meals  Qty: 90 tablet, Refills: 0    Associated Diagnoses: Esophagitis determined by endoscopy; Duodenitis      triamcinolone (KENALOG) 0 025 % cream APPLY TO AFFECTED AREA TWICE A DAY  Qty: 30 g, Refills: 1    Associated Diagnoses: Allergic reaction to contrast media      warfarin (COUMADIN) 2 5 mg tablet Take 2 tabs by mouth daily or as directed by physician  Qty: 180 tablet, Refills: 3    Comments: Pts dose is increased  Associated Diagnoses: Apical mural thrombus       ! ! - Potential duplicate medications found  Please discuss with provider  No discharge procedures on file  PDMP Review       Value Time User    PDMP Reviewed  Yes 11/16/2021  1:20 PM Pasquale Hidden, DO           ED Provider  Attending physically available and evaluated [place] [place] I managed the patient along with the ED Attending      Electronically Signed by         Ryan Koenig MD  07/14/22 1231

## 2022-07-11 NOTE — ASSESSMENT & PLAN NOTE
· Hx of paroxysmal atrial fibrillation    · Previously on warfarin for left apical thrombus, however has since been discontinued by his cardiologist      Plan:  · F/u outpatient

## 2022-07-11 NOTE — ASSESSMENT & PLAN NOTE
· Patient presents with progressively worsening dysphagia since May 2022  Previously was able to eat a normal diet in the beginning of May, now unable to swallow any food, liquids, and saliva  · Given the temporal relationship of his diagnosis of esophageal adenocarcinoma, tumor mass effect is the most likely cause of his dysphagia  · EGD (7/13): Ulcerated friable mass with stricture/narrowing of distal esophagus  Stent successfully placed  Plan:  · Patient is to continue mechanical soft diet with thin liquids indefinitely as long as he has the esophageal stent in place  · Keep elevated 45 degrees at all times to minimize reflux    · Continue pantoprazole 40 mg BID

## 2022-07-11 NOTE — ED ATTENDING ATTESTATION
7/11/2022  I, Juana Doyle MD, saw and evaluated the patient  I have discussed the patient with the resident/non-physician practitioner and agree with the resident's/non-physician practitioner's findings, Plan of Care, and MDM as documented in the resident's/non-physician practitioner's note, except where noted  All available labs and Radiology studies were reviewed  I was present for key portions of any procedure(s) performed by the resident/non-physician practitioner and I was immediately available to provide assistance  At this point I agree with the current assessment done in the Emergency Department  I have conducted an independent evaluation of this patient a history and physical is as follows:  41-year-old male with a history of esophageal cancer diagnosed at the end of May  Patient has been unable to swallow his own saliva since yesterday  Patient states that he has had progressive dysphagia, with a feeling like things do not go all way down  Since yesterday, he has not been able to keep anything down at all  Patient denies fevers  He has no shortness of breath  He denies abdominal pain  He has lost significant amount of weight over the last 2 months  Review of systems otherwise -12 systems reviewed  On exam the patient is awake, alert, interactive  He has no evidence of respiratory distress  He has adequate blood pressure  He appears to be well perfused, but is mucous membranes are tacky  Face is symmetric  Oropharynx is clear  The patient neck is supple nontender with no adenopathy  His heart is regular without murmurs, rubs, or gallops  His lungs are clear bilaterally with good air movement  Abdomen is soft and nontender without masses, rebound, guarding  Extremities are intact    Neurologically nonfocal with normal skin and back exam   Impression:  Esophageal cancer with esophageal obstruction, inability to take p o   Will plan to establish IV access, give IV fluid bolus, check labs, admit to the hospital  ED Course         Critical Care Time  Procedures

## 2022-07-12 LAB
ALBUMIN SERPL BCP-MCNC: 3 G/DL (ref 3.5–5)
ALP SERPL-CCNC: 143 U/L (ref 46–116)
ALT SERPL W P-5'-P-CCNC: 9 U/L (ref 12–78)
ANION GAP SERPL CALCULATED.3IONS-SCNC: 5 MMOL/L (ref 4–13)
AST SERPL W P-5'-P-CCNC: 12 U/L (ref 5–45)
ATRIAL RATE: 60 BPM
BASOPHILS # BLD AUTO: 0.03 THOUSANDS/ΜL (ref 0–0.1)
BASOPHILS NFR BLD AUTO: 1 % (ref 0–1)
BILIRUB SERPL-MCNC: 0.46 MG/DL (ref 0.2–1)
BUN SERPL-MCNC: 11 MG/DL (ref 5–25)
CALCIUM ALBUM COR SERPL-MCNC: 8.9 MG/DL (ref 8.3–10.1)
CALCIUM SERPL-MCNC: 8.1 MG/DL (ref 8.3–10.1)
CHLORIDE SERPL-SCNC: 112 MMOL/L (ref 100–108)
CO2 SERPL-SCNC: 25 MMOL/L (ref 21–32)
CREAT SERPL-MCNC: 0.71 MG/DL (ref 0.6–1.3)
EOSINOPHIL # BLD AUTO: 0.2 THOUSAND/ΜL (ref 0–0.61)
EOSINOPHIL NFR BLD AUTO: 6 % (ref 0–6)
ERYTHROCYTE [DISTWIDTH] IN BLOOD BY AUTOMATED COUNT: 22.1 % (ref 11.6–15.1)
GFR SERPL CREATININE-BSD FRML MDRD: 90 ML/MIN/1.73SQ M
GLUCOSE SERPL-MCNC: 94 MG/DL (ref 65–140)
HCT VFR BLD AUTO: 29.6 % (ref 36.5–49.3)
HGB BLD-MCNC: 9.4 G/DL (ref 12–17)
IMM GRANULOCYTES # BLD AUTO: 0.04 THOUSAND/UL (ref 0–0.2)
IMM GRANULOCYTES NFR BLD AUTO: 1 % (ref 0–2)
LYMPHOCYTES # BLD AUTO: 0.94 THOUSANDS/ΜL (ref 0.6–4.47)
LYMPHOCYTES NFR BLD AUTO: 28 % (ref 14–44)
MCH RBC QN AUTO: 31.5 PG (ref 26.8–34.3)
MCHC RBC AUTO-ENTMCNC: 31.8 G/DL (ref 31.4–37.4)
MCV RBC AUTO: 99 FL (ref 82–98)
MONOCYTES # BLD AUTO: 0.42 THOUSAND/ΜL (ref 0.17–1.22)
MONOCYTES NFR BLD AUTO: 13 % (ref 4–12)
NEUTROPHILS # BLD AUTO: 1.69 THOUSANDS/ΜL (ref 1.85–7.62)
NEUTS SEG NFR BLD AUTO: 51 % (ref 43–75)
NRBC BLD AUTO-RTO: 0 /100 WBCS
P AXIS: 71 DEGREES
PLATELET # BLD AUTO: 87 THOUSANDS/UL (ref 149–390)
POTASSIUM SERPL-SCNC: 3.4 MMOL/L (ref 3.5–5.3)
PR INTERVAL: 224 MS
PROT SERPL-MCNC: 6.5 G/DL (ref 6.4–8.2)
QRS AXIS: -26 DEGREES
QRSD INTERVAL: 84 MS
QT INTERVAL: 402 MS
QTC INTERVAL: 402 MS
RBC # BLD AUTO: 2.98 MILLION/UL (ref 3.88–5.62)
SODIUM SERPL-SCNC: 142 MMOL/L (ref 136–145)
T WAVE AXIS: 14 DEGREES
VENTRICULAR RATE: 60 BPM
WBC # BLD AUTO: 3.32 THOUSAND/UL (ref 4.31–10.16)

## 2022-07-12 PROCEDURE — 85025 COMPLETE CBC W/AUTO DIFF WBC: CPT

## 2022-07-12 PROCEDURE — C9113 INJ PANTOPRAZOLE SODIUM, VIA: HCPCS

## 2022-07-12 PROCEDURE — 92610 EVALUATE SWALLOWING FUNCTION: CPT

## 2022-07-12 PROCEDURE — 99222 1ST HOSP IP/OBS MODERATE 55: CPT | Performed by: SURGERY

## 2022-07-12 PROCEDURE — 97166 OT EVAL MOD COMPLEX 45 MIN: CPT

## 2022-07-12 PROCEDURE — 93010 ELECTROCARDIOGRAM REPORT: CPT | Performed by: INTERNAL MEDICINE

## 2022-07-12 PROCEDURE — NC001 PR NO CHARGE: Performed by: INTERNAL MEDICINE

## 2022-07-12 PROCEDURE — 80053 COMPREHEN METABOLIC PANEL: CPT

## 2022-07-12 PROCEDURE — 99223 1ST HOSP IP/OBS HIGH 75: CPT | Performed by: INTERNAL MEDICINE

## 2022-07-12 PROCEDURE — 99222 1ST HOSP IP/OBS MODERATE 55: CPT | Performed by: INTERNAL MEDICINE

## 2022-07-12 PROCEDURE — 97162 PT EVAL MOD COMPLEX 30 MIN: CPT

## 2022-07-12 RX ORDER — POTASSIUM CHLORIDE 14.9 MG/ML
20 INJECTION INTRAVENOUS ONCE
Status: COMPLETED | OUTPATIENT
Start: 2022-07-12 | End: 2022-07-12

## 2022-07-12 RX ORDER — ATORVASTATIN CALCIUM 80 MG/1
80 TABLET, FILM COATED ORAL
Status: DISCONTINUED | OUTPATIENT
Start: 2022-07-13 | End: 2022-07-15 | Stop reason: HOSPADM

## 2022-07-12 RX ORDER — DEXTROSE AND SODIUM CHLORIDE 5; .45 G/100ML; G/100ML
150 INJECTION, SOLUTION INTRAVENOUS CONTINUOUS
Status: DISCONTINUED | OUTPATIENT
Start: 2022-07-12 | End: 2022-07-15

## 2022-07-12 RX ORDER — POTASSIUM CHLORIDE 14.9 MG/ML
20 INJECTION INTRAVENOUS 2 TIMES DAILY
Status: COMPLETED | OUTPATIENT
Start: 2022-07-12 | End: 2022-07-12

## 2022-07-12 RX ORDER — SODIUM CHLORIDE, SODIUM GLUCONATE, SODIUM ACETATE, POTASSIUM CHLORIDE, MAGNESIUM CHLORIDE, SODIUM PHOSPHATE, DIBASIC, AND POTASSIUM PHOSPHATE .53; .5; .37; .037; .03; .012; .00082 G/100ML; G/100ML; G/100ML; G/100ML; G/100ML; G/100ML; G/100ML
100 INJECTION, SOLUTION INTRAVENOUS CONTINUOUS
Status: DISCONTINUED | OUTPATIENT
Start: 2022-07-12 | End: 2022-07-12

## 2022-07-12 RX ADMIN — POTASSIUM CHLORIDE 20 MEQ: 14.9 INJECTION, SOLUTION INTRAVENOUS at 09:43

## 2022-07-12 RX ADMIN — SODIUM CHLORIDE, SODIUM GLUCONATE, SODIUM ACETATE, POTASSIUM CHLORIDE, MAGNESIUM CHLORIDE, SODIUM PHOSPHATE, DIBASIC, AND POTASSIUM PHOSPHATE 100 ML/HR: .53; .5; .37; .037; .03; .012; .00082 INJECTION, SOLUTION INTRAVENOUS at 05:33

## 2022-07-12 RX ADMIN — PHENOBARBITAL 64.8 MG: 64.8 TABLET ORAL at 18:13

## 2022-07-12 RX ADMIN — PANTOPRAZOLE SODIUM 40 MG: 40 INJECTION, POWDER, FOR SOLUTION INTRAVENOUS at 09:43

## 2022-07-12 RX ADMIN — POTASSIUM CHLORIDE 20 MEQ: 14.9 INJECTION, SOLUTION INTRAVENOUS at 15:12

## 2022-07-12 RX ADMIN — DEXTROSE AND SODIUM CHLORIDE 100 ML/HR: 5; .45 INJECTION, SOLUTION INTRAVENOUS at 15:11

## 2022-07-12 RX ADMIN — PHENOBARBITAL 64.8 MG: 64.8 TABLET ORAL at 09:43

## 2022-07-12 NOTE — CONSULTS
Consult: Surgical Oncology  Taz Sandoval 67 y o  male MRN: [id number]  Unit/Bed#: [facility] 907-01 Encounter: [id number]        Assessment/Plan     Assessment:  Patient is a 67 y o  male with pmhx of CAD; stenting x4 (most recent 2021), CHF, hypertension, hyperlipidemia, AFib (not on anticoagulation), pancytopenia, seizure disorder, AAA repair in  complicated by fascial dehiscence and large incisional hernia and known esophageal adenocarcinoma (see T2 N0) who presented with worsening dysphagia which has been progressive since May of this year, initially only to solids and now unable to tolerate his own secretions  Afebrile, VSS  WBC: 3 3; Hb 4    Plan:  -EGD tomorrow with placement of esophageal stent  -should avoid PEG tube placement as to preserve gastric conduit given its possible esophagectomy in future  -will consider placement of J-tube if stent placement is unsuccessful  -Advance to CLD when tolerating per SLP recs  -continue with plan for neoadjuvant chemoradiation as outpatient    History of Present Illness     HPI:  Taz Sandoval is a 67 y o  male  with pmhx of CAD; stenting x4 (most recent 2021), CHF, hypertension, hyperlipidemia, AFib (not on anticoagulation), pancytopenia, seizure disorder, AAA repair in  complicated by fascial dehiscence and large incisional hernia and known esophageal adenocarcinoma (see T2 N0) who presented with worsening dysphagia which has been progressive since May of this year  Initially he only noticed a feeling of fullness after swallowing solid foods which eventually progressed to the same feeling with liquids  He had been able to maintain adequate p o  intake until 7/10 when he was unable to swallow his nighttime medications; he reported regurgitation of liquids at this point including saliva  He reports that he has had normal bowel movements the last of which was on 7/10  Additionally he reports a 20 lb weight loss over the past 2 and half months  When he initially presented in May he underwent the EGD with biopsy as well as endoscopic ultrasound; this revealed poorly differentiated adenocarcinoma with extension into the adventitia  He has been evaluated by Medical Oncology with a plan to undergo neoadjuvant chemoradiation  He was scheduled to be seen in clinic by Surgical Oncology today for assessment of chemo port placement  He denies fevers, chills, nausea, chest pain, and shortness of breath  Review of Systems   Constitutional: Negative for activity change and appetite change  HENT: Positive for trouble swallowing  Progressive dysphagia now to solids and liquids   Eyes: Negative  Respiratory: Negative  Cardiovascular: Negative  Gastrointestinal: Negative for abdominal distention, abdominal pain and anal bleeding  New onset Regurgitation with attempt to swallow liquids   Neurological: Negative  Inpatient Consult to Surgical Oncology  Consult performed by: Aimee Dave MD  Consult ordered by: Viry Melendez MD          Historical Information   Past Medical History:   Diagnosis Date    Cardiac disease     CHF [facility]congestive heart failure) ([facility] [address] )     Hernia of abdominal cavity     Myocardial infarction [facility])     last assessed 14, past, Pt had MI s/p AGUSTIN placed in , refuses to take any other medications for his cardiac disease, only will take seizure med   Understands possible complications from this decision    Seizure [facility])      Past Surgical History:   Procedure Laterality Date    ABDOMINAL AORTIC ANEURYSM REPAIR      for dialation or occulsion    CATARACT EXTRACTION       Social History   Social History     Substance and Sexual Activity   Alcohol Use Not Currently     Social History     Substance and Sexual Activity   Drug Use Never     Social History     Tobacco Use   Smoking Status Former Smoker    Quit date: 2005    Years since quittin 1   Smokeless Tobacco Never Used     Family History: non-contributory    Meds/Allergies   all medications and allergies reviewed  Allergies   Allergen Reactions    Iodinated Diagnostic Agents Rash     Pt's skin get very red and he gets hot and chills    Clopidogrel Rash       Objective   First Vitals:   Blood Pressure: 104/69 (07/11/22 1358)  Pulse: 79 (07/11/22 1358)  Temperature: 97 7 °F (36 5 °C) (07/11/22 1358)  Temp Source: Oral (07/11/22 1945)  Respirations: 20 (07/11/22 1358)  Height: 6' (182 9 cm) (07/11/22 1945)  Weight - Scale: 91 4 kg (201 lb 8 oz) (07/11/22 1945)  SpO2: 95 % (07/11/22 1358)    Current Vitals:   Blood Pressure: 112/66 (07/12/22 0814)  Pulse: 76 (07/12/22 0814)  Temperature: 98 2 °F (36 8 °C) (07/12/22 0814)  Temp Source: Oral (07/11/22 1945)  Respirations: 17 (07/12/22 0814)  Height: 6' (182 9 cm) (07/11/22 1945)  Weight - Scale: 91 4 kg (201 lb 8 oz) (07/11/22 1945)  SpO2: 93 % (07/12/22 0814)      Intake/Output Summary (Last 24 hours) at 7/12/2022 1427  Last data filed at 7/12/2022 0532  Gross per 24 hour   Intake 1000 ml   Output 200 ml   Net 800 ml       Invasive Devices  Report    Peripheral Intravenous Line  Duration           Peripheral IV 07/11/22 Right Antecubital <1 day                Physical Exam  Constitutional:       Appearance: Normal appearance  HENT:      Head: Atraumatic  Mouth/Throat:      Mouth: Mucous membranes are moist       Pharynx: No oropharyngeal exudate or posterior oropharyngeal erythema  Eyes:      Extraocular Movements: Extraocular movements intact  Conjunctiva/sclera: Conjunctivae normal    Cardiovascular:      Rate and Rhythm: Normal rate and regular rhythm  Pulses: Normal pulses  Pulmonary:      Effort: Pulmonary effort is normal    Abdominal:      General: Bowel sounds are normal  There is no distension  Palpations: Abdomen is soft  Tenderness: There is no abdominal tenderness  There is no guarding        Comments: Large midline incisional hernia   Musculoskeletal: Cervical back: Neck supple  No tenderness  Lymphadenopathy:      Cervical: No cervical adenopathy  Skin:     General: Skin is warm and dry  Neurological:      General: No focal deficit present  Mental Status: He is alert  Lab Results: I have personally reviewed pertinent lab results  Imaging: I have personally reviewed pertinent reports  EKG, Pathology, and Other Studies: I have personally reviewed pertinent reports        Code Status: Level 3 - DNAR and DNI

## 2022-07-12 NOTE — PHYSICAL THERAPY NOTE
Physical Therapy Treatment Note    Patient's Name: Favian Mckay  : [id number]   PT Last Visit   PT Visit Date 22   Note Type   Note type Evaluation   Pain Assessment   Pain Assessment Tool 0-10   Pain Score No Pain   Restrictions/Precautions   Weight Bearing Precautions Per Order No   Other Precautions Fall Risk;Multiple lines   Home Living   Type of [address] One level;Stairs to enter with rails  (2 SEEMA)   Bathroom Shower/Tub Tub/shower unit   [place]   Prior Function   Level of [place] Independent with ADLs and functional mobility   Lives With Family  (brother Ana Martinez)   [place] [address] Help From Family;Home health;Friend(s)   ADL Assistance Independent   IADLs Independent   Falls in the last 6 months 1 to 4  (1 due to shoes w/ soles that were too big per pt)   Vocational Retired  (retired )   Comments I w/ functional mobility community distances w/o AD at baseline, (-)   General   Family/Caregiver Present No   Cognition   Attention Attends with cues to redirect  (verbose in conversation)   Orientation Level Oriented X4   Memory Within functional limits   Following Commands Follows all commands and directions without difficulty   Comments pleasant + cooperative   Subjective   Subjective Pt agreeable to mobilize  RLE Assessment   RLE Assessment WFL   LLE Assessment   LLE Assessment WFL   Coordination   Movements are Fluid and Coordinated 1   Bed Mobility   Supine to Sit Unable to assess   Sit to Supine Unable to assess   Additional Comments Pt greeted in chair  Transfers   Sit to Stand 7  Independent   Additional items Increased time required   Stand to Sit 7  Independent   Additional items Increased time required   Additional Comments no AD   Ambulation/Elevation   Gait pattern Excessively slow; Short stride; Wide NAVID   Gait Assistance 5  Supervision   Additional items Verbal cues   Assistive Device None   Distance 250'   Stair Management Assistance 5  Supervision   Stair Management Technique Reciprocal;One rail L   Number of Stairs 2   Balance   Static Sitting Fair +   Dynamic Sitting Fair +   Static Standing Fair   Dynamic Standing [place]   Endurance Deficit   Endurance Deficit No   Activity Tolerance   Activity Tolerance Patient tolerated treatment well   Medical Staff Made Aware BING Steele   Nurse Made Aware yes - cleared + updated   Assessment   Assessment Pt is 67 y o  male seen for a moderate complexity PT evaluation s/p admit to [address] [place] [address] on 7/11/2022  Pt presenting w/ principal dx of dysphagia (recent dx of esophageal cancer)  Please see above for other active problem list / PMH  PT now consulted to assess functional mobility and needs for safe d/c planning  Prior to admission, pt was I w/ functional mobility w/o AD community distances, lives in an apt w/ 2 SEEMA w/ his brother  Currently pt is I for functional transfers; S for ambulation w/o AD; S for stair negotiation  Pt presents near functional baseline  No further skilled acute PT needs  Will sign off  Goals   Patient Goals to walk more   PT Treatment Day 0   Plan   Treatment/Interventions   (eval only - d/c PT)   PT Frequency   (d/c PT)   Recommendation   PT Discharge Recommendation (S)  No rehabilitation needs   AM-PAC Basic Mobility Inpatient   Turning in Bed Without Bedrails 4   Lying on Back to Sitting on Edge of Flat Bed 4   Moving Bed to Chair 4   Standing Up From Chair 4   Walk in Room 4   Climb 3-5 Stairs 4   Basic Mobility Inpatient Raw Score 24   Basic Mobility Standardized Score 57 68   Highest Level Of Mobility   JH-HLM Goal 8: Walk 250 feet or more   JH-HLM Achieved 8: Walk 250 feet ot more   End of Consult   Patient Position at End of Consult Bedside chair; All needs within reach  (all lines in tact)   Personal factors / comorbidities: # of co-morbidities,  2 entry steps, fall risk  Body systems/functions: cardiopulmonary, upper GI, balance impairments  Clinical presentation: unstable due to multiple lines, need for ongoing medical monitoring, dysphagia w/ pending speech eros Crawford, PT, DPT

## 2022-07-12 NOTE — OCCUPATIONAL THERAPY NOTE
Occupational Therapy Evaluation     Patient Name: Dalia Castellanos  Today's Date: 7/12/2022  Problem List  Principal Problem:    Dysphagia  Active Problems:    CAD (coronary artery disease)    HTN (hypertension)    HLD (hyperlipidemia)    Seizure disorder (HCC)    Hernia, incisional    Chronic combined systolic and diastolic CHF (congestive heart failure) (HCC)    Paroxysmal atrial fibrillation ([facility] [address] )    Esophageal adenocarcinoma ([facility] [address] )    Pancytopenia ([facility] [address] )    Past Medical History  Past Medical History:   Diagnosis Date    Cardiac disease     CHF (congestive heart failure) ([facility] [address] )     Hernia of abdominal cavity     Myocardial infarction ([facility] [address] )     last assessed 7/31/14, past, Pt had MI s/p AGUSTIN placed in 2001, refuses to take any other medications for his cardiac disease, only will take seizure med  Understands possible complications from this decision    Seizure [facility])      Past Surgical History  Past Surgical History:   Procedure Laterality Date    ABDOMINAL AORTIC ANEURYSM REPAIR      for dialation or occulsion    CATARACT EXTRACTION           07/12/22 0901   OT Last Visit   OT Visit Date 07/12/22   Note Type   Note type Evaluation   Restrictions/Precautions   Weight Bearing Precautions Per Order No   Other Precautions Fall Risk;Multiple lines   Pain Assessment   Pain Assessment Tool 0-10   Pain Score No Pain   Home Living   Type of [address] One level  (2 SEEMA)   Bathroom Shower/Tub Tub/shower unit   Bathroom Toilet Standard   Bathroom Equipment   (denies)   [facility]  (denies use PTA)   Prior Function   Level of [place] Independent with ADLs and functional mobility   Lives With Family  (brother- bill)   Receives Help From Family; Neighbor;Friend(s)   ADL Assistance Independent   IADLs Independent   Falls in the last 6 months 1 to 4  (1 per pt report)   Vocational Retired   Lifestyle   Autonomy PTA pt I for ADLs, IADLs, and fxnl mobility, (-) driving   Reciprocal Relationships brother, neighbor   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[facility] [address] going to the [employer]   Psychosocial   Psychosocial (WDL) WDL   Subjective   Subjective "[facility]"   ADL   Where Assessed Chair   Eating Assistance 7  Independent   Grooming Assistance 7  Independent   UB Bathing Assistance 7  Independent   LB Bathing Assistance 5  Supervision/Setup   LB Bathing Deficit Increased time to complete;Supervision/safety   UB Dressing Assistance 7  Independent   LB Dressing Assistance 5  Supervision/Setup   LB Dressing Deficit Supervision/safety; Increased time to complete   Toileting Assistance  5  Supervision/Setup   Toileting Deficit Increased time to complete;Supervison/safety   Bed Mobility   Supine to Sit Unable to assess   Sit to Supine Unable to assess   Additional Comments pt sitting OOB in chair upon entry and left sitting OOB in chair   Transfers   Sit to Stand 7  Independent   Stand to Sit 7  Independent   Additional Comments transfers without AD   Functional Mobility   Functional Mobility 5  Supervision   Additional items   (no AD)   Balance   Static Sitting Good   Dynamic Sitting Fair +   Static Standing Fair   Dynamic Standing Fair   Ambulatory Fair   Activity Tolerance   Activity Tolerance Patient tolerated treatment well   Medical Staff Made Aware PT Renetta Hernandez   Nurse Made Aware RN approved for session   RUE Assessment   RUE Assessment WFL   LUE Assessment   LUE Assessment WFL   Hand Function   Gross Motor Coordination Functional   Fine Motor Coordination Functional   Cognition   Overall Cognitive Status WFL   Arousal/Participation Alert; Responsive; Cooperative   Attention Within functional limits   Orientation Level Oriented X4   Memory Within functional limits   Following Commands Follows all commands and directions without difficulty   Comments pt pleasant and cooperative t/o session   Assessment Assessment Pt is 67 y o  M presented to [facility] with esophageal cancer  Pt  has a past medical history of Cardiac disease, CHF, Hernia of abdominal cavity, Myocardial infarction, dysphagia, ischemic cardiomyopathy, acute gastric ulcer, NSTEMI, and Seizure  Pt lives with brother in 1 floor aparment, 2 SEEMA  PTA pt I with ADLs, shares IADLs, and - driving  Pt with active OT orders  Pt seen as co-evaluation with PT due to comorbidities, medical complexity, and current deficits  Pt presents to session sitting OOB in chair  Pt performed STS trx independently, and S for fnxl mobility without AD  Pt currently performers UB ADLs, eating and grooming independently and requires S for LB ADLs eating  OT recommendation at time of d/c to home with increased social support when medically stable  Pt left sitting OOB in chair with all current needs met  The patient's raw score on the AM-PAC Daily Activity inpatient short form is 21, standardized score is 44 27, greater than 39 4  Patients at this level are likely to benefit from discharge to home  Please refer to the recommendation of the Occupational Therapist for safe discharge planning     Goals   Patient Goals to walk more   Plan   OT Frequency Eval only   Recommendation   OT Discharge Recommendation No rehabilitation needs   Additional Comments  okay to d/c when medically stable   AM-PAC Daily Activity Inpatient   Lower Body Dressing 3   Bathing 3   Toileting 3   Upper Body Dressing 4   Grooming 4   Eating 4   Daily Activity Raw Score 21   Daily Activity Standardized Score (Calc for Raw Score >=11) 44 27   AM-PAC Applied Cognition Inpatient   Following a Speech/Presentation 4   Understanding Ordinary Conversation 4   Taking Medications 4   Remembering Where Things Are Placed or Put Away 4   Remembering List of 4-5 Errands 4   Taking Care of Complicated Tasks 4   Applied Cognition Raw Score 24   Applied Cognition Standardized Score 62 21     Germán Mena, [facility]

## 2022-07-12 NOTE — PROGRESS NOTES
07/12/22 1500   Clinical Encounter Type   Visited With Patient   Routine Visit Introduction  (Pastoral Visit with Fr Roxann San)

## 2022-07-12 NOTE — RESTORATIVE TECHNICIAN NOTE
Restorative Technician Note      Patient Name: Kaylah Sesay     Restorative Tech Visit Date: 7/12/2022  Note Type: Mobility  Patient Position Upon Consult: Bedside chair  Activity Performed: Ambulated  Assistive Device: Other (Comment) (none)  Patient Position at End of Consult: Bedside chair;  All needs within [facility]

## 2022-07-12 NOTE — PROGRESS NOTES
INTERNAL MEDICINE RESIDENCY PROGRESS NOTE     Name: Sang Quiroz   Age & Sex: 67 y o  male   MRN: [id number]  Unit/Bed#: [facility] 907-01   Encounter: [id number]  Team: SOD Team B     PATIENT INFORMATION     Name: Sang Quiroz   Age & Sex: 67 y o  male   MRN: [id number]  Hospital Stay Days: 1    ASSESSMENT/PLAN     Principal Problem:    Dysphagia  Active Problems:    Esophageal adenocarcinoma (HCC)    Chronic combined systolic and diastolic CHF (congestive heart failure) (HCC)    CAD (coronary artery disease)    Paroxysmal atrial fibrillation ([facility] )    Hernia, incisional    HTN (hypertension)    HLD (hyperlipidemia)    Seizure disorder ([facility] [address] )    Pancytopenia ([facility] )      * Dysphagia  Assessment & Plan  · Patient presents with progressively worsening dysphagia since May 2022  Previously was able to eat a normal diet in the beginning of May, now unable to swallow any food, liquids, and saliva  · Given the temporal relationship of his diagnosis of esophageal adenocarcinoma, tumor mass effect is the most likely cause of his dysphagia  Plan:  · GI consulted: discussed case with surg onc who recommends against PEG tube given anticipated need for surgery  · Plan for EGD, may place stent or J tube  · Medical oncology consulted, appreciate recommendations  · NPO at midnight per GI  · Speech recommends clear liquid diet, sips as desired, aspiration and reflux precautions  · IVF @ 100 cc/hr  · Pantoprazole 40mg IV        Esophageal adenocarcinoma ([facility] [address] )  Assessment & Plan  · Patient diagnosed with T2 N0 distal esophageal adenocarcinoma in May 2022  · Sees Dr Melida Blair  Recommended to undergo induction chemotherapy and radiation therapy followed by possible transhiatal esophagogastrectomy  Plan was for surgical candidacy assessment following these treatments  Patient has not yet started treatment       Plan:  · See above A/P for dysphagia    Chronic combined systolic and diastolic CHF (congestive heart failure) [facility])  Assessment & Plan  Wt Readings from Last 3 Encounters:   07/05/22 92 9 kg (204 lb 12 9 oz)   06/16/22 94 6 kg (208 lb 9 6 oz)   05/02/22 100 kg (220 lb 12 8 oz)     · Hx of systolic and diastolic heart failure  · Last echo in March 2022 EF 50%  Plan:   Strict intake and output monitoring   Daily standing weights    CAD (coronary artery disease)  Assessment & Plan  · History of CAD s/p stenting x 4    · On ASA 81 for secondary prevention      Paroxysmal atrial fibrillation (HCC)  Assessment & Plan  · Hx of paroxysmal atrial fibrillation  · Previously on warfarin for left apical thrombus, however has since been discontinued by his cardiologist      Hernia, incisional  Assessment & Plan  · Large ventral hernia developed following AAA repair in 2005  · Hernia has not been surgically repaired  · His cardiothoracic surgeon plans to address this surgically if he undergoes resection of his primary tumor  Pancytopenia ([facility] [address] )  Assessment & Plan  · Presented to the ED with a hemoglobin of 11 3, WBC 3 06, Platelets 029  Multiple CBCs in the past in this range  · In setting of esophageal adenocarcinoma  Plan:  · Continue to monitor serial CBCs  · Plan to transfuse for hemoglobin < 7    Seizure disorder [facility])  Assessment & Plan  · Hx of seizure disorder  · Continue outpatient phenobarbital 64 8 mg qday  HLD (hyperlipidemia)  Assessment & Plan  · Continue home 80 mg atorvastatin  HTN (hypertension)  Assessment & Plan  · Hx of hypertension however no longer takes antihypertensive medications  · Patient is normotensive on admission  Disposition: Continue inpatient care     SUBJECTIVE     Patient seen and examined  No acute events overnight  Reports feeling generally well, however was not able to swallow water while sucking on ice cubes yesterday evening  Also reports feeling substantial substernal pain when he attempts to swallow both fluids and solids  His last bowel movement was on Sunday  Otherwise he denies fevers, chills, nausea, vomiting, diarrhea, SOB, abdominal pain, dysuria  OBJECTIVE     Vitals:    22 2118 22 2157 22 2158 22 0814   BP:  127/66 127/66 112/66   BP Location:       Pulse:  68 68 76   Resp:  18  17   Temp:  97 8 °F (36 6 °C) 97 8 °F (36 6 °C) 98 2 °F (36 8 °C)   TempSrc:       SpO2: 99% 98% 97% 93%   Weight:       Height:          Temperature:   Temp (24hrs), Av 9 °F (36 6 °C), Min:97 7 °F (36 5 °C), Max:98 2 °F (36 8 °C)    Temperature: 98 2 °F (36 8 °C)  Intake & Output:  I/O       07/10 07 07 0701   07 07 0700    IV Piggyback  1000     Total Intake(mL/kg)  1000 (10 9)     Urine (mL/kg/hr)  200     Total Output  200     Net  +800                Weights:   IBW (Ideal Body Weight): 77 6 kg    Body mass index is 27 33 kg/m²  Weight (last 2 days)     Date/Time Weight    22 194 91 4 (201 5)        Physical Exam  Vitals reviewed  Constitutional:       General: He is not in acute distress  Appearance: He is not ill-appearing, toxic-appearing or diaphoretic  HENT:      Head: Normocephalic and atraumatic  Eyes:      Conjunctiva/sclera: Conjunctivae normal    Cardiovascular:      Rate and Rhythm: Normal rate and regular rhythm  Pulses: Normal pulses  Heart sounds: No murmur heard  No friction rub  No gallop  Comments:   Distant S1 and S2  Pulmonary:      Effort: Pulmonary effort is normal  No respiratory distress  Breath sounds: No wheezing or rhonchi  Comments: Faint basilar crackles bilaterally  Abdominal:      General: Abdomen is flat  Bowel sounds are normal       Palpations: Abdomen is soft  Tenderness: There is no abdominal tenderness  There is no guarding  Comments:   Large, non-tender, soft ventral hernia spanning the epigastric region to the umbilicus  No overlying skin changes  Musculoskeletal:      Right lower leg: No edema        Left lower leg: No edema  Skin:     General: Skin is warm and dry  Neurological:      Mental Status: He is alert and oriented to person, place, and time  Psychiatric:         Mood and Affect: Mood normal          Behavior: Behavior normal        LABORATORY DATA     Labs: I have personally reviewed pertinent reports  Results from last 7 days   Lab Units 07/12/22  0532 07/11/22  1506   WBC Thousand/uL 3 32* 3 06*   HEMOGLOBIN g/dL 9 4* 11 3*   HEMATOCRIT % 29 6* 34 9*   PLATELETS Thousands/uL 87* 107*   NEUTROS PCT % 51  --    MONOS PCT % 13*  --    MONO PCT %  --  1*      Results from last 7 days   Lab Units 07/12/22  0532 07/11/22  1506   POTASSIUM mmol/L 3 4* 4 0   CHLORIDE mmol/L 112* 109*   CO2 mmol/L 25 26   BUN mg/dL 11 12   CREATININE mg/dL 0 71 0 98   CALCIUM mg/dL 8 1* 8 9   ALK PHOS U/L 143* 174*   ALT U/L 9* 10*   AST U/L 12 18                          IMAGING & DIAGNOSTIC TESTING     Radiology Results: I have personally reviewed pertinent reports  XR chest 1 view portable    Result Date: 7/11/2022  Impression: No acute cardiopulmonary disease  Workstation performed: [id number]     Other Diagnostic Testing: I have personally reviewed pertinent reports      ACTIVE MEDICATIONS     Current Facility-Administered Medications   Medication Dose Route Frequency    acetaminophen (TYLENOL) tablet 487 5 mg  487 5 mg Oral Q6H PRN    aspirin (ECOTRIN LOW STRENGTH) EC tablet 81 mg  81 mg Oral Daily    enoxaparin (LOVENOX) subcutaneous injection 40 mg  40 mg Subcutaneous Daily    labetalol (NORMODYNE) injection 10 mg  10 mg Intravenous Q6H PRN    multi-electrolyte (PLASMALYTE-A/ISOLYTE-S PH 7 4) IV solution  100 mL/hr Intravenous Continuous    ondansetron (ZOFRAN) injection 4 mg  4 mg Intravenous Q6H PRN    pantoprazole (PROTONIX) injection 40 mg  40 mg Intravenous Q24H ADENIKE    PHENobarbital tablet 64 8 mg  64 8 mg Oral BID    potassium chloride 20 mEq IVPB (premix)  20 mEq Intravenous BID       VTE Pharmacologic Prophylaxis: Enoxaparin (Lovenox)  VTE Mechanical Prophylaxis: sequential compression device    Portions of the record may have been created with voice recognition software  Occasional wrong word or "sound a like" substitutions may have occurred due to the inherent limitations of voice recognition software    Read the chart carefully and recognize, using context, where substitutions have occurred   ==  [address]  MS4

## 2022-07-12 NOTE — CONSULTS
Consultation - [facility]  Lachelle Meals 67 y o  male MRN: [id number]  Unit/Bed#: [facility] 907-01 Encounter: [id number]              Inpatient consult to gastroenterology     Performed by  Keshawn Buitrago DO     Authorized by [address], [place]              Reason for Consult / Principal Problem:     Dysphagia    ASSESSMENT AND PLAN:      54-year-old male with past medical history of recently diagnosed esophageal adenocarcinoma currently undergoing workup, CAD status post multiple stents on aspirin, combined systolic and diastolic heart failure, AFib not on anticoagulation, GERD, seizure disorder who is admitted for progressively worsening dysphagia since May of 2022  GI is consulted for the same  1  Dysphagia  Likely esophageal in origin given recent diagnosis of cancer  Of the differentials includes should esophageal stricture, esophagitis, achalasia  Has lost 20 lb in the last 2 months  Unable to tolerate both liquids and solids at this point   Discuss case with Surgical Oncology who recommended against PEG tube at this time given anticipation for surgery  A stent or J tube would be accceptable   Discussed risks and benefits of procedure, patient is currently agreeable   Will need EGD first to assess if scope can be passed through lesion   Continue NPO status for now  2  Esophageal Adenocarcinoma  Diagnosed in May 2022 after EGD  Evaluated by thoracic surgery recommended chemoradiation prior to esophagectomy  Has not receive treatment yet  · Management as described above  Rest of care per primary team   Thank you for this consultation     ______________________________________________________________________    HPI:  54-year-old male with past medical history of recently diagnosed esophageal adenocarcinoma currently undergoing workup, CAD status post multiple stents on aspirin, combined systolic and diastolic heart failure, AFib not on anticoagulation, GERD, seizure disorder who is admitted for progressively worsening dysphagia since May of 2022  GI is consulted for the same  Per patient he cannot tolerate liquids or solids for at least 1 month  Symptoms began 3 months ago  Unable tolerate small sips of water  Has regurgitation immediately  Has lost 20 lb last 2 months  Denies any nausea, abdominal pain, bowel movements  Does feel food getting stuck substernally  REVIEW OF SYSTEMS:    Review of Systems   Constitutional: Positive for unexpected weight change  Negative for chills and fever  HENT: Negative for congestion and sinus pressure  Respiratory: Negative for cough and shortness of breath  Cardiovascular: Negative for chest pain, palpitations and leg swelling  Gastrointestinal: Negative for abdominal pain, diarrhea, nausea and vomiting  Genitourinary: Negative for dysuria and hematuria  Musculoskeletal: Negative for arthralgias and back pain  Skin: Negative for color change and rash  Neurological: Negative for dizziness and headaches  Psychiatric/Behavioral: Negative for agitation and confusion  All other systems reviewed and are negative  Historical Information   Past Medical History:   Diagnosis Date    Cardiac disease     CHF [facility]congestive heart failure) ([facility] [address] )     Hernia of abdominal cavity     Myocardial infarction ([facility] [address] )     last assessed 7/31/14, past, Pt had MI s/p AGUSTIN placed in 2001, refuses to take any other medications for his cardiac disease, only will take seizure med   Understands possible complications from this decision    Seizure [facility])      Past Surgical History:   Procedure Laterality Date    ABDOMINAL AORTIC ANEURYSM REPAIR      for dialation or occulsion    CATARACT EXTRACTION       Social History   Social History     Substance and Sexual Activity   Alcohol Use Not Currently     Social History     Substance and Sexual Activity   Drug Use Never     Social History     Tobacco Use   Smoking Status Former Smoker    Quit date: 2005    Years since quittin 1   Smokeless Tobacco Never Used     Family History   Problem Relation Age of Onset    Hypertension Father     Kidney disease Brother        Meds/Allergies     Medications Prior to Admission   Medication    aspirin (Aspirin Low Dose) 81 mg EC tablet    atorvastatin (LIPITOR) 80 mg tablet    PHENobarbital 64 8 mg tablet    acetaminophen (TYLENOL) 500 mg tablet    metoprolol succinate (TOPROL-XL) 50 mg 24 hr tablet    nitroglycerin (NITROSTAT) 0 4 mg SL tablet    pantoprazole (PROTONIX) 40 mg tablet    pantoprazole (PROTONIX) 40 mg tablet    sucralfate (CARAFATE) 1 g tablet    triamcinolone (KENALOG) 0 025 % cream    warfarin (COUMADIN) 2 5 mg tablet     Current Facility-Administered Medications   Medication Dose Route Frequency    acetaminophen (TYLENOL) tablet 487 5 mg  487 5 mg Oral Q6H PRN    aspirin (ECOTRIN LOW STRENGTH) EC tablet 81 mg  81 mg Oral Daily    enoxaparin (LOVENOX) subcutaneous injection 40 mg  40 mg Subcutaneous Daily    labetalol (NORMODYNE) injection 10 mg  10 mg Intravenous Q6H PRN    multi-electrolyte (PLASMALYTE-A/ISOLYTE-S PH 7 4) IV solution  100 mL/hr Intravenous Continuous    ondansetron (ZOFRAN) injection 4 mg  4 mg Intravenous Q6H PRN    pantoprazole (PROTONIX) injection 40 mg  40 mg Intravenous Q24H ADENIKE    PHENobarbital tablet 64 8 mg  64 8 mg Oral BID       Allergies   Allergen Reactions    Iodinated Diagnostic Agents Rash     Pt's skin get very red and he gets hot and chills    Clopidogrel Rash           Objective     Blood pressure 127/66, pulse 68, temperature 97 8 °F (36 6 °C), resp  rate 20, height 6' (1 829 m), weight 91 4 kg (201 lb 8 oz), SpO2 97 %  Body mass index is 27 33 kg/m²        Intake/Output Summary (Last 24 hours) at 2022 0801  Last data filed at 2022 0532  Gross per 24 hour   Intake 1000 ml   Output 200 ml   Net 800 ml         PHYSICAL EXAM:      Physical Exam  Vitals and nursing note reviewed  Constitutional:       General: He is not in acute distress  Appearance: Normal appearance  He is well-developed  He is not ill-appearing  HENT:      Head: Normocephalic and atraumatic  Mouth/Throat:      Mouth: Mucous membranes are moist    Eyes:      Extraocular Movements: Extraocular movements intact  Conjunctiva/sclera: Conjunctivae normal       Pupils: Pupils are equal, round, and reactive to light  Cardiovascular:      Rate and Rhythm: Normal rate and regular rhythm  Pulses: Normal pulses  Heart sounds: Normal heart sounds  No murmur heard  No friction rub  No gallop  Pulmonary:      Effort: Pulmonary effort is normal  No respiratory distress  Breath sounds: Normal breath sounds  No wheezing or rales  Abdominal:      General: Abdomen is flat  Bowel sounds are normal  There is distension  Palpations: Abdomen is soft  Tenderness: There is no abdominal tenderness  There is no guarding  Musculoskeletal:      Cervical back: Neck supple  Right lower leg: No edema  Left lower leg: No edema  Skin:     General: Skin is warm and dry  Neurological:      General: No focal deficit present  Mental Status: He is alert and oriented to person, place, and time     Psychiatric:         Mood and Affect: Mood normal          Behavior: Behavior normal           Lab Results:   Admission on 07/11/2022   Component Date Value    WBC 07/11/2022 3 06 (A)    RBC 07/11/2022 3 62 (A)    Hemoglobin 07/11/2022 11 3 (A)    Hematocrit 07/11/2022 34 9 (A)    MCV 07/11/2022 96     MCH 07/11/2022 31 2     MCHC 07/11/2022 32 4     RDW 07/11/2022 22 1 (A)    Platelets 63/12/6408 107 (A)    Sodium 07/11/2022 141     Potassium 07/11/2022 4 0     Chloride 07/11/2022 109 (A)    CO2 07/11/2022 26     ANION GAP 07/11/2022 6     BUN 07/11/2022 12     Creatinine 07/11/2022 0 98     Glucose 07/11/2022 101     Calcium 07/11/2022 8 9     AST 07/11/2022 18     ALT 07/11/2022 10 (A)    Alkaline Phosphatase 07/11/2022 174 (A)    Total Protein 07/11/2022 7 9     Albumin 07/11/2022 3 5     Total Bilirubin 07/11/2022 0 61     eGFR 07/11/2022 74     hs TnI 0hr 07/11/2022 7     ABO Grouping 07/11/2022 A     Rh Factor 07/11/2022 Positive     Antibody Screen 07/11/2022 Negative     Specimen Expiration Date 07/11/2022 [id number]     hs TnI 2hr 07/11/2022 7     Delta 2hr hsTnI 07/11/2022 0     Segmented % 07/11/2022 65     Lymphocytes % 07/11/2022 27     Monocytes % 07/11/2022 1 (A)    Eosinophils, % 07/11/2022 3     Basophils % 07/11/2022 4 (A)    Absolute Neutrophils 07/11/2022 1 99     Lymphocytes Absolute 07/11/2022 0 83     Monocytes Absolute 07/11/2022 0 03     Eosinophils Absolute 07/11/2022 0 09     Basophils Absolute 07/11/2022 0 12 (A)    Anisocytosis 07/11/2022 Present     Poikilocytes 07/11/2022 Present     Polychromasia 07/11/2022 Present     Schistocytes 07/11/2022 Present     Target Cells 07/11/2022 Present     Platelet Estimate 45/47/0472 Borderline (A)    Sodium 07/12/2022 142     Potassium 07/12/2022 3 4 (A)    Chloride 07/12/2022 112 (A)    CO2 07/12/2022 25     ANION GAP 07/12/2022 5     BUN 07/12/2022 11     Creatinine 07/12/2022 0 71     Glucose 07/12/2022 94     Calcium 07/12/2022 8 1 (A)    Corrected Calcium 07/12/2022 8 9     AST 07/12/2022 12     ALT 07/12/2022 9 (A)    Alkaline Phosphatase 07/12/2022 143 (A)    Total Protein 07/12/2022 6 5     Albumin 07/12/2022 3 0 (A)    Total Bilirubin 07/12/2022 0 46     eGFR 07/12/2022 90     WBC 07/12/2022 3 32 (A)    RBC 07/12/2022 2 98 (A)    Hemoglobin 07/12/2022 9 4 (A)    Hematocrit 07/12/2022 29 6 (A)    MCV 07/12/2022 99 (A)    MCH 07/12/2022 31 5     MCHC 07/12/2022 31 8     RDW 07/12/2022 22 1 (A)    Platelets 65/96/0987 87 (A)    nRBC 07/12/2022 0     Neutrophils Relative 07/12/2022 51     Immat GRANS % 07/12/2022 1     Lymphocytes Relative 07/12/2022 28     Monocytes Relative 07/12/2022 13 (A)    Eosinophils Relative 07/12/2022 6     Basophils Relative 07/12/2022 1     Neutrophils Absolute 07/12/2022 1 69 (A)    Immature Grans Absolute 07/12/2022 0 04     Lymphocytes Absolute 07/12/2022 0 94     Monocytes Absolute 07/12/2022 0 42     Eosinophils Absolute 07/12/2022 0 20     Basophils Absolute 07/12/2022 0 03        Imaging Studies: I have personally reviewed pertinent imaging studies  [address] [facility]    Gastroenterology Fellow  PGY-4  Available via [employer]  7/12/2022 8:01 AM

## 2022-07-12 NOTE — SPEECH THERAPY NOTE
Speech Language/Pathology  Speech/Language Pathology  Assessment    Patient Name: Lachelle Amin  Today's Date: 7/12/2022     Problem List  Principal Problem:    Dysphagia  Active Problems:    CAD (coronary artery disease)    HTN (hypertension)    HLD (hyperlipidemia)    Seizure disorder (HCC)    Hernia, incisional    Chronic combined systolic and diastolic CHF (congestive heart failure) (HCC)    Paroxysmal atrial fibrillation ([facility] )    Esophageal adenocarcinoma ([facility] )    Pancytopenia ([facility] )    Past Medical History  Past Medical History:   Diagnosis Date    Cardiac disease     CHF (congestive heart failure) ([facility] )     Hernia of abdominal cavity     Myocardial infarction ([facility] )     last assessed 7/31/14, past, Pt had MI s/p AGUSTIN placed in 2001, refuses to take any other medications for his cardiac disease, only will take seizure med  Understands possible complications from this decision    Seizure [facility])      Past Surgical History  Past Surgical History:   Procedure Laterality Date    ABDOMINAL AORTIC ANEURYSM REPAIR      for dialation or occulsion    CATARACT EXTRACTION          Bedside Swallow Evaluation:    Summary:  Pt presents w/ normal oropharyngeal swallow, most likely esophageal dysphagia based on CT findings  EGD will be completed tomorrow  PO trials limited d/t patient's c/o fullness and clear diet  Pt oral containment, bolus formation and transfer all wnl  Swallow initiation was prompt, and laryngeal rise wnl upon palpation  No coughing, choking or wet voicing present  Remain on clear diet per physician orders , upgrade diet as deemed appropriate by physician  Recommendations:  Diet: Continue clear liquid diet   Meds: Sips as desired   Supervision: Full   Positioning:Upright  Strategies: Pt to take PO/Meds only when fully alert and upright     Oral care: Frequently   Aspiration precautions  Reflux precautions    Therapy Prognosis: Fair   Prognosis considerations: pt esophageal mass, pt cooperation, Frequency:  Eval only, No f/u tx indicated  Consider consult w/:  GI  Nutrition    H&P/Admit info/ pertinent provider notes: (PMH noted above)  Per admission note 7/11/22:   Mr Tae Mason is a 75yoM with PMH of 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, N0, 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), AAA repair in 2005 with ventral hernia, coronary artery disease s/p 4 x stents, combined systolic and diastolic heart failure (EF 50% 3/2022), and seizure disorder on phenobarbital presenting with progressive dysphagia since May  In May he was able to eat his regular diet, however as time passed he describes increasing frequency of sensations of food items becoming stuck in his throat  This has progressed to the present where he is currently unable to swallow his own saliva  He described this morning attempting to drink coffee and ultimately spitting it all up  He did spit up pink colored saliva 2 x per the patient  Currently with dysphagia of both solids and liquids  Of note, the patient was diagnosed with distal esophageal adenocarcinoma following hospitalization for similar symptoms of dysphagia  PET CT scan done in June 2022 with no hypermetabolic lesions concerning for malignancy, aside from known esophageal mass  Followed by thoracic surgery  No alcohol drinking history, however with history of smoking half a pack a day for 50+ pack years but has quit      In the ED he was hemodynamically stable, afebrile, saturating well on room air  CBC significant for hemoglobin of 11 3, WBC 3 06, platelets 850  Troponins negative  CMP significant for alkaline phosphatase of 174  Chest x-ray negative for acute cardiopulmonary disease  Patient was spitting green saliva into a bag at bedside      Patient is DNR/DNI  Admitted to [facility] for possible GI or surgical intervention        Special Studies:  Chest XR 7/11/22:   No acute cardiopulmonary disease  PET CT skull base to mid thigh 6/23/22:   1    Known distal esophageal mass demonstrates only minimal FDG activity, SUV 2 1  Given the low FDG avidity of the primary esophageal mass, evaluation for small metastases is likely limited  2   Otherwise no hypermetabolic lesions are visualized that are concerning for malignancy      Previous VBS:  --    Goal(s):  Pt will tolerate least restrictive diet w/out s/s aspiration or oral/pharyngeal difficulties  Patient's goal: None stated     Did the pt report pain? No  If yes, was nursing notified/was it addressed? --    Reason for consult:  R/o aspiration  Determine safest and least restrictive diet  C/o progressive dysphagia since May    Precautions:  Aspiration  Fall    Food allergies:  --     Current diet:  Clear Liquids     Premorbid diet[de-identified]  Regular w/thin     O2 requirement:  None RA     Voice/Speech:  Clear, fluent, intelligible     Social:  Lives w/ family members in a private residence     Follows commands: Yes                Cognitive Status:  Alert, awake and cooperative     [facility]ral [facility] exam:  Dentition: edentulous   Labial strength and ROM: WNL   Lingual strength and ROM: WNL   Mandibular strength and ROM:WNL   Velum: WNL   Secretion management: WNL   Volitional cough: WNL   Volitional swallow:WNL   Oral care     Items administered:  Jello,nectar thick liquid, thin liquids  Liquids were taken by straw/cup/tsp      Oral stage:  Lip closure: wnl   Mastication:--   Bolus formation:wnl   Bolus control:wnl   Transfer:wnl  Oral residue: none noted   Pocketing: none noted     Pharyngeal stage:  Swallow promptness: prompt   Laryngeal rise: wnl   Wet voice: none noted   Throat clear: none noted   Cough: none noted   Secondary swallows: none noted   Audible swallows: none noted   No overt s/s aspiration    Esophageal stage:  H/o Distal esophageal adenocarcinoma  H/o EGD    Results d/w:  Pt, nursing, family

## 2022-07-12 NOTE — QUICK NOTE
Attempted to call the patient's brother to provide updates  Did not answer the phone, will try again tomorrow

## 2022-07-12 NOTE — NUTRITION
07/12/22 1416   Biochemical Data,Medical Tests, and Procedures   Biochemical Data/Medical Tests/Procedures Lab values reviewed; Meds reviewed   Recommendations/Interventions   Summary Pt very loquacious and difficult to redirect  Concerns of malnutrition given significant wt loss, inadequate data for classification at this time  Pt had broth this afternoon , states he was not able to keep it all down  Clear liquid protein supplement offered, pt refused  Interventions/Recommendations Monitor labs for refeeding syndrome;Monitor I & O's;Initiate daily weight   Intervention Comments 1  Diet advancement defered to provider and SLP   Recommendations to Provider 1  If unable to advance diet and enteral access is obtained recommend tube feeds: Jevity 1 5 initial goal rate of 35mL/h, start at 10 mL/hr and advance by 10 mL q 4 hrs to goal rate of 35 mL/hr  infuse at 35mL/hr x 2 days, if electrolytes are WNL and no signs of refeeding syndrome advance rate again by 10 mL q 4 hrs to full goal of 80mL/hr  At goal provides: 1920 mL TV, 2880 kcal, 123g protein and 1459 mL free water  2  if euvolemic and absent of IVF, FWF at 110 mL q 4 hrs will meet hydration needs  3  Monitor BMP, Mg and phos closely - replace PRN    4  recommend 100mg thiamine daily

## 2022-07-12 NOTE — QUICK NOTE
Touched base with Medical oncology  Plan for outpatient follow-up set up for 7/19  Advised to keep appointment  No inpatient evaluation at this time

## 2022-07-13 ENCOUNTER — ANESTHESIA EVENT (OUTPATIENT)
Dept: ANESTHESIOLOGY | Facility: HOSPITAL | Age: 77
End: 2022-07-13

## 2022-07-13 ENCOUNTER — ANESTHESIA (OUTPATIENT)
Dept: ANESTHESIOLOGY | Facility: HOSPITAL | Age: 77
End: 2022-07-13

## 2022-07-13 ENCOUNTER — APPOINTMENT (INPATIENT)
Dept: RADIOLOGY | Facility: HOSPITAL | Age: 77
DRG: 375 | End: 2022-07-13
Payer: MEDICARE

## 2022-07-13 ENCOUNTER — TELEPHONE (OUTPATIENT)
Dept: GENETICS | Facility: CLINIC | Age: 77
End: 2022-07-13

## 2022-07-13 ENCOUNTER — ANESTHESIA (INPATIENT)
Dept: GASTROENTEROLOGY | Facility: HOSPITAL | Age: 77
DRG: 375 | End: 2022-07-13
Payer: MEDICARE

## 2022-07-13 ENCOUNTER — APPOINTMENT (INPATIENT)
Dept: GASTROENTEROLOGY | Facility: HOSPITAL | Age: 77
DRG: 375 | End: 2022-07-13
Payer: MEDICARE

## 2022-07-13 ENCOUNTER — ANESTHESIA EVENT (INPATIENT)
Dept: GASTROENTEROLOGY | Facility: HOSPITAL | Age: 77
DRG: 375 | End: 2022-07-13
Payer: MEDICARE

## 2022-07-13 LAB
ANION GAP SERPL CALCULATED.3IONS-SCNC: 4 MMOL/L (ref 4–13)
BUN SERPL-MCNC: 10 MG/DL (ref 5–25)
CALCIUM SERPL-MCNC: 8.1 MG/DL (ref 8.3–10.1)
CHLORIDE SERPL-SCNC: 110 MMOL/L (ref 100–108)
CO2 SERPL-SCNC: 26 MMOL/L (ref 21–32)
CREAT SERPL-MCNC: 0.68 MG/DL (ref 0.6–1.3)
ERYTHROCYTE [DISTWIDTH] IN BLOOD BY AUTOMATED COUNT: 22.1 % (ref 11.6–15.1)
GFR SERPL CREATININE-BSD FRML MDRD: 92 ML/MIN/1.73SQ M
GLUCOSE SERPL-MCNC: 100 MG/DL (ref 65–140)
HCT VFR BLD AUTO: 31 % (ref 36.5–49.3)
HGB BLD-MCNC: 9.8 G/DL (ref 12–17)
MCH RBC QN AUTO: 31 PG (ref 26.8–34.3)
MCHC RBC AUTO-ENTMCNC: 31.6 G/DL (ref 31.4–37.4)
MCV RBC AUTO: 98 FL (ref 82–98)
PLATELET # BLD AUTO: 84 THOUSANDS/UL (ref 149–390)
POTASSIUM SERPL-SCNC: 3.6 MMOL/L (ref 3.5–5.3)
RBC # BLD AUTO: 3.16 MILLION/UL (ref 3.88–5.62)
SODIUM SERPL-SCNC: 140 MMOL/L (ref 136–145)
WBC # BLD AUTO: 2.77 THOUSAND/UL (ref 4.31–10.16)

## 2022-07-13 PROCEDURE — 43240 EGD W/TRANSMURAL DRAIN CYST: CPT | Performed by: INTERNAL MEDICINE

## 2022-07-13 PROCEDURE — 80048 BASIC METABOLIC PNL TOTAL CA: CPT | Performed by: INTERNAL MEDICINE

## 2022-07-13 PROCEDURE — 99232 SBSQ HOSP IP/OBS MODERATE 35: CPT | Performed by: SURGERY

## 2022-07-13 PROCEDURE — C9113 INJ PANTOPRAZOLE SODIUM, VIA: HCPCS

## 2022-07-13 PROCEDURE — C9113 INJ PANTOPRAZOLE SODIUM, VIA: HCPCS | Performed by: STUDENT IN AN ORGANIZED HEALTH CARE EDUCATION/TRAINING PROGRAM

## 2022-07-13 PROCEDURE — 0D738DZ DILATION OF LOWER ESOPHAGUS WITH INTRALUMINAL DEVICE, VIA NATURAL OR ARTIFICIAL OPENING ENDOSCOPIC: ICD-10-PCS | Performed by: INTERNAL MEDICINE

## 2022-07-13 PROCEDURE — 85027 COMPLETE CBC AUTOMATED: CPT | Performed by: INTERNAL MEDICINE

## 2022-07-13 PROCEDURE — 99232 SBSQ HOSP IP/OBS MODERATE 35: CPT | Performed by: INTERNAL MEDICINE

## 2022-07-13 PROCEDURE — C1874 STENT, COATED/COV W/DEL SYS: HCPCS

## 2022-07-13 RX ORDER — SUCCINYLCHOLINE/SOD CL,ISO/PF 100 MG/5ML
SYRINGE (ML) INTRAVENOUS AS NEEDED
Status: DISCONTINUED | OUTPATIENT
Start: 2022-07-13 | End: 2022-07-13

## 2022-07-13 RX ORDER — PANTOPRAZOLE SODIUM 40 MG/10ML
40 INJECTION, POWDER, LYOPHILIZED, FOR SOLUTION INTRAVENOUS EVERY 12 HOURS SCHEDULED
Status: DISCONTINUED | OUTPATIENT
Start: 2022-07-13 | End: 2022-07-15 | Stop reason: HOSPADM

## 2022-07-13 RX ORDER — SODIUM CHLORIDE 9 MG/ML
INJECTION, SOLUTION INTRAVENOUS CONTINUOUS PRN
Status: DISCONTINUED | OUTPATIENT
Start: 2022-07-13 | End: 2022-07-13

## 2022-07-13 RX ORDER — DEXAMETHASONE SODIUM PHOSPHATE 10 MG/ML
INJECTION, SOLUTION INTRAMUSCULAR; INTRAVENOUS AS NEEDED
Status: DISCONTINUED | OUTPATIENT
Start: 2022-07-13 | End: 2022-07-13

## 2022-07-13 RX ORDER — PROPOFOL 10 MG/ML
INJECTION, EMULSION INTRAVENOUS AS NEEDED
Status: DISCONTINUED | OUTPATIENT
Start: 2022-07-13 | End: 2022-07-13

## 2022-07-13 RX ORDER — FENTANYL CITRATE 50 UG/ML
INJECTION, SOLUTION INTRAMUSCULAR; INTRAVENOUS AS NEEDED
Status: DISCONTINUED | OUTPATIENT
Start: 2022-07-13 | End: 2022-07-13

## 2022-07-13 RX ORDER — LIDOCAINE HYDROCHLORIDE 20 MG/ML
15 SOLUTION OROPHARYNGEAL 4 TIMES DAILY PRN
Status: DISCONTINUED | OUTPATIENT
Start: 2022-07-13 | End: 2022-07-15 | Stop reason: HOSPADM

## 2022-07-13 RX ORDER — LIDOCAINE HYDROCHLORIDE 20 MG/ML
INJECTION, SOLUTION EPIDURAL; INFILTRATION; INTRACAUDAL; PERINEURAL AS NEEDED
Status: DISCONTINUED | OUTPATIENT
Start: 2022-07-13 | End: 2022-07-13

## 2022-07-13 RX ADMIN — ATORVASTATIN CALCIUM 80 MG: 80 TABLET, FILM COATED ORAL at 18:08

## 2022-07-13 RX ADMIN — PROPOFOL 100 MG: 10 INJECTION, EMULSION INTRAVENOUS at 12:35

## 2022-07-13 RX ADMIN — SODIUM CHLORIDE: 9 INJECTION, SOLUTION INTRAVENOUS at 12:33

## 2022-07-13 RX ADMIN — DEXTROSE AND SODIUM CHLORIDE 100 ML/HR: 5; .45 INJECTION, SOLUTION INTRAVENOUS at 01:33

## 2022-07-13 RX ADMIN — DEXAMETHASONE SODIUM PHOSPHATE 10 MG: 10 INJECTION, SOLUTION INTRAMUSCULAR; INTRAVENOUS at 12:43

## 2022-07-13 RX ADMIN — PHENOBARBITAL 64.8 MG: 64.8 TABLET ORAL at 18:09

## 2022-07-13 RX ADMIN — FENTANYL CITRATE 50 MCG: 50 INJECTION INTRAMUSCULAR; INTRAVENOUS at 12:35

## 2022-07-13 RX ADMIN — ONDANSETRON 4 MG: 2 INJECTION INTRAMUSCULAR; INTRAVENOUS at 13:12

## 2022-07-13 RX ADMIN — PHENOBARBITAL 64.8 MG: 64.8 TABLET ORAL at 09:44

## 2022-07-13 RX ADMIN — LIDOCAINE HYDROCHLORIDE 100 MG: 20 INJECTION, SOLUTION EPIDURAL; INFILTRATION; INTRACAUDAL; PERINEURAL at 12:35

## 2022-07-13 RX ADMIN — Medication 100 MG: at 12:35

## 2022-07-13 RX ADMIN — PANTOPRAZOLE SODIUM 40 MG: 40 INJECTION, POWDER, FOR SOLUTION INTRAVENOUS at 22:04

## 2022-07-13 RX ADMIN — ASPIRIN 81 MG: 81 TABLET, COATED ORAL at 09:44

## 2022-07-13 RX ADMIN — PANTOPRAZOLE SODIUM 40 MG: 40 INJECTION, POWDER, FOR SOLUTION INTRAVENOUS at 09:44

## 2022-07-13 RX ADMIN — DEXTROSE AND SODIUM CHLORIDE 150 ML/HR: 5; .45 INJECTION, SOLUTION INTRAVENOUS at 19:16

## 2022-07-13 NOTE — ANESTHESIA POSTPROCEDURE EVALUATION
Post-Op Assessment Note    CV Status:  Stable  Pain Score: 0    Pain management: adequate     Mental Status:  Alert   Hydration Status:  Euvolemic   PONV Controlled:  None   Airway Patency:  Patent      Post Op Vitals Reviewed: Yes      Staff: CRNA         No complications documented      /71 (07/13/22 1323)    Temp 97 7 °F (36 5 °C) (07/13/22 1323)    Pulse 80 (07/13/22 1323)   Resp 18 (07/13/22 1323)    SpO2 92

## 2022-07-13 NOTE — PROGRESS NOTES
INTERNAL MEDICINE RESIDENCY PROGRESS NOTE     Name: Emime Valverde   Age & Sex: 67 y o  male   MRN: [id number]  Unit/Bed#: [facility] 907-01   Encounter: [id number]  Team: SOD Team B     PATIENT INFORMATION     Name: Emmie Valverde   Age & Sex: 67 y o  male   MRN: [id number]  Hospital Stay Days: 2    ASSESSMENT/PLAN     Principal Problem:    Dysphagia  Active Problems:    Esophageal adenocarcinoma (HCC)    Chronic combined systolic and diastolic CHF (congestive heart failure) (HCC)    CAD (coronary artery disease)    Paroxysmal atrial fibrillation ([facility] )    Hernia, incisional    HTN (hypertension)    HLD (hyperlipidemia)    Seizure disorder ([facility] )    Pancytopenia ([facility] )      * Dysphagia  Assessment & Plan  · Patient presents with progressively worsening dysphagia since May 2022  Previously was able to eat a normal diet in the beginning of May, now unable to swallow any food, liquids, and saliva  · Given the temporal relationship of his diagnosis of esophageal adenocarcinoma, tumor mass effect is the most likely cause of his dysphagia  · EGD (7/13): Ulcerated friable mass with stricture/narrowing of distal esophagus  Stent successfully placed  Plan:  · GI consulted, s/p EGD with esophageal stent placement:  · Start clear liquid diet today, advance to soft stent diet tomorrow if tolerates clears  · Keep elevated 45 degrees at all times to minimize reflux  · Continue pantoprazole 40 mg q12  · Pain: acetaminophen 487 5 q6h PRN and viscous lidocaine q6h PRN  · Surg onc consulted: recommends against PEG tube given anticipated need for surgery  · Speech: aspiration and reflux precautions  · Dextrose 5% at 150mL/hr  · Patient to f/u with medical oncology outpatient scheduled 7/19      Esophageal adenocarcinoma [facility])  Assessment & Plan  · Patient diagnosed with T2 N0 distal esophageal adenocarcinoma in May 2022  · Sees Dr  Ave Neighbor   Recommended to undergo induction chemotherapy and radiation therapy followed by possible transhiatal esophagogastrectomy  Plan was for surgical candidacy assessment following these treatments  · Patient has not yet started treatment  Plan:  · See above A/P for dysphagia    Chronic combined systolic and diastolic CHF (congestive heart failure) (HCC)  Assessment & Plan  Wt Readings from Last 3 Encounters:   07/05/22 92 9 kg (204 lb 12 9 oz)   06/16/22 94 6 kg (208 lb 9 6 oz)   05/02/22 100 kg (220 lb 12 8 oz)     · Hx of systolic and diastolic heart failure  · Last echo in March 2022 EF 50%  Plan:   I/O monitoring   Daily standing weights    CAD (coronary artery disease)  Assessment & Plan  · History of CAD s/p stenting x 4    · On ASA 81 for secondary prevention      Paroxysmal atrial fibrillation (HCC)  Assessment & Plan  · Hx of paroxysmal atrial fibrillation  · Previously on warfarin for left apical thrombus, however has since been discontinued by his cardiologist      Hernia, incisional  Assessment & Plan  · Large ventral hernia developed following AAA repair in 2005  · Hernia has not been surgically repaired  · His cardiothoracic surgeon plans to address this surgically if he undergoes resection of his primary tumor  Pancytopenia ([facility] )  Assessment & Plan  · Presented to the ED with a hemoglobin of 11 3, WBC 3 06, Platelets 802  Multiple CBCs in the past in this range  · In setting of esophageal adenocarcinoma  Plan:  · Continue to monitor serial CBCs  · Plan to transfuse for hemoglobin < 7    Seizure disorder [facility])  Assessment & Plan  · Hx of seizure disorder  · Continue outpatient phenobarbital 64 8 mg qday  HLD (hyperlipidemia)  Assessment & Plan  · Continue home 80 mg atorvastatin  HTN (hypertension)  Assessment & Plan  · Hx of hypertension however no longer takes antihypertensive medications  · Patient is normotensive on admission  · Labetalol 10 mg PRN for SBP > 170       Disposition: Continue inpatient care     SUBJECTIVE     Patient seen and examined  No acute events overnight  Doing well this morning  States he has not been spitting up  His only concern is that he is hungry as he has not eaten for the past few days  Had a BM yesterday, no diarrhea or blood  Denies fevers, chills, nausea, vomiting, SOB, chest pain, abdominal pain, dysuria  OBJECTIVE     Vitals:    22 1327 22 1333 22 1347 22 1502   BP: 139/63 145/70 136/65 109/59   Pulse: 79 67 74 72   Resp: 18 18 20 16   Temp:    97 8 °F (36 6 °C)   TempSrc:       SpO2: 93% 96% 100% 93%   Weight:       Height:          Temperature:   Temp (24hrs), Av 2 °F (36 8 °C), Min:97 7 °F (36 5 °C), Max:99 2 °F (37 3 °C)    Temperature: 97 8 °F (36 6 °C)  Intake & Output:  I/O        07 07 07 07 07 0700    P  O   420     I V  (mL/kg)   400 (4 2)    IV Piggyback 1000      Total Intake(mL/kg) 1000 (10 9) 420 (4 4) 400 (4 2)    Urine (mL/kg/hr) 200 0 (0)     Stool  1     Total Output 200 1     Net +800 +419 +400           Unmeasured Urine Occurrence  3 x         Weights:   IBW (Ideal Body Weight): 77 6 kg    Body mass index is 28 21 kg/m²  Weight (last 2 days)     Date/Time Weight    22 1227 94 3 (208)    22 0548 94 4 (208 11)    22 1945 91 4 (201 5)        Physical Exam  Constitutional:       General: He is not in acute distress  Appearance: He is not ill-appearing, toxic-appearing or diaphoretic  HENT:      Head: Normocephalic and atraumatic  Mouth/Throat:      Mouth: Mucous membranes are dry  Cardiovascular:      Rate and Rhythm: Normal rate and regular rhythm  Pulses: Normal pulses  Heart sounds: No murmur heard  No friction rub  No gallop  Comments: Distant S1 and S2  Pulmonary:      Effort: Pulmonary effort is normal  No respiratory distress  Breath sounds: Normal breath sounds  No wheezing, rhonchi or rales  Abdominal:      General: Abdomen is flat   Bowel sounds are normal  There is no distension  Palpations: Abdomen is soft  Tenderness: There is no abdominal tenderness  There is no guarding  Hernia: A hernia is present  Comments: Large ventral hernia spanning epigastric region to umbilicus  Non-tender, soft, no overlying skin changes  Musculoskeletal:      Comments: Mild swelling of LE bilaterally, non-pitting   Skin:     General: Skin is warm and dry  Neurological:      Mental Status: He is alert and oriented to person, place, and time  Psychiatric:         Mood and Affect: Mood normal          Behavior: Behavior normal        LABORATORY DATA     Labs: I have personally reviewed pertinent reports  Results from last 7 days   Lab Units 07/13/22  0500 07/12/22  0532 07/11/22  1506   WBC Thousand/uL 2 77* 3 32* 3 06*   HEMOGLOBIN g/dL 9 8* 9 4* 11 3*   HEMATOCRIT % 31 0* 29 6* 34 9*   PLATELETS Thousands/uL 84* 87* 107*   NEUTROS PCT %  --  51  --    MONOS PCT %  --  13*  --    MONO PCT %  --   --  1*      Results from last 7 days   Lab Units 07/13/22  0500 07/12/22  0532 07/11/22  1506   POTASSIUM mmol/L 3 6 3 4* 4 0   CHLORIDE mmol/L 110* 112* 109*   CO2 mmol/L 26 25 26   BUN mg/dL 10 11 12   CREATININE mg/dL 0 68 0 71 0 98   CALCIUM mg/dL 8 1* 8 1* 8 9   ALK PHOS U/L  --  143* 174*   ALT U/L  --  9* 10*   AST U/L  --  12 18                            IMAGING & DIAGNOSTIC TESTING     Radiology Results: I have personally reviewed pertinent reports  EGD Fluoro    Result Date: 7/13/2022  Impression: Ulcerated, friable mass with stricture/narrowing in the distal esophagus from known esophageal malignancy  The stricture segment measured 5 cm in length from 35 cm to 40 cm  Traversable with gentle pressure  Successfully placed 23 x 125 mm covered esophageal stent with the proximal end of the stent at 31 cm  Stent was secured in place using over-the-scope clip  Normal stomach  Normal duodenum  RECOMMENDATION: Start clear liquid diet today   Possibly advance to soft stent diet tomorrow if tolerates clears  Keep HOB elevated 45 degrees at all time to minimize reflux  Continue Protonix Q12h  Can add viscous lidocaine Q6h PRN for any discomfort  Pain control as needed  Return to floors for continued care per primary team  Please contact the GI fellow on-call with any questions or concerns  ATTENDING ATTESTATION:  I was present throughout the entire procedure from insertion to complete withdrawal of the scope  I performed all interventions myself or oversaw the fellow  Benton Wiggins MD     XR chest 1 view portable    Result Date: 7/11/2022  Impression: No acute cardiopulmonary disease  Workstation performed: [id number]     Other Diagnostic Testing: I have personally reviewed pertinent reports  ACTIVE MEDICATIONS     Current Facility-Administered Medications   Medication Dose Route Frequency    acetaminophen (TYLENOL) tablet 487 5 mg  487 5 mg Oral Q6H PRN    aspirin (ECOTRIN LOW STRENGTH) EC tablet 81 mg  81 mg Oral Daily    atorvastatin (LIPITOR) tablet 80 mg  80 mg Oral Daily With Dinner    dextrose 5 % and sodium chloride 0 45 % infusion  150 mL/hr Intravenous Continuous    enoxaparin (LOVENOX) subcutaneous injection 40 mg  40 mg Subcutaneous Daily    labetalol (NORMODYNE) injection 10 mg  10 mg Intravenous Q6H PRN    Lidocaine Viscous HCl (XYLOCAINE) 2 % mucosal solution 15 mL  15 mL Swish & Swallow 4x Daily PRN    ondansetron (ZOFRAN) injection 4 mg  4 mg Intravenous Q6H PRN    pantoprazole (PROTONIX) injection 40 mg  40 mg Intravenous Q12H AEDNIKE    PHENobarbital tablet 64 8 mg  64 8 mg Oral BID       VTE Pharmacologic Prophylaxis: Enoxaparin (Lovenox)  VTE Mechanical Prophylaxis: sequential compression device    Portions of the record may have been created with voice recognition software  Occasional wrong word or "sound a like" substitutions may have occurred due to the inherent limitations of voice recognition software    Read the chart carefully and recognize, using context, where substitutions have occurred   ==  [facility]  MS4

## 2022-07-13 NOTE — QUICK NOTE
Updated brother on the patients clinical course and progress at bedside  All questions answered  Patient drank a bowl of broth, tolerating well

## 2022-07-13 NOTE — PROGRESS NOTES
Progress Note - General Surgery   Jeet Fenton 67 y o  male MRN: [id number]  Unit/Bed#: [facility] 907-01 Encounter: [id number]    Assessment:  Patient is a 67 y o  male p/w progressive worsening dysphagia since May due to esophageal adenocarcinoma (T2N0)  PMHx of CAD, stenting x4 (most recent August 2021), CHF, HTN, HLD, Afib, pancytopenia, seizure disorder, AAA repair (6371) complicated by fascial dehiscence and large incisional hernia  Afebrile, VSS    Plan:   EGD today with placement of esophageal stent   Consider J-tube if stent placement unsuccessful   No PEG placement to preserve gastric conduit for possible future esophagectomy   Outpatient neoadjuvant chemoradiation   Please Tiger[employer] on call Red Surgery or Acute Care Surgery Floor Call with any questions     Subjective/Objective     Subjective:   No acute events overnight  Patient reports feeling well  NPO since midnight  Pertinent review of systems as above  All other review of systems negative  Objective:    Blood pressure 111/60, pulse 61, temperature 98 1 °F (36 7 °C), resp  rate 18, height 6' (1 829 m), weight 94 4 kg (208 lb 1 8 oz), SpO2 94 %  ,Body mass index is 28 23 kg/m²  Intake/Output Summary (Last 24 hours) at 7/13/2022 0618  Last data filed at 7/13/2022 0300  Gross per 24 hour   Intake 420 ml   Output 1 ml   Net 419 ml       Invasive Devices  Report    Peripheral Intravenous Line  Duration           Peripheral IV 07/13/22 Proximal;Right;Ventral (anterior) Forearm <1 day                Physical Exam:   Gen:  NAD  HEENT: NCAT  MMM  CV: well perfused  Lungs: Normal respiratory effort  Abd: soft, nt/nd  Skin: warm/ dry  Extremities: no peripheral edema, no clubbing or cyanosis  Neuro:  AxO x3      Results from last 7 days   Lab Units 07/13/22  0500 07/12/22  0532 07/11/22  1506   WBC Thousand/uL 2 77* 3 32* 3 06*   HEMOGLOBIN g/dL 9 8* 9 4* 11 3*   HEMATOCRIT % 31 0* 29 6* 34 9*   PLATELETS Thousands/uL 84* 87* 107*     Results from last 7 days   Lab Units 07/12/22  0532 07/11/22  1506   POTASSIUM mmol/L 3 4* 4 0   CHLORIDE mmol/L 112* 109*   CO2 mmol/L 25 26   BUN mg/dL 11 12   CREATININE mg/dL 0 71 0 98   CALCIUM mg/dL 8 1* 8 9            I have personally reviewed pertinent films in PACS      Medications:   Scheduled Meds:  Current Facility-Administered Medications   Medication Dose Route Frequency Provider Last Rate    acetaminophen  487 5 mg Oral Q6H PRN Olena Lema MD      aspirin  81 mg Oral Daily Olena Lema MD      atorvastatin  80 mg Oral Daily With Karlie Lorenzana MD      dextrose 5 % and sodium chloride 0 45 %  100 mL/hr Intravenous Continuous Darien Puentes  mL/hr (07/13/22 0133)    enoxaparin  40 mg Subcutaneous Daily Olena Lema MD      labetalol  10 mg Intravenous Q6H PRN Olena Lema MD      ondansetron  4 mg Intravenous Q6H PRN Olena Lema MD      pantoprazole  40 mg Intravenous Q24H [address] [place] Little MD      PHENobarbital  64 8 mg Oral BID Olena Lema MD       Continuous Infusions:dextrose 5 % and sodium chloride 0 45 %, 100 mL/hr, Last Rate: 100 mL/hr (07/13/22 0133)      PRN Meds:  acetaminophen, 487 5 mg, Q6H PRN  labetalol, 10 mg, Q6H PRN  ondansetron, 4 mg, Q6H PRN      VTE Pharmacologic Prophylaxis: Enoxaparin (Lovenox)  VTE Mechanical Prophylaxis: sequential compression device

## 2022-07-13 NOTE — PROGRESS NOTES
Pastoral Care Progress Note    2022  Patient: Juana Nevarez : 1945  Admission Date & Time: 2022 1416  MRN: [id number] [facility]: [id number]                     Patient of the floor, but Fr Alexander Loya spoke with pts brother, Gabriela Manjarrez      22 1400   Clinical Encounter Type   Visited With Family; Patient not available   Routine Visit Follow-up

## 2022-07-13 NOTE — ANESTHESIA PREPROCEDURE EVALUATION
Procedure:  EGD    Relevant Problems   CARDIO   (+) Abdominal aortic aneurysm, without rupture (HCC)   (+) CAD (coronary artery disease)   (+) Coronary artery disease of native artery of native heart with stable angina pectoris (HCC)   (+) HLD (hyperlipidemia)   (+) HTN (hypertension)   (+) NSTEMI (non-ST elevated myocardial infarction) (HCC)   (+) Paroxysmal atrial fibrillation (HCC)   (+) STEMI (ST elevation myocardial infarction) (HCC)      GI/HEPATIC   (+) Acute gastric ulcer   (+) Dysphagia   (+) Esophageal adenocarcinoma (HCC)      HEMATOLOGY   (+) Pancytopenia (HCC)      NEURO/PSYCH   (+) Seizure disorder (HCC)        Physical Exam    Airway    Mallampati score: II  TM Distance: >3 FB  Neck ROM: full     Dental       Cardiovascular  Cardiovascular exam normal    Pulmonary  Pulmonary exam normal     Other Findings        Anesthesia Plan  ASA Score- 3     Anesthesia Type- general with ASA Monitors  Additional Monitors:   Airway Plan: ETT  Comment: Good cardiac fxn  No cardiac or pulmonary complaints  Dr Edouard Gutierrez requesting GA with ETT for eval of possible malignancy          Plan Factors-Exercise tolerance (METS): >4 METS  Chart reviewed  EKG reviewed  Imaging results reviewed  Existing labs reviewed  Patient summary reviewed  Patient is not a current smoker  Induction- intravenous  Postoperative Plan-   Planned trial extubation    Informed Consent- Anesthetic plan and risks discussed with patient  I personally reviewed this patient with the CRNA  Discussed and agreed on the Anesthesia Plan with the CRNA  Gwen Christian

## 2022-07-13 NOTE — PROGRESS NOTES
[facility] - Progress Note  Kaylah Sesay 67 y o  male MRN: [id number]  Unit/Bed#: [facility] 907-01 Encounter: [id number]      ASSESSMENT & PLAN:    43-year-old male with past medical history of recently diagnosed esophageal adenocarcinoma currently undergoing workup, CAD status post multiple stents on aspirin, combined systolic and diastolic heart failure, AFib not on anticoagulation, GERD, seizure disorder who is admitted for progressively worsening dysphagia since May of 2022  GI is consulted for the same  1  Esophageal adenocarcinoma, dysphagia - worsening dysphagia in setting of malignancy  Significant weight loss noted  Eventual plan for esophagectomy following chemoradiation  · Plan for EGD with fluoro for possible esophageal stent placement  · NPO for procedure  · No antiplatelets or anticoagulants  · Okay for DVT prophylaxis  ______________________________________________________________________    SUBJECTIVE:     Patient seen and examined in the GI lab prior to procedure  Tolerated clears better yesterday  No other acute complaints      Medication Administration - last 24 hours from 07/12/2022 1231 to 07/13/2022 1231       Date/Time Order Dose Route Action Action by     07/13/2022 0944 enoxaparin (LOVENOX) subcutaneous injection 40 mg 40 mg Subcutaneous Not Given Vertell Liner, RN     07/13/2022 0944 aspirin (ECOTRIN LOW STRENGTH) EC tablet 81 mg 81 mg Oral Given Vertell Liner, RN     07/13/2022 0944 pantoprazole (PROTONIX) injection 40 mg 40 mg Intravenous Given Vertell Liner, RN     07/13/2022 0944 PHENobarbital tablet 64 8 mg 64 8 mg Oral Given Vertell Liner, RN     07/12/2022 1813 PHENobarbital tablet 64 8 mg 64 8 mg Oral Given Josh Lobo RN     07/12/2022 2150 potassium chloride 20 mEq IVPB (premix) 0 mEq Intravenous Stopped Estil Carol Ann, MC     07/12/2022 1512 potassium chloride 20 mEq IVPB (premix) 20 mEq Intravenous New Bag Josh Lobo RN 07/12/2022 2150 potassium chloride 20 mEq IVPB (premix) 0 mEq Intravenous Stopped Tim Tirado RN     07/12/2022 1507 multi-electrolyte (PLASMALYTE-A/ISOLYTE-S PH 7 4) IV solution 100 mL/hr Intravenous Not Given Araceli Rand RN     07/13/2022 3759 dextrose 5 % and sodium chloride 0 45 % infusion 150 mL/hr Intravenous Rate/Dose Change Carl Pennington RN     07/13/2022 0133 dextrose 5 % and sodium chloride 0 45 % infusion 100 mL/hr Intravenous [address] [place] [employer], [address] [facility]     07/13/2022 0131 dextrose 5 % and sodium chloride 0 45 % infusion 0 mL/hr Intravenous Stopped Daisy [place] [employer], RN     07/12/2022 1511 dextrose 5 % and sodium chloride 0 45 % infusion 100 mL/hr Intravenous New Bag Araceli Rand RN          OBJECTIVE:     Objective   Blood pressure 120/56, pulse 59, temperature 99 2 °F (37 3 °C), temperature source Tympanic, resp  rate 18, height 6' (1 829 m), weight 94 3 kg (208 lb), SpO2 95 %  Body mass index is 28 21 kg/m²      Intake/Output Summary (Last 24 hours) at 7/13/2022 1231  Last data filed at 7/13/2022 0700  Gross per 24 hour   Intake 420 ml   Output 1 ml   Net 419 ml       PHYSICAL EXAM:   General Appearance: Awake and alert, in no acute distress  Abdomen: Soft, non-tender, non-distended; bowel sounds normal; no masses or no organomegaly    Invasive Devices  Report    Peripheral Intravenous Line  Duration           Peripheral IV 07/13/22 Proximal;Right;Ventral (anterior) Forearm <1 day                LAB RESULTS:  Admission on 07/11/2022   Component Date Value    WBC 07/11/2022 3 06 (A)    RBC 07/11/2022 3 62 (A)    Hemoglobin 07/11/2022 11 3 (A)    Hematocrit 07/11/2022 34 9 (A)    MCV 07/11/2022 96     MCH 07/11/2022 31 2     MCHC 07/11/2022 32 4     RDW 07/11/2022 22 1 (A)    Platelets 02/20/1293 107 (A)    Sodium 07/11/2022 141     Potassium 07/11/2022 4 0     Chloride 07/11/2022 109 (A)    CO2 07/11/2022 26     ANION GAP 07/11/2022 6     BUN 07/11/2022 12     Creatinine 07/11/2022 0 98     Glucose 07/11/2022 101     Calcium 07/11/2022 8 9     AST 07/11/2022 18     ALT 07/11/2022 10 (A)    Alkaline Phosphatase 07/11/2022 174 (A)    Total Protein 07/11/2022 7 9     Albumin 07/11/2022 3 5     Total Bilirubin 07/11/2022 0 61     eGFR 07/11/2022 74     hs TnI 0hr 07/11/2022 7     ABO Grouping 07/11/2022 A     Rh Factor 07/11/2022 Positive     Antibody Screen 07/11/2022 Negative     Specimen Expiration Date 07/11/2022 [id number]     hs TnI 2hr 07/11/2022 7     Delta 2hr hsTnI 07/11/2022 0     Segmented % 07/11/2022 65     Lymphocytes % 07/11/2022 27     Monocytes % 07/11/2022 1 (A)    Eosinophils, % 07/11/2022 3     Basophils % 07/11/2022 4 (A)    Absolute Neutrophils 07/11/2022 1 99     Lymphocytes Absolute 07/11/2022 0 83     Monocytes Absolute 07/11/2022 0 03     Eosinophils Absolute 07/11/2022 0 09     Basophils Absolute 07/11/2022 0 12 (A)    Anisocytosis 07/11/2022 Present     Poikilocytes 07/11/2022 Present     Polychromasia 07/11/2022 Present     Schistocytes 07/11/2022 Present     Target Cells 07/11/2022 Present     Platelet Estimate 91/48/1247 Borderline (A)    Sodium 07/12/2022 142     Potassium 07/12/2022 3 4 (A)    Chloride 07/12/2022 112 (A)    CO2 07/12/2022 25     ANION GAP 07/12/2022 5     BUN 07/12/2022 11     Creatinine 07/12/2022 0 71     Glucose 07/12/2022 94     Calcium 07/12/2022 8 1 (A)    Corrected Calcium 07/12/2022 8 9     AST 07/12/2022 12     ALT 07/12/2022 9 (A)    Alkaline Phosphatase 07/12/2022 143 (A)    Total Protein 07/12/2022 6 5     Albumin 07/12/2022 3 0 (A)    Total Bilirubin 07/12/2022 0 46     eGFR 07/12/2022 90     WBC 07/12/2022 3 32 (A)    RBC 07/12/2022 2 98 (A)    Hemoglobin 07/12/2022 9 4 (A)    Hematocrit 07/12/2022 29 6 (A)    MCV 07/12/2022 99 (A)    MCH 07/12/2022 31 5     MCHC 07/12/2022 31 8     RDW 07/12/2022 22 1 (A)    Platelets 74/15/0325 87 (A)    nRBC 07/12/2022 0     Neutrophils Relative 07/12/2022 51     Immat GRANS % 07/12/2022 1     Lymphocytes Relative 07/12/2022 28     Monocytes Relative 07/12/2022 13 (A)    Eosinophils Relative 07/12/2022 6     Basophils Relative 07/12/2022 1     Neutrophils Absolute 07/12/2022 1 69 (A)    Immature Grans Absolute 07/12/2022 0 04     Lymphocytes Absolute 07/12/2022 0 94     Monocytes Absolute 07/12/2022 0 42     Eosinophils Absolute 07/12/2022 0 20     Basophils Absolute 07/12/2022 0 03     Ventricular Rate 07/11/2022 60     Atrial Rate 07/11/2022 60     RI Interval 07/11/2022 224     QRSD Interval 07/11/2022 84     QT Interval 07/11/2022 402     QTC Interval 07/11/2022 402     P Axis 07/11/2022 71     QRS Axis 07/11/2022 -26     T Wave Axis 07/11/2022 14     WBC 07/13/2022 2 77 (A)    RBC 07/13/2022 3 16 (A)    Hemoglobin 07/13/2022 9 8 (A)    Hematocrit 07/13/2022 31 0 (A)    MCV 07/13/2022 98     MCH 07/13/2022 31 0     MCHC 07/13/2022 31 6     RDW 07/13/2022 22 1 (A)    Platelets 80/54/9365 84 (A)    Sodium 07/13/2022 140     Potassium 07/13/2022 3 6     Chloride 07/13/2022 110 (A)    CO2 07/13/2022 26     ANION GAP 07/13/2022 4     BUN 07/13/2022 10     Creatinine 07/13/2022 0 68     Glucose 07/13/2022 100     Calcium 07/13/2022 8 1 (A)    eGFR 07/13/2022 92        RADIOLOGY RESULTS: I have personally reviewed pertinent imaging studies  YOANNA Oshea  Chief Gastroenterology Fellow  [facility]  Available on [email or web address]

## 2022-07-13 NOTE — TELEPHONE ENCOUNTER
Second Attempt    I called Mabelene Alpers to schedule a new patient appointment with the Cancer Risk and Genetics Program       Outcome:   I left a voice message encouraging the patient to call the genetics team at [phone] to schedule this appointment  Follow-up:   At this time the referral will be closed and we will wait to hear back from the patient regarding scheduling this appointment

## 2022-07-13 NOTE — RESTORATIVE TECHNICIAN NOTE
Restorative Technician Note      Patient Name: Steph Blunt     Mobile Factory Tech Visit Date: 7/13/2022  Note Type: Mobility  Patient Position Upon Consult: Supine  Activity Performed: Ambulated  Assistive Device: Other (Comment) (none)  Patient Position at End of Consult: Bedside chair;  All needs within [facility]

## 2022-07-14 ENCOUNTER — TELEPHONE (OUTPATIENT)
Dept: HEMATOLOGY ONCOLOGY | Facility: CLINIC | Age: 77
End: 2022-07-14

## 2022-07-14 LAB
ANION GAP SERPL CALCULATED.3IONS-SCNC: 6 MMOL/L (ref 4–13)
BUN SERPL-MCNC: 10 MG/DL (ref 5–25)
CALCIUM SERPL-MCNC: 8.3 MG/DL (ref 8.3–10.1)
CHLORIDE SERPL-SCNC: 107 MMOL/L (ref 100–108)
CO2 SERPL-SCNC: 25 MMOL/L (ref 21–32)
CREAT SERPL-MCNC: 0.86 MG/DL (ref 0.6–1.3)
ERYTHROCYTE [DISTWIDTH] IN BLOOD BY AUTOMATED COUNT: 21.9 % (ref 11.6–15.1)
GFR SERPL CREATININE-BSD FRML MDRD: 83 ML/MIN/1.73SQ M
GLUCOSE SERPL-MCNC: 145 MG/DL (ref 65–140)
HCT VFR BLD AUTO: 31 % (ref 36.5–49.3)
HGB BLD-MCNC: 10.1 G/DL (ref 12–17)
MCH RBC QN AUTO: 32.3 PG (ref 26.8–34.3)
MCHC RBC AUTO-ENTMCNC: 32.6 G/DL (ref 31.4–37.4)
MCV RBC AUTO: 99 FL (ref 82–98)
PLATELET # BLD AUTO: 89 THOUSANDS/UL (ref 149–390)
POTASSIUM SERPL-SCNC: 3.8 MMOL/L (ref 3.5–5.3)
RBC # BLD AUTO: 3.13 MILLION/UL (ref 3.88–5.62)
SODIUM SERPL-SCNC: 138 MMOL/L (ref 136–145)
WBC # BLD AUTO: 4.26 THOUSAND/UL (ref 4.31–10.16)

## 2022-07-14 PROCEDURE — NC001 PR NO CHARGE: Performed by: SURGERY

## 2022-07-14 PROCEDURE — NC001 PR NO CHARGE

## 2022-07-14 PROCEDURE — C9113 INJ PANTOPRAZOLE SODIUM, VIA: HCPCS | Performed by: STUDENT IN AN ORGANIZED HEALTH CARE EDUCATION/TRAINING PROGRAM

## 2022-07-14 PROCEDURE — 85027 COMPLETE CBC AUTOMATED: CPT | Performed by: INTERNAL MEDICINE

## 2022-07-14 PROCEDURE — 99232 SBSQ HOSP IP/OBS MODERATE 35: CPT | Performed by: INTERNAL MEDICINE

## 2022-07-14 PROCEDURE — 80048 BASIC METABOLIC PNL TOTAL CA: CPT | Performed by: INTERNAL MEDICINE

## 2022-07-14 RX ADMIN — ONDANSETRON 4 MG: 2 INJECTION INTRAMUSCULAR; INTRAVENOUS at 05:31

## 2022-07-14 RX ADMIN — ASPIRIN 81 MG: 81 TABLET, COATED ORAL at 10:33

## 2022-07-14 RX ADMIN — DEXTROSE AND SODIUM CHLORIDE 150 ML/HR: 5; .45 INJECTION, SOLUTION INTRAVENOUS at 20:46

## 2022-07-14 RX ADMIN — DEXTROSE AND SODIUM CHLORIDE 150 ML/HR: 5; .45 INJECTION, SOLUTION INTRAVENOUS at 12:30

## 2022-07-14 RX ADMIN — DEXTROSE AND SODIUM CHLORIDE 150 ML/HR: 5; .45 INJECTION, SOLUTION INTRAVENOUS at 05:33

## 2022-07-14 RX ADMIN — PANTOPRAZOLE SODIUM 40 MG: 40 INJECTION, POWDER, FOR SOLUTION INTRAVENOUS at 20:45

## 2022-07-14 RX ADMIN — PANTOPRAZOLE SODIUM 40 MG: 40 INJECTION, POWDER, FOR SOLUTION INTRAVENOUS at 10:33

## 2022-07-14 RX ADMIN — PHENOBARBITAL 64.8 MG: 64.8 TABLET ORAL at 10:33

## 2022-07-14 RX ADMIN — ENOXAPARIN SODIUM 40 MG: 40 INJECTION SUBCUTANEOUS at 10:32

## 2022-07-14 RX ADMIN — PHENOBARBITAL 64.8 MG: 64.8 TABLET ORAL at 18:18

## 2022-07-14 RX ADMIN — ATORVASTATIN CALCIUM 80 MG: 80 TABLET, FILM COATED ORAL at 16:44

## 2022-07-14 NOTE — PROGRESS NOTES
Progress Note -  [facility] Specialists  Janette Dover 67 y o  male MRN: [id number]  Unit/Bed#: [facility] 907-01 Encounter: [id number]      ASSESSMENT AND PLAN:      27-year-old male with past medical history of recently diagnosed esophageal adenocarcinoma currently undergoing workup, CAD status post multiple stents on aspirin, combined systolic and diastolic heart failure, AFib not on anticoagulation, GERD, seizure disorder who is admitted for progressively worsening dysphagia since May of 2022  GI is consulted for the same      1  Dysphagia  Likely esophageal in origin given recent diagnosis of cancer  Underwent EGD with stent placement 7/13/2022  Tolerating well  · Advance diet to pureed with thin liquids  · If able tolerate this diet in the next 24 hours can advance to soft diet  · Provided patient with material on diet  · Monitor electrolytes and replete as needed  · Monitor vital signs, bowel movements  2  Esophageal Adenocarcinoma  Diagnosed in May 2022 after EGD  Evaluated by thoracic surgery recommended chemoradiation prior to esophagectomy  Has not receive treatment yet  · Follow-up with Medical, Surgical and Radiation Oncology  · Management as described above      Rest of care per primary team     ______________________________________________________________________    Subjective:  Patient is doing well  No new denies any pain  Has some nausea controlled with Zofran  Denies any overt vomiting, diarrhea, abdominal pain, reflux symptoms  REVIEW OF SYSTEMS:    Review of Systems   Constitutional: Negative for chills and fever  HENT: Negative for congestion and sinus pressure  Respiratory: Negative for cough and shortness of breath  Cardiovascular: Negative for chest pain, palpitations and leg swelling  Gastrointestinal: Negative for abdominal pain, diarrhea, nausea and vomiting  Genitourinary: Negative for dysuria and hematuria     Musculoskeletal: Negative for arthralgias and back pain  Skin: Negative for color change and rash  Neurological: Negative for dizziness and headaches  Psychiatric/Behavioral: Negative for agitation and confusion  All other systems reviewed and are negative  Historical Information   Past Medical History:   Diagnosis Date    Cardiac disease     [facility]est[facility]) [facility] [address] [facility]     Hernia of abdominal cavity     Myocardial infarction ([facility] [address] )     last assessed 14, past, Pt had MI s/p AGUSTIN placed in , refuses to take any other medications for his cardiac disease, only will take seizure med   Understands possible complications from this decision    Seizure [facility])      Past Surgical History:   Procedure Laterality Date    ABDOMINAL AORTIC ANEURYSM REPAIR      for dialation or occulsion    CATARACT EXTRACTION       Social History   Social History     Substance and Sexual Activity   Alcohol Use Not Currently     Social History     Substance and Sexual Activity   Drug Use Never     Social History     Tobacco Use   Smoking Status Former Smoker    Quit date: 2005    Years since quittin 1   Smokeless Tobacco Never Used     Family History   Problem Relation Age of Onset    Hypertension Father     Kidney disease Brother        Meds/Allergies     Medications Prior to Admission   Medication    aspirin (Aspirin Low Dose) 81 mg EC tablet    atorvastatin (LIPITOR) 80 mg tablet    PHENobarbital 64 8 mg tablet    acetaminophen (TYLENOL) 500 mg tablet    metoprolol succinate (TOPROL-XL) 50 mg 24 hr tablet    nitroglycerin (NITROSTAT) 0 4 mg SL tablet    pantoprazole (PROTONIX) 40 mg tablet    pantoprazole (PROTONIX) 40 mg tablet    sucralfate (CARAFATE) 1 g tablet    triamcinolone (KENALOG) 0 025 % cream    warfarin (COUMADIN) 2 5 mg tablet     Current Facility-Administered Medications   Medication Dose Route Frequency    acetaminophen (TYLENOL) tablet 487 5 mg  487 5 mg Oral Q6H PRN    aspirin (ECOTRIN LOW STRENGTH) EC tablet 81 mg  81 mg Oral Daily    atorvastatin (LIPITOR) tablet 80 mg  80 mg Oral Daily With Dinner    dextrose 5 % and sodium chloride 0 45 % infusion  150 mL/hr Intravenous Continuous    enoxaparin (LOVENOX) subcutaneous injection 40 mg  40 mg Subcutaneous Daily    labetalol (NORMODYNE) injection 10 mg  10 mg Intravenous Q6H PRN    Lidocaine Viscous HCl (XYLOCAINE) 2 % mucosal solution 15 mL  15 mL Swish & Swallow 4x Daily PRN    ondansetron (ZOFRAN) injection 4 mg  4 mg Intravenous Q6H PRN    pantoprazole (PROTONIX) injection 40 mg  40 mg Intravenous Q12H ADENIKE    PHENobarbital tablet 64 8 mg  64 8 mg Oral BID       Allergies   Allergen Reactions    Iodinated Diagnostic Agents Rash     Pt's skin get very red and he gets hot and chills    Clopidogrel Rash           Objective     Blood pressure 114/61, pulse 82, temperature 98 1 °F (36 7 °C), resp  rate 18, height 6' (1 829 m), weight 95 8 kg (211 lb 3 2 oz), SpO2 91 %  Body mass index is 28 64 kg/m²  Intake/Output Summary (Last 24 hours) at 7/14/2022 0744  Last data filed at 7/13/2022 1916  Gross per 24 hour   Intake 2435 ml   Output --   Net 2435 ml         PHYSICAL EXAM:      Physical Exam  Vitals and nursing note reviewed  Constitutional:       General: He is not in acute distress  Appearance: Normal appearance  He is well-developed  He is not ill-appearing  HENT:      Head: Normocephalic and atraumatic  Mouth/Throat:      Mouth: Mucous membranes are moist    Eyes:      Extraocular Movements: Extraocular movements intact  Conjunctiva/sclera: Conjunctivae normal       Pupils: Pupils are equal, round, and reactive to light  Cardiovascular:      Rate and Rhythm: Normal rate and regular rhythm  Pulses: Normal pulses  Heart sounds: Normal heart sounds  No murmur heard  No friction rub  No gallop  Pulmonary:      Effort: Pulmonary effort is normal  No respiratory distress  Breath sounds: Normal breath sounds   No wheezing or rales  Abdominal:      General: Abdomen is flat  Bowel sounds are normal  There is no distension  Palpations: Abdomen is soft  Tenderness: There is no abdominal tenderness  There is no guarding  Hernia: A hernia is present  Musculoskeletal:      Cervical back: Neck supple  Right lower leg: No edema  Left lower leg: No edema  Skin:     General: Skin is warm and dry  Neurological:      General: No focal deficit present  Mental Status: He is alert and oriented to person, place, and time     Psychiatric:         Mood and Affect: Mood normal          Behavior: Behavior normal           Lab Results:   Admission on 07/11/2022   Component Date Value    WBC 07/11/2022 3 06 (A)    RBC 07/11/2022 3 62 (A)    Hemoglobin 07/11/2022 11 3 (A)    Hematocrit 07/11/2022 34 9 (A)    MCV 07/11/2022 96     MCH 07/11/2022 31 2     MCHC 07/11/2022 32 4     RDW 07/11/2022 22 1 (A)    Platelets 73/06/3174 107 (A)    Sodium 07/11/2022 141     Potassium 07/11/2022 4 0     Chloride 07/11/2022 109 (A)    CO2 07/11/2022 26     ANION GAP 07/11/2022 6     BUN 07/11/2022 12     Creatinine 07/11/2022 0 98     Glucose 07/11/2022 101     Calcium 07/11/2022 8 9     AST 07/11/2022 18     ALT 07/11/2022 10 (A)    Alkaline Phosphatase 07/11/2022 174 (A)    Total Protein 07/11/2022 7 9     Albumin 07/11/2022 3 5     Total Bilirubin 07/11/2022 0 61     eGFR 07/11/2022 74     hs TnI 0hr 07/11/2022 7     ABO Grouping 07/11/2022 A     Rh Factor 07/11/2022 Positive     Antibody Screen 07/11/2022 Negative     Specimen Expiration Date 07/11/2022 [id number]     hs TnI 2hr 07/11/2022 7     Delta 2hr hsTnI 07/11/2022 0     Segmented % 07/11/2022 65     Lymphocytes % 07/11/2022 27     Monocytes % 07/11/2022 1 (A)    Eosinophils, % 07/11/2022 3     Basophils % 07/11/2022 4 (A)    Absolute Neutrophils 07/11/2022 1 99     Lymphocytes Absolute 07/11/2022 0 83     Monocytes Absolute 07/11/2022 0 03     Eosinophils Absolute 07/11/2022 0 09     Basophils Absolute 07/11/2022 0 12 (A)    Anisocytosis 07/11/2022 Present     Poikilocytes 07/11/2022 Present     Polychromasia 07/11/2022 Present     Schistocytes 07/11/2022 Present     Target Cells 07/11/2022 Present     Platelet Estimate 48/97/0855 Borderline (A)    Sodium 07/12/2022 142     Potassium 07/12/2022 3 4 (A)    Chloride 07/12/2022 112 (A)    CO2 07/12/2022 25     ANION GAP 07/12/2022 5     BUN 07/12/2022 11     Creatinine 07/12/2022 0 71     Glucose 07/12/2022 94     Calcium 07/12/2022 8 1 (A)    Corrected Calcium 07/12/2022 8 9     AST 07/12/2022 12     ALT 07/12/2022 9 (A)    Alkaline Phosphatase 07/12/2022 143 (A)    Total Protein 07/12/2022 6 5     Albumin 07/12/2022 3 0 (A)    Total Bilirubin 07/12/2022 0 46     eGFR 07/12/2022 90     WBC 07/12/2022 3 32 (A)    RBC 07/12/2022 2 98 (A)    Hemoglobin 07/12/2022 9 4 (A)    Hematocrit 07/12/2022 29 6 (A)    MCV 07/12/2022 99 (A)    MCH 07/12/2022 31 5     MCHC 07/12/2022 31 8     RDW 07/12/2022 22 1 (A)    Platelets 77/40/6247 87 (A)    nRBC 07/12/2022 0     Neutrophils Relative 07/12/2022 51     Immat GRANS % 07/12/2022 1     Lymphocytes Relative 07/12/2022 28     Monocytes Relative 07/12/2022 13 (A)    Eosinophils Relative 07/12/2022 6     Basophils Relative 07/12/2022 1     Neutrophils Absolute 07/12/2022 1 69 (A)    Immature Grans Absolute 07/12/2022 0 04     Lymphocytes Absolute 07/12/2022 0 94     Monocytes Absolute 07/12/2022 0 42     Eosinophils Absolute 07/12/2022 0 20     Basophils Absolute 07/12/2022 0 03     Ventricular Rate 07/11/2022 60     Atrial Rate 07/11/2022 60     WI Interval 07/11/2022 224     QRSD Interval 07/11/2022 84     QT Interval 07/11/2022 402     QTC Interval 07/11/2022 402     P Axis 07/11/2022 71     QRS Axis 07/11/2022 -26     T Wave Axis 07/11/2022 14     WBC 07/13/2022 2 77 (A)    RBC 07/13/2022 3 16 (A)    Hemoglobin 07/13/2022 9 8 (A)    Hematocrit 07/13/2022 31 0 (A)    MCV 07/13/2022 98     MCH 07/13/2022 31 0     MCHC 07/13/2022 31 6     RDW 07/13/2022 22 1 (A)    Platelets 61/01/2666 84 (A)    Sodium 07/13/2022 140     Potassium 07/13/2022 3 6     Chloride 07/13/2022 110 (A)    CO2 07/13/2022 26     ANION GAP 07/13/2022 4     BUN 07/13/2022 10     Creatinine 07/13/2022 0 68     Glucose 07/13/2022 100     Calcium 07/13/2022 8 1 (A)    eGFR 07/13/2022 92        Imaging Studies: I have personally reviewed pertinent imaging studies  [facility] YOANNA O    Gastroenterology Fellow  PGY-4  Available via [employer]  7/14/2022 7:44 AM

## 2022-07-14 NOTE — QUICK NOTE
Attempted to call brother to provide updates  Did not answer the phone, left a voicemail  However did see the brother at bedside this morning  All questions were answered  Will try to call tomorrow

## 2022-07-14 NOTE — TELEPHONE ENCOUNTER
Patient calling in to cancel his [address] [facility] appointment today, 7/14  Patient is currently in the hospital  Patient was transferred to radiology oncology ext [phone]

## 2022-07-14 NOTE — PROGRESS NOTES
07/14/22 1500   Clinical Encounter Type   Visited With Patient and family together   Routine Visit Follow-up   Sacramental Encounters   Communion Given Indicator Yes   Sacrament Other   (Delia; Gucci Melara)

## 2022-07-14 NOTE — RESTORATIVE TECHNICIAN NOTE
Restorative Technician Note      Patient Name: Spike Hill     Restorative Tech Visit Date: 7/14/2022  Note Type: Mobility  Patient Position Upon Consult: Seated edge of bed  Activity Performed: Ambulated  Assistive Device: Other (Comment) (none)  Patient Position at End of Consult: Supine;  All needs within reach      Turney Mg

## 2022-07-14 NOTE — CASE MANAGEMENT
Case Management Discharge Planning Note    Patient name Molly Albrecht  [facility] [address] [facility] [id number]  : 1945 Date 2022       Current Admission Date: 2022  Current Admission Diagnosis:Dysphagia   Patient Active Problem List    Diagnosis Date Noted    Dysphagia 2022    Pancytopenia ([place] [address] ) 2022    Acute gastric ulcer 2022    Esophageal adenocarcinoma ([place] [address] ) 2022    Abdominal aortic aneurysm, without rupture ([place] [address] ) 02/10/2022    Paroxysmal atrial fibrillation ([place] [address] ) 02/10/2022    Coronary artery disease of native artery of native heart with stable angina pectoris ([place] [address] ) 02/10/2022    Ischemic cardiomyopathy 2021    Chronic combined systolic and diastolic CHF (congestive heart failure) ([place] [address] ) 2021    Allergic reaction to contrast media 2021    NSTEMI (non-ST elevated myocardial infarction) ([place] [address] ) 2021    Abdominal pain 2020    Hernia, incisional 2019    Ventral hernia with obstruction and without gangrene 2019    CAD (coronary artery disease) 2017    HTN (hypertension) 2017    HLD (hyperlipidemia) 2017    Seizure disorder ([place] [address] ) 2017    S/P AAA (abdominal aortic aneurysm) repair 2017    STEMI (ST elevation myocardial infarction) ([place] [address] ) 2017      LOS (days): 3  Geometric Mean LOS (GMLOS) (days): 3 60  Days to GMLOS:0 8     OBJECTIVE:  Risk of Unplanned Readmission Score: 18 73         Current admission status: Inpatient   Preferred Pharmacy:   [id number] Guilherme Murillo  [address] [place] [address]  Phone: [phone] Fax: Jimbo Guillermo [address] [facility] [address] [facility]  Phone: [phone] Fax: [phone]    Primary Care Provider: Amanda Bray MD    Primary Insurance: MEDICARE  Secondary Insurance: [address] [employer]    DISCHARGE DETAILS:         Other Referral/Resources/Interventions Provided:  Referral Comments: patient with no PT/OT needs for time of discharge  Additional Comments: patient is not medically cleared for discharge at this time  He is currently on a clear liquid diet with plans to advance to full liquid diet today  Patient is also recommended for inducation chemo and radiation which has not yet been started at this time

## 2022-07-14 NOTE — TELEMEDICINE
e-Consult (IPC)  - Interventional Radiology  Taz Sandoval 67 y o  male MRN: [id number]  [facility] Encounter: [id number]          Interventional Radiology has been consulted to evaluate Taz Sandoval    We were consulted by Surg/Onc concerning this patient with esophageal carcinoma  IP Consult to IR  Consult performed by: [facility], BRIJESH  Consult ordered by: Dyana Barthel, PA-C        07/14/22    Assessment/Recommendation:   Carlie Kelley, 66y male with a history of HTN, HLD, CAD, Seizures, A-fib, Esophageal carcinoma and esophageal stenting yesterday  Patient is scheduled to f/u with oncology as an outpatient on 7/19  Was recommended to undergo induction chemo and radiation therapy and possible transhiatal esophagogastrectomy  Patient has not yet started any treatment  IR strongly recommends against placement of implanted venous access ports on inpatients due to multiple studies published demonstrating an increased risk of port infection, detailed below  Magi et  al  (CVIR 2019) showed in a multivariate retrospective analysis of nearly 6000 ports that inpatient status was the sole factor responsible for increased infection when accounting for many other variables, with close to a 5x increased risk of infection  Julieta Hammer  (JVIR 2013) in a cohort of approximately 2000 port placements, found that there was nearly a 12x increased risk of port infections placed in the inpatient setting compared to the outpatient setting for all indications  Tim Arevalo  al  (JVIR 2014) in a cohort of approximately 4000 port placements, also showed an increased prevalence of port infections within the first thirty days of placement specifically within the inpatient population  For these reasons, we will make every attempt to expedite an outpatient port placement or placement on day of discharge      Please follow up with IR to schedule as outpatient or possibly on day of discharge as patient will need to be NPO after midnight prior to procedure  I spent 21-30 minutes in medical consultative time evaluating the medical record and imaging of Taz Sandoval   Thank you for allowing Interventional Radiology to participate in the care of@  Please don't hesitate to call or TigerText us with any questions  [address]         Total time spent in review of data, discussion with requesting provider and rendering advice was 22 minutes     Thank you for allowing Interventional Radiology to participate in the care of Taz Sandoval  Please don't hesitate to call or TigerText us with any questions       [facility] Blaise Rota

## 2022-07-14 NOTE — PROGRESS NOTES
Progress Note - Surgical Oncology  Thor Hines 67 y o  male MRN: [id number]  Unit/Bed#: [facility] 907-01 Encounter: [id number]    Assessment:  67 M p/w progressive dysphagia, found to have T2N0 esophageal carcinoma, now s/p EGD with esophageal stenting on 7/13  AVSS, room air     Unrecorded UOP, tolerated full tray of clears for dinner     Plan:  -continue CLD, advance as tolerated, consider FLD today  -protein calorie nutrition supplements  -consider calorie count  -if required, could consider J tube placement, but recommend trial of oral nutrition first given successful stenting  -OOB, ambulate  -DVt PPx  -disposition per primary team  -outpatient surgical oncology follow-up    Subjective/Objective       Subjective: dysphagia improved, mild intermittent nausea overnight, spat up once this AM     Objective:     Blood pressure 113/61, pulse 72, temperature 97 9 °F (36 6 °C), resp  rate 18, height 6' (1 829 m), weight 95 8 kg (211 lb 3 2 oz), SpO2 92 %  ,Body mass index is 28 64 kg/m²        Intake/Output Summary (Last 24 hours) at 7/14/2022 0553  Last data filed at 7/13/2022 1916  Gross per 24 hour   Intake 2435 ml   Output --   Net 2435 ml       Invasive Devices  Report    Peripheral Intravenous Line  Duration           Peripheral IV 07/13/22 Proximal;Right;Ventral (anterior) Forearm 1 day                Physical Exam:     General: NAD  HEENT: normocephalic, atraumatic   Cardiovascular: RRR  Respiratory: no distress, room air   Abdomen: soft, nontender, non-distended multiple surgical scars, large ventral hernia   Extremities: no clubbing, cyanosis, or edema   Neuro: AAOx3  Skin: warm, dry       Lab, Imaging and other studies:pending   VTE Pharmacologic Prophylaxis: Enoxaparin (Lovenox)  VTE Mechanical Prophylaxis: sequential compression device

## 2022-07-14 NOTE — PROGRESS NOTES
INTERNAL MEDICINE RESIDENCY PROGRESS NOTE     Name: Steph Blunt   Age & Sex: 67 y o  male   MRN: [id number]  Unit/Bed#: [facility] 907-01   Encounter: [id number]  Team: SOD Team B     PATIENT INFORMATION     Name: Steph Blunt   Age & Sex: 67 y o  male   MRN: [id number]  Hospital Stay Days: 3    ASSESSMENT/PLAN     Principal Problem:    Dysphagia  Active Problems:    Esophageal adenocarcinoma (HCC)    Chronic combined systolic and diastolic CHF (congestive heart failure) (HCC)    CAD (coronary artery disease)    Paroxysmal atrial fibrillation ([facility] [address] )    Hernia, incisional    HTN (hypertension)    HLD (hyperlipidemia)    Seizure disorder ([facility] )    [facility]      * Dysphagia  Assessment & Plan  · Patient presents with progressively worsening dysphagia since May 2022  Previously was able to eat a normal diet in the beginning of May, now unable to swallow any food, liquids, and saliva  · Given the temporal relationship of his diagnosis of esophageal adenocarcinoma, tumor mass effect is the most likely cause of his dysphagia  · EGD (7/13): Ulcerated friable mass with stricture/narrowing of distal esophagus  Stent successfully placed  Plan:  · GI consulted, s/p EGD with esophageal stent placement:  · Tolerated clear liquids, now on pureed with thin liquids, advance to soft diet tomorrow as tolerated  · Calorie counts and ensures  · Monitor electrolytes, replete PRN  · Monitor BM  · Keep elevated 45 degrees at all times to minimize reflux  · Continue pantoprazole 40 mg q12  · Pain: acetaminophen 487 5 q6h PRN and viscous lidocaine q6h PRN  · Surg onc consulted: recommends against PEG tube given anticipated need for surgery  · Speech: aspiration and reflux precautions  · Dextrose 5% at 150mL/hr  · Patient to f/u with medical oncology outpatient scheduled 7/19        Esophageal adenocarcinoma [facility])  Assessment & Plan  · Patient diagnosed with T2 N0 distal esophageal adenocarcinoma in May 2022    · Sees   Mekhi  Recommended to undergo induction chemotherapy and radiation therapy followed by possible transhiatal esophagogastrectomy  Plan was for surgical candidacy assessment following these treatments  · Patient has not yet started treatment  · IR strongly recommends against placement of implanted venous access ports on inpatients due to increased risk of post infection  Plan:  · See above A/P for dysphagia    Chronic combined systolic and diastolic CHF (congestive heart failure) (HCC)  Assessment & Plan  Wt Readings from Last 3 Encounters:   07/05/22 92 9 kg (204 lb 12 9 oz)   06/16/22 94 6 kg (208 lb 9 6 oz)   05/02/22 100 kg (220 lb 12 8 oz)     · Hx of systolic and diastolic heart failure  · Last echo in March 2022 EF 50%  Plan:   I/O monitoring   Daily standing weights    CAD (coronary artery disease)  Assessment & Plan  · History of CAD s/p stenting x 4    · On ASA 81 for secondary prevention      Paroxysmal atrial fibrillation (HCC)  Assessment & Plan  · Hx of paroxysmal atrial fibrillation  · Previously on warfarin for left apical thrombus, however has since been discontinued by his cardiologist      Hernia, incisional  Assessment & Plan  · Large ventral hernia developed following AAA repair in 2005  · Hernia has not been surgically repaired  · His cardiothoracic surgeon plans to address this surgically if he undergoes resection of his primary tumor  Pancytopenia ([facility] [address] )  Assessment & Plan  · Presented to the ED with a hemoglobin of 11 3, WBC 3 06, Platelets 181  Multiple CBCs in the past in this range  · In setting of esophageal adenocarcinoma  Plan:  · Continue to monitor serial CBCs  · Plan to transfuse for hemoglobin < 7    Seizure disorder [facility])  Assessment & Plan  · Hx of seizure disorder  · Continue outpatient phenobarbital 64 8 mg qday  HLD (hyperlipidemia)  Assessment & Plan  · Continue home 80 mg atorvastatin       HTN (hypertension)  Assessment & Plan  · Hx of hypertension however no longer takes antihypertensive medications  · Patient is normotensive on admission  · Labetalol 10 mg PRN for SBP > 170  Disposition: Continue inpatient care    SUBJECTIVE     Patient seen and examined  No acute events overnight  States he is feeling well  Burps and spits up saliva at times but not too frequently  Denies pain and substernal burning but feels "sort of nauseous" at times  Had some difficulty sleeping at 45 degrees  Tolerated his clear liquid diet yesterday evening and this morning  Denies fevers, chills, vomiting, SOB, chest pain, abdominal pain, dysuria  OBJECTIVE     Vitals:    22 1502 22 2219 22 0533 22 0725   BP: 109/59 113/61  114/61   Pulse: 72 72  82   Resp: 16 18  18   Temp: 97 8 °F (36 6 °C) 97 9 °F (36 6 °C)  98 1 °F (36 7 °C)   TempSrc:       SpO2: 93% 92%  91%   Weight:   95 8 kg (211 lb 3 2 oz)    Height:          Temperature:   Temp (24hrs), Av 9 °F (36 6 °C), Min:97 8 °F (36 6 °C), Max:98 1 °F (36 7 °C)    Temperature: 98 1 °F (36 7 °C)  Intake & Output:  I/O        07 07 07 07 0701  07/15 0700    P  O  420      I V  (mL/kg)  2435 (25 4)     IV Piggyback       Total Intake(mL/kg) 420 (4 4) 2435 (25 4)     Urine (mL/kg/hr) 0 (0)      Stool 1      Total Output 1      Net +419 +2435            Unmeasured Urine Occurrence 3 x 1 x         Weights:   IBW (Ideal Body Weight): 77 6 kg    Body mass index is 28 64 kg/m²  Weight (last 2 days)     Date/Time Weight    22 0533 95 8 (211 2)    22 1227 94 3 (208)    22 0548 94 4 (208 11)        Physical Exam  Vitals reviewed  Constitutional:       General: He is not in acute distress  Appearance: He is not ill-appearing, toxic-appearing or diaphoretic  HENT:      Head: Normocephalic and atraumatic        Mouth/Throat:      Mouth: Mucous membranes are moist    Cardiovascular:      Rate and Rhythm: Normal rate and regular rhythm  Pulses: Normal pulses  Heart sounds: No murmur heard  No friction rub  No gallop  Comments: Distant S1 and S2  Pulmonary:      Effort: Pulmonary effort is normal  No respiratory distress  Breath sounds: Normal breath sounds  No wheezing, rhonchi or rales  Abdominal:      General: Bowel sounds are normal  There is no distension  Palpations: Abdomen is soft  Tenderness: There is no abdominal tenderness  There is no guarding  Hernia: A hernia is present  Comments: Large ventral hernia spanning epigastric region to umbilicus  Nontender, soft, no overlying skin changes  Musculoskeletal:      Comments: Mild non-pitting lower extremity swelling bilaterally  Skin:     General: Skin is warm and dry  Neurological:      Mental Status: He is alert and oriented to person, place, and time  Psychiatric:         Mood and Affect: Mood normal          Behavior: Behavior normal        LABORATORY DATA     Labs: I have personally reviewed pertinent reports  Results from last 7 days   Lab Units 07/14/22  0900 07/13/22  0500 07/12/22  0532 07/11/22  1506   WBC Thousand/uL 4 26* 2 77* 3 32* 3 06*   HEMOGLOBIN g/dL 10 1* 9 8* 9 4* 11 3*   HEMATOCRIT % 31 0* 31 0* 29 6* 34 9*   PLATELETS Thousands/uL 89* 84* 87* 107*   NEUTROS PCT %  --   --  51  --    MONOS PCT %  --   --  13*  --    MONO PCT %  --   --   --  1*      Results from last 7 days   Lab Units 07/14/22  0900 07/13/22  0500 07/12/22  0532 07/11/22  1506   POTASSIUM mmol/L 3 8 3 6 3 4* 4 0   CHLORIDE mmol/L 107 110* 112* 109*   CO2 mmol/L 25 26 25 26   BUN mg/dL 10 10 11 12   CREATININE mg/dL 0 86 0 68 0 71 0 98   CALCIUM mg/dL 8 3 8 1* 8 1* 8 9   ALK PHOS U/L  --   --  143* 174*   ALT U/L  --   --  9* 10*   AST U/L  --   --  12 18                            IMAGING & DIAGNOSTIC TESTING     Radiology Results: I have personally reviewed pertinent reports    EGD Fluoro    Result Date: 7/13/2022  Impression: Ulcerated, friable mass with stricture/narrowing in the distal esophagus from known esophageal malignancy  The stricture segment measured 5 cm in length from 35 cm to 40 cm  Traversable with gentle pressure  Successfully placed 23 x 125 mm covered esophageal stent with the proximal end of the stent at 31 cm  Stent was secured in place using over-the-scope clip  Normal stomach  Normal duodenum  RECOMMENDATION: Start clear liquid diet today  Possibly advance to soft stent diet tomorrow if tolerates clears  Keep HOB elevated 45 degrees at all time to minimize reflux  Continue Protonix Q12h  Can add viscous lidocaine Q6h PRN for any discomfort  Pain control as needed  Return to floors for continued care per primary team  Please contact the GI fellow on-call with any questions or concerns  ATTENDING ATTESTATION:  I was present throughout the entire procedure from insertion to complete withdrawal of the scope  I performed all interventions myself or oversaw the fellow  Thelma Watson MD     XR chest 1 view portable    Result Date: 7/11/2022  Impression: No acute cardiopulmonary disease  Workstation performed: [id number]     Other Diagnostic Testing: I have personally reviewed pertinent reports      ACTIVE MEDICATIONS     Current Facility-Administered Medications   Medication Dose Route Frequency    acetaminophen (TYLENOL) tablet 487 5 mg  487 5 mg Oral Q6H PRN    aspirin (ECOTRIN LOW STRENGTH) EC tablet 81 mg  81 mg Oral Daily    atorvastatin (LIPITOR) tablet 80 mg  80 mg Oral Daily With Dinner    dextrose 5 % and sodium chloride 0 45 % infusion  150 mL/hr Intravenous Continuous    enoxaparin (LOVENOX) subcutaneous injection 40 mg  40 mg Subcutaneous Daily    labetalol (NORMODYNE) injection 10 mg  10 mg Intravenous Q6H PRN    Lidocaine Viscous HCl (XYLOCAINE) 2 % mucosal solution 15 mL  15 mL Swish & Swallow 4x Daily PRN    ondansetron (ZOFRAN) injection 4 mg  4 mg Intravenous Q6H PRN    pantoprazole (PROTONIX) injection 40 mg  40 mg Intravenous Q12H [facility]    PHENobarbital tablet 64 8 mg  64 8 mg Oral BID       VTE Pharmacologic Prophylaxis: Enoxaparin (Lovenox)  VTE Mechanical Prophylaxis: sequential compression device    Portions of the record may have been created with voice recognition software  Occasional wrong word or "sound a like" substitutions may have occurred due to the inherent limitations of voice recognition software    Read the chart carefully and recognize, using context, where substitutions have occurred   ==  [address]  MS4

## 2022-07-15 ENCOUNTER — APPOINTMENT (INPATIENT)
Dept: RADIOLOGY | Facility: HOSPITAL | Age: 77
DRG: 375 | End: 2022-07-15
Payer: MEDICARE

## 2022-07-15 VITALS
DIASTOLIC BLOOD PRESSURE: 62 MMHG | HEIGHT: 72 IN | WEIGHT: 205.47 LBS | BODY MASS INDEX: 27.83 KG/M2 | SYSTOLIC BLOOD PRESSURE: 106 MMHG | RESPIRATION RATE: 16 BRPM | HEART RATE: 75 BPM | OXYGEN SATURATION: 94 % | TEMPERATURE: 97.7 F

## 2022-07-15 LAB
ANION GAP SERPL CALCULATED.3IONS-SCNC: 4 MMOL/L (ref 4–13)
BUN SERPL-MCNC: 7 MG/DL (ref 5–25)
CALCIUM SERPL-MCNC: 8.2 MG/DL (ref 8.3–10.1)
CHLORIDE SERPL-SCNC: 108 MMOL/L (ref 100–108)
CO2 SERPL-SCNC: 25 MMOL/L (ref 21–32)
CREAT SERPL-MCNC: 0.83 MG/DL (ref 0.6–1.3)
GFR SERPL CREATININE-BSD FRML MDRD: 84 ML/MIN/1.73SQ M
GLUCOSE SERPL-MCNC: 118 MG/DL (ref 65–140)
POTASSIUM SERPL-SCNC: 3.5 MMOL/L (ref 3.5–5.3)
SODIUM SERPL-SCNC: 137 MMOL/L (ref 136–145)

## 2022-07-15 PROCEDURE — 76937 US GUIDE VASCULAR ACCESS: CPT | Performed by: RADIOLOGY

## 2022-07-15 PROCEDURE — 99232 SBSQ HOSP IP/OBS MODERATE 35: CPT | Performed by: INTERNAL MEDICINE

## 2022-07-15 PROCEDURE — 77001 FLUOROGUIDE FOR VEIN DEVICE: CPT | Performed by: RADIOLOGY

## 2022-07-15 PROCEDURE — 36561 INSERT TUNNELED CV CATH: CPT | Performed by: RADIOLOGY

## 2022-07-15 PROCEDURE — 80048 BASIC METABOLIC PNL TOTAL CA: CPT | Performed by: INTERNAL MEDICINE

## 2022-07-15 PROCEDURE — 77001 FLUOROGUIDE FOR VEIN DEVICE: CPT

## 2022-07-15 PROCEDURE — 99238 HOSP IP/OBS DSCHRG MGMT 30/<: CPT | Performed by: INTERNAL MEDICINE

## 2022-07-15 PROCEDURE — C1894 INTRO/SHEATH, NON-LASER: HCPCS

## 2022-07-15 PROCEDURE — 36561 INSERT TUNNELED CV CATH: CPT

## 2022-07-15 PROCEDURE — 0JH63WZ INSERTION OF TOTALLY IMPLANTABLE VASCULAR ACCESS DEVICE INTO CHEST SUBCUTANEOUS TISSUE AND FASCIA, PERCUTANEOUS APPROACH: ICD-10-PCS | Performed by: INTERNAL MEDICINE

## 2022-07-15 PROCEDURE — C9113 INJ PANTOPRAZOLE SODIUM, VIA: HCPCS | Performed by: STUDENT IN AN ORGANIZED HEALTH CARE EDUCATION/TRAINING PROGRAM

## 2022-07-15 PROCEDURE — C1788 PORT, INDWELLING, IMP: HCPCS

## 2022-07-15 PROCEDURE — 99152 MOD SED SAME PHYS/QHP 5/>YRS: CPT | Performed by: RADIOLOGY

## 2022-07-15 PROCEDURE — 76937 US GUIDE VASCULAR ACCESS: CPT

## 2022-07-15 PROCEDURE — 99153 MOD SED SAME PHYS/QHP EA: CPT

## 2022-07-15 PROCEDURE — 99152 MOD SED SAME PHYS/QHP 5/>YRS: CPT

## 2022-07-15 PROCEDURE — 02H633Z INSERTION OF INFUSION DEVICE INTO RIGHT ATRIUM, PERCUTANEOUS APPROACH: ICD-10-PCS | Performed by: INTERNAL MEDICINE

## 2022-07-15 RX ORDER — FENTANYL CITRATE 50 UG/ML
INJECTION, SOLUTION INTRAMUSCULAR; INTRAVENOUS CODE/TRAUMA/SEDATION MEDICATION
Status: COMPLETED | OUTPATIENT
Start: 2022-07-15 | End: 2022-07-15

## 2022-07-15 RX ORDER — MIDAZOLAM HYDROCHLORIDE 2 MG/2ML
INJECTION, SOLUTION INTRAMUSCULAR; INTRAVENOUS CODE/TRAUMA/SEDATION MEDICATION
Status: COMPLETED | OUTPATIENT
Start: 2022-07-15 | End: 2022-07-15

## 2022-07-15 RX ORDER — LIDOCAINE HYDROCHLORIDE 20 MG/ML
15 SOLUTION OROPHARYNGEAL 4 TIMES DAILY PRN
Refills: 0 | Status: CANCELLED | OUTPATIENT
Start: 2022-07-15

## 2022-07-15 RX ORDER — ACETAMINOPHEN 325 MG/1
487.5 TABLET ORAL EVERY 6 HOURS PRN
Refills: 0 | Status: CANCELLED | OUTPATIENT
Start: 2022-07-15

## 2022-07-15 RX ORDER — CEFAZOLIN SODIUM 2 G/50ML
SOLUTION INTRAVENOUS
Status: COMPLETED | OUTPATIENT
Start: 2022-07-15 | End: 2022-07-15

## 2022-07-15 RX ADMIN — DEXTROSE AND SODIUM CHLORIDE 150 ML/HR: 5; .45 INJECTION, SOLUTION INTRAVENOUS at 03:18

## 2022-07-15 RX ADMIN — FENTANYL CITRATE 50 MCG: 50 INJECTION INTRAMUSCULAR; INTRAVENOUS at 13:45

## 2022-07-15 RX ADMIN — ENOXAPARIN SODIUM 40 MG: 40 INJECTION SUBCUTANEOUS at 08:41

## 2022-07-15 RX ADMIN — ATORVASTATIN CALCIUM 80 MG: 80 TABLET, FILM COATED ORAL at 15:59

## 2022-07-15 RX ADMIN — MIDAZOLAM 1 MG: 1 INJECTION INTRAMUSCULAR; INTRAVENOUS at 14:03

## 2022-07-15 RX ADMIN — PANTOPRAZOLE SODIUM 40 MG: 40 INJECTION, POWDER, FOR SOLUTION INTRAVENOUS at 08:41

## 2022-07-15 RX ADMIN — PHENOBARBITAL 64.8 MG: 64.8 TABLET ORAL at 08:40

## 2022-07-15 RX ADMIN — CEFAZOLIN SODIUM 2000 MG: 2 SOLUTION INTRAVENOUS at 13:33

## 2022-07-15 RX ADMIN — PHENOBARBITAL 64.8 MG: 64.8 TABLET ORAL at 16:00

## 2022-07-15 RX ADMIN — ASPIRIN 81 MG: 81 TABLET, COATED ORAL at 08:41

## 2022-07-15 RX ADMIN — MIDAZOLAM 1 MG: 1 INJECTION INTRAMUSCULAR; INTRAVENOUS at 13:45

## 2022-07-15 RX ADMIN — FENTANYL CITRATE 50 MCG: 50 INJECTION INTRAMUSCULAR; INTRAVENOUS at 14:04

## 2022-07-15 NOTE — RESTORATIVE TECHNICIAN NOTE
"7/15/2020       RE: Derek Enriquez  [address]  [place] [facility] [place] [address]     Dear Colleague,    Thank you for referring your patient, Derek Enriquez, to the [facility] UROLOGY AND INST FOR PROSTATE AND UROLOGIC CANCERS at [facility]. Please see a copy of my visit note below.    Derek Enriquez is a 56 year old male who is being evaluated via a billable telephone visit.       Please Call # [phone]        The patient has been notified of following:      \"This telephone visit will be conducted via a call between you and your physician/provider. We have found that certain health care needs can be provided without the need for a physical exam.  This service lets us provide the care you need with a short phone conversation.  If a prescription is necessary we can send it directly to your pharmacy.  If lab work is needed we can place an order for that and you can then stop by our lab to have the test done at a later time.     Telephone visits are billed at different rates depending on your insurance coverage. During this emergency period, for some insurers they may be billed the same as an in-person visit.  Please reach out to your insurance provider with any questions.     If during the course of the call the physician/provider feels a telephone visit is not appropriate, you will not be charged for this service.\"     Patient has given verbal consent for Telephone visit?  Yes 9:55 AM 07/15/20      How would you like to obtain your AVS? Mail a copy       I spoke to patient for 6 minutes today.    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  Tried Trospium but significant side effect of constipation. He cannot tolerate anticholinergics.  Has not tried Botox.  Performs CIC.  Underwent UDS today - not the first time - which shows significant NDO with very high " Restorative Technician Note      Patient Name: Lachelle Amin     Restorative Tech Visit Date: 7/15/2022  Note Type: Mobility  Patient Position Upon Consult: Standing  Activity Performed: Ambulated  Assistive Device: Other (Comment) (none)  Patient Position at End of Consult: Bedside chair;  All needs within [facility] pressures starting around 125mL  He has urinary urge incontinence due to his neurogenic DO    A/P   Persistent neurogenic DO in neurogenic bladder.  Failed oral medications due to significant side effects.  I strongly recommend Botox injection. Apparently, he tried this before but had insurance denial. Perhaps now that he's tried anticholinergic, we can obtain coverage.  We could consider myrbetriq, but the amount of pressure he is generating is likely not to improve with single medication alone.  Botox next week if insurance will approve - 200u.    Cody Stroud MD

## 2022-07-15 NOTE — PROGRESS NOTES
07/15/22 1500   Clinical Encounter Type   Visited With Patient   Routine Visit Follow-up   Sacramental Encounters   Sacrament Other Other (Comment)  (Jose D Morel)

## 2022-07-15 NOTE — DISCHARGE INSTRUCTIONS
Implanted Venous Access Port     WHAT YOU NEED TO KNOW:   An implanted venous access port is a device used to give treatments and take blood  It may also be called a central venous access device (CVAD)  The port is a small container that is placed under your skin, usually in your upper chest  The port is attached to a catheter that enters a large vein  DISCHARGE INSTRUCTIONS:   Resume your normal diet  Small sips of flat soda will help with mild nausea  Prevent an infection:   Wash your hands often  Use soap and water  Clean your hands before and after you care for your port  Remind everyone who cares for your port to wash their hands  Check your skin for infection every day  Look for redness, swelling, or fluid oozing from the port site  Care for your port:   1  You may shower beginning 48 hours after procedure  2  Change dressing if it becomes wet  3  Remove dressing after 24 hours  Leave glue in place  4  It is normal for some bruising to occur  5  Use Tylenol for pain  6  Limit use of arm on the side that your port was placed  Lift nothing heavier than 5 pounds for 1 week, and then gradually increase activity as tolerated  7  DO NOT apply ointment, lotion or cream to port site until incision is healed  Allow glue to fall off  DO NOT attempt to peel glue from skin even it it begins to flake  8, After the port incision is healed you may swim, bathe  Notify the Interventional Radiologist if you have any of the followin  Fever above 101 F    2  Increased redness or swelling after 1st day  3  Increased pain after 1st day  4  Any sign of infection (drainage from port site, skin separation, hot to touch)  5  Persistent nausea or vomiting  Contact Interventional Radiology at [phone] [facility] PATIENTS: Contact Interventional Radiology at [phone]) ([address]: Contact Interventional Radiology at [phone])   Implanted Venous Access Port     WHAT YOU NEED TO KNOW:   An implanted venous access port is a device used to give treatments and take blood  It may also be called a central venous access device (CVAD)  The port is a small container that is placed under your skin, usually in your upper chest  The port is attached to a catheter that enters a large vein  DISCHARGE INSTRUCTIONS:   Resume your normal diet  Small sips of flat soda will help with mild nausea  Prevent an infection:   Wash your hands often  Use soap and water  Clean your hands before and after you care for your port  Remind everyone who cares for your port to wash their hands  Check your skin for infection every day  Look for redness, swelling, or fluid oozing from the port site  Care for your port:   1  You may shower beginning 48 hours after procedure  2  Change dressing if it becomes wet  3  Remove dressing after 24 hours  Leave glue in place  4  It is normal for some bruising to occur  5  Use Tylenol for pain  6  Limit use of arm on the side that your port was placed  Lift nothing heavier than 5 pounds for 1 week, and then gradually increase activity as tolerated  7  DO NOT apply ointment, lotion or cream to port site until incision is healed  Allow glue to fall off  DO NOT attempt to peel glue from skin even it it begins to flake  8, After the port incision is healed you may swim, bathe  Notify the Interventional Radiologist if you have any of the followin  Fever above 101 F    2  Increased redness or swelling after 1st day  3  Increased pain after 1st day  4  Any sign of infection (drainage from port site, skin separation, hot to touch)  5  Persistent nausea or vomiting  Contact Interventional Radiology at [phone] [facility] PATIENTS: Contact Interventional Radiology at [phone]) ([address] [facility]: Contact Interventional Radiology at [phone])

## 2022-07-15 NOTE — CASE MANAGEMENT
Case Management Discharge Planning Note    Patient name Taz Sandoval  Location [address] 907/[facility] 556-96 MRN [id number]  : 1945 Date 7/15/2022       Current Admission Date: 2022  Current Admission Diagnosis:Dysphagia   Patient Active Problem List    Diagnosis Date Noted    Dysphagia 2022    Pancytopenia ([place] [address] ) 2022    Acute gastric ulcer 2022    Esophageal adenocarcinoma ([place] [address] ) 2022    Abdominal aortic aneurysm, without rupture ([place] [address] ) 02/10/2022    Paroxysmal atrial fibrillation ([place] [address] ) 02/10/2022    Coronary artery disease of native artery of native heart with stable angina pectoris ([place] [address] ) 02/10/2022    Ischemic cardiomyopathy 2021    Chronic combined systolic and diastolic CHF (congestive heart failure) ([place] [address] ) 2021    Allergic reaction to contrast media 2021    NSTEMI (non-ST elevated myocardial infarction) ([place] [address] ) 2021    Abdominal pain 2020    Hernia, incisional 2019    Ventral hernia with obstruction and without gangrene 2019    CAD (coronary artery disease) 2017    HTN (hypertension) 2017    HLD (hyperlipidemia) 2017    Seizure disorder ([place] [address] ) 2017    S/P AAA (abdominal aortic aneurysm) repair 2017    STEMI (ST elevation myocardial infarction) ([place] [address] ) 2017      LOS (days): 4  Geometric Mean LOS (GMLOS) (days): 3 60  Days to GMLOS:-0 3     OBJECTIVE:  Risk of Unplanned Readmission Score: 21 47         Current admission status: Inpatient   Preferred Pharmacy:   [id number]  [address] [place] [address] Drive [address] [address]  Phone: [phone] Fax: Rosalee Guillermo[address] [place] [address] [address] [facility]  Phone: [phone] Fax: [phone]    Primary Care Provider: Jasson Naranjo MD    Primary Insurance: MEDICARE  Secondary Insurance: [address] [employer]    DISCHARGE DETAILS:            Other Referral/Resources/Interventions Provided:  Referral Comments: patient with no PT/OT needs for time of discharge               Additional Comments: Patient is not yet medically cleared for discharge  Patient had port placed today  They are also working on advancing his diet to mechanical soft with thins

## 2022-07-15 NOTE — PROGRESS NOTES
Progress Note -  [facility]  Morro Franklin 67 y o  male MRN: [id number]  Unit/Bed#: [facility] 907-01 Encounter: [id number]      ASSESSMENT AND PLAN:      48-year-old male with past medical history of recently diagnosed esophageal adenocarcinoma currently undergoing workup, CAD status post multiple stents on aspirin, combined systolic and diastolic heart failure, AFib not on anticoagulation, GERD, seizure disorder who is admitted for progressively worsening dysphagia since May of 2022  GI is consulted for the same      1  Dysphagia  Likely esophageal in origin given recent diagnosis of cancer  Underwent EGD with stent placement 7/13/2022  Tolerating well  · Advance diet to mechanical soft with thin liquids  Patient to remain on this diet indefineatly as long as he has esophageal stent in place  · He will need to follow-up with gastroenterology the next 2-3 weeks  Will contact GI office to arrange follow-up  · Provided patient with material on diet  · Monitor electrolytes and replete as needed  · Monitor vital signs, bowel movements  2  Esophageal Adenocarcinoma  Diagnosed in May 2022 after EGD  Evaluated by thoracic surgery recommended chemoradiation prior to esophagectomy  Has not receive treatment yet  · Follow-up with Medical, Surgical and Radiation Oncology  · Management as described above      Rest of care per primary team     ______________________________________________________________________    Subjective:  Patient is doing well  No new denies any pain  Tolerating diet  Denies any vomiting, diarrhea, abdominal pain  REVIEW OF SYSTEMS:    Review of Systems   Constitutional: Negative for chills and fever  HENT: Negative for congestion and sinus pressure  Respiratory: Negative for cough and shortness of breath  Cardiovascular: Negative for chest pain, palpitations and leg swelling  Gastrointestinal: Negative for abdominal pain, diarrhea, nausea and vomiting  Genitourinary: Negative for dysuria and hematuria  Musculoskeletal: Negative for arthralgias and back pain  Skin: Negative for color change and rash  Neurological: Negative for dizziness and headaches  Psychiatric/Behavioral: Negative for agitation and confusion  All other systems reviewed and are negative  Historical Information   Past Medical History:   Diagnosis Date    Cardiac disease     CHF [facility]congestive heart failure) ([facility] [address] )     Hernia of abdominal cavity     Myocardial infarction ([facility] [address] )     last assessed 14, past, Pt had MI s/p AGUSTIN placed in , refuses to take any other medications for his cardiac disease, only will take seizure med   Understands possible complications from this decision    Seizure [facility])      Past Surgical History:   Procedure Laterality Date    ABDOMINAL AORTIC ANEURYSM REPAIR      for dialation or occulsion    CATARACT EXTRACTION       Social History   Social History     Substance and Sexual Activity   Alcohol Use Not Currently     Social History     Substance and Sexual Activity   Drug Use Never     Social History     Tobacco Use   Smoking Status Former Smoker    Quit date: 2005    Years since quittin 1   Smokeless Tobacco Never Used     Family History   Problem Relation Age of Onset    Hypertension Father     Kidney disease Brother        Meds/Allergies     Medications Prior to Admission   Medication    aspirin (Aspirin Low Dose) 81 mg EC tablet    atorvastatin (LIPITOR) 80 mg tablet    PHENobarbital 64 8 mg tablet    acetaminophen (TYLENOL) 500 mg tablet    metoprolol succinate (TOPROL-XL) 50 mg 24 hr tablet    nitroglycerin (NITROSTAT) 0 4 mg SL tablet    pantoprazole (PROTONIX) 40 mg tablet    pantoprazole (PROTONIX) 40 mg tablet    sucralfate (CARAFATE) 1 g tablet    triamcinolone (KENALOG) 0 025 % cream    warfarin (COUMADIN) 2 5 mg tablet     Current Facility-Administered Medications   Medication Dose Route Frequency    acetaminophen (TYLENOL) tablet 487 5 mg  487 5 mg Oral Q6H PRN    aspirin (ECOTRIN LOW STRENGTH) EC tablet 81 mg  81 mg Oral Daily    atorvastatin (LIPITOR) tablet 80 mg  80 mg Oral Daily With Dinner    enoxaparin (LOVENOX) subcutaneous injection 40 mg  40 mg Subcutaneous Daily    labetalol (NORMODYNE) injection 10 mg  10 mg Intravenous Q6H PRN    Lidocaine Viscous HCl (XYLOCAINE) 2 % mucosal solution 15 mL  15 mL Swish & Swallow 4x Daily PRN    ondansetron (ZOFRAN) injection 4 mg  4 mg Intravenous Q6H PRN    pantoprazole (PROTONIX) injection 40 mg  40 mg Intravenous Q12H ADENIKE    PHENobarbital tablet 64 8 mg  64 8 mg Oral BID       Allergies   Allergen Reactions    Iodinated Diagnostic Agents Rash     Pt's skin get very red and he gets hot and chills    Clopidogrel Rash           Objective     Blood pressure 113/53, pulse 75, temperature 98 °F (36 7 °C), resp  rate 19, height 6' (1 829 m), weight 95 8 kg (211 lb 3 2 oz), SpO2 91 %  Body mass index is 28 64 kg/m²  Intake/Output Summary (Last 24 hours) at 7/15/2022 1104  Last data filed at 7/15/2022 0846  Gross per 24 hour   Intake 6105 ml   Output --   Net 6105 ml         PHYSICAL EXAM:      Physical Exam  Vitals and nursing note reviewed  Constitutional:       General: He is not in acute distress  Appearance: Normal appearance  He is well-developed  He is obese  He is not ill-appearing  HENT:      Head: Normocephalic and atraumatic  Mouth/Throat:      Mouth: Mucous membranes are moist    Eyes:      Extraocular Movements: Extraocular movements intact  Conjunctiva/sclera: Conjunctivae normal       Pupils: Pupils are equal, round, and reactive to light  Cardiovascular:      Rate and Rhythm: Normal rate and regular rhythm  Pulses: Normal pulses  Heart sounds: Normal heart sounds  No murmur heard  No friction rub  No gallop  Pulmonary:      Effort: Pulmonary effort is normal  No respiratory distress        Breath sounds: Normal breath sounds  No wheezing or rales  Abdominal:      General: Abdomen is flat  Bowel sounds are normal  There is no distension  Palpations: Abdomen is soft  Tenderness: There is no abdominal tenderness  There is no guarding  Hernia: A hernia is present  Musculoskeletal:      Cervical back: Neck supple  Right lower leg: No edema  Left lower leg: No edema  Skin:     General: Skin is warm and dry  Neurological:      General: No focal deficit present  Mental Status: He is alert and oriented to person, place, and time     Psychiatric:         Mood and Affect: Mood normal          Behavior: Behavior normal           Lab Results:   Admission on 07/11/2022   Component Date Value    WBC 07/11/2022 3 06 (A)    RBC 07/11/2022 3 62 (A)    Hemoglobin 07/11/2022 11 3 (A)    Hematocrit 07/11/2022 34 9 (A)    MCV 07/11/2022 96     MCH 07/11/2022 31 2     MCHC 07/11/2022 32 4     RDW 07/11/2022 22 1 (A)    Platelets 10/33/0886 107 (A)    Sodium 07/11/2022 141     Potassium 07/11/2022 4 0     Chloride 07/11/2022 109 (A)    CO2 07/11/2022 26     ANION GAP 07/11/2022 6     BUN 07/11/2022 12     Creatinine 07/11/2022 0 98     Glucose 07/11/2022 101     Calcium 07/11/2022 8 9     AST 07/11/2022 18     ALT 07/11/2022 10 (A)    Alkaline Phosphatase 07/11/2022 174 (A)    Total Protein 07/11/2022 7 9     Albumin 07/11/2022 3 5     Total Bilirubin 07/11/2022 0 61     eGFR 07/11/2022 74     hs TnI 0hr 07/11/2022 7     ABO Grouping 07/11/2022 A     Rh Factor 07/11/2022 Positive     Antibody Screen 07/11/2022 Negative     Specimen Expiration Date 07/11/2022 [id number]     hs TnI 2hr 07/11/2022 7     Delta 2hr hsTnI 07/11/2022 0     Segmented % 07/11/2022 65     Lymphocytes % 07/11/2022 27     Monocytes % 07/11/2022 1 (A)    Eosinophils, % 07/11/2022 3     Basophils % 07/11/2022 4 (A)    Absolute Neutrophils 07/11/2022 1 99     Lymphocytes Absolute 07/11/2022 0 83  Monocytes Absolute 07/11/2022 0 03     Eosinophils Absolute 07/11/2022 0 09     Basophils Absolute 07/11/2022 0 12 (A)    Anisocytosis 07/11/2022 Present     Poikilocytes 07/11/2022 Present     Polychromasia 07/11/2022 Present     Schistocytes 07/11/2022 Present     Target Cells 07/11/2022 Present     Platelet Estimate 78/16/8061 Borderline (A)    Sodium 07/12/2022 142     Potassium 07/12/2022 3 4 (A)    Chloride 07/12/2022 112 (A)    CO2 07/12/2022 25     ANION GAP 07/12/2022 5     BUN 07/12/2022 11     Creatinine 07/12/2022 0 71     Glucose 07/12/2022 94     Calcium 07/12/2022 8 1 (A)    Corrected Calcium 07/12/2022 8 9     AST 07/12/2022 12     ALT 07/12/2022 9 (A)    Alkaline Phosphatase 07/12/2022 143 (A)    Total Protein 07/12/2022 6 5     Albumin 07/12/2022 3 0 (A)    Total Bilirubin 07/12/2022 0 46     eGFR 07/12/2022 90     WBC 07/12/2022 3 32 (A)    RBC 07/12/2022 2 98 (A)    Hemoglobin 07/12/2022 9 4 (A)    Hematocrit 07/12/2022 29 6 (A)    MCV 07/12/2022 99 (A)    MCH 07/12/2022 31 5     MCHC 07/12/2022 31 8     RDW 07/12/2022 22 1 (A)    Platelets 81/29/0993 87 (A)    nRBC 07/12/2022 0     Neutrophils Relative 07/12/2022 51     Immat GRANS % 07/12/2022 1     Lymphocytes Relative 07/12/2022 28     Monocytes Relative 07/12/2022 13 (A)    Eosinophils Relative 07/12/2022 6     Basophils Relative 07/12/2022 1     Neutrophils Absolute 07/12/2022 1 69 (A)    Immature Grans Absolute 07/12/2022 0 04     Lymphocytes Absolute 07/12/2022 0 94     Monocytes Absolute 07/12/2022 0 42     Eosinophils Absolute 07/12/2022 0 20     Basophils Absolute 07/12/2022 0 03     Ventricular Rate 07/11/2022 60     Atrial Rate 07/11/2022 60     AZ Interval 07/11/2022 224     QRSD Interval 07/11/2022 84     QT Interval 07/11/2022 402     QTC Interval 07/11/2022 402     P Axis 07/11/2022 71     QRS Axis 07/11/2022 -26     T Wave Axis 07/11/2022 14     WBC 07/13/2022 2 77 (A)  RBC 07/13/2022 3 16 (A)    Hemoglobin 07/13/2022 9 8 (A)    Hematocrit 07/13/2022 31 0 (A)    MCV 07/13/2022 98     MCH 07/13/2022 31 0     MCHC 07/13/2022 31 6     RDW 07/13/2022 22 1 (A)    Platelets 24/51/2375 84 (A)    Sodium 07/13/2022 140     Potassium 07/13/2022 3 6     Chloride 07/13/2022 110 (A)    CO2 07/13/2022 26     ANION GAP 07/13/2022 4     BUN 07/13/2022 10     Creatinine 07/13/2022 0 68     Glucose 07/13/2022 100     Calcium 07/13/2022 8 1 (A)    eGFR 07/13/2022 92     WBC 07/14/2022 4 26 (A)    RBC 07/14/2022 3 13 (A)    Hemoglobin 07/14/2022 10 1 (A)    Hematocrit 07/14/2022 31 0 (A)    MCV 07/14/2022 99 (A)    MCH 07/14/2022 32 3     MCHC 07/14/2022 32 6     RDW 07/14/2022 21 9 (A)    Platelets 32/96/2249 89 (A)    Sodium 07/14/2022 138     Potassium 07/14/2022 3 8     Chloride 07/14/2022 107     CO2 07/14/2022 25     ANION GAP 07/14/2022 6     BUN 07/14/2022 10     Creatinine 07/14/2022 0 86     Glucose 07/14/2022 145 (A)    Calcium 07/14/2022 8 3     eGFR 07/14/2022 83     Sodium 07/15/2022 137     Potassium 07/15/2022 3 5     Chloride 07/15/2022 108     CO2 07/15/2022 25     ANION GAP 07/15/2022 4     BUN 07/15/2022 7     Creatinine 07/15/2022 0 83     Glucose 07/15/2022 118     Calcium 07/15/2022 8 2 (A)    eGFR 07/15/2022 84        Imaging Studies: I have personally reviewed pertinent imaging studies  [address] [facility] [address]    Gastroenterology Fellow  PGY-4  Available via [employer]  7/15/2022 11:04 AM

## 2022-07-15 NOTE — NUTRITION
07/15/22 1002   Recommendations/Interventions   Summary 7/14 calorie count result: 805kcal/ 50g protein (3 meals, 1 of which was clear liquid; 1 ensure enlive); PO intake not yet adequate given recent esophageal stent; patient reported he is trying to increase PO intake gradually; he was advised to gradually increase his PO intake and sip on ensure enlive between meals; diet is NPO for port, therefore will d/c calorie count at this time; could re-order calorie count in a few days to allow patient opportunity to establish PO tolerance and intake; reinforced post-esophageal stent diet; post discharge suggest OP nutrition consult with RD in OP Oncology

## 2022-07-15 NOTE — PLAN OF CARE
Problem: SAFETY ADULT  Goal: Patient will remain free of falls  Description: INTERVENTIONS:  - Educate patient/family on patient safety including physical limitations  - Instruct patient to call for assistance with activity   - Consult OT/PT to assist with strengthening/mobility   - Keep Call bell within reach  - Keep bed low and locked with side rails adjusted as appropriate  - Keep care items and personal belongings within reach  - Initiate and maintain comfort rounds  - Make Fall Risk Sign visible to staff  - Offer Toileting every 2 Hours, in advance of need  - Obtain necessary fall risk management equipment:   - Apply yellow socks and bracelet for high fall risk patients  - Consider moving patient to room near nurses station  Outcome: Progressing  Goal: Maintain or return to baseline ADL function  Description: INTERVENTIONS:  -  Assess patient's ability to carry out ADLs; assess patient's baseline for ADL function and identify physical deficits which impact ability to perform ADLs (bathing, care of mouth/teeth, toileting, grooming, dressing, etc )  - Assess/evaluate cause of self-care deficits   - Assess range of motion  - Assess patient's mobility; develop plan if impaired  - Assess patient's need for assistive devices and provide as appropriate  - Encourage maximum independence but intervene and supervise when necessary  - Involve family in performance of ADLs  - Assess for home care needs following discharge   - Consider OT consult to assist with ADL evaluation and planning for discharge  - Provide patient education as appropriate  Outcome: Progressing  Goal: Maintains/Returns to pre admission functional level  Description: INTERVENTIONS:  - Perform BMAT or MOVE assessment daily    - Set and communicate daily mobility goal to care team and patient/family/caregiver     - Collaborate with rehabilitation services on mobility goals if consulted  - Ambulate patient 4 times a day  - Out of bed to chair 4 times a day   - Out of bed for meals 3 times a day  - Out of bed for toileting  - Record patient progress and toleration of activity level   Outcome: Progressing

## 2022-07-15 NOTE — PLAN OF CARE
Problem: Nutrition/Hydration-ADULT  Goal: Nutrient/Hydration intake appropriate for improving, restoring or maintaining nutritional needs  Description: Monitor and assess patient's nutrition/hydration status for malnutrition  Collaborate with interdisciplinary team and initiate plan and interventions as ordered  Monitor patient's weight and dietary intake as ordered or per policy  Utilize nutrition screening tool and intervene as necessary  Determine patient's food preferences and provide high-protein, high-caloric foods as appropriate       INTERVENTIONS:  - Monitor oral intake, urinary output, labs, and treatment plans  - Assess nutrition and hydration status and recommend course of action  - Evaluate amount of meals eaten  - Assist patient with eating if necessary   - Allow adequate time for meals  - Recommend/ encourage appropriate diets, oral nutritional supplements, and vitamin/mineral supplements  - Order, calculate, and assess calorie counts as needed  - Recommend, monitor, and adjust tube feedings and TPN/PPN based on assessed needs  - Assess need for intravenous fluids  - Provide specific nutrition/hydration education as appropriate  - Include patient/family/caregiver in decisions related to nutrition  Outcome: Progressing Yes

## 2022-07-15 NOTE — BRIEF OP NOTE (RAD/CATH)
INTERVENTIONAL RADIOLOGY PROCEDURE NOTE    Date: 7/15/2022    Procedure: IR PORT PLACEMENT     Preoperative diagnosis:   1  Esophageal cancer ([facility] [address] )    2  Difficulty swallowing    3  Esophageal adenocarcinoma ([facility] [address] )    4  Esophageal dysphagia       Postoperative diagnosis: Same  Surgeon: Kiera Scott MD     Assistant: None  No qualified resident was available  Blood loss: minimal    Specimens: none    Findings: Successful port placement  May use immediately  Complications: None immediate      Anesthesia: conscious sedation

## 2022-07-15 NOTE — DISCHARGE SUMMARY
INTERNAL MEDICINE RESIDENCY DISCHARGE SUMMARY     Dalia Castellanos   67 y o  male  MRN: [id number]  Room/Bed: [facility] 90/[facility] [address] [address]   Encounter: [id number]    Principal Problem:    Dysphagia  Active Problems:    Esophageal adenocarcinoma (HCC)    Chronic combined systolic and diastolic CHF (congestive heart failure) (HCC)    CAD (coronary artery disease)    Paroxysmal atrial fibrillation (HCC)    Hernia, incisional    HTN (hypertension)    HLD (hyperlipidemia)    Seizure disorder ([facility] )    Pancytopenia (HCC)      * Dysphagia  Assessment & Plan  · Patient presents with progressively worsening dysphagia since May 2022  Previously was able to eat a normal diet in the beginning of May, now unable to swallow any food, liquids, and saliva  · Given the temporal relationship of his diagnosis of esophageal adenocarcinoma, tumor mass effect is the most likely cause of his dysphagia  · EGD (7/13): Ulcerated friable mass with stricture/narrowing of distal esophagus  Stent successfully placed  Plan:  · Patient is to continue mechanical soft diet with thin liquids indefinitely as long as he has the esophageal stent in place  · Keep elevated 45 degrees at all times to minimize reflux  · Continue pantoprazole 40 mg BID      Esophageal adenocarcinoma ([facility] [address] )  Assessment & Plan  · Patient diagnosed with T2 N0 distal esophageal adenocarcinoma in May 2022  · Sees Dr Shey Santillan  Recommended to undergo induction chemotherapy and radiation therapy followed by possible transhiatal esophagogastrectomy  Plan was for surgical candidacy assessment following these treatments  · Patient has not yet started treatment       Plan:  · See above A/P for dysphagia    Chronic combined systolic and diastolic CHF (congestive heart failure) (HCC)  Assessment & Plan  Wt Readings from Last 3 Encounters:   07/05/22 92 9 kg (204 lb 12 9 oz)   06/16/22 94 6 kg (208 lb 9 6 oz)   05/02/22 100 kg (220 lb 12 8 oz)     · Hx of systolic and diastolic heart failure  · Last echo in March 2022 EF 50%  Plan:   F/u oupatient    CAD (coronary artery disease)  Assessment & Plan  · History of CAD s/p stenting x 4    · On ASA 81 for secondary prevention    Plan:  · F/u outpatient      Paroxysmal atrial fibrillation (HCC)  Assessment & Plan  · Hx of paroxysmal atrial fibrillation  · Previously on warfarin for left apical thrombus, however has since been discontinued by his cardiologist      Plan:  · F/u outpatient    Hernia, incisional  Assessment & Plan  · Large ventral hernia developed following AAA repair in 2005  · Hernia has not been surgically repaired  Plan:  · Cardiothoracic surgeon plans to address this surgically if he undergoes resection of his primary tumor  Pancytopenia ([facility] [address] )  Assessment & Plan  · Presented to the ED with a hemoglobin of 11 3, WBC 3 06, Platelets 953  Multiple CBCs in the past in this range  · In setting of esophageal adenocarcinoma  Plan:  · F/u outpatient    Seizure disorder [facility])  Assessment & Plan  · Hx of seizure disorder  · Continue outpatient phenobarbital 64 8 mg qday  HLD (hyperlipidemia)  Assessment & Plan  · Continue home 80 mg atorvastatin  HTN (hypertension)  Assessment & Plan  · Hx of hypertension however no longer takes antihypertensive medications  · Patient is normotensive on admission  Plan:  · F/u outpatient      [address]     The patient is a 67 y o  with a pmhx of coronary artery disease s/p 4 stents, atrial fibrillation not on anticoagulation, combined systolic and diastolic heart failure EF 50% (3/22), large ventral hernia 2/2 AAA repair in 2005, seizures on phenobarbital, and distal esophageal adenocarcinoma (T2, N0) diagnosed 5/22 who presented with severe dysphagia  Since his esophageal cancer diagnosis in May, patient has been experiencing progressive difficulty with swallowing both solids and liquids   He reports losing about 20lbs during this period  When he could no longer swallow his saliva and regurgitated his morning coffee, he made the decision to visit the ED on 7/11/22  By this time, he had recently seen Dr Benson Cranker of cardiothoracic surgery who recommended induction chemo and radiation followed by possible esophagogastrectomy, however treatment had not yet begun  In the ED he was hemodynamically stable and afebrile  His CBC was notable for a Hg of 11 3, WBC 3 06, pt 107  Troponins negative  Alk phos 174  Chest x-ray unremarkable  Repeat EGD revealed the known mass, ulcerated and friable, with stricture of the distal esophagus  An esophageal stent was thus placed on 7/13  On 7/15, IR placed a port in anticipation of the chemotherapy he is to receive  After tolerating clear liquid, pureed, and soft foods the patient was deemed stable for discharge to home with the recommendation that he follow up with GI in 2/3 weeks  He is also to follow up with his med onc appointment on 7/19  Subjective:  Patient seen and examined  No acute events OVN  Reports experiencing mild intermittent heart burn since his stent placement, as expected  Otherwise he feels well and ready to go home  Denies fevers, chills, nausea, vomiting, SOB, chest pain, abdominal pain, dysuria  Objective:  Vitals:    07/15/22 1532   BP: 106/62   Pulse: 75   Resp: 16   Temp: 97 7 °F (36 5 °C)   SpO2: 94%     Physical Exam  Constitutional:       General: He is not in acute distress  Appearance: He is not ill-appearing, toxic-appearing or diaphoretic  HENT:      Head: Normocephalic and atraumatic  Mouth/Throat:      Mouth: Mucous membranes are moist    Eyes:      Conjunctiva/sclera: Conjunctivae normal    Cardiovascular:      Rate and Rhythm: Normal rate and regular rhythm  Pulses: Normal pulses  Comments: Distant S1 and S2  Pulmonary:      Effort: Pulmonary effort is normal  No respiratory distress        Breath sounds: Normal breath sounds  No wheezing, rhonchi or rales  Abdominal:      General: Bowel sounds are normal       Palpations: Abdomen is soft  Tenderness: There is no abdominal tenderness  There is no guarding  Hernia: A hernia is present  Comments: Large ventral hernia spanning the epigastric to umbilical region  Soft, non-tender, no overlying skin changes   Musculoskeletal:      Right lower leg: No edema  Left lower leg: No edema  Skin:     General: Skin is warm and dry  Capillary Refill: Capillary refill takes less than 2 seconds  Comments: Pink birth dragan present on the back of his neck   Neurological:      Mental Status: He is alert and oriented to person, place, and time  Psychiatric:         Mood and Affect: Mood normal          Behavior: Behavior normal          DISCHARGE INFORMATION     PCP at Discharge: Argie Skiff, MD    Admitting Provider: Jose Carlos Gayle MD  Admission Date: 7/11/2022    Discharge Provider: Jose Carlos Gayle MD  Discharge Date: 7/15/22    Discharge Disposition: Home/Self Care  Discharge Condition: stable  Discharge with Lines: no    Discharge Diet: soft thin liquid  Activity Restrictions: none  Test Results Pending at Discharge: None    Discharge Diagnoses:  Principal Problem:    Dysphagia  Active Problems:    Esophageal adenocarcinoma (HCC)    Chronic combined systolic and diastolic CHF (congestive heart failure) (HCC)    CAD (coronary artery disease)    Paroxysmal atrial fibrillation (HCC)    Hernia, incisional    HTN (hypertension)    HLD (hyperlipidemia)    Seizure disorder ([address] )    Pancytopenia ([facility] [address] )  Resolved Problems:    * No resolved hospital problems  *      Consulting Providers: Surgical oncology, Gastroenterology      Diagnostic & Therapeutic Procedures Performed:  EGD Fluoro    Result Date: 7/13/2022  Impression: Ulcerated, friable mass with stricture/narrowing in the distal esophagus from known esophageal malignancy   The stricture segment measured 5 cm in length from 35 cm to 40 cm  Traversable with gentle pressure  Successfully placed 23 x 125 mm covered esophageal stent with the proximal end of the stent at 31 cm  Stent was secured in place using over-the-scope clip  Normal stomach  Normal duodenum  RECOMMENDATION: Start clear liquid diet today  Possibly advance to soft stent diet tomorrow if tolerates clears  Keep HOB elevated 45 degrees at all time to minimize reflux  Continue Protonix Q12h  Can add viscous lidocaine Q6h PRN for any discomfort  Pain control as needed  Return to floors for continued care per primary team  Please contact the GI fellow on-call with any questions or concerns  ATTENDING ATTESTATION:  I was present throughout the entire procedure from insertion to complete withdrawal of the scope  I performed all interventions myself or oversaw the fellow  Nick Painting MD     XR chest 1 view portable    Result Date: 7/11/2022  Impression: No acute cardiopulmonary disease  Workstation performed: [id number]     IR port placement    Result Date: 7/15/2022  Impression: Impression: 1  Successful ultrasound and fluoroscopically guided placement of a chest port via the right internal jugular vein  2  The tip of the catheter is in the right atrium and may be used immediately  Workstation performed: [id number]       Code Status: Level 3 - DNAR and DNI  Advance Directive & Living Will: <no information>  Power of :    POLST:      Medications:  Current Discharge Medication List        Current Discharge Medication List        Current Discharge Medication List      CONTINUE these medications which have NOT CHANGED    Details   aspirin (Aspirin Low Dose) 81 mg EC tablet Take 1 tablet (81 mg total) by mouth daily  Qty: 90 tablet, Refills: 3    Associated Diagnoses: Chronic combined systolic and diastolic CHF (congestive heart failure) ([facility] ); NSTEMI (non-ST elevated myocardial infarction) ([facility] ); Essential hypertension;  Seizure disorder ([facility] )      atorvastatin (LIPITOR) 80 mg tablet Take 1 tablet (80 mg total) by mouth daily with dinner  Qty: 90 tablet, Refills: 4    Comments: DX Code Needed    Associated Diagnoses: Chronic combined systolic and diastolic CHF (congestive heart failure) ([place] [address] ); NSTEMI (non-ST elevated myocardial infarction) ([place] [address] ); Essential hypertension; Seizure disorder ([facility])      PHENobarbital 64 8 mg tablet TAKE 1 TABLET (64 8 MG TOTAL) BY MOUTH 2 (TWO) TIMES A DAY  Qty: 180 tablet, Refills: 1    Comments: This request is for a new prescription for a controlled substance as required by Federal/State law  Associated Diagnoses: Seizure disorder ([facility])      acetaminophen (TYLENOL) 500 mg tablet Take 1 tablet (500 mg total) by mouth every 6 (six) hours as needed for mild pain  Qty: 30 tablet, Refills: 0    Associated Diagnoses: Acute pain of left knee; Pain and swelling of left knee      metoprolol succinate (TOPROL-XL) 50 mg 24 hr tablet TAKE 1 TABLET BY MOUTH EVERY DAY  Qty: 90 tablet, Refills: 0    Associated Diagnoses: Coronary artery disease involving native coronary artery of native heart without angina pectoris; Chronic combined systolic and diastolic CHF (congestive heart failure) ([facility])      nitroglycerin (NITROSTAT) 0 4 mg SL tablet Place 1 tablet (0 4 mg total) under the tongue every 5 (five) minutes as needed for chest pain  Qty: 30 tablet, Refills: 1    Associated Diagnoses: Coronary artery disease involving native coronary artery of native heart without angina pectoris      ! ! pantoprazole (PROTONIX) 40 mg tablet TAKE 1 TABLET BY MOUTH 2 TIMES A DAY BEFORE MEALS  Qty: 180 tablet, Refills: 3    Associated Diagnoses: Esophagitis determined by endoscopy; Duodenitis      ! !  pantoprazole (PROTONIX) 40 mg tablet TAKE 1 TABLET BY MOUTH EVERY DAY  Qty: 90 tablet, Refills: 3    Associated Diagnoses: Esophagitis determined by endoscopy; Duodenitis      sucralfate (CARAFATE) 1 g tablet Take 1 tablet (1 g total) by mouth 3 (three) times a day before meals  Qty: 90 tablet, Refills: 0    Associated Diagnoses: Esophagitis determined by endoscopy; Duodenitis      triamcinolone (KENALOG) 0 025 % cream APPLY TO AFFECTED AREA TWICE A DAY  Qty: 30 g, Refills: 1    Associated Diagnoses: Allergic reaction to contrast media      warfarin (COUMADIN) 2 5 mg tablet Take 2 tabs by mouth daily or as directed by physician  Qty: 180 tablet, Refills: 3    Comments: Pts dose is increased  Associated Diagnoses: Apical mural thrombus       ! ! - Potential duplicate medications found  Please discuss with provider  Allergies: Allergies   Allergen Reactions    Iodinated Diagnostic Agents Rash     Pt's skin get very red and he gets hot and chills    Clopidogrel Rash       FOLLOW-UP     Consulting Providers Follow-up:  Follow up with GI in 2-3 weeks    Discharge Statement:   I spent 15 minutes minutes discharging the patient  This time was spent on the day of discharge  I had direct contact with the patient on the day of discharge  Additional documentation is required if more than 30 minutes were spent on discharge  Portions of the record may have been created with voice recognition software  Occasional wrong word or "sound a like" substitutions may have occurred due to the inherent limitations of voice recognition software    Read the chart carefully and recognize, using context, where substitutions have occurred     ==  R Sherlyn Bess 2  MS4

## 2022-07-18 ENCOUNTER — TELEPHONE (OUTPATIENT)
Dept: HEMATOLOGY ONCOLOGY | Facility: CLINIC | Age: 77
End: 2022-07-18

## 2022-07-20 ENCOUNTER — TELEPHONE (OUTPATIENT)
Dept: HEMATOLOGY ONCOLOGY | Facility: CLINIC | Age: 77
End: 2022-07-20

## 2022-07-21 ENCOUNTER — PATIENT OUTREACH (OUTPATIENT)
Dept: HEMATOLOGY ONCOLOGY | Facility: CLINIC | Age: 77
End: 2022-07-21

## 2022-07-21 NOTE — PROGRESS NOTES
MSW received a referral from Navigation for this pt  Pt will meet with Oncology on 7/26 and MSW will plan to reach out to the pt on 7/28 to complete the psychosocial assessment

## 2022-07-26 ENCOUNTER — CLINICAL SUPPORT (OUTPATIENT)
Dept: RADIATION ONCOLOGY | Facility: HOSPITAL | Age: 77
End: 2022-07-26
Attending: INTERNAL MEDICINE
Payer: MEDICARE

## 2022-07-26 ENCOUNTER — OFFICE VISIT (OUTPATIENT)
Dept: HEMATOLOGY ONCOLOGY | Facility: CLINIC | Age: 77
End: 2022-07-26
Payer: MEDICARE

## 2022-07-26 VITALS
HEART RATE: 108 BPM | BODY MASS INDEX: 26.84 KG/M2 | RESPIRATION RATE: 17 BRPM | HEIGHT: 72 IN | DIASTOLIC BLOOD PRESSURE: 78 MMHG | SYSTOLIC BLOOD PRESSURE: 124 MMHG | WEIGHT: 198.2 LBS | TEMPERATURE: 97.4 F | OXYGEN SATURATION: 95 %

## 2022-07-26 VITALS
DIASTOLIC BLOOD PRESSURE: 84 MMHG | HEIGHT: 72 IN | SYSTOLIC BLOOD PRESSURE: 126 MMHG | WEIGHT: 197 LBS | HEART RATE: 86 BPM | OXYGEN SATURATION: 95 % | TEMPERATURE: 96.8 F | BODY MASS INDEX: 26.68 KG/M2 | RESPIRATION RATE: 20 BRPM

## 2022-07-26 DIAGNOSIS — C15.9 ESOPHAGEAL ADENOCARCINOMA (HCC): Primary | ICD-10-CM

## 2022-07-26 DIAGNOSIS — D53.9 NUTRITIONAL ANEMIA: ICD-10-CM

## 2022-07-26 DIAGNOSIS — D69.6 THROMBOCYTOPENIA (HCC): ICD-10-CM

## 2022-07-26 PROCEDURE — 99204 OFFICE O/P NEW MOD 45 MIN: CPT | Performed by: INTERNAL MEDICINE

## 2022-07-26 PROCEDURE — 99205 OFFICE O/P NEW HI 60 MIN: CPT | Performed by: INTERNAL MEDICINE

## 2022-07-26 PROCEDURE — 99211 OFF/OP EST MAY X REQ PHY/QHP: CPT | Performed by: INTERNAL MEDICINE

## 2022-07-26 RX ORDER — ONDANSETRON 8 MG/1
8 TABLET, ORALLY DISINTEGRATING ORAL EVERY 8 HOURS PRN
Qty: 30 TABLET | Refills: 3 | Status: SHIPPED | OUTPATIENT
Start: 2022-07-26

## 2022-07-26 RX ORDER — SODIUM CHLORIDE 9 MG/ML
20 INJECTION, SOLUTION INTRAVENOUS ONCE
Status: CANCELLED | OUTPATIENT
Start: 2022-08-15

## 2022-07-26 RX ORDER — PROCHLORPERAZINE MALEATE 10 MG
10 TABLET ORAL EVERY 6 HOURS PRN
Qty: 30 TABLET | Refills: 3 | Status: SHIPPED | OUTPATIENT
Start: 2022-07-26 | End: 2022-10-20

## 2022-07-26 RX ORDER — LIDOCAINE AND PRILOCAINE 25; 25 MG/G; MG/G
CREAM TOPICAL
Qty: 30 G | Refills: 2 | Status: SHIPPED | OUTPATIENT
Start: 2022-07-26 | End: 2022-10-20

## 2022-07-26 NOTE — TELEPHONE ENCOUNTER
While we try to accommodate patient requests, our priority is to schedule treatment according to Doctor's orders and site availability  comments: We need to coordinate the start of carbo/taxol with the start or radition  I will see patient with the third weekly dose of chemo, labs prior to the start of treatment  FOR Dr Gagan Aponte      1  Does the Provider use the intake sheet or checkout note? CHECKOUT NOTES  2  What would be a preferred day of the week that would work best for your infusion appointment? Monday [place]; Friday BLOOD  3  Do you prefer mornings or afternoons for your appointments? AFTERNOON  4  Are there any days or dates that do not work for your schedule, including any upcoming vacations? NONE  5  We are going to try our best to schedule you at the infusion center closest to your home  In the event that we are unable to what would be your next preferred infusion site or sites? [place]     1  BE  2  AN  3  AL    6  Do you have transportation to take you to all of your appointments? 7  Would you like the infusion center to draw labs from your port?  [facility] (disregard if patient doesn't have a port or need labs for infusion appointment) YES

## 2022-07-26 NOTE — PROGRESS NOTES
Kirk Salcido 1945 is a 67 y o  male with newly diagnosed esophageal adenocarinoma  He presents today for radiation oncology consult  Pt is referred by Dr Suzanna Cintron  5/2/22-5/5/22 admitted to the hospital for new onset dysphagia x 1 week  Underwent EGD and was found to have a distal esophageal mass, biopsy revealed poorly differentiated adenocarcinoma      5/2/22 CT C/A/P-  Luminal narrowing of the distal esophagus may be due to esophageal underdistention, though given patient's symptoms, a functional obstruction cannot be excluded based on this imaging, and an esophagram can be considered for further assessment  Otherwise   no acute thoracic or abdominopelvic pathology with chronic findings as delineated above  5/4/22 EGD-  · Grade D esophagitis with mucosal breaks measuring 5 mm or more, continuous between folds, covering 75% or more of the circumference, showing edematous, erythematous and ulcerated mucosa in the lower third of the esophagus and GE junction; performed cold forceps biopsy  · Mild, localized nodular mucosa in the antrum; performed cold forceps biopsy to rule out H  pylori  · Mild, localized erythematous mucosa in the duodenal bulb  Suggestive of peptic duodenitis  5/4/22  B  Esophagus, lower esophageal bx:  - Poorly differentiated carcinoma, consistent with adenocarcinoma  See comment   - Background foveolar epithelium  5/26/22 Upper GI-  · Irregular and hypoechoic mass (traversable) measuring 31 mm x 24 mm with poorly defined and irregular margins in the lower third of the esophagus, observed with the scope at 35 cm from incisors, extending to the muscularis propria with an indeterminate layer origin    6/23/22 PET scan-  1  Known distal esophageal mass demonstrates only minimal FDG activity, SUV 2 1  Given the low FDG avidity of the primary esophageal mass, evaluation for small metastases is likely limited    2   Otherwise no hypermetabolic lesions are visualized that are concerning for malignancy  7/6/22 Dr Maribel Hightower- clinical stage at least T2, N0 distal esophageal adenocarcinoma  Recommend induction chemotherapy and RT followed by possible transhiatal esophagogastrectomy  F/u near end of chemo and RT to assess surgical candidacy  He has appts with med onc and rad onc, we will also have him see surg onc    7/11/22-7/15/22 admitted to the hospital with severe dysphagia  He had an esophageal stent placed 7/13/22  Port was placed 7/15/22  He was able to tolerate clear liquid, pureed, and soft foods at d/c    7/13/22 EGD Fluoro-  · Ulcerated, friable mass with stricture/narrowing in the distal esophagus from known esophageal malignancy  The stricture segment measured 5 cm in length from 35 cm to 40 cm  Traversable with gentle pressure  Successfully placed 23 x 125 mm covered esophageal stent with the proximal end of the stent at 31 cm  Stent was secured in place using over-the-scope clip  · Normal stomach  · Normal duodenum  7 15 22  IR Port Placement     Appts  7 26 22    Dr Nina Toussaint      Oncology History   Esophageal adenocarcinoma [facility])   5/2/2022 Initial Diagnosis    Esophageal adenocarcinoma ([facility] [address] )     5/4/2022 Biopsy    Esophagus, lower esophageal bx:  - Poorly differentiated carcinoma, consistent with adenocarcinoma         5/18/2022 -  Cancer Staged    Staging form: Esophagus - Adenocarcinoma, AJCC 8th Edition  - Clinical stage from 5/18/2022: Stage IIB (cT2, cN0, cM0, G3) - Signed by Avery Love MD on 7/5/2022  Total positive nodes: 0  Histologic grading system: 3 grade system  HER2 status: Negative  Clinical staging modalities: Biopsy, EUS, Endoscopy, PET/CT       8/2/2022 -  Chemotherapy    CARBOplatin (PARAPLATIN) IVPB (GOG AUC DOSING), , Intravenous, Once, 0 of 7 cycles  PACLItaxel (TAXOL) chemo IVPB, 50 mg/m2, Intravenous, Once, 0 of 7 cycles         Review of Systems:  Review of Systems   Constitutional: Positive for appetite change (Decreased/Heartburn), fatigue (Recent fatigue noted ) and unexpected weight change (Base-220lbs/Today 197 lbs  )  HENT: Positive for dental problem (Edentulous/No dentures )  Eyes:        Cataract sx by hx  Respiratory: Negative  Cardiovascular: Negative  Gastrointestinal: Positive for constipation (Occasional )  Hernia noted  Endocrine: Negative  Genitourinary: Negative  Musculoskeletal: Negative  Skin:        Right chest [id number] port placement    Allergic/Immunologic: Negative  Neurological: Negative  Hematological: Bruises/bleeds easily (Easy bruising )  Psychiatric/Behavioral: Positive for sleep disturbance (Pt needs to be elevated at this time to rest/does not rest well  )         Clinical Trial: no         Pain assessment: 0     PFT    Prior Radiation  No prior hx Chemo/RT     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      Implantable Devices [facility], pacemaker, pain stimulator)  IR Port RCW  7 15 22  Esophageal stent 7 13 22      Hip Replacement   No     Covid Vaccine Status  No vaccines     Health Maintenance   Topic Date Due    Hepatitis C Screening  Never done    COVID-19 Vaccine (1) Never done    Pneumococcal Vaccine: 65+ Years (1 - PCV) Never done   [employer] Annual Wellness Visit (AWV)  01/23/2021    Fall Risk  01/21/2022    Influenza Vaccine (1) 09/01/2022    BMI: Followup Plan  11/16/2022    Depression Screening  07/26/2023    BMI: Adult  07/26/2023    HIB Vaccine  Aged Out    Hepatitis B Vaccine  Aged Out    IPV Vaccine  Aged Out    Hepatitis A Vaccine  Aged Out    Meningococcal ACWY Vaccine  Aged Out    HPV Vaccine  Aged Out       Past Medical History:   Diagnosis Date    Cardiac disease     [facility]congestive heart failure) ([facility] )     Esophageal cancer ([facility] )     Hernia of abdominal cavity     Myocardial infarction ([facility] )     last assessed 7/31/14, past, Pt had MI s/p AGUSTIN placed in 2001, refuses to take any other medications for his cardiac disease, only will take seizure med  Understands possible complications from this decision    Seizure [facility])        Past Surgical History:   Procedure Laterality Date    ABDOMINAL AORTIC ANEURYSM REPAIR      for dialation or occulsion    CATARACT EXTRACTION      IR PORT PLACEMENT  7/15/2022       Family History   Problem Relation Age of Onset    Hypertension Father     Kidney disease Brother        Social History     Tobacco Use    Smoking status: Former Smoker     Quit date: 2005     Years since quittin 1    Smokeless tobacco: Never Used   Vaping Use    Vaping Use: Never used   Substance Use Topics    Alcohol use: Not Currently    Drug use: Never          Current Outpatient Medications:     aspirin (Aspirin Low Dose) 81 mg EC tablet, Take 1 tablet (81 mg total) by mouth daily, Disp: 90 tablet, Rfl: 3    atorvastatin (LIPITOR) 80 mg tablet, Take 1 tablet (80 mg total) by mouth daily with dinner, Disp: 90 tablet, Rfl: 4    PHENobarbital 64 8 mg tablet, TAKE 1 TABLET (64 8 MG TOTAL) BY MOUTH 2 (TWO) TIMES A DAY, Disp: 180 tablet, Rfl: 1    lidocaine-prilocaine (EMLA) cream, Apply to port 30 to 60 min prior to use (Patient not taking: Reported on 2022), Disp: 30 g, Rfl: 2    ondansetron (Zofran ODT) 8 mg disintegrating tablet, Take 1 tablet (8 mg total) by mouth every 8 (eight) hours as needed for nausea or vomiting (Patient not taking: Reported on 2022), Disp: 30 tablet, Rfl: 3    pantoprazole (PROTONIX) 40 mg tablet, TAKE 1 TABLET BY MOUTH 2 TIMES A DAY BEFORE MEALS   (Patient not taking: No sig reported), Disp: 180 tablet, Rfl: 3    prochlorperazine (COMPAZINE) 10 mg tablet, Take 1 tablet (10 mg total) by mouth every 6 (six) hours as needed for nausea or vomiting (Patient not taking: Reported on 2022), Disp: 30 tablet, Rfl: 3    Allergies   Allergen Reactions    Iodinated Diagnostic Agents Rash     Pt's skin get very red and he gets hot and chills    Clopidogrel Rash        Vitals:    07/26/22 1242   BP: 126/84   Pulse: 86   Resp: 20   Temp: (!) 96 8 °F (36 °C)   SpO2: 95%   Weight: 89 4 kg (197 lb)   Height: 6' (1 829 m)       Pain Score: 0-No pain

## 2022-07-26 NOTE — TELEPHONE ENCOUNTER
Please schedule patient for treatment  Patient will like be scheduled on mondays for treatment and Friday for labs  Patient is also cocurrent with Radiation  Thank you

## 2022-07-26 NOTE — LETTER
2022     Brian Beltran MD  [address]    Patient: Kyra Torres   YOB: 1945   Date of Visit: 2022       Dear Dr Andi Moss: Thank you for referring Tae Mason to me for evaluation  Below are my notes for this consultation  If you have questions, please do not hesitate to call me  I look forward to following your patient along with you  Sincerely,        Brian Lucero MD        CC: No Recipients  Brian Lucero MD  2022  3:30 PM  Sign when Signing Visit  Consultation - Radiation Oncology      Patient Name: Kyra Torres ZTA:[id number] : 1945  Encounter: [id number]  Referring Provider: Bety Pickens MD    ASSESSMENT  Cancer Staging  Esophageal adenocarcinoma [facility])  Staging form: Esophagus - Adenocarcinoma, AJCC 8th Edition  - Clinical stage from 2022: Stage IIB (cT2, cN0, cM0, G3) - Signed by Jadene Harada, MD on 2022  Total positive nodes: 0  Histologic grading system: 3 grade system  HER2 status: Negative  Clinical staging modalities: Biopsy, EUS, Endoscopy, PET/CT    PLAN  Kyra Torres is a 67 y o  male with newly diagnosed atleast [id number] esophageal adenocarcinoma of the distal esophagus  EGD/EUS and stent placement overall reveal an ulcerated mass (traversable) measuring 5cm in the lower third of the esophagus (35 - 40 cm from the incisors, covering three quarters of the circumference  A stent was placed spanning 12cm from 4cm proximal to 3 5cm distal to the tumor  PET notable for a minimally avid primary without any identifiable lymph nodes  EUS showed that the mass extended to the muscularis propria and may have involved the adventitia at some areas; therefore is staged at least as a T2  He was counseled regarding preop chemoRT followed by surgery  I discussed with the patient the diagnosis and treatment options, namely neoadjuvant chemotherapy and radiation followed by surgical evaluation    I discussed the randomized data supporting this approach, and recommend concurrent chemoradiation therapy to 5040 cGy in 28 fractions)  I reviewed acute and long-term side effects, which include, but are not limited to, fatigue, dermatitis, esophagitis, dysphagia or odynophagia, dehydration, weight loss, cough with shortness of breath, bone marrow depression, reflux or heartburn, nausea/vomiting, pneumonitis, pericarditis and increased risk for coronary artery disease, esophageal stricture, and rarely fistula, perforation, spinal cord injury, or secondary maligancy  I discussed that even despite radiation therapy and chemotherapy, there is still risk of progression or recurrence  The patient agreed to proceed with radiation therapy, and informed consent was signed  CT simulation to be scheduled at [facility], with treatments at [place] per his preference  Thank you for the opportunity to participate in the care of this patient  Dario Castellanos MD  Department of [address] [place] [address]    Orders Placed This Encounter   Procedures    Radiation Simulation Treatment     1  Esophageal adenocarcinoma [facility])  Radiation Simulation Treatment       CHIEF COMPLAINT  Chief Complaint   Patient presents with    Consult     Rad Onc          History of Present Illness  [place] 1945 is a 67 y o  male with newly diagnosed esophageal adenocarinoma  He presents today for radiation oncology consult  Pt is referred by Dr Ajay Abdi  5/2/22-5/5/22 admitted to the hospital for new onset dysphagia x 1 week   Underwent EGD and was found to have a distal esophageal mass, biopsy revealed poorly differentiated adenocarcinoma        5/2/22 CT C/A/P-  Luminal narrowing of the distal esophagus may be due to esophageal underdistention, though given patient's symptoms, a functional obstruction cannot be excluded based on this imaging, and an esophagram can be considered for further assessment  Otherwise   no acute thoracic or abdominopelvic pathology with chronic findings as delineated above  5/4/22 EGD-  · Grade D esophagitis with mucosal breaks measuring 5 mm or more, continuous between folds, covering 75% or more of the circumference, showing edematous, erythematous and ulcerated mucosa in the lower third of the esophagus and GE junction; performed cold forceps biopsy  · Mild, localized nodular mucosa in the antrum; performed cold forceps biopsy to rule out H  pylori  · Mild, localized erythematous mucosa in the duodenal bulb  Suggestive of peptic duodenitis  5/4/22  B  Esophagus, lower esophageal bx:  - Poorly differentiated carcinoma, consistent with adenocarcinoma  See comment   - Background foveolar epithelium  5/26/22 Upper GI-  · Irregular and hypoechoic mass (traversable) measuring 31 mm x 24 mm with poorly defined and irregular margins in the lower third of the esophagus, observed with the scope at 35 cm from incisors, extending to the muscularis propria with an indeterminate layer origin     6/23/22 PET scan-  1  Known distal esophageal mass demonstrates only minimal FDG activity, SUV 2 1  Given the low FDG avidity of the primary esophageal mass, evaluation for small metastases is likely limited  2   Otherwise no hypermetabolic lesions are visualized that are concerning for malignancy  7/6/22 Dr Bryan Dvaison- clinical stage at least T2, N0 distal esophageal adenocarcinoma  Recommend induction chemotherapy and RT followed by possible transhiatal esophagogastrectomy  F/u near end of chemo and RT to assess surgical candidacy  He has appts with med onc and rad onc, we will also have him see surg onc     7/11/22-7/15/22 admitted to the hospital with severe dysphagia  He had an esophageal stent placed 7/13/22  Port was placed 7/15/22   He was able to tolerate clear liquid, pureed, and soft foods at d/c     7/13/22 EGD Fluoro-  · Ulcerated, friable mass with stricture/narrowing in the distal esophagus from known esophageal malignancy  The stricture segment measured 5 cm in length from 35 cm to 40 cm  Traversable with gentle pressure  Successfully placed 23 x 125 mm covered esophageal stent with the proximal end of the stent at 31 cm  Stent was secured in place using over-the-scope clip  · Normal stomach  · Normal duodenum  7 15 22  IR Port Placement      Appts  7 26 22    Dr Carlos De La Rosa    Today, the patient feels well overall/ He notes that his swallowing discomfort has dramatically resolved s/p stenting  He feels no new pain  He is otherwise able to take most foods by mouth  Oncology History   Esophageal adenocarcinoma ([facility] )   5/2/2022 Initial Diagnosis    Esophageal adenocarcinoma ([place] [address] )     5/4/2022 Biopsy    Esophagus, lower esophageal bx:  - Poorly differentiated carcinoma, consistent with adenocarcinoma  5/18/2022 -  Cancer Staged    Staging form: Esophagus - Adenocarcinoma, AJCC 8th Edition  - Clinical stage from 5/18/2022: Stage IIB (cT2, cN0, cM0, G3) - Signed by Rob Galarza MD on 7/5/2022  Total positive nodes: 0  Histologic grading system: 3 grade system  HER2 status: Negative  Clinical staging modalities: Biopsy, EUS, Endoscopy, PET/CT       8/2/2022 -  Chemotherapy    CARBOplatin (PARAPLATIN) IVPB (GOG AUC DOSING), , Intravenous, Once, 0 of 7 cycles  PACLItaxel (TAXOL) chemo IVPB, 50 mg/m2, Intravenous, Once, 0 of 7 cycles       Historical Information   Past Medical History:   Diagnosis Date    Cardiac disease     CHF (congestive heart failure) ([facility] )     Esophageal cancer ([place] [address] )     Hernia of abdominal cavity     Myocardial infarction ([place] [address] )     last assessed 7/31/14, past, Pt had MI s/p AGUSTIN placed in 2001, refuses to take any other medications for his cardiac disease, only will take seizure med   Understands possible complications from this decision    Seizure [facility])      Past Surgical History:   Procedure Laterality Date    ABDOMINAL AORTIC ANEURYSM REPAIR      for dialation or occulsion    CATARACT EXTRACTION      IR PORT PLACEMENT  7/15/2022     Family History   Problem Relation Age of Onset    Hypertension Father     Kidney disease Brother      Social History   Social History     Substance and Sexual Activity   Alcohol Use Not Currently     Social History     Substance and Sexual Activity   Drug Use Never     Social History     Tobacco Use   Smoking Status Former Smoker    Quit date: 2005    Years since quittin 1   Smokeless Tobacco Never Used     Meds/Allergies     Current Outpatient Medications:     aspirin (Aspirin Low Dose) 81 mg EC tablet, Take 1 tablet (81 mg total) by mouth daily, Disp: 90 tablet, Rfl: 3    atorvastatin (LIPITOR) 80 mg tablet, Take 1 tablet (80 mg total) by mouth daily with dinner, Disp: 90 tablet, Rfl: 4    PHENobarbital 64 8 mg tablet, TAKE 1 TABLET (64 8 MG TOTAL) BY MOUTH 2 (TWO) TIMES A DAY, Disp: 180 tablet, Rfl: 1    lidocaine-prilocaine (EMLA) cream, Apply to port 30 to 60 min prior to use (Patient not taking: Reported on 2022), Disp: 30 g, Rfl: 2    ondansetron (Zofran ODT) 8 mg disintegrating tablet, Take 1 tablet (8 mg total) by mouth every 8 (eight) hours as needed for nausea or vomiting (Patient not taking: Reported on 2022), Disp: 30 tablet, Rfl: 3    pantoprazole (PROTONIX) 40 mg tablet, TAKE 1 TABLET BY MOUTH 2 TIMES A DAY BEFORE MEALS   (Patient not taking: No sig reported), Disp: 180 tablet, Rfl: 3    prochlorperazine (COMPAZINE) 10 mg tablet, Take 1 tablet (10 mg total) by mouth every 6 (six) hours as needed for nausea or vomiting (Patient not taking: Reported on 2022), Disp: 30 tablet, Rfl: 3  Allergies   Allergen Reactions    Iodinated Diagnostic Agents Rash     Pt's skin get very red and he gets hot and chills    Clopidogrel Rash       Lab Results/Imaging Studies       Chemistry        Component Value Date/Time    K 3 5 07/15/2022 0853     07/15/2022 0853    CO2 25 07/15/2022 0853    CO2 25 01/28/2017 1708    BUN 7 07/15/2022 0853    CREATININE 0 83 07/15/2022 0853        Component Value Date/Time    CALCIUM 8 2 (L) 07/15/2022 0853    ALKPHOS 143 (H) 07/12/2022 0532    AST 12 07/12/2022 0532    ALT 9 (L) 07/12/2022 0532          Lab Results   Component Value Date    WBC 4 26 (L) 07/14/2022    HGB 10 1 (L) 07/14/2022    HCT 31 0 (L) 07/14/2022    MCV 99 (H) 07/14/2022    PLT 89 (L) 07/14/2022     Imaging Studies  EGD Fluoro    Result Date: 7/13/2022  Narrative: [address] [place] [address] [phone] DATE OF SERVICE: 7/13/22 PHYSICIAN(S): Attending: Falguni Salmon MD Fellow: Carlyle Muñoz DO INDICATION: Esophageal dysphagia, Esophageal cancer ([address] ) POST-OP DIAGNOSIS: See the impression below  PREPROCEDURE: Informed consent was obtained for the procedure, including sedation  Risks of perforation, hemorrhage, adverse drug reaction and aspiration were discussed  The patient was placed in the left lateral decubitus position  Patient was explained about the risks and benefits of the procedure  Risks including but not limited to bleeding, infection, and perforation were explained in detail  Also explained about less than 100% sensitivity with the exam and other alternatives  DETAILS OF PROCEDURE: Patient was taken to the procedure room where a time out was performed to confirm correct patient and correct procedure  The patient underwent general anesthesia, which was administered by an anesthesia professional  The patient's blood pressure, heart rate, level of consciousness, respirations, oxygen and ETCO2 were monitored throughout the procedure  The scope was introduced through the mouth and advanced to the second part of the duodenum  Retroflexion was performed in the cardia, fundus and incisura  The patient experienced no blood loss  The procedure was not difficult  The patient tolerated the procedure well   There were no apparent complications  ANESTHESIA INFORMATION: ASA: III Anesthesia Type: General MEDICATIONS: No administrations occurring from 1234 to 1313 on 07/13/22 FINDINGS: Ulcerated mass (traversable) measuring 50 mm in the lower third of the esophagus (35 cm from the incisors), covering three quarters of the circumference; placed [facility] 23 mm x 12 cm fully covered stent using guidewire  Stricture from esophageal mass measuring 5 cm in length from 35 cm proximally to 40 cm distally  Some resistance but traversable with gentle pressure  Placed fully covered stent over guidewire with proximal end of the stent at 31 cm  Position was confirmed endoscopically  The stent was secured in place using over-the-scope clip ([facility] STENTFIX) which was successfully deployed on the proximal end of the stent  The fundus of the stomach, body of the stomach, incisura, antrum and pylorus appeared normal  The duodenal bulb and 2nd part of the duodenum appeared normal  Performed random biopsy using biopsy forceps  SPECIMENS: * No specimens in log *     Impression: Ulcerated, friable mass with stricture/narrowing in the distal esophagus from known esophageal malignancy  The stricture segment measured 5 cm in length from 35 cm to 40 cm  Traversable with gentle pressure  Successfully placed 23 x 125 mm covered esophageal stent with the proximal end of the stent at 31 cm  Stent was secured in place using over-the-scope clip  Normal stomach  Normal duodenum  RECOMMENDATION: Start clear liquid diet today  Possibly advance to soft stent diet tomorrow if tolerates clears  Keep HOB elevated 45 degrees at all time to minimize reflux  Continue Protonix Q12h  Can add viscous lidocaine Q6h PRN for any discomfort  Pain control as needed  Return to floors for continued care per primary team  Please contact the GI fellow on-call with any questions or concerns    ATTENDING ATTESTATION:  I was present throughout the entire procedure from insertion to complete withdrawal of the scope  I performed all interventions myself or oversaw the fellow  Prabhakar Pedersen MD     XR chest 1 view portable    Result Date: 7/11/2022  Narrative: CHEST INDICATION:   chest pain  COMPARISON:  Chest radiograph July 28, 2021 and CT chest May 2, 2022  EXAM PERFORMED/VIEWS:  XR CHEST PORTABLE FINDINGS: Heart size is at the upper limits of normal   Thoracic aorta tortuous and ectatic  Atherosclerotic ossification of the aortic arch  The lungs are clear  No pneumothorax or pleural effusion  Osseous structures appear within normal limits for patient age  Impression: No acute cardiopulmonary disease  Workstation performed: [id number]     IR port placement    Result Date: 7/15/2022  Narrative: Chest port placement Clinical History: esophageal carcinoma Fluoro time: 0 3 minutes Number of Images: 2 Radiation Dose:  13 mGy Conscious sedation time:  1 hour Technique: The patient was brought to the interventional radiology suite and identified verbally and by wristband  The patient was placed supine on the table  The right internal jugular vein was evaluated as a potential access site with ultrasound  The vessel was found to be patent and compressible  The right neck and upper chest was prepped and draped in the usual sterile fashion  All elements of maximal sterile barrier technique were followed (cap, mask, sterile gown, sterile gloves, large sterile sheet, hand hygiene, and 2% chlorhexidine for cutaneous antisepsis)  Lidocaine was administered to the skin and a small skin incision was made  Under ultrasound guidance, utilizing sterile ultrasound technique with sterile gel and a sterile probe cover, the right internal jugular vein was accessed using single wall Seldinger technique  Static images of real time needle entry into the vessel were obtained  A 0 018 wire was then advanced through the needle into the central venous system   The needle was removed, and a 5 [employer] coaxial dilator was inserted  A heavy wire was inserted through the outer dilator and a 6 Polish peel-away sheath was inserted over the wire  A subcutaneous pocket was created in the skin of the upper chest for the reservoir utilizing a surgical incision  The port catheter was then tunneled under the skin of the upper chest  The catheter was advanced through the peel-away and the peel-away was removed  The catheter tip was then positioned in the right atrium under fluoroscopic control  The catheter was connected to the port, and the port inserted into the subcutaneous pocket  The port was sutured in 2 places to the fascia using 3-0 Monocryl suture  The pocket was closed with 3-0 Monocryl suture and Exofin skin adhesive  A sterile dressing was applied and 0 9 normal saline solution was administered into the lumen  Impression: Impression: 1  Successful ultrasound and fluoroscopically guided placement of a chest port via the right internal jugular vein  2  The tip of the catheter is in the right atrium and may be used immediately  Workstation performed: [id number]        Review of Systems  Constitutional: Positive for appetite change (Decreased/Heartburn), fatigue (Recent fatigue noted ) and unexpected weight change (Base-220lbs/Today 197 lbs  )  HENT: Positive for dental problem (Edentulous/No dentures )  Eyes:        Cataract sx by hx  Respiratory: Negative  Cardiovascular: Negative  Gastrointestinal: Positive for constipation (Occasional )  Hernia noted  Endocrine: Negative  Genitourinary: Negative  Musculoskeletal: Negative  Skin:        Right chest [id number] port placement    Allergic/Immunologic: Negative  Neurological: Negative  Hematological: Bruises/bleeds easily (Easy bruising )  Psychiatric/Behavioral: Positive for sleep disturbance (Pt needs to be elevated at this time to rest/does not rest well   )       OBJECTIVE:   /84   Pulse 86   Temp (!) 96 8 °F (36 °C)   Resp 20   Ht 6' (1 829 m)   Wt 89 4 kg (197 lb)   SpO2 95%   BMI 26 72 kg/m²   Pain Assessment:  0  Performance Status: ECO - Symptomatic but completely ambulatory    Physical Exam  General Appearance:  Alert, cooperative, no distress, appears stated age  HEENT: normocephalic/atraumatic  Cardiovascular:  Extremities warm and well perfused, no lower extremity edema  Lungs: Respirations unlabored, no cyanosis, able to speak in complete sentences without dyspnea  Abdomen: large ventral hernia  No tenderness  +BS  Extremities: No cyanosis or edema  Skin: No rash or dermatitis  Neurologic: ANOx3    Pathology:  See above  Portions of the record may have been created with voice recognition software  Occasional wrong word or "sound a like" substitutions may have occurred due to the inherent limitations of voice recognition software  Read the chart carefully and recognize, using context, where substitutions have occurred

## 2022-07-26 NOTE — PROGRESS NOTES
Consultation - Radiation Oncology      Patient Name: Angle Hogan DKD:[id number] : 1945  Encounter: [id number]  Referring Provider: Teresita Peñaloza MD    ASSESSMENT  Cancer Staging  Esophageal adenocarcinoma [facility])  Staging form: Esophagus - Adenocarcinoma, AJCC 8th Edition  - Clinical stage from 2022: Stage IIB (cT2, cN0, cM0, G3) - Signed by Avery Love MD on 2022  Total positive nodes: 0  Histologic grading system: 3 grade system  HER2 status: Negative  Clinical staging modalities: Biopsy, EUS, Endoscopy, PET/CT    PLAN  Angle Hogan is a 67 y o  male with newly diagnosed atleast [id number] esophageal adenocarcinoma of the distal esophagus  EGD/EUS and stent placement overall reveal an ulcerated mass (traversable) measuring 5cm in the lower third of the esophagus (35 - 40 cm from the incisors, covering three quarters of the circumference  A stent was placed spanning 12cm from 4cm proximal to 3 5cm distal to the tumor  PET notable for a minimally avid primary without any identifiable lymph nodes  EUS showed that the mass extended to the muscularis propria and may have involved the adventitia at some areas; therefore is staged at least as a T2  He was counseled regarding preop chemoRT followed by surgery  I discussed with the patient the diagnosis and treatment options, namely neoadjuvant chemotherapy and radiation followed by surgical evaluation  I discussed the randomized data supporting this approach, and recommend concurrent chemoradiation therapy to 5040 cGy in 28 fractions)          I reviewed acute and long-term side effects, which include, but are not limited to, fatigue, dermatitis, esophagitis, dysphagia or odynophagia, dehydration, weight loss, cough with shortness of breath, bone marrow depression, reflux or heartburn, nausea/vomiting, pneumonitis, pericarditis and increased risk for coronary artery disease, esophageal stricture, and rarely fistula, perforation, spinal cord injury, or secondary maligancy  I discussed that even despite radiation therapy and chemotherapy, there is still risk of progression or recurrence  The patient agreed to proceed with radiation therapy, and informed consent was signed  CT simulation to be scheduled at [facility], with treatments at [place] per his preference  Thank you for the opportunity to participate in the care of this patient  Corin Watkins MD  Department of [address] [facility] 12    Orders Placed This Encounter   Procedures    Radiation Simulation Treatment     1  Esophageal adenocarcinoma [facility])  Radiation Simulation Treatment       CHIEF COMPLAINT  Chief Complaint   Patient presents with    Consult     Rad Onc          History of Present Illness  Alyssa Ayers 1945 is a 67 y o  male with newly diagnosed esophageal adenocarinoma  He presents today for radiation oncology consult  Pt is referred by Dr José Luis Moeller  5/2/22-5/5/22 admitted to the hospital for new onset dysphagia x 1 week  Underwent EGD and was found to have a distal esophageal mass, biopsy revealed poorly differentiated adenocarcinoma        5/2/22 CT C/A/P-  Luminal narrowing of the distal esophagus may be due to esophageal underdistention, though given patient's symptoms, a functional obstruction cannot be excluded based on this imaging, and an esophagram can be considered for further assessment  Otherwise   no acute thoracic or abdominopelvic pathology with chronic findings as delineated above       5/4/22 EGD-  Grade D esophagitis with mucosal breaks measuring 5 mm or more, continuous between folds, covering 75% or more of the circumference, showing edematous, erythematous and ulcerated mucosa in the lower third of the esophagus and GE junction; performed cold forceps biopsy  Mild, localized nodular mucosa in the antrum; performed cold forceps biopsy to rule out H  pylori  Mild, localized erythematous mucosa in the duodenal bulb  Suggestive of peptic duodenitis  5/4/22  B  Esophagus, lower esophageal bx:  - Poorly differentiated carcinoma, consistent with adenocarcinoma  See comment   - Background foveolar epithelium  5/26/22 Upper GI-  Irregular and hypoechoic mass (traversable) measuring 31 mm x 24 mm with poorly defined and irregular margins in the lower third of the esophagus, observed with the scope at 35 cm from incisors, extending to the muscularis propria with an indeterminate layer origin     6/23/22 PET scan-  1  Known distal esophageal mass demonstrates only minimal FDG activity, SUV 2 1  Given the low FDG avidity of the primary esophageal mass, evaluation for small metastases is likely limited  2   Otherwise no hypermetabolic lesions are visualized that are concerning for malignancy  7/6/22 Dr Hermelindo Farley- clinical stage at least T2, N0 distal esophageal adenocarcinoma  Recommend induction chemotherapy and RT followed by possible transhiatal esophagogastrectomy  F/u near end of chemo and RT to assess surgical candidacy  He has appts with med onc and rad onc, we will also have him see surg onc     7/11/22-7/15/22 admitted to the hospital with severe dysphagia  He had an esophageal stent placed 7/13/22  Port was placed 7/15/22  He was able to tolerate clear liquid, pureed, and soft foods at d/c     7/13/22 EGD Fluoro-  Ulcerated, friable mass with stricture/narrowing in the distal esophagus from known esophageal malignancy  The stricture segment measured 5 cm in length from 35 cm to 40 cm  Traversable with gentle pressure  Successfully placed 23 x 125 mm covered esophageal stent with the proximal end of the stent at 31 cm  Stent was secured in place using over-the-scope clip  Normal stomach  Normal duodenum  7 15 22  IR Port Placement      Appts    7 26 22    Dr Cronin Maker    Today, the patient feels well overall/ He notes that his swallowing discomfort has dramatically resolved s/p stenting  He feels no new pain  He is otherwise able to take most foods by mouth  Oncology History   Esophageal adenocarcinoma ([place] [address] )   2022 Initial Diagnosis    Esophageal adenocarcinoma ([place] [address] )     2022 Biopsy    Esophagus, lower esophageal bx:  - Poorly differentiated carcinoma, consistent with adenocarcinoma  2022 -  Cancer Staged    Staging form: Esophagus - Adenocarcinoma, AJCC 8th Edition  - Clinical stage from 2022: Stage IIB (cT2, cN0, cM0, G3) - Signed by Kourtney Antoine MD on 2022  Total positive nodes: 0  Histologic grading system: 3 grade system  HER2 status: Negative  Clinical staging modalities: Biopsy, EUS, Endoscopy, PET/CT       2022 -  Chemotherapy    CARBOplatin (PARAPLATIN) IVPB (GOG AUC DOSING), , Intravenous, Once, 0 of 7 cycles  PACLItaxel (TAXOL) chemo IVPB, 50 mg/m2, Intravenous, Once, 0 of 7 cycles       Historical Information   Past Medical History:   Diagnosis Date    Cardiac disease     CHF (congestive heart failure) ([place] [address] )     Esophageal cancer ([place] [address] )     Hernia of abdominal cavity     Myocardial infarction ([place] [address] )     last assessed 14, past, Pt had MI s/p AGUSTIN placed in , refuses to take any other medications for his cardiac disease, only will take seizure med   Understands possible complications from this decision    Seizure [facility])      Past Surgical History:   Procedure Laterality Date    ABDOMINAL AORTIC ANEURYSM REPAIR      for dialation or occulsion    CATARACT EXTRACTION      IR PORT PLACEMENT  7/15/2022     Family History   Problem Relation Age of Onset    Hypertension Father     Kidney disease Brother      Social History   Social History     Substance and Sexual Activity   Alcohol Use Not Currently     Social History     Substance and Sexual Activity   Drug Use Never     Social History     Tobacco Use   Smoking Status Former Smoker    Quit date: 2005    Years since quittin 1   Smokeless Tobacco Never Used     Meds/Allergies     Current Outpatient Medications:     aspirin (Aspirin Low Dose) 81 mg EC tablet, Take 1 tablet (81 mg total) by mouth daily, Disp: 90 tablet, Rfl: 3    atorvastatin (LIPITOR) 80 mg tablet, Take 1 tablet (80 mg total) by mouth daily with dinner, Disp: 90 tablet, Rfl: 4    PHENobarbital 64 8 mg tablet, TAKE 1 TABLET (64 8 MG TOTAL) BY MOUTH 2 (TWO) TIMES A DAY, Disp: 180 tablet, Rfl: 1    lidocaine-prilocaine (EMLA) cream, Apply to port 30 to 60 min prior to use (Patient not taking: Reported on 7/26/2022), Disp: 30 g, Rfl: 2    ondansetron (Zofran ODT) 8 mg disintegrating tablet, Take 1 tablet (8 mg total) by mouth every 8 (eight) hours as needed for nausea or vomiting (Patient not taking: Reported on 7/26/2022), Disp: 30 tablet, Rfl: 3    pantoprazole (PROTONIX) 40 mg tablet, TAKE 1 TABLET BY MOUTH 2 TIMES A DAY BEFORE MEALS   (Patient not taking: No sig reported), Disp: 180 tablet, Rfl: 3    prochlorperazine (COMPAZINE) 10 mg tablet, Take 1 tablet (10 mg total) by mouth every 6 (six) hours as needed for nausea or vomiting (Patient not taking: Reported on 7/26/2022), Disp: 30 tablet, Rfl: 3  Allergies   Allergen Reactions    Iodinated Diagnostic Agents Rash     Pt's skin get very red and he gets hot and chills    Clopidogrel Rash       Lab Results/Imaging Studies       Chemistry        Component Value Date/Time    K 3 5 07/15/2022 0853     07/15/2022 0853    CO2 25 07/15/2022 0853    CO2 25 01/28/2017 1708    BUN 7 07/15/2022 0853    CREATININE 0 83 07/15/2022 0853        Component Value Date/Time    CALCIUM 8 2 (L) 07/15/2022 0853    ALKPHOS 143 (H) 07/12/2022 0532    AST 12 07/12/2022 0532    ALT 9 (L) 07/12/2022 0532          Lab Results   Component Value Date    WBC 4 26 (L) 07/14/2022    HGB 10 1 (L) 07/14/2022    HCT 31 0 (L) 07/14/2022    MCV 99 (H) 07/14/2022    PLT 89 (L) 07/14/2022     Imaging Studies  EGD Fluoro    Result Date: 7/13/2022  Narrative: [facility] Endoscopy [address] DATE OF SERVICE: 7/13/22 PHYSICIAN(S): Attending: Maria Del Carmen Jeronimo MD Fellow: Lizzette Orta DO INDICATION: Esophageal dysphagia, Esophageal cancer ([facility] [address] ) POST-OP DIAGNOSIS: See the impression below  PREPROCEDURE: Informed consent was obtained for the procedure, including sedation  Risks of perforation, hemorrhage, adverse drug reaction and aspiration were discussed  The patient was placed in the left lateral decubitus position  Patient was explained about the risks and benefits of the procedure  Risks including but not limited to bleeding, infection, and perforation were explained in detail  Also explained about less than 100% sensitivity with the exam and other alternatives  DETAILS OF PROCEDURE: Patient was taken to the procedure room where a time out was performed to confirm correct patient and correct procedure  The patient underwent general anesthesia, which was administered by an anesthesia professional  The patient's blood pressure, heart rate, level of consciousness, respirations, oxygen and ETCO2 were monitored throughout the procedure  The scope was introduced through the mouth and advanced to the second part of the duodenum  Retroflexion was performed in the cardia, fundus and incisura  The patient experienced no blood loss  The procedure was not difficult  The patient tolerated the procedure well  There were no apparent complications  ANESTHESIA INFORMATION: ASA: III Anesthesia Type: General MEDICATIONS: No administrations occurring from 1234 to 1313 on 07/13/22 FINDINGS: Ulcerated mass (traversable) measuring 50 mm in the lower third of the esophagus (35 cm from the incisors), covering three quarters of the circumference; placed [facility] 23 mm x 12 cm fully covered stent using guidewire  Stricture from esophageal mass measuring 5 cm in length from 35 cm proximally to 40 cm distally   Some resistance but traversable with gentle pressure  Placed fully covered stent over guidewire with proximal end of the stent at 31 cm  Position was confirmed endoscopically  The stent was secured in place using over-the-scope clip ([facility] STENTFIX) which was successfully deployed on the proximal end of the stent  The fundus of the stomach, body of the stomach, incisura, antrum and pylorus appeared normal  The duodenal bulb and 2nd part of the duodenum appeared normal  Performed random biopsy using biopsy forceps  SPECIMENS: * No specimens in log *     Impression: Ulcerated, friable mass with stricture/narrowing in the distal esophagus from known esophageal malignancy  The stricture segment measured 5 cm in length from 35 cm to 40 cm  Traversable with gentle pressure  Successfully placed 23 x 125 mm covered esophageal stent with the proximal end of the stent at 31 cm  Stent was secured in place using over-the-scope clip  Normal stomach  Normal duodenum  RECOMMENDATION: Start clear liquid diet today  Possibly advance to soft stent diet tomorrow if tolerates clears  Keep HOB elevated 45 degrees at all time to minimize reflux  Continue Protonix Q12h  Can add viscous lidocaine Q6h PRN for any discomfort  Pain control as needed  Return to floors for continued care per primary team  Please contact the GI fellow on-call with any questions or concerns  ATTENDING ATTESTATION:  I was present throughout the entire procedure from insertion to complete withdrawal of the scope  I performed all interventions myself or oversaw the fellow  Jackie Rivers MD     XR chest 1 view portable    Result Date: 7/11/2022  Narrative: CHEST INDICATION:   chest pain  COMPARISON:  Chest radiograph July 28, 2021 and CT chest May 2, 2022  EXAM PERFORMED/VIEWS:  XR CHEST PORTABLE FINDINGS: Heart size is at the upper limits of normal   Thoracic aorta tortuous and ectatic  Atherosclerotic ossification of the aortic arch  The lungs are clear  No pneumothorax or pleural effusion   Osseous structures appear within normal limits for patient age  Impression: No acute cardiopulmonary disease  Workstation performed: [id number]     IR port placement    Result Date: 7/15/2022  Narrative: Chest port placement Clinical History: esophageal carcinoma Fluoro time: 0 3 minutes Number of Images: 2 Radiation Dose:  13 mGy Conscious sedation time:  1 hour Technique: The patient was brought to the interventional radiology suite and identified verbally and by wristband  The patient was placed supine on the table  The right internal jugular vein was evaluated as a potential access site with ultrasound  The vessel was found to be patent and compressible  The right neck and upper chest was prepped and draped in the usual sterile fashion  All elements of maximal sterile barrier technique were followed (cap, mask, sterile gown, sterile gloves, large sterile sheet, hand hygiene, and 2% chlorhexidine for cutaneous antisepsis)  Lidocaine was administered to the skin and a small skin incision was made  Under ultrasound guidance, utilizing sterile ultrasound technique with sterile gel and a sterile probe cover, the right internal jugular vein was accessed using single wall Seldinger technique  Static images of real time needle entry into the vessel were obtained  A 0 018 wire was then advanced through the needle into the central venous system  The needle was removed, and a 5 Burkinan coaxial dilator was inserted  A heavy wire was inserted through the outer dilator and a 6 Burkinan peel-away sheath was inserted over the wire  A subcutaneous pocket was created in the skin of the upper chest for the reservoir utilizing a surgical incision  The port catheter was then tunneled under the skin of the upper chest  The catheter was advanced through the peel-away and the peel-away was removed  The catheter tip was then positioned in the right atrium under fluoroscopic control   The catheter was connected to the port, and the port inserted into the subcutaneous pocket  The port was sutured in 2 places to the fascia using 3-0 Monocryl suture  The pocket was closed with 3-0 Monocryl suture and Exofin skin adhesive  A sterile dressing was applied and 0 9 normal saline solution was administered into the lumen  Impression: Impression: 1  Successful ultrasound and fluoroscopically guided placement of a chest port via the right internal jugular vein  2  The tip of the catheter is in the right atrium and may be used immediately  Workstation performed: [id number]        Review of Systems  Constitutional: Positive for appetite change (Decreased/Heartburn), fatigue (Recent fatigue noted ) and unexpected weight change (Base-220lbs/Today 197 lbs  )  HENT: Positive for dental problem (Edentulous/No dentures )  Eyes:        Cataract sx by hx  Respiratory: Negative  Cardiovascular: Negative  Gastrointestinal: Positive for constipation (Occasional )  Hernia noted  Endocrine: Negative  Genitourinary: Negative  Musculoskeletal: Negative  Skin:        Right chest [id number] port placement    Allergic/Immunologic: Negative  Neurological: Negative  Hematological: Bruises/bleeds easily (Easy bruising )  Psychiatric/Behavioral: Positive for sleep disturbance (Pt needs to be elevated at this time to rest/does not rest well   )  OBJECTIVE:   /84   Pulse 86   Temp (!) 96 8 °F (36 °C)   Resp 20   Ht 6' (1 829 m)   Wt 89 4 kg (197 lb)   SpO2 95%   BMI 26 72 kg/m²   Pain Assessment:  0  Performance Status: ECO - Symptomatic but completely ambulatory    Physical Exam  General Appearance:  Alert, cooperative, no distress, appears stated age  HEENT: normocephalic/atraumatic  Cardiovascular:  Extremities warm and well perfused, no lower extremity edema  Lungs: Respirations unlabored, no cyanosis, able to speak in complete sentences without dyspnea  Abdomen: large ventral hernia  No tenderness  +BS  Extremities: No cyanosis or edema  Skin: No rash or dermatitis  Neurologic: ANOx3    Pathology:  See above  Portions of the record may have been created with voice recognition software  Occasional wrong word or "sound a like" substitutions may have occurred due to the inherent limitations of voice recognition software  Read the chart carefully and recognize, using context, where substitutions have occurred

## 2022-07-26 NOTE — LETTER
2022     Nasir Marshall MD  [address]    Patient: Steph Blunt   YOB: 1945   Date of Visit: 2022       Dear Dr Murphy Javed: Thank you for referring Seng Arrant to me for evaluation  Below are my notes for this consultation  If you have questions, please do not hesitate to call me  I look forward to following your patient along with you  Sincerely,        Douglas Garcia MD        CC: No Recipients  Douglas Garcia MD  2022  3:22 PM  Incomplete  Consultation - Radiation Oncology      Patient Name: Steph Blunt WRV:[id number] : 1945  Encounter: [id number]  Referring Provider: Melody Rocha MD    ASSESSMENT  Cancer Staging  Esophageal adenocarcinoma [facility])  Staging form: Esophagus - Adenocarcinoma, AJCC 8th Edition  - Clinical stage from 2022: Stage IIB (cT2, cN0, cM0, G3) - Signed by Aris Parker MD on 2022  Total positive nodes: 0  Histologic grading system: 3 grade system  HER2 status: Negative  Clinical staging modalities: Biopsy, EUS, Endoscopy, PET/CT    PLAN  Steph Blunt is a 67 y o  male with newly diagnosed atleast [id number] esophageal adenocarcinoma of the distal esophagus  EGD/EUS and stent placement overall reveal an ulcerated mass (traversable) measuring 5cm in the lower third of the esophagus (35 - 40 cm from the incisors, covering three quarters of the circumference  A stent was placed spanning 12cm from 4cm proximal to 3 5cm distal to the tumor  PET notable for a minimally avid primary without any identifiable lymph nodes  EUS showed that the mass extended to the muscularis propria and may have involved the adventitia at some areas; therefore is staged at least as a T2  He was counseled regarding preop chemoRT followed by surgery  I discussed with the patient the diagnosis and treatment options, namely neoadjuvant chemotherapy and radiation followed by surgical evaluation    I discussed the randomized data supporting this approach, and recommend concurrent chemoradiation therapy to 5040 cGy in 28 fractions)  I reviewed acute and long-term side effects, which include, but are not limited to, fatigue, dermatitis, esophagitis, dysphagia or odynophagia, dehydration, weight loss, cough with shortness of breath, bone marrow depression, reflux or heartburn, nausea/vomiting, pneumonitis, pericarditis and increased risk for coronary artery disease, esophageal stricture, and rarely fistula, perforation, spinal cord injury, or secondary maligancy  I discussed that even despite radiation therapy and chemotherapy, there is still risk of progression or recurrence  The patient agreed to proceed with radiation therapy, and informed consent was signed  CT simulation to be scheduled at [facility], with treatments at [facility] per his preference  Thank you for the opportunity to participate in the care of this patient  Brenda Omalley MD  Department of [address] [place] [address]    Orders Placed This Encounter   Procedures    Radiation Simulation Treatment     1  Esophageal adenocarcinoma [facility])  Radiation Simulation Treatment       CHIEF COMPLAINT  Chief Complaint   Patient presents with    Consult     Rad Onc          History of Present Illness  Mason Braxton 1945 is a 67 y o  male with newly diagnosed esophageal adenocarinoma  He presents today for radiation oncology consult  Pt is referred by Dr Gallagher Sites  5/2/22-5/5/22 admitted to the hospital for new onset dysphagia x 1 week   Underwent EGD and was found to have a distal esophageal mass, biopsy revealed poorly differentiated adenocarcinoma        5/2/22 CT C/A/P-  Luminal narrowing of the distal esophagus may be due to esophageal underdistention, though given patient's symptoms, a functional obstruction cannot be excluded based on this imaging, and an esophagram can be considered for further assessment  Otherwise   no acute thoracic or abdominopelvic pathology with chronic findings as delineated above  5/4/22 EGD-  · Grade D esophagitis with mucosal breaks measuring 5 mm or more, continuous between folds, covering 75% or more of the circumference, showing edematous, erythematous and ulcerated mucosa in the lower third of the esophagus and GE junction; performed cold forceps biopsy  · Mild, localized nodular mucosa in the antrum; performed cold forceps biopsy to rule out H  pylori  · Mild, localized erythematous mucosa in the duodenal bulb  Suggestive of peptic duodenitis  5/4/22  B  Esophagus, lower esophageal bx:  - Poorly differentiated carcinoma, consistent with adenocarcinoma  See comment   - Background foveolar epithelium  5/26/22 Upper GI-  · Irregular and hypoechoic mass (traversable) measuring 31 mm x 24 mm with poorly defined and irregular margins in the lower third of the esophagus, observed with the scope at 35 cm from incisors, extending to the muscularis propria with an indeterminate layer origin     6/23/22 PET scan-  1  Known distal esophageal mass demonstrates only minimal FDG activity, SUV 2 1  Given the low FDG avidity of the primary esophageal mass, evaluation for small metastases is likely limited  2   Otherwise no hypermetabolic lesions are visualized that are concerning for malignancy  7/6/22 Dr Umer Robles- clinical stage at least T2, N0 distal esophageal adenocarcinoma  Recommend induction chemotherapy and RT followed by possible transhiatal esophagogastrectomy  F/u near end of chemo and RT to assess surgical candidacy  He has appts with med onc and rad onc, we will also have him see surg onc     7/11/22-7/15/22 admitted to the hospital with severe dysphagia  He had an esophageal stent placed 7/13/22  Port was placed 7/15/22   He was able to tolerate clear liquid, pureed, and soft foods at d/c     7/13/22 EGD Fluoro-  · Ulcerated, friable mass with stricture/narrowing in the distal esophagus from known esophageal malignancy  The stricture segment measured 5 cm in length from 35 cm to 40 cm  Traversable with gentle pressure  Successfully placed 23 x 125 mm covered esophageal stent with the proximal end of the stent at 31 cm  Stent was secured in place using over-the-scope clip  · Normal stomach  · Normal duodenum  7 15 22  IR Port Placement      Appts  7 26 22    Dr Monica Moreno    Today, the patient feels well overall/ He notes that his swallowing discomfort has dramatically resolved s/p stenting  He feels no new pain  He is otherwise able to take most foods by mouth  Oncology History   Esophageal adenocarcinoma ([place] [address] )   5/2/2022 Initial Diagnosis    Esophageal adenocarcinoma ([place] [address] )     5/4/2022 Biopsy    Esophagus, lower esophageal bx:  - Poorly differentiated carcinoma, consistent with adenocarcinoma  5/18/2022 -  Cancer Staged    Staging form: Esophagus - Adenocarcinoma, AJCC 8th Edition  - Clinical stage from 5/18/2022: Stage IIB (cT2, cN0, cM0, G3) - Signed by Shanice Perez MD on 7/5/2022  Total positive nodes: 0  Histologic grading system: 3 grade system  HER2 status: Negative  Clinical staging modalities: Biopsy, EUS, Endoscopy, PET/CT       8/2/2022 -  Chemotherapy    CARBOplatin (PARAPLATIN) IVPB (GOG AUC DOSING), , Intravenous, Once, 0 of 7 cycles  PACLItaxel (TAXOL) chemo IVPB, 50 mg/m2, Intravenous, Once, 0 of 7 cycles       Historical Information   Past Medical History:   Diagnosis Date    Cardiac disease     CHF (congestive heart failure) ([place] [address] )     Esophageal cancer ([place] [address] )     Hernia of abdominal cavity     Myocardial infarction ([place] [address] )     last assessed 7/31/14, past, Pt had MI s/p AGUSTIN placed in 2001, refuses to take any other medications for his cardiac disease, only will take seizure med   Understands possible complications from this decision    Seizure [facility])      Past Surgical History:   Procedure Laterality Date    ABDOMINAL AORTIC ANEURYSM REPAIR      for dialation or occulsion    CATARACT EXTRACTION      IR PORT PLACEMENT  7/15/2022     Family History   Problem Relation Age of Onset    Hypertension Father     Kidney disease Brother      Social History   Social History     Substance and Sexual Activity   Alcohol Use Not Currently     Social History     Substance and Sexual Activity   Drug Use Never     Social History     Tobacco Use   Smoking Status Former Smoker    Quit date: 2005    Years since quittin 1   Smokeless Tobacco Never Used     Meds/Allergies     Current Outpatient Medications:     aspirin (Aspirin Low Dose) 81 mg EC tablet, Take 1 tablet (81 mg total) by mouth daily, Disp: 90 tablet, Rfl: 3    atorvastatin (LIPITOR) 80 mg tablet, Take 1 tablet (80 mg total) by mouth daily with dinner, Disp: 90 tablet, Rfl: 4    PHENobarbital 64 8 mg tablet, TAKE 1 TABLET (64 8 MG TOTAL) BY MOUTH 2 (TWO) TIMES A DAY, Disp: 180 tablet, Rfl: 1    lidocaine-prilocaine (EMLA) cream, Apply to port 30 to 60 min prior to use (Patient not taking: Reported on 2022), Disp: 30 g, Rfl: 2    ondansetron (Zofran ODT) 8 mg disintegrating tablet, Take 1 tablet (8 mg total) by mouth every 8 (eight) hours as needed for nausea or vomiting (Patient not taking: Reported on 2022), Disp: 30 tablet, Rfl: 3    pantoprazole (PROTONIX) 40 mg tablet, TAKE 1 TABLET BY MOUTH 2 TIMES A DAY BEFORE MEALS   (Patient not taking: No sig reported), Disp: 180 tablet, Rfl: 3    prochlorperazine (COMPAZINE) 10 mg tablet, Take 1 tablet (10 mg total) by mouth every 6 (six) hours as needed for nausea or vomiting (Patient not taking: Reported on 2022), Disp: 30 tablet, Rfl: 3  Allergies   Allergen Reactions    Iodinated Diagnostic Agents Rash     Pt's skin get very red and he gets hot and chills    Clopidogrel Rash       Lab Results/Imaging Studies       Chemistry        Component Value Date/Time    K 3 5 07/15/2022 0853     07/15/2022 0853    CO2 25 07/15/2022 0853    CO2 25 01/28/2017 1708    BUN 7 07/15/2022 0853    CREATININE 0 83 07/15/2022 0853        Component Value Date/Time    CALCIUM 8 2 (L) 07/15/2022 0853    ALKPHOS 143 (H) 07/12/2022 0532    AST 12 07/12/2022 0532    ALT 9 (L) 07/12/2022 0532          Lab Results   Component Value Date    WBC 4 26 (L) 07/14/2022    HGB 10 1 (L) 07/14/2022    HCT 31 0 (L) 07/14/2022    MCV 99 (H) 07/14/2022    PLT 89 (L) 07/14/2022     Imaging Studies  EGD Fluoro    Result Date: 7/13/2022  Narrative: [facility] [id number] [place] [address] [phone] DATE OF SERVICE: 7/13/22 PHYSICIAN(S): Attending: Charlie Bowles MD Fellow: Faiza Silva DO INDICATION: Esophageal dysphagia, Esophageal cancer ([facility] [address] ) POST-OP DIAGNOSIS: See the impression below  PREPROCEDURE: Informed consent was obtained for the procedure, including sedation  Risks of perforation, hemorrhage, adverse drug reaction and aspiration were discussed  The patient was placed in the left lateral decubitus position  Patient was explained about the risks and benefits of the procedure  Risks including but not limited to bleeding, infection, and perforation were explained in detail  Also explained about less than 100% sensitivity with the exam and other alternatives  DETAILS OF PROCEDURE: Patient was taken to the procedure room where a time out was performed to confirm correct patient and correct procedure  The patient underwent general anesthesia, which was administered by an anesthesia professional  The patient's blood pressure, heart rate, level of consciousness, respirations, oxygen and ETCO2 were monitored throughout the procedure  The scope was introduced through the mouth and advanced to the second part of the duodenum  Retroflexion was performed in the cardia, fundus and incisura  The patient experienced no blood loss  The procedure was not difficult  The patient tolerated the procedure well   There were no apparent complications  ANESTHESIA INFORMATION: ASA: III Anesthesia Type: General MEDICATIONS: No administrations occurring from 1234 to 1313 on 07/13/22 FINDINGS: Ulcerated mass (traversable) measuring 50 mm in the lower third of the esophagus (35 cm from the incisors), covering three quarters of the circumference; placed [facility] 23 mm x 12 cm fully covered stent using guidewire  Stricture from esophageal mass measuring 5 cm in length from 35 cm proximally to 40 cm distally  Some resistance but traversable with gentle pressure  Placed fully covered stent over guidewire with proximal end of the stent at 31 cm  Position was confirmed endoscopically  The stent was secured in place using over-the-scope clip ([facility] STENTFIX) which was successfully deployed on the proximal end of the stent  The fundus of the stomach, body of the stomach, incisura, antrum and pylorus appeared normal  The duodenal bulb and 2nd part of the duodenum appeared normal  Performed random biopsy using biopsy forceps  SPECIMENS: * No specimens in log *     Impression: Ulcerated, friable mass with stricture/narrowing in the distal esophagus from known esophageal malignancy  The stricture segment measured 5 cm in length from 35 cm to 40 cm  Traversable with gentle pressure  Successfully placed 23 x 125 mm covered esophageal stent with the proximal end of the stent at 31 cm  Stent was secured in place using over-the-scope clip  Normal stomach  Normal duodenum  RECOMMENDATION: Start clear liquid diet today  Possibly advance to soft stent diet tomorrow if tolerates clears  Keep HOB elevated 45 degrees at all time to minimize reflux  Continue Protonix Q12h  Can add viscous lidocaine Q6h PRN for any discomfort  Pain control as needed  Return to floors for continued care per primary team  Please contact the GI fellow on-call with any questions or concerns    ATTENDING ATTESTATION:  I was present throughout the entire procedure from insertion to complete withdrawal of the scope  I performed all interventions myself or oversaw the fellow  Nick Painting MD     XR chest 1 view portable    Result Date: 7/11/2022  Narrative: CHEST INDICATION:   chest pain  COMPARISON:  Chest radiograph July 28, 2021 and CT chest May 2, 2022  EXAM PERFORMED/VIEWS:  XR CHEST PORTABLE FINDINGS: Heart size is at the upper limits of normal   Thoracic aorta tortuous and ectatic  Atherosclerotic ossification of the aortic arch  The lungs are clear  No pneumothorax or pleural effusion  Osseous structures appear within normal limits for patient age  Impression: No acute cardiopulmonary disease  Workstation performed: [id number]     IR port placement    Result Date: 7/15/2022  Narrative: Chest port placement Clinical History: esophageal carcinoma Fluoro time: 0 3 minutes Number of Images: 2 Radiation Dose:  13 mGy Conscious sedation time:  1 hour Technique: The patient was brought to the interventional radiology suite and identified verbally and by wristband  The patient was placed supine on the table  The right internal jugular vein was evaluated as a potential access site with ultrasound  The vessel was found to be patent and compressible  The right neck and upper chest was prepped and draped in the usual sterile fashion  All elements of maximal sterile barrier technique were followed (cap, mask, sterile gown, sterile gloves, large sterile sheet, hand hygiene, and 2% chlorhexidine for cutaneous antisepsis)  Lidocaine was administered to the skin and a small skin incision was made  Under ultrasound guidance, utilizing sterile ultrasound technique with sterile gel and a sterile probe cover, the right internal jugular vein was accessed using single wall Seldinger technique  Static images of real time needle entry into the vessel were obtained  A 0 018 wire was then advanced through the needle into the central venous system   The needle was removed, and a 5 [employer] coaxial dilator was inserted  A heavy wire was inserted through the outer dilator and a 6 Welsh peel-away sheath was inserted over the wire  A subcutaneous pocket was created in the skin of the upper chest for the reservoir utilizing a surgical incision  The port catheter was then tunneled under the skin of the upper chest  The catheter was advanced through the peel-away and the peel-away was removed  The catheter tip was then positioned in the right atrium under fluoroscopic control  The catheter was connected to the port, and the port inserted into the subcutaneous pocket  The port was sutured in 2 places to the fascia using 3-0 Monocryl suture  The pocket was closed with 3-0 Monocryl suture and Exofin skin adhesive  A sterile dressing was applied and 0 9 normal saline solution was administered into the lumen  Impression: Impression: 1  Successful ultrasound and fluoroscopically guided placement of a chest port via the right internal jugular vein  2  The tip of the catheter is in the right atrium and may be used immediately  Workstation performed: [id number]        Review of Systems  Constitutional: Positive for appetite change (Decreased/Heartburn), fatigue (Recent fatigue noted ) and unexpected weight change (Base-220lbs/Today 197 lbs  )  HENT: Positive for dental problem (Edentulous/No dentures )  Eyes:        Cataract sx by hx  Respiratory: Negative  Cardiovascular: Negative  Gastrointestinal: Positive for constipation (Occasional )  Hernia noted  Endocrine: Negative  Genitourinary: Negative  Musculoskeletal: Negative  Skin:        Right chest [id number] port placement    Allergic/Immunologic: Negative  Neurological: Negative  Hematological: Bruises/bleeds easily (Easy bruising )  Psychiatric/Behavioral: Positive for sleep disturbance (Pt needs to be elevated at this time to rest/does not rest well   )       OBJECTIVE:   /84   Pulse 86   Temp (!) 96 8 °F (36 °C)   Resp 20   Ht 6' (1 829 m)   Wt 89 4 kg (197 lb)   SpO2 95%   BMI 26 72 kg/m²   Pain Assessment:  0  Performance Status: ECO - Symptomatic but completely ambulatory    Physical Exam  General Appearance:  Alert, cooperative, no distress, appears stated age  HEENT: normocephalic/atraumatic  Cardiovascular:  Extremities warm and well perfused, no lower extremity edema  Lungs: Respirations unlabored, no cyanosis, able to speak in complete sentences without dyspnea  Abdomen: large ventral hernia  No tenderness  +BS  Extremities: No cyanosis or edema  Skin: No rash or dermatitis  Neurologic: ANOx3    Pathology:  See above  Portions of the record may have been created with voice recognition software  Occasional wrong word or "sound a like" substitutions may have occurred due to the inherent limitations of voice recognition software  Read the chart carefully and recognize, using context, where substitutions have occurred

## 2022-07-27 ENCOUNTER — TELEPHONE (OUTPATIENT)
Dept: NUTRITION | Facility: CLINIC | Age: 77
End: 2022-07-27

## 2022-07-27 PROCEDURE — 77263 THER RADIOLOGY TX PLNG CPLX: CPT | Performed by: INTERNAL MEDICINE

## 2022-07-27 NOTE — TELEPHONE ENCOUNTER
Received notification by Sosa [place] Fariba RN on 7/26/22 that pt has triggered for oncology nutrition care (reason for referral: Esophageal CA dx)  Eduar Alex today to establish care  No answer  No ability to leave voice message, will attempt to contact again in the near future

## 2022-07-27 NOTE — TELEPHONE ENCOUNTER
"Attempted to call patient again, voicemail stated \"machine is off\"  Left detailed message with my teams number to return my call  Will attempt to call patient again tomorrow     "

## 2022-07-27 NOTE — TELEPHONE ENCOUNTER
Attempted to call patient 2x, patient was unavailable  Left detailed message to return my call to go over chemo schedule  Will try and attempt to call patient again later on today

## 2022-07-28 NOTE — TELEPHONE ENCOUNTER
Second attempt to contact Sri Cristina to establish oncology nutrition care, no answer, no ability to LVM  Will continue to attempt to contact

## 2022-07-28 NOTE — TELEPHONE ENCOUNTER
"Attempted to call patient, unable to leave voicemail  When I call voicemail stated \"machine is off\"  I will be mailing schedule to patients home address  Will attempt to call patient again, patient is also seeing radiation doctors 8/2     "

## 2022-07-29 ENCOUNTER — PATIENT OUTREACH (OUTPATIENT)
Dept: HEMATOLOGY ONCOLOGY | Facility: CLINIC | Age: 77
End: 2022-07-29

## 2022-07-29 NOTE — PROGRESS NOTES
Review of patients chart he has had his consult with med onc and rad onc  He is scheduled to begin treatment 8/15    Added to upper GI shared calendar on that date for [facility] f/u

## 2022-07-29 NOTE — PROGRESS NOTES
Oncology Outpatient Consult Note  Sang Quiroz 67 y o  male MRN: @ Encounter: [id number]        Date:  7/29/2022        CC:  New diagnosis distal esophageal adenocarcinoma      HPI:  Sang Quiroz is seen for initial consultation 7/29/2022 regarding his newly diagnosed esophageal adenocarcinoma  This presented with dysphagia that started in May  He had an EGD that revealed a distal esophageal mass with biopsy positive for adenocarcinoma  He has had stent placement and EUS with a clinical stage of T2N0 with suspicion for greater invasion than T2  PET scan was performed and this did not show any distant metastatic disease  He has lost 23 lbs since may  He denies any headaches  No BRBPR or melena  Since the stent he has been able to eat more normally  No CP/SOB  No D/C  No pain  His other medical history includes CAD s/p stenting, CHF and afib not on anticoagulation  Last cbc also shows pancytopenia with a hgb = 10 1 with an MCV = 99, plt = 89 and WBC = 4 2  He doesn't drink Etoh or know of any liver problems  He has no famhx of cancer  He smoked in the past   He lives with his brother  The patient does not drive and his brother is the one who gives him rides to treatment  His brother is also a dialysis patient  The patient used to work for the [employer] and is now retired  ROS: As stated in history of present illness otherwise her 14 point review of systems today was negative      ECOG PS: 0    Cancer Staging:  Cancer Staging  Esophageal adenocarcinoma ([facility] [address] )  Staging form: Esophagus - Adenocarcinoma, AJCC 8th Edition  - Clinical stage from 5/18/2022: Stage IIB (cT2, cN0, cM0, G3) - Signed by Honey Yin MD on 7/5/2022  Total positive nodes: 0  Histologic grading system: 3 grade system  HER2 status: Negative  Clinical staging modalities: Biopsy, EUS, Endoscopy, PET/CT      Molecular Testing:     Oncology History Overview Note   7/2022 - adenocarcinoma of the distal esophagus s/p EUS + stent placement - [id number]     Esophageal adenocarcinoma ([facility] [address] )   5/2/2022 Initial Diagnosis    Esophageal adenocarcinoma ([facility] [address] )     5/4/2022 Biopsy    Esophagus, lower esophageal bx:  - Poorly differentiated carcinoma, consistent with adenocarcinoma  5/18/2022 -  Cancer Staged    Staging form: Esophagus - Adenocarcinoma, AJCC 8th Edition  - Clinical stage from 5/18/2022: Stage IIB (cT2, cN0, cM0, G3) - Signed by Avery Love MD on 7/5/2022  Total positive nodes: 0  Histologic grading system: 3 grade system  HER2 status: Negative  Clinical staging modalities: Biopsy, EUS, Endoscopy, PET/CT       8/9/2022 -  Chemotherapy    CARBOplatin (PARAPLATIN) IVPB (GOG AUC DOSING), , Intravenous, Once, 0 of 7 cycles  PACLItaxel (TAXOL) chemo IVPB, 50 mg/m2, Intravenous, Once, 0 of 7 cycles             Test Results:    Imaging: EGD Fluoro    Result Date: 7/13/2022  Narrative: [address] [place] [address] [address] DATE OF SERVICE: 7/13/22 PHYSICIAN(S): Attending: Benotn Wiggins MD Fellow: Signa French, DO INDICATION: Esophageal dysphagia, Esophageal cancer ([facility] [address] ) POST-OP DIAGNOSIS: See the impression below  PREPROCEDURE: Informed consent was obtained for the procedure, including sedation  Risks of perforation, hemorrhage, adverse drug reaction and aspiration were discussed  The patient was placed in the left lateral decubitus position  Patient was explained about the risks and benefits of the procedure  Risks including but not limited to bleeding, infection, and perforation were explained in detail  Also explained about less than 100% sensitivity with the exam and other alternatives  DETAILS OF PROCEDURE: Patient was taken to the procedure room where a time out was performed to confirm correct patient and correct procedure   The patient underwent general anesthesia, which was administered by an anesthesia professional  The patient's blood pressure, heart rate, level of consciousness, respirations, oxygen and ETCO2 were monitored throughout the procedure  The scope was introduced through the mouth and advanced to the second part of the duodenum  Retroflexion was performed in the cardia, fundus and incisura  The patient experienced no blood loss  The procedure was not difficult  The patient tolerated the procedure well  There were no apparent complications  ANESTHESIA INFORMATION: ASA: III Anesthesia Type: General MEDICATIONS: No administrations occurring from 1234 to 1313 on 07/13/22 FINDINGS: Ulcerated mass (traversable) measuring 50 mm in the lower third of the esophagus (35 cm from the incisors), covering three quarters of the circumference; placed [facility] 23 mm x 12 cm fully covered stent using guidewire  Stricture from esophageal mass measuring 5 cm in length from 35 cm proximally to 40 cm distally  Some resistance but traversable with gentle pressure  Placed fully covered stent over guidewire with proximal end of the stent at 31 cm  Position was confirmed endoscopically  The stent was secured in place using over-the-scope clip (OTSJDLab STENTFIX) which was successfully deployed on the proximal end of the stent  The fundus of the stomach, body of the stomach, incisura, antrum and pylorus appeared normal  The duodenal bulb and 2nd part of the duodenum appeared normal  Performed random biopsy using biopsy forceps  SPECIMENS: * No specimens in log *     Impression: Ulcerated, friable mass with stricture/narrowing in the distal esophagus from known esophageal malignancy  The stricture segment measured 5 cm in length from 35 cm to 40 cm  Traversable with gentle pressure  Successfully placed 23 x 125 mm covered esophageal stent with the proximal end of the stent at 31 cm  Stent was secured in place using over-the-scope clip  Normal stomach  Normal duodenum  RECOMMENDATION: Start clear liquid diet today   Possibly advance to soft stent diet tomorrow if tolerates clears  Keep HOB elevated 45 degrees at all time to minimize reflux  Continue Protonix Q12h  Can add viscous lidocaine Q6h PRN for any discomfort  Pain control as needed  Return to floors for continued care per primary team  Please contact the GI fellow on-call with any questions or concerns  ATTENDING ATTESTATION:  I was present throughout the entire procedure from insertion to complete withdrawal of the scope  I performed all interventions myself or oversaw the fellow  Rodger Vann MD     XR chest 1 view portable    Result Date: 7/11/2022  Narrative: CHEST INDICATION:   chest pain  COMPARISON:  Chest radiograph July 28, 2021 and CT chest May 2, 2022  EXAM PERFORMED/VIEWS:  XR CHEST PORTABLE FINDINGS: Heart size is at the upper limits of normal   Thoracic aorta tortuous and ectatic  Atherosclerotic ossification of the aortic arch  The lungs are clear  No pneumothorax or pleural effusion  Osseous structures appear within normal limits for patient age  Impression: No acute cardiopulmonary disease  Workstation performed: [id number]     IR port placement    Result Date: 7/15/2022  Narrative: Chest port placement Clinical History: esophageal carcinoma Fluoro time: 0 3 minutes Number of Images: 2 Radiation Dose:  13 mGy Conscious sedation time:  1 hour Technique: The patient was brought to the interventional radiology suite and identified verbally and by wristband  The patient was placed supine on the table  The right internal jugular vein was evaluated as a potential access site with ultrasound  The vessel was found to be patent and compressible  The right neck and upper chest was prepped and draped in the usual sterile fashion  All elements of maximal sterile barrier technique were followed (cap, mask, sterile gown, sterile gloves, large sterile sheet, hand hygiene, and 2% chlorhexidine for cutaneous antisepsis)  Lidocaine was administered to the skin and a small skin incision was made   Under ultrasound guidance, utilizing sterile ultrasound technique with sterile gel and a sterile probe cover, the right internal jugular vein was accessed using single wall Seldinger technique  Static images of real time needle entry into the vessel were obtained  A 0 018 wire was then advanced through the needle into the central venous system  The needle was removed, and a 5 Greenlandic coaxial dilator was inserted  A heavy wire was inserted through the outer dilator and a 6 Green[place]ic peel-away sheath was inserted over the wire  A subcutaneous pocket was created in the skin of the upper chest for the reservoir utilizing a surgical incision  The port catheter was then tunneled under the skin of the upper chest  The catheter was advanced through the peel-away and the peel-away was removed  The catheter tip was then positioned in the right atrium under fluoroscopic control  The catheter was connected to the port, and the port inserted into the subcutaneous pocket  The port was sutured in 2 places to the fascia using 3-0 Monocryl suture  The pocket was closed with 3-0 Monocryl suture and Exofin skin adhesive  A sterile dressing was applied and 0 9 normal saline solution was administered into the lumen  Impression: Impression: 1  Successful ultrasound and fluoroscopically guided placement of a chest port via the right internal jugular vein  2  The tip of the catheter is in the right atrium and may be used immediately   Workstation performed: [id number]       Labs:   Lab Results   Component Value Date    WBC 4 26 (L) 07/14/2022    HGB 10 1 (L) 07/14/2022    HCT 31 0 (L) 07/14/2022    MCV 99 (H) 07/14/2022    PLT 89 (L) 07/14/2022     Lab Results   Component Value Date    K 3 5 07/15/2022     07/15/2022    CO2 25 07/15/2022    BUN 7 07/15/2022    CREATININE 0 83 07/15/2022    GLUCOSE 125 01/28/2017    GLUF 126 (H) 07/30/2021    CALCIUM 8 2 (L) 07/15/2022    CORRECTEDCA 8 9 07/12/2022    AST 12 07/12/2022    ALT 9 (L) 07/12/2022    ALKPHOS 143 (H) 07/12/2022    EGFR 84 07/15/2022         No results found for: SPEP, UPEP    No results found for: PSA    No results found for: CEA    No results found for: AFP    No results found for: IRON, TIBC, FERRITIN    No results found for: [id number]            Active Problems:   Patient Active Problem List   Diagnosis    CAD (coronary artery disease)    HTN (hypertension)    HLD (hyperlipidemia)    [facility]    S/P AAA (abdominal aortic aneurysm) repair    STEMI (ST elevation myocardial infarction) ([facility] [address] )    Ventral hernia with obstruction and without gangrene    Hernia, incisional    Abdominal pain    NSTEMI (non-ST elevated myocardial infarction) ([facility] [facility] )    Chronic combined systolic and diastolic CHF (congestive heart failure) (HCC)    Allergic reaction to contrast media    Ischemic cardiomyopathy    Abdominal aortic aneurysm, without rupture (HCC)    Paroxysmal atrial fibrillation ([facility] [facility] )    Coronary artery disease of native artery of native heart with stable angina pectoris (HCC)    Esophageal adenocarcinoma (HCC)    Acute gastric ulcer    Dysphagia    Pancytopenia (HCC)    Thrombocytopenia ([facility] )       Past Medical History:   Past Medical History:   Diagnosis Date    Cardiac disease     CH[facility]congestive heart failure) [facility]     Esophageal cancer ([facility] )     Hernia of abdominal cavity     Myocardial infarction ([facility] )     last assessed 7/31/14, past, Pt had MI s/p AGUSTIN placed in 2001, refuses to take any other medications for his cardiac disease, only will take seizure med   Understands possible complications from this decision    Seizure [facility])        Surgical History:   Past Surgical History:   Procedure Laterality Date    ABDOMINAL AORTIC ANEURYSM REPAIR      for dialation or occulsion    CATARACT EXTRACTION      IR PORT PLACEMENT  7/15/2022       Family History:    Family History   Problem Relation Age of Onset    Hypertension Father     Kidney disease Brother        Cancer-related family history is not on file  Social History:   Social History     Socioeconomic History    Marital status: Single     Spouse name: Not on file    Number of children: Not on file    Years of education: Not on file    Highest education level: Not on file   Occupational History    Not on file   Tobacco Use    Smoking status: Former Smoker     Quit date: 2005     Years since quittin 1    Smokeless tobacco: Never Used   Vaping Use    Vaping Use: Never used   Substance and Sexual Activity    Alcohol use: Not Currently    Drug use: Never    Sexual activity: Never   Other Topics Concern    Not on file   Social History Narrative    Not on file     Social Determinants of Health     Financial Resource Strain: Not on file   Food Insecurity: No Food Insecurity    Worried About [employer] [employer] in the Last Year: Never true    [address] [employer] in the Last Year: Never true   Transportation Needs: No Transportation Needs    Lack of Transportation (Medical): No    Lack of Transportation (Non-Medical):  No   Physical Activity: Not on file   Stress: Not on file   Social Connections: Not on file   Intimate Partner Violence: Not on file   Housing Stability: Low Risk     Unable to Pay for Housing in the Last Year: No    Number of Places Lived in the Last Year: 1    Unstable Housing in the Last Year: No       Current Medications:   Current Outpatient Medications   Medication Sig Dispense Refill    aspirin (Aspirin Low Dose) 81 mg EC tablet Take 1 tablet (81 mg total) by mouth daily 90 tablet 3    atorvastatin (LIPITOR) 80 mg tablet Take 1 tablet (80 mg total) by mouth daily with dinner 90 tablet 4    lidocaine-prilocaine (EMLA) cream Apply to port 30 to 60 min prior to use (Patient not taking: Reported on 2022) 30 g 2    ondansetron (Zofran ODT) 8 mg disintegrating tablet Take 1 tablet (8 mg total) by mouth every 8 (eight) hours as needed for nausea or vomiting (Patient not taking: Reported on 7/26/2022) 30 tablet 3    PHENobarbital 64 8 mg tablet TAKE 1 TABLET (64 8 MG TOTAL) BY MOUTH 2 (TWO) TIMES A  tablet 1    prochlorperazine (COMPAZINE) 10 mg tablet Take 1 tablet (10 mg total) by mouth every 6 (six) hours as needed for nausea or vomiting (Patient not taking: Reported on 7/26/2022) 30 tablet 3    pantoprazole (PROTONIX) 40 mg tablet TAKE 1 TABLET BY MOUTH 2 TIMES A DAY BEFORE MEALS  (Patient not taking: No sig reported) 180 tablet 3     No current facility-administered medications for this visit  Allergies: Allergies   Allergen Reactions    Iodinated Diagnostic Agents Rash     Pt's skin get very red and he gets hot and chills    Clopidogrel Rash         Physical Exam:    Body surface area is 2 12 meters squared  Wt Readings from Last 3 Encounters:   07/26/22 89 4 kg (197 lb)   07/26/22 89 9 kg (198 lb 3 2 oz)   07/19/22 91 8 kg (202 lb 6 4 oz)        Temp Readings from Last 3 Encounters:   07/26/22 (!) 96 8 °F (36 °C)   07/26/22 (!) 97 4 °F (36 3 °C)   07/19/22 (!) 97 4 °F (36 3 °C)        BP Readings from Last 3 Encounters:   07/26/22 126/84   07/26/22 124/78   07/19/22 140/80         Pulse Readings from Last 3 Encounters:   07/26/22 86   07/26/22 (!) 108   07/19/22 74     @LASTSAO2(3)@    Physical Exam     Constitutional   General appearance: No acute distress, well appearing and well nourished  Eyes   Conjunctiva and lids: No swelling, erythema or discharge  Pupils and irises: Equal, round and reactive to light  Ears, Nose, Mouth, and Throat   External inspection of ears and nose: Normal     Nasal mucosa, septum, and turbinates: Normal without edema or erythema  Oropharynx: Normal with no erythema, edema, exudate or lesions  Pulmonary   Respiratory effort: No increased work of breathing or signs of respiratory distress  Auscultation of lungs: Clear to auscultation      Cardiovascular   Palpation of heart: Normal PMI, no thrills  Auscultation of heart: Normal rate and rhythm, normal S1 and S2, without murmurs  Examination of extremities for edema and/or varicosities: Normal     Carotid pulses: Normal     Abdomen   Abdomen: Non-tender, no masses  large ventral hernia  Liver and spleen: No hepatomegaly or splenomegaly  Lymphatic   Palpation of lymph nodes in neck: No lymphadenopathy  Musculoskeletal   Gait and station: Normal     Digits and nails: Normal without clubbing or cyanosis  Inspection/palpation of joints, bones, and muscles: Normal     Skin   Skin and subcutaneous tissue: Normal without rashes or lesions  Neurologic   Cranial nerves: Cranial nerves 2-12 intact  Sensation: No sensory loss  Psychiatric   Orientation to person, place, and time: Normal     Mood and affect: Normal           Assessment/ Plan:  66 yo male with a cT2N0 adenocarcinoma of the distal esophagus  Plan for treatment is concurrent chemo/RT followed by esophagectomy  Chemotherapy will be weekly carbo/taxol with RT  Risks and benefits of carbo/taxol were reviewed and consent was signed  I sent zofran, compazine and emla cream to his pharmacy  He already has a port placed  We discussed the possibility of discomfort from esophagitis the further get gets into his treatment  We may add extra IVF as time goes on and he was educated on the S&S of dehydration  He does not have a feeding tube  He has pancytopenia and I do not have an obvious cause at this time  I will likely need to dose reduce his chemotherapy as time goes on  I sent further workup of iron studies, FA, b12 and hepatitis testing to his regular pre-treatment labs  I will plan on see him on week three of treatment to assess his tolerance  I spent 60 minutes in chart review, face to face counseling, coordination of care, and documentation

## 2022-07-29 NOTE — PROGRESS NOTES
MSW attempted to follow up with pt after his [employer] and Med Onc consults  Call was made to pt and there was no answer and no voicemail  MSW will attempt to call again  If not able to reach pt, MSW will plan to met pt in the infusion center on 8/12

## 2022-08-01 NOTE — TELEPHONE ENCOUNTER
Attempted to contact Reyna Light again to establish oncology nutrition care, no answer, no ability to LVM  Will continue to attempt to contact

## 2022-08-02 ENCOUNTER — APPOINTMENT (OUTPATIENT)
Dept: RADIATION ONCOLOGY | Facility: HOSPITAL | Age: 77
End: 2022-08-02
Attending: INTERNAL MEDICINE
Payer: MEDICARE

## 2022-08-02 ENCOUNTER — APPOINTMENT (OUTPATIENT)
Dept: RADIATION ONCOLOGY | Facility: HOSPITAL | Age: 77
End: 2022-08-02
Payer: MEDICARE

## 2022-08-02 PROCEDURE — 77334 RADIATION TREATMENT AID(S): CPT | Performed by: STUDENT IN AN ORGANIZED HEALTH CARE EDUCATION/TRAINING PROGRAM

## 2022-08-02 PROCEDURE — 77399 UNLISTED PX MED RADJ PHYSICS: CPT | Performed by: INTERNAL MEDICINE

## 2022-08-02 PROCEDURE — 77470 SPECIAL RADIATION TREATMENT: CPT | Performed by: STUDENT IN AN ORGANIZED HEALTH CARE EDUCATION/TRAINING PROGRAM

## 2022-08-03 ENCOUNTER — TELEPHONE (OUTPATIENT)
Dept: NUTRITION | Facility: CLINIC | Age: 77
End: 2022-08-03

## 2022-08-03 NOTE — TELEPHONE ENCOUNTER
Attempted to contact Emerson Mcmullen again to establish oncology nutrition care  Spoke with Emerson Mcmullen  Discussed oncology nutrition services available (options for in-person and phone consultation) and the benefits of meeting for a consultation  Emerson Mcmullen is appreciative of call today he has RD contact info and would like to reach out prn

## 2022-08-04 ENCOUNTER — TELEPHONE (OUTPATIENT)
Dept: HEMATOLOGY ONCOLOGY | Facility: CLINIC | Age: 77
End: 2022-08-04

## 2022-08-04 NOTE — TELEPHONE ENCOUNTER
Appointment Confirmation (to confirm pre existing appointments - ONLY)  No need to route   Appointment with [facility]   Appointment date & time 8/30 8:40AM   Location [place]   Patient verbilized Understanding  yes and reviewed entire schedule of dates in [address] [place] [address] Rd

## 2022-08-10 ENCOUNTER — PATIENT OUTREACH (OUTPATIENT)
Dept: HEMATOLOGY ONCOLOGY | Facility: CLINIC | Age: 77
End: 2022-08-10

## 2022-08-10 PROBLEM — Z95.828 PORT-A-CATH IN PLACE: Status: ACTIVE | Noted: 2022-08-10

## 2022-08-10 NOTE — PROGRESS NOTES
Biopsychosocial and Barriers Assessment    Cancer Diagnosis: Esophageal adenocarcinoma   Home/Cell Phone:  [phone]  Emergency Contact: BrotherEmory  Marital Status: Single  Interpretation concerns, speaks another language, preferred language: [place]  Cultural concerns: None Reported  Ability to read or write: Fully Literate    Caregiver/Support: Brother  Children: No  Child/Elder care: No    Housing: Apartment  Home Setup: 1st floor  Lives With: Brother  Daily Living Activities: Limited  Durable Medical Equipment: wheelchair, walker  Ambulation: Fully Ambulatory    Preferred Pharmacy:  [facility] co-pays with insurance: None reported  High co-pays with medication coverage: None reported  No medication coverage: Pt states he has a prescription plan    Primary Care Provider:  Dr Jono Harris  [facility] [address] [facility] [address]: No  Hx of Short term rehab: No  Mental Health Hx: None reported  Substance Abuse Hx: None reported  Employment: retired   Status/Location: Yes, [employer]  Ability to pay bills: Yes  POA/LW/AD: Yes, brother  Transportation Plan/Concerns: Pt will ride [facility]       What do you know about your Cancer Diagnosis    What has your doctor told you about your cancer diagnosis: Pt was able to describe his diagnosis  What has your doctor told you about your cancer treatment: Pt is aware that he needs chemo and radiation  He did not seem to be aware of the length of either of these treatments  What specific concerns do you have about your diagnosis and treatment: Pt was more concerned about the time than anything else  He requested afternoon appointments and states he was given morning times  He explained that he has "difficulty waking up early "    Have you been made aware of any hair loss associated with treatment: N/A    Additional Comments: MSW made outreach to the pt to introduce self and the role of the OSW    Pt lives in an apartment with his brother, who he reports is his only support  Pt never  and neither did his brother  He states that they moved into this current apartment with their parents and cared for them until they   Pt has a sister, who he states lives at [place], but due to a lack of a relationship, he is unaware "if she is living or dead"  Pt spoke casually about his treatment, in the sense that he was "eager to get out to socialize "  MSW attempted to talk with him about side effects and ask if he had any concerns but pt states he had not yet had his first cup of coffee  Pt requesting to meet in person  MSW will plan to meet with person after his radiation treatment on   Contact information and emotional support provided  MSW will continue to follow

## 2022-08-11 ENCOUNTER — TELEPHONE (OUTPATIENT)
Dept: INFUSION CENTER | Facility: HOSPITAL | Age: 77
End: 2022-08-11

## 2022-08-11 PROCEDURE — 77338 DESIGN MLC DEVICE FOR IMRT: CPT | Performed by: INTERNAL MEDICINE

## 2022-08-11 PROCEDURE — 77300 RADIATION THERAPY DOSE PLAN: CPT | Performed by: INTERNAL MEDICINE

## 2022-08-11 PROCEDURE — 77301 RADIOTHERAPY DOSE PLAN IMRT: CPT | Performed by: INTERNAL MEDICINE

## 2022-08-11 NOTE — TELEPHONE ENCOUNTER
Patient called back after receiving voicemail  Confirmed appointment time and duration  Answered patient's other questions about location and [facility] transport

## 2022-08-11 NOTE — TELEPHONE ENCOUNTER
Left message as reminder of appointment time for bloodwork tomorrow  Explained visitor policy and left callback number for questions

## 2022-08-12 ENCOUNTER — HOSPITAL ENCOUNTER (OUTPATIENT)
Dept: INFUSION CENTER | Facility: HOSPITAL | Age: 77
Discharge: HOME | End: 2022-08-12
Payer: MEDICARE

## 2022-08-12 ENCOUNTER — APPOINTMENT (OUTPATIENT)
Dept: RADIATION ONCOLOGY | Facility: HOSPITAL | Age: 77
End: 2022-08-12
Payer: MEDICARE

## 2022-08-12 VITALS — TEMPERATURE: 98.4 F

## 2022-08-12 DIAGNOSIS — D53.9 NUTRITIONAL ANEMIA: ICD-10-CM

## 2022-08-12 DIAGNOSIS — Z95.828 PORT-A-CATH IN PLACE: ICD-10-CM

## 2022-08-12 DIAGNOSIS — C15.9 ESOPHAGEAL ADENOCARCINOMA (HCC): Primary | ICD-10-CM

## 2022-08-12 DIAGNOSIS — C15.9 ESOPHAGEAL CANCER (HCC): ICD-10-CM

## 2022-08-12 LAB
ALBUMIN SERPL BCP-MCNC: 2.8 G/DL (ref 3.5–5)
ALP SERPL-CCNC: 132 U/L (ref 46–116)
ALT SERPL W P-5'-P-CCNC: 12 U/L (ref 12–78)
ANION GAP SERPL CALCULATED.3IONS-SCNC: 4 MMOL/L (ref 4–13)
ANISOCYTOSIS BLD QL SMEAR: PRESENT
AST SERPL W P-5'-P-CCNC: 15 U/L (ref 5–45)
BASOPHILS # BLD MANUAL: 0.04 THOUSAND/UL (ref 0–0.1)
BASOPHILS NFR MAR MANUAL: 1 % (ref 0–1)
BILIRUB SERPL-MCNC: 0.32 MG/DL (ref 0.2–1)
BUN SERPL-MCNC: 14 MG/DL (ref 5–25)
CALCIUM ALBUM COR SERPL-MCNC: 9.3 MG/DL (ref 8.3–10.1)
CALCIUM SERPL-MCNC: 8.3 MG/DL (ref 8.3–10.1)
CHLORIDE SERPL-SCNC: 109 MMOL/L (ref 96–108)
CO2 SERPL-SCNC: 27 MMOL/L (ref 21–32)
CREAT SERPL-MCNC: 0.77 MG/DL (ref 0.6–1.3)
EOSINOPHIL # BLD MANUAL: 0.4 THOUSAND/UL (ref 0–0.4)
EOSINOPHIL NFR BLD MANUAL: 10 % (ref 0–6)
ERYTHROCYTE [DISTWIDTH] IN BLOOD BY AUTOMATED COUNT: 24.7 % (ref 11.6–15.1)
FERRITIN SERPL-MCNC: 88 NG/ML (ref 8–388)
FOLATE SERPL-MCNC: 2.3 NG/ML (ref 3.1–17.5)
GFR SERPL CREATININE-BSD FRML MDRD: 87 ML/MIN/1.73SQ M
GLUCOSE SERPL-MCNC: 99 MG/DL (ref 65–140)
HCT VFR BLD AUTO: 28.5 % (ref 36.5–49.3)
HGB BLD-MCNC: 9.1 G/DL (ref 12–17)
IRON SATN MFR SERPL: 50 % (ref 20–50)
IRON SERPL-MCNC: 92 UG/DL (ref 65–175)
LYMPHOCYTES # BLD AUTO: 0.4 THOUSAND/UL (ref 0.6–4.47)
LYMPHOCYTES # BLD AUTO: 10 % (ref 14–44)
MACROCYTES BLD QL AUTO: PRESENT
MCH RBC QN AUTO: 31.7 PG (ref 26.8–34.3)
MCHC RBC AUTO-ENTMCNC: 31.9 G/DL (ref 31.4–37.4)
MCV RBC AUTO: 99 FL (ref 82–98)
MONOCYTES # BLD AUTO: 0.12 THOUSAND/UL (ref 0–1.22)
MONOCYTES NFR BLD: 3 % (ref 4–12)
NEUTROPHILS # BLD MANUAL: 3.02 THOUSAND/UL (ref 1.85–7.62)
NEUTS BAND NFR BLD MANUAL: 22 % (ref 0–8)
NEUTS SEG NFR BLD AUTO: 53 % (ref 43–75)
PLATELET # BLD AUTO: 154 THOUSANDS/UL (ref 149–390)
PLATELET BLD QL SMEAR: ADEQUATE
POIKILOCYTOSIS BLD QL SMEAR: PRESENT
POLYCHROMASIA BLD QL SMEAR: PRESENT
POTASSIUM SERPL-SCNC: 3.8 MMOL/L (ref 3.5–5.3)
PROT SERPL-MCNC: 6 G/DL (ref 6.4–8.4)
RBC # BLD AUTO: 2.87 MILLION/UL (ref 3.88–5.62)
RBC MORPH BLD: PRESENT
SCHISTOCYTES BLD QL SMEAR: PRESENT
SODIUM SERPL-SCNC: 140 MMOL/L (ref 135–147)
TIBC SERPL-MCNC: 184 UG/DL (ref 250–450)
VARIANT LYMPHS # BLD AUTO: 1 %
VIT B12 SERPL-MCNC: 88 PG/ML (ref 100–900)
WBC # BLD AUTO: 4.03 THOUSAND/UL (ref 4.31–10.16)

## 2022-08-12 PROCEDURE — 83540 ASSAY OF IRON: CPT

## 2022-08-12 PROCEDURE — 82728 ASSAY OF FERRITIN: CPT

## 2022-08-12 PROCEDURE — 80053 COMPREHEN METABOLIC PANEL: CPT | Performed by: INTERNAL MEDICINE

## 2022-08-12 PROCEDURE — 82607 VITAMIN B-12: CPT

## 2022-08-12 PROCEDURE — 82746 ASSAY OF FOLIC ACID SERUM: CPT

## 2022-08-12 PROCEDURE — 85027 COMPLETE CBC AUTOMATED: CPT | Performed by: INTERNAL MEDICINE

## 2022-08-12 PROCEDURE — 85007 BL SMEAR W/DIFF WBC COUNT: CPT | Performed by: INTERNAL MEDICINE

## 2022-08-12 PROCEDURE — 83550 IRON BINDING TEST: CPT

## 2022-08-12 RX ORDER — SODIUM CHLORIDE 9 MG/ML
20 INJECTION, SOLUTION INTRAVENOUS ONCE
Status: CANCELLED | OUTPATIENT
Start: 2022-09-07

## 2022-08-12 RX ORDER — SODIUM CHLORIDE 9 MG/ML
20 INJECTION, SOLUTION INTRAVENOUS ONCE
Status: CANCELLED | OUTPATIENT
Start: 2022-09-26

## 2022-08-12 RX ORDER — SODIUM CHLORIDE 9 MG/ML
20 INJECTION, SOLUTION INTRAVENOUS ONCE
Status: CANCELLED | OUTPATIENT
Start: 2022-09-28

## 2022-08-12 RX ORDER — SODIUM CHLORIDE 9 MG/ML
20 INJECTION, SOLUTION INTRAVENOUS ONCE
Status: CANCELLED | OUTPATIENT
Start: 2022-08-22

## 2022-08-12 RX ORDER — SODIUM CHLORIDE 9 MG/ML
20 INJECTION, SOLUTION INTRAVENOUS ONCE
Status: CANCELLED | OUTPATIENT
Start: 2022-08-29

## 2022-08-12 RX ORDER — SODIUM CHLORIDE 9 MG/ML
20 INJECTION, SOLUTION INTRAVENOUS ONCE
Status: CANCELLED | OUTPATIENT
Start: 2022-09-21

## 2022-08-12 NOTE — PROGRESS NOTES
Central labs drawn and port flushed without incident  Confirmed next appt  AVS printed and given to pt  Unable to draw Hepatitis  (Refl) on 8/12  Please draw on 8/15

## 2022-08-15 ENCOUNTER — APPOINTMENT (OUTPATIENT)
Dept: RADIATION ONCOLOGY | Facility: HOSPITAL | Age: 77
End: 2022-08-15
Payer: MEDICARE

## 2022-08-15 ENCOUNTER — HOSPITAL ENCOUNTER (OUTPATIENT)
Dept: INFUSION CENTER | Facility: HOSPITAL | Age: 77
Discharge: HOME | End: 2022-08-15
Attending: INTERNAL MEDICINE
Payer: MEDICARE

## 2022-08-15 ENCOUNTER — APPOINTMENT (OUTPATIENT)
Dept: RADIATION ONCOLOGY | Facility: HOSPITAL | Age: 77
End: 2022-08-15
Attending: INTERNAL MEDICINE
Payer: MEDICARE

## 2022-08-15 VITALS — BODY MASS INDEX: 26.13 KG/M2 | HEIGHT: 72 IN | WEIGHT: 192.9 LBS

## 2022-08-15 DIAGNOSIS — C15.9 ESOPHAGEAL ADENOCARCINOMA (HCC): Primary | ICD-10-CM

## 2022-08-15 PROCEDURE — 77427 RADIATION TX MANAGEMENT X5: CPT | Performed by: STUDENT IN AN ORGANIZED HEALTH CARE EDUCATION/TRAINING PROGRAM

## 2022-08-15 PROCEDURE — 77014 CHG CT GUIDANCE PLACEMENT RAD THERAPY FIELDS: CPT | Performed by: INTERNAL MEDICINE

## 2022-08-15 PROCEDURE — 77386 HB NTSTY MODUL RAD TX DLVR CPLX: CPT | Performed by: INTERNAL MEDICINE

## 2022-08-15 PROCEDURE — 96417 CHEMO IV INFUS EACH ADDL SEQ: CPT

## 2022-08-15 PROCEDURE — 96413 CHEMO IV INFUSION 1 HR: CPT

## 2022-08-15 PROCEDURE — 96367 TX/PROPH/DG ADDL SEQ IV INF: CPT

## 2022-08-15 RX ORDER — SODIUM CHLORIDE 9 MG/ML
20 INJECTION, SOLUTION INTRAVENOUS ONCE
Status: COMPLETED | OUTPATIENT
Start: 2022-08-15 | End: 2022-08-15

## 2022-08-15 RX ADMIN — FAMOTIDINE 20 MG: 10 INJECTION INTRAVENOUS at 10:27

## 2022-08-15 RX ADMIN — CARBOPLATIN 237.6 MG: 10 INJECTION, SOLUTION INTRAVENOUS at 12:04

## 2022-08-15 RX ADMIN — DEXAMETHASONE SODIUM PHOSPHATE: 10 INJECTION, SOLUTION INTRAMUSCULAR; INTRAVENOUS at 09:33

## 2022-08-15 RX ADMIN — DIPHENHYDRAMINE HYDROCHLORIDE 25 MG: 50 INJECTION, SOLUTION INTRAMUSCULAR; INTRAVENOUS at 09:56

## 2022-08-15 RX ADMIN — SODIUM CHLORIDE 20 ML/HR: 0.9 INJECTION, SOLUTION INTRAVENOUS at 09:53

## 2022-08-15 RX ADMIN — PACLITAXEL 106.2 MG: 6 INJECTION, SOLUTION, CONCENTRATE INTRAVENOUS at 11:01

## 2022-08-15 NOTE — PROGRESS NOTES
Pt tolerated chemo without incident  Reviewed radiation and future chemo appts with pt  AVS provided

## 2022-08-16 ENCOUNTER — APPOINTMENT (OUTPATIENT)
Dept: RADIATION ONCOLOGY | Facility: HOSPITAL | Age: 77
End: 2022-08-16
Attending: INTERNAL MEDICINE
Payer: MEDICARE

## 2022-08-16 PROCEDURE — 77386 HB NTSTY MODUL RAD TX DLVR CPLX: CPT | Performed by: STUDENT IN AN ORGANIZED HEALTH CARE EDUCATION/TRAINING PROGRAM

## 2022-08-16 PROCEDURE — 77014 CHG CT GUIDANCE PLACEMENT RAD THERAPY FIELDS: CPT | Performed by: STUDENT IN AN ORGANIZED HEALTH CARE EDUCATION/TRAINING PROGRAM

## 2022-08-17 ENCOUNTER — APPOINTMENT (OUTPATIENT)
Dept: RADIATION ONCOLOGY | Facility: HOSPITAL | Age: 77
End: 2022-08-17
Attending: INTERNAL MEDICINE
Payer: MEDICARE

## 2022-08-17 PROCEDURE — 77014 CHG CT GUIDANCE PLACEMENT RAD THERAPY FIELDS: CPT | Performed by: RADIOLOGY

## 2022-08-17 PROCEDURE — 77386 HB NTSTY MODUL RAD TX DLVR CPLX: CPT | Performed by: RADIOLOGY

## 2022-08-18 ENCOUNTER — APPOINTMENT (OUTPATIENT)
Dept: RADIATION ONCOLOGY | Facility: HOSPITAL | Age: 77
End: 2022-08-18
Attending: INTERNAL MEDICINE
Payer: MEDICARE

## 2022-08-19 ENCOUNTER — APPOINTMENT (OUTPATIENT)
Dept: RADIATION ONCOLOGY | Facility: HOSPITAL | Age: 77
End: 2022-08-19
Attending: INTERNAL MEDICINE
Payer: MEDICARE

## 2022-08-19 PROCEDURE — 77386 HB NTSTY MODUL RAD TX DLVR CPLX: CPT | Performed by: STUDENT IN AN ORGANIZED HEALTH CARE EDUCATION/TRAINING PROGRAM

## 2022-08-19 PROCEDURE — 77014 CHG CT GUIDANCE PLACEMENT RAD THERAPY FIELDS: CPT | Performed by: STUDENT IN AN ORGANIZED HEALTH CARE EDUCATION/TRAINING PROGRAM

## 2022-08-22 ENCOUNTER — APPOINTMENT (OUTPATIENT)
Dept: RADIATION ONCOLOGY | Facility: HOSPITAL | Age: 77
End: 2022-08-22
Payer: MEDICARE

## 2022-08-22 ENCOUNTER — APPOINTMENT (OUTPATIENT)
Dept: RADIATION ONCOLOGY | Facility: HOSPITAL | Age: 77
End: 2022-08-22
Attending: INTERNAL MEDICINE
Payer: MEDICARE

## 2022-08-22 ENCOUNTER — TELEPHONE (OUTPATIENT)
Dept: HEMATOLOGY ONCOLOGY | Facility: HOSPITAL | Age: 77
End: 2022-08-22

## 2022-08-22 ENCOUNTER — HOSPITAL ENCOUNTER (OUTPATIENT)
Dept: INFUSION CENTER | Facility: HOSPITAL | Age: 77
Discharge: HOME | End: 2022-08-22
Attending: INTERNAL MEDICINE
Payer: MEDICARE

## 2022-08-22 VITALS
HEIGHT: 72 IN | RESPIRATION RATE: 18 BRPM | BODY MASS INDEX: 25.65 KG/M2 | WEIGHT: 189.38 LBS | HEART RATE: 81 BPM | DIASTOLIC BLOOD PRESSURE: 57 MMHG | SYSTOLIC BLOOD PRESSURE: 94 MMHG | TEMPERATURE: 97.9 F

## 2022-08-22 DIAGNOSIS — C15.9 ESOPHAGEAL ADENOCARCINOMA (HCC): Primary | ICD-10-CM

## 2022-08-22 LAB
ALBUMIN SERPL BCP-MCNC: 2.5 G/DL (ref 3.5–5)
ALP SERPL-CCNC: 116 U/L (ref 46–116)
ALT SERPL W P-5'-P-CCNC: 8 U/L (ref 12–78)
ANION GAP SERPL CALCULATED.3IONS-SCNC: 3 MMOL/L (ref 4–13)
AST SERPL W P-5'-P-CCNC: 13 U/L (ref 5–45)
BASOPHILS # BLD AUTO: 0.02 THOUSANDS/ΜL (ref 0–0.1)
BASOPHILS NFR BLD AUTO: 1 % (ref 0–1)
BILIRUB SERPL-MCNC: 0.29 MG/DL (ref 0.2–1)
BUN SERPL-MCNC: 14 MG/DL (ref 5–25)
CALCIUM ALBUM COR SERPL-MCNC: 8.8 MG/DL (ref 8.3–10.1)
CALCIUM SERPL-MCNC: 7.6 MG/DL (ref 8.3–10.1)
CHLORIDE SERPL-SCNC: 107 MMOL/L (ref 96–108)
CO2 SERPL-SCNC: 28 MMOL/L (ref 21–32)
CREAT SERPL-MCNC: 0.69 MG/DL (ref 0.6–1.3)
EOSINOPHIL # BLD AUTO: 0.14 THOUSAND/ΜL (ref 0–0.61)
EOSINOPHIL NFR BLD AUTO: 8 % (ref 0–6)
ERYTHROCYTE [DISTWIDTH] IN BLOOD BY AUTOMATED COUNT: 26 % (ref 11.6–15.1)
GFR SERPL CREATININE-BSD FRML MDRD: 91 ML/MIN/1.73SQ M
GLUCOSE SERPL-MCNC: 106 MG/DL (ref 65–140)
HCT VFR BLD AUTO: 26.2 % (ref 36.5–49.3)
HGB BLD-MCNC: 8.4 G/DL (ref 12–17)
IMM GRANULOCYTES # BLD AUTO: 0.13 THOUSAND/UL (ref 0–0.2)
IMM GRANULOCYTES NFR BLD AUTO: 8 % (ref 0–2)
LYMPHOCYTES # BLD AUTO: 0.45 THOUSANDS/ΜL (ref 0.6–4.47)
LYMPHOCYTES NFR BLD AUTO: 26 % (ref 14–44)
MCH RBC QN AUTO: 31.3 PG (ref 26.8–34.3)
MCHC RBC AUTO-ENTMCNC: 32.1 G/DL (ref 31.4–37.4)
MCV RBC AUTO: 98 FL (ref 82–98)
MONOCYTES # BLD AUTO: 0.15 THOUSAND/ΜL (ref 0.17–1.22)
MONOCYTES NFR BLD AUTO: 9 % (ref 4–12)
NEUTROPHILS # BLD AUTO: 0.82 THOUSANDS/ΜL (ref 1.85–7.62)
NEUTS SEG NFR BLD AUTO: 48 % (ref 43–75)
NRBC BLD AUTO-RTO: 0 /100 WBCS
PLATELET # BLD AUTO: 85 THOUSANDS/UL (ref 149–390)
POTASSIUM SERPL-SCNC: 3.8 MMOL/L (ref 3.5–5.3)
PROT SERPL-MCNC: 6 G/DL (ref 6.4–8.4)
RBC # BLD AUTO: 2.68 MILLION/UL (ref 3.88–5.62)
SODIUM SERPL-SCNC: 138 MMOL/L (ref 135–147)
WBC # BLD AUTO: 1.71 THOUSAND/UL (ref 4.31–10.16)

## 2022-08-22 PROCEDURE — 77386 HB NTSTY MODUL RAD TX DLVR CPLX: CPT | Performed by: STUDENT IN AN ORGANIZED HEALTH CARE EDUCATION/TRAINING PROGRAM

## 2022-08-22 PROCEDURE — 96372 THER/PROPH/DIAG INJ SC/IM: CPT

## 2022-08-22 PROCEDURE — 96365 THER/PROPH/DIAG IV INF INIT: CPT

## 2022-08-22 PROCEDURE — 77014 CHG CT GUIDANCE PLACEMENT RAD THERAPY FIELDS: CPT | Performed by: STUDENT IN AN ORGANIZED HEALTH CARE EDUCATION/TRAINING PROGRAM

## 2022-08-22 PROCEDURE — 85025 COMPLETE CBC W/AUTO DIFF WBC: CPT | Performed by: INTERNAL MEDICINE

## 2022-08-22 PROCEDURE — 77336 RADIATION PHYSICS CONSULT: CPT | Performed by: STUDENT IN AN ORGANIZED HEALTH CARE EDUCATION/TRAINING PROGRAM

## 2022-08-22 PROCEDURE — 96367 TX/PROPH/DG ADDL SEQ IV INF: CPT

## 2022-08-22 PROCEDURE — 80053 COMPREHEN METABOLIC PANEL: CPT | Performed by: INTERNAL MEDICINE

## 2022-08-22 RX ORDER — SODIUM CHLORIDE 9 MG/ML
20 INJECTION, SOLUTION INTRAVENOUS ONCE
Status: COMPLETED | OUTPATIENT
Start: 2022-08-22 | End: 2022-08-22

## 2022-08-22 RX ADMIN — SODIUM CHLORIDE 20 ML/HR: 0.9 INJECTION, SOLUTION INTRAVENOUS at 09:34

## 2022-08-22 RX ADMIN — DIPHENHYDRAMINE HYDROCHLORIDE 25 MG: 50 INJECTION, SOLUTION INTRAMUSCULAR; INTRAVENOUS at 09:57

## 2022-08-22 RX ADMIN — FAMOTIDINE 20 MG: 10 INJECTION INTRAVENOUS at 10:29

## 2022-08-22 RX ADMIN — TBO-FILGRASTIM 480 MCG: 480 INJECTION, SOLUTION SUBCUTANEOUS at 12:06

## 2022-08-22 RX ADMIN — DEXAMETHASONE SODIUM PHOSPHATE: 10 INJECTION, SOLUTION INTRAMUSCULAR; INTRAVENOUS at 09:35

## 2022-08-22 NOTE — TELEPHONE ENCOUNTER
Title: Medication time out/change to orders    Patient's Carboplatin dose is reduced to an AUC of 1 5 and Taxol 40 mg/m2  Starting on Monday 8/29 for cycle 3  Office RN to route to [facility]  Infusion  pool routes to [facility]  Infusion tech to receive message, confirm scheduled treatment duration matches ordered treatment duration or adjust accordingly, and re-link appointment request orders  Infusion tech to notify pharmacy and finance

## 2022-08-22 NOTE — PROGRESS NOTES
Critical labs values received from lab: WBC 1 71  Hgb 8 4  Platelets 85    Phyllis Jones RN at Dr Julissa Carias office notified of lab values  Patient will not be having treatment today per Dr Tushar Mohr  Granix will be given instead

## 2022-08-22 NOTE — PROGRESS NOTES
Patient tolerated Granix injection without incident  Patient informed that treatment being deferred due to lab values   AVS given to patient

## 2022-08-23 ENCOUNTER — APPOINTMENT (OUTPATIENT)
Dept: RADIATION ONCOLOGY | Facility: HOSPITAL | Age: 77
End: 2022-08-23
Payer: MEDICARE

## 2022-08-23 ENCOUNTER — APPOINTMENT (OUTPATIENT)
Dept: RADIATION ONCOLOGY | Facility: HOSPITAL | Age: 77
End: 2022-08-23
Attending: INTERNAL MEDICINE
Payer: MEDICARE

## 2022-08-23 PROCEDURE — 77014 CHG CT GUIDANCE PLACEMENT RAD THERAPY FIELDS: CPT | Performed by: INTERNAL MEDICINE

## 2022-08-23 PROCEDURE — 77386 HB NTSTY MODUL RAD TX DLVR CPLX: CPT | Performed by: INTERNAL MEDICINE

## 2022-08-23 PROCEDURE — 77427 RADIATION TX MANAGEMENT X5: CPT | Performed by: INTERNAL MEDICINE

## 2022-08-24 ENCOUNTER — APPOINTMENT (OUTPATIENT)
Dept: RADIATION ONCOLOGY | Facility: HOSPITAL | Age: 77
End: 2022-08-24
Payer: MEDICARE

## 2022-08-24 ENCOUNTER — TELEPHONE (OUTPATIENT)
Dept: NUTRITION | Facility: CLINIC | Age: 77
End: 2022-08-24

## 2022-08-24 PROCEDURE — 77386 HB NTSTY MODUL RAD TX DLVR CPLX: CPT | Performed by: RADIOLOGY

## 2022-08-24 PROCEDURE — 77014 CHG CT GUIDANCE PLACEMENT RAD THERAPY FIELDS: CPT | Performed by: RADIOLOGY

## 2022-08-24 NOTE — TELEPHONE ENCOUNTER
Mony Smith to touch base in regards to his nutrition  Regulo More states that he is doing fine at this time and does not have any questions/concerns  He is appreciative of call today  Encouraged him to reach out with any questions/concerns

## 2022-08-25 ENCOUNTER — APPOINTMENT (OUTPATIENT)
Dept: RADIATION ONCOLOGY | Facility: HOSPITAL | Age: 77
End: 2022-08-25
Attending: INTERNAL MEDICINE
Payer: MEDICARE

## 2022-08-25 PROCEDURE — 77386 HB NTSTY MODUL RAD TX DLVR CPLX: CPT | Performed by: RADIOLOGY

## 2022-08-25 PROCEDURE — 77014 CHG CT GUIDANCE PLACEMENT RAD THERAPY FIELDS: CPT | Performed by: RADIOLOGY

## 2022-08-26 ENCOUNTER — APPOINTMENT (OUTPATIENT)
Dept: RADIATION ONCOLOGY | Facility: HOSPITAL | Age: 77
End: 2022-08-26
Payer: MEDICARE

## 2022-08-26 ENCOUNTER — HOSPITAL ENCOUNTER (OUTPATIENT)
Dept: INFUSION CENTER | Facility: HOSPITAL | Age: 77
End: 2022-08-26
Payer: MEDICARE

## 2022-08-26 VITALS — TEMPERATURE: 97.2 F

## 2022-08-26 DIAGNOSIS — Z95.828 PORT-A-CATH IN PLACE: ICD-10-CM

## 2022-08-26 DIAGNOSIS — C15.9 ESOPHAGEAL ADENOCARCINOMA (HCC): Primary | ICD-10-CM

## 2022-08-26 LAB
ACANTHOCYTES BLD QL SMEAR: PRESENT
ALBUMIN SERPL BCP-MCNC: 2.6 G/DL (ref 3.5–5)
ALP SERPL-CCNC: 114 U/L (ref 46–116)
ALT SERPL W P-5'-P-CCNC: 9 U/L (ref 12–78)
ANION GAP SERPL CALCULATED.3IONS-SCNC: 3 MMOL/L (ref 4–13)
ANISOCYTOSIS BLD QL SMEAR: PRESENT
AST SERPL W P-5'-P-CCNC: 11 U/L (ref 5–45)
BASOPHILS # BLD MANUAL: 0 THOUSAND/UL (ref 0–0.1)
BASOPHILS NFR MAR MANUAL: 0 % (ref 0–1)
BILIRUB SERPL-MCNC: 0.28 MG/DL (ref 0.2–1)
BUN SERPL-MCNC: 13 MG/DL (ref 5–25)
CALCIUM ALBUM COR SERPL-MCNC: 8.9 MG/DL (ref 8.3–10.1)
CALCIUM SERPL-MCNC: 7.8 MG/DL (ref 8.3–10.1)
CHLORIDE SERPL-SCNC: 108 MMOL/L (ref 96–108)
CO2 SERPL-SCNC: 26 MMOL/L (ref 21–32)
CREAT SERPL-MCNC: 0.71 MG/DL (ref 0.6–1.3)
EOSINOPHIL # BLD MANUAL: 0.26 THOUSAND/UL (ref 0–0.4)
EOSINOPHIL NFR BLD MANUAL: 12 % (ref 0–6)
ERYTHROCYTE [DISTWIDTH] IN BLOOD BY AUTOMATED COUNT: 25.8 % (ref 11.6–15.1)
GFR SERPL CREATININE-BSD FRML MDRD: 90 ML/MIN/1.73SQ M
GIANT PLATELETS BLD QL SMEAR: PRESENT
GLUCOSE SERPL-MCNC: 110 MG/DL (ref 65–140)
HCT VFR BLD AUTO: 27.2 % (ref 36.5–49.3)
HGB BLD-MCNC: 8.7 G/DL (ref 12–17)
LYMPHOCYTES # BLD AUTO: 0.5 THOUSAND/UL (ref 0.6–4.47)
LYMPHOCYTES # BLD AUTO: 23 % (ref 14–44)
MACROCYTES BLD QL AUTO: PRESENT
MCH RBC QN AUTO: 31.8 PG (ref 26.8–34.3)
MCHC RBC AUTO-ENTMCNC: 32 G/DL (ref 31.4–37.4)
MCV RBC AUTO: 99 FL (ref 82–98)
MICROCYTES BLD QL AUTO: PRESENT
MONOCYTES # BLD AUTO: 0.11 THOUSAND/UL (ref 0–1.22)
MONOCYTES NFR BLD: 5 % (ref 4–12)
NEUTROPHILS # BLD MANUAL: 1.3 THOUSAND/UL (ref 1.85–7.62)
NEUTS SEG NFR BLD AUTO: 60 % (ref 43–75)
PLATELET # BLD AUTO: 90 THOUSANDS/UL (ref 149–390)
PLATELET BLD QL SMEAR: ABNORMAL
POIKILOCYTOSIS BLD QL SMEAR: PRESENT
POLYCHROMASIA BLD QL SMEAR: PRESENT
POTASSIUM SERPL-SCNC: 3.9 MMOL/L (ref 3.5–5.3)
PROT SERPL-MCNC: 5.9 G/DL (ref 6.4–8.4)
RBC # BLD AUTO: 2.74 MILLION/UL (ref 3.88–5.62)
RBC MORPH BLD: PRESENT
SCHISTOCYTES BLD QL SMEAR: PRESENT
SODIUM SERPL-SCNC: 137 MMOL/L (ref 135–147)
WBC # BLD AUTO: 2.17 THOUSAND/UL (ref 4.31–10.16)

## 2022-08-26 PROCEDURE — 77014 CHG CT GUIDANCE PLACEMENT RAD THERAPY FIELDS: CPT | Performed by: RADIOLOGY

## 2022-08-26 PROCEDURE — 80053 COMPREHEN METABOLIC PANEL: CPT | Performed by: INTERNAL MEDICINE

## 2022-08-26 PROCEDURE — 85007 BL SMEAR W/DIFF WBC COUNT: CPT | Performed by: INTERNAL MEDICINE

## 2022-08-26 PROCEDURE — 85027 COMPLETE CBC AUTOMATED: CPT | Performed by: INTERNAL MEDICINE

## 2022-08-26 PROCEDURE — 77386 HB NTSTY MODUL RAD TX DLVR CPLX: CPT | Performed by: RADIOLOGY

## 2022-08-26 NOTE — PROGRESS NOTES
Central labs drawn and port flushed without incident  Confirmed next appt  AVS printed and given to pt

## 2022-08-29 ENCOUNTER — APPOINTMENT (OUTPATIENT)
Dept: RADIATION ONCOLOGY | Facility: HOSPITAL | Age: 77
End: 2022-08-29
Attending: INTERNAL MEDICINE
Payer: MEDICARE

## 2022-08-29 ENCOUNTER — RA CDI HCC (OUTPATIENT)
Dept: OTHER | Facility: HOSPITAL | Age: 77
End: 2022-08-29

## 2022-08-29 ENCOUNTER — APPOINTMENT (OUTPATIENT)
Dept: RADIATION ONCOLOGY | Facility: HOSPITAL | Age: 77
End: 2022-08-29
Payer: MEDICARE

## 2022-08-29 ENCOUNTER — HOSPITAL ENCOUNTER (OUTPATIENT)
Dept: INFUSION CENTER | Facility: HOSPITAL | Age: 77
Discharge: HOME | End: 2022-08-29
Attending: INTERNAL MEDICINE
Payer: MEDICARE

## 2022-08-29 VITALS
SYSTOLIC BLOOD PRESSURE: 106 MMHG | HEART RATE: 80 BPM | BODY MASS INDEX: 25.11 KG/M2 | TEMPERATURE: 97.2 F | HEIGHT: 72 IN | WEIGHT: 185.41 LBS | RESPIRATION RATE: 18 BRPM | DIASTOLIC BLOOD PRESSURE: 69 MMHG

## 2022-08-29 DIAGNOSIS — E53.8 VITAMIN B12 DEFICIENCY: Primary | ICD-10-CM

## 2022-08-29 DIAGNOSIS — E53.8 VITAMIN B12 DEFICIENCY: ICD-10-CM

## 2022-08-29 DIAGNOSIS — C15.9 ESOPHAGEAL ADENOCARCINOMA (HCC): Primary | ICD-10-CM

## 2022-08-29 PROCEDURE — 77014 CHG CT GUIDANCE PLACEMENT RAD THERAPY FIELDS: CPT | Performed by: INTERNAL MEDICINE

## 2022-08-29 PROCEDURE — 77386 HB NTSTY MODUL RAD TX DLVR CPLX: CPT | Performed by: INTERNAL MEDICINE

## 2022-08-29 PROCEDURE — 96413 CHEMO IV INFUSION 1 HR: CPT

## 2022-08-29 PROCEDURE — 96367 TX/PROPH/DG ADDL SEQ IV INF: CPT

## 2022-08-29 PROCEDURE — 96417 CHEMO IV INFUS EACH ADDL SEQ: CPT

## 2022-08-29 PROCEDURE — 77336 RADIATION PHYSICS CONSULT: CPT | Performed by: INTERNAL MEDICINE

## 2022-08-29 PROCEDURE — 96372 THER/PROPH/DIAG INJ SC/IM: CPT

## 2022-08-29 RX ORDER — CYANOCOBALAMIN 1000 UG/ML
1000 INJECTION, SOLUTION INTRAMUSCULAR; SUBCUTANEOUS ONCE
Status: CANCELLED | OUTPATIENT
Start: 2022-08-29

## 2022-08-29 RX ORDER — CYANOCOBALAMIN 1000 UG/ML
1000 INJECTION, SOLUTION INTRAMUSCULAR; SUBCUTANEOUS ONCE
Status: COMPLETED | OUTPATIENT
Start: 2022-08-29 | End: 2022-08-29

## 2022-08-29 RX ORDER — SODIUM CHLORIDE 9 MG/ML
20 INJECTION, SOLUTION INTRAVENOUS ONCE
Status: COMPLETED | OUTPATIENT
Start: 2022-08-29 | End: 2022-08-29

## 2022-08-29 RX ORDER — CYANOCOBALAMIN 1000 UG/ML
1000 INJECTION, SOLUTION INTRAMUSCULAR; SUBCUTANEOUS ONCE
Status: CANCELLED | OUTPATIENT
Start: 2022-09-02

## 2022-08-29 RX ADMIN — CYANOCOBALAMIN 1000 MCG: 1000 INJECTION, SOLUTION INTRAMUSCULAR; SUBCUTANEOUS at 12:17

## 2022-08-29 RX ADMIN — FAMOTIDINE 20 MG: 10 INJECTION INTRAVENOUS at 10:02

## 2022-08-29 RX ADMIN — PACLITAXEL 84.6 MG: 6 INJECTION, SOLUTION, CONCENTRATE INTRAVENOUS at 10:41

## 2022-08-29 RX ADMIN — SODIUM CHLORIDE 20 ML/HR: 0.9 INJECTION, SOLUTION INTRAVENOUS at 09:13

## 2022-08-29 RX ADMIN — CARBOPLATIN 190.05 MG: 10 INJECTION, SOLUTION INTRAVENOUS at 11:42

## 2022-08-29 RX ADMIN — DIPHENHYDRAMINE HYDROCHLORIDE 25 MG: 50 INJECTION, SOLUTION INTRAMUSCULAR; INTRAVENOUS at 09:35

## 2022-08-29 RX ADMIN — DEXAMETHASONE SODIUM PHOSPHATE: 10 INJECTION, SOLUTION INTRAMUSCULAR; INTRAVENOUS at 09:13

## 2022-08-29 NOTE — PROGRESS NOTES
[facility] [address]  coding opportunities       Chart reviewed, no opportunity found: CHART REVIEWED, NO OPPORTUNITY FOUND        Patients Insurance     Medicare Insurance: Medicare

## 2022-08-30 ENCOUNTER — OFFICE VISIT (OUTPATIENT)
Dept: HEMATOLOGY ONCOLOGY | Facility: CLINIC | Age: 77
End: 2022-08-30
Payer: MEDICARE

## 2022-08-30 VITALS
RESPIRATION RATE: 16 BRPM | DIASTOLIC BLOOD PRESSURE: 64 MMHG | SYSTOLIC BLOOD PRESSURE: 122 MMHG | BODY MASS INDEX: 25.33 KG/M2 | HEART RATE: 79 BPM | WEIGHT: 187 LBS | OXYGEN SATURATION: 96 % | TEMPERATURE: 98.3 F | HEIGHT: 72 IN

## 2022-08-30 DIAGNOSIS — C15.5 MALIGNANT NEOPLASM OF LOWER THIRD OF ESOPHAGUS (HCC): ICD-10-CM

## 2022-08-30 DIAGNOSIS — E53.8 FOLATE DEFICIENCY: Primary | ICD-10-CM

## 2022-08-30 PROCEDURE — 77386 HB NTSTY MODUL RAD TX DLVR CPLX: CPT | Performed by: RADIOLOGY

## 2022-08-30 PROCEDURE — 99204 OFFICE O/P NEW MOD 45 MIN: CPT | Performed by: INTERNAL MEDICINE

## 2022-08-30 PROCEDURE — 77014 CHG CT GUIDANCE PLACEMENT RAD THERAPY FIELDS: CPT | Performed by: RADIOLOGY

## 2022-08-30 PROCEDURE — 99214 OFFICE O/P EST MOD 30 MIN: CPT | Performed by: INTERNAL MEDICINE

## 2022-08-30 RX ORDER — FOLIC ACID 1 MG/1
1 TABLET ORAL DAILY
Qty: 90 TABLET | Refills: 1 | Status: SHIPPED | OUTPATIENT
Start: 2022-08-30 | End: 2022-10-20

## 2022-08-31 ENCOUNTER — APPOINTMENT (OUTPATIENT)
Dept: RADIATION ONCOLOGY | Facility: HOSPITAL | Age: 77
End: 2022-08-31
Attending: INTERNAL MEDICINE
Payer: MEDICARE

## 2022-08-31 PROCEDURE — 77386 HB NTSTY MODUL RAD TX DLVR CPLX: CPT | Performed by: RADIOLOGY

## 2022-08-31 PROCEDURE — 77014 CHG CT GUIDANCE PLACEMENT RAD THERAPY FIELDS: CPT | Performed by: RADIOLOGY

## 2022-09-01 ENCOUNTER — OFFICE VISIT (OUTPATIENT)
Dept: INTERNAL MEDICINE CLINIC | Facility: CLINIC | Age: 77
End: 2022-09-01

## 2022-09-01 ENCOUNTER — APPOINTMENT (OUTPATIENT)
Dept: RADIATION ONCOLOGY | Facility: HOSPITAL | Age: 77
End: 2022-09-01
Payer: MEDICARE

## 2022-09-01 VITALS
DIASTOLIC BLOOD PRESSURE: 57 MMHG | BODY MASS INDEX: 28.34 KG/M2 | HEIGHT: 68 IN | SYSTOLIC BLOOD PRESSURE: 94 MMHG | WEIGHT: 187 LBS | TEMPERATURE: 98.1 F | HEART RATE: 97 BPM

## 2022-09-01 DIAGNOSIS — C15.9 ESOPHAGEAL ADENOCARCINOMA (HCC): Primary | ICD-10-CM

## 2022-09-01 DIAGNOSIS — G40.909 SEIZURE DISORDER (HCC): Chronic | ICD-10-CM

## 2022-09-01 PROCEDURE — 77014 CHG CT GUIDANCE PLACEMENT RAD THERAPY FIELDS: CPT | Performed by: RADIOLOGY

## 2022-09-01 PROCEDURE — 77386 HB NTSTY MODUL RAD TX DLVR CPLX: CPT | Performed by: RADIOLOGY

## 2022-09-01 PROCEDURE — 99214 OFFICE O/P EST MOD 30 MIN: CPT | Performed by: HOSPITALIST

## 2022-09-01 RX ORDER — PHENOBARBITAL 64.8 MG/1
64.8 TABLET ORAL 2 TIMES DAILY
Qty: 180 TABLET | Refills: 1 | Status: CANCELLED | OUTPATIENT
Start: 2022-09-01 | End: 2023-02-28

## 2022-09-01 NOTE — PROGRESS NOTES
[facility]  INTERNAL MEDICINE OFFICE VISIT     PATIENT INFORMATION     Angle Hogan   67 y o  male   MRN: [id number]    ASSESSMENT/PLAN     Diagnoses and all orders for this visit:    Esophageal adenocarcinoma ([facility] )    Seizure disorder ([facility] )      Esophageal Adenocarcinoma  -Patient recently had stent placed for worsening dysphagia, currently tolerating oral intake without issues   -On radiation therapy 5x weekly   -Patient sometimes gets light headed when fasting, low blood pressure in office in 70s, rechecked to 94/57, 88/55    -S/P chemotherapy twice with Carboplatin and Paclitaxel  Plan  -Continue treatment per oncology   -Encouraged patient to remain well hydrated  Seizure Disorder  -Patient had a history of seizures 50+ years ago and has been on Phenobarbital since then   -He has not had any seizures since the 1960s  -Patient had low blood pressure in the office in 70s, rechecked to 94/57, 88/55    -Last WBC 2 17, HGB 8 7   -Discussed with patient that his phenobarbital may be contributing to his low blood pressure and lab abnormalities, due to combined effect of phenobarbital and chemotherapy  Plan  -Continue current phenobarbital, will renew medication   -Patient is adamantly against changing seizure medications or stopping phenobarbital at this time    -Patient understands the risks of continuing with current medication and wishes to not change medications at this time  Schedule a follow-up appointment in 6 weeks      HEALTH MAINTENANCE     Immunization History   Administered Date(s) Administered    Influenza, seasonal, injectable 10/22/2009    Td (adult), adsorbed 10/16/2007    Tdap 02/27/2018     CHIEF COMPLAINT     Chief Complaint   Patient presents with    Follow-up      HISTORY OF PRESENT ILLNESS     Patient is a 66 y/o male with PMH including CAD S/P 4x stenting for MI, AAA s/p repair, and seizure disorder, who was recently diagnosed with esophageal adenocarcinoma, stage 2, in May  He developed worsening dysphagia to solids and liquids  A stent was placed during a hospitalization in July  Since then patient has been tolerated solids and liquids with no dysphagia  He currently undergoes radiation therapy 5x weekly and has had chemotherapy twice with Carboplatin and Paclitaxel  He is currently following with oncology actively  He has lab studies scheduled for tomorrow  Overall, he feels well at this visit with no acute concerns  Patient has had on and off fatigue  He has not had any oral intake since 4 PM yesterday due to his radiation therapy  Sometimes when fasting for treatment he feels light headed  Patient's blood pressures were low in the office though higher on recheck  Encouraged patient to make sure that he is hydrated well whenever possible  Patient notes that he has a rash on his right foot that comes and goes, not currently symptomatic  Encouraged patient to seek treatment if rash becomes symptomatic again  Discussed with patient that his phenobarbital may be contributing to his low blood pressure and lab abnormalities, due to combined effect of phenobarbital and chemotherapy  Patient is adamantly against changing seizure medications or stopping phenobarbital at this time  The risks and benefits of changing medication was discussed with patient  He understands the risks and does not wish to stop phenobarbital at this time  He is not interested in a neurology appointment at this time  Patient has a history of smoking for 50 pack years, but he quit in 2005  He does not currently drink alcohol or use any recreational/illicit drugs  REVIEW OF SYSTEMS     Review of Systems   Constitutional: Positive for fatigue  Negative for chills, fever and unexpected weight change  HENT: Negative for hearing loss and sore throat  Eyes: Negative for visual disturbance  Respiratory: Negative for cough and shortness of breath      Cardiovascular: Negative for chest pain and palpitations  Gastrointestinal: Positive for constipation  Negative for abdominal pain, anal bleeding, diarrhea, nausea and vomiting  Occasional constipation  Endocrine: Negative for polyuria  Genitourinary: Negative for dysuria and hematuria  Musculoskeletal: Negative for arthralgias and back pain  Skin: Negative for rash and wound  Neurological: Negative for dizziness, tremors, seizures, weakness, light-headedness and headaches  Psychiatric/Behavioral: Negative for dysphoric mood  The patient is not nervous/anxious  OBJECTIVE     Vitals:    09/01/22 0145 09/01/22 1300 09/01/22 1308 09/01/22 1344   BP: (!) 88/55 (!) 74/47 (!) 76/54 94/57   BP Location: Left arm Left arm Left arm Right arm   Patient Position: Sitting Sitting Sitting Sitting   Cuff Size: Large Large Large Large   Pulse:  91 97    Temp:  98 1 °F (36 7 °C)     TempSrc:  Temporal     Weight:  84 8 kg (187 lb)     Height:  5' 8" (1 727 m)       Physical Exam  Vitals reviewed  Constitutional:       Appearance: Normal appearance  HENT:      Head: Normocephalic  Right Ear: External ear normal       Left Ear: External ear normal       Nose: Nose normal       Mouth/Throat:      Mouth: Mucous membranes are moist    Eyes:      Extraocular Movements: Extraocular movements intact  Pupils: Pupils are equal, round, and reactive to light  Cardiovascular:      Rate and Rhythm: Normal rate and regular rhythm  Pulses: Normal pulses  Heart sounds: Normal heart sounds  Pulmonary:      Effort: Pulmonary effort is normal       Breath sounds: Normal breath sounds  Abdominal:      General: Bowel sounds are normal       Hernia: A hernia is present  Comments: Chronic abdominal hernia with distension, known and stable per patient  Musculoskeletal:         General: Normal range of motion  Cervical back: Normal range of motion  Skin:     General: Skin is warm        Capillary Refill: Capillary refill takes less than 2 seconds  Findings: Rash present  Comments: Rash with erythema, scaling, flaking of skin on right foot below ankle  Chronic and stable per patient  Neurological:      General: No focal deficit present  Mental Status: He is alert and oriented to person, place, and time  CURRENT MEDICATIONS     Current Outpatient Medications:     aspirin (Aspirin Low Dose) 81 mg EC tablet, Take 1 tablet (81 mg total) by mouth daily, Disp: 90 tablet, Rfl: 3    atorvastatin (LIPITOR) 80 mg tablet, Take 1 tablet (80 mg total) by mouth daily with dinner, Disp: 90 tablet, Rfl: 4    folic acid ( Folic Acid) 1 mg tablet, Take 1 tablet (1 mg total) by mouth daily, Disp: 90 tablet, Rfl: 1    PHENobarbital 64 8 mg tablet, TAKE 1 TABLET (64 8 MG TOTAL) BY MOUTH 2 (TWO) TIMES A DAY, Disp: 180 tablet, Rfl: 1    lidocaine-prilocaine (EMLA) cream, Apply to port 30 to 60 min prior to use (Patient not taking: No sig reported), Disp: 30 g, Rfl: 2    ondansetron (Zofran ODT) 8 mg disintegrating tablet, Take 1 tablet (8 mg total) by mouth every 8 (eight) hours as needed for nausea or vomiting (Patient not taking: No sig reported), Disp: 30 tablet, Rfl: 3    pantoprazole (PROTONIX) 40 mg tablet, TAKE 1 TABLET BY MOUTH 2 TIMES A DAY BEFORE MEALS  (Patient not taking: No sig reported), Disp: 180 tablet, Rfl: 3    prochlorperazine (COMPAZINE) 10 mg tablet, Take 1 tablet (10 mg total) by mouth every 6 (six) hours as needed for nausea or vomiting (Patient not taking: No sig reported), Disp: 30 tablet, Rfl: 3    PAST MEDICAL & SURGICAL HISTORY     Past Medical History:   Diagnosis Date    Cardiac disease     CHF (congestive heart failure) (HCC)     Esophageal cancer ([facility] )     Hernia of abdominal cavity     Myocardial infarction ([facility] [address] )     last assessed 7/31/14, past, Pt had MI s/p AGUSTIN placed in 2001, refuses to take any other medications for his cardiac disease, only will take seizure med  Understands possible complications from this decision    Seizure [facility])      Past Surgical History:   Procedure Laterality Date    ABDOMINAL AORTIC ANEURYSM REPAIR      for dialation or occulsion    CATARACT EXTRACTION      IR PORT PLACEMENT  7/15/2022     SOCIAL & FAMILY HISTORY     Social History     Socioeconomic History    Marital status: Single     Spouse name: Not on file    Number of children: Not on file    Years of education: Not on file    Highest education level: Not on file   Occupational History    Not on file   Tobacco Use    Smoking status: Former Smoker     Quit date: 2005     Years since quittin 2    Smokeless tobacco: Never Used   Vaping Use    Vaping Use: Never used   Substance and Sexual Activity    Alcohol use: Not Currently    Drug use: Never    Sexual activity: Never   Other Topics Concern    Not on file   Social History Narrative    Not on file     Social Determinants of Health     Financial Resource Strain: Low Risk     Difficulty of Paying Living Expenses: Not hard at all   Food Insecurity: No Food Insecurity    Worried About [address] in the Last Year: Never true    [address] in the Last Year: Never true   Transportation Needs: No Transportation Needs    Lack of Transportation (Medical): No    Lack of Transportation (Non-Medical):  No   Physical Activity: Not on file   Stress: Not on file   Social Connections: Not on file   Intimate Partner Violence: Not on file   Housing Stability: Low Risk     Unable to Pay for Housing in the Last Year: No    Number of Places Lived in the Last Year: 1    Unstable Housing in the Last Year: No     Social History     Substance and Sexual Activity   Alcohol Use Not Currently       Social History     Substance and Sexual Activity   Drug Use Never     Social History     Tobacco Use   Smoking Status Former Smoker    Quit date: 2005    Years since quittin 2   Smokeless Tobacco Never Used     Family History   Problem Relation Age of Onset    Hypertension Father     Kidney disease Brother          BMI Counseling: Body mass index is 28 43 kg/m²  The BMI is above normal  Nutrition recommendations include encouraging healthy choices of fruits and vegetables, limiting drinks that contain sugar, moderation in carbohydrate intake and reducing intake of saturated and trans fat  Exercise recommendations include moderate physical activity 150 minutes/week and exercising 3-5 times per week  Rationale for BMI follow-up plan is due to patient being overweight or obese         ==  Noé Lopez MD PGY1  [address] [place] [address] , [place] [id number] [facility], [address] [facility]  Office: [phone]  Fax: [phone]

## 2022-09-01 NOTE — PATIENT INSTRUCTIONS
I have renewed your phenobarbital  Please continue to follow with oncology for your therapy  We will schedule a follow up appointment in 6 weeks

## 2022-09-02 ENCOUNTER — TELEPHONE (OUTPATIENT)
Dept: HEMATOLOGY ONCOLOGY | Facility: CLINIC | Age: 77
End: 2022-09-02

## 2022-09-02 ENCOUNTER — APPOINTMENT (OUTPATIENT)
Dept: RADIATION ONCOLOGY | Facility: HOSPITAL | Age: 77
End: 2022-09-02
Payer: MEDICARE

## 2022-09-02 ENCOUNTER — HOSPITAL ENCOUNTER (OUTPATIENT)
Dept: INFUSION CENTER | Facility: HOSPITAL | Age: 77
End: 2022-09-02
Payer: MEDICARE

## 2022-09-02 DIAGNOSIS — C15.9 ESOPHAGEAL ADENOCARCINOMA (HCC): ICD-10-CM

## 2022-09-02 DIAGNOSIS — Z95.828 PORT-A-CATH IN PLACE: Primary | ICD-10-CM

## 2022-09-02 LAB
ALBUMIN SERPL BCP-MCNC: 2.4 G/DL (ref 3.5–5)
ALP SERPL-CCNC: 104 U/L (ref 46–116)
ALT SERPL W P-5'-P-CCNC: 11 U/L (ref 12–78)
ANION GAP SERPL CALCULATED.3IONS-SCNC: 4 MMOL/L (ref 4–13)
ANISOCYTOSIS BLD QL SMEAR: PRESENT
AST SERPL W P-5'-P-CCNC: 14 U/L (ref 5–45)
BASOPHILS # BLD MANUAL: 0.03 THOUSAND/UL (ref 0–0.1)
BASOPHILS NFR MAR MANUAL: 2 % (ref 0–1)
BILIRUB SERPL-MCNC: 0.28 MG/DL (ref 0.2–1)
BUN SERPL-MCNC: 14 MG/DL (ref 5–25)
CALCIUM ALBUM COR SERPL-MCNC: 8.8 MG/DL (ref 8.3–10.1)
CALCIUM SERPL-MCNC: 7.5 MG/DL (ref 8.3–10.1)
CHLORIDE SERPL-SCNC: 106 MMOL/L (ref 96–108)
CO2 SERPL-SCNC: 26 MMOL/L (ref 21–32)
CREAT SERPL-MCNC: 0.7 MG/DL (ref 0.6–1.3)
EOSINOPHIL # BLD MANUAL: 0.02 THOUSAND/UL (ref 0–0.4)
EOSINOPHIL NFR BLD MANUAL: 1 % (ref 0–6)
ERYTHROCYTE [DISTWIDTH] IN BLOOD BY AUTOMATED COUNT: 26.5 % (ref 11.6–15.1)
GFR SERPL CREATININE-BSD FRML MDRD: 91 ML/MIN/1.73SQ M
GLUCOSE SERPL-MCNC: 111 MG/DL (ref 65–140)
HCT VFR BLD AUTO: 25.4 % (ref 36.5–49.3)
HGB BLD-MCNC: 8.5 G/DL (ref 12–17)
LYMPHOCYTES # BLD AUTO: 0.29 THOUSAND/UL (ref 0.6–4.47)
LYMPHOCYTES # BLD AUTO: 19 % (ref 14–44)
MCH RBC QN AUTO: 32.1 PG (ref 26.8–34.3)
MCHC RBC AUTO-ENTMCNC: 33.5 G/DL (ref 31.4–37.4)
MCV RBC AUTO: 96 FL (ref 82–98)
MONOCYTES # BLD AUTO: 0.03 THOUSAND/UL (ref 0–1.22)
MONOCYTES NFR BLD: 2 % (ref 4–12)
MYELOCYTES NFR BLD MANUAL: 1 % (ref 0–1)
NEUTROPHILS # BLD MANUAL: 1.13 THOUSAND/UL (ref 1.85–7.62)
NEUTS BAND NFR BLD MANUAL: 2 % (ref 0–8)
NEUTS SEG NFR BLD AUTO: 73 % (ref 43–75)
NRBC BLD AUTO-RTO: 1 /100 WBC (ref 0–2)
PLATELET # BLD AUTO: 61 THOUSANDS/UL (ref 149–390)
PLATELET BLD QL SMEAR: ABNORMAL
POIKILOCYTOSIS BLD QL SMEAR: PRESENT
POTASSIUM SERPL-SCNC: 3.9 MMOL/L (ref 3.5–5.3)
PROT SERPL-MCNC: 5.8 G/DL (ref 6.4–8.4)
RBC # BLD AUTO: 2.65 MILLION/UL (ref 3.88–5.62)
RBC MORPH BLD: PRESENT
SCHISTOCYTES BLD QL SMEAR: PRESENT
SODIUM SERPL-SCNC: 136 MMOL/L (ref 135–147)
WBC # BLD AUTO: 1.51 THOUSAND/UL (ref 4.31–10.16)

## 2022-09-02 PROCEDURE — 80053 COMPREHEN METABOLIC PANEL: CPT | Performed by: INTERNAL MEDICINE

## 2022-09-02 PROCEDURE — 77386 HB NTSTY MODUL RAD TX DLVR CPLX: CPT | Performed by: STUDENT IN AN ORGANIZED HEALTH CARE EDUCATION/TRAINING PROGRAM

## 2022-09-02 PROCEDURE — 85007 BL SMEAR W/DIFF WBC COUNT: CPT | Performed by: INTERNAL MEDICINE

## 2022-09-02 PROCEDURE — 77014 CHG CT GUIDANCE PLACEMENT RAD THERAPY FIELDS: CPT | Performed by: STUDENT IN AN ORGANIZED HEALTH CARE EDUCATION/TRAINING PROGRAM

## 2022-09-02 PROCEDURE — 85027 COMPLETE CBC AUTOMATED: CPT | Performed by: INTERNAL MEDICINE

## 2022-09-02 NOTE — TELEPHONE ENCOUNTER
Spoke with patient to let him know that we rec'd his lab results and his blood counts are low, so we would like him to repeat labs again on Tuesday 9/6 at 10am at [address]  Patient verbalized understanding

## 2022-09-03 NOTE — PROGRESS NOTES
HEMATOLOGY / [address] FOLLOW UP NOTE    Primary Care Provider: Alondra Wilson MD  Referring Provider: Dinh Torres  MRN: [id number]  : 1945    Reason for Encounter: follow up esophageal cancer       Oncology History Overview Note   2022 - adenocarcinoma of the distal esophagus s/p EUS + stent placement - [id number]    8/15/2022 - start weekly carbo/taxol/RT    Week 2 held due to cytopenias - FA and b12 deficiencies found and replacement started         Esophageal adenocarcinoma ([facility] )   2022 Initial Diagnosis    Esophageal adenocarcinoma ([facility] [address] )     2022 Biopsy    Esophagus, lower esophageal bx:  - Poorly differentiated carcinoma, consistent with adenocarcinoma  2022 -  Cancer Staged    Staging form: Esophagus - Adenocarcinoma, AJCC 8th Edition  - Clinical stage from 2022: Stage IIB (cT2, cN0, cM0, G3) - Signed by Ester Gallardo MD on 2022  Total positive nodes: 0  Histologic grading system: 3 grade system  HER2 status: Negative  Clinical staging modalities: Biopsy, EUS, Endoscopy, PET/CT       8/15/2022 -  Chemotherapy    CARBOplatin (PARAPLATIN) IVPB (GOG AUC DOSING), 237 6 mg, Intravenous, Once, 3 of 7 cycles  Administration: 237 6 mg (8/15/2022), 190 05 mg (2022)  PACLItaxel (TAXOL) chemo IVPB, 50 mg/m2 = 106 2 mg, Intravenous, Once, 3 of 7 cycles  Dose modification: 40 mg/m2 (original dose 50 mg/m2, Cycle 3, Reason: Neutropenia, Comment: 20% dose reduction due to neutropenia)  Administration: 106 2 mg (8/15/2022), 84 6 mg (2022)  tbo-filgrastim (GRANIX), 480 mcg (100 % of original dose 480 mcg), Subcutaneous, Once, 1 of 1 cycle  Dose modification: 480 mcg (original dose 480 mcg, Cycle 2)  Administration: 480 mcg (2022)         Interval History: patient presents for esophageal cancer  This is week 3 of carbo/taxol/RT  I send FA and b12 and they were both low  He got a b12 shot yesterday    So far, he is tolerating treatment well   No neuropathy  No fevers  No pain with swallowing  No N/V/D/C  He denies any BRBPR or melena  REVIEW OF SYSTEMS:  Please note that a 14-point review of systems was performed to include Constitutional, HEENT, Respiratory, CVS, GI, , Musculoskeletal, Integumentary, Neurologic, Rheumatologic, Endocrinologic, Psychiatric, Lymphatic, and Hematologic/Oncologic systems were reviewed and are negative unless otherwise stated in HPI  Positive and negative findings pertinent to this evaluation are incorporated into the history of present illness  ECOG PS: 0    PROBLEM LIST:  Patient Active Problem List   Diagnosis    CAD (coronary artery disease)    HTN (hypertension)    HLD (hyperlipidemia)    Seizure disorder (HCC)    S/P AAA (abdominal aortic aneurysm) repair    STEMI (ST elevation myocardial infarction) ([facility] )    Ventral hernia with obstruction and without gangrene    Hernia, incisional    Abdominal pain    NSTEMI (non-ST elevated myocardial infarction) (HCC)    Chronic combined systolic and diastolic CHF (congestive heart failure) (HCC)    Allergic reaction to contrast media    Ischemic cardiomyopathy    Abdominal aortic aneurysm, without rupture (HCC)    Paroxysmal atrial fibrillation (HCC)    Coronary artery disease of native artery of native heart with stable angina pectoris (HCC)    Esophageal adenocarcinoma (HCC)    Acute gastric ulcer    Dysphagia    Pancytopenia (HCC)    Thrombocytopenia ([address] )    Port-A-Cath in place    Vitamin B12 deficiency       Assessment / Plan: patient is doing well with treatment  We will continue with the current dose reduction  I started him on PO FA replacement and he will get weekly b12 injections  These deficiencies are likely nutritional in nature  So far he is doing well, but was counseled in the importance of calling me if it gets more difficult to maintain his hydration  I can bring him in for weekly IVF    I will see him in 3 weeks in follow up  I spent 30 minutes on chart review, face to face counseling time, coordination of care and documentation  Past Medical History:   has a past medical history of Cardiac disease, CHF (congestive heart failure) ([facility] ), Esophageal cancer ([facility] ), Hernia of abdominal cavity, Myocardial infarction [facility]), and Seizure ([facility] )  PAST SURGICAL HISTORY:   has a past surgical history that includes Abdominal aortic aneurysm repair; Cataract extraction; and IR port placement (7/15/2022)  CURRENT MEDICATIONS  Current Outpatient Medications   Medication Sig Dispense Refill    aspirin (Aspirin Low Dose) 81 mg EC tablet Take 1 tablet (81 mg total) by mouth daily 90 tablet 3    atorvastatin (LIPITOR) 80 mg tablet Take 1 tablet (80 mg total) by mouth daily with dinner 90 tablet 4    folic acid (KP Folic Acid) 1 mg tablet Take 1 tablet (1 mg total) by mouth daily 90 tablet 1    PHENobarbital 64 8 mg tablet TAKE 1 TABLET (64 8 MG TOTAL) BY MOUTH 2 (TWO) TIMES A  tablet 1    lidocaine-prilocaine (EMLA) cream Apply to port 30 to 60 min prior to use (Patient not taking: No sig reported) 30 g 2    ondansetron (Zofran ODT) 8 mg disintegrating tablet Take 1 tablet (8 mg total) by mouth every 8 (eight) hours as needed for nausea or vomiting (Patient not taking: No sig reported) 30 tablet 3    pantoprazole (PROTONIX) 40 mg tablet TAKE 1 TABLET BY MOUTH 2 TIMES A DAY BEFORE MEALS  (Patient not taking: No sig reported) 180 tablet 3    prochlorperazine (COMPAZINE) 10 mg tablet Take 1 tablet (10 mg total) by mouth every 6 (six) hours as needed for nausea or vomiting (Patient not taking: No sig reported) 30 tablet 3     No current facility-administered medications for this visit  [unfilled]    SOCIAL HISTORY:   reports that he quit smoking about 17 years ago  He has never used smokeless tobacco  He reports previous alcohol use  He reports that he does not use drugs       FAMILY HISTORY:  family history includes Hypertension in his father; Kidney disease in his brother  ALLERGIES:  is allergic to iodinated diagnostic agents and clopidogrel  Physical Exam:  Vital Signs:   Visit Vitals  /64 (BP Location: Left arm, Patient Position: Sitting, Cuff Size: Large)   Pulse 79   Temp 98 3 °F (36 8 °C) (Temporal)   Resp 16   Ht 6' 0 01" (1 829 m)   Wt 84 8 kg (187 lb)   SpO2 96%   BMI 25 35 kg/m²   Smoking Status Former Smoker   BSA 2 07 m²     Body mass index is 25 35 kg/m²  Body surface area is 2 07 meters squared  GEN: Alert, awake oriented x3, in no acute distress  HEENT- No pallor, icterus, cyanosis, no oral mucosal lesions,   LAD - no palpable cervical, clavicle, axillary, inguinal LAD  Heart- normal S1 S2, regular rate and rhythm, No murmur, rubs     Lungs- clear breathing sound bilateral    Abdomen- soft, Non tender, bowel sounds present  Extremities- No cyanosis, clubbing, edema  Neuro- No focal neurological deficit    Labs:  Lab Results   Component Value Date    WBC 1 51 (LL) 09/02/2022    HGB 8 5 (L) 09/02/2022    HCT 25 4 (L) 09/02/2022    MCV 96 09/02/2022    PLT 61 (L) 09/02/2022     Lab Results   Component Value Date    SODIUM 136 09/02/2022    K 3 9 09/02/2022     09/02/2022    CO2 26 09/02/2022    AGAP 4 09/02/2022    BUN 14 09/02/2022    CREATININE 0 70 09/02/2022    GLUC 111 09/02/2022    GLUF 126 (H) 07/30/2021    CALCIUM 7 5 (L) 09/02/2022    AST 14 09/02/2022    ALT 11 (L) 09/02/2022    ALKPHOS 104 09/02/2022    TP 5 8 (L) 09/02/2022    TBILI 0 28 09/02/2022    EGFR 91 09/02/2022

## 2022-09-06 ENCOUNTER — HOSPITAL ENCOUNTER (OUTPATIENT)
Dept: INFUSION CENTER | Facility: HOSPITAL | Age: 77
Discharge: HOME | DRG: 314 | End: 2022-09-06
Payer: MEDICARE

## 2022-09-06 ENCOUNTER — APPOINTMENT (OUTPATIENT)
Dept: RADIATION ONCOLOGY | Facility: HOSPITAL | Age: 77
End: 2022-09-06
Attending: INTERNAL MEDICINE
Payer: MEDICARE

## 2022-09-06 VITALS — TEMPERATURE: 97.6 F

## 2022-09-06 DIAGNOSIS — C15.9 ESOPHAGEAL ADENOCARCINOMA (HCC): ICD-10-CM

## 2022-09-06 DIAGNOSIS — E53.8 VITAMIN B12 DEFICIENCY: Primary | ICD-10-CM

## 2022-09-06 DIAGNOSIS — Z95.828 PORT-A-CATH IN PLACE: ICD-10-CM

## 2022-09-06 LAB
ACANTHOCYTES BLD QL SMEAR: PRESENT
ALBUMIN SERPL BCP-MCNC: 2.4 G/DL (ref 3.5–5)
ALP SERPL-CCNC: 99 U/L (ref 46–116)
ALT SERPL W P-5'-P-CCNC: 12 U/L (ref 12–78)
ANION GAP SERPL CALCULATED.3IONS-SCNC: 4 MMOL/L (ref 4–13)
ANISOCYTOSIS BLD QL SMEAR: PRESENT
AST SERPL W P-5'-P-CCNC: 13 U/L (ref 5–45)
BASOPHILS # BLD AUTO: 0.03 THOUSAND/UL (ref 0–0.1)
BASOPHILS NFR MAR MANUAL: 2 % (ref 0–1)
BILIRUB SERPL-MCNC: 0.26 MG/DL (ref 0.2–1)
BUN SERPL-MCNC: 12 MG/DL (ref 5–25)
CALCIUM ALBUM COR SERPL-MCNC: 9.1 MG/DL (ref 8.3–10.1)
CALCIUM SERPL-MCNC: 7.8 MG/DL (ref 8.3–10.1)
CHLORIDE SERPL-SCNC: 104 MMOL/L (ref 96–108)
CO2 SERPL-SCNC: 27 MMOL/L (ref 21–32)
CREAT SERPL-MCNC: 0.71 MG/DL (ref 0.6–1.3)
EOSINOPHIL # BLD AUTO: 0.09 THOUSAND/UL (ref 0–0.61)
EOSINOPHIL NFR BLD MANUAL: 7 % (ref 0–6)
ERYTHROCYTE [DISTWIDTH] IN BLOOD BY AUTOMATED COUNT: 26.6 % (ref 11.6–15.1)
GFR SERPL CREATININE-BSD FRML MDRD: 90 ML/MIN/1.73SQ M
GLUCOSE SERPL-MCNC: 108 MG/DL (ref 65–140)
HCT VFR BLD AUTO: 24.1 % (ref 36.5–49.3)
HELMET CELLS BLD QL SMEAR: PRESENT
HGB BLD-MCNC: 8 G/DL (ref 12–17)
HYPERCHROMIA BLD QL SMEAR: PRESENT
LYMPHOCYTES # BLD AUTO: 0.12 THOUSAND/UL (ref 0.6–4.47)
LYMPHOCYTES # BLD AUTO: 9 %
MACROCYTES BLD QL AUTO: PRESENT
MCH RBC QN AUTO: 31.9 PG (ref 26.8–34.3)
MCHC RBC AUTO-ENTMCNC: 33.2 G/DL (ref 31.4–37.4)
MCV RBC AUTO: 96 FL (ref 82–98)
MONOCYTES # BLD AUTO: 0.1 THOUSAND/UL (ref 0–1.22)
MONOCYTES NFR BLD AUTO: 8 % (ref 4–12)
NEUTS BAND NFR BLD MANUAL: 18 % (ref 0–8)
NEUTS SEG # BLD: 0.96 THOUSAND/UL (ref 1.81–6.82)
NEUTS SEG NFR BLD AUTO: 55 %
NRBC BLD AUTO-RTO: 0 /100 WBCS
PLATELET # BLD AUTO: 54 THOUSANDS/UL (ref 149–390)
PLATELET BLD QL SMEAR: ABNORMAL
POIKILOCYTOSIS BLD QL SMEAR: PRESENT
POTASSIUM SERPL-SCNC: 3.6 MMOL/L (ref 3.5–5.3)
PROT SERPL-MCNC: 5.9 G/DL (ref 6.4–8.4)
RBC # BLD AUTO: 2.51 MILLION/UL (ref 3.88–5.62)
RBC MORPH BLD: ABNORMAL
SCHISTOCYTES BLD QL SMEAR: PRESENT
SODIUM SERPL-SCNC: 135 MMOL/L (ref 135–147)
TOTAL CELLS COUNTED SPEC: 100
VARIANT LYMPHS # BLD AUTO: 1 % (ref 0–0)
WBC # BLD AUTO: 1.31 THOUSAND/UL (ref 4.31–10.16)

## 2022-09-06 PROCEDURE — 77014 CHG CT GUIDANCE PLACEMENT RAD THERAPY FIELDS: CPT | Performed by: RADIOLOGY

## 2022-09-06 PROCEDURE — 77386 HB NTSTY MODUL RAD TX DLVR CPLX: CPT | Performed by: RADIOLOGY

## 2022-09-06 PROCEDURE — 80053 COMPREHEN METABOLIC PANEL: CPT | Performed by: INTERNAL MEDICINE

## 2022-09-06 PROCEDURE — 77336 RADIATION PHYSICS CONSULT: CPT | Performed by: INTERNAL MEDICINE

## 2022-09-06 PROCEDURE — 85007 BL SMEAR W/DIFF WBC COUNT: CPT | Performed by: INTERNAL MEDICINE

## 2022-09-06 PROCEDURE — 96372 THER/PROPH/DIAG INJ SC/IM: CPT

## 2022-09-06 PROCEDURE — 85027 COMPLETE CBC AUTOMATED: CPT | Performed by: INTERNAL MEDICINE

## 2022-09-06 RX ORDER — CYANOCOBALAMIN 1000 UG/ML
1000 INJECTION, SOLUTION INTRAMUSCULAR; SUBCUTANEOUS ONCE
Status: CANCELLED | OUTPATIENT
Start: 2022-09-13

## 2022-09-06 RX ORDER — CYANOCOBALAMIN 1000 UG/ML
1000 INJECTION, SOLUTION INTRAMUSCULAR; SUBCUTANEOUS ONCE
Status: COMPLETED | OUTPATIENT
Start: 2022-09-06 | End: 2022-09-06

## 2022-09-06 RX ADMIN — CYANOCOBALAMIN 1000 MCG: 1000 INJECTION, SOLUTION INTRAMUSCULAR; SUBCUTANEOUS at 09:19

## 2022-09-06 NOTE — PROGRESS NOTES
Patients port accessed and central labs drawn without incident  Port flushed and locked with NSS and de-accessed  Patient received B12 injection in R arm without incident  AVS given to patient

## 2022-09-07 ENCOUNTER — APPOINTMENT (OUTPATIENT)
Dept: RADIATION ONCOLOGY | Facility: HOSPITAL | Age: 77
End: 2022-09-07
Payer: MEDICARE

## 2022-09-07 ENCOUNTER — DOCUMENTATION (OUTPATIENT)
Dept: RADIATION ONCOLOGY | Facility: HOSPITAL | Age: 77
End: 2022-09-07

## 2022-09-07 ENCOUNTER — HOSPITAL ENCOUNTER (OUTPATIENT)
Dept: INFUSION CENTER | Facility: HOSPITAL | Age: 77
Discharge: HOME | DRG: 314 | End: 2022-09-07
Attending: INTERNAL MEDICINE
Payer: MEDICARE

## 2022-09-07 ENCOUNTER — TELEPHONE (OUTPATIENT)
Dept: NUTRITION | Facility: CLINIC | Age: 77
End: 2022-09-07

## 2022-09-07 ENCOUNTER — TELEPHONE (OUTPATIENT)
Dept: HEMATOLOGY ONCOLOGY | Facility: CLINIC | Age: 77
End: 2022-09-07

## 2022-09-07 DIAGNOSIS — C15.9 ESOPHAGEAL ADENOCARCINOMA (HCC): Primary | ICD-10-CM

## 2022-09-07 PROCEDURE — 77014 CHG CT GUIDANCE PLACEMENT RAD THERAPY FIELDS: CPT | Performed by: RADIOLOGY

## 2022-09-07 PROCEDURE — 77386 HB NTSTY MODUL RAD TX DLVR CPLX: CPT | Performed by: RADIOLOGY

## 2022-09-07 PROCEDURE — 77427 RADIATION TX MANAGEMENT X5: CPT | Performed by: INTERNAL MEDICINE

## 2022-09-07 PROCEDURE — 96372 THER/PROPH/DIAG INJ SC/IM: CPT

## 2022-09-07 RX ADMIN — TBO-FILGRASTIM 480 MCG: 480 INJECTION, SOLUTION SUBCUTANEOUS at 10:16

## 2022-09-07 NOTE — TELEPHONE ENCOUNTER
Received notification by Jacobo Purvis RN on 9/7/22 that pt has triggered for oncology nutrition care (reason for referral: wt loss)  Jorge L Chase today to establish care  No answer, and no ability to leave voice message  Will attempt to contact Brenda Hunter again tomorrow

## 2022-09-07 NOTE — PROGRESS NOTES
1420-Pt needs lab draw on 9 8 22 prior to RT tx  Call to [facility]  Pt to be sent to infusion Center upon arrival for RT tx tomorrow  Pt comes to facility w/[facility] txport  Stat lab draw, pt to wait for lab results and RT txmt plan for 9 8 22  Pt was seen by Dr John Cheung today  Abn  Labs noted 9 2 and 9 6 22  Neupogen injection 9 7 22  Call to pt-unable to leave voice message on the machine  Pt's brother lists the same phone number

## 2022-09-07 NOTE — PROGRESS NOTES
Patient received Granix injection without incident  AVS printed and given prior to discharge  Patient left in stable condition

## 2022-09-07 NOTE — TELEPHONE ENCOUNTER
Patient's chemotherapy will be held today due to low blood counts  Patient will receive neupogen shot today instead  Patient to be at INF at 1000  Wisam Moseley RN notified

## 2022-09-08 ENCOUNTER — HOSPITAL ENCOUNTER (INPATIENT)
Facility: HOSPITAL | Age: 77
LOS: 8 days | Discharge: HOME | DRG: 314 | End: 2022-09-16
Attending: EMERGENCY MEDICINE | Admitting: INTERNAL MEDICINE
Payer: MEDICARE

## 2022-09-08 ENCOUNTER — APPOINTMENT (OUTPATIENT)
Dept: RADIOLOGY | Facility: HOSPITAL | Age: 77
DRG: 314 | End: 2022-09-08
Payer: MEDICARE

## 2022-09-08 ENCOUNTER — HOSPITAL ENCOUNTER (OUTPATIENT)
Dept: INFUSION CENTER | Facility: HOSPITAL | Age: 77
Discharge: HOME | DRG: 314 | End: 2022-09-08
Payer: MEDICARE

## 2022-09-08 ENCOUNTER — APPOINTMENT (OUTPATIENT)
Dept: RADIATION ONCOLOGY | Facility: HOSPITAL | Age: 77
End: 2022-09-08
Attending: INTERNAL MEDICINE
Payer: MEDICARE

## 2022-09-08 VITALS
SYSTOLIC BLOOD PRESSURE: 85 MMHG | RESPIRATION RATE: 16 BRPM | TEMPERATURE: 97.9 F | DIASTOLIC BLOOD PRESSURE: 54 MMHG | HEART RATE: 102 BPM

## 2022-09-08 DIAGNOSIS — T85.528D MIGRATION OF ESOPHAGEAL STENT, SUBSEQUENT ENCOUNTER: Primary | ICD-10-CM

## 2022-09-08 DIAGNOSIS — D61.818 PANCYTOPENIA (HCC): ICD-10-CM

## 2022-09-08 DIAGNOSIS — I95.9 HYPOTENSION: ICD-10-CM

## 2022-09-08 DIAGNOSIS — T85.528A MIGRATION OF ESOPHAGEAL STENT, INITIAL ENCOUNTER: ICD-10-CM

## 2022-09-08 DIAGNOSIS — C15.9 ESOPHAGEAL ADENOCARCINOMA (HCC): ICD-10-CM

## 2022-09-08 DIAGNOSIS — C15.9 ESOPHAGEAL CANCER (HCC): ICD-10-CM

## 2022-09-08 DIAGNOSIS — E87.6 HYPOKALEMIA: ICD-10-CM

## 2022-09-08 DIAGNOSIS — C15.9 MALIGNANT NEOPLASM OF ESOPHAGUS, UNSPECIFIED LOCATION (HCC): ICD-10-CM

## 2022-09-08 DIAGNOSIS — Z95.828 PORT-A-CATH IN PLACE: Primary | ICD-10-CM

## 2022-09-08 DIAGNOSIS — E86.0 DEHYDRATION: ICD-10-CM

## 2022-09-08 PROBLEM — K21.9 GERD (GASTROESOPHAGEAL REFLUX DISEASE): Status: ACTIVE | Noted: 2022-09-08

## 2022-09-08 PROBLEM — R53.1 WEAKNESS: Status: ACTIVE | Noted: 2022-09-08

## 2022-09-08 PROBLEM — R42 DIZZINESS: Status: ACTIVE | Noted: 2022-09-08

## 2022-09-08 PROBLEM — R53.83 FATIGUE: Status: ACTIVE | Noted: 2022-09-08

## 2022-09-08 PROBLEM — D64.9 ANEMIA: Status: ACTIVE | Noted: 2022-09-08

## 2022-09-08 LAB
2HR DELTA HS TROPONIN: -8 NG/L
ALBUMIN SERPL BCP-MCNC: 2.3 G/DL (ref 3.5–5)
ALP SERPL-CCNC: 95 U/L (ref 46–116)
ALT SERPL W P-5'-P-CCNC: 12 U/L (ref 12–78)
ANION GAP SERPL CALCULATED.3IONS-SCNC: 4 MMOL/L (ref 4–13)
AST SERPL W P-5'-P-CCNC: 14 U/L (ref 5–45)
BASOPHILS # BLD AUTO: 0.04 THOUSANDS/ΜL (ref 0–0.1)
BASOPHILS NFR BLD AUTO: 1 % (ref 0–1)
BILIRUB SERPL-MCNC: 0.33 MG/DL (ref 0.2–1)
BILIRUB UR QL STRIP: NEGATIVE
BUN SERPL-MCNC: 12 MG/DL (ref 5–25)
CALCIUM ALBUM COR SERPL-MCNC: 9 MG/DL (ref 8.3–10.1)
CALCIUM SERPL-MCNC: 7.6 MG/DL (ref 8.3–10.1)
CARDIAC TROPONIN I PNL SERPL HS: 16 NG/L
CARDIAC TROPONIN I PNL SERPL HS: 8 NG/L
CHLORIDE SERPL-SCNC: 103 MMOL/L (ref 96–108)
CLARITY UR: CLEAR
CO2 SERPL-SCNC: 26 MMOL/L (ref 21–32)
COLOR UR: YELLOW
CREAT SERPL-MCNC: 0.75 MG/DL (ref 0.6–1.3)
EOSINOPHIL # BLD AUTO: 0.29 THOUSAND/ΜL (ref 0–0.61)
EOSINOPHIL NFR BLD AUTO: 7 % (ref 0–6)
ERYTHROCYTE [DISTWIDTH] IN BLOOD BY AUTOMATED COUNT: 26.8 % (ref 11.6–15.1)
FLUAV RNA RESP QL NAA+PROBE: NEGATIVE
FLUBV RNA RESP QL NAA+PROBE: NEGATIVE
GFR SERPL CREATININE-BSD FRML MDRD: 88 ML/MIN/1.73SQ M
GLUCOSE SERPL-MCNC: 113 MG/DL (ref 65–140)
GLUCOSE SERPL-MCNC: 117 MG/DL (ref 65–140)
GLUCOSE UR STRIP-MCNC: NEGATIVE MG/DL
HCT VFR BLD AUTO: 23.7 % (ref 36.5–49.3)
HGB BLD-MCNC: 7.8 G/DL (ref 12–17)
HGB UR QL STRIP.AUTO: NEGATIVE
IMM GRANULOCYTES # BLD AUTO: 0.06 THOUSAND/UL (ref 0–0.2)
IMM GRANULOCYTES NFR BLD AUTO: 1 % (ref 0–2)
KETONES UR STRIP-MCNC: NEGATIVE MG/DL
LACTATE SERPL-SCNC: 1.1 MMOL/L (ref 0.5–2)
LEUKOCYTE ESTERASE UR QL STRIP: NEGATIVE
LYMPHOCYTES # BLD AUTO: 0.16 THOUSANDS/ΜL (ref 0.6–4.47)
LYMPHOCYTES NFR BLD AUTO: 4 % (ref 14–44)
MCH RBC QN AUTO: 32 PG (ref 26.8–34.3)
MCHC RBC AUTO-ENTMCNC: 32.9 G/DL (ref 31.4–37.4)
MCV RBC AUTO: 97 FL (ref 82–98)
MONOCYTES # BLD AUTO: 0.31 THOUSAND/ΜL (ref 0.17–1.22)
MONOCYTES NFR BLD AUTO: 7 % (ref 4–12)
NEUTROPHILS # BLD AUTO: 3.5 THOUSANDS/ΜL (ref 1.85–7.62)
NEUTS SEG NFR BLD AUTO: 80 % (ref 43–75)
NITRITE UR QL STRIP: NEGATIVE
NRBC BLD AUTO-RTO: 0 /100 WBCS
PH UR STRIP.AUTO: 6.5 [PH] (ref 4.5–8)
PLATELET # BLD AUTO: 44 THOUSANDS/UL (ref 149–390)
POTASSIUM SERPL-SCNC: 3.3 MMOL/L (ref 3.5–5.3)
PROT SERPL-MCNC: 6 G/DL (ref 6.4–8.4)
PROT UR STRIP-MCNC: NEGATIVE MG/DL
RBC # BLD AUTO: 2.44 MILLION/UL (ref 3.88–5.62)
RSV RNA RESP QL NAA+PROBE: NEGATIVE
SARS-COV-2 RNA RESP QL NAA+PROBE: NEGATIVE
SODIUM SERPL-SCNC: 133 MMOL/L (ref 135–147)
SP GR UR STRIP.AUTO: 1.02 (ref 1–1.03)
TSH SERPL DL<=0.05 MIU/L-ACNC: 0.81 UIU/ML (ref 0.45–4.5)
UROBILINOGEN UR QL STRIP.AUTO: 0.2 E.U./DL
WBC # BLD AUTO: 4.36 THOUSAND/UL (ref 4.31–10.16)

## 2022-09-08 PROCEDURE — 36415 COLL VENOUS BLD VENIPUNCTURE: CPT | Performed by: EMERGENCY MEDICINE

## 2022-09-08 PROCEDURE — 96367 TX/PROPH/DG ADDL SEQ IV INF: CPT

## 2022-09-08 PROCEDURE — 96366 THER/PROPH/DIAG IV INF ADDON: CPT

## 2022-09-08 PROCEDURE — 96365 THER/PROPH/DIAG IV INF INIT: CPT

## 2022-09-08 PROCEDURE — 84484 ASSAY OF TROPONIN QUANT: CPT | Performed by: EMERGENCY MEDICINE

## 2022-09-08 PROCEDURE — 71046 X-RAY EXAM CHEST 2 VIEWS: CPT

## 2022-09-08 PROCEDURE — 80053 COMPREHEN METABOLIC PANEL: CPT | Performed by: RADIOLOGY

## 2022-09-08 PROCEDURE — 81003 URINALYSIS AUTO W/O SCOPE: CPT

## 2022-09-08 PROCEDURE — 99285 EMERGENCY DEPT VISIT HI MDM: CPT

## 2022-09-08 PROCEDURE — 0241U HB NFCT DS VIR RESP RNA 4 TRGT: CPT

## 2022-09-08 PROCEDURE — 85025 COMPLETE CBC W/AUTO DIFF WBC: CPT | Performed by: RADIOLOGY

## 2022-09-08 PROCEDURE — 84443 ASSAY THYROID STIM HORMONE: CPT | Performed by: EMERGENCY MEDICINE

## 2022-09-08 PROCEDURE — 82948 REAGENT STRIP/BLOOD GLUCOSE: CPT

## 2022-09-08 PROCEDURE — 99284 EMERGENCY DEPT VISIT MOD MDM: CPT | Performed by: EMERGENCY MEDICINE

## 2022-09-08 PROCEDURE — 83605 ASSAY OF LACTIC ACID: CPT | Performed by: EMERGENCY MEDICINE

## 2022-09-08 PROCEDURE — 87040 BLOOD CULTURE FOR BACTERIA: CPT | Performed by: EMERGENCY MEDICINE

## 2022-09-08 PROCEDURE — 96368 THER/DIAG CONCURRENT INF: CPT

## 2022-09-08 RX ORDER — SODIUM CHLORIDE, SODIUM GLUCONATE, SODIUM ACETATE, POTASSIUM CHLORIDE, MAGNESIUM CHLORIDE, SODIUM PHOSPHATE, DIBASIC, AND POTASSIUM PHOSPHATE .53; .5; .37; .037; .03; .012; .00082 G/100ML; G/100ML; G/100ML; G/100ML; G/100ML; G/100ML; G/100ML
1000 INJECTION, SOLUTION INTRAVENOUS ONCE
Status: COMPLETED | OUTPATIENT
Start: 2022-09-08 | End: 2022-09-08

## 2022-09-08 RX ORDER — ATORVASTATIN CALCIUM 80 MG/1
80 TABLET, FILM COATED ORAL
Status: DISCONTINUED | OUTPATIENT
Start: 2022-09-08 | End: 2022-09-16 | Stop reason: HOSPADM

## 2022-09-08 RX ORDER — ONDANSETRON 2 MG/ML
4 INJECTION INTRAMUSCULAR; INTRAVENOUS EVERY 6 HOURS PRN
Status: DISCONTINUED | OUTPATIENT
Start: 2022-09-08 | End: 2022-09-16 | Stop reason: HOSPADM

## 2022-09-08 RX ORDER — HEPARIN SODIUM 5000 [USP'U]/ML
5000 INJECTION, SOLUTION INTRAVENOUS; SUBCUTANEOUS EVERY 8 HOURS SCHEDULED
Status: DISCONTINUED | OUTPATIENT
Start: 2022-09-08 | End: 2022-09-08

## 2022-09-08 RX ORDER — ASPIRIN 81 MG/1
81 TABLET ORAL DAILY
Refills: 3 | Status: DISCONTINUED | OUTPATIENT
Start: 2022-09-09 | End: 2022-09-16 | Stop reason: HOSPADM

## 2022-09-08 RX ORDER — ACETAMINOPHEN 325 MG/1
650 TABLET ORAL EVERY 6 HOURS PRN
Status: DISCONTINUED | OUTPATIENT
Start: 2022-09-08 | End: 2022-09-16 | Stop reason: HOSPADM

## 2022-09-08 RX ORDER — POLYETHYLENE GLYCOL 3350 17 G/17G
17 POWDER, FOR SOLUTION ORAL DAILY
Status: DISCONTINUED | OUTPATIENT
Start: 2022-09-08 | End: 2022-09-10

## 2022-09-08 RX ORDER — PHENOBARBITAL 64.8 MG/1
64.8 TABLET ORAL 2 TIMES DAILY
Status: DISCONTINUED | OUTPATIENT
Start: 2022-09-08 | End: 2022-09-16 | Stop reason: HOSPADM

## 2022-09-08 RX ORDER — SENNOSIDES 8.6 MG
1 TABLET ORAL
Status: DISCONTINUED | OUTPATIENT
Start: 2022-09-08 | End: 2022-09-16 | Stop reason: HOSPADM

## 2022-09-08 RX ORDER — ENOXAPARIN SODIUM 100 MG/ML
40 INJECTION SUBCUTANEOUS DAILY
Status: CANCELLED | OUTPATIENT
Start: 2022-09-08

## 2022-09-08 RX ORDER — PANTOPRAZOLE SODIUM 40 MG/1
40 TABLET, DELAYED RELEASE ORAL
Status: DISCONTINUED | OUTPATIENT
Start: 2022-09-08 | End: 2022-09-16 | Stop reason: HOSPADM

## 2022-09-08 RX ORDER — SODIUM CHLORIDE 9 MG/ML
100 INJECTION, SOLUTION INTRAVENOUS CONTINUOUS
Status: DISCONTINUED | OUTPATIENT
Start: 2022-09-08 | End: 2022-09-14

## 2022-09-08 RX ORDER — POTASSIUM CHLORIDE 14.9 MG/ML
20 INJECTION INTRAVENOUS ONCE
Status: COMPLETED | OUTPATIENT
Start: 2022-09-08 | End: 2022-09-08

## 2022-09-08 RX ADMIN — SODIUM CHLORIDE, SODIUM GLUCONATE, SODIUM ACETATE, POTASSIUM CHLORIDE, MAGNESIUM CHLORIDE, SODIUM PHOSPHATE, DIBASIC, AND POTASSIUM PHOSPHATE 1000 ML: .53; .5; .37; .037; .03; .012; .00082 INJECTION, SOLUTION INTRAVENOUS at 12:28

## 2022-09-08 RX ADMIN — PHENOBARBITAL 64.8 MG: 64.8 TABLET ORAL at 21:00

## 2022-09-08 RX ADMIN — SODIUM CHLORIDE 75 ML/HR: 0.9 INJECTION, SOLUTION INTRAVENOUS at 18:41

## 2022-09-08 RX ADMIN — POTASSIUM CHLORIDE 20 MEQ: 14.9 INJECTION, SOLUTION INTRAVENOUS at 10:43

## 2022-09-08 RX ADMIN — ATORVASTATIN CALCIUM 80 MG: 80 TABLET, FILM COATED ORAL at 21:00

## 2022-09-08 RX ADMIN — SODIUM CHLORIDE, SODIUM GLUCONATE, SODIUM ACETATE, POTASSIUM CHLORIDE, MAGNESIUM CHLORIDE, SODIUM PHOSPHATE, DIBASIC, AND POTASSIUM PHOSPHATE 1000 ML: .53; .5; .37; .037; .03; .012; .00082 INJECTION, SOLUTION INTRAVENOUS at 12:10

## 2022-09-08 RX ADMIN — SODIUM CHLORIDE 1000 ML: 0.9 INJECTION, SOLUTION INTRAVENOUS at 16:49

## 2022-09-08 RX ADMIN — CEFTRIAXONE 1000 MG: 2 INJECTION, POWDER, FOR SOLUTION INTRAMUSCULAR; INTRAVENOUS at 12:22

## 2022-09-08 RX ADMIN — SODIUM CHLORIDE, SODIUM GLUCONATE, SODIUM ACETATE, POTASSIUM CHLORIDE, MAGNESIUM CHLORIDE, SODIUM PHOSPHATE, DIBASIC, AND POTASSIUM PHOSPHATE 1000 ML: .53; .5; .37; .037; .03; .012; .00082 INJECTION, SOLUTION INTRAVENOUS at 12:05

## 2022-09-08 RX ADMIN — SODIUM CHLORIDE 1000 ML: 0.9 INJECTION, SOLUTION INTRAVENOUS at 10:18

## 2022-09-08 NOTE — ASSESSMENT & PLAN NOTE
Normocytic anemia  Iron panel done 08/2022 with iron sat 50, TIBC 184, Fe 92, Ferritin 88  Baseline hemoglobin of around 12 from 07/2021  Gradually downtrending since then  Follows with heme Onc, has a documented history of folic acid and K30 deficiency for which she is receiving IM B12 supplementation and was prescribed folate p o  Supplementation which he is not taking  His overall anemia possibly combined microcytic and macrocytic anemia secondary to gradual blood loss from his esophageal cancer and FA/B12 deficiency  No signs of acute blood loss at this time  Anemia is most likely contributing to the patient's chief concerns of fatigue and dizziness  Hemoglobin on admission 7 8 from 8 7 two weeks ago  Last B12 supplementation 09/06/2022  Latest hemoglobin, 6 7 -- given 1 unit of irradiated leukoreduced RBCs  9/12 Hb 7 5 the patient continues to poor being fatigued  9/13 Hb 8 2 post 1 packed red blood cells and patient reports improvement of fatigue symptoms  Iron studies reflect pattern of anemia of chronic disease  Folate levels low - 2  B12 wnl    Plan  · p o   Iron supplementation upon discharge  · Folic acid supplementation  outpatient follow-up with heme Onc

## 2022-09-08 NOTE — ASSESSMENT & PLAN NOTE
Patient has a ventral hernia initially diagnosed 2005  He reportedly had followed up with surgery regarding this and was offered surgery but he refused  He reports intermittent constipation that he treats with MiraLax at home but denies obstipation      Plan:  · Continue MiraLax  · Expectant management at this time

## 2022-09-08 NOTE — ASSESSMENT & PLAN NOTE
Patient with a chief concern of subacute worsening fatigue in setting of esophageal adenocarcinoma, recent chemo, and continued radiation therapy  Patient anemic, TSH low normal  Likely multifactorial expect his anemia may be the largest contributing factor         Greatly improved post 2 packed RBC    Plan:  Continue to monitor hemoglobin

## 2022-09-08 NOTE — H&P
INTERNAL MEDICINE RESIDENCY ADMISSION H&P     Name: Kirk Salcido   Age & Sex: 67 y o  male   MRN: [id number]  Unit/Bed#: ED 08   Encounter: [id number]  Primary Care Provider: Tristin Crow MD    Code Status: Prior  Admission Status: INPATIENT   Disposition: Patient requires Level 2 Step Down     Admit to team: SOD Team A    ASSESSMENT/PLAN     Principal Problem:    Hypotension  Active Problems:    Chronic combined systolic and diastolic CHF (congestive heart failure) (HCC)    Paroxysmal atrial fibrillation (HCC)    Esophageal cancer ([facility] )    Pancytopenia ([facility] )    Fatigue    Anemia    Dizziness    Coronary artery disease of native artery of native heart with stable angina pectoris (HCC)    GERD (gastroesophageal reflux disease)    Seizure disorder ([facility] )    HLD (hyperlipidemia)    Allergic reaction to contrast media    Ventral hernia with obstruction and without gangrene    Thrombocytopenia ([facility] )      * Hypotension  Assessment & Plan  Upon presentation patient hypotensive in the 41Y to 80 systolic  This was in the setting of a recent increase in his WBC count from 1 3 on 9/6 to 4 3 on this admission in the setting of her recent granix infusion 9/7  There was a concern for sepsis this admission and the patient received a total of 3 L crystalloid in the ED with slight improvement of his blood pressure  Lactic acid 1 1  Upon chart review, the patient was recently seen in the outpatient setting 9/1 and was noted to be hypotensive to the 90B to 94I systolic as well, with improvement when the blood pressure was taken on the right arm  Patient does not technically meet sepsis criteria however the increase in WBC count from baseline does not seem to be explained only from his recent Granix infusion  Can not rule out sepsis at this time      Plan:  · Blood cultures ordered, pending  · UA with no signs of infection  · Chest XR like clear with no signs of pneumonia, no widened mediastinum, no chest pain or back pain  · Patient was started on ceftriaxone in the ED, will continue at this time with q24 dosing but consider discontinuing the patient continues be hemodynamically stable  · If patient does become hypotensive, recommend repeating blood pressures on both arms  · Patient is down a total of 4 L crystalloid since admission and has a diet in place, will hold off on further infusions at this time  · Strict I/Os and retention protocol          Pancytopenia [facility])  Assessment & Plan  Patient with history of anemia, leukopenia, and thrombocytopenia in the setting of adenocarcinoma  Values on admission, hemoglobin of 7 8, WBCs of 4 36, platelets of 44, trending down over the past few months  Likely secondary to recent chemo  No signs of acute bleeding at this time but can not rule out a slow upper GI bleed in the setting of his esophageal adenocarcinoma and radiation  Plan:  · Transfuse hemoglobin for less than 7  · Transfuse platelets for less than 30 in the setting of likely mild upper GI bleed  · Patient currently on Granix for leukopenia  · Consider heme Onc consult    Esophageal cancer [facility])  Assessment & Plan  Patient with stage II B esophageal cancer, 1st discovered in 05/2022 to via EUS in verified by biopsy  No metastatic disease per  PET-CT scan performed on 06/2022  Has undergone carboplatin pack with Taxol chemotherapy as well as radiotherapy  Followed by rere Onc as an outpatient  Presenting today from his radiation oncology appointments and did not receive his scheduled radiation therapy today  Plan:  · Continue outpatient follow-up    Paroxysmal atrial fibrillation [facility])  Assessment & Plan  Documented Hx of paroxysmal atrial fibrillation, Julissa Vasc of 5  Previously on warfarin for left apical thrombus, however has since been discontinued by his cardiologist   Last seen by outpatient Cardiology 06/2022   Currently normal sinus in the ED      Plan:  · Patient not currently on any home beta-blocker regiment  · Avoiding beta-blockers in the setting of hypotension  · F/u outpatient      Chronic combined systolic and diastolic CHF (congestive heart failure) (HCC)  Assessment & Plan  Wt Readings from Last 3 Encounters:   09/01/22 84 8 kg (187 lb)   08/30/22 84 8 kg (187 lb)   08/29/22 84 1 kg (185 lb 6 5 oz)     Documented history of chronic combined systolic and diastolic heart failure  Last echo performed 03/15/2022 showed LVEF 50% with G1 DD, apical akinesis, T BMP with mild regurg  Plan:  · Patient takes no diuretics or beta-blockers at baseline  · Patient appears dry/euvolemic on exam today  · Chest x-ray not suggestive of fluid overload  · Continue to monitor with strict I&Os and daily weights  · Outpatient follow-up        Dizziness  Assessment & Plan  Patient describes a vertiginous "dizziness" that comes on when he stands up quickly  Denies any blacking out, presyncope or syncope  He is able to avoid the dizziness feeling if he gets up slowly  No exacerbating symptoms  Likely multifactorial, secondary to his hypotension and anemia  Plan:  · See anemia plan  · See hypotension plan    Anemia  Assessment & Plan  Normocytic anemia  Iron panel done 08/2022 with iron sat 50, TIBC 184, Fe 92, Ferritin 88  Baseline hemoglobin of around 12 from 07/2021  Gradually downtrending since then  Follows with heme Onc, has a documented history of folic acid and B69 deficiency for which she is receiving IM B12 supplementation and was prescribed folate p o  Supplementation which he is not taking  His overall anemia possibly combined microcytic and macrocytic anemia secondary to gradual blood loss from his esophageal cancer and FA/B12 deficiency  No signs of acute blood loss at this time  Anemia is most likely contributing to the patient's chief concerns of fatigue and dizziness        Plan:  · Hemoglobin on admission 7 8 from 8 7 two weeks ago  · Will hold off iron infusion in the setting of possible infection  · Transfuse for hemoglobin less than 7  · Consider p o  Iron supplementation upon discharge  · Consider reinitiating folic acid supplementation  · Last B12 supplementation 09/06/2022  · Continue outpatient follow-up with heme Onc        Fatigue  Assessment & Plan  Patient with a chief concern of subacute worsening fatigue in setting of esophageal adenocarcinoma, recent chemo, and continued radiation therapy  Patient anemic, TSH low normal  Likely multifactorial expect his anemia may be the largest contributing factor  Plan:  · See plan for anemia          Coronary artery disease of native artery of native heart with stable angina pectoris [facility])  Assessment & Plan  Patient with a history of CAD status post AGUSTIN x4, has follow-up with cardiology as an outpatient  Most recently completed 6 months of dual antiplatelet therapy  Currently on baby aspirin and atorvastatin daily  No chest pain  Troponins negative  Plan  · Continue aspirin, statin for secondary prevention      GERD (gastroesophageal reflux disease)  Assessment & Plan  Patient reports a history of GERD, likely the etiology behind his cough  Was prescribed pantoprazole 40 mg b i d  But reports not taking this medication  Plan:  · Will start him on pantoprazole 40 mg daily here    Seizure disorder [facility])  Assessment & Plan  Patient with reported history of seizures diagnosed roughly 40-50 years ago  Has been taking it b i d  Phenobarbital for this chronically  No seizure activity in the last 40 years  Plan:  · Continue phenobarbital    HLD (hyperlipidemia)  Assessment & Plan  Continue statin    Allergic reaction to contrast media  Assessment & Plan  Of note, patient has documented reaction to contrast media  Thrombocytopenia ([facility] )  Assessment & Plan  Platelets of 44 on admission, gradually trending down from 140s 1 year ago      Plan:  · Holding DVT prophylaxis due to thrombocytopenia  · No signs of severe bleeding, hold off on platelet transfusion at this point  · Recommend transfusion this patient for platelets less than 30 in the setting of possible slow upper GI bleed  · Repeat CBC in the a m  · Continue to monitor for hemodynamic stability and for any signs of bleeding    Ventral hernia with obstruction and without gangrene  Assessment & Plan  Patient has a ventral hernia initially diagnosed 2005  He reportedly had followed up with surgery regarding this and was offered surgery but he refused  He reports intermittent constipation that he treats with MiraLax at home but denies obstipation  Plan:  · Continue MiraLax  · Expectant management at this time      VTE Pharmacologic Prophylaxis: Sequential compression device (Venodyne) , pharmacologic prophylaxis held in the setting of thrombocytopenia less than 50k  VTE Mechanical Prophylaxis: sequential compression device    CHIEF COMPLAINT     Chief Complaint   Patient presents with    Hypotension     Pt sent over from radiation d/t to low blood pressure  Pt c/o fatigue, nausea, and abdominal pain       HISTORY OF PRESENT ILLNESS     Patient is a 65yo male PMH of hypertension, hyperlipidemia, CAD complicated by MIs status post AGUSTIN x4, NYHA class 1 combined systolic and diastolic heart failure PYMT22%, AAA status post repair, questionable history of seizures diagnosed 50 years ago on chronic phenobarbital with no seizure activity in over 40 years, history of stageIIB  esophageal cancer on a chemo regimen of carboplatin/paclitaxel and concurrent radiotherapy, history of leukopenia status post Granix therapy 9/7/22  Pt states that as of last night (9/7/22), pt felt extremely weak and fatigued, "I really don't know", attributes to possible dehydration  C/o cough, dizziness, abd pain currently  States he was not able to eat much last night   Went to sleep early in the evening and coughed throughout the night, bringing up copious amounts of saliva, clear in color w/ no blood or mucus   Endorses nausea and dry heaving and coughing which led to eventual abd pain that pt rates 5/10 currently, but as high as 9-10/10 on pain scale, states it is constant  "kicked in the stomach" in reference to abd pain  No trauma to the area, no inciting event  Currently feels like he has "hunger pains", says he has not eaten anything since yesterday  Upon further hx, pt had AAA repaired surgically, discovered at age 71  Has hx of 4 MI's, s/p 4 stents placed  Last one was either last year or the year prior, as per pt  Has stent in lower esophagus to help with swallowing  Dx w/ esophageal cancer in May 2022 after endoscopy w/ bx in May 2022, received chemo 2x, last treatment unknown  Started receiving radiation therapy 2nd week of August, treatments 5x/wk  Received last rad  tx 9/6, was supposed to receive tx today, but due to Sx, did not receive it  Upon presentation in the ED, vitals 98 0 F, HR 87, RR 17, BP 79/53, SpO2 98% on RA  Orthostatics were negative  Labs notable for sodium 133, potassium 3 3, WBC 4 3, absolute neutrophil count 3 50, hemoglobin 7 8, platelets 44  Initial lactic acid 1 1, initial HS troponin 16  CXR with bibasilar scarring/atelectasis, no consolidation or pulmonary edema, no pneumothorax or pleural effusion  Previous echo from 03/15/2022 with LVEF 50% with G1 DD, akinesis of the apical inferior , apical lateral, apical septal, and apical walls  TV and PV with mild regurg  There was a concern for sepsis  Blood cultures were drawn  Patient received 30 cc/kg of crystalloid and was started on ceftriaxone  Per conversation with patient he is a level 3 DNR DNI  He will be admitted for step-down level 2 for continued workup and monitoring of his hypotension  REVIEW OF SYSTEMS     Review of Systems   Constitutional: Positive for appetite change, fatigue and unexpected weight change  Negative for chills and fever          Lack of appetite and food intake contributing to weight loss, feels "weak as a cat"   HENT: Positive for drooling and trouble swallowing  Pt has stent in lower esophagus due to dysphagia, drooling saliva from coughing since last night   Eyes: Negative for visual disturbance  Respiratory: Positive for cough  Negative for shortness of breath and wheezing  Cough up copious amounts of clear saliva  No blood or mucus  Cardiovascular: Negative for chest pain  Gastrointestinal: Positive for abdominal pain and constipation  Negative for blood in stool, diarrhea, nausea and vomiting  Abd pain, pointing to site of hernia  /10 at its worst  5/10 currently  Last BM 2 days ago  Attributes constipation to lack of appetite and food intake  No blood in stool  Genitourinary: Positive for difficulty urinating  Negative for dysuria and hematuria  Neg  Burning on urination, neg  Hematuria  Endorses only "a little bit comes out" when urinating, but that is not new for pt  Musculoskeletal: Negative for myalgias  Neurological: Positive for dizziness  Negative for headaches  Feels dizzy when getting up too quickly  Neg  Acute hearing changes or acute loss of hearing  OBJECTIVE     Vitals:    22 1245 22 1300 22 1315 22 1330   BP: 110/56 93/56 93/62 102/54   BP Location:       Pulse: 76 58 58 76   Resp: 20 20 20 16   Temp:       SpO2: 96% 95% 96% 97%   Height:          Temperature:   Temp (24hrs), Av °F (36 7 °C), Min:97 9 °F (36 6 °C), Max:98 °F (36 7 °C)    Temperature: 98 °F (36 7 °C)  Intake & Output:  I/O        07 07 07 07 07 07    I V    2000    Total Intake   2000    Urine   500    Total Output   500    Net   +1500               Weights:   IBW (Ideal Body Weight): 77 6 kg    Body mass index is 25 36 kg/m²  Weight (last 2 days)     None        Physical Exam  Constitutional:       General: He is not in acute distress       Appearance: He is not ill-appearing or toxic-appearing  HENT:      Head: Normocephalic and atraumatic  Right Ear: External ear normal       Left Ear: External ear normal    Eyes:      Extraocular Movements: Extraocular movements intact  Pupils: Pupils are equal, round, and reactive to light  Comments: Conjunctival pallor   Cardiovascular:      Rate and Rhythm: Normal rate and regular rhythm  Pulses: Normal pulses  Heart sounds: Normal heart sounds  No murmur heard  No friction rub  No gallop  Comments: Heart sounds distant, difficult to ausc  Pulmonary:      Effort: Pulmonary effort is normal  No respiratory distress  Breath sounds: Normal breath sounds  No stridor  No wheezing, rhonchi or rales  Comments: Bibasilar crackles or atelectatic changes in BL bases  Abdominal:      General: Bowel sounds are normal  There is no distension  Palpations: Abdomen is soft  There is no mass  Tenderness: There is no abdominal tenderness  There is no guarding or rebound  Hernia: A hernia (impressive hernia located in central abdomen, distorts abd image  + BS in area of hernia, likely containing bowel  Pt points to hernia when describing location of pain) is present  Musculoskeletal:         General: No swelling or deformity  Right lower leg: No edema  Left lower leg: No edema  Comments: Trace edema to ankle BL, no pain to palpation   Skin:     Coloration: Skin is pale  Skin is not jaundiced  Findings: No bruising, erythema, lesion or rash  Comments: Birthmark on posterior neck   Neurological:      General: No focal deficit present  Mental Status: He is alert and oriented to person, place, and time  Mental status is at baseline     Psychiatric:         Mood and Affect: Mood normal        PAST MEDICAL HISTORY     Past Medical History:   Diagnosis Date    Cardiac disease     [facility]     Es[facility]     Hernia of abdominal cavity     Myocardial infarction [facility])     last assessed 14, past, Pt had MI s/p AGUSTIN placed in , refuses to take any other medications for his cardiac disease, only will take seizure med  Understands possible complications from this decision    Seizure [facility])      PAST SURGICAL HISTORY     Past Surgical History:   Procedure Laterality Date    ABDOMINAL AORTIC ANEURYSM REPAIR      for dialation or occulsion    CATARACT EXTRACTION      IR PORT PLACEMENT  7/15/2022     SOCIAL & FAMILY HISTORY     Social History     Substance and Sexual Activity   Alcohol Use Not Currently     Social History     Substance and Sexual Activity   Drug Use Never     Social History     Tobacco Use   Smoking Status Former Smoker    Quit date: 2005    Years since quittin 2   Smokeless Tobacco Never Used     Family History   Problem Relation Age of Onset    Hypertension Father     Kidney disease Brother      LABORATORY DATA     Labs: I have personally reviewed pertinent reports  Results from last 7 days   Lab Units 22  0841 2218 22  0856   WBC Thousand/uL 4 36 1 31* 1 51*   HEMOGLOBIN g/dL 7 8* 8 0* 8 5*   HEMATOCRIT % 23 7* 24 1* 25 4*   PLATELETS Thousands/uL 44* 54* 61*   NEUTROS PCT % 80*  --   --    MONOS PCT % 7  --   --    MONO PCT %  --  8 2*      Results from last 7 days   Lab Units 22  0841 22  0918 22  0856   POTASSIUM mmol/L 3 3* 3 6 3 9   CHLORIDE mmol/L 103 104 106   CO2 mmol/L 26 27 26   BUN mg/dL 12 12 14   CREATININE mg/dL 0 75 0 71 0 70   CALCIUM mg/dL 7 6* 7 8* 7 5*   ALK PHOS U/L 95 99 104   ALT U/L 12 12 11*   AST U/L 14 13 14                  Results from last 7 days   Lab Units 22  1217   LACTIC ACID mmol/L 1 1         Micro:  Lab Results   Component Value Date    BLOODCX No Growth After 5 Days  2017    BLOODCX No Growth After 5 Days   2017     IMAGING & DIAGNOSTIC TESTS     Imaging: I have personally reviewed pertinent reports  XR chest 2 views    Result Date: 9/8/2022  Impression: 1  Bibasilar scarring/atelectasis  2   Esophageal stent extending from the lower esophagus and terminating within the proximal stomach  Workstation performed: [id number]     EKG, Pathology, and Other Studies: I have personally reviewed pertinent reports  ALLERGIES     Allergies   Allergen Reactions    Iodinated Diagnostic Agents Rash     Pt's skin get very red and he gets hot and chills    Clopidogrel Rash     MEDICATIONS PRIOR TO ARRIVAL     Prior to Admission medications    Medication Sig Start Date End Date Taking? Authorizing Provider   aspirin (Aspirin Low Dose) 81 mg EC tablet Take 1 tablet (81 mg total) by mouth daily 1/24/22   [facility] [place],    atorvastatin (LIPITOR) 80 mg tablet Take 1 tablet (80 mg total) by mouth daily with dinner 3/11/22   Valentin Holloway MD   folic acid ( Folic Acid) 1 mg tablet Take 1 tablet (1 mg total) by mouth daily 8/30/22   Oksana Quinonez DO   lidocaine-prilocaine (EMLA) cream Apply to port 30 to 60 min prior to use  Patient not taking: No sig reported 7/26/22   Oksana Quinonez DO   ondansetron (Zofran ODT) 8 mg disintegrating tablet Take 1 tablet (8 mg total) by mouth every 8 (eight) hours as needed for nausea or vomiting  Patient not taking: No sig reported 7/26/22   Oksana Quinonez DO   pantoprazole (PROTONIX) 40 mg tablet TAKE 1 TABLET BY MOUTH 2 TIMES A DAY BEFORE MEALS    Patient not taking: No sig reported 5/31/22   Ambrocio Araya DO   PHENobarbital 64 8 mg tablet TAKE 1 TABLET (64 8 MG TOTAL) BY MOUTH 2 (TWO) TIMES A DAY 6/6/22 12/3/22  Cookie Austin DO   prochlorperazine (COMPAZINE) 10 mg tablet Take 1 tablet (10 mg total) by mouth every 6 (six) hours as needed for nausea or vomiting  Patient not taking: No sig reported 7/26/22   Oksana Quinonez DO     MEDICATIONS ADMINISTERED IN LAST 24 HOURS     Medication Administration - last 24 hours from 09/07/2022 1400 to 09/08/2022 1400       Date/Time Order Dose Route Action Action by     09/08/2022 1100 sodium chloride 0 9 % bolus 1,000 mL 0 mL Intravenous Chon Tang RN     05/80/7530 1018 sodium chloride 0 9 % bolus 1,000 mL 1,000 mL Intravenous New Bag Franki Tang RN     14/32/1350 1252 potassium chloride 20 mEq IVPB (premix) 0 mEq Intravenous Chon Tang RN     22/48/2977 1043 potassium chloride 20 mEq IVPB (premix) 20 mEq Intravenous New Bag Franki Tang RN     65/80/1785 1255 ceftriaxone (ROCEPHIN) 1 g/50 mL in dextrose IVPB 0 mg Intravenous Chon Lora RN     30/36/4119 1222 ceftriaxone (ROCEPHIN) 1 g/50 mL in dextrose IVPB 1,000 mg Intravenous New Rachel Lora RN     51/20/2042 1308 multi-electrolyte (PLASMALYTE-A/ISOLYTE-S PH 7 4) IV solution 1,000 mL 0 mL Intravenous Chon Tang RN     48/28/0839 1205 multi-electrolyte (PLASMALYTE-A/ISOLYTE-S PH 7 4) IV solution 1,000 mL 1,000 mL Intravenous New Rachel Lora RN     00/11/4915 1300 multi-electrolyte (PLASMALYTE-A/ISOLYTE-S PH 7 4) IV solution 1,000 mL 0 mL Intravenous Chon Tang RN     70/66/9773 1210 multi-electrolyte (PLASMALYTE-A/ISOLYTE-S PH 7 4) IV solution 1,000 mL 1,000 mL Intravenous New Bag Franki Tang RN     41/98/3561 1228 multi-electrolyte (PLASMALYTE-A/ISOLYTE-S PH 7 4) IV solution 1,000 mL 1,000 mL Intravenous New Bag Cassy Lora RN        CURRENT MEDICATIONS     sodium chloride, 75 mL/hr, Last Rate: 75 mL/hr (09/08/22 1841)          Admission Time  I spent 1 hour admitting the patient  This involved direct patient contact where I performed a full history and physical, reviewing previous records, and reviewing laboratory and other diagnostic studies  Portions of the record may have been created with voice recognition software  Occasional wrong word or "sound a like" substitutions may have occurred due to the inherent limitations of voice recognition software    Read the chart carefully and recognize, using context, where substitutions have occurred     ==  Zoe Barkley MD  [address]  Internal Medicine Residency PGY-2

## 2022-09-08 NOTE — ASSESSMENT & PLAN NOTE
Patient with reported history of seizures diagnosed roughly 40-50 years ago  Has been taking it b i d  Phenobarbital for this chronically  No seizure activity in the last 40 years      Plan:  · Continue phenobarbital

## 2022-09-08 NOTE — ED PROVIDER NOTES
History  Chief Complaint   Patient presents with    Hypotension     Pt sent over from radiation d/t to low blood pressure  Pt c/o fatigue, nausea, and abdominal pain      Patient was sent from a right Onc secondary to hypotension  While patient was at radiation he did have a a CBC as well as a CMP obtained  Patient states that he is undergoing radiation for esophageal cancer as well as receiving chemotherapy for which it last chemotherapy was approximately 1 month ago  Since then he states that he does not been feeling well with weakness generalized  Patient also states he has a chronic cough  He reports that he has stent placed in his esophagus after being diagnosed with esophageal cancer due to difficulty swallowing and dysphagia  Since then he is unable to lay flat when he sleeps and has to sleep upright  He does state when he lays flat he has coughing events  He will also reports having events where occasionally he feels a significant amount of secretions in his mouth and start pouring out of his mouth and nose  He does have what is described as chills when he has these events  He reports having 2 of these events the past 24 hours  After having the chills the drooling and symptoms of overall weakness do subside  Patient denies having fevers  He denies having any nausea or vomiting  He denies having abdominal pain or chest pain  He admits to having a chronic cough but denies shortness of breath  He did recently receive a shot for low WBC count  Prior to Admission Medications   Prescriptions Last Dose Informant Patient Reported? Taking?    PHENobarbital 64 8 mg tablet  Self No No   Sig: TAKE 1 TABLET (64 8 MG TOTAL) BY MOUTH 2 (TWO) TIMES A DAY   aspirin (Aspirin Low Dose) 81 mg EC tablet  Self No No   Sig: Take 1 tablet (81 mg total) by mouth daily   atorvastatin (LIPITOR) 80 mg tablet  Self No No   Sig: Take 1 tablet (80 mg total) by mouth daily with dinner   folic acid ( Folic Acid) 1 mg tablet   No No   Sig: Take 1 tablet (1 mg total) by mouth daily   lidocaine-prilocaine (EMLA) cream   No No   Sig: Apply to port 30 to 60 min prior to use   Patient not taking: No sig reported   ondansetron (Zofran ODT) 8 mg disintegrating tablet   No No   Sig: Take 1 tablet (8 mg total) by mouth every 8 (eight) hours as needed for nausea or vomiting   Patient not taking: No sig reported   pantoprazole (PROTONIX) 40 mg tablet Not Taking at Unknown time  No No   Sig: TAKE 1 TABLET BY MOUTH 2 TIMES A DAY BEFORE MEALS  Patient not taking: No sig reported   prochlorperazine (COMPAZINE) 10 mg tablet   No No   Sig: Take 1 tablet (10 mg total) by mouth every 6 (six) hours as needed for nausea or vomiting   Patient not taking: No sig reported      Facility-Administered Medications: None       Past Medical History:   Diagnosis Date    Cardiac disease     CHF (congestive heart failure) (HCC)     [facility] )     Hernia of abdominal cavity     Myocardial infarction ([facility] )     last assessed 14, past, Pt had MI s/p AGUSTIN placed in , refuses to take any other medications for his cardiac disease, only will take seizure med  Understands possible complications from this decision    Seizure [facility])        Past Surgical History:   Procedure Laterality Date    ABDOMINAL AORTIC ANEURYSM REPAIR      for dialation or occulsion    CATARACT EXTRACTION      IR PORT PLACEMENT  7/15/2022       Family History   Problem Relation Age of Onset    Hypertension Father     Kidney disease Brother      I have reviewed and agree with the history as documented      E-Cigarette/Vaping    E-Cigarette Use Never User      E-Cigarette/Vaping Substances    Nicotine No     THC No     CBD No     Flavoring No     Other No     Unknown No      Social History     Tobacco Use    Smoking status: Former Smoker     Quit date: 2005     Years since quittin 2    Smokeless tobacco: Never Used   Vaping Use    Vaping Use: Never used   Substance Use Topics    Alcohol use: Not Currently    Drug use: Never       Review of Systems   Constitutional: Positive for chills  Negative for fatigue and fever  HENT: Negative for sinus pressure and sore throat  Eyes: Negative for visual disturbance  Respiratory: Positive for cough  Negative for shortness of breath  Cardiovascular: Negative for chest pain, palpitations and leg swelling  Gastrointestinal: Negative for abdominal pain, constipation, diarrhea, nausea and vomiting  Genitourinary: Negative for dysuria  Neurological: Positive for weakness  Negative for dizziness, light-headedness and headaches  Psychiatric/Behavioral: Negative for agitation and confusion  All other systems reviewed and are negative  Physical Exam  Physical Exam  Vitals and nursing note reviewed  Constitutional:       General: He is not in acute distress  Appearance: He is not toxic-appearing or diaphoretic  HENT:      Head: Normocephalic and atraumatic  Mouth/Throat:      Mouth: Mucous membranes are moist    Eyes:      Conjunctiva/sclera: Conjunctivae normal       Pupils: Pupils are equal, round, and reactive to light  Cardiovascular:      Rate and Rhythm: Normal rate and regular rhythm  Heart sounds: Normal heart sounds  No murmur heard  Pulmonary:      Effort: Pulmonary effort is normal  No respiratory distress  Breath sounds: Normal breath sounds  No rales  Abdominal:      General: Bowel sounds are normal       Palpations: Abdomen is soft  Tenderness: There is no guarding or rebound  Comments: Midline abdominal scar with hernia that is easily reducible and nontender   Musculoskeletal:      Cervical back: Normal range of motion and neck supple  Right lower leg: No edema  Left lower leg: No edema  Skin:     General: Skin is warm and dry  Capillary Refill: Capillary refill takes less than 2 seconds  Findings: No erythema or rash  Neurological:      Mental Status: He is alert  Cranial Nerves: No cranial nerve deficit  Psychiatric:         Mood and Affect: Mood normal          Vital Signs  ED Triage Vitals   Temperature Pulse Respirations Blood Pressure SpO2   09/08/22 1019 09/08/22 0903 09/08/22 0903 09/08/22 0903 09/08/22 0903   98 °F (36 7 °C) 87 17 (!) 79/53 98 %      Temp src Heart Rate Source Patient Position - Orthostatic VS BP Location FiO2 (%)   -- 09/08/22 0903 09/08/22 0903 09/08/22 0903 --    Monitor Lying Right arm       Pain Score       09/08/22 0903       3           Vitals:    09/08/22 0930 09/08/22 1005 09/08/22 1007 09/08/22 1008   BP: 101/54 (!) 85/50 99/64 (!) 82/47   Pulse: 78 88 82 98   Patient Position - Orthostatic VS:  Lying - Orthostatic VS Sitting - Orthostatic VS Standing - Orthostatic VS         Visual Acuity      ED Medications  Medications   potassium chloride 20 mEq IVPB (premix) (20 mEq Intravenous New Bag 9/8/22 1043)   sodium chloride 0 9 % bolus 1,000 mL (1,000 mL Intravenous New Bag 9/8/22 1018)       Diagnostic Studies  Results Reviewed     Procedure Component Value Units Date/Time    Blood culture [[id number]] Collected: 09/08/22 1048    Lab Status: In process Specimen: Blood from Hand, Right Updated: 09/08/22 1057    TSH [[id number]]  (Normal) Collected: 09/08/22 0841    Lab Status: Final result Specimen: Blood from Central Venous Line Updated: 09/08/22 1025     TSH 3RD GENERATON 0 815 uIU/mL     Narrative:      Patients undergoing fluorescein dye angiography may retain small amounts of fluorescein in the body for 48-72 hours post procedure  Samples containing fluorescein can produce falsely depressed TSH values  If the patient had this procedure,a specimen should be resubmitted post fluorescein clearance  Blood culture [[id number]] Collected: 09/08/22 1017    Lab Status:  In process Specimen: Blood from Arm, Left Updated: 09/08/22 1024    HS Troponin 0hr (reflex protocol) [[id number]] Collected: 09/08/22 1017    Lab Status: No result Specimen: Blood from Hand, Left                  XR chest 2 views    (Results Pending)              Procedures  Procedures         ED Course  ED Course as of 09/12/22 0738   Thu Sep 08, 2022   1242 Blood pressure improved with IV fluid bolus  CBC and CMP reviewed from Radiology  Urinalysis without finding significant for infection  1242 Lactic acid pending  1321 Chest x-ray reviewed with stent noted in esophagus extending into the stomach  Bilateral basilar infiltrate/atelectasis  Urinalysis not consistent with infection  Lactic acid 1 1  Patient is hemodynamically improved  1327 HS Troponin 0hr (reflex protocol)               Identification of Seniors at [address] [place] [address] Most Recent Value   (ISAR) Identification of Seniors at Risk    Before the illness or injury that brought you to the Emergency, did you need someone to help you on a regular basis? 0 Filed at: 09/08/2022 0904   In the last 24 hours, have you needed more help than usual? 0 Filed at: 09/08/2022 2697   Have you been hospitalized for one or more nights during the past 6 months? 1 Filed at: 09/08/2022 0904   In general, do you see well? 0 Filed at: 09/08/2022 0489   In general, do you have serious problems with your memory? 0 Filed at: 09/08/2022 9540   Do you take more than three different medications every day? 1 Filed at: 09/08/2022 0383   ISAR Score 2 Filed at: 09/08/2022 2508                                    MDM  Number of Diagnoses or Management Options  Diagnosis management comments: Hypotension unclear if secondary to decreased p o  Intake with esophageal stent in patient reports his diet has been different since his stent placement with decreased p o  Intake  Versus possibility of infection  Patient does have a chronic cough and reports he has significant reflux and at risk for aspiration    Plan to check baseline laboratory data along with blood cultures as well as urinalysis  Also check lactic acid  IV fluids to be administered  Disposition  Final diagnoses:   None     ED Disposition     None      Follow-up Information    None         Patient's Medications   Discharge Prescriptions    No medications on file       No discharge procedures on file      PDMP Review       Value Time User    PDMP Reviewed  Yes 11/16/2021  1:20 PM Porfirio Isidro DO          ED Provider  Electronically Signed by           Sanjiv Charles DO  09/12/22 0073

## 2022-09-08 NOTE — ASSESSMENT & PLAN NOTE
Patient with a history of CAD status post AGUSTIN x4, has follow-up with cardiology as an outpatient  Most recently completed 6 months of dual antiplatelet therapy  Currently on baby aspirin and atorvastatin daily  No chest pain  Troponins negative      Plan  · Continue aspirin, statin for secondary prevention

## 2022-09-08 NOTE — ASSESSMENT & PLAN NOTE
Wt Readings from Last 3 Encounters:   09/01/22 84 8 kg (187 lb)   08/30/22 84 8 kg (187 lb)   08/29/22 84 1 kg (185 lb 6 5 oz)     Documented history of chronic combined systolic and diastolic heart failure  Last echo performed 03/15/2022 showed LVEF 50% with G1 DD, apical akinesis, T BMP with mild regurg    Chest x-ray not suggestive of fluid overload  09/14/2022 patient has developed lower extremity edema clear to auscultation of lungs; IV fluid stopped    Plan:  · Will hold diuresis at time  · Continue to monitor with strict I&Os and daily weights  · Outpatient follow-up

## 2022-09-08 NOTE — ASSESSMENT & PLAN NOTE
Patient with stage II B esophageal cancer, 1st discovered in 05/2022 to via EUS in verified by biopsy  No metastatic disease per  PET-CT scan performed on 06/2022  Has undergone carboplatin pack with Taxol chemotherapy as well as radiotherapy  Followed by heme Onc as an outpatient  Presenting today from his radiation oncology appointments and did not receive his scheduled radiation therapy today      CT 9/12 shows migration of esophageal stent in the stomach  As patient is neutropenic, thrombocytopenic, does endorse symptoms of dysphasia or odynophagia GI recommends outpatient follow-up    Plan:  · Continue outpatient follow-up with GI

## 2022-09-08 NOTE — ASSESSMENT & PLAN NOTE
Documented Hx of paroxysmal atrial fibrillation, Julissa Vasc of 5  Previously on warfarin for left apical thrombus, however has since been discontinued by his cardiologist   Last seen by outpatient Cardiology 06/2022   Currently normal sinus in the ED      Plan:  · Patient not currently on any home beta-blocker regiment  · Avoiding beta-blockers in the setting of hypotension  · F/u outpatient

## 2022-09-08 NOTE — TELEPHONE ENCOUNTER
Reviewed pts chart prior to contacting today to discuss nutrition, per chart review pt is in the ED  Will contact again when pt is discharged

## 2022-09-08 NOTE — Clinical Note
Case was discussed with SOD resident and the patient's admission status was agreed to be Admission Status: inpatient status to the service of Dr Kandice Nath

## 2022-09-08 NOTE — ASSESSMENT & PLAN NOTE
Upon presentation patient hypotensive in the 92X to 80 systolic  This was in the setting of a recent increase in his WBC count from 1 3 on 9/6 to 4 3 on this admission in the setting of her recent granix infusion 9/7  There was a concern for sepsis this admission and the patient received a total of 3 L crystalloid in the ED with slight improvement of his blood pressure  Lactic acid 1 1  Upon chart review, the patient was recently seen in the outpatient setting 9/1 and was noted to be hypotensive to the 48L to 79W systolic as well, with improvement when the blood pressure was taken on the right arm  Patient does not technically meet sepsis criteria however the increase in WBC count from baseline does not seem to be explained only from his recent Granix infusion  Can not rule out sepsis at this time      UA and 4 blood cultures with no signs of infection  Chest XR like clear with no signs of pneumonia, no widened mediastinum, no chest pain or back pain  IV fluids stopped as pt developed LE edema  No cause of hypotension was found, but may be likely to cardiac autonomic dysfunction secondary to chemotherapy  Patient was found to have orthostatic hypotension    Plan:  · Midodrine 2 5 t i d   · If patient does become hypotensive, recommend repeating blood pressures on both arms  · Knee-high compression stockings

## 2022-09-08 NOTE — ASSESSMENT & PLAN NOTE
Patient reports a history of GERD, likely the etiology behind his cough  Was prescribed pantoprazole 40 mg b i d  But reports not taking this medication      Plan:  · Will start him on pantoprazole 40 mg daily here

## 2022-09-08 NOTE — ASSESSMENT & PLAN NOTE
Patient describes a vertiginous "dizziness" that comes on when he stands up quickly  Denies any blacking out, presyncope or syncope  He is able to avoid the dizziness feeling if he gets up slowly  No exacerbating symptoms  Likely multifactorial, secondary to his hypotension and anemia      Plan:  · See anemia plan  · See hypotension plan

## 2022-09-08 NOTE — PROGRESS NOTES
Pt arrived on unit for STAT labs for radiation  Pt c/o fatigue,  generalized body pain, and  abd pain w/ nausea  States last night he was unable to sleep and up most of the night with "the dry heaves " BP 85/54,   Port accessed and STAT labs drawn  Spoke to Mechelle RN, agreeable pt should be evaluated in ED  Port dressed w/ CHG dressing, report called to ED RN Berta Chavez  Pt escorted to ED

## 2022-09-08 NOTE — ASSESSMENT & PLAN NOTE
Patient with history of anemia, leukopenia, and thrombocytopenia in the setting of adenocarcinoma  Values on admission, hemoglobin of 7 8, WBCs of 4 36, platelets of 44, trending down over the past few months  Likely secondary to recent chemo  No signs of acute bleeding at this time but can not rule out a slow upper GI bleed in the setting of his esophageal adenocarcinoma and radiation          Plan:  · Continue recommendations per Oncology  · Increase antibiotic coverage for ANC < 500  · Patient currently on Granix for leukopenia

## 2022-09-08 NOTE — ASSESSMENT & PLAN NOTE
Platelets of 44 on admission, gradually trending down from 140s 1 year ago      Plan:  · Holding DVT prophylaxis due to thrombocytopenia  · No signs of severe bleeding, hold off on platelet transfusion at this point  · Continue to monitor for hemodynamic stability and for any signs of bleeding

## 2022-09-09 ENCOUNTER — APPOINTMENT (OUTPATIENT)
Dept: RADIATION ONCOLOGY | Facility: HOSPITAL | Age: 77
End: 2022-09-09
Payer: MEDICARE

## 2022-09-09 LAB
ABO GROUP BLD: NORMAL
ACANTHOCYTES BLD QL SMEAR: PRESENT
ANION GAP SERPL CALCULATED.3IONS-SCNC: 8 MMOL/L (ref 4–13)
ANISOCYTOSIS BLD QL SMEAR: PRESENT
BASOPHILS # BLD MANUAL: 0.11 THOUSAND/UL (ref 0–0.1)
BASOPHILS NFR MAR MANUAL: 4 % (ref 0–1)
BLD GP AB SCN SERPL QL: NEGATIVE
BUN SERPL-MCNC: 9 MG/DL (ref 5–25)
BURR CELLS BLD QL SMEAR: PRESENT
CALCIUM SERPL-MCNC: 7.3 MG/DL (ref 8.3–10.1)
CHLORIDE SERPL-SCNC: 108 MMOL/L (ref 96–108)
CO2 SERPL-SCNC: 23 MMOL/L (ref 21–32)
CREAT SERPL-MCNC: 0.51 MG/DL (ref 0.6–1.3)
EOSINOPHIL # BLD MANUAL: 0.19 THOUSAND/UL (ref 0–0.4)
EOSINOPHIL NFR BLD MANUAL: 7 % (ref 0–6)
ERYTHROCYTE [DISTWIDTH] IN BLOOD BY AUTOMATED COUNT: 27.1 % (ref 11.6–15.1)
GFR SERPL CREATININE-BSD FRML MDRD: 103 ML/MIN/1.73SQ M
GLUCOSE SERPL-MCNC: 101 MG/DL (ref 65–140)
HCT VFR BLD AUTO: 20.7 % (ref 36.5–49.3)
HCT VFR BLD AUTO: 26.1 % (ref 36.5–49.3)
HGB BLD-MCNC: 6.7 G/DL (ref 12–17)
HGB BLD-MCNC: 8.7 G/DL (ref 12–17)
LYMPHOCYTES # BLD AUTO: 0.05 THOUSAND/UL (ref 0.6–4.47)
LYMPHOCYTES # BLD AUTO: 2 % (ref 14–44)
MACROCYTES BLD QL AUTO: PRESENT
MCH RBC QN AUTO: 32.1 PG (ref 26.8–34.3)
MCHC RBC AUTO-ENTMCNC: 32.4 G/DL (ref 31.4–37.4)
MCV RBC AUTO: 99 FL (ref 82–98)
METAMYELOCYTES NFR BLD MANUAL: 1 % (ref 0–1)
MICROCYTES BLD QL AUTO: PRESENT
MONOCYTES # BLD AUTO: 0.05 THOUSAND/UL (ref 0–1.22)
MONOCYTES NFR BLD: 2 % (ref 4–12)
NEUTROPHILS # BLD MANUAL: 2.27 THOUSAND/UL (ref 1.85–7.62)
NEUTS BAND NFR BLD MANUAL: 11 % (ref 0–8)
NEUTS SEG NFR BLD AUTO: 72 % (ref 43–75)
PLATELET # BLD AUTO: 37 THOUSANDS/UL (ref 149–390)
PLATELET BLD QL SMEAR: ABNORMAL
POIKILOCYTOSIS BLD QL SMEAR: PRESENT
POLYCHROMASIA BLD QL SMEAR: PRESENT
POTASSIUM SERPL-SCNC: 3.1 MMOL/L (ref 3.5–5.3)
RBC # BLD AUTO: 2.09 MILLION/UL (ref 3.88–5.62)
RBC MORPH BLD: PRESENT
RH BLD: POSITIVE
SCHISTOCYTES BLD QL SMEAR: PRESENT
SODIUM SERPL-SCNC: 139 MMOL/L (ref 135–147)
SPECIMEN EXPIRATION DATE: NORMAL
VARIANT LYMPHS # BLD AUTO: 1 %
WBC # BLD AUTO: 2.74 THOUSAND/UL (ref 4.31–10.16)

## 2022-09-09 PROCEDURE — 85007 BL SMEAR W/DIFF WBC COUNT: CPT

## 2022-09-09 PROCEDURE — P9040 RBC LEUKOREDUCED IRRADIATED: HCPCS

## 2022-09-09 PROCEDURE — 86923 COMPATIBILITY TEST ELECTRIC: CPT

## 2022-09-09 PROCEDURE — 85027 COMPLETE CBC AUTOMATED: CPT

## 2022-09-09 PROCEDURE — 30233N1 TRANSFUSION OF NONAUTOLOGOUS RED BLOOD CELLS INTO PERIPHERAL VEIN, PERCUTANEOUS APPROACH: ICD-10-PCS | Performed by: HOSPITALIST

## 2022-09-09 PROCEDURE — 85018 HEMOGLOBIN: CPT

## 2022-09-09 PROCEDURE — 86880 COOMBS TEST DIRECT: CPT

## 2022-09-09 PROCEDURE — 85014 HEMATOCRIT: CPT

## 2022-09-09 PROCEDURE — 99222 1ST HOSP IP/OBS MODERATE 55: CPT | Performed by: INTERNAL MEDICINE

## 2022-09-09 PROCEDURE — 86901 BLOOD TYPING SEROLOGIC RH(D): CPT

## 2022-09-09 PROCEDURE — 86900 BLOOD TYPING SEROLOGIC ABO: CPT

## 2022-09-09 PROCEDURE — NC001 PR NO CHARGE: Performed by: INTERNAL MEDICINE

## 2022-09-09 PROCEDURE — 80048 BASIC METABOLIC PNL TOTAL CA: CPT

## 2022-09-09 PROCEDURE — 86850 RBC ANTIBODY SCREEN: CPT

## 2022-09-09 RX ORDER — ACETAMINOPHEN 325 MG/1
975 TABLET ORAL ONCE
Status: COMPLETED | OUTPATIENT
Start: 2022-09-10 | End: 2022-09-10

## 2022-09-09 RX ORDER — DIPHENHYDRAMINE HYDROCHLORIDE 50 MG/ML
25 INJECTION INTRAMUSCULAR; INTRAVENOUS EVERY 6 HOURS PRN
Status: DISCONTINUED | OUTPATIENT
Start: 2022-09-09 | End: 2022-09-10

## 2022-09-09 RX ORDER — MIDODRINE HYDROCHLORIDE 2.5 MG/1
2.5 TABLET ORAL
Status: DISCONTINUED | OUTPATIENT
Start: 2022-09-09 | End: 2022-09-16 | Stop reason: HOSPADM

## 2022-09-09 RX ADMIN — ATORVASTATIN CALCIUM 80 MG: 80 TABLET, FILM COATED ORAL at 17:00

## 2022-09-09 RX ADMIN — ASPIRIN 81 MG: 81 TABLET, COATED ORAL at 08:21

## 2022-09-09 RX ADMIN — CEFTRIAXONE 1000 MG: 1 INJECTION, POWDER, FOR SOLUTION INTRAMUSCULAR; INTRAVENOUS at 12:01

## 2022-09-09 RX ADMIN — PANTOPRAZOLE SODIUM 40 MG: 40 TABLET, DELAYED RELEASE ORAL at 05:24

## 2022-09-09 RX ADMIN — CARBIDOPA AND LEVODOPA 2.5 MG: 50; 200 TABLET, EXTENDED RELEASE ORAL at 17:00

## 2022-09-09 RX ADMIN — DIPHENHYDRAMINE HYDROCHLORIDE 25 MG: 50 INJECTION, SOLUTION INTRAMUSCULAR; INTRAVENOUS at 22:04

## 2022-09-09 RX ADMIN — PHENOBARBITAL 64.8 MG: 64.8 TABLET ORAL at 17:00

## 2022-09-09 RX ADMIN — SODIUM CHLORIDE 100 ML/HR: 0.9 INJECTION, SOLUTION INTRAVENOUS at 20:06

## 2022-09-09 RX ADMIN — ACETAMINOPHEN 650 MG: 325 TABLET ORAL at 17:55

## 2022-09-09 RX ADMIN — PHENOBARBITAL 64.8 MG: 64.8 TABLET ORAL at 08:21

## 2022-09-09 NOTE — PROGRESS NOTES
Pt refusing oxygen overnight with saturations in the mid 80s  Pt educated  Will continue to monitor patients status

## 2022-09-09 NOTE — PLAN OF CARE
Problem: Potential for Falls  Goal: Patient will remain free of falls  Description: INTERVENTIONS:  - Educate patient/family on patient safety including physical limitations  - Instruct patient to call for assistance with activity   - Consult OT/PT to assist with strengthening/mobility   - Keep Call bell within reach  - Keep bed low and locked with side rails adjusted as appropriate  - Keep care items and personal belongings within reach  - Initiate and maintain comfort rounds  - Make Fall Risk Sign visible to staff  - Offer Toileting every  Hours, in advance of need  - Initiate/Maintain alarm  - Obtain necessary fall risk management equipment:  - Apply yellow socks and bracelet for high fall risk patients  - Consider moving patient to room near nurses station  Outcome: Progressing     Problem: INFECTION - ADULT  Goal: Absence or prevention of progression during hospitalization  Description: INTERVENTIONS:  - Assess and monitor for signs and symptoms of infection  - Monitor lab/diagnostic results  - Monitor all insertion sites, i e  indwelling lines, tubes, and drains  - Monitor endotracheal if appropriate and nasal secretions for changes in amount and color  - [place] appropriate cooling/warming therapies per order  - Administer medications as ordered  - Instruct and encourage patient and family to use good hand hygiene technique  - Identify and instruct in appropriate isolation precautions for identified infection/condition  Outcome: Progressing  Goal: Absence of fever/infection during neutropenic period  Description: INTERVENTIONS:  - Monitor WBC    Outcome: Progressing

## 2022-09-09 NOTE — CASE MANAGEMENT
Case Management Discharge Planning Note    Patient name Allen Carry  [address] [facility] [id number]  : 1945 Date 2022       Current Admission Date: 2022  Current Admission Diagnosis:Hypotension   Patient Active Problem List    Diagnosis Date Noted    Fatigue 2022    Anemia 2022    Hypotension 2022    GERD (gastroesophageal reflux disease) 2022    Dizziness 2022    Vitamin B12 deficiency 2022    Port-A-Cath in place 08/10/2022    Thrombocytopenia ([facility] ) 2022    Dysphagia 2022    Pancytopenia ([facility] ) 2022    Acute gastric ulcer 2022    Esophageal cancer ([facility] ) 2022    Abdominal aortic aneurysm, without rupture ([place] [address] ) 02/10/2022    Paroxysmal atrial fibrillation ([facility] ) 02/10/2022    Coronary artery disease of native artery of native heart with stable angina pectoris ([facility] ) 02/10/2022    Ischemic cardiomyopathy 2021    Chronic combined systolic and diastolic CHF (congestive heart failure) ([place] [address] ) 2021    Allergic reaction to contrast media 2021    NSTEMI (non-ST elevated myocardial infarction) ([facility] ) 2021    Abdominal pain 2020    Hernia, incisional 2019    Ventral hernia with obstruction and without gangrene 2019    CAD (coronary artery disease) 2017    HTN (hypertension) 2017    HLD (hyperlipidemia) 2017    [facility] 2017    S/P AAA (abdominal aortic aneurysm) repair 2017    STEMI (ST elevation myocardial infarction) ([facility] ) 2017      LOS (days): 1  Geometric Mean LOS (GMLOS) (days): 4 80  Days to GMLOS:3 9     OBJECTIVE:  Risk of Unplanned Readmission Score: 26 29         Current admission status: Inpatient   Preferred Pharmacy:   [id number] [address]  [address] [place] [address]  Phone: [phone] Fax: [phone] - [place], [place] - [address] [facility] [address] [facility] [address] [facility]  Phone: [phone] Fax: [phone]    Primary Care Provider: Lucas Cooper MD    Primary Insurance: MEDICARE  Secondary Insurance: [id number] [facility] DETAILS:                      Additional Comments: Patient is not medically stable for discharge at this time  Patient currently with hemoglobin of 6 7  Patient was noted to be refusing oxygen last night and O2 sats in the mid 80s  There is possible heme/onc consult to be done while inpatient

## 2022-09-09 NOTE — PLAN OF CARE
Problem: Potential for Falls  Goal: Patient will remain free of falls  Description: INTERVENTIONS:  - Educate patient/family on patient safety including physical limitations  - Instruct patient to call for assistance with activity   - Consult OT/PT to assist with strengthening/mobility   - Keep Call bell within reach  - Keep bed low and locked with side rails adjusted as appropriate  - Keep care items and personal belongings within reach  - Initiate and maintain comfort rounds  - Make Fall Risk Sign visible to staff  - Offer Toileting every  Hours, in advance of need  - Initiate/Maintain alarm  - Obtain necessary fall risk management equipment  - Apply yellow socks and bracelet for high fall risk patients  - Consider moving patient to room near nurses station  Outcome: Progressing     Problem: INFECTION - ADULT  Goal: Absence or prevention of progression during hospitalization  Description: INTERVENTIONS:  - Assess and monitor for signs and symptoms of infection  - Monitor lab/diagnostic results  - Monitor all insertion sites, i e  indwelling lines, tubes, and drains  - Monitor endotracheal if appropriate and nasal secretions for changes in amount and color  - [place] appropriate cooling/warming therapies per order  - Administer medications as ordered  - Instruct and encourage patient and family to use good hand hygiene technique  - Identify and instruct in appropriate isolation precautions for identified infection/condition  Outcome: Progressing  Goal: Absence of fever/infection during neutropenic period  Description: INTERVENTIONS:  - Monitor WBC    Outcome: Progressing

## 2022-09-09 NOTE — RESTORATIVE TECHNICIAN NOTE
Restorative Technician Note      Patient Name: [place]     [facility] Visit Date: 9/9/2022  Note Type: Mobility  Patient Position Upon Consult: Supine  Activity Performed: Ambulated  Assistive Device: Other (Comment) (none)  Patient Position at End of Consult: Bedside chair;  All needs within [facility]

## 2022-09-09 NOTE — CASE MANAGEMENT
Case Management Assessment & Discharge Planning Note    Patient name Janette Dover  Location [address] [facility] [address] [address] [facility] [id number]  : 1945 Date 2022       Current Admission Date: 2022  Current Admission Diagnosis:Hypotension   Patient Active Problem List    Diagnosis Date Noted    Fatigue 2022    Anemia 2022    Hypotension 2022    GERD (gastroesophageal reflux disease) 2022    Dizziness 2022    Vitamin B12 deficiency 2022    Port-A-Cath in place 08/10/2022    Thrombocytopenia ([facility] ) 2022    Dysphagia 2022    Pancytopenia ([facility] ) 2022    Acute gastric ulcer 2022    Esophageal cancer ([facility] ) 2022    Abdominal aortic aneurysm, without rupture ([facility] ) 02/10/2022    Paroxysmal atrial fibrillation ([facility] ) 02/10/2022    Coronary artery disease of native artery of native heart with stable angina pectoris ([facility] ) 02/10/2022    Ischemic cardiomyopathy 2021    Chronic combined systolic and diastolic CHF (congestive heart failure) ([facility] ) 2021    Allergic reaction to contrast media 2021    NSTEMI (non-ST elevated myocardial infarction) ([facility] ) 2021    Abdominal pain 2020    Hernia, incisional 2019    Ventral hernia with obstruction and without gangrene 2019    CAD (coronary artery disease) 2017    HTN (hypertension) 2017    HLD (hyperlipidemia) 2017    [facility] 2017    S/P AAA (abdominal aortic aneurysm) repair 2017    STEMI (ST elevation myocardial infarction) ([facility] ) 2017      LOS (days): 1  Geometric Mean LOS (GMLOS) (days): 4 80  Days to GMLOS:4     OBJECTIVE:    Risk of Unplanned Readmission Score: 26 29       Current admission status: Inpatient     Preferred Pharmacy:   [id number] [address] [facility]  [address]  [address] [place] [address]  Phone: [phone] Fax: [phone] [facility] [address] [place] [address] [address] [facility] [address] [place] [address] [facility] [address] [address]  Phone: [phone] Fax: [phone]    Primary Care Provider: Mary Duran MD    Primary Insurance: MEDICARE  Secondary Insurance: [address]    ASSESSMENT:  Caren 26 Proxies    There are no active Health Care Proxies on file  Readmission Root Cause  30 Day Readmission: No    Patient Information  Admitted from[de-identified] Home  Mental Status: Alert  During Assessment patient was accompanied by: Not accompanied during assessment  Assessment information provided by[de-identified] Patient  Primary Caregiver: Self  Support Systems: Self, Family members  [place] of Residence: [address] [place] [address] do you live in?: [facility] entry access options   Select all that apply : Stairs  Number of steps to enter home : 2  Type of Current Residence: Apartment  Floor Level: 1  Upon entering residence, is there a bedroom on the main floor (no further steps)?: Yes  Upon entering residence, is there a bathroom on the main floor (no further steps)?: Yes  In the last 12 months, was there a time when you were not able to pay the mortgage or rent on time?: No  In the last 12 months, how many places have you lived?: 1  In the last 12 months, was there a time when you did not have a steady place to sleep or slept in a shelter (including now)?: No  Homeless/housing insecurity resource given?: N/A  Living Arrangements: Lives w/ Extended Family    Activities of Daily Living Prior to Admission  Functional Status: Independent  Completes ADLs independently?: Yes  Ambulates independently?: Yes  Does patient use assisted devices?: No  Does patient currently own DME?: Yes  What DME does the patient currently own?: Mel Gutierrez (Per patient, patient has walker from [email or web address], but does not actively use it)  Does patient have a history of Outpatient Therapy (PT/OT)?: No  Does the patient have a history of Short-Term Rehab?: Yes (Per pt- [facility] 2005- operation)  Does patient have a history of HHC?: No  Does patient currently have Kaylynn Chiu?: No       Patient Information Continued  Within the past 12 months, you worried that your food would run out before you got the money to buy more : Never true  Within the past 12 months, the food you bought just didn't last and you didn't have money to get more : Never true  Food insecurity resource given?: N/A  Does patient receive dialysis treatments?: No  Does patient have a history of substance abuse?: No  Does patient have a history of Mental Health Diagnosis?: No       Means of Transportation  Means of Transport to Appts[de-identified] Family transport (Brother- Aurora Patel (emergency contact) provides transportation)  In the past 12 months, has lack of transportation kept you from medical appointments or from getting medications?: No  In the past 12 months, has lack of transportation kept you from meetings, work, or from getting things needed for daily living?: No  Was application for public transport provided?: N/A    DISCHARGE DETAILS:    Discharge planning discussed with[de-identified] Patient  Freedom of Choice: Yes     CM contacted family/caregiver?: No- see comments (Patient was alert and oriented)     Additional Comments: Met with patient at bedside to complete assessment and discharge planning  Patient advised that he has a walker at home, but does not actively use it  No history of HHC  Pt was at [facility] in 2005 for 11 days  Patient advised that Marena Schaumann (brother/ emergency contact) will transport upon d/c

## 2022-09-09 NOTE — PROGRESS NOTES
INTERNAL MEDICINE RESIDENCY PROGRESS NOTE     Name: Thor Hines   Age & Sex: 67 y o  male   MRN: [id number]  Unit/Bed#: [facility] 906-01   Encounter: [id number]  Team: SOD Team A    PATIENT INFORMATION     Name: Thor Hines   Age & Sex: 67 y o  male   MRN: [id number]  Hospital Stay Days: 1    ASSESSMENT/PLAN     Principal Problem:    Hypotension  Active Problems:    HLD (hyperlipidemia)    Seizure disorder ([facility] )    Ventral hernia with obstruction and without gangrene    Chronic combined systolic and diastolic CHF (congestive heart failure) (HCC)    Allergic reaction to contrast media    Paroxysmal atrial fibrillation ([facility] )    Coronary artery disease of native artery of native heart with stable angina pectoris (HCC)    Esophageal cancer (HCC)    Pancytopenia (HCC)    Thrombocytopenia (HCC)    Fatigue    Anemia    GERD (gastroesophageal reflux disease)    Dizziness      Dizziness  Assessment & Plan  Patient describes a vertiginous "dizziness" that comes on when he stands up quickly  Denies any blacking out, presyncope or syncope  He is able to avoid the dizziness feeling if he gets up slowly  No exacerbating symptoms  Likely multifactorial, secondary to his hypotension and anemia  Plan:  · See anemia plan  · See hypotension plan    GERD (gastroesophageal reflux disease)  Assessment & Plan  Patient reports a history of GERD, likely the etiology behind his cough  Was prescribed pantoprazole 40 mg b i d  But reports not taking this medication  Plan:  · Will start him on pantoprazole 40 mg daily here    Anemia  Assessment & Plan  Normocytic anemia  Iron panel done 08/2022 with iron sat 50, TIBC 184, Fe 92, Ferritin 88  Baseline hemoglobin of around 12 from 07/2021  Gradually downtrending since then  Follows with heme Onc, has a documented history of folic acid and I79 deficiency for which she is receiving IM B12 supplementation and was prescribed folate p o  Supplementation which he is not taking    His overall anemia possibly combined microcytic and macrocytic anemia secondary to gradual blood loss from his esophageal cancer and FA/B12 deficiency  No signs of acute blood loss at this time  Anemia is most likely contributing to the patient's chief concerns of fatigue and dizziness  Hemoglobin on admission 7 8 from 8 7 two weeks ago  Plan    · Latest hemoglobin, 6 7 -- given 1 unit of irradiated leukoreduced RBCs  · Follow-up with H&H after transfusion   · Will hold off iron infusion in the setting of possible infection  · Transfuse for hemoglobin less than 7  · Consider p o  Iron supplementation upon discharge  · Consider reinitiating folic acid supplementation  · Last B12 supplementation 09/06/2022  · Consider outpatient follow-up with heme Onc if condition does not improve        Fatigue  Assessment & Plan  Patient with a chief concern of subacute worsening fatigue in setting of esophageal adenocarcinoma, recent chemo, and continued radiation therapy  Patient anemic, TSH low normal  Likely multifactorial expect his anemia may be the largest contributing factor  Plan:  · See plan for anemia          Thrombocytopenia (HCC)  Assessment & Plan  Platelets of 44 on admission, gradually trending down from 140s 1 year ago  Plan:  · Holding DVT prophylaxis due to thrombocytopenia  · No signs of severe bleeding, hold off on platelet transfusion at this point  · Recommend transfusion this patient for platelets less than 30 in the setting of possible slow upper GI bleed  · Repeat CBC in the a m  · Continue to monitor for hemodynamic stability and for any signs of bleeding    Pancytopenia [facility])  Assessment & Plan  Patient with history of anemia, leukopenia, and thrombocytopenia in the setting of adenocarcinoma  Values on admission, hemoglobin of 7 8, WBCs of 4 36, platelets of 44, trending down over the past few months  Likely secondary to recent chemo    No signs of acute bleeding at this time but can not rule out a slow upper GI bleed in the setting of his esophageal adenocarcinoma and radiation  Plan:  · Transfuse hemoglobin for less than 7  · Transfused 9/9 1 unit for hemoglobin 6 7  · Transfuse platelets for less than 30 in the setting of likely mild upper GI bleed  · Patient currently on Granix for leukopenia  · Consider heme Onc consult if patient does not improve    Esophageal cancer [facility])  Assessment & Plan  Patient with stage II B esophageal cancer, 1st discovered in 05/2022 to via EUS in verified by biopsy  No metastatic disease per  PET-CT scan performed on 06/2022  Has undergone carboplatin pack with Taxol chemotherapy as well as radiotherapy  Followed by heme Onc as an outpatient  Presenting today from his radiation oncology appointments and did not receive his scheduled radiation therapy today  Plan:  · Continue outpatient follow-up    Coronary artery disease of native artery of native heart with stable angina pectoris [facility])  Assessment & Plan  Patient with a history of CAD status post AGUSTIN x4, has follow-up with cardiology as an outpatient  Most recently completed 6 months of dual antiplatelet therapy  Currently on baby aspirin and atorvastatin daily  No chest pain  Troponins negative  Plan  · Continue aspirin, statin for secondary prevention      Paroxysmal atrial fibrillation [facility])  Assessment & Plan  Documented Hx of paroxysmal atrial fibrillation, Julissa Vasc of 5  Previously on warfarin for left apical thrombus, however has since been discontinued by his cardiologist   Last seen by outpatient Cardiology 06/2022  Currently normal sinus in the ED      Plan:  · Patient not currently on any home beta-blocker regiment  · Avoiding beta-blockers in the setting of hypotension  · F/u outpatient      Allergic reaction to contrast media  Assessment & Plan  Of note, patient has documented reaction to contrast media      Chronic combined systolic and diastolic CHF (congestive heart failure) [facility])  Assessment & Plan  Wt Readings from Last 3 Encounters:   09/01/22 84 8 kg (187 lb)   08/30/22 84 8 kg (187 lb)   08/29/22 84 1 kg (185 lb 6 5 oz)     Documented history of chronic combined systolic and diastolic heart failure  Last echo performed 03/15/2022 showed LVEF 50% with G1 DD, apical akinesis, T BMP with mild regurg  Plan:  · Patient takes no diuretics or beta-blockers at baseline  · Patient appears dry on exam today  · Chest x-ray not suggestive of fluid overload  · Continue to monitor with strict I&Os and daily weights  · Outpatient follow-up        Ventral hernia with obstruction and without gangrene  Assessment & Plan  Patient has a ventral hernia initially diagnosed 2005  He reportedly had followed up with surgery regarding this and was offered surgery but he refused  He reports intermittent constipation that he treats with MiraLax at home but denies obstipation  Plan:  · Continue MiraLax  · Expectant management at this time    Seizure disorder [facility])  Assessment & Plan  Patient with reported history of seizures diagnosed roughly 40-50 years ago  Has been taking it b i d  Phenobarbital for this chronically  No seizure activity in the last 40 years  Plan:  · Continue phenobarbital    HLD (hyperlipidemia)  Assessment & Plan  Continue statin    * Hypotension  Assessment & Plan  Upon presentation patient hypotensive in the 20H to 80 systolic  This was in the setting of a recent increase in his WBC count from 1 3 on 9/6 to 4 3 on this admission in the setting of her recent granix infusion 9/7  There was a concern for sepsis this admission and the patient received a total of 3 L crystalloid in the ED with slight improvement of his blood pressure  Lactic acid 1 1  Upon chart review, the patient was recently seen in the outpatient setting 9/1 and was noted to be hypotensive to the 65C to 29H systolic as well, with improvement when the blood pressure was taken on the right arm    Patient does not technically meet sepsis criteria however the increase in WBC count from baseline does not seem to be explained only from his recent Granix infusion  Can not rule out sepsis at this time  Plan:  · Blood cultures ordered, pending  · UA with no signs of infection  · Chest XR like clear with no signs of pneumonia, no widened mediastinum, no chest pain or back pain  · Patient was started on ceftriaxone in the ED, will continue at this time with q24 dosing but consider discontinuing the patient continues be hemodynamically stable or if blood cultures negative  · If patient does become hypotensive, recommend repeating blood pressures on both arms  · IVF increased to 100cc/hr  · Track orthostatic BP  · Knee-high compression stockings  · Strict I/Os and retention protocol            Disposition: Continued in-patient treatment     SUBJECTIVE     Patient seen and examined at bedside  Overnight, patient had O2 sats in 80s, but refused oxygen mask and denied any shortness of breath or symptomatic hypoxia  Today patient reports that he is feeling better than yesterday  Patient says he is still fairly fatigued, but improved from yesterday  Patient reports that he had a large bowel movement this morning that completely ameliorated his nausea, abdominal pain and tenderness from yesterday  Patient tolerating PO and ate breakfast  Patient denies any shortness of breath, nausea, vomiting, chills, dizziness, chest-pain        OBJECTIVE     Vitals:    22 1100 22 1101 22 1122 22 1123   BP: (!) 94/49 92/51 97/53 97/53   Pulse: 87 104 86 88   Resp:       Temp:   98 4 °F (36 9 °C) 98 4 °F (36 9 °C)   SpO2: 99% 100% 98% 100%   Height:          Temperature:   Temp (24hrs), Av 8 °F (37 1 °C), Min:98 4 °F (36 9 °C), Max:99 3 °F (37 4 °C)    Temperature: 98 4 °F (36 9 °C)  Intake & Output:  I/O       09/07 0701  09/08 0700 09/08 0701  09/09 0700 09/09 0701  09/10 0700    I V   2000     Total Intake   Urine  1000     Total Output  1000     Net  +1000            Unmeasured Urine Occurrence  1 x     Unmeasured Stool Occurrence  1 x 1 x        Weights:   IBW (Ideal Body Weight): 77 6 kg    Body mass index is 25 36 kg/m²  Weight (last 2 days)     None        Physical Exam  Constitutional:       Appearance: Normal appearance  He is normal weight  Comments: Alert, awake, pale  Port-a-cath present on right sternum    HENT:      Head: Normocephalic and atraumatic  Mouth/Throat:      Mouth: Mucous membranes are moist       Pharynx: Oropharynx is clear  Eyes:      General: No scleral icterus  Extraocular Movements: Extraocular movements intact  Conjunctiva/sclera: Conjunctivae normal       Pupils: Pupils are equal, round, and reactive to light  Neck:      Vascular: No carotid bruit  Cardiovascular:      Rate and Rhythm: Normal rate and regular rhythm  Pulses: Normal pulses  Heart sounds: Normal heart sounds  Pulmonary:      Effort: Pulmonary effort is normal  No respiratory distress  Breath sounds: No stridor  No wheezing or rhonchi  Comments: Some minor crackles heard in lung base bilaterally   Chest:      Chest wall: No tenderness  Abdominal:      General: Abdomen is flat  Musculoskeletal:         General: Normal range of motion  Cervical back: Normal range of motion and neck supple  No tenderness  Right lower leg: Edema present  Left lower leg: Edema present  Comments: Trace edema bilaterally to ankles     Lymphadenopathy:      Cervical: No cervical adenopathy  Skin:     General: Skin is warm  Capillary Refill: Capillary refill takes less than 2 seconds  Coloration: Skin is pale  Skin is not jaundiced  Findings: No bruising, erythema, lesion or rash  Neurological:      General: No focal deficit present  Mental Status: He is alert and oriented to person, place, and time  Mental status is at baseline     Psychiatric: Mood and Affect: Mood normal          Behavior: Behavior normal          Thought Content: Thought content normal          Judgment: Judgment normal        LABORATORY DATA     Labs: I have personally reviewed pertinent reports  Results from last 7 days   Lab Units 09/09/22  0525 09/08/22  0841 09/06/22  0918   WBC Thousand/uL 2 74* 4 36 1 31*   HEMOGLOBIN g/dL 6 7* 7 8* 8 0*   HEMATOCRIT % 20 7* 23 7* 24 1*   PLATELETS Thousands/uL 37* 44* 54*   NEUTROS PCT %  --  80*  --    MONOS PCT %  --  7  --    MONO PCT % 2*  --  8      Results from last 7 days   Lab Units 09/09/22  0525 09/08/22  0841 09/06/22  0918   POTASSIUM mmol/L 3 1* 3 3* 3 6   CHLORIDE mmol/L 108 103 104   CO2 mmol/L 23 26 27   BUN mg/dL 9 12 12   CREATININE mg/dL 0 51* 0 75 0 71   CALCIUM mg/dL 7 3* 7 6* 7 8*   ALK PHOS U/L  --  95 99   ALT U/L  --  12 12   AST U/L  --  14 13                  Results from last 7 days   Lab Units 09/08/22  1217   LACTIC ACID mmol/L 1 1           IMAGING & DIAGNOSTIC TESTING     Radiology Results: I have personally reviewed pertinent reports  XR chest 2 views    Result Date: 9/8/2022  Impression: 1  Bibasilar scarring/atelectasis  2   Esophageal stent extending from the lower esophagus and terminating within the proximal stomach  Workstation performed: [id number]     Other Diagnostic Testing: I have personally reviewed pertinent reports      ACTIVE MEDICATIONS     Current Facility-Administered Medications   Medication Dose Route Frequency    acetaminophen (TYLENOL) tablet 650 mg  650 mg Oral Q6H PRN    aspirin (ECOTRIN LOW STRENGTH) EC tablet 81 mg  81 mg Oral Daily    atorvastatin (LIPITOR) tablet 80 mg  80 mg Oral Daily With Dinner    cefTRIAXone (ROCEPHIN) 1,000 mg in dextrose 5 % 50 mL IVPB  1,000 mg Intravenous Q24H    ondansetron (ZOFRAN) injection 4 mg  4 mg Intravenous Q6H PRN    pantoprazole (PROTONIX) EC tablet 40 mg  40 mg Oral Early Morning    PHENobarbital tablet 64 8 mg  64 8 mg Oral BID    polyethylene glycol (MIRALAX) packet 17 g  17 g Oral Daily    senna (SENOKOT) tablet 8 6 mg  1 tablet Oral HS PRN    sodium chloride 0 9 % infusion  100 mL/hr Intravenous Continuous       VTE Pharmacologic Prophylaxis: Held due do thrombocytopenia   VTE Mechanical Prophylaxis: sequential compression device    Portions of the record may have been created with voice recognition software  Occasional wrong word or "sound a like" substitutions may have occurred due to the inherent limitations of voice recognition software    Read the chart carefully and recognize, using context, where substitutions have occurred   ==  [facility]  MS4

## 2022-09-10 LAB
ABO GROUP BLD BPU: NORMAL
ABO GROUP BLD BPU: NORMAL
ANION GAP SERPL CALCULATED.3IONS-SCNC: 4 MMOL/L (ref 4–13)
ANISOCYTOSIS BLD QL SMEAR: PRESENT
BPU ID: NORMAL
BPU ID: NORMAL
BUN SERPL-MCNC: 9 MG/DL (ref 5–25)
BURR CELLS BLD QL SMEAR: PRESENT
CALCIUM SERPL-MCNC: 7 MG/DL (ref 8.3–10.1)
CHLORIDE SERPL-SCNC: 109 MMOL/L (ref 96–108)
CO2 SERPL-SCNC: 24 MMOL/L (ref 21–32)
CREAT SERPL-MCNC: 0.6 MG/DL (ref 0.6–1.3)
CROSSMATCH: NORMAL
CROSSMATCH: NORMAL
EOSINOPHIL # BLD MANUAL: 0.27 THOUSAND/UL (ref 0–0.4)
EOSINOPHIL NFR BLD MANUAL: 16 % (ref 0–6)
ERYTHROCYTE [DISTWIDTH] IN BLOOD BY AUTOMATED COUNT: 26.4 % (ref 11.6–15.1)
GFR SERPL CREATININE-BSD FRML MDRD: 96 ML/MIN/1.73SQ M
GLUCOSE SERPL-MCNC: 93 MG/DL (ref 65–140)
HCT VFR BLD AUTO: 25.1 % (ref 36.5–49.3)
HGB BLD-MCNC: 8.3 G/DL (ref 12–17)
LYMPHOCYTES # BLD AUTO: 0.03 THOUSAND/UL (ref 0.6–4.47)
LYMPHOCYTES # BLD AUTO: 2 % (ref 14–44)
MCH RBC QN AUTO: 31.8 PG (ref 26.8–34.3)
MCHC RBC AUTO-ENTMCNC: 33.1 G/DL (ref 31.4–37.4)
MCV RBC AUTO: 96 FL (ref 82–98)
METAMYELOCYTES NFR BLD MANUAL: 2 % (ref 0–1)
MONOCYTES # BLD AUTO: 0.03 THOUSAND/UL (ref 0–1.22)
MONOCYTES NFR BLD: 2 % (ref 4–12)
NEUTROPHILS # BLD MANUAL: 1.33 THOUSAND/UL (ref 1.85–7.62)
NEUTS BAND NFR BLD MANUAL: 21 % (ref 0–8)
NEUTS SEG NFR BLD AUTO: 57 % (ref 43–75)
NRBC BLD AUTO-RTO: 0 /100 WBC (ref 0–2)
NRBC BLD AUTO-RTO: 0 /100 WBCS
OVALOCYTES BLD QL SMEAR: PRESENT
PLATELET # BLD AUTO: 33 THOUSANDS/UL (ref 149–390)
PLATELET BLD QL SMEAR: ABNORMAL
POIKILOCYTOSIS BLD QL SMEAR: PRESENT
POTASSIUM SERPL-SCNC: 3.3 MMOL/L (ref 3.5–5.3)
RBC # BLD AUTO: 2.61 MILLION/UL (ref 3.88–5.62)
RBC MORPH BLD: PRESENT
SCHISTOCYTES BLD QL SMEAR: PRESENT
SODIUM SERPL-SCNC: 137 MMOL/L (ref 135–147)
TOTAL CELLS COUNTED SPEC: 100
UNIT DISPENSE STATUS: NORMAL
UNIT DISPENSE STATUS: NORMAL
UNIT PRODUCT CODE: NORMAL
UNIT PRODUCT CODE: NORMAL
UNIT PRODUCT VOLUME: 350 ML
UNIT PRODUCT VOLUME: 350 ML
UNIT RH: NORMAL
UNIT RH: NORMAL
WBC # BLD AUTO: 1.66 THOUSAND/UL (ref 4.31–10.16)

## 2022-09-10 PROCEDURE — 85025 COMPLETE CBC W/AUTO DIFF WBC: CPT

## 2022-09-10 PROCEDURE — 87040 BLOOD CULTURE FOR BACTERIA: CPT | Performed by: STUDENT IN AN ORGANIZED HEALTH CARE EDUCATION/TRAINING PROGRAM

## 2022-09-10 PROCEDURE — 99232 SBSQ HOSP IP/OBS MODERATE 35: CPT | Performed by: INTERNAL MEDICINE

## 2022-09-10 PROCEDURE — 85027 COMPLETE CBC AUTOMATED: CPT

## 2022-09-10 PROCEDURE — 80048 BASIC METABOLIC PNL TOTAL CA: CPT

## 2022-09-10 PROCEDURE — 85007 BL SMEAR W/DIFF WBC COUNT: CPT

## 2022-09-10 RX ORDER — POTASSIUM CHLORIDE 20 MEQ/1
20 TABLET, EXTENDED RELEASE ORAL
Status: CANCELLED | OUTPATIENT
Start: 2022-09-10

## 2022-09-10 RX ORDER — POTASSIUM CHLORIDE 29.8 MG/ML
40 INJECTION INTRAVENOUS ONCE
Status: COMPLETED | OUTPATIENT
Start: 2022-09-10 | End: 2022-09-10

## 2022-09-10 RX ORDER — DIPHENHYDRAMINE HCL 25 MG
25 TABLET ORAL EVERY 6 HOURS PRN
Status: DISCONTINUED | OUTPATIENT
Start: 2022-09-10 | End: 2022-09-16 | Stop reason: HOSPADM

## 2022-09-10 RX ADMIN — SODIUM CHLORIDE 100 ML/HR: 0.9 INJECTION, SOLUTION INTRAVENOUS at 22:58

## 2022-09-10 RX ADMIN — ATORVASTATIN CALCIUM 80 MG: 80 TABLET, FILM COATED ORAL at 17:13

## 2022-09-10 RX ADMIN — PHENOBARBITAL 64.8 MG: 64.8 TABLET ORAL at 08:17

## 2022-09-10 RX ADMIN — POTASSIUM CHLORIDE 40 MEQ: 29.8 INJECTION, SOLUTION INTRAVENOUS at 08:17

## 2022-09-10 RX ADMIN — CARBIDOPA AND LEVODOPA 2.5 MG: 50; 200 TABLET, EXTENDED RELEASE ORAL at 17:13

## 2022-09-10 RX ADMIN — ACETAMINOPHEN 650 MG: 325 TABLET ORAL at 21:34

## 2022-09-10 RX ADMIN — PHENOBARBITAL 64.8 MG: 64.8 TABLET ORAL at 17:13

## 2022-09-10 RX ADMIN — SODIUM CHLORIDE 100 ML/HR: 0.9 INJECTION, SOLUTION INTRAVENOUS at 05:16

## 2022-09-10 RX ADMIN — ACETAMINOPHEN 975 MG: 325 TABLET ORAL at 00:10

## 2022-09-10 RX ADMIN — CARBIDOPA AND LEVODOPA 2.5 MG: 50; 200 TABLET, EXTENDED RELEASE ORAL at 08:17

## 2022-09-10 RX ADMIN — SODIUM CHLORIDE 100 ML/HR: 0.9 INJECTION, SOLUTION INTRAVENOUS at 17:12

## 2022-09-10 RX ADMIN — PANTOPRAZOLE SODIUM 40 MG: 40 TABLET, DELAYED RELEASE ORAL at 05:16

## 2022-09-10 RX ADMIN — ASPIRIN 81 MG: 81 TABLET, COATED ORAL at 08:17

## 2022-09-10 RX ADMIN — CEFTRIAXONE 1000 MG: 1 INJECTION, POWDER, FOR SOLUTION INTRAMUSCULAR; INTRAVENOUS at 11:44

## 2022-09-10 RX ADMIN — CARBIDOPA AND LEVODOPA 2.5 MG: 50; 200 TABLET, EXTENDED RELEASE ORAL at 11:44

## 2022-09-10 NOTE — QUICK NOTE
Paged by nursing staff at 7:45pm hrs that pt was febrile at 102 9F and had a diffuse, erythematous rash  He received 1 unit of pRBCs at 4pm which was completed around 6pm  Nursing states that they began to notice the rash at 6:30 pm  Pt was seen and examine  BP was stable 98/54, not in respiratory distress, and asymptomatic  Given 25mg benadryl, tylenol 975, and ordered transfusion reaction evaluation blood work  Rash improved and currently afebrile

## 2022-09-10 NOTE — PROGRESS NOTES
Dr Kristi Matos made aware patient's temp is 101 4 an hour and 31 min after blood transfusion  Dr Claudia Jose aware rectal temp is 102 9 with flushed faced  Next shift RN made aware

## 2022-09-10 NOTE — PROGRESS NOTES
INTERNAL MEDICINE RESIDENCY PROGRESS NOTE     Name: Sang Quiroz   Age & Sex: 67 y o  male   MRN: [id number]  Unit/Bed#: [facility] 906-01   Encounter: [id number]  Team: SOD Team A    PATIENT INFORMATION     Name: Sang Quiroz   Age & Sex: 67 y o  male   MRN: [id number]  Hospital Stay Days: 2    ASSESSMENT/PLAN     Principal Problem:    Hypotension  Active Problems:    HLD (hyperlipidemia)    Seizure disorder ([facility] [address] )    Ventral hernia with obstruction and without gangrene    Chronic combined systolic and diastolic CHF (congestive heart failure) (HCC)    Allergic reaction to contrast media    [facility]    Coronary artery disease of native artery of native heart with stable angina pectoris (HCC)    Esophageal cancer (HCC)    Pancytopenia (HCC)    Thrombocytopenia (HCC)    Fatigue    Anemia    GERD (gastroesophageal reflux disease)    Dizziness      Dizziness  Assessment & Plan  Patient describes a vertiginous "dizziness" that comes on when he stands up quickly  Denies any blacking out, presyncope or syncope  He is able to avoid the dizziness feeling if he gets up slowly  No exacerbating symptoms  Likely multifactorial, secondary to his hypotension and anemia  Plan:  · See anemia plan  · See hypotension plan    GERD (gastroesophageal reflux disease)  Assessment & Plan  Patient reports a history of GERD, likely the etiology behind his cough  Was prescribed pantoprazole 40 mg b i d  But reports not taking this medication  Plan:  · Will start him on pantoprazole 40 mg daily here    Anemia  Assessment & Plan  Normocytic anemia  Iron panel done 08/2022 with iron sat 50, TIBC 184, Fe 92, Ferritin 88  Baseline hemoglobin of around 12 from 07/2021  Gradually downtrending since then  Follows with heme Onc, has a documented history of folic acid and F73 deficiency for which she is receiving IM B12 supplementation and was prescribed folate p o  Supplementation which he is not taking    His overall anemia possibly combined microcytic and macrocytic anemia secondary to gradual blood loss from his esophageal cancer and FA/B12 deficiency  No signs of acute blood loss at this time  Anemia is most likely contributing to the patient's chief concerns of fatigue and dizziness  Hemoglobin on admission 7 8 from 8 7 two weeks ago  Plan    · Latest hemoglobin, 6 7 -- given 1 unit of irradiated leukoreduced RBCs  · Follow-up with H&H after transfusion   · Will hold off iron infusion in the setting of possible infection  · Transfuse for hemoglobin less than 7  · Consider p o  Iron supplementation upon discharge  · Consider reinitiating folic acid supplementation  · Last B12 supplementation 09/06/2022  · Consider outpatient follow-up with heme Onc if condition does not improve        Fatigue  Assessment & Plan  Patient with a chief concern of subacute worsening fatigue in setting of esophageal adenocarcinoma, recent chemo, and continued radiation therapy  Patient anemic, TSH low normal  Likely multifactorial expect his anemia may be the largest contributing factor  Plan:  · See plan for anemia          Thrombocytopenia (HCC)  Assessment & Plan  Platelets of 44 on admission, gradually trending down from 140s 1 year ago  Plan:  · Holding DVT prophylaxis due to thrombocytopenia  · No signs of severe bleeding, hold off on platelet transfusion at this point  · Recommend transfusion this patient for platelets less than 30 in the setting of possible slow upper GI bleed  · Repeat CBC in the a m  · Continue to monitor for hemodynamic stability and for any signs of bleeding    Pancytopenia [facility])  Assessment & Plan  Patient with history of anemia, leukopenia, and thrombocytopenia in the setting of adenocarcinoma  Values on admission, hemoglobin of 7 8, WBCs of 4 36, platelets of 44, trending down over the past few months  Likely secondary to recent chemo    No signs of acute bleeding at this time but can not rule out a slow upper GI bleed in the setting of his esophageal adenocarcinoma and radiation  Plan:  · Transfuse hemoglobin for less than 7  · Transfused 9/9 1 unit- reaction of febrile, rash-resolved with benadryl and tylenol  · Hemoglobin 8 3 this morning (9/10)  · Transfuse platelets for less than 30 in the setting of likely mild upper GI bleed  · Patient currently on Granix for leukopenia  · Consider heme Onc consult if patient does not improve    Esophageal cancer [facility])  Assessment & Plan  Patient with stage II B esophageal cancer, 1st discovered in 05/2022 to via EUS in verified by biopsy  No metastatic disease per  PET-CT scan performed on 06/2022  Has undergone carboplatin pack with Taxol chemotherapy as well as radiotherapy  Followed by heme Onc as an outpatient  Presenting today from his radiation oncology appointments and did not receive his scheduled radiation therapy today  Plan:  · Continue outpatient follow-up    Coronary artery disease of native artery of native heart with stable angina pectoris [facility])  Assessment & Plan  Patient with a history of CAD status post AGUSTIN x4, has follow-up with cardiology as an outpatient  Most recently completed 6 months of dual antiplatelet therapy  Currently on baby aspirin and atorvastatin daily  No chest pain  Troponins negative  Plan  · Continue aspirin, statin for secondary prevention      Paroxysmal atrial fibrillation [facility])  Assessment & Plan  Documented Hx of paroxysmal atrial fibrillation, Julissa Vasc of 5  Previously on warfarin for left apical thrombus, however has since been discontinued by his cardiologist   Last seen by outpatient Cardiology 06/2022   Currently normal sinus in the ED      Plan:  · Patient not currently on any home beta-blocker regiment  · Avoiding beta-blockers in the setting of hypotension  · F/u outpatient      Allergic reaction to contrast media  Assessment & Plan  Of note, patient has documented reaction to contrast media     Chronic combined systolic and diastolic CHF (congestive heart failure) (HCC)  Assessment & Plan  Wt Readings from Last 3 Encounters:   09/01/22 84 8 kg (187 lb)   08/30/22 84 8 kg (187 lb)   08/29/22 84 1 kg (185 lb 6 5 oz)     Documented history of chronic combined systolic and diastolic heart failure  Last echo performed 03/15/2022 showed LVEF 50% with G1 DD, apical akinesis, T BMP with mild regurg  Plan:  · Patient takes no diuretics or beta-blockers at baseline  · Patient appears dry on exam today  · Chest x-ray not suggestive of fluid overload  · Continue to monitor with strict I&Os and daily weights  · Outpatient follow-up        Ventral hernia with obstruction and without gangrene  Assessment & Plan  Patient has a ventral hernia initially diagnosed 2005  He reportedly had followed up with surgery regarding this and was offered surgery but he refused  He reports intermittent constipation that he treats with MiraLax at home but denies obstipation  Plan:  · Continue MiraLax  · Expectant management at this time    Seizure disorder [facility])  Assessment & Plan  Patient with reported history of seizures diagnosed roughly 40-50 years ago  Has been taking it b i d  Phenobarbital for this chronically  No seizure activity in the last 40 years  Plan:  · Continue phenobarbital    HLD (hyperlipidemia)  Assessment & Plan  Continue statin    * Hypotension  Assessment & Plan  Upon presentation patient hypotensive in the 08Y to 80 systolic  This was in the setting of a recent increase in his WBC count from 1 3 on 9/6 to 4 3 on this admission in the setting of her recent granix infusion 9/7  There was a concern for sepsis this admission and the patient received a total of 3 L crystalloid in the ED with slight improvement of his blood pressure  Lactic acid 1 1       Upon chart review, the patient was recently seen in the outpatient setting 9/1 and was noted to be hypotensive to the 47R to 47W systolic as well, with improvement when the blood pressure was taken on the right arm  Patient does not technically meet sepsis criteria however the increase in WBC count from baseline does not seem to be explained only from his recent Granix infusion  Can not rule out sepsis at this time  Plan:  · Blood cultures ordered, pending  · UA with no signs of infection  · Chest XR like clear with no signs of pneumonia, no widened mediastinum, no chest pain or back pain  · Patient was started on ceftriaxone in the ED, will continue at this time with q24 dosing but consider discontinuing the patient continues be hemodynamically stable or if blood cultures negative  · If patient does become hypotensive, recommend repeating blood pressures on both arms  · IVF increased to 100cc/hr  · Track orthostatic BP  · Knee-high compression stockings  · Strict I/Os and retention protocol              Disposition: Transfused 1 unit yesterday, continue to follow counts     SUBJECTIVE     Patient seen and examined  Patient did have a rash and fever 1 5 hours after transfusion  Benadryl and tylenol were given and the issue was resolved  Patient states he is still going to the bathroom consistently and has no complaints  OBJECTIVE     Vitals:    22 2206 22 2317 22 2317 09/10/22 0249   BP:  100/51 100/51 96/51   Pulse: 101 95 95 87   Resp:    18   Temp: (!) 102 °F (38 9 °C) (!) 97 1 °F (36 2 °C) 100 4 °F (38 °C) 98 6 °F (37 °C)   TempSrc:       SpO2: 92% 100% 100% 95%   Height:          Temperature:   Temp (24hrs), Av 3 °F (37 4 °C), Min:97 1 °F (36 2 °C), Max:102 °F (38 9 °C)    Temperature: 98 6 °F (37 °C)  Intake & Output:  I/O        07 0700  0701  09/10 0700    P  O   100    I V  2000 1000    Blood  354 2    Total Intake  1454 2    Urine 1000     Total Output 1000     Net +1000 +1454 2          Unmeasured Urine Occurrence 1 x 5 x    Unmeasured Stool Occurrence 1 x 6 x        Weights:   IBW (Ideal Body Weight): 77 6 kg    Body mass index is 25 36 kg/m²  Weight (last 2 days)     None        Physical Exam  Vitals and nursing note reviewed  Constitutional:       Appearance: He is well-developed  HENT:      Head: Normocephalic and atraumatic  Eyes:      Conjunctiva/sclera: Conjunctivae normal    Cardiovascular:      Rate and Rhythm: Normal rate and regular rhythm  Heart sounds: No murmur heard  Pulmonary:      Effort: Pulmonary effort is normal  No respiratory distress  Breath sounds: Normal breath sounds  Abdominal:      Palpations: Abdomen is soft  Tenderness: There is no abdominal tenderness  Musculoskeletal:      Cervical back: Neck supple  Skin:     General: Skin is warm and dry  Neurological:      Mental Status: He is alert and oriented to person, place, and time  LABORATORY DATA     Labs: I have personally reviewed pertinent reports  Results from last 7 days   Lab Units 09/10/22  0520 09/09/22  2005 09/09/22  0525 09/08/22  0841 09/06/22  0918   WBC Thousand/uL 1 66*  --  2 74* 4 36 1 31*   HEMOGLOBIN g/dL 8 3* 8 7* 6 7* 7 8* 8 0*   HEMATOCRIT % 25 1* 26 1* 20 7* 23 7* 24 1*   PLATELETS Thousands/uL 33*  --  37* 44* 54*   NEUTROS PCT %  --   --   --  80*  --    MONOS PCT %  --   --   --  7  --    MONO PCT %  --   --  2*  --  8      Results from last 7 days   Lab Units 09/10/22  0520 09/09/22  0525 09/08/22  0841 09/06/22  0918   POTASSIUM mmol/L 3 3* 3 1* 3 3* 3 6   CHLORIDE mmol/L 109* 108 103 104   CO2 mmol/L 24 23 26 27   BUN mg/dL 9 9 12 12   CREATININE mg/dL 0 60 0 51* 0 75 0 71   CALCIUM mg/dL 7 0* 7 3* 7 6* 7 8*   ALK PHOS U/L  --   --  95 99   ALT U/L  --   --  12 12   AST U/L  --   --  14 13                  Results from last 7 days   Lab Units 09/08/22  1217   LACTIC ACID mmol/L 1 1           IMAGING & DIAGNOSTIC TESTING     Radiology Results: I have personally reviewed pertinent reports  XR chest 2 views    Result Date: 9/8/2022  Impression: 1    Bibasilar scarring/atelectasis  2   Esophageal stent extending from the lower esophagus and terminating within the proximal stomach  Workstation performed: [id number]     Other Diagnostic Testing: I have personally reviewed pertinent reports  ACTIVE MEDICATIONS     Current Facility-Administered Medications   Medication Dose Route Frequency    acetaminophen (TYLENOL) tablet 650 mg  650 mg Oral Q6H PRN    aspirin (ECOTRIN LOW STRENGTH) EC tablet 81 mg  81 mg Oral Daily    atorvastatin (LIPITOR) tablet 80 mg  80 mg Oral Daily With Dinner    cefTRIAXone (ROCEPHIN) 1,000 mg in dextrose 5 % 50 mL IVPB  1,000 mg Intravenous Q24H    diphenhydrAMINE (BENADRYL) injection 25 mg  25 mg Intravenous Q6H PRN    midodrine (PROAMATINE) tablet 2 5 mg  2 5 mg Oral TID AC    ondansetron (ZOFRAN) injection 4 mg  4 mg Intravenous Q6H PRN    pantoprazole (PROTONIX) EC tablet 40 mg  40 mg Oral Early Morning    PHENobarbital tablet 64 8 mg  64 8 mg Oral BID    polyethylene glycol (MIRALAX) packet 17 g  17 g Oral Daily    senna (SENOKOT) tablet 8 6 mg  1 tablet Oral HS PRN    sodium chloride 0 9 % infusion  100 mL/hr Intravenous Continuous       VTE Pharmacologic Prophylaxis: Contraindicated due to pancytopenia  VTE Mechanical Prophylaxis: sequential compression device    Portions of the record may have been created with voice recognition software  Occasional wrong word or "sound a like" substitutions may have occurred due to the inherent limitations of voice recognition software    Read the chart carefully and recognize, using context, where substitutions have occurred   ==  Lucas Beard MD  [address] [facility]  Internal Medicine Residency PGY-1

## 2022-09-10 NOTE — PLAN OF CARE
Problem: INFECTION - ADULT  Goal: Absence or prevention of progression during hospitalization  Description: INTERVENTIONS:  - Assess and monitor for signs and symptoms of infection  - Monitor lab/diagnostic results  - Monitor all insertion sites, i e  indwelling lines, tubes, and drains  - Monitor endotracheal if appropriate and nasal secretions for changes in amount and color  - [place] appropriate cooling/warming therapies per order  - Administer medications as ordered  - Instruct and encourage patient and family to use good hand hygiene technique  - Identify and instruct in appropriate isolation precautions for identified infection/condition  Outcome: Progressing  Goal: Absence of fever/infection during neutropenic period  Description: INTERVENTIONS:  - Monitor WBC    Outcome: Progressing

## 2022-09-10 NOTE — PLAN OF CARE
Problem: Potential for Falls  Goal: Patient will remain free of falls  Description: INTERVENTIONS:  - Educate patient/family on patient safety including physical limitations  - Instruct patient to call for assistance with activity   - Consult OT/PT to assist with strengthening/mobility   - Keep Call bell within reach  - Keep bed low and locked with side rails adjusted as appropriate  - Keep care items and personal belongings within reach  - Initiate and maintain comfort rounds  - Make Fall Risk Sign visible to staff  - Offer Toileting every  Hours, in advance of need  - Initiate/Maintain alarm  - Obtain necessary fall risk management equipment  - Apply yellow socks and bracelet for high fall risk patients  - Consider moving patient to room near nurses station  Outcome: Progressing     Problem: INFECTION - ADULT  Goal: Absence or prevention of progression during hospitalization  Description: INTERVENTIONS:  - Assess and monitor for signs and symptoms of infection  - Monitor lab/diagnostic results  - Monitor all insertion sites, i e  indwelling lines, tubes, and drains  - Monitor endotracheal if appropriate and nasal secretions for changes in amount and color  - [place] appropriate cooling/warming therapies per order  - Administer medications as ordered  - Instruct and encourage patient and family to use good hand hygiene technique  - Identify and instruct in appropriate isolation precautions for identified infection/condition  Outcome: Progressing  Goal: Absence of fever/infection during neutropenic period  Description: INTERVENTIONS:  - Monitor WBC    Outcome: Progressing     Problem: MOBILITY - ADULT  Goal: Maintain or return to baseline ADL function  Description: INTERVENTIONS:  -  Assess patient's ability to carry out ADLs; assess patient's baseline for ADL function and identify physical deficits which impact ability to perform ADLs (bathing, care of mouth/teeth, toileting, grooming, dressing, etc )  - Assess/evaluate cause of self-care deficits   - Assess range of motion  - Assess patient's mobility; develop plan if impaired  - Assess patient's need for assistive devices and provide as appropriate  - Encourage maximum independence but intervene and supervise when necessary  - Involve family in performance of ADLs  - Assess for home care needs following discharge   - Consider OT consult to assist with ADL evaluation and planning for discharge  - Provide patient education as appropriate  Outcome: Progressing  Goal: Maintains/Returns to pre admission functional level  Description: INTERVENTIONS:  - Perform BMAT or MOVE assessment daily    - Set and communicate daily mobility goal to care team and patient/family/caregiver  - Collaborate with rehabilitation services on mobility goals if consulted  - Perform Range of Motion  times a day  - Reposition patient every  hours    - Dangle patient  times a day  - Stand patient  times a day  - Ambulate patient  times a day  - Out of bed to chair  times a day   - Out of bed for meals  times a day  - Out of bed for toileting  - Record patient progress and toleration of activity level   Outcome: Progressing

## 2022-09-10 NOTE — PROGRESS NOTES
Pt received blood on dayshift, with no complaints throughout the transfusion  However, at the end of the transfusion, per the dayshift RN, the patient complained of being "chilly"  RN checked the patients vitals revealing a fever  About an hour later after completion of the infusion, the patient turned red  SOD ABC was notified by the nurses about the fever and the rash  The provider came to beside and assessed the pt, and ordered benadryl  The medication was administered  Pt at this time was a pink color and feeling better, but still running a fever  Labs were drawn for a blood transfusion reaction, and RN spoke with blood bank

## 2022-09-11 ENCOUNTER — APPOINTMENT (OUTPATIENT)
Dept: RADIOLOGY | Facility: HOSPITAL | Age: 77
DRG: 314 | End: 2022-09-11
Payer: MEDICARE

## 2022-09-11 LAB
ANION GAP SERPL CALCULATED.3IONS-SCNC: 6 MMOL/L (ref 4–13)
ANISOCYTOSIS BLD QL SMEAR: PRESENT
BASOPHILS # BLD MANUAL: 0 THOUSAND/UL (ref 0–0.1)
BASOPHILS NFR MAR MANUAL: 0 % (ref 0–1)
BUN SERPL-MCNC: 8 MG/DL (ref 5–25)
CALCIUM SERPL-MCNC: 6.8 MG/DL (ref 8.3–10.1)
CHLORIDE SERPL-SCNC: 109 MMOL/L (ref 96–108)
CO2 SERPL-SCNC: 23 MMOL/L (ref 21–32)
CREAT SERPL-MCNC: 0.59 MG/DL (ref 0.6–1.3)
EOSINOPHIL # BLD MANUAL: 0.1 THOUSAND/UL (ref 0–0.4)
EOSINOPHIL NFR BLD MANUAL: 8 % (ref 0–6)
ERYTHROCYTE [DISTWIDTH] IN BLOOD BY AUTOMATED COUNT: 26.2 % (ref 11.6–15.1)
GFR SERPL CREATININE-BSD FRML MDRD: 97 ML/MIN/1.73SQ M
GLUCOSE SERPL-MCNC: 89 MG/DL (ref 65–140)
HCT VFR BLD AUTO: 22.7 % (ref 36.5–49.3)
HGB BLD-MCNC: 7.5 G/DL (ref 12–17)
LYMPHOCYTES # BLD AUTO: 0.07 THOUSAND/UL (ref 0.6–4.47)
LYMPHOCYTES # BLD AUTO: 6 % (ref 14–44)
MCH RBC QN AUTO: 32.2 PG (ref 26.8–34.3)
MCHC RBC AUTO-ENTMCNC: 33 G/DL (ref 31.4–37.4)
MCV RBC AUTO: 97 FL (ref 82–98)
MONOCYTES # BLD AUTO: 0.02 THOUSAND/UL (ref 0–1.22)
MONOCYTES NFR BLD: 2 % (ref 4–12)
NEUTROPHILS # BLD MANUAL: 1.04 THOUSAND/UL (ref 1.85–7.62)
NEUTS BAND NFR BLD MANUAL: 16 % (ref 0–8)
NEUTS SEG NFR BLD AUTO: 68 % (ref 43–75)
NRBC BLD AUTO-RTO: 0 /100 WBCS
OVALOCYTES BLD QL SMEAR: PRESENT
PLATELET # BLD AUTO: 28 THOUSANDS/UL (ref 149–390)
PLATELET BLD QL SMEAR: ABNORMAL
POIKILOCYTOSIS BLD QL SMEAR: PRESENT
POTASSIUM SERPL-SCNC: 3.1 MMOL/L (ref 3.5–5.3)
RBC # BLD AUTO: 2.33 MILLION/UL (ref 3.88–5.62)
RBC MORPH BLD: PRESENT
SCHISTOCYTES BLD QL SMEAR: PRESENT
SODIUM SERPL-SCNC: 138 MMOL/L (ref 135–147)
WBC # BLD AUTO: 1.24 THOUSAND/UL (ref 4.31–10.16)

## 2022-09-11 PROCEDURE — 99232 SBSQ HOSP IP/OBS MODERATE 35: CPT | Performed by: INTERNAL MEDICINE

## 2022-09-11 PROCEDURE — 71045 X-RAY EXAM CHEST 1 VIEW: CPT

## 2022-09-11 PROCEDURE — 85027 COMPLETE CBC AUTOMATED: CPT

## 2022-09-11 PROCEDURE — 85007 BL SMEAR W/DIFF WBC COUNT: CPT

## 2022-09-11 PROCEDURE — 85025 COMPLETE CBC W/AUTO DIFF WBC: CPT

## 2022-09-11 PROCEDURE — 80048 BASIC METABOLIC PNL TOTAL CA: CPT

## 2022-09-11 RX ORDER — POTASSIUM CHLORIDE 20MEQ/15ML
20 LIQUID (ML) ORAL 2 TIMES DAILY
Status: DISCONTINUED | OUTPATIENT
Start: 2022-09-11 | End: 2022-09-12

## 2022-09-11 RX ORDER — CALCIUM CARBONATE 200(500)MG
500 TABLET,CHEWABLE ORAL DAILY
Status: DISCONTINUED | OUTPATIENT
Start: 2022-09-11 | End: 2022-09-16 | Stop reason: HOSPADM

## 2022-09-11 RX ORDER — METRONIDAZOLE 500 MG/1
500 TABLET ORAL EVERY 8 HOURS SCHEDULED
Status: DISCONTINUED | OUTPATIENT
Start: 2022-09-11 | End: 2022-09-16 | Stop reason: HOSPADM

## 2022-09-11 RX ORDER — CHOLESTYRAMINE LIGHT 4 G/5.7G
4 POWDER, FOR SUSPENSION ORAL 2 TIMES DAILY
Status: DISCONTINUED | OUTPATIENT
Start: 2022-09-11 | End: 2022-09-16 | Stop reason: HOSPADM

## 2022-09-11 RX ORDER — POTASSIUM CHLORIDE 20MEQ/15ML
40 LIQUID (ML) ORAL ONCE
Status: COMPLETED | OUTPATIENT
Start: 2022-09-11 | End: 2022-09-11

## 2022-09-11 RX ADMIN — METRONIDAZOLE 500 MG: 500 TABLET ORAL at 22:03

## 2022-09-11 RX ADMIN — ATORVASTATIN CALCIUM 80 MG: 80 TABLET, FILM COATED ORAL at 18:13

## 2022-09-11 RX ADMIN — SODIUM CHLORIDE 100 ML/HR: 0.9 INJECTION, SOLUTION INTRAVENOUS at 20:02

## 2022-09-11 RX ADMIN — POTASSIUM CHLORIDE 40 MEQ: 20 SOLUTION ORAL at 11:38

## 2022-09-11 RX ADMIN — PHENOBARBITAL 64.8 MG: 64.8 TABLET ORAL at 18:13

## 2022-09-11 RX ADMIN — CARBIDOPA AND LEVODOPA 2.5 MG: 50; 200 TABLET, EXTENDED RELEASE ORAL at 18:13

## 2022-09-11 RX ADMIN — ASPIRIN 81 MG: 81 TABLET, COATED ORAL at 08:38

## 2022-09-11 RX ADMIN — METRONIDAZOLE 500 MG: 500 TABLET ORAL at 11:38

## 2022-09-11 RX ADMIN — PANTOPRAZOLE SODIUM 40 MG: 40 TABLET, DELAYED RELEASE ORAL at 05:22

## 2022-09-11 RX ADMIN — SODIUM CHLORIDE 100 ML/HR: 0.9 INJECTION, SOLUTION INTRAVENOUS at 08:38

## 2022-09-11 RX ADMIN — CEFTRIAXONE 1000 MG: 1 INJECTION, POWDER, FOR SOLUTION INTRAMUSCULAR; INTRAVENOUS at 12:24

## 2022-09-11 RX ADMIN — CARBIDOPA AND LEVODOPA 2.5 MG: 50; 200 TABLET, EXTENDED RELEASE ORAL at 05:23

## 2022-09-11 RX ADMIN — PHENOBARBITAL 64.8 MG: 64.8 TABLET ORAL at 08:38

## 2022-09-11 RX ADMIN — ACETAMINOPHEN 650 MG: 325 TABLET ORAL at 22:04

## 2022-09-11 RX ADMIN — CARBIDOPA AND LEVODOPA 2.5 MG: 50; 200 TABLET, EXTENDED RELEASE ORAL at 11:38

## 2022-09-11 NOTE — QUICK NOTE
Paged by nursing at 9:40PM that pt had a 100 7F fever, , BP 92/52, 92% on RA  Patient was seen and examine  He was sleeping comfortably in bed  He c/o 6 episodes of loose diarrhea with urgency that began today  Last episode at 4:30PM  No associated abdominal pain, nausea, or vomiting  Had a good appetite today  Denies new cough, SOB, chest pain, or dysuria  On exam: heart was tachycardic, lungs CTA no rales, rhonchi, or wheezes, abdomen with ventral hernia, soft, non tender  No calf tenderness, negative homans  Repeat CXR, blood cultures, c/diff and stool O&P ordered

## 2022-09-11 NOTE — PROGRESS NOTES
INTERNAL MEDICINE RESIDENCY PROGRESS NOTE     Name: Carmen Rodriguez   Age & Sex: 67 y o  male   MRN: [id number]  [facility]   Encounter: [id number]  Team: SOD Team A    PATIENT INFORMATION     Name: Carmen Rodriguez   Age & Sex: 67 y o  male   MRN: [id number]  Hospital Stay Days: 3    ASSESSMENT/PLAN     Principal Problem:    Hypotension  Active Problems:    Esophageal cancer ([facility] )    Pancytopenia (HCC)    Thrombocytopenia ([facility] )    Anemia    HLD (hyperlipidemia)    Seizure disorder (HCC)    Ventral hernia with obstruction and without gangrene    Chronic combined systolic and diastolic CHF (congestive heart failure) (HCC)    Allergic reaction to contrast media    Paroxysmal atrial fibrillation ([facility] )    Coronary artery disease of native artery of native heart with stable angina pectoris (HCC)    Fatigue    GERD (gastroesophageal reflux disease)    Dizziness      * Hypotension  Assessment & Plan  Upon presentation patient hypotensive in the 64W to 80 systolic  This was in the setting of a recent increase in his WBC count from 1 3 on 9/6 to 4 3 on this admission in the setting of her recent granix infusion 9/7  There was a concern for sepsis this admission and the patient received a total of 3 L crystalloid in the ED with slight improvement of his blood pressure  Lactic acid 1 1  Upon chart review, the patient was recently seen in the outpatient setting 9/1 and was noted to be hypotensive to the 09D to 52Q systolic as well, with improvement when the blood pressure was taken on the right arm  Patient does not technically meet sepsis criteria however the increase in WBC count from baseline does not seem to be explained only from his recent Granix infusion  Can not rule out sepsis at this time      UA with no signs of infection  Chest XR like clear with no signs of pneumonia, no widened mediastinum, no chest pain or back pain      Plan:  · Midodrine 2 5 t i d   · Patient was started on ceftriaxone in the ED, will continue at this time with q24 dosing but consider discontinuing the patient continues be hemodynamically stable or if blood cultures negative  · Repeat x-rays pending  · Blood cultures ordered, pending  · Stool O&P pending  · If patient does become hypotensive, recommend repeating blood pressures on both arms  · IVF increased to 100cc/hr  · Track orthostatic BP  · Knee-high compression stockings  · Strict I/Os and retention protocol          Esophageal cancer [facility])  Assessment & Plan  Patient with stage II B esophageal cancer, 1st discovered in 05/2022 to via EUS in verified by biopsy  No metastatic disease per  PET-CT scan performed on 06/2022  Has undergone carboplatin pack with Taxol chemotherapy as well as radiotherapy  Followed by heme Onc as an outpatient  Presenting today from his radiation oncology appointments and did not receive his scheduled radiation therapy today  Plan:  · Continue outpatient follow-up    Pancytopenia [facility])  Assessment & Plan  Patient with history of anemia, leukopenia, and thrombocytopenia in the setting of adenocarcinoma  Values on admission, hemoglobin of 7 8, WBCs of 4 36, platelets of 44, trending down over the past few months  Likely secondary to recent chemo  No signs of acute bleeding at this time but can not rule out a slow upper GI bleed in the setting of his esophageal adenocarcinoma and radiation  Plan:  · Transfuse hemoglobin for less than 7  · Transfused 9/9 1 unit- reaction of febrile, rash-resolved with benadryl and tylenol  · Hemoglobin 8 3 this morning (9/10)  · Transfuse platelets for less than 30 in the setting of likely mild upper GI bleed  · Increase antibiotic coverage for ANC < 500  · Patient currently on Granix for leukopenia  · Consider heme Onc consult if patient does not improve    Thrombocytopenia ([facility] [address] )  Assessment & Plan  Platelets of 44 on admission, gradually trending down from 140s 1 year ago      Plan:  · Holding DVT prophylaxis due to thrombocytopenia  · No signs of severe bleeding, hold off on platelet transfusion at this point  · Recommend transfusion this patient for platelets less than 30 in the setting of possible slow upper GI bleed  · Repeat CBC with manual diff in the a m  · Continue to monitor for hemodynamic stability and for any signs of bleeding    Anemia  Assessment & Plan  Normocytic anemia  Iron panel done 08/2022 with iron sat 50, TIBC 184, Fe 92, Ferritin 88  Baseline hemoglobin of around 12 from 07/2021  Gradually downtrending since then  Follows with heme Onc, has a documented history of folic acid and C36 deficiency for which she is receiving IM B12 supplementation and was prescribed folate p o  Supplementation which he is not taking  His overall anemia possibly combined microcytic and macrocytic anemia secondary to gradual blood loss from his esophageal cancer and FA/B12 deficiency  No signs of acute blood loss at this time  Anemia is most likely contributing to the patient's chief concerns of fatigue and dizziness  Hemoglobin on admission 7 8 from 8 7 two weeks ago  Plan    · Latest hemoglobin, 6 7 -- given 1 unit of irradiated leukoreduced RBCs  · Follow-up with H&H after transfusion   · Will hold off iron infusion in the setting of possible infection  · Transfuse for hemoglobin less than 7  · Consider p o  Iron supplementation upon discharge  · Consider reinitiating folic acid supplementation  · Last B12 supplementation 09/06/2022  · Consider outpatient follow-up with heme Onc if condition does not improve        Dizziness  Assessment & Plan  Patient describes a vertiginous "dizziness" that comes on when he stands up quickly  Denies any blacking out, presyncope or syncope  He is able to avoid the dizziness feeling if he gets up slowly  No exacerbating symptoms  Likely multifactorial, secondary to his hypotension and anemia      Plan:  · See anemia plan  · See hypotension plan    GERD (gastroesophageal reflux disease)  Assessment & Plan  Patient reports a history of GERD, likely the etiology behind his cough  Was prescribed pantoprazole 40 mg b i d  But reports not taking this medication  Plan:  · Will start him on pantoprazole 40 mg daily here    Fatigue  Assessment & Plan  Patient with a chief concern of subacute worsening fatigue in setting of esophageal adenocarcinoma, recent chemo, and continued radiation therapy  Patient anemic, TSH low normal  Likely multifactorial expect his anemia may be the largest contributing factor  Plan:  · See plan for anemia          Coronary artery disease of native artery of native heart with stable angina pectoris [facility])  Assessment & Plan  Patient with a history of CAD status post AGUSTIN x4, has follow-up with cardiology as an outpatient  Most recently completed 6 months of dual antiplatelet therapy  Currently on baby aspirin and atorvastatin daily  No chest pain  Troponins negative  Plan  · Continue aspirin, statin for secondary prevention      Paroxysmal atrial fibrillation [facility])  Assessment & Plan  Documented Hx of paroxysmal atrial fibrillation, Julissa Vasc of 5  Previously on warfarin for left apical thrombus, however has since been discontinued by his cardiologist   Last seen by outpatient Cardiology 06/2022  Currently normal sinus in the ED      Plan:  · Patient not currently on any home beta-blocker regiment  · Avoiding beta-blockers in the setting of hypotension  · F/u outpatient      Allergic reaction to contrast media  Assessment & Plan  Of note, patient has documented reaction to contrast media  Chronic combined systolic and diastolic CHF (congestive heart failure) ([facility])  Assessment & Plan  Wt Readings from Last 3 Encounters:   09/01/22 84 8 kg (187 lb)   08/30/22 84 8 kg (187 lb)   08/29/22 84 1 kg (185 lb 6 5 oz)     Documented history of chronic combined systolic and diastolic heart failure    Last echo performed 03/15/2022 showed LVEF 50% with G1 DD, apical akinesis, T BMP with mild regurg  Plan:  · Patient takes no diuretics or beta-blockers at baseline  · Patient continues to appear dry on exam today  · Chest x-ray not suggestive of fluid overload  · Continue to monitor with strict I&Os and daily weights  · Outpatient follow-up        Ventral hernia with obstruction and without gangrene  Assessment & Plan  Patient has a ventral hernia initially diagnosed 2005  He reportedly had followed up with surgery regarding this and was offered surgery but he refused  He reports intermittent constipation that he treats with MiraLax at home but denies obstipation  Plan:  · Continue MiraLax  · Expectant management at this time    Seizure disorder [facility])  Assessment & Plan  Patient with reported history of seizures diagnosed roughly 40-50 years ago  Has been taking it b i d  Phenobarbital for this chronically  No seizure activity in the last 40 years  Plan:  · Continue phenobarbital    HLD (hyperlipidemia)  Assessment & Plan  Continue statin         SUBJECTIVE     Patient seen and examined  Patient stays at fevers for the past 2 nights  He also states he has had 6 bowel movements in the past 3 days normal consistency and form  He reports a good appetite  Patient reports being comfortable denies any complaints at time of examination  Review of Systems   Constitutional: Negative for chills, fatigue and fever  Respiratory: Negative for cough and shortness of breath  Cardiovascular: Negative for chest pain  Gastrointestinal: Negative for abdominal distention, abdominal pain, constipation, diarrhea, nausea and vomiting  Genitourinary: Negative for difficulty urinating  Neurological: Negative for dizziness           OBJECTIVE     Vitals:    09/10/22 2128 09/10/22 2130 09/11/22 0241 09/11/22 0300   BP: (!) 77/50 92/52     Pulse: (!) 35 105 86 81   Resp: 18      Temp: (!) 100 7 °F (38 2 °C)   98 °F (36 7 °C)   TempSrc:       SpO2: 92% 95% 100%   Height:          Temperature:   Temp (24hrs), Av 6 °F (37 °C), Min:97 5 °F (36 4 °C), Max:100 7 °F (38 2 °C)    Temperature: 98 °F (36 7 °C)  Intake & Output:  I/O        0701  09/10 0700 09/10 07 07 07 0700    P  O  100 240     I V  1000 2173 3     Blood 354 2      Total Intake 1454 2 2413 3     Urine  770     Stool  0     Total Output  770     Net +1454 2 +1643 3            Unmeasured Urine Occurrence 5 x 2 x     Unmeasured Stool Occurrence 6 x 2 x         Weights:   IBW (Ideal Body Weight): 77 6 kg    Body mass index is 25 36 kg/m²  Weight (last 2 days)     None        Physical Exam  Constitutional:       General: He is not in acute distress  Appearance: Normal appearance  He is normal weight  He is not ill-appearing or toxic-appearing  HENT:      Head: Normocephalic and atraumatic  Cardiovascular:      Rate and Rhythm: Normal rate and regular rhythm  Pulses: Normal pulses  Heart sounds: No murmur heard  No gallop  Pulmonary:      Effort: No respiratory distress  Breath sounds: No wheezing or rales  Abdominal:      General: Abdomen is flat  There is no distension  Tenderness: There is no abdominal tenderness  Musculoskeletal:      Right lower leg: No edema  Neurological:      Mental Status: He is alert and oriented to person, place, and time  Psychiatric:         Mood and Affect: Mood normal          Behavior: Behavior normal        LABORATORY DATA     Labs: I have personally reviewed pertinent reports    Results from last 7 days   Lab Units 09/11/22  0515 09/10/22  0520 09/09/22  2005 09/09/22  0525 22  0841   WBC Thousand/uL 1 24* 1 66*  --  2 74* 4 36   HEMOGLOBIN g/dL 7 5* 8 3* 8 7* 6 7* 7 8*   HEMATOCRIT % 22 7* 25 1* 26 1* 20 7* 23 7*   PLATELETS Thousands/uL 28* 33*  --  37* 44*   NEUTROS PCT %  --   --   --   --  80*   MONOS PCT %  --   --   --   --  7   MONO PCT % 2* 2*  --  2*  --       Results from last 7 days Lab Units 09/11/22  0000 09/10/22  0520 09/09/22  0525 09/08/22  0841 09/06/22  0918   POTASSIUM mmol/L 3 1* 3 3* 3 1* 3 3* 3 6   CHLORIDE mmol/L 109* 109* 108 103 104   CO2 mmol/L 23 24 23 26 27   BUN mg/dL 8 9 9 12 12   CREATININE mg/dL 0 59* 0 60 0 51* 0 75 0 71   CALCIUM mg/dL 6 8* 7 0* 7 3* 7 6* 7 8*   ALK PHOS U/L  --   --   --  95 99   ALT U/L  --   --   --  12 12   AST U/L  --   --   --  14 13                  Results from last 7 days   Lab Units 09/08/22  1217   LACTIC ACID mmol/L 1 1           IMAGING & DIAGNOSTIC TESTING     Radiology Results: I have personally reviewed pertinent reports  XR chest 2 views    Result Date: 9/8/2022  Impression: 1  Bibasilar scarring/atelectasis  2   Esophageal stent extending from the lower esophagus and terminating within the proximal stomach  Workstation performed: [id number]     Other Diagnostic Testing: I have personally reviewed pertinent reports      ACTIVE MEDICATIONS     Current Facility-Administered Medications   Medication Dose Route Frequency    acetaminophen (TYLENOL) tablet 650 mg  650 mg Oral Q6H PRN    aspirin (ECOTRIN LOW STRENGTH) EC tablet 81 mg  81 mg Oral Daily    atorvastatin (LIPITOR) tablet 80 mg  80 mg Oral Daily With Dinner    cefTRIAXone (ROCEPHIN) 1,000 mg in dextrose 5 % 50 mL IVPB  1,000 mg Intravenous Q24H    diphenhydrAMINE (BENADRYL) tablet 25 mg  25 mg Oral Q6H PRN    midodrine (PROAMATINE) tablet 2 5 mg  2 5 mg Oral TID AC    ondansetron (ZOFRAN) injection 4 mg  4 mg Intravenous Q6H PRN    pantoprazole (PROTONIX) EC tablet 40 mg  40 mg Oral Early Morning    PHENobarbital tablet 64 8 mg  64 8 mg Oral BID    senna (SENOKOT) tablet 8 6 mg  1 tablet Oral HS PRN    sodium chloride 0 9 % infusion  100 mL/hr Intravenous Continuous       VTE Pharmacologic Prophylaxis: Reason for no pharmacologic prophylaxis Holding for pancytopenia  VTE Mechanical Prophylaxis: sequential compression device    Portions of the record may have been created with voice recognition software  Occasional wrong word or "sound a like" substitutions may have occurred due to the inherent limitations of voice recognition software    Read the chart carefully and recognize, using context, where substitutions have occurred   ==  Dallas Morrison, [address] [facility]  Internal Medicine Residency PGY-1

## 2022-09-12 ENCOUNTER — APPOINTMENT (OUTPATIENT)
Dept: RADIATION ONCOLOGY | Facility: HOSPITAL | Age: 77
End: 2022-09-12
Attending: INTERNAL MEDICINE
Payer: MEDICARE

## 2022-09-12 ENCOUNTER — APPOINTMENT (INPATIENT)
Dept: RADIOLOGY | Facility: HOSPITAL | Age: 77
DRG: 314 | End: 2022-09-12
Payer: MEDICARE

## 2022-09-12 PROBLEM — R50.9 FEVER: Status: ACTIVE | Noted: 2022-09-12

## 2022-09-12 LAB
ANION GAP SERPL CALCULATED.3IONS-SCNC: 5 MMOL/L (ref 4–13)
ANISOCYTOSIS BLD QL SMEAR: PRESENT
BASOPHILS # BLD MANUAL: 0 THOUSAND/UL (ref 0–0.1)
BASOPHILS NFR MAR MANUAL: 0 % (ref 0–1)
BUN SERPL-MCNC: 7 MG/DL (ref 5–25)
CALCIUM SERPL-MCNC: 6.9 MG/DL (ref 8.3–10.1)
CHLORIDE SERPL-SCNC: 107 MMOL/L (ref 96–108)
CO2 SERPL-SCNC: 24 MMOL/L (ref 21–32)
CREAT SERPL-MCNC: 0.59 MG/DL (ref 0.6–1.3)
EOSINOPHIL # BLD MANUAL: 0.22 THOUSAND/UL (ref 0–0.4)
EOSINOPHIL NFR BLD MANUAL: 18 % (ref 0–6)
ERYTHROCYTE [DISTWIDTH] IN BLOOD BY AUTOMATED COUNT: 26 % (ref 11.6–15.1)
ERYTHROCYTE [DISTWIDTH] IN BLOOD BY AUTOMATED COUNT: 26 % (ref 11.6–15.1)
GFR SERPL CREATININE-BSD FRML MDRD: 97 ML/MIN/1.73SQ M
GLUCOSE SERPL-MCNC: 94 MG/DL (ref 65–140)
HCT VFR BLD AUTO: 23 % (ref 36.5–49.3)
HCT VFR BLD AUTO: 23 % (ref 36.5–49.3)
HGB BLD-MCNC: 7.5 G/DL (ref 12–17)
HGB BLD-MCNC: 7.5 G/DL (ref 12–17)
HGB UR QL STRIP.AUTO: NEGATIVE
LYMPHOCYTES # BLD AUTO: 0.04 THOUSAND/UL (ref 0.6–4.47)
LYMPHOCYTES # BLD AUTO: 3 % (ref 14–44)
MCH RBC QN AUTO: 31.9 PG (ref 26.8–34.3)
MCH RBC QN AUTO: 31.9 PG (ref 26.8–34.3)
MCHC RBC AUTO-ENTMCNC: 32.6 G/DL (ref 31.4–37.4)
MCHC RBC AUTO-ENTMCNC: 32.6 G/DL (ref 31.4–37.4)
MCV RBC AUTO: 98 FL (ref 82–98)
MCV RBC AUTO: 98 FL (ref 82–98)
MONOCYTES # BLD AUTO: 0 THOUSAND/UL (ref 0–1.22)
MONOCYTES NFR BLD: 0 % (ref 4–12)
NEUTROPHILS # BLD MANUAL: 0.93 THOUSAND/UL (ref 1.85–7.62)
NEUTS BAND NFR BLD MANUAL: 30 % (ref 0–8)
NEUTS SEG NFR BLD AUTO: 46 % (ref 43–75)
NRBC BLD AUTO-RTO: 0 /100 WBCS
OVALOCYTES BLD QL SMEAR: PRESENT
PLATELET # BLD AUTO: 25 THOUSANDS/UL (ref 149–390)
PLATELET # BLD AUTO: 25 THOUSANDS/UL (ref 149–390)
PLATELET BLD QL SMEAR: ABNORMAL
POIKILOCYTOSIS BLD QL SMEAR: PRESENT
POTASSIUM SERPL-SCNC: 3 MMOL/L (ref 3.5–5.3)
POTASSIUM SERPL-SCNC: 3.1 MMOL/L (ref 3.5–5.3)
RBC # BLD AUTO: 2.35 MILLION/UL (ref 3.88–5.62)
RBC # BLD AUTO: 2.35 MILLION/UL (ref 3.88–5.62)
RBC, URINE: NORMAL
SCHISTOCYTES BLD QL SMEAR: PRESENT
SODIUM SERPL-SCNC: 136 MMOL/L (ref 135–147)
VARIANT LYMPHS # BLD AUTO: 3 %
WBC # BLD AUTO: 1.22 THOUSAND/UL (ref 4.31–10.16)
WBC # BLD AUTO: 1.22 THOUSAND/UL (ref 4.31–10.16)

## 2022-09-12 PROCEDURE — 81015 MICROSCOPIC EXAM OF URINE: CPT

## 2022-09-12 PROCEDURE — G1004 CDSM NDSC: HCPCS

## 2022-09-12 PROCEDURE — 85007 BL SMEAR W/DIFF WBC COUNT: CPT | Performed by: INTERNAL MEDICINE

## 2022-09-12 PROCEDURE — 80048 BASIC METABOLIC PNL TOTAL CA: CPT

## 2022-09-12 PROCEDURE — 87505 NFCT AGENT DETECTION GI: CPT | Performed by: STUDENT IN AN ORGANIZED HEALTH CARE EDUCATION/TRAINING PROGRAM

## 2022-09-12 PROCEDURE — 85025 COMPLETE CBC W/AUTO DIFF WBC: CPT

## 2022-09-12 PROCEDURE — P9040 RBC LEUKOREDUCED IRRADIATED: HCPCS

## 2022-09-12 PROCEDURE — 87040 BLOOD CULTURE FOR BACTERIA: CPT

## 2022-09-12 PROCEDURE — 87209 SMEAR COMPLEX STAIN: CPT | Performed by: STUDENT IN AN ORGANIZED HEALTH CARE EDUCATION/TRAINING PROGRAM

## 2022-09-12 PROCEDURE — 71250 CT THORAX DX C-: CPT

## 2022-09-12 PROCEDURE — 87493 C DIFF AMPLIFIED PROBE: CPT | Performed by: STUDENT IN AN ORGANIZED HEALTH CARE EDUCATION/TRAINING PROGRAM

## 2022-09-12 PROCEDURE — 87103 BLOOD FUNGUS CULTURE: CPT

## 2022-09-12 PROCEDURE — 85027 COMPLETE CBC AUTOMATED: CPT | Performed by: INTERNAL MEDICINE

## 2022-09-12 PROCEDURE — 84132 ASSAY OF SERUM POTASSIUM: CPT

## 2022-09-12 PROCEDURE — 87177 OVA AND PARASITES SMEARS: CPT | Performed by: STUDENT IN AN ORGANIZED HEALTH CARE EDUCATION/TRAINING PROGRAM

## 2022-09-12 PROCEDURE — 81003 URINALYSIS AUTO W/O SCOPE: CPT

## 2022-09-12 PROCEDURE — 99232 SBSQ HOSP IP/OBS MODERATE 35: CPT | Performed by: HOSPITALIST

## 2022-09-12 RX ORDER — POTASSIUM CHLORIDE 29.8 MG/ML
40 INJECTION INTRAVENOUS 2 TIMES DAILY
Status: COMPLETED | OUTPATIENT
Start: 2022-09-12 | End: 2022-09-13

## 2022-09-12 RX ORDER — POTASSIUM CHLORIDE 20 MEQ/1
40 TABLET, EXTENDED RELEASE ORAL 2 TIMES DAILY
Status: DISCONTINUED | OUTPATIENT
Start: 2022-09-12 | End: 2022-09-12

## 2022-09-12 RX ORDER — POTASSIUM CHLORIDE 20MEQ/15ML
40 LIQUID (ML) ORAL
Status: DISCONTINUED | OUTPATIENT
Start: 2022-09-12 | End: 2022-09-12

## 2022-09-12 RX ORDER — POTASSIUM CHLORIDE 14.9 MG/ML
20 INJECTION INTRAVENOUS ONCE
Status: COMPLETED | OUTPATIENT
Start: 2022-09-12 | End: 2022-09-12

## 2022-09-12 RX ADMIN — ATORVASTATIN CALCIUM 80 MG: 80 TABLET, FILM COATED ORAL at 16:50

## 2022-09-12 RX ADMIN — POTASSIUM CHLORIDE 40 MEQ: 29.8 INJECTION, SOLUTION INTRAVENOUS at 20:53

## 2022-09-12 RX ADMIN — PANTOPRAZOLE SODIUM 40 MG: 40 TABLET, DELAYED RELEASE ORAL at 05:35

## 2022-09-12 RX ADMIN — METRONIDAZOLE 500 MG: 500 TABLET ORAL at 13:12

## 2022-09-12 RX ADMIN — CARBIDOPA AND LEVODOPA 2.5 MG: 50; 200 TABLET, EXTENDED RELEASE ORAL at 06:21

## 2022-09-12 RX ADMIN — PHENOBARBITAL 64.8 MG: 64.8 TABLET ORAL at 17:36

## 2022-09-12 RX ADMIN — POTASSIUM CHLORIDE 40 MEQ: 29.8 INJECTION, SOLUTION INTRAVENOUS at 16:52

## 2022-09-12 RX ADMIN — ASPIRIN 81 MG: 81 TABLET, COATED ORAL at 09:09

## 2022-09-12 RX ADMIN — CARBIDOPA AND LEVODOPA 2.5 MG: 50; 200 TABLET, EXTENDED RELEASE ORAL at 16:50

## 2022-09-12 RX ADMIN — METRONIDAZOLE 500 MG: 500 TABLET ORAL at 05:36

## 2022-09-12 RX ADMIN — PHENOBARBITAL 64.8 MG: 64.8 TABLET ORAL at 09:08

## 2022-09-12 RX ADMIN — CHOLESTYRAMINE 4 G: 4 POWDER, FOR SUSPENSION ORAL at 17:36

## 2022-09-12 RX ADMIN — CHOLESTYRAMINE 4 G: 4 POWDER, FOR SUSPENSION ORAL at 09:11

## 2022-09-12 RX ADMIN — CEFTRIAXONE 1000 MG: 1 INJECTION, POWDER, FOR SOLUTION INTRAMUSCULAR; INTRAVENOUS at 11:40

## 2022-09-12 RX ADMIN — POTASSIUM CHLORIDE 20 MEQ: 14.9 INJECTION, SOLUTION INTRAVENOUS at 06:46

## 2022-09-12 RX ADMIN — CARBIDOPA AND LEVODOPA 2.5 MG: 50; 200 TABLET, EXTENDED RELEASE ORAL at 11:37

## 2022-09-12 RX ADMIN — METRONIDAZOLE 500 MG: 500 TABLET ORAL at 21:28

## 2022-09-12 RX ADMIN — CALCIUM CARBONATE (ANTACID) CHEW TAB 500 MG 500 MG: 500 CHEW TAB at 09:09

## 2022-09-12 RX ADMIN — SODIUM CHLORIDE 100 ML/HR: 0.9 INJECTION, SOLUTION INTRAVENOUS at 03:38

## 2022-09-12 NOTE — RESTORATIVE TECHNICIAN NOTE
Restorative Technician Note      Patient Name: Angel Addison     Restorative Tech Visit Date: 9/12/2022  Note Type: Mobility  Patient Position Upon Consult: Seated edge of bed  Activity Performed: Ambulated  Assistive Device: Other (Comment) (none)  Patient Position at End of Consult: Supine;  All needs within [facility]

## 2022-09-12 NOTE — PROGRESS NOTES
INTERNAL MEDICINE RESIDENCY PROGRESS NOTE     Name: Stan Reagan   Age & Sex: 67 y o  male   MRN: [id number]  Unit/Bed#: [facility] 906-01   Encounter: [id number]  Team: SOD Team A    PATIENT INFORMATION     Name: Stan Reagan   Age & Sex: 67 y o  male   MRN: [id number]  Hospital Stay Days: 4    ASSESSMENT/PLAN     Principal Problem:    Hypotension  Active Problems:    Esophageal cancer ([facility] )    Pancytopenia (HCC)    Thrombocytopenia ([facility] )    Anemia    HLD (hyperlipidemia)    Seizure disorder (HCC)    Ventral hernia with obstruction and without gangrene    Chronic combined systolic and diastolic CHF (congestive heart failure) (HCC)    Allergic reaction to contrast media    Paroxysmal atrial fibrillation ([facility] )    Coronary artery disease of native artery of native heart with stable angina pectoris (HCC)    Fatigue    GERD (gastroesophageal reflux disease)    Dizziness    Fever      * Hypotension  Assessment & Plan  Upon presentation patient hypotensive in the 59M to 80 systolic  This was in the setting of a recent increase in his WBC count from 1 3 on 9/6 to 4 3 on this admission in the setting of her recent granix infusion 9/7  There was a concern for sepsis this admission and the patient received a total of 3 L crystalloid in the ED with slight improvement of his blood pressure  Lactic acid 1 1  Upon chart review, the patient was recently seen in the outpatient setting 9/1 and was noted to be hypotensive to the 69G to 41R systolic as well, with improvement when the blood pressure was taken on the right arm  Patient does not technically meet sepsis criteria however the increase in WBC count from baseline does not seem to be explained only from his recent Granix infusion  Can not rule out sepsis at this time      UA and 4 blood cultures with no signs of infection  Chest XR like clear with no signs of pneumonia, no widened mediastinum, no chest pain or back pain      Plan:  · Midodrine 2 5 t i d   · Patient was started on ceftriaxone in the ED, will continue at this time with q24 dosing but consider discontinuing the patient continues be hemodynamically stable or if blood cultures negative  · Stool O&P pending  · If patient does become hypotensive, recommend repeating blood pressures on both arms  · IVF increased to 100cc/hr  · Track orthostatic BP  · Knee-high compression stockings  · Strict I/Os and retention protocol          Fever  Assessment & Plan  Patient is spiking fevers 101° at night of September 10th, 11th, 12th  UA show no sign infection  X-ray 9/8 and 9/10 show no consolidation  9/8 x2 blood cultures and 9/10 x2 blood cultures  show no growth to date    Plan:  Plan for CT chest abdomen pelvis for fever of unknown origin for 3 days and no signs infection  Will repeat blood cultures checking for fungal infection, separate cultures at time of fever, and cultures from port  Continue p r n  Tylenol    Esophageal cancer [facility])  Assessment & Plan  Patient with stage II B esophageal cancer, 1st discovered in 05/2022 to via EUS in verified by biopsy  No metastatic disease per  PET-CT scan performed on 06/2022  Has undergone carboplatin pack with Taxol chemotherapy as well as radiotherapy  Followed by heme Onc as an outpatient  Presenting today from his radiation oncology appointments and did not receive his scheduled radiation therapy today  Plan:  · Continue outpatient follow-up    Pancytopenia [facility])  Assessment & Plan  Patient with history of anemia, leukopenia, and thrombocytopenia in the setting of adenocarcinoma  Values on admission, hemoglobin of 7 8, WBCs of 4 36, platelets of 44, trending down over the past few months  Likely secondary to recent chemo  No signs of acute bleeding at this time but can not rule out a slow upper GI bleed in the setting of his esophageal adenocarcinoma and radiation          Plan:  · Transfuse hemoglobin for less than 7  · Transfused 9/9 1 unit- reaction of febrile, rash-resolved with benadryl and tylenol  · Hemoglobin 8 3 this morning (9/10)  · Transfuse platelets for less than 30 in the setting of likely mild upper GI bleed  · Increase antibiotic coverage for ANC < 500  · Patient currently on Granix for leukopenia  · Consider heme Onc consult if patient does not improve    Thrombocytopenia ([facility] )  Assessment & Plan  Platelets of 44 on admission, gradually trending down from 140s 1 year ago  Plan:  · Holding DVT prophylaxis due to thrombocytopenia  · No signs of severe bleeding, hold off on platelet transfusion at this point  · Recommend transfusion this patient for platelets less than 30 in the setting of possible slow upper GI bleed  · Repeat CBC with manual diff in the a m  · Continue to monitor for hemodynamic stability and for any signs of bleeding    Anemia  Assessment & Plan  Normocytic anemia  Iron panel done 08/2022 with iron sat 50, TIBC 184, Fe 92, Ferritin 88  Baseline hemoglobin of around 12 from 07/2021  Gradually downtrending since then  Follows with heme Onc, has a documented history of folic acid and O74 deficiency for which she is receiving IM B12 supplementation and was prescribed folate p o  Supplementation which he is not taking  His overall anemia possibly combined microcytic and macrocytic anemia secondary to gradual blood loss from his esophageal cancer and FA/B12 deficiency  No signs of acute blood loss at this time  Anemia is most likely contributing to the patient's chief concerns of fatigue and dizziness  Hemoglobin on admission 7 8 from 8 7 two weeks ago  Last B12 supplementation 09/06/2022  Latest hemoglobin, 6 7 -- given 1 unit of irradiated leukoreduced RBCs  9/12 Hb 7 5 the patient continues to poor being fatigued    Plan    · Transfuse 1 packed red blood cells and reassess  · Follow-up with H&H after transfusion   · Will hold off iron infusion in the setting of possible infection  · Consider p o   Iron supplementation upon discharge  · Folic acid supplementation  · Consider outpatient follow-up with heme Onc if condition does not improve        Dizziness  Assessment & Plan  Patient describes a vertiginous "dizziness" that comes on when he stands up quickly  Denies any blacking out, presyncope or syncope  He is able to avoid the dizziness feeling if he gets up slowly  No exacerbating symptoms  Likely multifactorial, secondary to his hypotension and anemia  Plan:  · See anemia plan  · See hypotension plan    GERD (gastroesophageal reflux disease)  Assessment & Plan  Patient reports a history of GERD, likely the etiology behind his cough  Was prescribed pantoprazole 40 mg b i d  But reports not taking this medication  Plan:  · Will start him on pantoprazole 40 mg daily here    Fatigue  Assessment & Plan  Patient with a chief concern of subacute worsening fatigue in setting of esophageal adenocarcinoma, recent chemo, and continued radiation therapy  Patient anemic, TSH low normal  Likely multifactorial expect his anemia may be the largest contributing factor  Plan:  · See plan for anemia          Coronary artery disease of native artery of native heart with stable angina pectoris [facility])  Assessment & Plan  Patient with a history of CAD status post AGUSTIN x4, has follow-up with cardiology as an outpatient  Most recently completed 6 months of dual antiplatelet therapy  Currently on baby aspirin and atorvastatin daily  No chest pain  Troponins negative  Plan  · Continue aspirin, statin for secondary prevention      Paroxysmal atrial fibrillation [facility])  Assessment & Plan  Documented Hx of paroxysmal atrial fibrillation, Julissa Vasc of 5  Previously on warfarin for left apical thrombus, however has since been discontinued by his cardiologist   Last seen by outpatient Cardiology 06/2022   Currently normal sinus in the ED      Plan:  · Patient not currently on any home beta-blocker regiment  · Avoiding beta-blockers in the setting of hypotension  · F/u outpatient      Allergic reaction to contrast media  Assessment & Plan  Of note, patient has documented reaction to contrast media  Chronic combined systolic and diastolic CHF (congestive heart failure) (HCC)  Assessment & Plan  Wt Readings from Last 3 Encounters:   09/01/22 84 8 kg (187 lb)   08/30/22 84 8 kg (187 lb)   08/29/22 84 1 kg (185 lb 6 5 oz)     Documented history of chronic combined systolic and diastolic heart failure  Last echo performed 03/15/2022 showed LVEF 50% with G1 DD, apical akinesis, T BMP with mild regurg  Plan:  · Patient takes no diuretics or beta-blockers at baseline  · Patient continues to appear dry on exam today  · Chest x-ray not suggestive of fluid overload  · Continue to monitor with strict I&Os and daily weights  · Outpatient follow-up        Ventral hernia with obstruction and without gangrene  Assessment & Plan  Patient has a ventral hernia initially diagnosed 2005  He reportedly had followed up with surgery regarding this and was offered surgery but he refused  He reports intermittent constipation that he treats with MiraLax at home but denies obstipation  Plan:  · Continue MiraLax  · Expectant management at this time    Seizure disorder [facility])  Assessment & Plan  Patient with reported history of seizures diagnosed roughly 40-50 years ago  Has been taking it b i d  Phenobarbital for this chronically  No seizure activity in the last 40 years  Plan:  · Continue phenobarbital    HLD (hyperlipidemia)  Assessment & Plan  Continue statin       SUBJECTIVE     Patient seen and examined  No acute events overnight  Patient reports doing well, has had good appetite, and ambulating without difficulty  He endorses feeling fatigued particularly when shaving denies shortness of breath  Review of Systems   Constitutional: Negative for chills and fever  Respiratory: Negative for shortness of breath  Cardiovascular: Negative for chest pain  Gastrointestinal: Negative for abdominal pain, constipation, diarrhea, nausea and vomiting  Genitourinary: Negative for difficulty urinating  OBJECTIVE     Vitals:    22 2300 22 0230 22 0912 22 0913   BP:   (!) 89/57 (!) 79/40   Pulse:       Resp:       Temp:  98 1 °F (36 7 °C)  98 8 °F (37 1 °C)   TempSrc:       SpO2: 95%      Height:          Temperature:   Temp (24hrs), Av 2 °F (37 3 °C), Min:98 1 °F (36 7 °C), Max:101 4 °F (38 6 °C)    Temperature: 98 8 °F (37 1 °C)  Intake & Output:  I/O       09/10 07 07 07 07 07 0700    P  O  240 240     I V  2173 3 1561 7     Blood       Total Intake 2413 3 1801 7     Urine 770 1725 350    Stool 0      Total Output 770 1725 350    Net +1643 3 +76 7 -350           Unmeasured Urine Occurrence 2 x 1 x     Unmeasured Stool Occurrence 2 x          Weights:   IBW (Ideal Body Weight): 77 6 kg    Body mass index is 25 36 kg/m²  Weight (last 2 days)     None        Physical Exam  Constitutional:       General: He is not in acute distress  Appearance: Normal appearance  He is normal weight  He is not toxic-appearing  HENT:      Head: Normocephalic and atraumatic  Cardiovascular:      Rate and Rhythm: Normal rate and regular rhythm  Heart sounds: Normal heart sounds  No murmur heard  No friction rub  No gallop  Pulmonary:      Effort: Pulmonary effort is normal  No respiratory distress  Breath sounds: No wheezing or rales  Abdominal:      General: Abdomen is flat  There is no distension  Tenderness: There is no abdominal tenderness  Musculoskeletal:      Right lower leg: No edema  Left lower leg: No edema  Neurological:      Mental Status: He is alert and oriented to person, place, and time  Psychiatric:         Mood and Affect: Mood normal          Behavior: Behavior normal        LABORATORY DATA     Labs: I have personally reviewed pertinent reports    Results from last 7 days   Lab Units 09/12/22  0504 09/11/22  0515 09/10/22  0520 09/09/22  0525 09/08/22  0841   WBC Thousand/uL 1 22*  1 22* 1 24* 1 66*   < > 4 36   HEMOGLOBIN g/dL 7 5*  7 5* 7 5* 8 3*   < > 7 8*   HEMATOCRIT % 23 0*  23 0* 22 7* 25 1*   < > 23 7*   PLATELETS Thousands/uL 25*  25* 28* 33*   < > 44*   NEUTROS PCT %  --   --   --   --  80*   MONOS PCT %  --   --   --   --  7   MONO PCT % 0* 2* 2*   < >  --     < > = values in this interval not displayed  Results from last 7 days   Lab Units 09/12/22  0504 09/11/22  0000 09/10/22  0520 09/09/22  0525 09/08/22  0841 09/06/22  0918   POTASSIUM mmol/L 3 0* 3 1* 3 3*   < > 3 3* 3 6   CHLORIDE mmol/L 107 109* 109*   < > 103 104   CO2 mmol/L 24 23 24   < > 26 27   BUN mg/dL 7 8 9   < > 12 12   CREATININE mg/dL 0 59* 0 59* 0 60   < > 0 75 0 71   CALCIUM mg/dL 6 9* 6 8* 7 0*   < > 7 6* 7 8*   ALK PHOS U/L  --   --   --   --  95 99   ALT U/L  --   --   --   --  12 12   AST U/L  --   --   --   --  14 13    < > = values in this interval not displayed  Results from last 7 days   Lab Units 09/08/22  1217   LACTIC ACID mmol/L 1 1           IMAGING & DIAGNOSTIC TESTING     Radiology Results: I have personally reviewed pertinent reports  XR chest portable    Result Date: 9/12/2022  Impression: No acute cardiopulmonary disease  Question migration of esophageal stent below the diaphragm when compared with the fluoroscopic image for port placement from 7/15/2022  The study was marked in [facility] for immediate notification  Workstation performed: [id number]     XR chest 2 views    Result Date: 9/8/2022  Impression: 1  Bibasilar scarring/atelectasis  2   Esophageal stent extending from the lower esophagus and terminating within the proximal stomach  Workstation performed: [id number]     Other Diagnostic Testing: I have personally reviewed pertinent reports      ACTIVE MEDICATIONS     Current Facility-Administered Medications   Medication Dose Route Frequency    acetaminophen (TYLENOL) tablet 650 mg  650 mg Oral Q6H PRN    aspirin (ECOTRIN LOW STRENGTH) EC tablet 81 mg  81 mg Oral Daily    atorvastatin (LIPITOR) tablet 80 mg  80 mg Oral Daily With Dinner    calcium carbonate (TUMS) chewable tablet 500 mg  500 mg Oral Daily    cefTRIAXone (ROCEPHIN) 1,000 mg in dextrose 5 % 50 mL IVPB  1,000 mg Intravenous Q24H    cholestyramine sugar free (QUESTRAN LIGHT) packet 4 g  4 g Oral BID    diphenhydrAMINE (BENADRYL) tablet 25 mg  25 mg Oral Q6H PRN    metroNIDAZOLE (FLAGYL) tablet 500 mg  500 mg Oral Q8H [facility]    midodrine (PROAMATINE) tablet 2 5 mg  2 5 mg Oral TID AC    ondansetron (ZOFRAN) injection 4 mg  4 mg Intravenous Q6H PRN    pantoprazole (PROTONIX) EC tablet 40 mg  40 mg Oral Early Morning    PHENobarbital tablet 64 8 mg  64 8 mg Oral BID    potassium chloride oral solution 40 mEq  40 mEq Oral TID AC    senna (SENOKOT) tablet 8 6 mg  1 tablet Oral HS PRN    sodium chloride 0 9 % infusion  100 mL/hr Intravenous Continuous       VTE Pharmacologic Prophylaxis: Reason for no pharmacologic prophylaxis Hold for pancytopenia  VTE Mechanical Prophylaxis: sequential compression device    Portions of the record may have been created with voice recognition software  Occasional wrong word or "sound a like" substitutions may have occurred due to the inherent limitations of voice recognition software    Read the chart carefully and recognize, using context, where substitutions have occurred   ==  Christina Cornejo, [address] [facility]  Internal Medicine Residency PGY-1

## 2022-09-12 NOTE — PROGRESS NOTES
Spiritual Care Progress Note    2022  Patient: Stan Reagan : 1945  Admission Date & Time: 2022 0910  MRN: [id number] [facility]: [id number]      Fr Weiss Bearelva visited pt to provide prayer and a blessing with family (brother) present  Chaplains remain available                 Chaplaincy Interventions Utilized:   Exploration: Explored spiritual needs & resources    Collaboration: Facilitated respect for spiritual/cultural practice during hospitalization    Relationship Building: Cultivated a relationship of care and support    Ritual: Provided Roman Catholic and Provided ritual       22 1207   Clinical Encounter Type   Visited With Patient and family together   Routine Visit Introduction   Latter day Encounters   Latter day Needs Prayer   Sacramental Encounters   Sacrament Other Other (Comment)  ([place])

## 2022-09-12 NOTE — PLAN OF CARE
Problem: Potential for Falls  Goal: Patient will remain free of falls  Description: INTERVENTIONS:  - Educate patient/family n patient safety including physical limitations  - Instruct patient to call for assistance with activity   - Consult OT/PT to assist with strengthening/mobility   - Keep Call bell within reach  - Keep bed low and locked with side rails adjusted as appropriate  - Keep care items and personal belongings within reach  - Initiate and maintain comfort rounds  - Make Fall Risk Sign visible to staff  - Offer Toileting every  Hours, in advance of need  - Initiate/Maintain alarm  - Obtain necessary fall risk management equipment:   - Apply yellow socks and bracelet for high fall risk patients  - Consider moving patient to room near nurses station  Outcome: Progressing     Problem: INFECTION - ADULT  Goal: Absence or prevention of progression during hospitalization  Description: INTERVENTIONS:  - Assess and monitor for signs and symptoms of infection  - Monitor lab/diagnostic results  - Monitor all insertion sites, i e  indwelling lines, tubes, and drains  - Monitor endotracheal if appropriate and nasal secretions for changes in amount and color  - [place] appropriate cooling/warming therapies per order  - Administer medications as ordered  - Instruct and encourage patient and family to use good hand hygiene technique  - Identify and instruct in appropriate isolation precautions for identified infection/condition  Outcome: Progressing  Goal: Absence of fever/infection during neutropenic period  Description: INTERVENTIONS:  - Monitor WBC    Outcome: Progressing     Problem: MOBILITY - ADULT  Goal: Maintain or return to baseline ADL function  Description: INTERVENTIONS:  -  Assess patient's ability to carry out ADLs; assess patient's baseline for ADL function and identify physical deficits which impact ability to perform ADLs (bathing, care of mouth/teeth, toileting, grooming, dressing, etc )  - Assess/evaluate cause of self-care deficits   - Assess range of motion  - Assess patient's mobility; develop plan if impaired  - Assess patient's need for assistive devices and provide as appropriate  - Encourage maximum independence but intervene and supervise when necessary  - Involve family in performance of ADLs  - Assess for home care needs following discharge   - Consider OT consult to assist with ADL evaluation and planning for discharge  - Provide patient education as appropriate  Outcome: Progressing  Goal: Maintains/Returns to pre admission functional level  Description: INTERVENTIONS:  - Perform BMAT or MOVE assessment daily    - Set and communicate daily mobility goal to care team and patient/family/caregiver  - Collaborate with rehabilitation services on mobility goals if consulted  - Perform Range of Motion  times a day  - Reposition patient every  hours    - Dangle patient  times a day  - Stand patient  times a day  - Ambulate patient  times a day  - Out of bed to chair  times a day   - Out of bed for meals times a day  - Out of bed for toileting  - Record patient progress and toleration of activity level   Outcome: Progressing

## 2022-09-12 NOTE — PLAN OF CARE
Problem: INFECTION - ADULT  Goal: Absence or prevention of progression during hospitalization  Description: INTERVENTIONS:  - Assess and monitor for signs and symptoms of infection  - Monitor lab/diagnostic results  - Monitor all insertion sites, i e  indwelling lines, tubes, and drains  - Monitor endotracheal if appropriate and nasal secretions for changes in amount and color  - [place] appropriate cooling/warming therapies per order  - Administer medications as ordered  - Instruct and encourage patient and family to use good hand hygiene technique  - Identify and instruct in appropriate isolation precautions for identified infection/condition  Outcome: Progressing

## 2022-09-12 NOTE — ASSESSMENT & PLAN NOTE
Patient is spiking fevers 101° at night of September 10th, 11th, 12th  UA show no sign infection  X-ray 9/8 and 9/10 show no consolidation  9/8 x2 blood cultures and 9/10 x2 blood cultures  show no growth to date  CT chest showed no pneumonia, mild bronchiectasis in lower lobes likely from recurrent aspiration, trace effusions mi atelectasis in lower lobes  Patient is not febrile last 3 days; no source found    Plan: Will repeat blood cultures checking for fungal infection, separate cultures at time of fever, and cultures from port  Continue p r n   Tylenol

## 2022-09-12 NOTE — CASE MANAGEMENT
Case Management Discharge Planning Note    Patient name Janette Dover  Location [address] [place] [address] [facility] [id number]  : 1945 Date 2022       Current Admission Date: 2022  Current Admission Diagnosis:Hypotension   Patient Active Problem List    Diagnosis Date Noted    Fever 2022    Fatigue 2022    Anemia 2022    Hypotension 2022    GERD (gastroesophageal reflux disease) 2022    Dizziness 2022    Vitamin B12 deficiency 2022    Port-A-Cath in place 08/10/2022    Thrombocytopenia ([facility] ) 2022    Dysphagia 2022    Pancytopenia ([facility] ) 2022    Acute gastric ulcer 2022    Esophageal cancer ([place] [address] ) 2022    Abdominal aortic aneurysm, without rupture ([place] [address] ) 02/10/2022    Paroxysmal atrial fibrillation ([place] [address] ) 02/10/2022    Coronary artery disease of native artery of native heart with stable angina pectoris ([facility] ) 02/10/2022    Ischemic cardiomyopathy 2021    Chronic combined systolic and diastolic CHF (congestive heart failure) ([facility] ) 2021    Allergic reaction to contrast media 2021    NSTEMI (non-ST elevated myocardial infarction) ([place] [address] ) 2021    Abdominal pain 2020    Hernia, incisional 2019    Ventral hernia with obstruction and without gangrene 2019    CAD (coronary artery disease) 2017    HTN (hypertension) 2017    HLD (hyperlipidemia) 2017    [facility]    S/P AAA (abdominal aortic aneurysm) repair 2017    STEMI (ST elevation myocardial infarction) ([facility] ) 2017      LOS (days): 4  Geometric Mean LOS (GMLOS) (days): 4 80  Days to GMLOS:0 7     OBJECTIVE:  Risk of Unplanned Readmission Score: 28 71         Current admission status: Inpatient   [facility]erred Pharmacy:   [id number] [address] [facility]  [address] [facility] [address]  Phone: [phone] Fax: [phone] [facility] [address] [facility] [place] [address] [facility] [address] [place] [address] [address] [facility]  Phone: [phone] Fax: [phone]    Primary Care Provider: Nicholas Ramirez MD    Primary Insurance: MEDICARE  Secondary Insurance: [facility] DETAILS:               Additional Comments: Patient is not medically stable for discharge at this time  There is a CT chest pending due to fevers of unknown origin  In addition there are also blood cultures pending

## 2022-09-12 NOTE — PLAN OF CARE
Problem: Potential for Falls  Goal: Patient will remain free of falls  Description: INTERVENTIONS:  - Educate patient/family on patient safety including physical limitations  - Instruct patient to call for assistance with activity   - Consult OT/PT to assist with strengthening/mobility   - Keep Call bell within reach  - Keep bed low and locked with side rails adjusted as appropriate  - Keep care items and personal belongings within reach  - Initiate and maintain comfort rounds  - Make Fall Risk Sign visible to staff  - Offer Toileting every  Hours, in advance of need  - Initiate/Maintain alarm  - Obtain necessary fall risk management equipment:   - Apply yellow socks and bracelet for high fall risk patients  - Consider moving patient to room near nurses station  9/12/2022 1114 by Destiny Russell RN  Outcome: Progressing  9/12/2022 1113 by Destiny Russell RN  Outcome: Progressing     Problem: INFECTION - ADULT  Goal: Absence or prevention of progression during hospitalization  Description: INTERVENTIONS:  - Assess and monitor for signs and symptoms of infection  - Monitor lab/diagnostic results  - Monitor all insertion sites, i e  indwelling lines, tubes, and drains  - Monitor endotracheal if appropriate and nasal secretions for changes in amount and color  - [place] appropriate cooling/warming therapies per order  - Administer medications as ordered  - Instruct and encourage patient and family to use good hand hygiene technique  - Identify and instruct in appropriate isolation precautions for identified infection/condition  9/12/2022 1114 by Destiny Russell RN  Outcome: Progressing  9/12/2022 1113 by Destiny Russell RN  Outcome: Progressing  Goal: Absence of fever/infection during neutropenic period  Description: INTERVENTIONS:  - Monitor WBC    9/12/2022 1114 by Destiny Russell RN  Outcome: Progressing  9/12/2022 1113 by Destiny Russell RN  Outcome: Progressing     Problem: MOBILITY - ADULT  Goal: Maintain or return to baseline ADL function  Description: INTERVENTIONS:  -  Assess patient's ability to carry out ADLs; assess patient's baseline for ADL function and identify physical deficits which impact ability to perform ADLs (bathing, care of mouth/teeth, toileting, grooming, dressing, etc )  - Assess/evaluate cause of self-care deficits   - Assess range of motion  - Assess patient's mobility; develop plan if impaired  - Assess patient's need for assistive devices and provide as appropriate  - Encourage maximum independence but intervene and supervise when necessary  - Involve family in performance of ADLs  - Assess for home care needs following discharge   - Consider OT consult to assist with ADL evaluation and planning for discharge  - Provide patient education as appropriate  9/12/2022 1114 by Alondra Hunter RN  Outcome: Progressing  9/12/2022 1113 by Alondra Hunter RN  Outcome: Progressing  Goal: Maintains/Returns to pre admission functional level  Description: INTERVENTIONS:  - Perform BMAT or MOVE assessment daily    - Set and communicate daily mobility goal to care team and patient/family/caregiver  - Collaborate with rehabilitation services on mobility goals if consulted  - Perform Range of Motion  times a day  - Reposition patient every  hours    - Dangle patient  times a day  - Stand patient  times a day  - Ambulate patient  times a day  - Out of bed to chair  times a day   - Out of bed for ruthann times a day  - Out of bed for toileting  - Record patient progress and toleration of activity level   9/12/2022 1114 by Alondra Hunter RN  Outcome: Progressing  9/12/2022 1113 by Alondra Hunter RN  Outcome: Progressing

## 2022-09-13 ENCOUNTER — APPOINTMENT (OUTPATIENT)
Dept: RADIATION ONCOLOGY | Facility: HOSPITAL | Age: 77
End: 2022-09-13
Attending: INTERNAL MEDICINE
Payer: MEDICARE

## 2022-09-13 ENCOUNTER — HOSPITAL ENCOUNTER (OUTPATIENT)
Dept: INFUSION CENTER | Facility: HOSPITAL | Age: 77
End: 2022-09-13

## 2022-09-13 PROBLEM — T85.528A MIGRATION OF ESOPHAGEAL STENT: Status: ACTIVE | Noted: 2022-09-13

## 2022-09-13 LAB
ABO GROUP BLD BPU: NORMAL
ANION GAP SERPL CALCULATED.3IONS-SCNC: 4 MMOL/L (ref 4–13)
ANISOCYTOSIS BLD QL SMEAR: PRESENT
ATRIAL RATE: 45 BPM
BACTERIA BLD CULT: NORMAL
BACTERIA BLD CULT: NORMAL
BASOPHILS # BLD MANUAL: 0 THOUSAND/UL (ref 0–0.1)
BASOPHILS NFR MAR MANUAL: 0 % (ref 0–1)
BPU ID: NORMAL
BUN SERPL-MCNC: 6 MG/DL (ref 5–25)
C DIFF TOX GENS STL QL NAA+PROBE: NEGATIVE
CALCIUM SERPL-MCNC: 6.7 MG/DL (ref 8.3–10.1)
CAMPYLOBACTER DNA SPEC NAA+PROBE: NORMAL
CHLORIDE SERPL-SCNC: 109 MMOL/L (ref 96–108)
CO2 SERPL-SCNC: 23 MMOL/L (ref 21–32)
CREAT SERPL-MCNC: 0.54 MG/DL (ref 0.6–1.3)
CROSSMATCH: NORMAL
EOSINOPHIL # BLD MANUAL: 0.11 THOUSAND/UL (ref 0–0.4)
EOSINOPHIL NFR BLD MANUAL: 9 % (ref 0–6)
ERYTHROCYTE [DISTWIDTH] IN BLOOD BY AUTOMATED COUNT: 25.1 % (ref 11.6–15.1)
GFR SERPL CREATININE-BSD FRML MDRD: 101 ML/MIN/1.73SQ M
GLUCOSE SERPL-MCNC: 91 MG/DL (ref 65–140)
HCT VFR BLD AUTO: 24.9 % (ref 36.5–49.3)
HGB BLD-MCNC: 8.2 G/DL (ref 12–17)
LYMPHOCYTES # BLD AUTO: 0.18 THOUSAND/UL (ref 0.6–4.47)
LYMPHOCYTES # BLD AUTO: 15 % (ref 14–44)
MCH RBC QN AUTO: 31.4 PG (ref 26.8–34.3)
MCHC RBC AUTO-ENTMCNC: 32.9 G/DL (ref 31.4–37.4)
MCV RBC AUTO: 95 FL (ref 82–98)
MONOCYTES # BLD AUTO: 0.07 THOUSAND/UL (ref 0–1.22)
MONOCYTES NFR BLD: 6 % (ref 4–12)
NEUTROPHILS # BLD MANUAL: 0.82 THOUSAND/UL (ref 1.85–7.62)
NEUTS BAND NFR BLD MANUAL: 8 % (ref 0–8)
NEUTS SEG NFR BLD AUTO: 59 % (ref 43–75)
OVALOCYTES BLD QL SMEAR: PRESENT
P AXIS: 54 DEGREES
PLATELET # BLD AUTO: 20 THOUSANDS/UL (ref 149–390)
PLATELET BLD QL SMEAR: ABNORMAL
POIKILOCYTOSIS BLD QL SMEAR: PRESENT
POLYCHROMASIA BLD QL SMEAR: PRESENT
POTASSIUM SERPL-SCNC: 3.4 MMOL/L (ref 3.5–5.3)
PR INTERVAL: 188 MS
QRS AXIS: -12 DEGREES
QRSD INTERVAL: 86 MS
QT INTERVAL: 470 MS
QTC INTERVAL: 406 MS
RBC # BLD AUTO: 2.61 MILLION/UL (ref 3.88–5.62)
RBC MORPH BLD: PRESENT
SALMONELLA DNA SPEC QL NAA+PROBE: NORMAL
SCHISTOCYTES BLD QL SMEAR: PRESENT
SHIGA TOXIN STX GENE SPEC NAA+PROBE: NORMAL
SHIGELLA DNA SPEC QL NAA+PROBE: NORMAL
SODIUM SERPL-SCNC: 136 MMOL/L (ref 135–147)
T WAVE AXIS: -35 DEGREES
TARGETS BLD QL SMEAR: PRESENT
TOXIC GRANULES BLD QL SMEAR: PRESENT
UNIT DISPENSE STATUS: NORMAL
UNIT PRODUCT CODE: NORMAL
UNIT PRODUCT VOLUME: 350 ML
UNIT RH: NORMAL
VARIANT LYMPHS # BLD AUTO: 3 %
VENTRICULAR RATE: 45 BPM
WBC # BLD AUTO: 1.23 THOUSAND/UL (ref 4.31–10.16)

## 2022-09-13 PROCEDURE — 85027 COMPLETE CBC AUTOMATED: CPT

## 2022-09-13 PROCEDURE — 80048 BASIC METABOLIC PNL TOTAL CA: CPT

## 2022-09-13 PROCEDURE — 99232 SBSQ HOSP IP/OBS MODERATE 35: CPT | Performed by: HOSPITALIST

## 2022-09-13 PROCEDURE — 99222 1ST HOSP IP/OBS MODERATE 55: CPT | Performed by: INTERNAL MEDICINE

## 2022-09-13 PROCEDURE — 85007 BL SMEAR W/DIFF WBC COUNT: CPT

## 2022-09-13 RX ORDER — POTASSIUM CHLORIDE 14.9 MG/ML
20 INJECTION INTRAVENOUS ONCE
Status: COMPLETED | OUTPATIENT
Start: 2022-09-13 | End: 2022-09-14

## 2022-09-13 RX ORDER — POTASSIUM CHLORIDE 29.8 MG/ML
40 INJECTION INTRAVENOUS ONCE
Status: COMPLETED | OUTPATIENT
Start: 2022-09-13 | End: 2022-09-14

## 2022-09-13 RX ADMIN — METRONIDAZOLE 500 MG: 500 TABLET ORAL at 05:28

## 2022-09-13 RX ADMIN — CARBIDOPA AND LEVODOPA 2.5 MG: 50; 200 TABLET, EXTENDED RELEASE ORAL at 16:10

## 2022-09-13 RX ADMIN — POTASSIUM CHLORIDE 40 MEQ: 29.8 INJECTION, SOLUTION INTRAVENOUS at 09:15

## 2022-09-13 RX ADMIN — PHENOBARBITAL 64.8 MG: 64.8 TABLET ORAL at 16:10

## 2022-09-13 RX ADMIN — METRONIDAZOLE 500 MG: 500 TABLET ORAL at 16:10

## 2022-09-13 RX ADMIN — METRONIDAZOLE 500 MG: 500 TABLET ORAL at 21:20

## 2022-09-13 RX ADMIN — CALCIUM CARBONATE (ANTACID) CHEW TAB 500 MG 500 MG: 500 CHEW TAB at 09:10

## 2022-09-13 RX ADMIN — PANTOPRAZOLE SODIUM 40 MG: 40 TABLET, DELAYED RELEASE ORAL at 05:28

## 2022-09-13 RX ADMIN — CEFTRIAXONE 1000 MG: 1 INJECTION, POWDER, FOR SOLUTION INTRAMUSCULAR; INTRAVENOUS at 16:10

## 2022-09-13 RX ADMIN — POTASSIUM CHLORIDE 20 MEQ: 14.9 INJECTION, SOLUTION INTRAVENOUS at 17:51

## 2022-09-13 RX ADMIN — ASPIRIN 81 MG: 81 TABLET, COATED ORAL at 09:10

## 2022-09-13 RX ADMIN — CHOLESTYRAMINE 4 G: 4 POWDER, FOR SUSPENSION ORAL at 16:18

## 2022-09-13 RX ADMIN — CARBIDOPA AND LEVODOPA 2.5 MG: 50; 200 TABLET, EXTENDED RELEASE ORAL at 06:01

## 2022-09-13 RX ADMIN — ATORVASTATIN CALCIUM 80 MG: 80 TABLET, FILM COATED ORAL at 16:10

## 2022-09-13 RX ADMIN — PHENOBARBITAL 64.8 MG: 64.8 TABLET ORAL at 09:11

## 2022-09-13 RX ADMIN — CHOLESTYRAMINE 4 G: 4 POWDER, FOR SUSPENSION ORAL at 09:07

## 2022-09-13 RX ADMIN — SODIUM CHLORIDE 100 ML/HR: 0.9 INJECTION, SOLUTION INTRAVENOUS at 02:41

## 2022-09-13 NOTE — PLAN OF CARE
Problem: Potential for Falls  Goal: Patient will remain free of falls  Description: INTERVENTIONS:  - Educate patient/family on patient safety including physical limitations  - Instruct patient to call for assistance with activity   - Consult OT/PT to assist with strengthening/mobility   - Keep Call bell within reach  - Keep bed low and locked with side rails adjusted as appropriate  - Keep care items and personal belongings within reach  - Initiate and maintain comfort rounds  - Make Fall Risk Sign visible to staff  - Offer Toileting every  Hours, in advance of need  - Initiate/Maintain alarm  - Obtain necessary fall risk management equipment:   - Apply yellow socks and bracelet for high fall risk patients  - Consider moving patient to room near nurses station  Outcome: Progressing     Problem: INFECTION - ADULT  Goal: Absence or prevention of progression during hospitalization  Description: INTERVENTIONS:  - Assess and monitor for signs and symptoms of infection  - Monitor lab/diagnostic results  - Monitor all insertion sites, i e  indwelling lines, tubes, and drains  - Monitor endotracheal if appropriate and nasal secretions for changes in amount and color  - [place] appropriate cooling/warming therapies per order  - Administer medications as ordered  - Instruct and encourage patient and family to use good hand hygiene technique  - Identify and instruct in appropriate isolation precautions for identified infection/condition  Outcome: Progressing  Goal: Absence of fever/infection during neutropenic period  Description: INTERVENTIONS:  - Monitor WBC    Outcome: Progressing     Problem: MOBILITY - ADULT  Goal: Maintain or return to baseline ADL function  Description: INTERVENTIONS:  -  Assess patient's ability to carry out ADLs; assess patient's baseline for ADL function and identify physical deficits which impact ability to perform ADLs (bathing, care of mouth/teeth, toileting, grooming, dressing, etc )  - Assess/evaluate cause of self-care deficits   - Assess range of motion  - Assess patient's mobility; develop plan if impaired  - Assess patient's need for assistive devices and provide as appropriate  - Encourage maximum independence but intervene and supervise when necessary  - Involve family in performance of ADLs  - Assess for home care needs following discharge   - Consider OT consult to assist with ADL evaluation and planning for discharge  - Provide patient education as appropriate  Outcome: Progressing  Goal: Maintains/Returns to pre admission functional level  Description: INTERVENTIONS:  - Perform BMAT or MOVE assessment daily    - Set and communicate daily mobility goal to care team and patient/family/caregiver  - Collaborate with rehabilitation services on mobility goals if consulted  - Perform Range of Motion  times a day  - Reposition patient every  hours    - Dangle patient  times a day  - Stand patient  times a day  - Ambulate patient  times a day  - Out of bed to chair  times a day   - Out of bed for meal times a day  - Out of bed for toileting  - Record patient progress and toleration of activity level   Outcome: Progressing

## 2022-09-13 NOTE — CASE MANAGEMENT
Case Management Discharge Planning Note    Patient name Carmen Rodriguez  Location [address] 9[address]/[facility] 758-58 MRN [id number]  : 1945 Date 2022       Current Admission Date: 2022  Current Admission Diagnosis:Hypotension   Patient Active Problem List    Diagnosis Date Noted    Migration of esophageal stent 2022    Fever 2022    Fatigue 2022    Anemia 2022    Hypotension 2022    GERD (gastroesophageal reflux disease) 2022    Dizziness 2022    Vitamin B12 deficiency 2022    Port-A-Cath in place 08/10/2022    [facility] 2022    Dysphagia 2022    Pancytopenia ([facility] ) 2022    Acute gastric ulcer 2022    Esophageal cancer ([facility] ) 2022    Abdominal aortic aneurysm, without rupture ([facility] ) 02/10/2022    Paroxysmal atrial fibrillation ([facility] ) 02/10/2022    Coronary artery disease of native artery of native heart with stable angina pectoris ([facility] ) 02/10/2022    Ischemic cardiomyopathy 2021    Chronic combined systolic and diastolic CHF (congestive heart failure) ([facility] ) 2021    Allergic reaction to contrast media 2021    NSTEMI (non-ST elevated myocardial infarction) ([facility] ) 2021    Abdominal pain 2020    Hernia, incisional 2019    Ventral hernia with obstruction and without gangrene 2019    CAD (coronary artery disease) 2017    HTN (hypertension) 2017    HLD (hyperlipidemia) 2017    [facility] disorder [facility] ) 2017    S/P AAA (abdominal aortic aneurysm) repair 2017    STEMI (ST elevation myocardial infarction) ([facility] ) 2017      LOS (days): 5  Geometric Mean LOS (GMLOS) (days): 4 80  Days to GMLOS:-0 3     OBJECTIVE:  Risk of Unplanned Readmission Score: 29 07         Current admission status: Inpatient   Preferred Pharmacy:   [facility]/pharmacy #[id number] Velasco, [address] [address]  Phone: [phone] Fax: Jimbo Guillermo [address] [facility] [address] [facility] [address] [address] [address]  Phone: [phone] Fax: [phone]    Primary Care Provider: Joseph Dewey MD    Primary Insurance: MEDICARE  Secondary Insurance: [address] [place], [place] [address] DETAILS:          Additional Comments: Patient is not medically stable for discharge at this time  Blood cultures pending as is GI consult

## 2022-09-13 NOTE — RESTORATIVE TECHNICIAN NOTE
Restorative Technician Note      Patient Name: [facility]     Restorative Tech Visit Date: 9/13/2022  Note Type: Mobility  Patient Position Upon Consult: Supine  Activity Performed: Ambulated  Assistive Device: Roller walker  Patient Position at End of Consult: All needs within reach;  Supine      Hari Wilks  Restorative Technician

## 2022-09-13 NOTE — CONSULTS
Consult Service: Gastroenterology      PATIENT INFORMATION      Alyssa Ayers 67 y o  male MRN: [id number]  Unit/Bed#: [facility] 906-01 Encounter: [id number]  PCP: Columba Oneill MD  Date of Admission:  9/8/2022  Date of Consultation: 09/13/22  Requesting Physician: Mk Jernigan MD       ASSESSMENTS & PLAN   Alyssa Ayers is a 67 y o  old male with PMH of esophageal adenocarcinoma on chemotherapy, coronary artery disease, GERD, heart failure who presented with dizziness and fever and chills    Gastroenterology has been consulted for assistance with management of migrated esophageal stent    Esophageal adenocarcinoma  · Distal GE Junction S[place] type 1   · Diagnosed a few months ago when patient presented with dysphagia  · He had a 5cms tumor from 35-40 cms that was stented  · He was tolerating a regular diet after placement of esophageal stent  · He was then initated on chemotherapy and has received about 3 cycles  · His esophageal stent was present at the right position on CXR a few days ago 09/08 but CT chest done on 9/12 shows it migrated into the stomach  · This will need to be extracted non emergently with an EGD and will possibly have to place another stent if there is obstruction  · Currently he is tolerating a regular diet but we recommend he be on a surgical soft diet so that he does not develop dysphagia  · PPI once daily    Neutropenia  · Due to chemotherapy  · Associated with fevers at home and Tmax of 101 4  · Currently there is no indication for emergent endoscopy so we will await ANC to improve to avoid risk of infection    HISTORY OF PRESENT ILLNESS      Alyssa Ayers is a 67 y o  male who is originally admitted for dizziness and fevers and is being consulted for h/o esophageal adenocarcinoma  Patient currently denies any GI symptoms including dysphagia, heartburn, nausea, vomiting, abdominal pain, constipation, diarrhea, blood in stools    He was incidentally found to have migration of esophageal stent into stomach  REVIEW OF SYSTEMS     A thorough 12-point review of systems has been conducted  Pertinent positives and negatives are mentioned in the history of present illness  PAST MEDICAL & SURGICAL HISTORY      Past Medical History:   Diagnosis Date    Cardiac disease     [facility]est[facility]) [facility] )     Esophageal cancer [facility] )     Hernia of abdominal cavity     Myocardial infarction ([facility] )     last assessed 7/31/14, past, Pt had MI s/p AGUSTIN placed in 2001, refuses to take any other medications for his cardiac disease, only will take seizure med  Understands possible complications from this decision    Seizure [facility])        Past Surgical History:   Procedure Laterality Date    ABDOMINAL AORTIC ANEURYSM REPAIR      for dialation or occulsion    CATARACT EXTRACTION      IR PORT PLACEMENT  7/15/2022         MEDICATIONS & ALLERGIES       Medications:   Prior to Admission medications    Medication Sig Start Date End Date Taking?  Authorizing Provider   aspirin (Aspirin Low Dose) 81 mg EC tablet Take 1 tablet (81 mg total) by mouth daily 1/24/22  Yes Nimesh Myers, DO   atorvastatin (LIPITOR) 80 mg tablet Take 1 tablet (80 mg total) by mouth daily with dinner 3/11/22  Yes Sandie Last MD   PHENobarbital 64 8 mg tablet TAKE 1 TABLET (64 8 MG TOTAL) BY MOUTH 2 (TWO) TIMES A DAY 6/6/22 12/3/22 Yes Kelsy Johnson, DO   folic acid ( Folic Acid) 1 mg tablet Take 1 tablet (1 mg total) by mouth daily  Patient not taking: Reported on 9/8/2022 8/30/22   Polly Shall, DO   lidocaine-prilocaine (EMLA) cream Apply to port 30 to 60 min prior to use  Patient not taking: No sig reported 7/26/22   [place] Shall, DO   ondansetron (Zofran ODT) 8 mg disintegrating tablet Take 1 tablet (8 mg total) by mouth every 8 (eight) hours as needed for nausea or vomiting  Patient not taking: No sig reported 7/26/22   [place] Shall, DO   pantoprazole (PROTONIX) 40 mg tablet TAKE 1 TABLET BY MOUTH 2 TIMES A DAY BEFORE MEALS  Patient not taking: No sig reported 22   Ambrocio Araya DO   prochlorperazine (COMPAZINE) 10 mg tablet Take 1 tablet (10 mg total) by mouth every 6 (six) hours as needed for nausea or vomiting  Patient not taking: No sig reported 22   Roel Deleon DO       Allergies: Allergies   Allergen Reactions    Iodinated Diagnostic Agents Rash     Pt's skin get very red and he gets hot and chills    Clopidogrel Rash         SOCIAL HISTORY      Marital Status: Single    Substance Use History:   Social History     Substance and Sexual Activity   Alcohol Use Not Currently     Social History     Tobacco Use   Smoking Status Former Smoker    Quit date: 2005    Years since quittin 2   Smokeless Tobacco Never Used     Social History     Substance and Sexual Activity   Drug Use Never         FAMILY HISTORY      Non-Contributory      PHYSICAL EXAM     Vitals:   Blood Pressure: 111/62 (22)  Pulse: 97 (22)  Temperature: 99 8 °F (37 7 °C) (22)  Temp Source: Oral (22 1751)  Respirations: 18 (22)  Height: 6' (182 9 cm) (22 1100)  SpO2: 96 % (22 2030)    Physical Exam:   GENERAL: NAD  HEENT:  NC/AT, MMM  CARDIAC:  RRR, +S1/S2, no S3/S4 heard  PULMONARY:  CTA B/L, no wheezing/rales/rhonci, non-labored breathing  ABDOMEN:  Soft, NT/ND, +BS, no rebound/guarding/rigidity  DIGITAL RECTAL EXAM: not indicated   NEUROLOGIC:  Alert/oriented x3     SKIN:  No rashes or erythema       ADDITIONAL DATA     Lab Results:     Results from last 7 days   Lab Units 22  0527 22  0525 22  0841   WBC Thousand/uL 1 23*   < > 4 36   HEMOGLOBIN g/dL 8 2*   < > 7 8*   HEMATOCRIT % 24 9*   < > 23 7*   PLATELETS Thousands/uL 20*   < > 44*   NEUTROS PCT %  --   --  80*   LYMPHS PCT %  --   --  4*   LYMPHO PCT % 15   < >  --    MONOS PCT %  --   --  7   MONO PCT % 6   < >  --    EOS PCT % 9*   < > 7*    < > = values in this interval not displayed  Results from last 7 days   Lab Units 09/13/22  0527 09/09/22  0525 09/08/22  0841   POTASSIUM mmol/L 3 4*   < > 3 3*   CHLORIDE mmol/L 109*   < > 103   CO2 mmol/L 23   < > 26   BUN mg/dL 6   < > 12   CREATININE mg/dL 0 54*   < > 0 75   CALCIUM mg/dL 6 7*   < > 7 6*   ALK PHOS U/L  --   --  95   ALT U/L  --   --  12   AST U/L  --   --  14    < > = values in this interval not displayed  Imaging:    XR chest portable    Result Date: 9/12/2022  Narrative: CHEST INDICATION:   new fever, infectious concern  COMPARISON:  CXR 9/8/2022, PET CT 6/23/2022  Fluoroscopic image from port placement from 7/15/2022  EXAM PERFORMED/VIEWS:  XR CHEST PORTABLE FINDINGS:  Right port at cavoatrial junction  Cardiomediastinal silhouette appears unremarkable  Coronary stent  The lungs are clear  No pneumothorax or pleural effusion  Skinfold over left hemithorax  Osseous structures appear within normal limits for patient age  Question migration of esophageal stent below the diaphragm  Impression: No acute cardiopulmonary disease  Question migration of esophageal stent below the diaphragm when compared with the fluoroscopic image for port placement from 7/15/2022  The study was marked in [facility] for immediate notification  Workstation performed: [id number]     XR chest 2 views    Result Date: 9/8/2022  Narrative: CHEST INDICATION:   Hypotension  COMPARISON:  Chest radiograph from July 11, 2022 EXAM PERFORMED/VIEWS:  XR CHEST PA & LATERAL FINDINGS:  Right chest port catheter tip terminating within the lower SVC  There is a stent that extends from the lower esophagus into the proximal stomach  Normal heart size and aortic calcifications  There is bibasilar scarring and atelectasis  No consolidation or pulmonary edema  No pneumothorax or pleural effusion  There are degenerative changes of the imaged spine  The osseous structures are otherwise unremarkable  Impression: 1  Bibasilar scarring/atelectasis   2  Esophageal stent extending from the lower esophagus and terminating within the proximal stomach  Workstation performed: [id number]     CT chest wo contrast    Result Date: 9/13/2022  Narrative: CT CHEST WITHOUT IV CONTRAST INDICATION:   Cough, persistent r/o pneumonia, fever of unknown origin  Per my review of the medical record, the patient  was diagnosed with esophageal cancer in May 2022, treated with chemoradiation  COMPARISON:  CXR 9/11/2022, chest and abdomen CT 5/2/2022  TECHNIQUE: Chest CT without intravenous contrast   Axial, sagittal, coronal 2D reformats and coronal MIPS from source data  Radiation dose length product (DLP):  425 14 mGy-cm   Radiation dose exposure minimized using iterative reconstruction and automated exposure control  FINDINGS: LUNGS:  No pneumonia  Mild dependent atelectasis in both lower lobes  AIRWAYS: No significant filling defects  Redemonstration of mild bilateral lower lobe bronchiectasis  PLEURA:  Trace effusions  HEART/GREAT VESSELS:  Normal heart size  Mild coronary artery calcification indicating atherosclerotic heart disease  Coronary stents  MEDIASTINUM AND VINNIE: Thickening of the distal esophagus due to malignancy  Right port at cavoatrial junction  CHEST WALL AND LOWER NECK: Unremarkable  UPPER ABDOMEN:  Esophageal stent has migrated into the stomach as on recent chest radiograph  Hepatic cysts  Redemonstration of partially imaged nonobstructed transverse colon in a ventral hernia  OSSEOUS STRUCTURES: Osteopenia with mild degenerative disease and old compression deformities in the spine  Impression: No pneumonia  Mild bronchiectasis in the lower lobes which could be due to recurrent aspiration  Trace effusions with mild dependent atelectasis in the lower lobes  Migration of esophageal stent into the stomach  Nonobstructed transverse colon in a ventral hernia    Workstation performed: [id number]       EKG, Pathology, and Other Studies Reviewed on Admission: · EKG: Reviewed      Counseling / Coordination of Care Time: 30 total mins spent n consult  Greater than 50% of total time spent on patient counseling and coordination of care  Don Gamino MD   PGY-5,   Gastroenterology         ** Please Note: This note is constructed using a voice recognition dictation system   ** n

## 2022-09-13 NOTE — PROGRESS NOTES
INTERNAL MEDICINE RESIDENCY PROGRESS NOTE     Name: Torrie Weeks   Age & Sex: 67 y o  male   MRN: [id number]  Unit/Bed#: [facility] 906-01   Encounter: [id number]  Team: SOD Team A    PATIENT INFORMATION     Name: Torrie Weeks   Age & Sex: 67 y o  male   MRN: [id number]  Hospital Stay Days: 5    ASSESSMENT/PLAN     Principal Problem:    Hypotension  Active Problems:    Esophageal cancer ([facility] [address] )    Fever    Pancytopenia (HCC)    Thrombocytopenia (HCC)    Anemia    HLD (hyperlipidemia)    Seizure disorder (HCC)    Ventral hernia with obstruction and without gangrene    Chronic combined systolic and diastolic CHF (congestive heart failure) (HCC)    Allergic reaction to contrast media    Paroxysmal atrial fibrillation ([facility] [address] )    Coronary artery disease of native artery of native heart with stable angina pectoris (HCC)    Fatigue    GERD (gastroesophageal reflux disease)    Dizziness      * Hypotension  Assessment & Plan  Upon presentation patient hypotensive in the 08D to 80 systolic  This was in the setting of a recent increase in his WBC count from 1 3 on 9/6 to 4 3 on this admission in the setting of her recent granix infusion 9/7  There was a concern for sepsis this admission and the patient received a total of 3 L crystalloid in the ED with slight improvement of his blood pressure  Lactic acid 1 1  Upon chart review, the patient was recently seen in the outpatient setting 9/1 and was noted to be hypotensive to the 89B to 61A systolic as well, with improvement when the blood pressure was taken on the right arm  Patient does not technically meet sepsis criteria however the increase in WBC count from baseline does not seem to be explained only from his recent Granix infusion  Can not rule out sepsis at this time      UA and 4 blood cultures with no signs of infection  Chest XR like clear with no signs of pneumonia, no widened mediastinum, no chest pain or back pain      Plan:  · Midodrine 2 5 t i d   · Patient was started on ceftriaxone in the ED, will continue at this time with q24 dosing but consider discontinuing the patient continues be hemodynamically stable or if blood cultures negative  · Stool O&P pending  · If patient does become hypotensive, recommend repeating blood pressures on both arms  · IVF increased to 100cc/hr  · Track orthostatic BP  · Knee-high compression stockings  · Strict I/Os and retention protocol          Fever  Assessment & Plan  Patient is spiking fevers 101° at night of September 10th, 11th, 12th  UA show no sign infection  X-ray 9/8 and 9/10 show no consolidation  9/8 x2 blood cultures and 9/10 x2 blood cultures  show no growth to date  CT chest showed no pneumonia, mild bronchiectasis in lower lobes likely from recurrent aspiration, trace effusions mi atelectasis in lower lobes    Plan: Will repeat blood cultures checking for fungal infection, separate cultures at time of fever, and cultures from port  Continue p r n  Tylenol    Esophageal cancer [facility])  Assessment & Plan  Patient with stage II B esophageal cancer, 1st discovered in 05/2022 to via EUS in verified by biopsy  No metastatic disease per  PET-CT scan performed on 06/2022  Has undergone carboplatin pack with Taxol chemotherapy as well as radiotherapy  Followed by heme Onc as an outpatient  Presenting today from his radiation oncology appointments and did not receive his scheduled radiation therapy today  CT 9/12 shows migration of esophageal stent in the stomach    Plan:  · Continue outpatient follow-up    Pancytopenia [facility])  Assessment & Plan  Patient with history of anemia, leukopenia, and thrombocytopenia in the setting of adenocarcinoma  Values on admission, hemoglobin of 7 8, WBCs of 4 36, platelets of 44, trending down over the past few months  Likely secondary to recent chemo    No signs of acute bleeding at this time but can not rule out a slow upper GI bleed in the setting of his esophageal adenocarcinoma and radiation  Plan:  · Transfuse hemoglobin for less than 7  · Transfused 9/9 1 unit- reaction of febrile, rash-resolved with benadryl and tylenol  · Hemoglobin 8 3 this morning (9/10)  · Transfuse platelets for less than 30 in the setting of likely mild upper GI bleed  · Increase antibiotic coverage for ANC < 500  · Patient currently on Granix for leukopenia  · Consider heme Onc consult if patient does not improve    Thrombocytopenia ([facility] [address] )  Assessment & Plan  Platelets of 44 on admission, gradually trending down from 140s 1 year ago  Plan:  · Holding DVT prophylaxis due to thrombocytopenia  · No signs of severe bleeding, hold off on platelet transfusion at this point  · Recommend transfusion this patient for platelets less than 30 in the setting of possible slow upper GI bleed  · Repeat CBC with manual diff in the a m  · Continue to monitor for hemodynamic stability and for any signs of bleeding    Anemia  Assessment & Plan  Normocytic anemia  Iron panel done 08/2022 with iron sat 50, TIBC 184, Fe 92, Ferritin 88  Baseline hemoglobin of around 12 from 07/2021  Gradually downtrending since then  Follows with heme Onc, has a documented history of folic acid and W26 deficiency for which she is receiving IM B12 supplementation and was prescribed folate p o  Supplementation which he is not taking  His overall anemia possibly combined microcytic and macrocytic anemia secondary to gradual blood loss from his esophageal cancer and FA/B12 deficiency  No signs of acute blood loss at this time  Anemia is most likely contributing to the patient's chief concerns of fatigue and dizziness  Hemoglobin on admission 7 8 from 8 7 two weeks ago       Last B12 supplementation 09/06/2022  Latest hemoglobin, 6 7 -- given 1 unit of irradiated leukoreduced RBCs  9/12 Hb 7 5 the patient continues to poor being fatigued  9/13 Hb 8 2 post 1 packed red blood cells and patient reports improvement of fatigue symptoms    Plan    · Follow-up with H&H after transfusion   · Will hold off iron infusion in the setting of possible infection  · Consider p o  Iron supplementation upon discharge  · Folic acid supplementation  · Consider outpatient follow-up with heme Onc if condition does not improve        Dizziness  Assessment & Plan  Patient describes a vertiginous "dizziness" that comes on when he stands up quickly  Denies any blacking out, presyncope or syncope  He is able to avoid the dizziness feeling if he gets up slowly  No exacerbating symptoms  Likely multifactorial, secondary to his hypotension and anemia  Plan:  · See anemia plan  · See hypotension plan    GERD (gastroesophageal reflux disease)  Assessment & Plan  Patient reports a history of GERD, likely the etiology behind his cough  Was prescribed pantoprazole 40 mg b i d  But reports not taking this medication  Plan:  · Will start him on pantoprazole 40 mg daily here    Fatigue  Assessment & Plan  Patient with a chief concern of subacute worsening fatigue in setting of esophageal adenocarcinoma, recent chemo, and continued radiation therapy  Patient anemic, TSH low normal  Likely multifactorial expect his anemia may be the largest contributing factor  Plan:  · See plan for anemia          Coronary artery disease of native artery of native heart with stable angina pectoris [facility])  Assessment & Plan  Patient with a history of CAD status post AGUSTIN x4, has follow-up with cardiology as an outpatient  Most recently completed 6 months of dual antiplatelet therapy  Currently on baby aspirin and atorvastatin daily  No chest pain  Troponins negative  Plan  · Continue aspirin, statin for secondary prevention      Paroxysmal atrial fibrillation [facility])  Assessment & Plan  Documented Hx of paroxysmal atrial fibrillation, Julissa Vasc of 5   Previously on warfarin for left apical thrombus, however has since been discontinued by his cardiologist   Last seen by outpatient Cardiology 06/2022  Currently normal sinus in the ED      Plan:  · Patient not currently on any home beta-blocker regiment  · Avoiding beta-blockers in the setting of hypotension  · F/u outpatient      Allergic reaction to contrast media  Assessment & Plan  Of note, patient has documented reaction to contrast media  Chronic combined systolic and diastolic CHF (congestive heart failure) (HCC)  Assessment & Plan  Wt Readings from Last 3 Encounters:   09/01/22 84 8 kg (187 lb)   08/30/22 84 8 kg (187 lb)   08/29/22 84 1 kg (185 lb 6 5 oz)     Documented history of chronic combined systolic and diastolic heart failure  Last echo performed 03/15/2022 showed LVEF 50% with G1 DD, apical akinesis, T BMP with mild regurg  Plan:  · Patient takes no diuretics or beta-blockers at baseline  · Patient continues to appear dry on exam today  · Chest x-ray not suggestive of fluid overload  · Continue to monitor with strict I&Os and daily weights  · Outpatient follow-up        Ventral hernia with obstruction and without gangrene  Assessment & Plan  Patient has a ventral hernia initially diagnosed 2005  He reportedly had followed up with surgery regarding this and was offered surgery but he refused  He reports intermittent constipation that he treats with MiraLax at home but denies obstipation  Plan:  · Continue MiraLax  · Expectant management at this time    Seizure disorder [facility])  Assessment & Plan  Patient with reported history of seizures diagnosed roughly 40-50 years ago  Has been taking it b i d  Phenobarbital for this chronically  No seizure activity in the last 40 years  Plan:  · Continue phenobarbital    HLD (hyperlipidemia)  Assessment & Plan  Continue statin         SUBJECTIVE     Patient seen and examined  No acute events overnight  Patient reports improvement fatigue posttransfusion blood  Also states having a bowel movement that was unremarkable  He denies any recurrent fevers    He reports good appetite and is able to ambulate without difficulty  Review of Systems   Constitutional: Negative for chills, fatigue and fever  Respiratory: Negative for shortness of breath  Cardiovascular: Negative for chest pain and leg swelling  Gastrointestinal: Negative for abdominal pain, blood in stool, constipation, diarrhea, nausea and vomiting  Genitourinary: Negative for difficulty urinating  Neurological: Negative for dizziness  OBJECTIVE     Vitals:    22 1630 22 1843 22 2030 22 2307   BP: 118/68   111/62   Pulse: 79   97   Resp: 17   18   Temp: 98 5 °F (36 9 °C)   99 8 °F (37 7 °C)   TempSrc:       SpO2:  96% 96%    Height:          Temperature:   Temp (24hrs), Av 7 °F (37 1 °C), Min:98 1 °F (36 7 °C), Max:99 8 °F (37 7 °C)    Temperature: 99 8 °F (37 7 °C)  Intake & Output:  I/O        0701   0700  0701   0700  0701   0700    P  O  240 330     I V  1561 7      Blood  377 7     Total Intake 1801 7 707 7     Urine 1725 2600     Stool       Total Output 1725 2600     Net +76 7 -1892 3            Unmeasured Urine Occurrence 1 x          Weights:   IBW (Ideal Body Weight): 77 6 kg    Body mass index is 25 36 kg/m²  Weight (last 2 days)     None        Physical Exam  Constitutional:       General: He is not in acute distress  Appearance: Normal appearance  He is not ill-appearing or toxic-appearing  HENT:      Head: Normocephalic and atraumatic  Cardiovascular:      Rate and Rhythm: Normal rate and regular rhythm  Pulses: Normal pulses  Heart sounds: No murmur heard  No friction rub  No gallop  Pulmonary:      Effort: Pulmonary effort is normal  No respiratory distress  Breath sounds: No wheezing or rales  Abdominal:      General: Abdomen is flat  There is no distension  Palpations: Abdomen is soft  Tenderness: There is no abdominal tenderness  There is no guarding  Hernia: A hernia is present  Musculoskeletal:      Right lower leg: No edema  Left lower leg: No edema  Neurological:      Mental Status: He is alert and oriented to person, place, and time  Psychiatric:         Mood and Affect: Mood normal          Behavior: Behavior normal        LABORATORY DATA     Labs: I have personally reviewed pertinent reports  Results from last 7 days   Lab Units 09/13/22  0527 09/12/22  0504 09/11/22  0515 09/09/22  0525 09/08/22  0841   WBC Thousand/uL 1 23* 1 22*  1 22* 1 24*   < > 4 36   HEMOGLOBIN g/dL 8 2* 7 5*  7 5* 7 5*   < > 7 8*   HEMATOCRIT % 24 9* 23 0*  23 0* 22 7*   < > 23 7*   PLATELETS Thousands/uL 20* 25*  25* 28*   < > 44*   NEUTROS PCT %  --   --   --   --  80*   MONOS PCT %  --   --   --   --  7   MONO PCT % 6 0* 2*   < >  --     < > = values in this interval not displayed  Results from last 7 days   Lab Units 09/13/22  0527 09/12/22  1253 09/12/22  0504 09/11/22  0000 09/09/22  0525 09/08/22  0841   POTASSIUM mmol/L 3 4* 3 1* 3 0* 3 1*   < > 3 3*   CHLORIDE mmol/L 109*  --  107 109*   < > 103   CO2 mmol/L 23  --  24 23   < > 26   BUN mg/dL 6  --  7 8   < > 12   CREATININE mg/dL 0 54*  --  0 59* 0 59*   < > 0 75   CALCIUM mg/dL 6 7*  --  6 9* 6 8*   < > 7 6*   ALK PHOS U/L  --   --   --   --   --  95   ALT U/L  --   --   --   --   --  12   AST U/L  --   --   --   --   --  14    < > = values in this interval not displayed  Results from last 7 days   Lab Units 09/08/22  1217   LACTIC ACID mmol/L 1 1           IMAGING & DIAGNOSTIC TESTING     Radiology Results: I have personally reviewed pertinent reports  XR chest portable    Result Date: 9/12/2022  Impression: No acute cardiopulmonary disease  Question migration of esophageal stent below the diaphragm when compared with the fluoroscopic image for port placement from 7/15/2022  The study was marked in [facility] for immediate notification   Workstation performed: [id number]     XR chest 2 views    Result Date: 9/8/2022  Impression: 1  Bibasilar scarring/atelectasis  2   Esophageal stent extending from the lower esophagus and terminating within the proximal stomach  Workstation performed: [id number]     CT chest wo contrast    Result Date: 9/13/2022  Impression: No pneumonia  Mild bronchiectasis in the lower lobes which could be due to recurrent aspiration  Trace effusions with mild dependent atelectasis in the lower lobes  Migration of esophageal stent into the stomach  Nonobstructed transverse colon in a ventral hernia  Workstation performed: [id number]     Other Diagnostic Testing: I have personally reviewed pertinent reports      ACTIVE MEDICATIONS     Current Facility-Administered Medications   Medication Dose Route Frequency    acetaminophen (TYLENOL) tablet 650 mg  650 mg Oral Q6H PRN    aspirin (ECOTRIN LOW STRENGTH) EC tablet 81 mg  81 mg Oral Daily    atorvastatin (LIPITOR) tablet 80 mg  80 mg Oral Daily With Dinner    calcium carbonate (TUMS) chewable tablet 500 mg  500 mg Oral Daily    cefTRIAXone (ROCEPHIN) 1,000 mg in dextrose 5 % 50 mL IVPB  1,000 mg Intravenous Q24H    cholestyramine sugar free (QUESTRAN LIGHT) packet 4 g  4 g Oral BID    diphenhydrAMINE (BENADRYL) tablet 25 mg  25 mg Oral Q6H PRN    metroNIDAZOLE (FLAGYL) tablet 500 mg  500 mg Oral Q8H [address]    midodrine (PROAMATINE) tablet 2 5 mg  2 5 mg Oral TID AC    ondansetron (ZOFRAN) injection 4 mg  4 mg Intravenous Q6H PRN    pantoprazole (PROTONIX) EC tablet 40 mg  40 mg Oral Early Morning    PHENobarbital tablet 64 8 mg  64 8 mg Oral BID    potassium chloride 20 mEq IVPB (premix)  20 mEq Intravenous Once    potassium chloride 40 mEq IVPB (premix)  40 mEq Intravenous Once    senna (SENOKOT) tablet 8 6 mg  1 tablet Oral HS PRN    sodium chloride 0 9 % infusion  100 mL/hr Intravenous Continuous       VTE Pharmacologic Prophylaxis: Reason for no pharmacologic prophylaxis Holding for pancytopenia  VTE Mechanical Prophylaxis: sequential compression device    Portions of the record may have been created with voice recognition software  Occasional wrong word or "sound a like" substitutions may have occurred due to the inherent limitations of voice recognition software    Read the chart carefully and recognize, using context, where substitutions have occurred   ==  Faye Romero, [employer]  Internal Medicine Residency PGY-1

## 2022-09-13 NOTE — RESTORATIVE TECHNICIAN NOTE
Restorative Technician Note      Patient Name: Favian Mckay     Restorative Tech Visit Date: 9/13/2022  Note Type: Mobility  Patient Position Upon Consult: Bedside chair  Activity Performed: Ambulated  Assistive Device: Roller walker  Patient Position at End of Consult: Bedside chair;  All needs within [facility]

## 2022-09-13 NOTE — PLAN OF CARE

## 2022-09-14 ENCOUNTER — APPOINTMENT (OUTPATIENT)
Dept: RADIATION ONCOLOGY | Facility: HOSPITAL | Age: 77
End: 2022-09-14
Attending: RADIOLOGY
Payer: MEDICARE

## 2022-09-14 ENCOUNTER — HOSPITAL ENCOUNTER (OUTPATIENT)
Dept: INFUSION CENTER | Facility: HOSPITAL | Age: 77
End: 2022-09-14
Attending: INTERNAL MEDICINE

## 2022-09-14 DIAGNOSIS — E53.8 VITAMIN B12 DEFICIENCY: Primary | ICD-10-CM

## 2022-09-14 LAB
ANION GAP SERPL CALCULATED.3IONS-SCNC: 4 MMOL/L (ref 4–13)
ANISOCYTOSIS BLD QL SMEAR: PRESENT
BASOPHILS # BLD MANUAL: 0.03 THOUSAND/UL (ref 0–0.1)
BASOPHILS NFR MAR MANUAL: 2 % (ref 0–1)
BUN SERPL-MCNC: 5 MG/DL (ref 5–25)
CALCIUM SERPL-MCNC: 7.4 MG/DL (ref 8.3–10.1)
CHLORIDE SERPL-SCNC: 108 MMOL/L (ref 96–108)
CO2 SERPL-SCNC: 25 MMOL/L (ref 21–32)
CREAT SERPL-MCNC: 0.56 MG/DL (ref 0.6–1.3)
EOSINOPHIL # BLD MANUAL: 0.31 THOUSAND/UL (ref 0–0.4)
EOSINOPHIL NFR BLD MANUAL: 18 % (ref 0–6)
ERYTHROCYTE [DISTWIDTH] IN BLOOD BY AUTOMATED COUNT: 25.1 % (ref 11.6–15.1)
FERRITIN SERPL-MCNC: 584 NG/ML (ref 8–388)
FOLATE SERPL-MCNC: 2 NG/ML (ref 3.1–17.5)
GFR SERPL CREATININE-BSD FRML MDRD: 99 ML/MIN/1.73SQ M
GLUCOSE SERPL-MCNC: 95 MG/DL (ref 65–140)
HCT VFR BLD AUTO: 27 % (ref 36.5–49.3)
HGB BLD-MCNC: 9 G/DL (ref 12–17)
IRON SATN MFR SERPL: 55 % (ref 20–50)
IRON SERPL-MCNC: 73 UG/DL (ref 65–175)
LYMPHOCYTES # BLD AUTO: 0.63 THOUSAND/UL (ref 0.6–4.47)
LYMPHOCYTES # BLD AUTO: 37 % (ref 14–44)
MCH RBC QN AUTO: 31.9 PG (ref 26.8–34.3)
MCHC RBC AUTO-ENTMCNC: 33.3 G/DL (ref 31.4–37.4)
MCV RBC AUTO: 96 FL (ref 82–98)
MONOCYTES # BLD AUTO: 0.07 THOUSAND/UL (ref 0–1.22)
MONOCYTES NFR BLD: 4 % (ref 4–12)
NEUTROPHILS # BLD MANUAL: 0.51 THOUSAND/UL (ref 1.85–7.62)
NEUTS BAND NFR BLD MANUAL: 5 % (ref 0–8)
NEUTS SEG NFR BLD AUTO: 25 % (ref 43–75)
PLATELET # BLD AUTO: 21 THOUSANDS/UL (ref 149–390)
PLATELET BLD QL SMEAR: ABNORMAL
POIKILOCYTOSIS BLD QL SMEAR: PRESENT
POTASSIUM SERPL-SCNC: 3.7 MMOL/L (ref 3.5–5.3)
RBC # BLD AUTO: 2.82 MILLION/UL (ref 3.88–5.62)
RBC MORPH BLD: PRESENT
SCHISTOCYTES BLD QL SMEAR: PRESENT
SMUDGE CELLS BLD QL SMEAR: PRESENT
SODIUM SERPL-SCNC: 137 MMOL/L (ref 135–147)
TIBC SERPL-MCNC: 133 UG/DL (ref 250–450)
VARIANT LYMPHS # BLD AUTO: 9 %
VIT B12 SERPL-MCNC: 690 PG/ML (ref 100–900)
WBC # BLD AUTO: 1.7 THOUSAND/UL (ref 4.31–10.16)

## 2022-09-14 PROCEDURE — 80048 BASIC METABOLIC PNL TOTAL CA: CPT

## 2022-09-14 PROCEDURE — 83540 ASSAY OF IRON: CPT | Performed by: INTERNAL MEDICINE

## 2022-09-14 PROCEDURE — 82728 ASSAY OF FERRITIN: CPT | Performed by: INTERNAL MEDICINE

## 2022-09-14 PROCEDURE — 85027 COMPLETE CBC AUTOMATED: CPT

## 2022-09-14 PROCEDURE — 83550 IRON BINDING TEST: CPT | Performed by: INTERNAL MEDICINE

## 2022-09-14 PROCEDURE — 99222 1ST HOSP IP/OBS MODERATE 55: CPT | Performed by: INTERNAL MEDICINE

## 2022-09-14 PROCEDURE — 99232 SBSQ HOSP IP/OBS MODERATE 35: CPT | Performed by: HOSPITALIST

## 2022-09-14 PROCEDURE — 82746 ASSAY OF FOLIC ACID SERUM: CPT | Performed by: INTERNAL MEDICINE

## 2022-09-14 PROCEDURE — 85007 BL SMEAR W/DIFF WBC COUNT: CPT

## 2022-09-14 PROCEDURE — 82607 VITAMIN B-12: CPT | Performed by: INTERNAL MEDICINE

## 2022-09-14 RX ADMIN — CARBIDOPA AND LEVODOPA 2.5 MG: 50; 200 TABLET, EXTENDED RELEASE ORAL at 11:25

## 2022-09-14 RX ADMIN — METRONIDAZOLE 500 MG: 500 TABLET ORAL at 05:20

## 2022-09-14 RX ADMIN — CALCIUM CARBONATE (ANTACID) CHEW TAB 500 MG 500 MG: 500 CHEW TAB at 08:35

## 2022-09-14 RX ADMIN — ASPIRIN 81 MG: 81 TABLET, COATED ORAL at 08:35

## 2022-09-14 RX ADMIN — METRONIDAZOLE 500 MG: 500 TABLET ORAL at 15:49

## 2022-09-14 RX ADMIN — ATORVASTATIN CALCIUM 80 MG: 80 TABLET, FILM COATED ORAL at 17:26

## 2022-09-14 RX ADMIN — CARBIDOPA AND LEVODOPA 2.5 MG: 50; 200 TABLET, EXTENDED RELEASE ORAL at 17:26

## 2022-09-14 RX ADMIN — PANTOPRAZOLE SODIUM 40 MG: 40 TABLET, DELAYED RELEASE ORAL at 05:16

## 2022-09-14 RX ADMIN — PHENOBARBITAL 64.8 MG: 64.8 TABLET ORAL at 17:26

## 2022-09-14 RX ADMIN — CARBIDOPA AND LEVODOPA 2.5 MG: 50; 200 TABLET, EXTENDED RELEASE ORAL at 08:35

## 2022-09-14 RX ADMIN — METRONIDAZOLE 500 MG: 500 TABLET ORAL at 22:17

## 2022-09-14 RX ADMIN — CHOLESTYRAMINE 4 G: 4 POWDER, FOR SUSPENSION ORAL at 17:26

## 2022-09-14 RX ADMIN — CEFTRIAXONE 1000 MG: 1 INJECTION, POWDER, FOR SOLUTION INTRAMUSCULAR; INTRAVENOUS at 17:26

## 2022-09-14 RX ADMIN — PHENOBARBITAL 64.8 MG: 64.8 TABLET ORAL at 08:35

## 2022-09-14 RX ADMIN — SODIUM CHLORIDE 100 ML/HR: 0.9 INJECTION, SOLUTION INTRAVENOUS at 01:15

## 2022-09-14 NOTE — PLAN OF CARE
Problem: INFECTION - ADULT  Goal: Absence or prevention of progression during hospitalization  Description: INTERVENTIONS:  - Assess and monitor for signs and symptoms of infection  - Monitor lab/diagnostic results  - Monitor all insertion sites, i e  indwelling lines, tubes, and drains  - Monitor endotracheal if appropriate and nasal secretions for changes in amount and color  - [place] appropriate cooling/warming therapies per order  - Administer medications as ordered  - Instruct and encourage patient and family to use good hand hygiene technique  - Identify and instruct in appropriate isolation precautions for identified infection/condition  Outcome: Progressing  Goal: Absence of fever/infection during neutropenic period  Description: INTERVENTIONS:  - Monitor WBC    Outcome: Progressing

## 2022-09-14 NOTE — PROGRESS NOTES
Spiritual Care Progress Note    22  Patient: Molly Albrecht : 1945  Admission Date & Time: 2022 3310  MRN: [id number] CSN: [id number]      Father Sander Gabriel visited with pt  Chaplains remain available       22 1413   Clinical Encounter Type   Visited With Patient                  Chaplaincy Interventions Utilized:       Exploration: Explored spiritual needs & resources

## 2022-09-14 NOTE — RESTORATIVE TECHNICIAN NOTE
Restorative Technician Note      Patient Name: Sage South     [facility] Visit Date: 9/14/2022  Note Type: Mobility  Patient Position Upon Consult: Supine  Activity Performed: Ambulated  Assistive Device: Roller walker  Patient Position at End of Consult: Supine;  All needs within [facility]

## 2022-09-14 NOTE — RESTORATIVE TECHNICIAN NOTE
Restorative Technician Note      Patient Name: Farhana Mac     Restorative Tech Visit Date: 9/14/2022  Note Type: Mobility  Patient Position Upon Consult: Supine  Activity Performed: Ambulated  Assistive Device: Roller walker  Patient Position at End of Consult: Supine;  All needs within [facility]

## 2022-09-14 NOTE — PLAN OF CARE
Problem: Potential for Falls  Goal: Patient will remain free of falls  Description: INTERVENTIONS:  - Educate patient/family on patient safety including physical limitations  - Instruct patient to call for assistance with activity   - Consult OT/PT to assist with strengthening/mobility   - Keep Call bell within reach  - Keep bed low and locked with side rails adjusted as appropriate  - Keep care items and personal belongings within reach  - Initiate and maintain comfort rounds  - Apply yellow socks and bracelet for high fall risk patients  - Consider moving patient to room near nurses station  Outcome: Progressing     Problem: INFECTION - ADULT  Goal: Absence or prevention of progression during hospitalization  Description: INTERVENTIONS:  - Assess and monitor for signs and symptoms of infection  - Monitor lab/diagnostic results  - Monitor all insertion sites, i e  indwelling lines, tubes, and drains  - Monitor endotracheal if appropriate and nasal secretions for changes in amount and color  - [place] appropriate cooling/warming therapies per order  - Administer medications as ordered  - Instruct and encourage patient and family to use good hand hygiene technique  - Identify and instruct in appropriate isolation precautions for identified infection/condition  Outcome: Progressing

## 2022-09-14 NOTE — CONSULTS
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 - [id number]    8/15/2022 - start weekly carbo/taxol/RT    Week 2 held due to cytopenias - FA and b12 deficiencies found and replacement started    Week 3 dose reducation of 20% for taxol, carbo AUC 1 5         Esophageal cancer ([facility] [place] [address] )   5/2/2022 Initial Diagnosis    Esophageal adenocarcinoma ([facility] [place] [address] )     5/4/2022 Biopsy    Esophagus, lower esophageal bx:  - Poorly differentiated carcinoma, consistent with adenocarcinoma         5/18/2022 -  Cancer Staged    Staging form: Esophagus - Adenocarcinoma, AJCC 8th Edition  - Clinical stage from 5/18/2022: Stage IIB (cT2, cN0, cM0, G3) - Signed by Nabil Carver MD on 7/5/2022  Total positive nodes: 0  Histologic grading system: 3 grade system  HER2 status: Negative  Clinical staging modalities: Biopsy, EUS, Endoscopy, PET/CT       8/15/2022 -  Chemotherapy    CARBOplatin (PARAPLATIN) IVPB (GOG AUC DOSING), 237 6 mg, Intravenous, Once, 3 of 6 cycles  Administration: 237 6 mg (8/15/2022), 190 05 mg (8/29/2022)  PACLItaxel (TAXOL) chemo IVPB, 50 mg/m2 = 106 2 mg, Intravenous, Once, 3 of 6 cycles  Dose modification: 40 mg/m2 (original dose 50 mg/m2, Cycle 3, Reason: Neutropenia, Comment: 20% dose reduction due to neutropenia)  Administration: 106 2 mg (8/15/2022), 84 6 mg (8/29/2022)  filgrastim (NEUPOGEN) injection Soln, 480 mcg (100 % of original dose 480 mcg), Subcutaneous, Once, 0 of 1 cycle  Dose modification: 480 mcg (original dose 480 mcg, Cycle 4)  tbo-filgrastim (GRANIX), 480 mcg (100 % of original dose 480 mcg), Subcutaneous, Once, 1 of 2 cycles  Dose modification: 480 mcg (original dose 480 mcg, Cycle 2), 480 mcg (original dose 480 mcg, Cycle 4)  Administration: 480 mcg (8/22/2022) History of Presenting Illness: 66 yo male known to me for a diagnosis of esophageal cancer, T2N0, on combined carbo/taxol/RT  He has missed doses of chemo an RT due to pancytopenia  He was admitted on 22 with hypotension, weakness, and decreased PO intake  On CT, his esophageal stent had migrated into his stomach  He was having abd pain on admission that has since resolved  He denies any N/V  His CBC today shows ANC = 0 51, plt - 21 and hgb = 9 with MCV = 96  He denies any bleeding  He will likely be discharged in the next 1-2 days  Review of Systems - As stated in the HPI otherwise the fourteen point review of systems was negative  ECOG PS: 1    Past Medical History:   Diagnosis Date    Cardiac disease     CHF [facility]congestive heart failure) [facility] [address] [facility]     Esophageal cancer ([facility] [address] )     Hernia of abdominal cavity     Myocardial infarction ([facility] [address] )     last assessed 14, past, Pt had MI s/p AGUSTIN placed in , refuses to take any other medications for his cardiac disease, only will take seizure med   Understands possible complications from this decision    Seizure [facility])        Social History     Socioeconomic History    Marital status: Single     Spouse name: None    Number of children: None    Years of education: None    Highest education level: None   Occupational History    None   Tobacco Use    Smoking status: Former Smoker     Quit date: 2005     Years since quittin 2    Smokeless tobacco: Never Used   Vaping Use    Vaping Use: Never used   Substance and Sexual Activity    Alcohol use: Not Currently    Drug use: Never    Sexual activity: Never   Other Topics Concern    None   Social History Narrative    None     Social Determinants of Health     Financial Resource Strain: Low Risk     Difficulty of Paying Living Expenses: Not hard at all   Food Insecurity: No Food Insecurity    Worried About [address] in the Last Year: Never true    [facility] in the Last Year: Never true   Transportation Needs: No Transportation Needs    Lack of Transportation (Medical): No    Lack of Transportation (Non-Medical):  No   Physical Activity: Not on file   Stress: Not on file   Social Connections: Not on file   Intimate Partner Violence: Not on file   Housing Stability: Low Risk     Unable to Pay for Housing in the Last Year: No    Number of Places Lived in the Last Year: 1    Unstable Housing in the Last Year: No       Family History   Problem Relation Age of Onset    Hypertension Father     Kidney disease Brother        Allergies   Allergen Reactions    Iodinated Diagnostic Agents Rash     Pt's skin get very red and he gets hot and chills    Clopidogrel Rash         Current Facility-Administered Medications:     acetaminophen (TYLENOL) tablet 650 mg, 650 mg, Oral, Q6H PRN, Ele Barkley MD, 650 mg at 09/11/22 2204    aspirin (ECOTRIN LOW STRENGTH) EC tablet 81 mg, 81 mg, Oral, Daily, Ele Barkley MD, 81 mg at 09/14/22 0835    atorvastatin (LIPITOR) tablet 80 mg, 80 mg, Oral, Daily With Vallarimatt Barkley MD, 80 mg at 09/13/22 1610    calcium carbonate (TUMS) chewable tablet 500 mg, 500 mg, Oral, Daily, Ele Barkley MD, 500 mg at 09/14/22 0835    cefTRIAXone (ROCEPHIN) 1,000 mg in dextrose 5 % 50 mL IVPB, 1,000 mg, Intravenous, Q24H, Ele Barkley MD, Last Rate: 100 mL/hr at 09/13/22 1610, 1,000 mg at 09/13/22 1610    cholestyramine sugar free (QUESTRAN LIGHT) packet 4 g, 4 g, Oral, BID, Darlean Osgood Hippen, MD, 4 g at 09/13/22 1618    diphenhydrAMINE (BENADRYL) tablet 25 mg, 25 mg, Oral, Q6H PRN, Viral Dodge MD    metroNIDAZOLE (FLAGYL) tablet 500 mg, 500 mg, Oral, Q8H [address], Viral Dodge MD, 500 mg at 09/14/22 0520    midodrine (PROAMATINE) tablet 2 5 mg, 2 5 mg, Oral, TID AC, Celio Hodges MD, 2 5 mg at 09/14/22 1125    ondansetron (ZOFRAN) injection 4 mg, 4 mg, Intravenous, Q6H PRN, Ele Barkley MD    pantoprazole (PROTONIX) EC tablet 40 mg, 40 mg, Oral, Early Morning, Karmen Barkley MD, 40 mg at 09/14/22 0516    PHENobarbital tablet 64 8 mg, 64 8 mg, Oral, BID, Karmen Barkley MD, 64 8 mg at 09/14/22 0835    senna (SENOKOT) tablet 8 6 mg, 1 tablet, Oral, HS PRN, Karmen Barkley MD      BP 99/58   Pulse 80   Temp 98 4 °F (36 9 °C)   Resp 18   Ht 6' (1 829 m)   SpO2 95%   BMI 25 36 kg/m²     General Appearance:    Alert, oriented        Eyes:    PERRL   Ears:    Normal external ear canals, both ears   Nose:   Nares normal, septum midline   Throat:   Mucosa moist  Pharynx without injection  Neck:   Supple       Lungs:     Clear to auscultation bilaterally   Chest Wall:    No tenderness or deformity    Heart:    Regular rate and rhythm       Abdomen:     Soft, non-tender, bowel sounds +, no organomegaly           Extremities:   Extremities no cyanosis or edema       Skin:   no rash or icterus      Lymph nodes:   Cervical, supraclavicular, and axillary nodes normal   Neurologic:   CNII-XII intact, normal strength, sensation and reflexes     Throughout               Recent Results (from the past 48 hour(s))   Blood reaction urine    Collection Time: 09/12/22  3:17 PM   Result Value Ref Range    Occult Blood, UA Negative Negative    RBC, URINE None Seen None Seen   Stool Enteric Bacterial Panel by PCR    Collection Time: 09/12/22  4:08 PM    Specimen: Rectum; Stool   Result Value Ref Range    Salmonella sp PCR None Detected None Detected    Shigella sp/Enteroinvasive E  coli (EIEC) PCR None Detected None Detected    Campylobacter sp (jejuni and coli) PCR None Detected None Detected    Shiga toxin 1/Shiga toxin 2 genes PCR None Detected None Detected   Clostridium difficile toxin by PCR with EIA    Collection Time: 09/12/22  4:08 PM    Specimen: Rectum; Stool   Result Value Ref Range    C difficile toxin by PCR Negative Negative   Basic metabolic panel    Collection Time: 09/13/22  5:27 AM   Result Value Ref Range    Sodium 136 135 - 147 mmol/L    Potassium 3 4 (L) 3 5 - 5 3 mmol/L    Chloride 109 (H) 96 - 108 mmol/L    CO2 23 21 - 32 mmol/L    ANION GAP 4 4 - 13 mmol/L    BUN 6 5 - 25 mg/dL    Creatinine 0 54 (L) 0 60 - 1 30 mg/dL    Glucose 91 65 - 140 mg/dL    Calcium 6 7 (L) 8 3 - 10 1 mg/dL    eGFR 101 ml/min/1 73sq m   CBC and differential    Collection Time: 09/13/22  5:27 AM   Result Value Ref Range    WBC 1 23 (LL) 4 31 - 10 16 Thousand/uL    RBC 2 61 (L) 3 88 - 5 62 Million/uL    Hemoglobin 8 2 (L) 12 0 - 17 0 g/dL    Hematocrit 24 9 (L) 36 5 - 49 3 %    MCV 95 82 - 98 fL    MCH 31 4 26 8 - 34 3 pg    MCHC 32 9 31 4 - 37 4 g/dL    RDW 25 1 (H) 11 6 - 15 1 %    Platelets 20 (LL) 273 - 390 Thousands/uL   Manual Differential(PHLEBS Do Not Order)    Collection Time: 09/13/22  5:27 AM   Result Value Ref Range    Segmented % 59 43 - 75 %    Bands % 8 0 - 8 %    Lymphocytes % 15 14 - 44 %    Monocytes % 6 4 - 12 %    Eosinophils, % 9 (H) 0 - 6 %    Basophils % 0 0 - 1 %    Atypical Lymphocytes % 3 (H) <=0 %    Absolute Neutrophils 0 82 (L) 1 85 - 7 62 Thousand/uL    Lymphocytes Absolute 0 18 (L) 0 60 - 4 47 Thousand/uL    Monocytes Absolute 0 07 0 00 - 1 22 Thousand/uL    Eosinophils Absolute 0 11 0 00 - 0 40 Thousand/uL    Basophils Absolute 0 00 0 00 - 0 10 Thousand/uL    Total Counted      Toxic Granulation Present     RBC Morphology Present     Anisocytosis Present     Ovalocytes Present     Poikilocytes Present     Polychromasia Present     Schistocytes Present     Target Cells Present     Platelet Estimate Decreased (A) Adequate   Prepare Leukoreduced RBC: 1 Units, Leukoreduced, Irradiated, CMV Negative    Collection Time: 09/13/22  5:55 AM   Result Value Ref Range    Unit Product Code V9143W22     Unit Number [id number]     Unit ABO A     Unit [facility] POS     Crossmatch Compatible     Unit Dispense Status Presumed Trans     Unit Product Volume 350 mL   CBC and differential    Collection Time: 09/14/22  6:38 AM   Result Value Ref Range    WBC 1 70 (LL) 4 31 - 10 16 Thousand/uL    RBC 2 82 (L) 3 88 - 5 62 Million/uL    Hemoglobin 9 0 (L) 12 0 - 17 0 g/dL    Hematocrit 27 0 (L) 36 5 - 49 3 %    MCV 96 82 - 98 fL    MCH 31 9 26 8 - 34 3 pg    MCHC 33 3 31 4 - 37 4 g/dL    RDW 25 1 (H) 11 6 - 15 1 %    Platelets 21 (LL) 829 - 390 Thousands/uL   Basic metabolic panel    Collection Time: 09/14/22  6:38 AM   Result Value Ref Range    Sodium 137 135 - 147 mmol/L    Potassium 3 7 3 5 - 5 3 mmol/L    Chloride 108 96 - 108 mmol/L    CO2 25 21 - 32 mmol/L    ANION GAP 4 4 - 13 mmol/L    BUN 5 5 - 25 mg/dL    Creatinine 0 56 (L) 0 60 - 1 30 mg/dL    Glucose 95 65 - 140 mg/dL    Calcium 7 4 (L) 8 3 - 10 1 mg/dL    eGFR 99 ml/min/1 73sq m   Manual Differential(PHLEBS Do Not Order)    Collection Time: 09/14/22  6:38 AM   Result Value Ref Range    Segmented % 25 (L) 43 - 75 %    Bands % 5 0 - 8 %    Lymphocytes % 37 14 - 44 %    Monocytes % 4 4 - 12 %    Eosinophils, % 18 (H) 0 - 6 %    Basophils % 2 (H) 0 - 1 %    Atypical Lymphocytes % 9 (H) <=0 %    Absolute Neutrophils 0 51 (L) 1 85 - 7 62 Thousand/uL    Lymphocytes Absolute 0 63 0 60 - 4 47 Thousand/uL    Monocytes Absolute 0 07 0 00 - 1 22 Thousand/uL    Eosinophils Absolute 0 31 0 00 - 0 40 Thousand/uL    Basophils Absolute 0 03 0 00 - 0 10 Thousand/uL    Total Counted      Smudge Cells Present     RBC Morphology Present     Anisocytosis Present     Poikilocytes Present     Schistocytes Present     Platelet Estimate Decreased (A) Adequate         XR chest portable    Result Date: 9/12/2022  Narrative: CHEST INDICATION:   new fever, infectious concern  COMPARISON:  CXR 9/8/2022, PET CT 6/23/2022  Fluoroscopic image from port placement from 7/15/2022  EXAM PERFORMED/VIEWS:  XR CHEST PORTABLE FINDINGS:  Right port at cavoatrial junction  Cardiomediastinal silhouette appears unremarkable  Coronary stent  The lungs are clear  No pneumothorax or pleural effusion  Skinfold over left hemithorax  Osseous structures appear within normal limits for patient age  Question migration of esophageal stent below the diaphragm  Impression: No acute cardiopulmonary disease  Question migration of esophageal stent below the diaphragm when compared with the fluoroscopic image for port placement from 7/15/2022  The study was [facility] in [facility] for immediate notification  Workstation performed: [id number]     XR chest 2 views    Result Date: 9/8/2022  Narrative: CHEST INDICATION:   Hypotension  COMPARISON:  Chest radiograph from July 11, 2022 EXAM PERFORMED/VIEWS:  XR CHEST PA & LATERAL FINDINGS:  Right chest port catheter tip terminating within the lower SVC  There is a stent that extends from the lower esophagus into the proximal stomach  Normal heart size and aortic calcifications  There is bibasilar scarring and atelectasis  No consolidation or pulmonary edema  No pneumothorax or pleural effusion  There are degenerative changes of the imaged spine  The osseous structures are otherwise unremarkable  Impression: 1  Bibasilar scarring/atelectasis  2   Esophageal stent extending from the lower esophagus and terminating within the proximal stomach  Workstation performed: [id number]     CT chest wo contrast    Result Date: 9/13/2022  Narrative: CT CHEST WITHOUT IV CONTRAST INDICATION:   Cough, persistent r/o pneumonia, fever of unknown origin  Per my review of the medical record, the patient  was diagnosed with esophageal cancer in May 2022, treated with chemoradiation  COMPARISON:  CXR 9/11/2022, chest and abdomen CT 5/2/2022  TECHNIQUE: Chest CT without intravenous contrast   Axial, sagittal, coronal 2D reformats and coronal MIPS from source data  Radiation dose length product (DLP):  425 14 mGy-cm   Radiation dose exposure minimized using iterative reconstruction and automated exposure control  FINDINGS: LUNGS:  No pneumonia  Mild dependent atelectasis in both lower lobes  AIRWAYS: No significant filling defects  Redemonstration of mild bilateral lower lobe bronchiectasis  PLEURA:  Trace effusions  HEART/GREAT VESSELS:  Normal heart size  Mild coronary artery calcification indicating atherosclerotic heart disease  Coronary stents  MEDIASTINUM AND VINNIE: Thickening of the distal esophagus due to malignancy  Right port at cavoatrial junction  CHEST WALL AND LOWER NECK: Unremarkable  UPPER ABDOMEN:  Esophageal stent has migrated into the stomach as on recent chest radiograph  Hepatic cysts  Redemonstration of partially imaged nonobstructed transverse colon in a ventral hernia  OSSEOUS STRUCTURES: Osteopenia with mild degenerative disease and old compression deformities in the spine  Impression: No pneumonia  Mild bronchiectasis in the lower lobes which could be due to recurrent aspiration  Trace effusions with mild dependent atelectasis in the lower lobes  Migration of esophageal stent into the stomach  Nonobstructed transverse colon in a ventral hernia  Workstation performed: [id number]       Assessment and Plan: 66 yo male with a T2N0 esophageal carcinoma  He had thrombocytopenia prior to starting treatment and cytopenias have been an ongoing problem  He was on FA and b12 replacement as an outpatient  I am going to recheck iron panel, FA and b12 levels  If they have not normalized, but need to continue to support this  If his counts do not normalize after vitamin replacement, a bone marrow biopsy could be considered as it is also possible that he has an underlying MDS  I am getting him an appt in my office next week with a cbc prior  I spent 30 minutes on chart review, direct face to face counseling, coordination of care and documentation

## 2022-09-14 NOTE — PROGRESS NOTES
INTERNAL MEDICINE RESIDENCY PROGRESS NOTE     Name: Edenilson Pereira   Age & Sex: 67 y o  male   MRN: [id number]  Unit/Bed#: [facility] 906-01   Encounter: [id number]  Team: SOD Team A    PATIENT INFORMATION     Name: Edenilson Pereira   Age & Sex: 67 y o  male   MRN: [id number]  Hospital Stay Days: 6    ASSESSMENT/PLAN     Principal Problem:    Hypotension  Active Problems:    Esophageal cancer ([facility] )    Fever of unknown etiology    Pancytopenia ([facility] )    Thrombocytopenia (HCC)    Anemia    HLD (hyperlipidemia)    Seizure disorder (HCC)    Ventral hernia with obstruction and without gangrene    Chronic combined systolic and diastolic CHF (congestive heart failure) (HCC)    Allergic reaction to contrast media    Paroxysmal atrial fibrillation ([facility] )    Coronary artery disease of native artery of native heart with stable angina pectoris ([facility])    Fatigue    GERD (gastroesophageal reflux disease)    Dizziness    Migration of esophageal stent      * Hypotension  Assessment & Plan  Upon presentation patient hypotensive in the 74E to 80 systolic  This was in the setting of a recent increase in his WBC count from 1 3 on 9/6 to 4 3 on this admission in the setting of her recent granix infusion 9/7  There was a concern for sepsis this admission and the patient received a total of 3 L crystalloid in the ED with slight improvement of his blood pressure  Lactic acid 1 1  Upon chart review, the patient was recently seen in the outpatient setting 9/1 and was noted to be hypotensive to the 30U to 29V systolic as well, with improvement when the blood pressure was taken on the right arm  Patient does not technically meet sepsis criteria however the increase in WBC count from baseline does not seem to be explained only from his recent Granix infusion  Can not rule out sepsis at this time      UA and 4 blood cultures with no signs of infection  Chest XR like clear with no signs of pneumonia, no widened mediastinum, no chest pain or back pain      Plan:  · Midodrine 2 5 t i d   · Patient was started on ceftriaxone in the ED, will continue at this time with q24 dosing but consider discontinuing the patient continues be hemodynamically stable or if blood cultures negative  · Will stop  IV fluids as patient developed lower extremity edema  · Stool O&P pending  · If patient does become hypotensive, recommend repeating blood pressures on both arms  · Track orthostatic BP  · Knee-high compression stockings   · Strict I/Os and retention protocol          Fever of unknown etiology  Assessment & Plan  Patient is spiking fevers 101° at night of September 10th, 11th, 12th  UA show no sign infection  X-ray 9/8 and 9/10 show no consolidation  9/8 x2 blood cultures and 9/10 x2 blood cultures  show no growth to date  CT chest showed no pneumonia, mild bronchiectasis in lower lobes likely from recurrent aspiration, trace effusions mi atelectasis in lower lobes  Patient is not febrile last 48 hours    Plan: Will repeat blood cultures checking for fungal infection, separate cultures at time of fever, and cultures from port  Continue p r n  Tylenol    Esophageal cancer [facility])  Assessment & Plan  Patient with stage II B esophageal cancer, 1st discovered in 05/2022 to via EUS in verified by biopsy  No metastatic disease per  PET-CT scan performed on 06/2022  Has undergone carboplatin pack with Taxol chemotherapy as well as radiotherapy  Followed by heme Onc as an outpatient  Presenting today from his radiation oncology appointments and did not receive his scheduled radiation therapy today  CT 9/12 shows migration of esophageal stent in the stomach  As patient is neutropenic, thrombocytopenic, does endorse symptoms of dysphasia or odynophagia GI recommends outpatient follow-up    Plan:  · Continue outpatient follow-up with GI    Pancytopenia [facility])  Assessment & Plan  Patient with history of anemia, leukopenia, and thrombocytopenia in the setting of adenocarcinoma  Values on admission, hemoglobin of 7 8, WBCs of 4 36, platelets of 44, trending down over the past few months  Likely secondary to recent chemo  No signs of acute bleeding at this time but can not rule out a slow upper GI bleed in the setting of his esophageal adenocarcinoma and radiation  Plan:  · Transfuse hemoglobin for less than 7  · Transfused 9/9 1 unit- reaction of febrile, rash-resolved with benadryl and tylenol  · Hemoglobin 8 3 this morning (9/10)  · Transfuse platelets for less than 30 in the setting of likely mild upper GI bleed  · Increase antibiotic coverage for ANC < 500  · Patient currently on Granix for leukopenia  · Consider heme Onc consult if patient does not improve    Thrombocytopenia ([facility] )  Assessment & Plan  Platelets of 44 on admission, gradually trending down from 140s 1 year ago  Plan:  · Holding DVT prophylaxis due to thrombocytopenia  · No signs of severe bleeding, hold off on platelet transfusion at this point  · Recommend transfusion this patient for platelets less than 30 in the setting of possible slow upper GI bleed  · Repeat CBC with manual diff in the a m  · Continue to monitor for hemodynamic stability and for any signs of bleeding    Anemia  Assessment & Plan  Normocytic anemia  Iron panel done 08/2022 with iron sat 50, TIBC 184, Fe 92, Ferritin 88  Baseline hemoglobin of around 12 from 07/2021  Gradually downtrending since then  Follows with heme Onc, has a documented history of folic acid and L69 deficiency for which she is receiving IM B12 supplementation and was prescribed folate p o  Supplementation which he is not taking  His overall anemia possibly combined microcytic and macrocytic anemia secondary to gradual blood loss from his esophageal cancer and FA/B12 deficiency  No signs of acute blood loss at this time  Anemia is most likely contributing to the patient's chief concerns of fatigue and dizziness    Hemoglobin on admission 7 8 from 8 7 two weeks ago      Last B12 supplementation 09/06/2022  Latest hemoglobin, 6 7 -- given 1 unit of irradiated leukoreduced RBCs  9/12 Hb 7 5 the patient continues to poor being fatigued  9/13 Hb 8 2 post 1 packed red blood cells and patient reports improvement of fatigue symptoms    Plan    · Follow-up with H&H after transfusion   · Will hold off iron infusion in the setting of possible infection  · Consider p o  Iron supplementation upon discharge  · Folic acid supplementation  · Consider outpatient follow-up with heme Onc if condition does not improve        Dizziness  Assessment & Plan  Patient describes a vertiginous "dizziness" that comes on when he stands up quickly  Denies any blacking out, presyncope or syncope  He is able to avoid the dizziness feeling if he gets up slowly  No exacerbating symptoms  Likely multifactorial, secondary to his hypotension and anemia  Plan:  · See anemia plan  · See hypotension plan    GERD (gastroesophageal reflux disease)  Assessment & Plan  Patient reports a history of GERD, likely the etiology behind his cough  Was prescribed pantoprazole 40 mg b i d  But reports not taking this medication  Plan:  · Will start him on pantoprazole 40 mg daily here    Fatigue  Assessment & Plan  Patient with a chief concern of subacute worsening fatigue in setting of esophageal adenocarcinoma, recent chemo, and continued radiation therapy  Patient anemic, TSH low normal  Likely multifactorial expect his anemia may be the largest contributing factor  Greatly improved post 2 packed RBC    Plan:  Continue to monitor hemoglobin        Coronary artery disease of native artery of native heart with stable angina pectoris [facility])  Assessment & Plan  Patient with a history of CAD status post AGUSTIN x4, has follow-up with cardiology as an outpatient  Most recently completed 6 months of dual antiplatelet therapy  Currently on baby aspirin and atorvastatin daily  No chest pain    Troponins negative  Plan  · Continue aspirin, statin for secondary prevention      Paroxysmal atrial fibrillation [facility])  Assessment & Plan  Documented Hx of paroxysmal atrial fibrillation, Julissa Vasc of 5  Previously on warfarin for left apical thrombus, however has since been discontinued by his cardiologist   Last seen by outpatient Cardiology 06/2022  Currently normal sinus in the ED      Plan:  · Patient not currently on any home beta-blocker regiment  · Avoiding beta-blockers in the setting of hypotension  · F/u outpatient      Allergic reaction to contrast media  Assessment & Plan  Of note, patient has documented reaction to contrast media  Chronic combined systolic and diastolic CHF (congestive heart failure) ([facility])  Assessment & Plan  Wt Readings from Last 3 Encounters:   09/01/22 84 8 kg (187 lb)   08/30/22 84 8 kg (187 lb)   08/29/22 84 1 kg (185 lb 6 5 oz)     Documented history of chronic combined systolic and diastolic heart failure  Last echo performed 03/15/2022 showed LVEF 50% with G1 DD, apical akinesis, T BMP with mild regurg  Chest x-ray not suggestive of fluid overload  09/14/2022 patient has developed lower extremity edema clear to auscultation of lungs    Plan:  · Will consider stopping IV fluids and monitor patient's blood pressure  · Will hold diuresis at time  · Continue to monitor with strict I&Os and daily weights  · Outpatient follow-up        Ventral hernia with obstruction and without gangrene  Assessment & Plan  Patient has a ventral hernia initially diagnosed 2005  He reportedly had followed up with surgery regarding this and was offered surgery but he refused  He reports intermittent constipation that he treats with MiraLax at home but denies obstipation  Plan:  · Continue MiraLax  · Expectant management at this time    Seizure disorder [facility])  Assessment & Plan  Patient with reported history of seizures diagnosed roughly 40-50 years ago  Has been taking it b i d   Phenobarbital for this chronically  No seizure activity in the last 40 years  Plan:  · Continue phenobarbital    HLD (hyperlipidemia)  Assessment & Plan  Continue statin        Disposition:  Continue monitor for signs of fevers and infections  Plan for discharge on  if patient continues to be afebrile with no other symptoms concerning for infection and hypotension  SUBJECTIVE     Patient seen and examined  No acute events overnight  Patient reports doing well  He reports ambulating without difficulty  He reports good appetite with no dysphagia  Patient denies any episodes of chills or fevers  Patient reports having worsening lower extremity edema  He also states he had 1 normal bowel movement yesterday  Review of Systems   Constitutional: Negative for chills and fever  Respiratory: Negative for shortness of breath and wheezing  Cardiovascular: Positive for leg swelling  Negative for chest pain and palpitations  Gastrointestinal: Negative for abdominal distention, abdominal pain, constipation, diarrhea, nausea and vomiting  Neurological: Negative for dizziness  OBJECTIVE     Vitals:    22 2307 22 1517 22 2231 22 0723   BP: 111/62 116/63 105/67 99/58   Pulse: 97  94 80   Resp: 18 18 18 18   Temp: 99 8 °F (37 7 °C) 98 2 °F (36 8 °C) 99 °F (37 2 °C) 98 4 °F (36 9 °C)   TempSrc:       SpO2:   97% 95%   Height:          Temperature:   Temp (24hrs), Av 5 °F (36 9 °C), Min:98 2 °F (36 8 °C), Max:99 °F (37 2 °C)    Temperature: 98 4 °F (36 9 °C)  Intake & Output:  I/O        0701   0700  07 07 0701  09/15 0700    P  O  330      I V   980     Blood 377 7      Total Intake 707 7 980     Urine 2600      Total Output 2600      Net -1892 3 +980                Weights:   IBW (Ideal Body Weight): 77 6 kg    Body mass index is 25 36 kg/m²    Weight (last 2 days)     None        Physical Exam  Constitutional:       General: He is not in acute distress  Appearance: Normal appearance  He is normal weight  He is not ill-appearing or toxic-appearing  HENT:      Head: Normocephalic and atraumatic  Cardiovascular:      Rate and Rhythm: Normal rate and regular rhythm  Heart sounds: No murmur heard  No friction rub  No gallop  Pulmonary:      Effort: Pulmonary effort is normal  No respiratory distress  Breath sounds: No wheezing or rales  Abdominal:      General: Abdomen is flat  There is no distension  Tenderness: There is no abdominal tenderness  There is no guarding  Hernia: A hernia is present  Musculoskeletal:      Right lower leg: Edema present  Left lower leg: Edema present  Neurological:      Mental Status: He is alert and oriented to person, place, and time  Psychiatric:         Mood and Affect: Mood normal          Behavior: Behavior normal        LABORATORY DATA     Labs: I have personally reviewed pertinent reports  Results from last 7 days   Lab Units 09/14/22  0638 09/13/22  0527 09/12/22  0504 09/11/22  0515 09/09/22  0525 09/08/22  0841   WBC Thousand/uL 1 70* 1 23* 1 22*  1 22* 1 24*   < > 4 36   HEMOGLOBIN g/dL 9 0* 8 2* 7 5*  7 5* 7 5*   < > 7 8*   HEMATOCRIT % 27 0* 24 9* 23 0*  23 0* 22 7*   < > 23 7*   PLATELETS Thousands/uL 21* 20* 25*  25* 28*   < > 44*   NEUTROS PCT %  --   --   --   --   --  80*   MONOS PCT %  --   --   --   --   --  7   MONO PCT %  --  6 0* 2*   < >  --     < > = values in this interval not displayed        Results from last 7 days   Lab Units 09/14/22  0638 09/13/22  0527 09/12/22  1253 09/12/22  0504 09/09/22  0525 09/08/22  0841   POTASSIUM mmol/L 3 7 3 4* 3 1* 3 0*   < > 3 3*   CHLORIDE mmol/L 108 109*  --  107   < > 103   CO2 mmol/L 25 23  --  24   < > 26   BUN mg/dL 5 6  --  7   < > 12   CREATININE mg/dL 0 56* 0 54*  --  0 59*   < > 0 75   CALCIUM mg/dL 7 4* 6 7*  --  6 9*   < > 7 6*   ALK PHOS U/L  --   --   --   --   --  95   ALT U/L  --   --   --   --   --  12 AST U/L  --   --   --   --   --  14    < > = values in this interval not displayed  Results from last 7 days   Lab Units 09/08/22  1217   LACTIC ACID mmol/L 1 1           IMAGING & DIAGNOSTIC TESTING     Radiology Results: I have personally reviewed pertinent reports  XR chest portable    Result Date: 9/12/2022  Impression: No acute cardiopulmonary disease  Question migration of esophageal stent below the diaphragm when compared with the fluoroscopic image for port placement from 7/15/2022  The study was marked in [facility] for immediate notification  Workstation performed: [id number]     XR chest 2 views    Result Date: 9/8/2022  Impression: 1  Bibasilar scarring/atelectasis  2   Esophageal stent extending from the lower esophagus and terminating within the proximal stomach  Workstation performed: [id number]     CT chest wo contrast    Result Date: 9/13/2022  Impression: No pneumonia  Mild bronchiectasis in the lower lobes which could be due to recurrent aspiration  Trace effusions with mild dependent atelectasis in the lower lobes  Migration of esophageal stent into the stomach  Nonobstructed transverse colon in a ventral hernia  Workstation performed: [id number]     Other Diagnostic Testing: I have personally reviewed pertinent reports      ACTIVE MEDICATIONS     Current Facility-Administered Medications   Medication Dose Route Frequency    acetaminophen (TYLENOL) tablet 650 mg  650 mg Oral Q6H PRN    aspirin (ECOTRIN LOW STRENGTH) EC tablet 81 mg  81 mg Oral Daily    atorvastatin (LIPITOR) tablet 80 mg  80 mg Oral Daily With Dinner    calcium carbonate (TUMS) chewable tablet 500 mg  500 mg Oral Daily    cefTRIAXone (ROCEPHIN) 1,000 mg in dextrose 5 % 50 mL IVPB  1,000 mg Intravenous Q24H    cholestyramine sugar free (QUESTRAN LIGHT) packet 4 g  4 g Oral BID    diphenhydrAMINE (BENADRYL) tablet 25 mg  25 mg Oral Q6H PRN    metroNIDAZOLE (FLAGYL) tablet 500 mg  500 mg Oral Q8H [address]    midodrine (PROAMATINE) tablet 2 5 mg  2 5 mg Oral TID AC    ondansetron (ZOFRAN) injection 4 mg  4 mg Intravenous Q6H PRN    pantoprazole (PROTONIX) EC tablet 40 mg  40 mg Oral Early Morning    PHENobarbital tablet 64 8 mg  64 8 mg Oral BID    senna (SENOKOT) tablet 8 6 mg  1 tablet Oral HS PRN       VTE Pharmacologic Prophylaxis: Reason for no pharmacologic prophylaxis Holding for pancytopenia  VTE Mechanical Prophylaxis: sequential compression device    Portions of the record may have been created with voice recognition software  Occasional wrong word or "sound a like" substitutions may have occurred due to the inherent limitations of voice recognition software    Read the chart carefully and recognize, using context, where substitutions have occurred   ==  Landy Cevallos, [facility]  Internal Medicine Residency PGY-1

## 2022-09-14 NOTE — CASE MANAGEMENT
Case Management Discharge Planning Note    Patient name Kyra Torres  Location [address] [place] [address] 906/[facility] 344-35 MRN [id number]  : 1945 Date 2022       Current Admission Date: 2022  Current Admission Diagnosis:Hypotension   Patient Active Problem List    Diagnosis Date Noted    Migration of esophageal stent 2022    Fever of unknown etiology 2022    Fatigue 2022    Anemia 2022    Hypotension 2022    GERD (gastroesophageal reflux disease) 2022    Dizziness 2022    Vitamin B12 deficiency 2022    Port-A-Cath in place 08/10/2022    Thrombocytopenia ([facility] ) 2022    Dysphagia 2022    Pancytopenia ([facility] ) 2022    Acute gastric ulcer 2022    Esophageal cancer ([facility] ) 2022    Abdominal aortic aneurysm, without rupture ([facility] ) 02/10/2022    Paroxysmal atrial fibrillation ([facility] ) 02/10/2022    Coronary artery disease of native artery of native heart with stable angina pectoris ([facility] ) 02/10/2022    Ischemic cardiomyopathy 2021    Chronic combined systolic and diastolic CHF (congestive heart failure) ([facility] ) 2021    Allergic reaction to contrast media 2021    NSTEMI (non-ST elevated myocardial infarction) ([facility] ) 2021    Abdominal pain 2020    Hernia, incisional 2019    Ventral hernia with obstruction and without gangrene 2019    CAD (coronary artery disease) 2017    HTN (hypertension) 2017    HLD (hyperlipidemia) 2017    Seizure disorder ([facility] ) 2017    S/P AAA (abdominal aortic aneurysm) repair 2017    STEMI (ST elevation myocardial infarction) ([facility] ) 2017      LOS (days): 6  Geometric Mean LOS (GMLOS) (days): 4 80  Days to GMLOS:-1 1     OBJECTIVE:  Risk of Unplanned Readmission Score: 28 79         Current admission status: Inpatient   Preferred Pharmacy:   [id number] [place] - [place]   [address] [address]  Phone: [phone] Fax: Jimbo Guillermo - [address] [facility] [address]ista [facility] [address] [facility] [address] [address]  Phone: [phone] Fax: [phone]    Primary Care Provider: Lucía Lawler MD    Primary Insurance: MEDICARE  Secondary Insurance: [address]    DISCHARGE DETAILS:    Discharge planning discussed with[de-identified] Patient           Were Treatment Team discharge recommendations reviewed with patient/caregiver?: Yes  Did patient/caregiver verbalize understanding of patient care needs?: Yes  Were patient/caregiver advised of the risks associated with not following Treatment Team discharge recommendations?: Yes            IMM Given (Date):: 09/14/22  IMM Given to[de-identified] Patient     Additional Comments: Patient is not medically stable for discharge at this time  Blood cultures pending  Chart review indicates that the patient is planned for discharge on Friday pending that the patient remains afebrile and has no signs of infection or hypotension

## 2022-09-15 ENCOUNTER — APPOINTMENT (OUTPATIENT)
Dept: RADIATION ONCOLOGY | Facility: HOSPITAL | Age: 77
End: 2022-09-15
Payer: MEDICARE

## 2022-09-15 PROBLEM — T45.1X5A CHEMOTHERAPY INDUCED NEUTROPENIA (HCC): Status: ACTIVE | Noted: 2022-09-15

## 2022-09-15 PROBLEM — D70.1 CHEMOTHERAPY INDUCED NEUTROPENIA (HCC): Status: ACTIVE | Noted: 2022-09-15

## 2022-09-15 LAB
ANION GAP SERPL CALCULATED.3IONS-SCNC: 5 MMOL/L (ref 4–13)
ANISOCYTOSIS BLD QL SMEAR: PRESENT
BASOPHILS # BLD MANUAL: 0 THOUSAND/UL (ref 0–0.1)
BASOPHILS NFR MAR MANUAL: 0 % (ref 0–1)
BUN SERPL-MCNC: 9 MG/DL (ref 5–25)
CALCIUM SERPL-MCNC: 7.1 MG/DL (ref 8.3–10.1)
CHLORIDE SERPL-SCNC: 106 MMOL/L (ref 96–108)
CO2 SERPL-SCNC: 26 MMOL/L (ref 21–32)
CORTIS AM PEAK SERPL-MCNC: 14.5 UG/DL (ref 4.2–22.4)
CREAT SERPL-MCNC: 0.64 MG/DL (ref 0.6–1.3)
EOSINOPHIL # BLD MANUAL: 0.21 THOUSAND/UL (ref 0–0.4)
EOSINOPHIL NFR BLD MANUAL: 16 % (ref 0–6)
ERYTHROCYTE [DISTWIDTH] IN BLOOD BY AUTOMATED COUNT: 24.6 % (ref 11.6–15.1)
GFR SERPL CREATININE-BSD FRML MDRD: 94 ML/MIN/1.73SQ M
GLUCOSE SERPL-MCNC: 91 MG/DL (ref 65–140)
HCT VFR BLD AUTO: 25.3 % (ref 36.5–49.3)
HGB BLD-MCNC: 8.2 G/DL (ref 12–17)
LYMPHOCYTES # BLD AUTO: 0.49 THOUSAND/UL (ref 0.6–4.47)
LYMPHOCYTES # BLD AUTO: 38 % (ref 14–44)
MCH RBC QN AUTO: 31.2 PG (ref 26.8–34.3)
MCHC RBC AUTO-ENTMCNC: 32.4 G/DL (ref 31.4–37.4)
MCV RBC AUTO: 96 FL (ref 82–98)
MONOCYTES # BLD AUTO: 0.07 THOUSAND/UL (ref 0–1.22)
MONOCYTES NFR BLD: 5 % (ref 4–12)
NEUTROPHILS # BLD MANUAL: 0.43 THOUSAND/UL (ref 1.85–7.62)
NEUTS BAND NFR BLD MANUAL: 3 % (ref 0–8)
NEUTS SEG NFR BLD AUTO: 30 % (ref 43–75)
OVALOCYTES BLD QL SMEAR: PRESENT
PLASMA CELLS NFR BLD: 2 % (ref 0–0)
PLATELET # BLD AUTO: 20 THOUSANDS/UL (ref 149–390)
PLATELET BLD QL SMEAR: ABNORMAL
POTASSIUM SERPL-SCNC: 3.4 MMOL/L (ref 3.5–5.3)
RBC # BLD AUTO: 2.63 MILLION/UL (ref 3.88–5.62)
SCHISTOCYTES BLD QL SMEAR: PRESENT
SODIUM SERPL-SCNC: 137 MMOL/L (ref 135–147)
TARGETS BLD QL SMEAR: PRESENT
TOXIC GRANULES BLD QL SMEAR: PRESENT
VARIANT LYMPHS # BLD AUTO: 6 %
WBC # BLD AUTO: 1.3 THOUSAND/UL (ref 4.31–10.16)

## 2022-09-15 PROCEDURE — 82024 ASSAY OF ACTH: CPT

## 2022-09-15 PROCEDURE — 80048 BASIC METABOLIC PNL TOTAL CA: CPT

## 2022-09-15 PROCEDURE — 85007 BL SMEAR W/DIFF WBC COUNT: CPT

## 2022-09-15 PROCEDURE — 85027 COMPLETE CBC AUTOMATED: CPT

## 2022-09-15 PROCEDURE — 99232 SBSQ HOSP IP/OBS MODERATE 35: CPT | Performed by: INTERNAL MEDICINE

## 2022-09-15 PROCEDURE — 82533 TOTAL CORTISOL: CPT

## 2022-09-15 PROCEDURE — 99232 SBSQ HOSP IP/OBS MODERATE 35: CPT | Performed by: HOSPITALIST

## 2022-09-15 RX ORDER — FOLIC ACID 1 MG/1
1 TABLET ORAL DAILY
Status: DISCONTINUED | OUTPATIENT
Start: 2022-09-15 | End: 2022-09-16 | Stop reason: HOSPADM

## 2022-09-15 RX ADMIN — CARBIDOPA AND LEVODOPA 2.5 MG: 50; 200 TABLET, EXTENDED RELEASE ORAL at 13:29

## 2022-09-15 RX ADMIN — METRONIDAZOLE 500 MG: 500 TABLET ORAL at 13:29

## 2022-09-15 RX ADMIN — CALCIUM CARBONATE (ANTACID) CHEW TAB 500 MG 500 MG: 500 CHEW TAB at 09:26

## 2022-09-15 RX ADMIN — CARBIDOPA AND LEVODOPA 2.5 MG: 50; 200 TABLET, EXTENDED RELEASE ORAL at 16:49

## 2022-09-15 RX ADMIN — FOLIC ACID 1 MG: 1 TABLET ORAL at 09:26

## 2022-09-15 RX ADMIN — CHOLESTYRAMINE 4 G: 4 POWDER, FOR SUSPENSION ORAL at 17:07

## 2022-09-15 RX ADMIN — CARBIDOPA AND LEVODOPA 2.5 MG: 50; 200 TABLET, EXTENDED RELEASE ORAL at 06:03

## 2022-09-15 RX ADMIN — PANTOPRAZOLE SODIUM 40 MG: 40 TABLET, DELAYED RELEASE ORAL at 06:03

## 2022-09-15 RX ADMIN — PHENOBARBITAL 64.8 MG: 64.8 TABLET ORAL at 17:07

## 2022-09-15 RX ADMIN — PHENOBARBITAL 64.8 MG: 64.8 TABLET ORAL at 09:26

## 2022-09-15 RX ADMIN — CEFTRIAXONE 1000 MG: 1 INJECTION, POWDER, FOR SOLUTION INTRAMUSCULAR; INTRAVENOUS at 16:49

## 2022-09-15 RX ADMIN — CHOLESTYRAMINE 4 G: 4 POWDER, FOR SUSPENSION ORAL at 09:26

## 2022-09-15 RX ADMIN — ATORVASTATIN CALCIUM 80 MG: 80 TABLET, FILM COATED ORAL at 16:49

## 2022-09-15 RX ADMIN — METRONIDAZOLE 500 MG: 500 TABLET ORAL at 06:03

## 2022-09-15 RX ADMIN — ASPIRIN 81 MG: 81 TABLET, COATED ORAL at 09:26

## 2022-09-15 RX ADMIN — METRONIDAZOLE 500 MG: 500 TABLET ORAL at 21:26

## 2022-09-15 NOTE — CASE MANAGEMENT
Case Management Discharge Planning Note    Patient name Kyra Torres  Location [address] [place] [address] 906/[facility] 192-43 MRN [id number]  : 1945 Date 9/15/2022       Current Admission Date: 2022  Current Admission Diagnosis:Hypotension   Patient Active Problem List    Diagnosis Date Noted    Migration of esophageal stent 2022    Fever of unknown etiology 2022    Fatigue 2022    Anemia 2022    Hypotension 2022    GERD (gastroesophageal reflux disease) 2022    Dizziness 2022    Vitamin B12 deficiency 2022    Port-A-Cath in place 08/10/2022    [facility] 2022    Dysphagia 2022    Pancytopenia ([facility] ) 2022    Acute gastric ulcer 2022    Esophageal cancer ([facility] ) 2022    Abdominal aortic aneurysm, without rupture ([facility] ) 02/10/2022    Paroxysmal atrial fibrillation ([facility] ) 02/10/2022    Coronary artery disease of native artery of native heart with stable angina pectoris ([facility] ) 02/10/2022    Ischemic cardiomyopathy 2021    Chronic combined systolic and diastolic CHF (congestive heart failure) ([facility] ) 2021    Allergic reaction to contrast media 2021    NSTEMI (non-ST elevated myocardial infarction) ([facility] ) 2021    Abdominal pain 2020    Hernia, incisional 2019    Ventral hernia with obstruction and without gangrene 2019    CAD (coronary artery disease) 2017    HTN (hypertension) 2017    HLD (hyperlipidemia) 2017    Seizure disorder ([facility] ) 2017    S/P AAA (abdominal aortic aneurysm) repair 2017    STEMI (ST elevation myocardial infarction) ([facility] ) 2017      LOS (days): 7  Geometric Mean LOS (GMLOS) (days): 4 80  Days to GMLOS:-2     OBJECTIVE:  Risk of Unplanned Readmission Score: 29 41         Current admission status: Inpatient   Preferred Pharmacy:   [id number]- [place] [address] [facility] [address]  Phone: [phone] Fax: [facility] [address] [facility] [address] [facility] [address] [address]  Phone: [phone] Fax: [phone]    Primary Care Provider: Catrachita Barger MD    Primary Insurance: MEDICARE  Secondary Insurance: [address] [place] [address],[address] DETAILS:                Additional Comments: Patient is not medically cleared for discharge today   SOD is noting that the "Plan for discharge Friday 09/16/2022 the patient continues to be afebrile with no other concerning symptoms infection hypotension"

## 2022-09-15 NOTE — PROGRESS NOTES
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 - [id number]    8/15/2022 - start weekly carbo/taxol/RT    Week 2 held due to cytopenias - FA and b12 deficiencies found and replacement started    Week 3 dose reducation of 20% for taxol, carbo AUC 1 5         Esophageal cancer ([facility] [address] )   5/2/2022 Initial Diagnosis    Esophageal adenocarcinoma ([facility] [address] )     5/4/2022 Biopsy    Esophagus, lower esophageal bx:  - Poorly differentiated carcinoma, consistent with adenocarcinoma  5/18/2022 -  Cancer Staged    Staging form: Esophagus - Adenocarcinoma, AJCC 8th Edition  - Clinical stage from 5/18/2022: Stage IIB (cT2, cN0, cM0, G3) - Signed by Chapito Jha MD on 7/5/2022  Total positive nodes: 0  Histologic grading system: 3 grade system  HER2 status: Negative  Clinical staging modalities: Biopsy, EUS, Endoscopy, PET/CT       8/15/2022 -  Chemotherapy    CARBOplatin (PARAPLATIN) IVPB (GOG AUC DOSING), 237 6 mg, Intravenous, Once, 3 of 6 cycles  Administration: 237 6 mg (8/15/2022), 190 05 mg (8/29/2022)  PACLItaxel (TAXOL) chemo IVPB, 50 mg/m2 = 106 2 mg, Intravenous, Once, 3 of 6 cycles  Dose modification: 40 mg/m2 (original dose 50 mg/m2, Cycle 3, Reason: Neutropenia, Comment: 20% dose reduction due to neutropenia)  Administration: 106 2 mg (8/15/2022), 84 6 mg (8/29/2022)  filgrastim (NEUPOGEN) injection Soln, 480 mcg (100 % of original dose 480 mcg), Subcutaneous, Once, 0 of 1 cycle  Dose modification: 480 mcg (original dose 480 mcg, Cycle 4)  tbo-filgrastim (GRANIX), 480 mcg (100 % of original dose 480 mcg), Subcutaneous, Once, 1 of 2 cycles  Dose modification: 480 mcg (original dose 480 mcg, Cycle 2), 480 mcg (original dose 480 mcg, Cycle 4)  Administration: 480 mcg (8/22/2022)         Subjective: patient denies any complaints    No abd pain   No CP/SOB  No fevers  Objective:      BP 96/58   Pulse 82   Temp 98 2 °F (36 8 °C)   Resp 17   Ht 6' (1 829 m)   SpO2 96%   BMI 25 36 kg/m²   General Appearance:    Alert, oriented        Eyes:    PERRL   Ears:    Normal external ear canals, both ears   Nose:   Nares normal, septum midline   Throat:   Mucosa moist  Pharynx without injection  Neck:   Supple       Lungs:     Clear to auscultation bilaterally   Chest Wall:    No tenderness or deformity    Heart:    Regular rate and rhythm       Abdomen:     Soft, non-tender, bowel sounds +, no organomegaly  Large ventral hernia           Extremities:   Extremities no cyanosis or edema       Skin:   no rash or icterus      Lymph nodes:   Cervical, supraclavicular, and axillary nodes normal   Neurologic:   CNII-XII intact, normal strength, sensation and reflexes     throughout        Recent Results (from the past 48 hour(s))   CBC and differential    Collection Time: 09/14/22  6:38 AM   Result Value Ref Range    WBC 1 70 (LL) 4 31 - 10 16 Thousand/uL    RBC 2 82 (L) 3 88 - 5 62 Million/uL    Hemoglobin 9 0 (L) 12 0 - 17 0 g/dL    Hematocrit 27 0 (L) 36 5 - 49 3 %    MCV 96 82 - 98 fL    MCH 31 9 26 8 - 34 3 pg    MCHC 33 3 31 4 - 37 4 g/dL    RDW 25 1 (H) 11 6 - 15 1 %    Platelets 21 (LL) 265 - 390 Thousands/uL   Basic metabolic panel    Collection Time: 09/14/22  6:38 AM   Result Value Ref Range    Sodium 137 135 - 147 mmol/L    Potassium 3 7 3 5 - 5 3 mmol/L    Chloride 108 96 - 108 mmol/L    CO2 25 21 - 32 mmol/L    ANION GAP 4 4 - 13 mmol/L    BUN 5 5 - 25 mg/dL    Creatinine 0 56 (L) 0 60 - 1 30 mg/dL    Glucose 95 65 - 140 mg/dL    Calcium 7 4 (L) 8 3 - 10 1 mg/dL    eGFR 99 ml/min/1 73sq m   Manual Differential(PHLEBS Do Not Order)    Collection Time: 09/14/22  6:38 AM   Result Value Ref Range    Segmented % 25 (L) 43 - 75 %    Bands % 5 0 - 8 %    Lymphocytes % 37 14 - 44 %    Monocytes % 4 4 - 12 %    Eosinophils, % 18 (H) 0 - 6 % Basophils % 2 (H) 0 - 1 %    Atypical Lymphocytes % 9 (H) <=0 %    Absolute Neutrophils 0 51 (L) 1 85 - 7 62 Thousand/uL    Lymphocytes Absolute 0 63 0 60 - 4 47 Thousand/uL    Monocytes Absolute 0 07 0 00 - 1 22 Thousand/uL    Eosinophils Absolute 0 31 0 00 - 0 40 Thousand/uL    Basophils Absolute 0 03 0 00 - 0 10 Thousand/uL    Total Counted      Smudge Cells Present     RBC Morphology Present     Anisocytosis Present     Poikilocytes Present     Schistocytes Present     Platelet Estimate Decreased (A) Adequate   Folate    Collection Time: 09/14/22  6:38 AM   Result Value Ref Range    Folate 2 0 (L) 3 1 - 17 5 ng/mL   Vitamin B12    Collection Time: 09/14/22  6:38 AM   Result Value Ref Range    Vitamin B-12 690 100 - 900 pg/mL   Iron Saturation %    Collection Time: 09/14/22  6:38 AM   Result Value Ref Range    Iron Saturation 55 (H) 20 - 50 %    TIBC 133 (L) 250 - 450 ug/dL    Iron 73 65 - 175 ug/dL   Ferritin    Collection Time: 09/14/22  6:38 AM   Result Value Ref Range    Ferritin 584 (H) 8 - 388 ng/mL   Cortisol Level, AM Specimen    Collection Time: 09/15/22  6:20 AM   Result Value Ref Range    Cortisol - AM 14 5 4 2 - 22 4 ug/dL   CBC and differential    Collection Time: 09/15/22  6:20 AM   Result Value Ref Range    WBC 1 30 (LL) 4 31 - 10 16 Thousand/uL    RBC 2 63 (L) 3 88 - 5 62 Million/uL    Hemoglobin 8 2 (L) 12 0 - 17 0 g/dL    Hematocrit 25 3 (L) 36 5 - 49 3 %    MCV 96 82 - 98 fL    MCH 31 2 26 8 - 34 3 pg    MCHC 32 4 31 4 - 37 4 g/dL    RDW 24 6 (H) 11 6 - 15 1 %    Platelets 20 (LL) 469 - 390 Thousands/uL   Basic metabolic panel    Collection Time: 09/15/22  6:20 AM   Result Value Ref Range    Sodium 137 135 - 147 mmol/L    Potassium 3 4 (L) 3 5 - 5 3 mmol/L    Chloride 106 96 - 108 mmol/L    CO2 26 21 - 32 mmol/L    ANION GAP 5 4 - 13 mmol/L    BUN 9 5 - 25 mg/dL    Creatinine 0 64 0 60 - 1 30 mg/dL    Glucose 91 65 - 140 mg/dL    Calcium 7 1 (L) 8 3 - 10 1 mg/dL    eGFR 94 ml/min/1 73sq m Manual Differential(PHLEBS Do Not Order)    Collection Time: 09/15/22  6:20 AM   Result Value Ref Range    Segmented % 30 (L) 43 - 75 %    Bands % 3 0 - 8 %    Lymphocytes % 38 14 - 44 %    Monocytes % 5 4 - 12 %    Eosinophils, % 16 (H) 0 - 6 %    Basophils % 0 0 - 1 %    Atypical Lymphocytes % 6 (H) <=0 %    Plasma Cells % 2 (H) 0 - 0 %    Absolute Neutrophils 0 43 (L) 1 85 - 7 62 Thousand/uL    Lymphocytes Absolute 0 49 (L) 0 60 - 4 47 Thousand/uL    Monocytes Absolute 0 07 0 00 - 1 22 Thousand/uL    Eosinophils Absolute 0 21 0 00 - 0 40 Thousand/uL    Basophils Absolute 0 00 0 00 - 0 10 Thousand/uL    Total Counted      Toxic Granulation Present     Anisocytosis Present     Ovalocytes Present     Schistocytes Present     Target Cells Present     Platelet Estimate Decreased (A) Adequate         XR chest portable    Result Date: 9/12/2022  Narrative: CHEST INDICATION:   new fever, infectious concern  COMPARISON:  CXR 9/8/2022, PET CT 6/23/2022  Fluoroscopic image from port placement from 7/15/2022  EXAM PERFORMED/VIEWS:  XR CHEST PORTABLE FINDINGS:  Right port at cavoatrial junction  Cardiomediastinal silhouette appears unremarkable  Coronary stent  The lungs are clear  No pneumothorax or pleural effusion  Skinfold over left hemithorax  Osseous structures appear within normal limits for patient age  Question migration of esophageal stent below the diaphragm  Impression: No acute cardiopulmonary disease  Question migration of esophageal stent below the diaphragm when compared with the fluoroscopic image for port placement from 7/15/2022  The study was marked in [facility] for immediate notification  Workstation performed: [id number]     XR chest 2 views    Result Date: 9/8/2022  Narrative: CHEST INDICATION:   Hypotension  COMPARISON:  Chest radiograph from July 11, 2022 EXAM PERFORMED/VIEWS:  XR CHEST PA & LATERAL FINDINGS:  Right chest port catheter tip terminating within the lower SVC    There is a stent that extends from the lower esophagus into the proximal stomach  Normal heart size and aortic calcifications  There is bibasilar scarring and atelectasis  No consolidation or pulmonary edema  No pneumothorax or pleural effusion  There are degenerative changes of the imaged spine  The osseous structures are otherwise unremarkable  Impression: 1  Bibasilar scarring/atelectasis  2   Esophageal stent extending from the lower esophagus and terminating within the proximal stomach  Workstation performed: [id number]     CT chest wo contrast    Result Date: 9/13/2022  Narrative: CT CHEST WITHOUT IV CONTRAST INDICATION:   Cough, persistent r/o pneumonia, fever of unknown origin  Per my review of the medical record, the patient  was diagnosed with esophageal cancer in May 2022, treated with chemoradiation  COMPARISON:  CXR 9/11/2022, chest and abdomen CT 5/2/2022  TECHNIQUE: Chest CT without intravenous contrast   Axial, sagittal, coronal 2D reformats and coronal MIPS from source data  Radiation dose length product (DLP):  425 14 mGy-cm   Radiation dose exposure minimized using iterative reconstruction and automated exposure control  FINDINGS: LUNGS:  No pneumonia  Mild dependent atelectasis in both lower lobes  AIRWAYS: No significant filling defects  Redemonstration of mild bilateral lower lobe bronchiectasis  PLEURA:  Trace effusions  HEART/GREAT VESSELS:  Normal heart size  Mild coronary artery calcification indicating atherosclerotic heart disease  Coronary stents  MEDIASTINUM AND VINNIE: Thickening of the distal esophagus due to malignancy  Right port at cavoatrial junction  CHEST WALL AND LOWER NECK: Unremarkable  UPPER ABDOMEN:  Esophageal stent has migrated into the stomach as on recent chest radiograph  Hepatic cysts  Redemonstration of partially imaged nonobstructed transverse colon in a ventral hernia  OSSEOUS STRUCTURES: Osteopenia with mild degenerative disease and old compression deformities in the spine  Impression: No pneumonia  Mild bronchiectasis in the lower lobes which could be due to recurrent aspiration  Trace effusions with mild dependent atelectasis in the lower lobes  Migration of esophageal stent into the stomach  Nonobstructed transverse colon in a ventral hernia  Workstation performed: [id number]         Assessment and Plan : FA is still 2  Patient admits that he wasn't taking folic acid at home because he read that it can make his throat close  I told him that he is getting it       I spent 30 minutes in chart review, face to face counseling, coordination of care and documentation

## 2022-09-15 NOTE — PROGRESS NOTES
Spiritual Care Progress Note    9/15/2022  Patient: Ana Glasgow : 1945  Admission Date & Time: 2022 0910  MRN: [id number] [facility]: [id number]      Fr Daniel Reese visited pt to provide prayer, Holy Communion, and a blessing (pt's brother also present)  Chaplains remain available                 Chaplaincy Interventions Utilized:   Exploration: Explored spiritual needs & resources    Collaboration: Facilitated respect for spiritual/cultural practice during hospitalization    Relationship Building: Cultivated a relationship of care and support    Ritual: Provided prayer and Provided ritual       [phone]   Clinical Encounter Type   Visited With Patient and family together   Routine Visit Follow-up   Mosque Encounters   Mosque Needs Prayer   Sacramental Encounters   Communion Given Indicator Yes   Sacrament Other Other (Comment)  ([place])

## 2022-09-15 NOTE — ADDENDUM NOTE
Encounter addended by: Karen Henley DO on: 9/15/2022 2:05 PM   Actions taken: Problem List modified, Clinical Note Signed

## 2022-09-15 NOTE — PROGRESS NOTES
INTERNAL MEDICINE RESIDENCY PROGRESS NOTE     Name: Steph Blunt   Age & Sex: 67 y o  male   MRN: [id number]  [facility]   Encounter: [id number]  Team: SOD Team A    PATIENT INFORMATION     Name: Steph Blunt   Age & Sex: 67 y o  male   MRN: [id number]  Hospital Stay Days: 7    ASSESSMENT/PLAN     Principal Problem:    Hypotension  Active Problems:    Esophageal cancer ([facility] )    Fever of unknown etiology    Pancytopenia ([facility] )    Thrombocytopenia (HCC)    Anemia    HLD (hyperlipidemia)    Seizure disorder (HCC)    Ventral hernia with obstruction and without gangrene    Chronic combined systolic and diastolic CHF (congestive heart failure) (HCC)    Allergic reaction to contrast media    Paroxysmal atrial fibrillation ([facility] )    Coronary artery disease of native artery of native heart with stable angina pectoris (HCC)    Fatigue    GERD (gastroesophageal reflux disease)    Dizziness    Migration of esophageal stent      * Hypotension  Assessment & Plan  Upon presentation patient hypotensive in the 54H to 80 systolic  This was in the setting of a recent increase in his WBC count from 1 3 on 9/6 to 4 3 on this admission in the setting of her recent granix infusion 9/7  There was a concern for sepsis this admission and the patient received a total of 3 L crystalloid in the ED with slight improvement of his blood pressure  Lactic acid 1 1  Upon chart review, the patient was recently seen in the outpatient setting 9/1 and was noted to be hypotensive to the 12Z to 38X systolic as well, with improvement when the blood pressure was taken on the right arm  Patient does not technically meet sepsis criteria however the increase in WBC count from baseline does not seem to be explained only from his recent Granix infusion  Can not rule out sepsis at this time      UA and 4 blood cultures with no signs of infection  Chest XR like clear with no signs of pneumonia, no widened mediastinum, no chest pain or back pain  IV fluids stopped as pt developed LE edema    Plan:  · pendin Acth and am cortisol   · Midodrine 2 5 t i d   · Patient was started on ceftriaxone in the ED, will continue at this time with q24 dosing but consider discontinuing the patient continues be hemodynamically stable or if blood cultures negative  · Stool O&P pending  · If patient does become hypotensive, recommend repeating blood pressures on both arms  · Track orthostatic BP  · Knee-high compression stockings   · Strict I/Os and retention protocol          Fever of unknown etiology  Assessment & Plan  Patient is spiking fevers 101° at night of September 10th, 11th, 12th  UA show no sign infection  X-ray 9/8 and 9/10 show no consolidation  9/8 x2 blood cultures and 9/10 x2 blood cultures  show no growth to date  CT chest showed no pneumonia, mild bronchiectasis in lower lobes likely from recurrent aspiration, trace effusions mi atelectasis in lower lobes  Patient is not febrile last 3 days; no source found    Plan: Will repeat blood cultures checking for fungal infection, separate cultures at time of fever, and cultures from port  Continue p r n  Tylenol    Esophageal cancer [facility])  Assessment & Plan  Patient with stage II B esophageal cancer, 1st discovered in 05/2022 to via EUS in verified by biopsy  No metastatic disease per  PET-CT scan performed on 06/2022  Has undergone carboplatin pack with Taxol chemotherapy as well as radiotherapy  Followed by heme Onc as an outpatient  Presenting today from his radiation oncology appointments and did not receive his scheduled radiation therapy today      CT 9/12 shows migration of esophageal stent in the stomach  As patient is neutropenic, thrombocytopenic, does endorse symptoms of dysphasia or odynophagia GI recommends outpatient follow-up    Plan:  · Continue outpatient follow-up with GI    Pancytopenia [facility])  Assessment & Plan  Patient with history of anemia, leukopenia, and thrombocytopenia in the setting of adenocarcinoma  Values on admission, hemoglobin of 7 8, WBCs of 4 36, platelets of 44, trending down over the past few months  Likely secondary to recent chemo  No signs of acute bleeding at this time but can not rule out a slow upper GI bleed in the setting of his esophageal adenocarcinoma and radiation  Plan:  · Continue recommendations per Oncology  · Increase antibiotic coverage for ANC < 500  · Patient currently on Granix for leukopenia    Thrombocytopenia (HCC)  Assessment & Plan  Platelets of 44 on admission, gradually trending down from 140s 1 year ago  Plan:  · Holding DVT prophylaxis due to thrombocytopenia  · No signs of severe bleeding, hold off on platelet transfusion at this point  · Recommend transfusion this patient for platelets less than 30 in the setting of possible slow upper GI bleed  · Repeat CBC with manual diff in the a m  · Continue to monitor for hemodynamic stability and for any signs of bleeding    Anemia  Assessment & Plan  Normocytic anemia  Iron panel done 08/2022 with iron sat 50, TIBC 184, Fe 92, Ferritin 88  Baseline hemoglobin of around 12 from 07/2021  Gradually downtrending since then  Follows with heme Onc, has a documented history of folic acid and W01 deficiency for which she is receiving IM B12 supplementation and was prescribed folate p o  Supplementation which he is not taking  His overall anemia possibly combined microcytic and macrocytic anemia secondary to gradual blood loss from his esophageal cancer and FA/B12 deficiency  No signs of acute blood loss at this time  Anemia is most likely contributing to the patient's chief concerns of fatigue and dizziness  Hemoglobin on admission 7 8 from 8 7 two weeks ago       Last B12 supplementation 09/06/2022  Latest hemoglobin, 6 7 -- given 1 unit of irradiated leukoreduced RBCs  9/12 Hb 7 5 the patient continues to poor being fatigued  9/13 Hb 8 2 post 1 packed red blood cells and patient reports improvement of fatigue symptoms  Iron studies reflect pattern of anemia of chronic disease  Folate levels low - 2  B12 wnl    Plan  · Will hold off iron infusion in the setting of possible infection  · Consider p o  Iron supplementation upon discharge  · Folic acid supplementation  · Consider outpatient follow-up with heme Onc if condition does not improve        Dizziness  Assessment & Plan  Patient describes a vertiginous "dizziness" that comes on when he stands up quickly  Denies any blacking out, presyncope or syncope  He is able to avoid the dizziness feeling if he gets up slowly  No exacerbating symptoms  Likely multifactorial, secondary to his hypotension and anemia  Plan:  · See anemia plan  · See hypotension plan    GERD (gastroesophageal reflux disease)  Assessment & Plan  Patient reports a history of GERD, likely the etiology behind his cough  Was prescribed pantoprazole 40 mg b i d  But reports not taking this medication  Plan:  · Will start him on pantoprazole 40 mg daily here    Fatigue  Assessment & Plan  Patient with a chief concern of subacute worsening fatigue in setting of esophageal adenocarcinoma, recent chemo, and continued radiation therapy  Patient anemic, TSH low normal  Likely multifactorial expect his anemia may be the largest contributing factor  Greatly improved post 2 packed RBC    Plan:  Continue to monitor hemoglobin        Coronary artery disease of native artery of native heart with stable angina pectoris [facility])  Assessment & Plan  Patient with a history of CAD status post AGUSTIN x4, has follow-up with cardiology as an outpatient  Most recently completed 6 months of dual antiplatelet therapy  Currently on baby aspirin and atorvastatin daily  No chest pain  Troponins negative  Plan  · Continue aspirin, statin for secondary prevention      Paroxysmal atrial fibrillation [facility])  Assessment & Plan  Documented Hx of paroxysmal atrial fibrillation, Julissa Vasc of 5  Previously on warfarin for left apical thrombus, however has since been discontinued by his cardiologist   Last seen by outpatient Cardiology 06/2022  Currently normal sinus in the ED      Plan:  · Patient not currently on any home beta-blocker regiment  · Avoiding beta-blockers in the setting of hypotension  · F/u outpatient      Allergic reaction to contrast media  Assessment & Plan  Of note, patient has documented reaction to contrast media  Chronic combined systolic and diastolic CHF (congestive heart failure) (HCC)  Assessment & Plan  Wt Readings from Last 3 Encounters:   09/01/22 84 8 kg (187 lb)   08/30/22 84 8 kg (187 lb)   08/29/22 84 1 kg (185 lb 6 5 oz)     Documented history of chronic combined systolic and diastolic heart failure  Last echo performed 03/15/2022 showed LVEF 50% with G1 DD, apical akinesis, T BMP with mild regurg  Chest x-ray not suggestive of fluid overload  09/14/2022 patient has developed lower extremity edema clear to auscultation of lungs; IV fluid stopped    Plan:  · Will hold diuresis at time  · Continue to monitor with strict I&Os and daily weights  · Outpatient follow-up        Ventral hernia with obstruction and without gangrene  Assessment & Plan  Patient has a ventral hernia initially diagnosed 2005  He reportedly had followed up with surgery regarding this and was offered surgery but he refused  He reports intermittent constipation that he treats with MiraLax at home but denies obstipation  Plan:  · Continue MiraLax  · Expectant management at this time    Seizure disorder [facility])  Assessment & Plan  Patient with reported history of seizures diagnosed roughly 40-50 years ago  Has been taking it b i d  Phenobarbital for this chronically  No seizure activity in the last 40 years  Plan:  · Continue phenobarbital    HLD (hyperlipidemia)  Assessment & Plan  Continue statin      Disposition:  Continue to monitor for signs of fever or infection    Plan for discharge Friday 2022 the patient continues to be afebrile with no other concerning symptoms infection hypotension  SUBJECTIVE     Patient seen and examined  No acute events overnight  Patient reports doing well with no new symptoms  He continues to endorse lower extremity edema  He states he is going to the bathroom to urinate multiple times throughout the day  Reports good appetite with the dysphagia  Denies any fatigue  Review of Systems   Constitutional: Negative for chills and fever  Respiratory: Negative for cough, shortness of breath and wheezing  Cardiovascular: Positive for leg swelling  Negative for chest pain  Gastrointestinal: Negative for abdominal distention, abdominal pain, constipation, diarrhea, nausea and vomiting  Genitourinary: Negative for difficulty urinating  OBJECTIVE     Vitals:    22 2231 22 0723 22 1459 22 2215   BP: 105/67 99/58 103/61 92/59   Pulse: 94 80 78 92   Resp: 18 18 16 20   Temp: 99 °F (37 2 °C) 98 4 °F (36 9 °C) 98 2 °F (36 8 °C) 98 2 °F (36 8 °C)   TempSrc:       SpO2: 97% 95% 97% 95%   Height:          Temperature:   Temp (24hrs), Av 3 °F (36 8 °C), Min:98 2 °F (36 8 °C), Max:98 4 °F (36 9 °C)    Temperature: 98 2 °F (36 8 °C)  Intake & Output:  I/O        0701  / 0700  0701  /15 0700 /15 0701   0700    P  O        I V  980      Blood       Total Intake 980      Urine       Total Output       Net +980                 Weights:   IBW (Ideal Body Weight): 77 6 kg    Body mass index is 25 36 kg/m²  Weight (last 2 days)     None        Physical Exam  Constitutional:       General: He is not in acute distress  Appearance: Normal appearance  He is normal weight  He is not ill-appearing or toxic-appearing  HENT:      Head: Normocephalic and atraumatic  Cardiovascular:      Rate and Rhythm: Normal rate and regular rhythm  Pulmonary:      Effort: No respiratory distress  Breath sounds:  No wheezing or rales    Abdominal:      General: Abdomen is flat  There is no distension  Tenderness: There is no abdominal tenderness  There is no guarding  Hernia: A hernia is present  Musculoskeletal:      Right lower leg: Edema present  Left lower leg: Edema present  Neurological:      Mental Status: He is alert and oriented to person, place, and time  Psychiatric:         Mood and Affect: Mood normal          Behavior: Behavior normal        LABORATORY DATA     Labs: I have personally reviewed pertinent reports  Results from last 7 days   Lab Units 09/15/22  0620 09/14/22  1860 09/13/22  0527 09/12/22  0504 09/09/22  0525 09/08/22  0841   WBC Thousand/uL 1 30* 1 70* 1 23* 1 22*  1 22*   < > 4 36   HEMOGLOBIN g/dL 8 2* 9 0* 8 2* 7 5*  7 5*   < > 7 8*   HEMATOCRIT % 25 3* 27 0* 24 9* 23 0*  23 0*   < > 23 7*   PLATELETS Thousands/uL 20* 21* 20* 25*  25*   < > 44*   NEUTROS PCT %  --   --   --   --   --  80*   MONOS PCT %  --   --   --   --   --  7   MONO PCT %  --  4 6 0*   < >  --     < > = values in this interval not displayed  Results from last 7 days   Lab Units 09/14/22  0638 09/13/22  0527 09/12/22  1253 09/12/22  0504 09/09/22  0525 09/08/22  0841   POTASSIUM mmol/L 3 7 3 4* 3 1* 3 0*   < > 3 3*   CHLORIDE mmol/L 108 109*  --  107   < > 103   CO2 mmol/L 25 23  --  24   < > 26   BUN mg/dL 5 6  --  7   < > 12   CREATININE mg/dL 0 56* 0 54*  --  0 59*   < > 0 75   CALCIUM mg/dL 7 4* 6 7*  --  6 9*   < > 7 6*   ALK PHOS U/L  --   --   --   --   --  95   ALT U/L  --   --   --   --   --  12   AST U/L  --   --   --   --   --  14    < > = values in this interval not displayed  Results from last 7 days   Lab Units 09/08/22  1217   LACTIC ACID mmol/L 1 1           IMAGING & DIAGNOSTIC TESTING     Radiology Results: I have personally reviewed pertinent reports  XR chest portable    Result Date: 9/12/2022  Impression: No acute cardiopulmonary disease   Question migration of esophageal stent below the diaphragm when compared with the fluoroscopic image for port placement from 7/15/2022  The study was marked in [facility] for immediate notification  Workstation performed: [id number]     XR chest 2 views    Result Date: 9/8/2022  Impression: 1  Bibasilar scarring/atelectasis  2   Esophageal stent extending from the lower esophagus and terminating within the proximal stomach  Workstation performed: [id number]     CT chest wo contrast    Result Date: 9/13/2022  Impression: No pneumonia  Mild bronchiectasis in the lower lobes which could be due to recurrent aspiration  Trace effusions with mild dependent atelectasis in the lower lobes  Migration of esophageal stent into the stomach  Nonobstructed transverse colon in a ventral hernia  Workstation performed: [id number]     Other Diagnostic Testing: I have personally reviewed pertinent reports      ACTIVE MEDICATIONS     Current Facility-Administered Medications   Medication Dose Route Frequency    acetaminophen (TYLENOL) tablet 650 mg  650 mg Oral Q6H PRN    aspirin (ECOTRIN LOW STRENGTH) EC tablet 81 mg  81 mg Oral Daily    atorvastatin (LIPITOR) tablet 80 mg  80 mg Oral Daily With Dinner    calcium carbonate (TUMS) chewable tablet 500 mg  500 mg Oral Daily    cefTRIAXone (ROCEPHIN) 1,000 mg in dextrose 5 % 50 mL IVPB  1,000 mg Intravenous Q24H    cholestyramine sugar free (QUESTRAN LIGHT) packet 4 g  4 g Oral BID    diphenhydrAMINE (BENADRYL) tablet 25 mg  25 mg Oral Q6H PRN    metroNIDAZOLE (FLAGYL) tablet 500 mg  500 mg Oral Q8H [address]    midodrine (PROAMATINE) tablet 2 5 mg  2 5 mg Oral TID AC    ondansetron (ZOFRAN) injection 4 mg  4 mg Intravenous Q6H PRN    pantoprazole (PROTONIX) EC tablet 40 mg  40 mg Oral Early Morning    PHENobarbital tablet 64 8 mg  64 8 mg Oral BID    senna (SENOKOT) tablet 8 6 mg  1 tablet Oral HS PRN       VTE Pharmacologic Prophylaxis: Reason for no pharmacologic prophylaxis Holding for pancytopenia  VTE Mechanical Prophylaxis: sequential compression device    Portions of the record may have been created with voice recognition software  Occasional wrong word or "sound a like" substitutions may have occurred due to the inherent limitations of voice recognition software    Read the chart carefully and recognize, using context, where substitutions have occurred   ==  Fili Perry, [facility]  Internal Medicine Residency PGY-1

## 2022-09-16 ENCOUNTER — APPOINTMENT (OUTPATIENT)
Dept: RADIATION ONCOLOGY | Facility: HOSPITAL | Age: 77
End: 2022-09-16
Attending: INTERNAL MEDICINE
Payer: MEDICARE

## 2022-09-16 VITALS
BODY MASS INDEX: 25.36 KG/M2 | OXYGEN SATURATION: 93 % | TEMPERATURE: 96.4 F | HEIGHT: 72 IN | RESPIRATION RATE: 18 BRPM | DIASTOLIC BLOOD PRESSURE: 55 MMHG | HEART RATE: 91 BPM | SYSTOLIC BLOOD PRESSURE: 95 MMHG

## 2022-09-16 PROBLEM — R50.9 FEVER: Status: RESOLVED | Noted: 2022-09-12 | Resolved: 2022-09-16

## 2022-09-16 PROBLEM — R53.83 FATIGUE: Status: RESOLVED | Noted: 2022-09-08 | Resolved: 2022-09-16

## 2022-09-16 PROBLEM — R42 DIZZINESS: Status: RESOLVED | Noted: 2022-09-08 | Resolved: 2022-09-16

## 2022-09-16 LAB
ACTH PLAS-MCNC: 62.7 PG/ML (ref 7.2–63.3)
ANION GAP SERPL CALCULATED.3IONS-SCNC: 3 MMOL/L (ref 4–13)
ANISOCYTOSIS BLD QL SMEAR: PRESENT
BACTERIA BLD CULT: NORMAL
BACTERIA BLD CULT: NORMAL
BASOPHILS # BLD MANUAL: 0.02 THOUSAND/UL (ref 0–0.1)
BASOPHILS NFR MAR MANUAL: 1 % (ref 0–1)
BUN SERPL-MCNC: 10 MG/DL (ref 5–25)
CALCIUM SERPL-MCNC: 7.2 MG/DL (ref 8.3–10.1)
CHLORIDE SERPL-SCNC: 107 MMOL/L (ref 96–108)
CO2 SERPL-SCNC: 27 MMOL/L (ref 21–32)
CREAT SERPL-MCNC: 0.66 MG/DL (ref 0.6–1.3)
EOSINOPHIL # BLD MANUAL: 0.18 THOUSAND/UL (ref 0–0.4)
EOSINOPHIL NFR BLD MANUAL: 11 % (ref 0–6)
ERYTHROCYTE [DISTWIDTH] IN BLOOD BY AUTOMATED COUNT: 24.4 % (ref 11.6–15.1)
GFR SERPL CREATININE-BSD FRML MDRD: 93 ML/MIN/1.73SQ M
GLUCOSE SERPL-MCNC: 92 MG/DL (ref 65–140)
HCT VFR BLD AUTO: 24.9 % (ref 36.5–49.3)
HGB BLD-MCNC: 8.1 G/DL (ref 12–17)
LYMPHOCYTES # BLD AUTO: 0.78 THOUSAND/UL (ref 0.6–4.47)
LYMPHOCYTES # BLD AUTO: 49 % (ref 14–44)
MCH RBC QN AUTO: 31.4 PG (ref 26.8–34.3)
MCHC RBC AUTO-ENTMCNC: 32.5 G/DL (ref 31.4–37.4)
MCV RBC AUTO: 97 FL (ref 82–98)
MONOCYTES # BLD AUTO: 0.03 THOUSAND/UL (ref 0–1.22)
MONOCYTES NFR BLD: 2 % (ref 4–12)
NEUTROPHILS # BLD MANUAL: 0.34 THOUSAND/UL (ref 1.85–7.62)
NEUTS BAND NFR BLD MANUAL: 2 % (ref 0–8)
NEUTS SEG NFR BLD AUTO: 19 % (ref 43–75)
OVALOCYTES BLD QL SMEAR: PRESENT
PATHOLOGIST INTERPRETATION: NORMAL
PATHOLOGY REVIEW: YES
PLATELET # BLD AUTO: 19 THOUSANDS/UL (ref 149–390)
PLATELET BLD QL SMEAR: ABNORMAL
POIKILOCYTOSIS BLD QL SMEAR: PRESENT
POLYCHROMASIA BLD QL SMEAR: PRESENT
POTASSIUM SERPL-SCNC: 3.5 MMOL/L (ref 3.5–5.3)
RBC # BLD AUTO: 2.58 MILLION/UL (ref 3.88–5.62)
RBC MORPH BLD: PRESENT
SCHISTOCYTES BLD QL SMEAR: PRESENT
SMUDGE CELLS BLD QL SMEAR: PRESENT
SODIUM SERPL-SCNC: 137 MMOL/L (ref 135–147)
TARGETS BLD QL SMEAR: PRESENT
TRANSFUSION STATUS PATIENT QL: NORMAL
VARIANT LYMPHS # BLD AUTO: 16 %
WBC # BLD AUTO: 1.6 THOUSAND/UL (ref 4.31–10.16)

## 2022-09-16 PROCEDURE — 85025 COMPLETE CBC W/AUTO DIFF WBC: CPT

## 2022-09-16 PROCEDURE — 80048 BASIC METABOLIC PNL TOTAL CA: CPT

## 2022-09-16 PROCEDURE — 85007 BL SMEAR W/DIFF WBC COUNT: CPT

## 2022-09-16 PROCEDURE — 85027 COMPLETE CBC AUTOMATED: CPT

## 2022-09-16 PROCEDURE — 99238 HOSP IP/OBS DSCHRG MGMT 30/<: CPT | Performed by: HOSPITALIST

## 2022-09-16 RX ORDER — POTASSIUM CHLORIDE 14.9 MG/ML
20 INJECTION INTRAVENOUS
Status: COMPLETED | OUTPATIENT
Start: 2022-09-16 | End: 2022-09-16

## 2022-09-16 RX ORDER — MIDODRINE HYDROCHLORIDE 2.5 MG/1
2.5 TABLET ORAL
Qty: 90 TABLET | Refills: 0 | Status: SHIPPED | OUTPATIENT
Start: 2022-09-16 | End: 2022-10-12

## 2022-09-16 RX ORDER — POTASSIUM CHLORIDE 750 MG/1
10 CAPSULE, EXTENDED RELEASE ORAL 2 TIMES DAILY
Qty: 14 CAPSULE | Refills: 0 | Status: SHIPPED | OUTPATIENT
Start: 2022-09-16 | End: 2022-10-20

## 2022-09-16 RX ADMIN — PHENOBARBITAL 64.8 MG: 64.8 TABLET ORAL at 10:25

## 2022-09-16 RX ADMIN — ASPIRIN 81 MG: 81 TABLET, COATED ORAL at 10:26

## 2022-09-16 RX ADMIN — METRONIDAZOLE 500 MG: 500 TABLET ORAL at 13:15

## 2022-09-16 RX ADMIN — CARBIDOPA AND LEVODOPA 2.5 MG: 50; 200 TABLET, EXTENDED RELEASE ORAL at 10:35

## 2022-09-16 RX ADMIN — CALCIUM CARBONATE (ANTACID) CHEW TAB 500 MG 500 MG: 500 CHEW TAB at 10:26

## 2022-09-16 RX ADMIN — CARBIDOPA AND LEVODOPA 2.5 MG: 50; 200 TABLET, EXTENDED RELEASE ORAL at 05:23

## 2022-09-16 RX ADMIN — PANTOPRAZOLE SODIUM 40 MG: 40 TABLET, DELAYED RELEASE ORAL at 05:10

## 2022-09-16 RX ADMIN — METRONIDAZOLE 500 MG: 500 TABLET ORAL at 05:10

## 2022-09-16 RX ADMIN — CHOLESTYRAMINE 4 G: 4 POWDER, FOR SUSPENSION ORAL at 10:23

## 2022-09-16 RX ADMIN — FOLIC ACID 1 MG: 1 TABLET ORAL at 10:25

## 2022-09-16 RX ADMIN — POTASSIUM CHLORIDE 20 MEQ: 14.9 INJECTION, SOLUTION INTRAVENOUS at 10:23

## 2022-09-16 RX ADMIN — POTASSIUM CHLORIDE 20 MEQ: 14.9 INJECTION, SOLUTION INTRAVENOUS at 12:48

## 2022-09-16 NOTE — MALNUTRITION/BMI
This medical record reflects one or more clinical indicators suggestive of malnutrition  Malnutrition Findings:   Adult Malnutrition type: Chronic illness  Adult Degree of Malnutrition: Other severe protein calorie malnutrition  Malnutrition Characteristics: Fat loss, Muscle loss, Inadequate energy, Weight loss                  360 Statement: Severe malnutrition related to inadequate energy intake in the setting of chronic disease or illnes ( cancer) r/t prolonged inadequate oral intake as evidenced by <75% of estimated energy needs met > 1 mo ( early satiety, fatigue, nausea, emesis), severe unintentional wt loss of 33lbs (15%) since 5/2/22 (4 mo), severe trapezieus muscle wasting, severe buccal fat wasting  Treat with diet, pt refusing supplements  To consider remeron  BMI Findings: Body mass index is 25 36 kg/m²  See Nutrition note dated 9/16 for additional details  Completed nutrition assessment is viewable in the nutrition documentation

## 2022-09-16 NOTE — CASE MANAGEMENT
Case Management Discharge Planning Note    Patient name Shelly Mom  Location [facility] 906/PPHP 178-79 MRN [id number]  : 1945 Date 2022       Current Admission Date: 2022  Current Admission Diagnosis:Hypotension   Patient Active Problem List    Diagnosis Date Noted    Chemotherapy induced neutropenia ([facility] ) 09/15/2022    Migration of esophageal stent 2022    Fever of unknown etiology 2022    Fatigue 2022    Anemia 2022    Hypotension 2022    GERD (gastroesophageal reflux disease) 2022    Dizziness 2022    Vitamin B12 deficiency 2022    Port-A-Cath in place 08/10/2022    Thrombocytopenia ([facility] ) 2022    Dysphagia 2022    Pancytopenia ([facility] ) 2022    Acute gastric ulcer 2022    Esophageal cancer ([place] [address] ) 2022    Abdominal aortic aneurysm, without rupture ([facility] ) 02/10/2022    Paroxysmal atrial fibrillation ([facility] ) 02/10/2022    Coronary artery disease of native artery of native heart with stable angina pectoris ([facility] ) 02/10/2022    Ischemic cardiomyopathy 2021    Chronic combined systolic and diastolic CHF (congestive heart failure) ([facility] ) 2021    Allergic reaction to contrast media 2021    NSTEMI (non-ST elevated myocardial infarction) ([place] [address] ) 2021    Abdominal pain 2020    Hernia, incisional 2019    Ventral hernia with obstruction and without gangrene 2019    CAD (coronary artery disease) 2017    HTN (hypertension) 2017    HLD (hyperlipidemia) 2017    [facility] ) 2017    S/P AAA (abdominal aortic aneurysm) repair 2017    STEMI (ST elevation myocardial infarction) ([facility] ) 2017      LOS (days): 8  Geometric Mean LOS (GMLOS) (days): 4 80  Days to GMLOS:-3     OBJECTIVE:  Risk of Unplanned Readmission Score: 30 04         Current admission status: Inpatient   Preferred Pharmacy:   [facility]/pharmacy #[id number] [facility]  [address]  Phone: [phone] Fax: Jimbo Guillermo  [facility] [facility] [address] [address]  Phone: [phone] Fax: [phone]    Primary Care Provider: Marcella Rios MD    Primary Insurance: MEDICARE  Secondary Insurance: [address]    DISCHARGE DETAILS:    Discharge planning discussed with[de-identified] Patient           Were Treatment Team discharge recommendations reviewed with patient/caregiver?: Yes  Did patient/caregiver verbalize understanding of patient care needs?: Yes  Were patient/caregiver advised of the risks associated with not following Treatment Team discharge recommendations?: Yes           Other Referral/Resources/Interventions Provided:  Referral Comments: patient with no therapy needs for time of discharge  He will however need a ride home as his brother is at dialysis today  Patient confirms that he will discharge to [address] [place] [address] [address] in [address] [place] [address]  He confirms that he has his house keys on him and will be able to get into his home  Cata in nursing made aware  CM will call for lyft ride as soon as nurse is done going over discharge instructions with the patient and notifies CM

## 2022-09-16 NOTE — WOUND OSTOMY CARE
Consult Note - Wound   Taz Sandoval 67 y o  male MRN: [id number]  Unit/Bed#: [facility] 906-01 Encounter: [id number]        History and Present Illness:  Patient is a 68 yo male that was admitted to the hospital for treatment of hypotension  Patient has a PMH of hypertension, hyperlipidemia, CAD complicated by MIs status post AGUSTIN x4, NYHA class 1 combined systolic and diastolic heart failure SMXR69%, AAA status post repair, questionable history of seizures diagnosed 50 years ago on chronic phenobarbital with no seizure activity in over 40 years, history of stageIIB  esophageal cancer on a chemo regimen of carboplatin/paclitaxel and concurrent radiotherapy, history of leukopenia status post Granix therapy 9/7/22  Patient is a min assist for turning and repositioning  Patient is continence of bowel and bladder  On assessment, patient is lying supine on regular mattress  B/L heels dry, intact, and cammie  Patient refused buttocks assessment- reports no wounds or pain on buttocks  Wound Care was consulted for R foot wound     Assessment Findings:   Right Lateral Leg Eczema: intact dry redness that blanches  No skin loss, openings, or drainage noted  Patient reports long history of eczema  No induration, fluctuance, odor, warmth/temperature differences, redness, or purulence noted to the above noted wounds and skin areas assessed  New dressings applied per orders listed below  Patient tolerated well- no s/s of non-verbal pain or discomfort observed during the encounter  Bedside nurse aware of plan of care  See flow sheets for more detailed assessment findings  Orders listed below and wound care will sign off, call or tiger text with questions  Skin Care Plan:  1-Apply skin nourishing cream (purple moisutizing cream) daily to b/l legs BID and PRN   2-Turn/reposition q2h or when medically stable for pressure re-distribution on skin   3-Elevate heels to offload pressure    4-Moisturize skin daily with skin nourishing cream  5-Ehob cushion in chair when out of bed  6-Hydraguard to bilateral heels BID and PRN  Wounds:  Wound 09/15/22 Pretibial Distal;Right (Active)   Wound Image   09/16/22 0855   Wound Description Intact;Dry;Pink 09/16/22 0855   Cindi-wound Assessment Intact;Fragile 09/16/22 0855   Wound Length (cm) 0 cm 09/16/22 0855   Wound Width (cm) 0 cm 09/16/22 0855   Wound Depth (cm) 0 cm 09/16/22 0855   Wound Surface Area (cm^2) 0 cm^2 09/16/22 0855   Wound Volume (cm^3) 0 cm^3 09/16/22 0855   Calculated Wound Volume (cm^3) 0 cm^3 09/16/22 0855   Drainage Amount None 09/16/22 0855   Treatments Cleansed;Site care 09/16/22 0855   Dressing Moisture barrier 09/16/22 0855   Wound packed?  No 09/16/22 0855   Dressing Changed New 09/16/22 0855   Patient Tolerance Tolerated well 09/16/22 0855   Dressing Status Intact 09/16/22 0855               Pb Interiano RN, BSN

## 2022-09-16 NOTE — PROGRESS NOTES
Pastoral Care Progress Note    2022  Patient: Kaylah Sesay : 1945  Admission Date & Time: 2022 0910  MRN: [id number] [facility]: [id number]      Fr Leelee Cole visited pt to provide prayer, Holy Communion, and a blessing  Chaplains remain available                 Chaplaincy Interventions Utilized:     Exploration: Explored spiritual needs & resources    Collaboration: Facilitated respect for spiritual/cultural practice during hospitalization    Relationship Building: Cultivated a relationship of care and support    Ritual: Provided prayer and Provided ritual       09/15/22 0930   Clinical Encounter Type   Visited With Patient   Routine Visit Follow-up   Moravian Encounters   Moravian Needs Prayer   Sacramental Encounters   Communion Given Indicator Yes   Sacrament Other Other (Comment)  ([place])

## 2022-09-16 NOTE — RESTORATIVE TECHNICIAN NOTE
Restorative Technician Note      Patient Name: Asha Moralez     Restorative Tech Visit Date: 9/16/2022  Note Type: Mobility  Patient Position Upon Consult: Supine  Activity Performed: Ambulated  Assistive Device: Roller walker  Patient Position at End of Consult: All needs within reach;  Seated edge of bed      [facility]

## 2022-09-16 NOTE — DISCHARGE INSTRUCTIONS
You were admitted for hypotension and fevers  We treated you with antibiotics but did not find any source of infection  Who recommend continuing using the compression stockings to help with low blood pressures  Recommend coming back if you continue to have low blood pressures, feel lightheaded, and new fevers/chills    Please follow-up with Oncology in 1 week  Please have CBC labs done prior to office visit  Please also follow-up with GI doctor to evaluate your stent    Please follow-up with your PCP within 1 week

## 2022-09-16 NOTE — PLAN OF CARE
Problem: Potential for Falls  Goal: Patient will remain free of falls  Description: INTERVENTIONS:  - Educate patient/family on patient safety including physical limitations  - Instruct patient to call for assistance with activity   - Consult OT/PT to assist with strengthening/mobility   - Keep Call bell within reach  - Keep bed low and locked with side rails adjusted as appropriate  - Keep care items and personal belongings within reach  - Initiate and maintain comfort rounds  - Make Fall Risk Sign visible to staff  - Apply yellow socks and bracelet for high fall risk patients  - Consider moving patient to room near nurses station  Outcome: Progressing     Problem: INFECTION - ADULT  Goal: Absence of fever/infection during neutropenic period  Description: INTERVENTIONS:  - Monitor WBC    Outcome: Progressing     Problem: MOBILITY - ADULT  Goal: Maintain or return to baseline ADL function  Description: INTERVENTIONS:  -  Assess patient's ability to carry out ADLs; assess patient's baseline for ADL function and identify physical deficits which impact ability to perform ADLs (bathing, care of mouth/teeth, toileting, grooming, dressing, etc )  - Assess/evaluate cause of self-care deficits   - Assess range of motion  - Assess patient's mobility; develop plan if impaired  - Assess patient's need for assistive devices and provide as appropriate  - Encourage maximum independence but intervene and supervise when necessary  - Involve family in performance of ADLs  - Assess for home care needs following discharge   - Consider OT consult to assist with ADL evaluation and planning for discharge  - Provide patient education as appropriate  Outcome: Progressing

## 2022-09-16 NOTE — DISCHARGE SUMMARY
[facility] INTERNAL MEDICINE RESIDENCY DISCHARGE SUMMARY     Kaylah Sesay   67 y o  male  MRN: [id number]  Room/Bed: [place] [address]/[facility] [address] [place] [address]   Encounter: [id number]    Principal Problem:    Hypotension  Active Problems:    Esophageal cancer (HCC)    Pancytopenia (HCC)    Thrombocytopenia (HCC)    Anemia    HLD (hyperlipidemia)    Seizure disorder (HCC)    Ventral hernia with obstruction and without gangrene    Chronic combined systolic and diastolic CHF (congestive heart failure) (HCC)    Allergic reaction to contrast media    Paroxysmal atrial fibrillation (HCC)    Coronary artery disease of native artery of native heart with stable angina pectoris (HCC)    GERD (gastroesophageal reflux disease)    Migration of esophageal stent      * Hypotension  Assessment & Plan  Upon presentation patient hypotensive in the 42O to 80 systolic  This was in the setting of a recent increase in his WBC count from 1 3 on 9/6 to 4 3 on this admission in the setting of her recent granix infusion 9/7  There was a concern for sepsis this admission and the patient received a total of 3 L crystalloid in the ED with slight improvement of his blood pressure  Lactic acid 1 1  Upon chart review, the patient was recently seen in the outpatient setting 9/1 and was noted to be hypotensive to the 52Z to 17D systolic as well, with improvement when the blood pressure was taken on the right arm  Patient does not technically meet sepsis criteria however the increase in WBC count from baseline does not seem to be explained only from his recent Granix infusion  Can not rule out sepsis at this time      UA and 4 blood cultures with no signs of infection  Chest XR like clear with no signs of pneumonia, no widened mediastinum, no chest pain or back pain  IV fluids stopped as pt developed LE edema  No cause of hypotension was found, but may be likely to cardiac autonomic dysfunction secondary to chemotherapy  Patient was found to have orthostatic hypotension    Plan:  · Midodrine 2 5 t i d   · If patient does become hypotensive, recommend repeating blood pressures on both arms  · Knee-high compression stockings           Esophageal cancer [facility])  Assessment & Plan  Patient with stage II B esophageal cancer, 1st discovered in 05/2022 to via EUS in verified by biopsy  No metastatic disease per  PET-CT scan performed on 06/2022  Has undergone carboplatin pack with Taxol chemotherapy as well as radiotherapy  Followed by heme Onc as an outpatient  Presenting today from his radiation oncology appointments and did not receive his scheduled radiation therapy today  CT 9/12 shows migration of esophageal stent in the stomach  As patient is neutropenic, thrombocytopenic, does endorse symptoms of dysphasia or odynophagia GI recommends outpatient follow-up    Plan:  · Continue outpatient follow-up with GI    Fever of unknown etiology-resolved as of 9/16/2022  Assessment & Plan  Patient is spiking fevers 101° at night of September 10th, 11th, 12th  UA show no sign infection  X-ray 9/8 and 9/10 show no consolidation  9/8 x2 blood cultures and 9/10 x2 blood cultures  show no growth to date  CT chest showed no pneumonia, mild bronchiectasis in lower lobes likely from recurrent aspiration, trace effusions mi atelectasis in lower lobes  Patient is not febrile last 3 days; no source found    Plan: Will repeat blood cultures checking for fungal infection, separate cultures at time of fever, and cultures from port  Continue p r n  Tylenol    Pancytopenia [facility])  Assessment & Plan  Patient with history of anemia, leukopenia, and thrombocytopenia in the setting of adenocarcinoma  Values on admission, hemoglobin of 7 8, WBCs of 4 36, platelets of 44, trending down over the past few months  Likely secondary to recent chemo    No signs of acute bleeding at this time but can not rule out a slow upper GI bleed in the setting of his esophageal adenocarcinoma and radiation  Plan:  · Continue recommendations per Oncology  · Increase antibiotic coverage for ANC < 500  · Patient currently on Granix for leukopenia    Thrombocytopenia (HCC)  Assessment & Plan  Platelets of 44 on admission, gradually trending down from 140s 1 year ago  Plan:  · Holding DVT prophylaxis due to thrombocytopenia  · No signs of severe bleeding, hold off on platelet transfusion at this point  · Continue to monitor for hemodynamic stability and for any signs of bleeding    Anemia  Assessment & Plan  Normocytic anemia  Iron panel done 08/2022 with iron sat 50, TIBC 184, Fe 92, Ferritin 88  Baseline hemoglobin of around 12 from 07/2021  Gradually downtrending since then  Follows with heme Onc, has a documented history of folic acid and Z78 deficiency for which she is receiving IM B12 supplementation and was prescribed folate p o  Supplementation which he is not taking  His overall anemia possibly combined microcytic and macrocytic anemia secondary to gradual blood loss from his esophageal cancer and FA/B12 deficiency  No signs of acute blood loss at this time  Anemia is most likely contributing to the patient's chief concerns of fatigue and dizziness  Hemoglobin on admission 7 8 from 8 7 two weeks ago  Last B12 supplementation 09/06/2022  Latest hemoglobin, 6 7 -- given 1 unit of irradiated leukoreduced RBCs  9/12 Hb 7 5 the patient continues to poor being fatigued  9/13 Hb 8 2 post 1 packed red blood cells and patient reports improvement of fatigue symptoms  Iron studies reflect pattern of anemia of chronic disease  Folate levels low - 2  B12 wnl    Plan  · p o  Iron supplementation upon discharge  · Folic acid supplementation  outpatient follow-up with heme Onc       GERD (gastroesophageal reflux disease)  Assessment & Plan  Patient reports a history of GERD, likely the etiology behind his cough    Was prescribed pantoprazole 40 mg b i d  But reports not taking this medication  Plan:  · Will start him on pantoprazole 40 mg daily here    Coronary artery disease of native artery of native heart with stable angina pectoris [facility])  Assessment & Plan  Patient with a history of CAD status post AGUSTIN x4, has follow-up with cardiology as an outpatient  Most recently completed 6 months of dual antiplatelet therapy  Currently on baby aspirin and atorvastatin daily  No chest pain  Troponins negative  Plan  · Continue aspirin, statin for secondary prevention      Paroxysmal atrial fibrillation [facility])  Assessment & Plan  Documented Hx of paroxysmal atrial fibrillation, Julissa Vasc of 5  Previously on warfarin for left apical thrombus, however has since been discontinued by his cardiologist   Last seen by outpatient Cardiology 06/2022  Currently normal sinus in the ED      Plan:  · Patient not currently on any home beta-blocker regiment  · Avoiding beta-blockers in the setting of hypotension  · F/u outpatient      Allergic reaction to contrast media  Assessment & Plan  Of note, patient has documented reaction to contrast media  Chronic combined systolic and diastolic CHF (congestive heart failure) (HCC)  Assessment & Plan  Wt Readings from Last 3 Encounters:   09/01/22 84 8 kg (187 lb)   08/30/22 84 8 kg (187 lb)   08/29/22 84 1 kg (185 lb 6 5 oz)     Documented history of chronic combined systolic and diastolic heart failure  Last echo performed 03/15/2022 showed LVEF 50% with G1 DD, apical akinesis, T BMP with mild regurg  Chest x-ray not suggestive of fluid overload  09/14/2022 patient has developed lower extremity edema clear to auscultation of lungs; IV fluid stopped    Plan:  · Will hold diuresis at time  · Continue to monitor with strict I&Os and daily weights  · Outpatient follow-up        Ventral hernia with obstruction and without gangrene  Assessment & Plan  Patient has a ventral hernia initially diagnosed 2005   He reportedly had followed up with surgery regarding this and was offered surgery but he refused  He reports intermittent constipation that he treats with MiraLax at home but denies obstipation  Plan:  · Continue MiraLax  · Expectant management at this time    Seizure disorder [facility])  Assessment & Plan  Patient with reported history of seizures diagnosed roughly 40-50 years ago  Has been taking it b i d  Phenobarbital for this chronically  No seizure activity in the last 40 years  Plan:  · Continue phenobarbital    HLD (hyperlipidemia)  Assessment & Plan  Continue statin    Dizziness-resolved as of 9/16/2022  Assessment & Plan  Patient describes a vertiginous "dizziness" that comes on when he stands up quickly  Denies any blacking out, presyncope or syncope  He is able to avoid the dizziness feeling if he gets up slowly  No exacerbating symptoms  Likely multifactorial, secondary to his hypotension and anemia  Plan:  · See anemia plan  · See hypotension plan    Fatigue-resolved as of 9/16/2022  Assessment & Plan  Patient with a chief concern of subacute worsening fatigue in setting of esophageal adenocarcinoma, recent chemo, and continued radiation therapy  Patient anemic, TSH low normal  Likely multifactorial expect his anemia may be the largest contributing factor         Greatly improved post 2 packed RBC    Plan:  Continue to monitor hemoglobin            [address]      The patient is a 34-year-old male with past medical history of hypertension, hyperlipidemia in the commode by 4 MI status post drug-eluting stents x4, NYHA class 1 combined systolic and diastolic heart BBLTPWR GWRB65%, AAA status post repair, questionable history of seizures diagnosed 50 years ago on chronic phenobarbital with no seizure activity in over 40 years, history of stageIIB  esophageal cancer on a chemo regimen of carboplatin/paclitaxel and concurrent radiotherapy with palliative esophageal stent, history of leukopenia status post Granix therapy 9/7/22      Patient reports feeling extremely weak and fatigued  Patient reported having poor p o  intake, bringing up copious saliva, nausea, 5/10 abdominal pain that became 9-10/10 at times  Patient was hypotensive in the ED, WBC 4 3, absolute neutrophil count 3 50, hemoglobin 7 8, platelets 44  Patient is on from meets sepsis criteria and was admitted for concerns of infection given his immunocompromised state  He was started ceftriaxone again for 4 L of crystalloid  Patient was started on midodrine t i d  and maintenance fluids for hypotension  COVID/Flu/Rsv, for blood cultures, and UA for negative for infection  Chest x-ray did not show concerning signs of pneumonia  Patient's abdominal pain resolved after a bowel movement  Patient continues to have symptomatic anemia and was given 2 packed red blood cells throughout hospital course and patient reports improved fatigue  Patient also began spiking fevers around 101 at night approach repeat blood cultures and CT chest was performed  Chest CT showed no concern for pneumonia however did show mild bronchiectasis possibly from recurrent aspiration and esophageal stent migration  Patient had also continued to be neutropenic with ANC of 430  Patient was continued on ceftriaxone and Flagyl, he has not had any recurrent fevers for the past 4 days  Patient continued to deny any symptoms for the past 3 days hospitalization  He did however develop lower extremity edema for which means fluids were stopped compression stockings were used with significant improvement  No source of fever was found but has resolved  Hypotension may be secondary to cardiac autonomic dysfunction secondary to chemotherapy  Patient continues to be neutropenic and pancytopenic  In time of discharge patient is stable and no longer having fevers/chills    Patient continues to be pancytopenic and neutropenic for which oncology recommends outpatient follow-up in continue folate  Patient denies any fatigue, dysphagia, nausea, vomiting, shortness of breath, difficulty ambulating, constipation, and diarrhea      Outpt: Follow-up with Oncology for evaluation of esophageal cancer and pancytopenia  Follow-up with GI for esophageal stent migration             Physical Exam  Constitutional:       General: He is not in acute distress  Appearance: Normal appearance  He is normal weight  He is not ill-appearing or toxic-appearing  HENT:      Head: Normocephalic and atraumatic  Cardiovascular:      Rate and Rhythm: Normal rate and regular rhythm  Heart sounds: Normal heart sounds  No murmur heard  No gallop  Pulmonary:      Effort: Pulmonary effort is normal  No respiratory distress  Breath sounds: No wheezing or rales  Abdominal:      General: Abdomen is flat  There is no distension  Palpations: Abdomen is soft  Tenderness: There is no abdominal tenderness  There is no guarding  Hernia: A hernia is present  Musculoskeletal:      Right lower leg: No edema  Left lower leg: No edema  Neurological:      Mental Status: He is alert and oriented to person, place, and time  Sensory: Sensory deficit (Decreased sensation to light touch in distal toes) present     Psychiatric:         Mood and Affect: Mood normal          Behavior: Behavior normal      DISCHARGE INFORMATION     PCP at Discharge: Elmer Guevara MD    Admitting Provider: Emerita Turk MD  Admission Date: 9/8/2022    Discharge Provider: Emerita Turk MD  Discharge Date: 9/16/22    Discharge Disposition: Home/Self Care  Discharge Condition: fair  Discharge with Lines: no    Discharge Diet: cardiac diet  Activity Restrictions: none  Test Results Pending at Discharge:  CBC prior to follow up with oncology  Discharge Diagnoses:  Principal Problem:    Hypotension  Active Problems:    Esophageal cancer (HCC)    Pancytopenia (HCC)    Thrombocytopenia (HCC)    Anemia    HLD (hyperlipidemia)    Seizure disorder (HCC)    Ventral hernia with obstruction and without gangrene    Chronic combined systolic and diastolic CHF (congestive heart failure) (HCC)    Allergic reaction to contrast media    Paroxysmal atrial fibrillation (HCC)    Coronary artery disease of native artery of native heart with stable angina pectoris (HCC)    GERD (gastroesophageal reflux disease)    Migration of esophageal stent  Resolved Problems:    Fever of unknown etiology    Fatigue    Dizziness      Consulting Providers:      Diagnostic & Therapeutic Procedures Performed:  XR chest portable    Result Date: 9/12/2022  Impression: No acute cardiopulmonary disease  Question migration of esophageal stent below the diaphragm when compared with the fluoroscopic image for port placement from 7/15/2022  The study was marked in [facility] for immediate notification  Workstation performed: [id number]     XR chest 2 views    Result Date: 9/8/2022  Impression: 1  Bibasilar scarring/atelectasis  2   Esophageal stent extending from the lower esophagus and terminating within the proximal stomach  Workstation performed: [id number]     CT chest wo contrast    Result Date: 9/13/2022  Impression: No pneumonia  Mild bronchiectasis in the lower lobes which could be due to recurrent aspiration  Trace effusions with mild dependent atelectasis in the lower lobes  Migration of esophageal stent into the stomach  Nonobstructed transverse colon in a ventral hernia    Workstation performed: [id number]       Code Status: Level 3 - DNAR and DNI  Advance Directive & Living Will: <no information>  Power of :    POLST:      Medications:  Current Discharge Medication List        Current Discharge Medication List      START taking these medications    Details   midodrine (PROAMATINE) 2 5 mg tablet Take 1 tablet (2 5 mg total) by mouth 3 (three) times a day before meals  Qty: 90 tablet, Refills: 0    Associated Diagnoses: Hypotension      potassium chloride (MICRO-K) 10 MEQ CR capsule Take 1 capsule (10 mEq total) by mouth 2 (two) times a day for 7 days  Qty: 14 capsule, Refills: 0    Associated Diagnoses: Hypokalemia           Current Discharge Medication List      CONTINUE these medications which have NOT CHANGED    Details   aspirin (Aspirin Low Dose) 81 mg EC tablet Take 1 tablet (81 mg total) by mouth daily  Qty: 90 tablet, Refills: 3    Associated Diagnoses: Chronic combined systolic and diastolic CHF (congestive heart failure) ([place] [address] ); NSTEMI (non-ST elevated myocardial infarction) ([place] [address] ); Essential hypertension; Seizure disorder (HCC)      atorvastatin (LIPITOR) 80 mg tablet Take 1 tablet (80 mg total) by mouth daily with dinner  Qty: 90 tablet, Refills: 4    Comments: DX Code Needed    Associated Diagnoses: Chronic combined systolic and diastolic CHF (congestive heart failure) ([place] [address] ); NSTEMI (non-ST elevated myocardial infarction) ([place] [address] ); Essential hypertension; Seizure disorder ([facility])      PHENobarbital 64 8 mg tablet TAKE 1 TABLET (64 8 MG TOTAL) BY MOUTH 2 (TWO) TIMES A DAY  Qty: 180 tablet, Refills: 1    Comments: This request is for a new prescription for a controlled substance as required by Federal/State law  Associated Diagnoses: Seizure disorder ([place] [address] )      folic acid ( Folic Acid) 1 mg tablet Take 1 tablet (1 mg total) by mouth daily  Qty: 90 tablet, Refills: 1    Associated Diagnoses: Folate deficiency      lidocaine-prilocaine (EMLA) cream Apply to port 30 to 60 min prior to use  Qty: 30 g, Refills: 2    Associated Diagnoses: Esophageal adenocarcinoma (HCC)      ondansetron (Zofran ODT) 8 mg disintegrating tablet Take 1 tablet (8 mg total) by mouth every 8 (eight) hours as needed for nausea or vomiting  Qty: 30 tablet, Refills: 3    Associated Diagnoses: Esophageal adenocarcinoma (HCC)      pantoprazole (PROTONIX) 40 mg tablet TAKE 1 TABLET BY MOUTH 2 TIMES A DAY BEFORE MEALS    Qty: 180 tablet, Refills: 3    Associated Diagnoses: Esophagitis determined by endoscopy; Duodenitis      prochlorperazine (COMPAZINE) 10 mg tablet Take 1 tablet (10 mg total) by mouth every 6 (six) hours as needed for nausea or vomiting  Qty: 30 tablet, Refills: 3    Associated Diagnoses: Esophageal adenocarcinoma (HCC)             Allergies: Allergies   Allergen Reactions    Iodinated Diagnostic Agents Rash     Pt's skin get very red and he gets hot and chills    Clopidogrel Rash       FOLLOW-UP     PCP Outpatient Follow-up:  Follow-up with PCP within 1wk     Consulting Providers Follow-up:  Followup with Oncology and GI outpatient     Active Issues Requiring Follow-up:   Pancytopenia, palliative esophageal stent migration    Discharge Statement:   I spent 30 minutes minutes discharging the patient  This time was spent on the day of discharge  I had direct contact with the patient on the day of discharge  Additional documentation is required if more than 30 minutes were spent on discharge  Portions of the record may have been created with voice recognition software  Occasional wrong word or "sound a like" substitutions may have occurred due to the inherent limitations of voice recognition software    Read the chart carefully and recognize, using context, where substitutions have occurred     ==  Tracie Bills, [address] [facility]  Internal Medicine Resident PGY-1

## 2022-09-16 NOTE — DISCHARGE INSTR - OTHER ORDERS
Skin Care Plan:  Apply skin nourishing cream (purple moisutizing cream) daily to b/l legs BID and PRN

## 2022-09-17 LAB
BACTERIA BLD CULT: NORMAL
BACTERIA BLD CULT: NORMAL

## 2022-09-19 ENCOUNTER — HOSPITAL ENCOUNTER (INPATIENT)
Facility: HOSPITAL | Age: 77
LOS: 5 days | Discharge: HOME | DRG: 919 | End: 2022-09-25
Attending: EMERGENCY MEDICINE | Admitting: INTERNAL MEDICINE
Payer: MEDICARE

## 2022-09-19 ENCOUNTER — TRANSITIONAL CARE MANAGEMENT (OUTPATIENT)
Dept: INTERNAL MEDICINE CLINIC | Facility: CLINIC | Age: 77
End: 2022-09-19

## 2022-09-19 ENCOUNTER — HOSPITAL ENCOUNTER (OUTPATIENT)
Dept: INFUSION CENTER | Facility: HOSPITAL | Age: 77
End: 2022-09-19

## 2022-09-19 ENCOUNTER — APPOINTMENT (OUTPATIENT)
Dept: RADIATION ONCOLOGY | Facility: HOSPITAL | Age: 77
End: 2022-09-19
Attending: INTERNAL MEDICINE
Payer: MEDICARE

## 2022-09-19 ENCOUNTER — APPOINTMENT (EMERGENCY)
Dept: RADIOLOGY | Facility: HOSPITAL | Age: 77
DRG: 919 | End: 2022-09-19
Payer: MEDICARE

## 2022-09-19 ENCOUNTER — NURSE TRIAGE (OUTPATIENT)
Dept: OTHER | Facility: OTHER | Age: 77
End: 2022-09-19

## 2022-09-19 ENCOUNTER — TELEPHONE (OUTPATIENT)
Dept: HEMATOLOGY ONCOLOGY | Facility: HOSPITAL | Age: 77
End: 2022-09-19

## 2022-09-19 ENCOUNTER — APPOINTMENT (OUTPATIENT)
Dept: RADIATION ONCOLOGY | Facility: HOSPITAL | Age: 77
End: 2022-09-19
Payer: MEDICARE

## 2022-09-19 ENCOUNTER — HOSPITAL ENCOUNTER (OUTPATIENT)
Dept: INFUSION CENTER | Facility: HOSPITAL | Age: 77
Discharge: HOME | DRG: 919 | End: 2022-09-19
Payer: MEDICARE

## 2022-09-19 DIAGNOSIS — R13.10 DYSPHAGIA: Primary | ICD-10-CM

## 2022-09-19 DIAGNOSIS — C15.9 ESOPHAGEAL ADENOCARCINOMA (HCC): Primary | ICD-10-CM

## 2022-09-19 DIAGNOSIS — R13.10 DIFFICULTY IN SWALLOWING: ICD-10-CM

## 2022-09-19 DIAGNOSIS — R07.9 CHEST PAIN: ICD-10-CM

## 2022-09-19 DIAGNOSIS — T85.528A MIGRATION OF ESOPHAGEAL STENT, INITIAL ENCOUNTER: ICD-10-CM

## 2022-09-19 DIAGNOSIS — Z95.828 PORT-A-CATH IN PLACE: ICD-10-CM

## 2022-09-19 DIAGNOSIS — C15.9 ESOPHAGEAL CANCER (HCC): Primary | ICD-10-CM

## 2022-09-19 DIAGNOSIS — R11.0 NAUSEA: ICD-10-CM

## 2022-09-19 DIAGNOSIS — C15.9 MALIGNANT NEOPLASM OF ESOPHAGUS, UNSPECIFIED LOCATION (HCC): ICD-10-CM

## 2022-09-19 DIAGNOSIS — D69.6 THROMBOCYTOPENIA (HCC): ICD-10-CM

## 2022-09-19 DIAGNOSIS — D72.819 LEUKOPENIA: ICD-10-CM

## 2022-09-19 LAB
2HR DELTA HS TROPONIN: -1 NG/L
4HR DELTA HS TROPONIN: -1 NG/L
ALBUMIN SERPL BCP-MCNC: 2.2 G/DL (ref 3.5–5)
ALBUMIN SERPL BCP-MCNC: 2.5 G/DL (ref 3.5–5)
ALP SERPL-CCNC: 108 U/L (ref 46–116)
ALP SERPL-CCNC: 91 U/L (ref 46–116)
ALT SERPL W P-5'-P-CCNC: 16 U/L (ref 12–78)
ALT SERPL W P-5'-P-CCNC: 16 U/L (ref 12–78)
ANION GAP SERPL CALCULATED.3IONS-SCNC: 3 MMOL/L (ref 4–13)
ANION GAP SERPL CALCULATED.3IONS-SCNC: 4 MMOL/L (ref 4–13)
ANISOCYTOSIS BLD QL SMEAR: PRESENT
AST SERPL W P-5'-P-CCNC: 16 U/L (ref 5–45)
AST SERPL W P-5'-P-CCNC: 19 U/L (ref 5–45)
ATRIAL RATE: 75 BPM
BASOPHILS # BLD MANUAL: 0.04 THOUSAND/UL (ref 0–0.1)
BASOPHILS NFR MAR MANUAL: 2 % (ref 0–1)
BILIRUB SERPL-MCNC: 0.39 MG/DL (ref 0.2–1)
BILIRUB SERPL-MCNC: 0.39 MG/DL (ref 0.2–1)
BUN SERPL-MCNC: 11 MG/DL (ref 5–25)
BUN SERPL-MCNC: 9 MG/DL (ref 5–25)
CALCIUM ALBUM COR SERPL-MCNC: 8.8 MG/DL (ref 8.3–10.1)
CALCIUM ALBUM COR SERPL-MCNC: 9.2 MG/DL (ref 8.3–10.1)
CALCIUM SERPL-MCNC: 7.4 MG/DL (ref 8.3–10.1)
CALCIUM SERPL-MCNC: 8 MG/DL (ref 8.3–10.1)
CARDIAC TROPONIN I PNL SERPL HS: 6 NG/L
CARDIAC TROPONIN I PNL SERPL HS: 6 NG/L
CARDIAC TROPONIN I PNL SERPL HS: 7 NG/L
CHLORIDE SERPL-SCNC: 105 MMOL/L (ref 96–108)
CHLORIDE SERPL-SCNC: 107 MMOL/L (ref 96–108)
CO2 SERPL-SCNC: 27 MMOL/L (ref 21–32)
CO2 SERPL-SCNC: 27 MMOL/L (ref 21–32)
CREAT SERPL-MCNC: 0.67 MG/DL (ref 0.6–1.3)
CREAT SERPL-MCNC: 0.75 MG/DL (ref 0.6–1.3)
EOSINOPHIL # BLD MANUAL: 0.11 THOUSAND/UL (ref 0–0.4)
EOSINOPHIL NFR BLD MANUAL: 5 % (ref 0–6)
ERYTHROCYTE [DISTWIDTH] IN BLOOD BY AUTOMATED COUNT: 24.2 % (ref 11.6–15.1)
ERYTHROCYTE [DISTWIDTH] IN BLOOD BY AUTOMATED COUNT: 24.3 % (ref 11.6–15.1)
FUNGAL BLOOD CULTURE: NORMAL
GFR SERPL CREATININE-BSD FRML MDRD: 88 ML/MIN/1.73SQ M
GFR SERPL CREATININE-BSD FRML MDRD: 92 ML/MIN/1.73SQ M
GLUCOSE SERPL-MCNC: 103 MG/DL (ref 65–140)
GLUCOSE SERPL-MCNC: 107 MG/DL (ref 65–140)
HCT VFR BLD AUTO: 26.3 % (ref 36.5–49.3)
HCT VFR BLD AUTO: 28.2 % (ref 36.5–49.3)
HGB BLD-MCNC: 8.6 G/DL (ref 12–17)
HGB BLD-MCNC: 9.3 G/DL (ref 12–17)
HYPERCHROMIA BLD QL SMEAR: PRESENT
LYMPHOCYTES # BLD AUTO: 1.18 THOUSAND/UL (ref 0.6–4.47)
LYMPHOCYTES # BLD AUTO: 55 % (ref 14–44)
MCH RBC QN AUTO: 31.5 PG (ref 26.8–34.3)
MCH RBC QN AUTO: 31.8 PG (ref 26.8–34.3)
MCHC RBC AUTO-ENTMCNC: 32.7 G/DL (ref 31.4–37.4)
MCHC RBC AUTO-ENTMCNC: 33 G/DL (ref 31.4–37.4)
MCV RBC AUTO: 96 FL (ref 82–98)
MCV RBC AUTO: 97 FL (ref 82–98)
MONOCYTES # BLD AUTO: 0.11 THOUSAND/UL (ref 0–1.22)
MONOCYTES NFR BLD: 5 % (ref 4–12)
NEUTROPHILS # BLD MANUAL: 0.54 THOUSAND/UL (ref 1.85–7.62)
NEUTS BAND NFR BLD MANUAL: 2 % (ref 0–8)
NEUTS SEG NFR BLD AUTO: 23 % (ref 43–75)
NRBC BLD AUTO-RTO: 0 /100 WBCS
OVALOCYTES BLD QL SMEAR: PRESENT
P AXIS: 60 DEGREES
PLATELET # BLD AUTO: 27 THOUSANDS/UL (ref 149–390)
PLATELET # BLD AUTO: 29 THOUSANDS/UL (ref 149–390)
PLATELET BLD QL SMEAR: ABNORMAL
POIKILOCYTOSIS BLD QL SMEAR: PRESENT
POTASSIUM SERPL-SCNC: 3.5 MMOL/L (ref 3.5–5.3)
POTASSIUM SERPL-SCNC: 3.7 MMOL/L (ref 3.5–5.3)
PR INTERVAL: 198 MS
PROT SERPL-MCNC: 6 G/DL (ref 6.4–8.4)
PROT SERPL-MCNC: 6.8 G/DL (ref 6.4–8.4)
QRS AXIS: 5 DEGREES
QRSD INTERVAL: 82 MS
QT INTERVAL: 350 MS
QTC INTERVAL: 390 MS
RBC # BLD AUTO: 2.73 MILLION/UL (ref 3.88–5.62)
RBC # BLD AUTO: 2.92 MILLION/UL (ref 3.88–5.62)
SCHISTOCYTES BLD QL SMEAR: PRESENT
SODIUM SERPL-SCNC: 136 MMOL/L (ref 135–147)
SODIUM SERPL-SCNC: 137 MMOL/L (ref 135–147)
T WAVE AXIS: 37 DEGREES
TARGETS BLD QL SMEAR: PRESENT
VARIANT LYMPHS # BLD AUTO: 8 %
VENTRICULAR RATE: 75 BPM
WBC # BLD AUTO: 1.95 THOUSAND/UL (ref 4.31–10.16)
WBC # BLD AUTO: 2.14 THOUSAND/UL (ref 4.31–10.16)

## 2022-09-19 PROCEDURE — 99285 EMERGENCY DEPT VISIT HI MDM: CPT

## 2022-09-19 PROCEDURE — 85027 COMPLETE CBC AUTOMATED: CPT | Performed by: INTERNAL MEDICINE

## 2022-09-19 PROCEDURE — 77014 CHG CT GUIDANCE PLACEMENT RAD THERAPY FIELDS: CPT | Performed by: INTERNAL MEDICINE

## 2022-09-19 PROCEDURE — 99285 EMERGENCY DEPT VISIT HI MDM: CPT | Performed by: EMERGENCY MEDICINE

## 2022-09-19 PROCEDURE — G1004 CDSM NDSC: HCPCS

## 2022-09-19 PROCEDURE — 93005 ELECTROCARDIOGRAM TRACING: CPT

## 2022-09-19 PROCEDURE — 85027 COMPLETE CBC AUTOMATED: CPT | Performed by: EMERGENCY MEDICINE

## 2022-09-19 PROCEDURE — 84484 ASSAY OF TROPONIN QUANT: CPT | Performed by: EMERGENCY MEDICINE

## 2022-09-19 PROCEDURE — 80053 COMPREHEN METABOLIC PANEL: CPT | Performed by: EMERGENCY MEDICINE

## 2022-09-19 PROCEDURE — 80053 COMPREHEN METABOLIC PANEL: CPT | Performed by: INTERNAL MEDICINE

## 2022-09-19 PROCEDURE — 36415 COLL VENOUS BLD VENIPUNCTURE: CPT | Performed by: EMERGENCY MEDICINE

## 2022-09-19 PROCEDURE — 71250 CT THORAX DX C-: CPT

## 2022-09-19 PROCEDURE — 85007 BL SMEAR W/DIFF WBC COUNT: CPT | Performed by: INTERNAL MEDICINE

## 2022-09-19 PROCEDURE — 93010 ELECTROCARDIOGRAM REPORT: CPT | Performed by: INTERNAL MEDICINE

## 2022-09-19 PROCEDURE — 77386 HB NTSTY MODUL RAD TX DLVR CPLX: CPT | Performed by: INTERNAL MEDICINE

## 2022-09-19 PROCEDURE — 96360 HYDRATION IV INFUSION INIT: CPT

## 2022-09-19 RX ADMIN — SODIUM CHLORIDE 500 ML: 0.9 INJECTION, SOLUTION INTRAVENOUS at 16:42

## 2022-09-19 NOTE — TELEPHONE ENCOUNTER
Rec'd call from Desiree Pagan from the [facility] lab  Patient has critical plt count of 27  Dr Carlos De La Rosa notified

## 2022-09-19 NOTE — TELEPHONE ENCOUNTER
Regarding: Post procedure problem-stent issue  ----- Message from Callie Almazan sent at 9/19/2022  2:38 PM EDT -----  "I had a stent put in on 9/8/22 and its bothering me and needs to come out   I cant keep food down and I keep spitting it up "

## 2022-09-19 NOTE — ED ATTENDING ATTESTATION
9/19/2022  IDalila DO, saw and evaluated the patient  I have discussed the patient with the resident/non-physician practitioner and agree with the resident's/non-physician practitioner's findings, Plan of Care, and MDM as documented in the resident's/non-physician practitioner's note, except where noted  All available labs and Radiology studies were reviewed  I was present for key portions of any procedure(s) performed by the resident/non-physician practitioner and I was immediately available to provide assistance  At this point I agree with the current assessment done in the Emergency Department  I have conducted an independent evaluation of this patient a history and physical is as follows:    53-year-old male presents with difficulty swallowing  Patient was recently hospitalized and was told his esophageal stent that was placed for cancer moved to his stomach  Patient states unable to tolerate anything solid yesterday or today  Is able to drink small amounts  Denies pain unless trying to swallow, no fevers  On exam-no acute distress, heart regular, no respiratory distress, abdomen soft nontender    Plan-CT chest, check labs, IV fluids and discuss with GI, likely admit    ED Course         Critical Care Time  Procedures

## 2022-09-19 NOTE — ED PROVIDER NOTES
History  Chief Complaint   Patient presents with    Difficulty Swallowing     Pt with esophageal stent for cancer, reports the stent "moved to my stomach" and pt has not been tolerating anything PO at home, vomiting  Reports "I am starting to get hungry because I can't eat "     HPI    68 yo M, pmhx significant for esophageal cancer, being treated with chemo and radiation, esophageal stent placement in June, presenting for evaluation of difficulty tolerating PO  Patient states that during his recent hospitalization, he was told that his esophageal stent had migrated into his stomach  At that time, he was able to tolerate p o  So he was discharged with outpatient follow-up  Over the past couple days he has had difficulty tolerating p o  Particularly with food has been able to tolerate small amounts of liquids  He has pain over the center of his chest when attempting to swallow  He was advised to go to the ED if he has issues with swallowing so now he is in the emergency department  Denies any symptoms when not attempting to swallow  Prior to Admission Medications   Prescriptions Last Dose Informant Patient Reported? Taking?    PHENobarbital 64 8 mg tablet 9/19/2022 Self No Yes   Sig: TAKE 1 TABLET (64 8 MG TOTAL) BY MOUTH 2 (TWO) TIMES A DAY   aspirin (Aspirin Low Dose) 81 mg EC tablet 9/20/2022 at Unknown time Self No Yes   Sig: Take 1 tablet (81 mg total) by mouth daily   atorvastatin (LIPITOR) 80 mg tablet 9/19/2022 at Unknown time Self No Yes   Sig: Take 1 tablet (80 mg total) by mouth daily with dinner   folic acid ( Folic Acid) 1 mg tablet Not Taking at Unknown time  No No   Sig: Take 1 tablet (1 mg total) by mouth daily   Patient not taking: No sig reported   lidocaine-prilocaine (EMLA) cream Not Taking at Unknown time  No No   Sig: Apply to port 30 to 60 min prior to use   Patient not taking: No sig reported   midodrine (PROAMATINE) 2 5 mg tablet Not Taking at Unknown time  No No   Sig: Take 1 tablet (2 5 mg total) by mouth 3 (three) times a day before meals   Patient not taking: No sig reported   ondansetron (Zofran ODT) 8 mg disintegrating tablet Not Taking at Unknown time  No No   Sig: Take 1 tablet (8 mg total) by mouth every 8 (eight) hours as needed for nausea or vomiting   Patient not taking: No sig reported   pantoprazole (PROTONIX) 40 mg tablet Not Taking at Unknown time  No No   Sig: TAKE 1 TABLET BY MOUTH 2 TIMES A DAY BEFORE MEALS  Patient not taking: No sig reported   potassium chloride (MICRO-K) 10 MEQ CR capsule Unknown at Unknown time  No No   Sig: Take 1 capsule (10 mEq total) by mouth 2 (two) times a day for 7 days   prochlorperazine (COMPAZINE) 10 mg tablet Not Taking at Unknown time  No No   Sig: Take 1 tablet (10 mg total) by mouth every 6 (six) hours as needed for nausea or vomiting   Patient not taking: No sig reported      Facility-Administered Medications: None       Past Medical History:   Diagnosis Date    Cardiac disease     CHF (congestive heart failure) (HCC)     Esophageal cancer ([facility] [address] )     Hernia of abdominal cavity     Myocardial infarction ([facility] [address] )     last assessed 7/31/14, past, Pt had MI s/p AGUSTIN placed in 2001, refuses to take any other medications for his cardiac disease, only will take seizure med  Understands possible complications from this decision    Seizure [facility])        Past Surgical History:   Procedure Laterality Date    ABDOMINAL AORTIC ANEURYSM REPAIR      for dialation or occulsion    CATARACT EXTRACTION      IR PORT PLACEMENT  7/15/2022       Family History   Problem Relation Age of Onset    Hypertension Father     Kidney disease Brother      I have reviewed and agree with the history as documented      E-Cigarette/Vaping    E-Cigarette Use Never User      E-Cigarette/Vaping Substances    Nicotine No     THC No     CBD No     Flavoring No     Other No     Unknown No      Social History     Tobacco Use    Smoking status: Former Smoker Quit date: 2005     Years since quittin 3    Smokeless tobacco: Never Used   Vaping Use    Vaping Use: Never used   Substance Use Topics    Alcohol use: Not Currently    Drug use: Never        Review of Systems   Constitutional: Negative for chills and fever  HENT: Positive for trouble swallowing  Negative for sore throat  Respiratory: Negative for cough and shortness of breath  Cardiovascular: Positive for chest pain  Negative for palpitations and leg swelling  Gastrointestinal: Negative for abdominal pain, diarrhea, nausea and vomiting  Genitourinary: Negative for dysuria and frequency  Musculoskeletal: Negative for myalgias  Skin: Negative for rash and wound  Neurological: Negative for dizziness, weakness, light-headedness, numbness and headaches  All other systems reviewed and are negative  Physical Exam  ED Triage Vitals   Temperature Pulse Respirations Blood Pressure SpO2   22 1528 22 1530 22 1530 22 1530 22 1530   98 7 °F (37 1 °C) 91 18 101/61 96 %      Temp src Heart Rate Source Patient Position - Orthostatic VS BP Location FiO2 (%)   -- 22 1845 22 1845 22 1845 --    Monitor Lying Right arm       Pain Score       22 1530       No Pain             Orthostatic Vital Signs  Vitals:    22 1530 22 1845 22 2100   BP: 101/61 138/97 99/58   Pulse: 91 84 74   Patient Position - Orthostatic VS:  Lying Sitting       Physical Exam  Vitals and nursing note reviewed  Constitutional:       General: He is not in acute distress  Appearance: Normal appearance  He is not ill-appearing or toxic-appearing  HENT:      Head: Normocephalic and atraumatic  Right Ear: External ear normal       Left Ear: External ear normal       Nose: Nose normal       Mouth/Throat:      Mouth: Mucous membranes are moist       Pharynx: Oropharynx is clear  Eyes:      General: No scleral icterus       Extraocular Movements: Extraocular movements intact  Cardiovascular:      Rate and Rhythm: Normal rate and regular rhythm  Pulses: Normal pulses  Pulmonary:      Effort: Pulmonary effort is normal  No respiratory distress  Breath sounds: Normal breath sounds  Abdominal:      Palpations: Abdomen is soft  Tenderness: There is no abdominal tenderness  Musculoskeletal:         General: Normal range of motion  Cervical back: Normal range of motion and neck supple  Skin:     General: Skin is warm  Capillary Refill: Capillary refill takes less than 2 seconds  Neurological:      General: No focal deficit present  Mental Status: He is alert and oriented to person, place, and time           ED Medications  Medications   sodium chloride 0 9 % bolus 500 mL (0 mL Intravenous Stopped 9/19/22 1742)       Diagnostic Studies  Results Reviewed     Procedure Component Value Units Date/Time    HS Troponin I 4hr [[id number]]  (Normal) Collected: 09/19/22 2049    Lab Status: Final result Specimen: Blood from Arm, Right Updated: 09/19/22 2125     hs TnI 4hr 6 ng/L      Delta 4hr hsTnI -1 ng/L     HS Troponin I 2hr [[id number]]  (Normal) Collected: 09/19/22 1839    Lab Status: Final result Specimen: Blood from Line Updated: 09/19/22 1934     hs TnI 2hr 6 ng/L      Delta 2hr hsTnI -1 ng/L     HS Troponin 0hr (reflex protocol) [[id number]]  (Normal) Collected: 09/19/22 1645    Lab Status: Final result Specimen: Blood from Line, Venous Updated: 09/19/22 1731     hs TnI 0hr 7 ng/L     Comprehensive metabolic panel [[id number]]  (Abnormal) Collected: 09/19/22 1645    Lab Status: Final result Specimen: Blood from Line, Venous Updated: 09/19/22 1719     Sodium 136 mmol/L      Potassium 3 7 mmol/L      Chloride 105 mmol/L      CO2 27 mmol/L      ANION GAP 4 mmol/L      BUN 11 mg/dL      Creatinine 0 75 mg/dL      Glucose 107 mg/dL      Calcium 8 0 mg/dL      Corrected Calcium 9 2 mg/dL      AST 16 U/L      ALT 16 U/L      Alkaline Phosphatase 108 U/L      Total Protein 6 8 g/dL      Albumin 2 5 g/dL      Total Bilirubin 0 39 mg/dL      eGFR 88 ml/min/1 73sq m     Narrative:      [place] guidelines for Chronic Kidney Disease (CKD):     Stage 1 with normal or high GFR (GFR > 90 mL/min/1 73 square meters)    Stage 2 Mild CKD (GFR = 60-89 mL/min/1 73 square meters)    Stage 3A Moderate CKD (GFR = 45-59 mL/min/1 73 square meters)    Stage 3B Moderate CKD (GFR = 30-44 mL/min/1 73 square meters)    Stage 4 Severe CKD (GFR = 15-29 mL/min/1 73 square meters)    Stage 5 End Stage CKD (GFR <15 mL/min/1 73 square meters)  Note: GFR calculation is accurate only with a steady state creatinine    CBC and differential [[id number]]  (Abnormal) Collected: 09/19/22 1645    Lab Status: Final result Specimen: Blood from Line, Venous Updated: 09/19/22 1706     WBC 1 95 Thousand/uL      RBC 2 92 Million/uL      Hemoglobin 9 3 g/dL      Hematocrit 28 2 %      MCV 97 fL      MCH 31 8 pg      MCHC 33 0 g/dL      RDW 24 3 %      Platelets 29 Thousands/uL      nRBC 0 /100 WBCs     Narrative:      See Manual Diff Done Within 3 Days                 CT chest without contrast   Final Result by Thelma Bashir MD (09/19 1859)         1  Diffuse circumferential thickening of the mid-distal esophagus consistent with known malignancy  2   Previously placed esophageal stent again noted to have migrated into the gastric lumen  3   Additional findings as noted                       Workstation performed: [id number]               Procedures  Procedures      ED Course  ED Course as of 09/20/22 0010   Mon Sep 19, 2022   1718 ECG 12 lead  Procedure Note: EKG  Date/Time: 09/19/22 5:19 PM   Interpreted by: Uzair Kraus DO  Indications / Diagnosis: CP  ECG reviewed by me, the ED Physician: yes   The EKG demonstrates:  Rhythm: normal sinus  Intervals: normal intervals  Axis: normal axis  QRS/Blocks: normal QRS  ST Changes: No acute ST Changes, no STD/SEEMA  HEART Risk Score    Flowsheet Row Most Recent Value   Heart Score Risk Calculator    History 0 Filed at: 09/20/2022 0009   ECG 0 Filed at: 09/20/2022 0009   Age 2 Filed at: 09/20/2022 0009   Risk Factors 2 Filed at: 09/20/2022 0009   Troponin 0 Filed at: 09/20/2022 0009   HEART Score 4 Filed at: 09/20/2022 0009        Identification of Seniors at [facility] Most Recent Value   (ISAR) Identification of Seniors at Risk    Before the illness or injury that brought you to the Emergency, did you need someone to help you on a regular basis? 0 Filed at: 09/19/2022 1530   In the last 24 hours, have you needed more help than usual? 0 Filed at: 09/19/2022 1530   Have you been hospitalized for one or more nights during the past 6 months? 1 Filed at: 09/19/2022 1530   In general, do you see well? 0 Filed at: 09/19/2022 1530   In general, do you have serious problems with your memory? 0 Filed at: 09/19/2022 1530   Do you take more than three different medications every day? 1 Filed at: 09/19/2022 1530   ISAR Score 2 Filed at: 09/19/2022 1530                              [facility]  Number of Diagnoses or Management Options  Difficulty in swallowing  Dysphagia  Diagnosis management comments: 74-year-old male of trouble swallowing in the setting of a migrated esophageal stent  Patient's exam is benign  The patient has been chemotherapy and radiation will check labs, check an EKG and troponin as patient is complaining of chest pain repeat CT of the chest     CT of the chest shows migration of the esophageal stent  I discussed this case with GI who recommended admission for GI evaluation in the morning  Case was discussed with Medicine, Medicine to admit the patient         Disposition  Final diagnoses:   Dysphagia   Difficulty in swallowing   Chest pain   Thrombocytopenia (HCC)   Leukopenia     Time reflects when diagnosis was documented in both MDM as applicable and the Disposition within this note     Time User Action Codes Description Comment    9/19/2022  8:43 PM Luciano Spatz Add [R13 10] Dysphagia     9/19/2022  8:44 PM Luciano Spatz Add [R13 10] Difficulty in swallowing     9/20/2022 12:10 AM Luciano Spatz Add [R07 9] Chest pain     9/20/2022 12:10 AM Luciano Spatz Add [D69 6] Thrombocytopenia ([address] )     9/20/2022 12:10 AM Luciano Spatz Add [D48 485] Leukopenia       ED Disposition     ED Disposition   Admit    Condition   Stable    Date/Time   Mon Sep 19, 2022  8:44 PM    Comment   Case was discussed with Dr Ritesh Marcum and the patient's admission status was agreed to be Admission Status: observation status to the service of Dr Bienvenido Wilson   Follow-up Information    None         Patient's Medications   Discharge Prescriptions    No medications on file     No discharge procedures on file  PDMP Review       Value Time User    PDMP Reviewed  Yes 11/16/2021  1:20 PM Reynaldo Shafer DO           ED Provider  Attending physically available and evaluated Bard Espinosa  JORGE managed the patient along with the ED Attending      Electronically Signed by         Gibran Davila DO  09/20/22 0010

## 2022-09-19 NOTE — PROGRESS NOTES
Pt arrived on unit for lab draw  Chest port accessed, labs drawn, port deaccessed  Pt escorted via wheelchair to Radiation  AVS provided

## 2022-09-20 ENCOUNTER — APPOINTMENT (OUTPATIENT)
Dept: RADIATION ONCOLOGY | Facility: HOSPITAL | Age: 77
End: 2022-09-20
Payer: MEDICARE

## 2022-09-20 ENCOUNTER — TELEPHONE (OUTPATIENT)
Dept: HEMATOLOGY ONCOLOGY | Facility: CLINIC | Age: 77
End: 2022-09-20

## 2022-09-20 LAB
ABO GROUP BLD BPU: NORMAL
ABO GROUP BLD: NORMAL
ANION GAP SERPL CALCULATED.3IONS-SCNC: 5 MMOL/L (ref 4–13)
ANISOCYTOSIS BLD QL SMEAR: PRESENT
BASOPHILS # BLD MANUAL: 0.07 THOUSAND/UL (ref 0–0.1)
BASOPHILS NFR MAR MANUAL: 4 % (ref 0–1)
BLD GP AB SCN SERPL QL: NEGATIVE
BPU ID: NORMAL
BUN SERPL-MCNC: 7 MG/DL (ref 5–25)
BURR CELLS BLD QL SMEAR: PRESENT
CALCIUM SERPL-MCNC: 7.2 MG/DL (ref 8.3–10.1)
CHLORIDE SERPL-SCNC: 107 MMOL/L (ref 96–108)
CO2 SERPL-SCNC: 26 MMOL/L (ref 21–32)
CREAT SERPL-MCNC: 0.57 MG/DL (ref 0.6–1.3)
EOSINOPHIL # BLD MANUAL: 0.14 THOUSAND/UL (ref 0–0.4)
EOSINOPHIL NFR BLD MANUAL: 8 % (ref 0–6)
ERYTHROCYTE [DISTWIDTH] IN BLOOD BY AUTOMATED COUNT: 23.9 % (ref 11.6–15.1)
GFR SERPL CREATININE-BSD FRML MDRD: 99 ML/MIN/1.73SQ M
GLUCOSE SERPL-MCNC: 90 MG/DL (ref 65–140)
HCT VFR BLD AUTO: 24.4 % (ref 36.5–49.3)
HGB BLD-MCNC: 8 G/DL (ref 12–17)
HYPERCHROMIA BLD QL SMEAR: PRESENT
INR PPP: 1.07 (ref 0.84–1.19)
LYMPHOCYTES # BLD AUTO: 1.05 THOUSAND/UL (ref 0.6–4.47)
LYMPHOCYTES # BLD AUTO: 60 % (ref 14–44)
MACROCYTES BLD QL AUTO: PRESENT
MAGNESIUM SERPL-MCNC: 2.1 MG/DL (ref 1.6–2.6)
MCH RBC QN AUTO: 32 PG (ref 26.8–34.3)
MCHC RBC AUTO-ENTMCNC: 32.8 G/DL (ref 31.4–37.4)
MCV RBC AUTO: 98 FL (ref 82–98)
MONOCYTES # BLD AUTO: 0.07 THOUSAND/UL (ref 0–1.22)
MONOCYTES NFR BLD: 4 % (ref 4–12)
NEUTROPHILS # BLD MANUAL: 0.37 THOUSAND/UL (ref 1.85–7.62)
NEUTS SEG NFR BLD AUTO: 21 % (ref 43–75)
NRBC BLD AUTO-RTO: 0 /100 WBCS
OVALOCYTES BLD QL SMEAR: PRESENT
PLATELET # BLD AUTO: 28 THOUSANDS/UL (ref 149–390)
PLATELET # BLD AUTO: 41 THOUSANDS/UL (ref 149–390)
PLATELET BLD QL SMEAR: ABNORMAL
POIKILOCYTOSIS BLD QL SMEAR: PRESENT
POLYCHROMASIA BLD QL SMEAR: PRESENT
POTASSIUM SERPL-SCNC: 3.5 MMOL/L (ref 3.5–5.3)
PROTHROMBIN TIME: 14.1 SECONDS (ref 11.6–14.5)
RBC # BLD AUTO: 2.5 MILLION/UL (ref 3.88–5.62)
RBC MORPH BLD: PRESENT
RH BLD: POSITIVE
SCHISTOCYTES BLD QL SMEAR: PRESENT
SMUDGE CELLS BLD QL SMEAR: PRESENT
SODIUM SERPL-SCNC: 138 MMOL/L (ref 135–147)
SPECIMEN EXPIRATION DATE: NORMAL
UNIT DISPENSE STATUS: NORMAL
UNIT PRODUCT CODE: NORMAL
UNIT PRODUCT VOLUME: 300 ML
UNIT RH: NORMAL
VARIANT LYMPHS # BLD AUTO: 3 %
WBC # BLD AUTO: 1.75 THOUSAND/UL (ref 4.31–10.16)

## 2022-09-20 PROCEDURE — 80048 BASIC METABOLIC PNL TOTAL CA: CPT

## 2022-09-20 PROCEDURE — 83735 ASSAY OF MAGNESIUM: CPT | Performed by: STUDENT IN AN ORGANIZED HEALTH CARE EDUCATION/TRAINING PROGRAM

## 2022-09-20 PROCEDURE — 86880 COOMBS TEST DIRECT: CPT | Performed by: STUDENT IN AN ORGANIZED HEALTH CARE EDUCATION/TRAINING PROGRAM

## 2022-09-20 PROCEDURE — C9113 INJ PANTOPRAZOLE SODIUM, VIA: HCPCS

## 2022-09-20 PROCEDURE — 85027 COMPLETE CBC AUTOMATED: CPT

## 2022-09-20 PROCEDURE — 86901 BLOOD TYPING SEROLOGIC RH(D): CPT | Performed by: STUDENT IN AN ORGANIZED HEALTH CARE EDUCATION/TRAINING PROGRAM

## 2022-09-20 PROCEDURE — 85610 PROTHROMBIN TIME: CPT

## 2022-09-20 PROCEDURE — 99223 1ST HOSP IP/OBS HIGH 75: CPT | Performed by: INTERNAL MEDICINE

## 2022-09-20 PROCEDURE — 36415 COLL VENOUS BLD VENIPUNCTURE: CPT

## 2022-09-20 PROCEDURE — 85049 AUTOMATED PLATELET COUNT: CPT

## 2022-09-20 PROCEDURE — 86850 RBC ANTIBODY SCREEN: CPT | Performed by: STUDENT IN AN ORGANIZED HEALTH CARE EDUCATION/TRAINING PROGRAM

## 2022-09-20 PROCEDURE — 99222 1ST HOSP IP/OBS MODERATE 55: CPT | Performed by: INTERNAL MEDICINE

## 2022-09-20 PROCEDURE — P9037 PLATE PHERES LEUKOREDU IRRAD: HCPCS

## 2022-09-20 PROCEDURE — 86900 BLOOD TYPING SEROLOGIC ABO: CPT | Performed by: STUDENT IN AN ORGANIZED HEALTH CARE EDUCATION/TRAINING PROGRAM

## 2022-09-20 PROCEDURE — 85007 BL SMEAR W/DIFF WBC COUNT: CPT

## 2022-09-20 RX ORDER — ASPIRIN 81 MG/1
81 TABLET ORAL DAILY
Status: DISCONTINUED | OUTPATIENT
Start: 2022-09-20 | End: 2022-09-25 | Stop reason: HOSPADM

## 2022-09-20 RX ORDER — PHENOBARBITAL 64.8 MG/1
64.8 TABLET ORAL 2 TIMES DAILY
Status: DISCONTINUED | OUTPATIENT
Start: 2022-09-20 | End: 2022-09-20

## 2022-09-20 RX ORDER — CALCIUM GLUCONATE 20 MG/ML
1 INJECTION, SOLUTION INTRAVENOUS ONCE
Status: COMPLETED | OUTPATIENT
Start: 2022-09-20 | End: 2022-09-20

## 2022-09-20 RX ORDER — ACETAMINOPHEN 325 MG/1
650 TABLET ORAL EVERY 6 HOURS PRN
Status: DISCONTINUED | OUTPATIENT
Start: 2022-09-20 | End: 2022-09-25 | Stop reason: HOSPADM

## 2022-09-20 RX ORDER — FOLIC ACID 1 MG/1
1 TABLET ORAL DAILY
Status: DISCONTINUED | OUTPATIENT
Start: 2022-09-20 | End: 2022-09-20

## 2022-09-20 RX ORDER — DIPHENHYDRAMINE HYDROCHLORIDE 50 MG/ML
25 INJECTION INTRAMUSCULAR; INTRAVENOUS ONCE
Status: DISCONTINUED | OUTPATIENT
Start: 2022-09-20 | End: 2022-09-21

## 2022-09-20 RX ORDER — SODIUM CHLORIDE, SODIUM GLUCONATE, SODIUM ACETATE, POTASSIUM CHLORIDE, MAGNESIUM CHLORIDE, SODIUM PHOSPHATE, DIBASIC, AND POTASSIUM PHOSPHATE .53; .5; .37; .037; .03; .012; .00082 G/100ML; G/100ML; G/100ML; G/100ML; G/100ML; G/100ML; G/100ML
125 INJECTION, SOLUTION INTRAVENOUS CONTINUOUS
Status: DISCONTINUED | OUTPATIENT
Start: 2022-09-20 | End: 2022-09-20

## 2022-09-20 RX ORDER — PHENOBARBITAL 64.8 MG/1
64.8 TABLET ORAL 2 TIMES DAILY
Status: DISCONTINUED | OUTPATIENT
Start: 2022-09-21 | End: 2022-09-25 | Stop reason: HOSPADM

## 2022-09-20 RX ORDER — PHENOBARBITAL SODIUM 65 MG/ML
65 INJECTION INTRAMUSCULAR EVERY 12 HOURS
Status: DISCONTINUED | OUTPATIENT
Start: 2022-09-20 | End: 2022-09-20

## 2022-09-20 RX ORDER — ENOXAPARIN SODIUM 100 MG/ML
40 INJECTION SUBCUTANEOUS DAILY
Status: DISCONTINUED | OUTPATIENT
Start: 2022-09-20 | End: 2022-09-21

## 2022-09-20 RX ORDER — SODIUM CHLORIDE 9 MG/ML
125 INJECTION, SOLUTION INTRAVENOUS CONTINUOUS
Status: CANCELLED | OUTPATIENT
Start: 2022-09-20 | End: 2022-09-20

## 2022-09-20 RX ORDER — SODIUM CHLORIDE 9 MG/ML
125 INJECTION, SOLUTION INTRAVENOUS CONTINUOUS
Status: DISCONTINUED | OUTPATIENT
Start: 2022-09-20 | End: 2022-09-21

## 2022-09-20 RX ORDER — DIPHENHYDRAMINE HYDROCHLORIDE 50 MG/ML
25 INJECTION INTRAMUSCULAR; INTRAVENOUS ONCE
Status: COMPLETED | OUTPATIENT
Start: 2022-09-20 | End: 2022-09-20

## 2022-09-20 RX ORDER — PANTOPRAZOLE SODIUM 40 MG/1
40 TABLET, DELAYED RELEASE ORAL
Status: DISCONTINUED | OUTPATIENT
Start: 2022-09-20 | End: 2022-09-20

## 2022-09-20 RX ORDER — PANTOPRAZOLE SODIUM 40 MG/10ML
40 INJECTION, POWDER, LYOPHILIZED, FOR SOLUTION INTRAVENOUS EVERY 12 HOURS SCHEDULED
Status: DISCONTINUED | OUTPATIENT
Start: 2022-09-20 | End: 2022-09-22

## 2022-09-20 RX ORDER — ATORVASTATIN CALCIUM 80 MG/1
80 TABLET, FILM COATED ORAL
Status: DISCONTINUED | OUTPATIENT
Start: 2022-09-20 | End: 2022-09-25 | Stop reason: HOSPADM

## 2022-09-20 RX ADMIN — PHENOBARBITAL SODIUM 65 MG: 65 INJECTION INTRAMUSCULAR; INTRAVENOUS at 10:07

## 2022-09-20 RX ADMIN — SODIUM CHLORIDE 125 ML/HR: 0.9 INJECTION, SOLUTION INTRAVENOUS at 07:34

## 2022-09-20 RX ADMIN — TBO-FILGRASTIM 480 MCG: 480 INJECTION, SOLUTION SUBCUTANEOUS at 19:37

## 2022-09-20 RX ADMIN — DIPHENHYDRAMINE HYDROCHLORIDE 25 MG: 50 INJECTION, SOLUTION INTRAMUSCULAR; INTRAVENOUS at 05:25

## 2022-09-20 RX ADMIN — ENOXAPARIN SODIUM 40 MG: 40 INJECTION SUBCUTANEOUS at 10:07

## 2022-09-20 RX ADMIN — SODIUM CHLORIDE 125 ML/HR: 0.9 INJECTION, SOLUTION INTRAVENOUS at 22:33

## 2022-09-20 RX ADMIN — PANTOPRAZOLE SODIUM 40 MG: 40 INJECTION, POWDER, FOR SOLUTION INTRAVENOUS at 22:34

## 2022-09-20 RX ADMIN — ATORVASTATIN CALCIUM 80 MG: 80 TABLET, FILM COATED ORAL at 16:57

## 2022-09-20 RX ADMIN — SODIUM CHLORIDE, SODIUM GLUCONATE, SODIUM ACETATE, POTASSIUM CHLORIDE, MAGNESIUM CHLORIDE, SODIUM PHOSPHATE, DIBASIC, AND POTASSIUM PHOSPHATE 125 ML/HR: .53; .5; .37; .037; .03; .012; .00082 INJECTION, SOLUTION INTRAVENOUS at 02:17

## 2022-09-20 RX ADMIN — PANTOPRAZOLE SODIUM 40 MG: 40 INJECTION, POWDER, FOR SOLUTION INTRAVENOUS at 10:06

## 2022-09-20 RX ADMIN — SODIUM CHLORIDE 125 ML/HR: 0.9 INJECTION, SOLUTION INTRAVENOUS at 13:52

## 2022-09-20 RX ADMIN — FOLIC ACID 1 MG: 5 INJECTION, SOLUTION INTRAMUSCULAR; INTRAVENOUS; SUBCUTANEOUS at 10:09

## 2022-09-20 RX ADMIN — CALCIUM GLUCONATE 1 G: 20 INJECTION, SOLUTION INTRAVENOUS at 08:40

## 2022-09-20 NOTE — QUICK NOTE
Notified by RN that during platelet transfusion, patient developed hives and generalized itchiness  Vitals were taken at that time and were noted to be stable with a temp of 98 9°  Transfusion was stopped at this time patient was given 25 mg IV Benadryl  No complaints of pain at this time  Will continue to monitor for signs of any other transfusion reaction development

## 2022-09-20 NOTE — H&P
INTERNAL MEDICINE RESIDENCY ADMISSION H&P     Name: Lachelle Amin   Age & Sex: 67 y o  male   MRN: [id number]  Unit/Bed#: ED 20   Encounter: [id number]  Primary Care Provider: Misha Lee MD    Code Status: Level 3 - DNAR and DNI  Admission Status: OBSERVATION  Disposition: Patient requires Med/Surg    Admit to team: SOD Team A    ASSESSMENT/PLAN     Principal Problem:    Dysphagia  Active Problems:    Migration of esophageal stent    CAD (coronary artery disease)    HLD (hyperlipidemia)    Seizure disorder ([facility] )    Ventral hernia with obstruction and without gangrene    Esophageal cancer ([facility] )    Pancytopenia ([place] [address] )    Migration of esophageal stent  Assessment & Plan  S/p esophageal stent placed in June 2022 2/2 tumor mass affect caused by esophageal adenocarcinoma  During last hospital admission, CT chest showed esophageal stent migration into gastric lumen  Since discharge on Friday, 9/16, patient has been experiencing inability to tolerate PO intake  Repeat CT on current admission shows possible further stent migration  Of note, ventral hernia present and does not seem to be incarcerated  Unlikely cause of current symptoms  He is able to tolerate mild liquid intake  Patient is not experiencing pain at this time  Plan:  -Possible stent removal via GI tomorrow; recommend plt optimized > 50 but will assess in AM  -Will transfuse 1 unit platelets  -NPO at midnight; sips with meds  - ml/hr       * Dysphagia  Assessment & Plan  Patient experiencing progressive dysphagia since May 2022 2/2 to esophageal adenocarcinoma  Stent placed in June 2022 with recent migration into gastric lumen not on CT chest  Currently unable to tolerate PO intake of food  Can tolerate small amounts of liquid      Plan:  -Continue home pantoprazole 40 mg   -See plan "migration of esophageal stent"    Pancytopenia [facility])  Assessment & Plan  Patient has a PMHx of anemia, leukopenia, and thrombocytopenia in setting of esophageal adenocarcinoma  Presented in the ED with Hgb 9 3, WBC 1 95, and platelets of 28  Slightly trending upwards from previous admission last week  Plan:  -For possible stent removal, GI recommend plt > 50  -Will transfuse 1 unit plts to optimize  -Continue to monitor CBC      Esophageal cancer [facility])  Assessment & Plan  Patient with stage II B esophageal cancer diagnosed in May 2022  Currently undergoing carboplatin pack with taxol chemotherapy as well as radiotherapy  Follows outpatient with heme/onc  Ventral hernia with obstruction and without gangrene  Assessment & Plan  Previously diagnosed in 2015, seen by outpatient surgery, and patient not interested in surgery  Does not show signs of incarceration and unlikely to be cause of current symptoms  Patient denies abdominal pain, N/V/D  Seizure disorder [facility])  Assessment & Plan  Remote history of seizure disorder diagnosed 40 years ago  Patient currently takes phenobarbital 64 8 mg BID  Plan:   -Continue phenobarbital 64 8 mg BID     HLD (hyperlipidemia)  Assessment & Plan  -Continue home Atorvastatin 80 mg daily     CAD (coronary artery disease)  Assessment & Plan  History of CAD s/p stent x 4    Plan:  -Continue ASA 81 mg  -Follow-up outpatient      VTE Pharmacologic Prophylaxis: Enoxaparin  VTE Mechanical Prophylaxis: sequential compression device    CHIEF COMPLAINT     Chief Complaint   Patient presents with    Difficulty Swallowing     Pt with esophageal stent for cancer, reports the stent "moved to my stomach" and pt has not been tolerating anything PO at home, vomiting  Reports "I am starting to get hungry because I can't eat "      HISTORY OF PRESENT ILLNESS     Mr Agustín Martinez is a 24-year-old male with PMHx of esophageal adenocarcinoma on chemotherapy and radiation diagnosed in May 2022, esophageal stent placement in June 2022, CAD s/p stents x 4, and seizure disorder who presents today with difficulty tolerating PO intake   During his most recent hospitalization, patient was noted to have esophageal stent migration on CT chest  He was discharged on Friday, 9/16 for outpatient follow-up as he was tolerating PO intake at that time  Since discharge, patient states he has been unable to tolerate any food  Patient states he usually is able to eat in small amounts of food but has recently been regurgitating his food  He is able to tolerate small amounts of liquid intake  He was able to eat 2 potato pancakes on Sunday 9/18, but nothing further  He denies nausea, vomiting, abdominal pain, constipation, or diarrhea  Denies fever, chills, body aches  In the ED, patient was hemodynamically stable, afebrile, saturating on RA, and conversational  His labs were significant for WBC 1 95, Hgb 9 3, Plt 28, normal troponin's  CT chest showed known esophageal malignancy and esophageal stent migration into the gastric lumen, similar to CT on last admission  REVIEW OF SYSTEMS     Review of Systems   Constitutional: Negative for appetite change, diaphoresis, fatigue and fever  HENT: Negative for congestion and tinnitus  Eyes: Negative for visual disturbance  Respiratory: Negative for chest tightness and shortness of breath  Cardiovascular: Negative for chest pain  Gastrointestinal: Negative for abdominal distention, abdominal pain, constipation, diarrhea and nausea  Unable to tolerate PO intake   Genitourinary: Negative for difficulty urinating and dysuria  Neurological: Negative for dizziness, tremors, syncope, weakness and light-headedness  Psychiatric/Behavioral: Negative for confusion  All other systems reviewed and are negative      OBJECTIVE     Vitals:    09/20/22 0345 09/20/22 0400 09/20/22 0415 09/20/22 0430   BP: 103/57 107/59 113/63 118/59   BP Location:       Pulse: 83 74 72 70   Resp: 12 13 13 12   Temp: 98 4 °F (36 9 °C) 98 5 °F (36 9 °C) 98 5 °F (36 9 °C) 98 4 °F (36 9 °C)   TempSrc: Oral Oral Oral Oral   SpO2: 94% 97% 95% 98%   Weight:          Temperature:   Temp (24hrs), Av 5 °F (36 9 °C), Min:98 4 °F (36 9 °C), Max:98 7 °F (37 1 °C)    Temperature: 98 4 °F (36 9 °C)  Intake & Output:  I/O     None        Weights:        Body mass index is 24 55 kg/m²  Weight (last 2 days)     Date/Time Weight    22 1530 82 1 (181)        Physical Exam  Vitals and nursing note reviewed  Constitutional:       General: He is not in acute distress  Appearance: Normal appearance  He is normal weight  He is not ill-appearing  HENT:      Head: Normocephalic and atraumatic  Nose: Nose normal  No congestion  Eyes:      Pupils: Pupils are equal, round, and reactive to light  Cardiovascular:      Rate and Rhythm: Normal rate and regular rhythm  Pulses: Normal pulses  Heart sounds: Normal heart sounds  Pulmonary:      Effort: Pulmonary effort is normal  No respiratory distress  Breath sounds: Normal breath sounds  No wheezing  Abdominal:      General: Bowel sounds are normal  There is no distension  Palpations: Abdomen is soft  Tenderness: There is no abdominal tenderness  Hernia: A hernia is present  Musculoskeletal:         General: No swelling  Normal range of motion  Cervical back: Normal range of motion  Right lower leg: No edema  Left lower leg: No edema  Skin:     General: Skin is warm  Capillary Refill: Capillary refill takes less than 2 seconds  Neurological:      General: No focal deficit present  Mental Status: He is alert and oriented to person, place, and time  Mental status is at baseline  Psychiatric:         Mood and Affect: Mood normal          Behavior: Behavior normal          Thought Content:  Thought content normal          Judgment: Judgment normal            PAST MEDICAL HISTORY     Past Medical History:   Diagnosis Date    Cardiac disease     [facility]congestive [facility] )     Esophageal cancer (HCC)     Hernia of abdominal cavity  Myocardial infarction [facility])     last assessed 14, past, Pt had MI s/p AGUSTIN placed in , refuses to take any other medications for his cardiac disease, only will take seizure med  Understands possible complications from this decision    Seizure [facility])      PAST SURGICAL HISTORY     Past Surgical History:   Procedure Laterality Date    ABDOMINAL AORTIC ANEURYSM REPAIR      for dialation or occulsion    CATARACT EXTRACTION      IR PORT PLACEMENT  7/15/2022     SOCIAL & FAMILY HISTORY     Social History     Substance and Sexual Activity   Alcohol Use Not Currently     Substance and Sexual Activity   Alcohol Use Not Currently        Substance and Sexual Activity   Drug Use Never     Social History     Tobacco Use   Smoking Status Former Smoker    Quit date: 2005    Years since quittin 3   Smokeless Tobacco Never Used     Family History   Problem Relation Age of Onset    Hypertension Father     Kidney disease Brother      LABORATORY DATA     Labs: I have personally reviewed pertinent reports  Results from last 7 days   Lab Units 22  0206 22  1645 22  0909 22  0519 09/15/22  0620   WBC Thousand/uL  --  1 95* 2 14* 1 60* 1 30*   HEMOGLOBIN g/dL  --  9 3* 8 6* 8 1* 8 2*   HEMATOCRIT %  --  28 2* 26 3* 24 9* 25 3*   PLATELETS Thousands/uL 28* 29* 27* 19* 20*   MONO PCT %  --   --  5 2* 5      Results from last 7 days   Lab Units 22  1645 22  0909 22  0519   POTASSIUM mmol/L 3 7 3 5 3 5   CHLORIDE mmol/L 105 107 107   CO2 mmol/L 27 27 27   BUN mg/dL 11 9 10   CREATININE mg/dL 0 75 0 67 0 66   CALCIUM mg/dL 8 0* 7 4* 7 2*   ALK PHOS U/L 108 91  --    ALT U/L 16 16  --    AST U/L 16 19  --                           Micro:  Lab Results   Component Value Date    BLOODCX No Growth After 5 Days  2022    BLOODCX No Growth After 5 Days  2022    BLOODCX No Growth After 5 Days  09/10/2022    BLOODCX No Growth After 5 Days   09/10/2022     IMAGING & DIAGNOSTIC TESTS     Imaging: I have personally reviewed pertinent reports  CT chest without contrast    Result Date: 9/19/2022  Impression: 1  Diffuse circumferential thickening of the mid-distal esophagus consistent with known malignancy  2   Previously placed esophageal stent again noted to have migrated into the gastric lumen  3   Additional findings as noted  Workstation performed: [id number]     EKG, Pathology, and Other Studies: I have personally reviewed pertinent reports  ALLERGIES     Allergies   Allergen Reactions    Iodinated Diagnostic Agents Rash     Pt's skin get very red and he gets hot and chills    Clopidogrel Rash     MEDICATIONS PRIOR TO ARRIVAL     Prior to Admission medications    Medication Sig Start Date End Date Taking? Authorizing Provider   aspirin (Aspirin Low Dose) 81 mg EC tablet Take 1 tablet (81 mg total) by mouth daily 1/24/22  Yes Dayami Cabrera, DO   atorvastatin (LIPITOR) 80 mg tablet Take 1 tablet (80 mg total) by mouth daily with dinner 3/11/22  Yes Tawanna Dickerson MD   PHENobarbital 64 8 mg tablet TAKE 1 TABLET (64 8 MG TOTAL) BY MOUTH 2 (TWO) TIMES A DAY 6/6/22 12/3/22 Yes Jessica Patel, DO   folic acid ( Folic Acid) 1 mg tablet Take 1 tablet (1 mg total) by mouth daily  Patient not taking: No sig reported 8/30/22   Travis Face, DO   lidocaine-prilocaine (EMLA) cream Apply to port 30 to 60 min prior to use  Patient not taking: No sig reported 7/26/22   Travis Face, DO   midodrine (PROAMATINE) 2 5 mg tablet Take 1 tablet (2 5 mg total) by mouth 3 (three) times a day before meals  Patient not taking: No sig reported 9/16/22 10/16/22  Tracie Bills, DO   ondansetron (Zofran ODT) 8 mg disintegrating tablet Take 1 tablet (8 mg total) by mouth every 8 (eight) hours as needed for nausea or vomiting  Patient not taking: No sig reported 7/26/22   Travis Face, DO   pantoprazole (PROTONIX) 40 mg tablet TAKE 1 TABLET BY MOUTH 2 TIMES A DAY BEFORE MEALS    Patient not taking: No sig reported 5/31/22   Ambrocio Araya DO   potassium chloride (MICRO-K) 10 MEQ CR capsule Take 1 capsule (10 mEq total) by mouth 2 (two) times a day for 7 days 9/16/22 9/23/22  Tracie Bills DO   prochlorperazine (COMPAZINE) 10 mg tablet Take 1 tablet (10 mg total) by mouth every 6 (six) hours as needed for nausea or vomiting  Patient not taking: No sig reported 7/26/22   Heriberto Reardon DO     MEDICATIONS ADMINISTERED IN LAST 24 HOURS     Medication Administration - last 24 hours from 09/19/2022 0451 to 09/20/2022 0451       Date/Time Order Dose Route Action Action by     09/19/2022 1742 sodium chloride 0 9 % bolus 500 mL 0 mL [facility]venous [facility]     09/19/2022 1642 sodium chloride 0 9 % bolus 500 mL 500 mL Intravenous New Bag [facility]     09/20/2022 0217 multi-electrolyte (PLASMALYTE-A/ISOLYTE-S PH 7 4) IV solution 125 mL/hr Intravenous New Bag Kathryn Barnett RN        CURRENT MEDICATIONS     Current Facility-Administered Medications   Medication Dose Route Frequency Provider Last Rate    acetaminophen  650 mg Oral Q6H PRN Lilly Steel MD      aspirin  81 mg Oral Daily Lilly Steel MD      atorvastatin  80 mg Oral Daily With Mario Farley MD      enoxaparin  40 mg Subcutaneous Daily Lilly Steel MD      folic acid  1 mg Oral Daily Lilly Steel MD      pantoprazole  40 mg Oral BID AC Lilly Steel MD      PHENobarbital  64 8 mg Oral BID Lilly Steel MD      sodium chloride  125 mL/hr Intravenous Continuous Shereemichael Dunham DO       sodium chloride, 125 mL/hr      acetaminophen, 650 mg, Q6H PRN        Admission Time  I spent 45 minutes admitting the patient  This involved direct patient contact where I performed a full history and physical, reviewing previous records, and reviewing laboratory and other diagnostic studies      Portions of the record may have been created with voice recognition software  Occasional wrong word or "sound a like" substitutions may have occurred due to the inherent limitations of voice recognition software    Read the chart carefully and recognize, using context, where substitutions have occurred     ==  Santosh Fraga MD  [address]  Internal Medicine Residency PGY-1

## 2022-09-20 NOTE — ED NOTES
Dr Asaf Ibarra at bedside to evaluate patient and do PO water trial       [address], RN  09/20/22 6746

## 2022-09-20 NOTE — ASSESSMENT & PLAN NOTE
Previously diagnosed in 2015, seen by outpatient surgery, and patient not interested in surgery  Does not show signs of incarceration and unlikely to be cause of current symptoms  Patient denies abdominal pain, N/V/D

## 2022-09-20 NOTE — ED NOTES
Pt amb to rr with rolling walker and stand by assist  No c/o dizziness, s/sx of SOB        [address], RN  09/20/22 3120

## 2022-09-20 NOTE — ASSESSMENT & PLAN NOTE
S/p esophageal stent placed in June 2022 2/2 tumor mass affect caused by esophageal adenocarcinoma  During last hospital admission, CT chest showed esophageal stent migration into gastric lumen  Since discharge on Friday, 9/16, patient has been experiencing inability to tolerate PO intake  Repeat CT on current admission shows possible further stent migration  Of note, ventral hernia present and does not seem to be incarcerated  Unlikely cause of current symptoms  He is able to tolerate mild liquid intake  Patient is not experiencing pain at this time  Patient received platelets on 96/92/3622 with subsequent breakout of hives resolved with Benadryl  Was given 1 dose of Granix 9/20 with subsequent response with ANC 1570 the day after  Patient received platelets again on 07/47/6275 with pre transfusion Benadryl and Tylenol and was given additional Benadryl for hives following transfusion    EGD with stent removal and placement of new sutured stent on 9/22    Plan:  - patient's diet was advanced to clears; will continue monitor for toleration of oral diet  - will plan for discharge once patient's appetite improves

## 2022-09-20 NOTE — ASSESSMENT & PLAN NOTE
Patient experiencing progressive dysphagia since May 2022 2/2 to esophageal adenocarcinoma  Stent placed in June 2022 with recent migration into gastric lumen not on CT chest  Currently unable to tolerate PO intake of food  Can tolerate small amounts of liquid      Plan:  -Continue home pantoprazole 40 mg   -See plan "migration of esophageal stent"

## 2022-09-20 NOTE — ASSESSMENT & PLAN NOTE
Patient with stage II B esophageal cancer diagnosed in May 2022  Currently undergoing carboplatin pack with taxol chemotherapy as well as radiotherapy  Follows outpatient with heme/onc

## 2022-09-20 NOTE — PROGRESS NOTES
INTERNAL MEDICINE RESIDENCY SENIOR ADMISSION NOTE     Name: Allen Singleton   Age & Sex: 67 y o  male   MRN: [id number]  Unit/Bed#: ED 20   Encounter: [id number]  Primary Care Provider: Lucas Cooper MD    Admit to team: SOD Team A    Patient seen and examined  Reviewed H&P per Dr Sumit Jolley  Agree with the assessment and plan with any exception/addition as noted below:    Patient is a 23-year-old male with past medical history significant for esophageal adenocarcinoma on chemotherapy (carbo/taxol) and ongoing radiation diagnosed May 2020 to, esophageal stent placed in June 2020 to with known displacement from last admission, CAD s/p stents x4 last placed in 2021, seizure disorder, diastolic heart failure with EF 50% who presented to the ED with worsening inability to tolerate oral intake  Patient noted that since his last admission he has had limitations with his oral feeding gotten progressively worse and is now only able to take pills with sips of water  Otherwise food that he takes in he regurgitates immediately  Patient denies any other nausea, vomiting, abdominal pain, fever, chills, chest pain, shortness of breath, changes in bowel movements  Patient seen and examined while in the ED  Patient resting comfortably in no acute distress  Principal Problem:    Dysphagia  Active Problems:    CAD (coronary artery disease)    HLD (hyperlipidemia)    Seizure disorder (HCC)    Ventral hernia with obstruction and without gangrene    Esophageal cancer (HCC)    Pancytopenia (HCC)    Migration of esophageal stent    Plan:  · NPO sips with meds anticipating endoscopic intervention for stent displacement  GI consulted, recs appreciated  · To optimize patient, 1 unit of platelets were transfused, however patient developed transfusion reaction and was stopped early  Patient given IV Benadryl and transfusion reaction labs sent  Vital signs stable, patient afebrile    Will recheck platelet level in the morning    · Continue with home meds as per H&P    Code Status: Level 3 - DNAR and DNI  Admission Status: OBSERVATION  Disposition: Patient requires Med/Surg  Expected Length of Stay: 1 night

## 2022-09-20 NOTE — ASSESSMENT & PLAN NOTE
Remote history of seizure disorder diagnosed 40 years ago  Patient currently takes phenobarbital 64 8 mg BID      Plan:   -Continue phenobarbital 64 8 mg BID

## 2022-09-20 NOTE — CONSULTS
Consultation - [address] [facility]  Mandieteresa Melina 67 y o  male MRN: [id number]  Unit/Bed#: ED 20 Encounter: [id number]        Consults    Reason for Consult / Principal Problem:     Dysphagia      ASSESSMENT AND PLAN:    71-year-old male with past medical history of esophageal adenocarcinoma on radiation and chemotherapy, GERD and heart failure who presented to the hospital with difficulty swallowing  Gastroenterology being consulted for assistance with management of migrated esophageal stent in the setting of dysphagia  Esophageal adenocarcinoma   Esophageal stent migration  Pancytopenia    Patient was recently diagnosed with esophageal adeno carcinoma and was started on carboplatin/Taxol with radiation therapy  His chemotherapy had to be held due to cytopenia and dose readjusted  His recent CT imaging shows that the esophageal stent has migrated into the stomach  He unfortunately now he is endorsing solid food dysphagia  · His last dose of chemotherapy was on 08/29  · He continues to have neutropenia and thrombocytopenia  Would like absolute neutrophil count > 1000 and platelet >73 before plan for endoscopic removal of stent and will likely need another stent placed to help with ongoing dysphagia  · Recommend Hematology consult to discuss GSF and platelet transfusion  · Diet advanced to clear liquid as patient tolerating liquid diet    GI will continue to follow    Patient discussed with Dr Blossom Dunn MD   Gastroenterology Fellow       ______________________________________________________________________    HPI:  71-year-old male with past medical history of esophageal adenocarcinoma on radiation and chemotherapy, GERD and heart failure who presented to the hospital with difficulty swallowing  Gastroenterology being consulted for assistance with management of migrated esophageal stent in the setting of dysphagia      Patient was admitted in the hospital from 9/8-9/13 with dizziness and fevers  CT imaging at that time showed his esophageal stent had migrated, GI evaluated the patient and decided that the stent had to be removed endoscopically however given his pancytopenia (neutropenia and thrombocytopenia) decision made to plan the stent removal as outpatient  Unfortunately he states that over the last few weeks he has not been able to tolerate a diet  He states that whatever he eats comes out, he has not been having any difficulty with liquids  REVIEW OF SYSTEMS:    CONSTITUTIONAL: Denies any fever, chills, rigors, and weight loss  HEENT: No earache or tinnitus  Denies hearing loss or visual disturbances  CARDIOVASCULAR: No chest pain or palpitations  RESPIRATORY: Denies any cough, hemoptysis, shortness of breath or dyspnea on exertion  GASTROINTESTINAL: As noted in the History of Present Illness  GENITOURINARY: No problems with urination  Denies any hematuria or dysuria  NEUROLOGIC: No dizziness or vertigo, denies headaches  MUSCULOSKELETAL: Denies any muscle or joint pain  SKIN: Denies skin rashes or itching  ENDOCRINE: Denies excessive thirst  Denies intolerance to heat or cold  PSYCHOSOCIAL: Denies depression or anxiety  Denies any recent memory loss  Historical Information   Past Medical History:   Diagnosis Date    Cardiac disease     [facility]est[facility]     Esophageal cancer [facility] )     Hernia of abdominal cavity     Myocardial infarction ([facility] )     last assessed 7/31/14, past, Pt had MI s/p AGUSTIN placed in 2001, refuses to take any other medications for his cardiac disease, only will take seizure med   Understands possible complications from this decision    Seizure [facility])      Past Surgical History:   Procedure Laterality Date    ABDOMINAL AORTIC ANEURYSM REPAIR      for dialation or occulsion    CATARACT EXTRACTION      IR PORT PLACEMENT  7/15/2022     Social History   Social History     Substance and Sexual Activity Alcohol Use Not Currently     Social History     Substance and Sexual Activity   Drug Use Never     Social History     Tobacco Use   Smoking Status Former Smoker    Quit date: 2005    Years since quittin 3   Smokeless Tobacco Never Used     Family History   Problem Relation Age of Onset    Hypertension Father     Kidney disease Brother        Meds/Allergies     (Not in a hospital admission)    Current Facility-Administered Medications   Medication Dose Route Frequency    acetaminophen (TYLENOL) tablet 650 mg  650 mg Oral Q6H PRN    aspirin (ECOTRIN LOW STRENGTH) EC tablet 81 mg  81 mg Oral Daily    atorvastatin (LIPITOR) tablet 80 mg  80 mg Oral Daily With Dinner    diphenhydrAMINE (BENADRYL) injection 25 mg  25 mg Intravenous Once    enoxaparin (LOVENOX) subcutaneous injection 40 mg  40 mg Subcutaneous Daily    folic acid 1 mg in sodium chloride 0 9 % 50 mL IVPB  1 mg Intravenous Daily    pantoprazole (PROTONIX) injection 40 mg  40 mg Intravenous Q12H ADENIKE    PHENobarbital injection 65 mg  65 mg Intravenous Q12H    sodium chloride 0 9 % infusion  125 mL/hr Intravenous Continuous       Allergies   Allergen Reactions    Iodinated Diagnostic Agents Rash     Pt's skin get very red and he gets hot and chills    Clopidogrel Rash           Objective     Blood pressure 91/50, pulse 80, temperature 98 9 °F (37 2 °C), temperature source Oral, resp  rate 15, weight 82 1 kg (181 lb), SpO2 95 %  Body mass index is 24 55 kg/m²  Intake/Output Summary (Last 24 hours) at 2022 1151  Last data filed at 2022 1007  Gross per 24 hour   Intake 146 67 ml   Output --   Net 146 67 ml         PHYSICAL EXAM:      General Appearance:   Alert, cooperative, no distress   HEENT:   Normocephalic, atraumatic, anicteric       Neck:  Supple, symmetrical, trachea midline   Lungs:   Clear to auscultation bilaterally; no rales, rhonchi or wheezing; respirations unlabored    Heart[de-identified]   Regular rate and rhythm; no murmur, rub, or gallop     Abdomen:   Soft, non-tender, non-distended; normal bowel sounds; no masses, no organomegaly    Genitalia:   Deferred    Rectal:   Deferred    Extremities:  No cyanosis, clubbing or edema    Pulses:  2+ and symmetric all extremities    Skin:  No jaundice, rashes, or lesions    Lymph nodes:  No palpable cervical lymphadenopathy        Lab Results:   Admission on 09/19/2022   Component Date Value    WBC 09/19/2022 1 95 (A)    RBC 09/19/2022 2 92 (A)    Hemoglobin 09/19/2022 9 3 (A)    Hematocrit 09/19/2022 28 2 (A)    MCV 09/19/2022 97     MCH 09/19/2022 31 8     MCHC 09/19/2022 33 0     RDW 09/19/2022 24 3 (A)    Platelets 04/29/7473 29 (A)    nRBC 09/19/2022 0     Sodium 09/19/2022 136     Potassium 09/19/2022 3 7     Chloride 09/19/2022 105     CO2 09/19/2022 27     ANION GAP 09/19/2022 4     BUN 09/19/2022 11     Creatinine 09/19/2022 0 75     Glucose 09/19/2022 107     Calcium 09/19/2022 8 0 (A)    Corrected Calcium 09/19/2022 9 2     AST 09/19/2022 16     ALT 09/19/2022 16     Alkaline Phosphatase 09/19/2022 108     Total Protein 09/19/2022 6 8     Albumin 09/19/2022 2 5 (A)    Total Bilirubin 09/19/2022 0 39     eGFR 09/19/2022 88     hs TnI 0hr 09/19/2022 7     Ventricular Rate 09/19/2022 75     Atrial Rate 09/19/2022 75     AR Interval 09/19/2022 198     QRSD Interval 09/19/2022 82     QT Interval 09/19/2022 350     QTC Interval 09/19/2022 390     P Axis 09/19/2022 60     QRS Axis 09/19/2022 5     T Wave Axis 09/19/2022 37     hs TnI 2hr 09/19/2022 6     Delta 2hr hsTnI 09/19/2022 -1     hs TnI 4hr 09/19/2022 6     Delta 4hr hsTnI 09/19/2022 -1     Platelets 93/97/4754 28 (A)    ABO Grouping 09/20/2022 A     Rh Factor 09/20/2022 Positive     Antibody Screen 09/20/2022 Negative     Specimen Expiration Date 09/20/2022 [id number]     Unit Product Code 09/20/2022 [id number]     Unit Number 09/20/2022 [id number]     Unit ABO 09/20/2022 B  Unit [facility] 09/20/2022 POS     Unit Dispense Status 09/20/2022 Presumed Trans     Unit Product Volume 09/20/2022 300     Sodium 09/20/2022 138     Potassium 09/20/2022 3 5     Chloride 09/20/2022 107     CO2 09/20/2022 26     ANION GAP 09/20/2022 5     BUN 09/20/2022 7     Creatinine 09/20/2022 0 57 (A)    Glucose 09/20/2022 90     Calcium 09/20/2022 7 2 (A)    eGFR 09/20/2022 99     WBC 09/20/2022 1 75 (A)    RBC 09/20/2022 2 50 (A)    Hemoglobin 09/20/2022 8 0 (A)    Hematocrit 09/20/2022 24 4 (A)    MCV 09/20/2022 98     MCH 09/20/2022 32 0     MCHC 09/20/2022 32 8     RDW 09/20/2022 23 9 (A)    Platelets 23/15/6726 41 (A)    nRBC 09/20/2022 0     Protime 09/20/2022 14 1     INR 09/20/2022 1 07     Magnesium 09/20/2022 2 1     Segmented % 09/20/2022 21 (A)    Lymphocytes % 09/20/2022 60 (A)    Monocytes % 09/20/2022 4     Eosinophils, % 09/20/2022 8 (A)    Basophils % 09/20/2022 4 (A)    Atypical Lymphocytes % 09/20/2022 3 (A)    Absolute Neutrophils 09/20/2022 0 37 (A)    Lymphocytes Absolute 09/20/2022 1 05     Monocytes Absolute 09/20/2022 0 07     Eosinophils Absolute 09/20/2022 0 14     Basophils Absolute 09/20/2022 0 07     Smudge Cells 09/20/2022 Present     RBC Morphology 09/20/2022 Present     Anisocytosis 09/20/2022 Present     Leonardo Cells 09/20/2022 Present     Hypochromia 09/20/2022 Present     Macrocytes 09/20/2022 Present     Ovalocytes 09/20/2022 Present     Poikilocytes 09/20/2022 Present     Polychromasia 09/20/2022 Present     Schistocytes 09/20/2022 Present     Platelet Estimate 33/32/1511 Decreased (A)       Imaging Studies: I have personally reviewed pertinent imaging studies

## 2022-09-20 NOTE — ASSESSMENT & PLAN NOTE
Patient has a PMHx of anemia, leukopenia, and thrombocytopenia in setting of esophageal adenocarcinoma  Presented in the ED with Hgb 9 3, WBC 1 95, and platelets of 28  Slightly trending upwards from previous admission last week      Plan:  -Continue to monitor CBC

## 2022-09-20 NOTE — CASE MANAGEMENT
Case Management Assessment & Discharge Planning Note    Patient name [facility]  formerly [facility] ED 20/ED 20 MRN [id number]  : 1945 Date 2022       Current Admission Date: 2022  Current Admission Diagnosis:Dysphagia   Patient Active Problem List    Diagnosis Date Noted    Chemotherapy induced neutropenia ([facility] ) 09/15/2022    Migration of esophageal stent 2022    Anemia 2022    Hypotension 2022    GERD (gastroesophageal reflux disease) 2022    Vitamin B12 deficiency 2022    Port-A-Cath in place 08/10/2022    Thrombocytopenia ([facility] ) 2022    Dysphagia 2022    Pancytopenia ([facility] ) 2022    Acute gastric ulcer 2022    Esophageal cancer ([facility] ) 2022    Abdominal aortic aneurysm, without rupture ([place] [address] ) 02/10/2022    Paroxysmal atrial fibrillation ([facility] ) 02/10/2022    Coronary artery disease of native artery of native heart with stable angina pectoris ([facility] ) 02/10/2022    Ischemic cardiomyopathy 2021    Chronic combined systolic and diastolic CHF (congestive heart failure) ([place] [address] ) 2021    Allergic reaction to contrast media 2021    NSTEMI (non-ST elevated myocardial infarction) ([place] [address] ) 2021    Abdominal pain 2020    Hernia, incisional 2019    Ventral hernia with obstruction and without gangrene 2019    CAD (coronary artery disease) 2017    HTN (hypertension) 2017    HLD (hyperlipidemia) 2017    Seizure disorder ([facility] ) 2017    S/P AAA (abdominal aortic aneurysm) repair 2017    STEMI (ST elevation myocardial infarction) ([facility] ) 2017      LOS (days): 0  Geometric Mean LOS (GMLOS) (days):   Days to GMLOS:     OBJECTIVE:        Current admission status: Observation  Referral Reason: Other (dispo planning)    Preferred Pharmacy:   [id number] [address]  [address] [place] [address]  Phone: [phone] Fax: [facility] [place] [address] [place] [address] [place] [address] [facility] [address]  Phone: [phone] Fax: [phone]    Primary Care Provider: Tristin Crow MD    Primary Insurance: MEDICARE  Secondary Insurance: [address]    ASSESSMENT:  Caren Ramos Proxies    There are no active Health Care Proxies on file  Readmission [facility]  30 Day Readmission: Yes  Who directed you to return to the hospital?: Self  Did you understand whom to contact if you had questions or problems?: Yes  Did you get your prescriptions before you left the hospital?: No  Reason[de-identified] Not preferred pharmacy  Were you able to get your prescriptions filled when you left the hospital?: No  Reason[de-identified] Unable to get to pharmacy  Did you take your medications as prescribed?: No  Were you able to get to your follow-up appointments?: No  Reason[de-identified] Readmitted prior to appointment  Patient was readmitted due to: Unable to eat  Action Plan: likely stent removal    Patient Information  Admitted from[de-identified] Home  Mental Status: Alert  During Assessment patient was accompanied by: Not accompanied during assessment  Assessment information provided by[de-identified] Patient  Primary Caregiver: Self  Support Systems: Self, Family members  [place] of Residence: [address] [place] [address] do you live in?: [facility] entry access options   Select all that apply : Stairs  Number of steps to enter home : 2  Type of Current Residence: Apartment  Floor Level: 1  Upon entering residence, is there a bedroom on the main floor (no further steps)?: Yes  Upon entering residence, is there a bathroom on the main floor (no further steps)?: Yes  In the last 12 months, was there a time when you were not able to pay the mortgage or rent on time?: No  In the last 12 months, how many places have you lived?: 1  In the last 12 months, was there a time when you did not have a steady place to sleep or slept in a shelter (including now)?: No  Homeless/housing insecurity resource given?: No  Living Arrangements: Lives w/ Extended Family (Brother)    Activities of Daily Living Prior to Admission  Functional Status: Independent  Completes ADLs independently?: Yes  Ambulates independently?: Yes  Does patient use assisted devices?: Yes  Assisted Devices (DME) used: [employer]  Does patient currently own DME?: Yes  What DME does the patient currently own?: [employer]  Does patient have a history of Outpatient Therapy (PT/OT)?: Yes  Does the patient have a history of Short-Term Rehab?: Yes ([address] "a while ago")  Does patient have a history of HHC?: No  Does patient currently have [facility]?: No         Patient Information Continued  Income Source: Pension/jail  Within the past 12 months, you worried that your food would run out before you got the money to buy more : Never true  Within the past 12 months, the food you bought just didn't last and you didn't have money to get more : Never true  Food insecurity resource given?: No  Does patient receive dialysis treatments?: No  Does patient have a history of substance abuse?: No  Does patient have a history of Mental Health Diagnosis?: No     Means of Transportation  In the past 12 months, has lack of transportation kept you from medical appointments or from getting medications?: No  In the past 12 months, has lack of transportation kept you from meetings, work, or from getting things needed for daily living?: No  Was application for public transport provided?: No    DISCHARGE DETAILS:    Discharge planning discussed with[de-identified] Patient  Freedom of Choice: Yes  Comments - Freedom of Choice: Pt states he plans to return home at d/c  CM contacted family/caregiver?: No- see comments (Pt alert and oriented, declined call to famil  y)      Additional Comments: CM met with pt at bedside to complete Open and address dispo planning c/s    Pt was able to complete Open without difficulty  Pt states he is independent at home and he lives in a first floor apartment with his brother  Pt states that he plans to return home at d/c  Pt is open to discussing theray options if any recommendations are made  Pt states that at d/c his brother may be able to pick him up depending on the day of the week  Pt's brother is on dialysis and may not be available to transport  Pt reports he has not received any COVID vaccines

## 2022-09-21 ENCOUNTER — APPOINTMENT (OUTPATIENT)
Dept: RADIATION ONCOLOGY | Facility: HOSPITAL | Age: 77
End: 2022-09-21
Attending: INTERNAL MEDICINE
Payer: MEDICARE

## 2022-09-21 ENCOUNTER — HOSPITAL ENCOUNTER (OUTPATIENT)
Dept: INFUSION CENTER | Facility: HOSPITAL | Age: 77
End: 2022-09-21
Attending: INTERNAL MEDICINE

## 2022-09-21 LAB
ANION GAP SERPL CALCULATED.3IONS-SCNC: 5 MMOL/L (ref 4–13)
ANISOCYTOSIS BLD QL SMEAR: PRESENT
BASOPHILS # BLD MANUAL: 0.07 THOUSAND/UL (ref 0–0.1)
BASOPHILS NFR MAR MANUAL: 3 % (ref 0–1)
BUN SERPL-MCNC: 7 MG/DL (ref 5–25)
CALCIUM SERPL-MCNC: 7.3 MG/DL (ref 8.3–10.1)
CHLORIDE SERPL-SCNC: 109 MMOL/L (ref 96–108)
CO2 SERPL-SCNC: 25 MMOL/L (ref 21–32)
CREAT SERPL-MCNC: 0.59 MG/DL (ref 0.6–1.3)
EOSINOPHIL # BLD MANUAL: 0.13 THOUSAND/UL (ref 0–0.4)
EOSINOPHIL NFR BLD MANUAL: 6 % (ref 0–6)
ERYTHROCYTE [DISTWIDTH] IN BLOOD BY AUTOMATED COUNT: 24 % (ref 11.6–15.1)
GFR SERPL CREATININE-BSD FRML MDRD: 97 ML/MIN/1.73SQ M
GLUCOSE SERPL-MCNC: 82 MG/DL (ref 65–140)
HCT VFR BLD AUTO: 26.8 % (ref 36.5–49.3)
HGB BLD-MCNC: 8.6 G/DL (ref 12–17)
LYMPHOCYTES # BLD AUTO: 0.36 THOUSAND/UL (ref 0.6–4.47)
LYMPHOCYTES # BLD AUTO: 16 % (ref 14–44)
MACROCYTES BLD QL AUTO: PRESENT
MCH RBC QN AUTO: 31.4 PG (ref 26.8–34.3)
MCHC RBC AUTO-ENTMCNC: 32.1 G/DL (ref 31.4–37.4)
MCV RBC AUTO: 98 FL (ref 82–98)
MONOCYTES # BLD AUTO: 0.09 THOUSAND/UL (ref 0–1.22)
MONOCYTES NFR BLD: 4 % (ref 4–12)
NEUTROPHILS # BLD MANUAL: 1.57 THOUSAND/UL (ref 1.85–7.62)
NEUTS BAND NFR BLD MANUAL: 34 % (ref 0–8)
NEUTS SEG NFR BLD AUTO: 36 % (ref 43–75)
PLATELET # BLD AUTO: 42 THOUSANDS/UL (ref 149–390)
PLATELET BLD QL SMEAR: ABNORMAL
POIKILOCYTOSIS BLD QL SMEAR: PRESENT
POLYCHROMASIA BLD QL SMEAR: PRESENT
POTASSIUM SERPL-SCNC: 3.6 MMOL/L (ref 3.5–5.3)
RBC # BLD AUTO: 2.74 MILLION/UL (ref 3.88–5.62)
RBC MORPH BLD: PRESENT
SCHISTOCYTES BLD QL SMEAR: PRESENT
SODIUM SERPL-SCNC: 139 MMOL/L (ref 135–147)
VARIANT LYMPHS # BLD AUTO: 1 %
WBC # BLD AUTO: 2.24 THOUSAND/UL (ref 4.31–10.16)

## 2022-09-21 PROCEDURE — 99232 SBSQ HOSP IP/OBS MODERATE 35: CPT | Performed by: INTERNAL MEDICINE

## 2022-09-21 PROCEDURE — 85027 COMPLETE CBC AUTOMATED: CPT

## 2022-09-21 PROCEDURE — P9053 PLT, PHER, L/R CMV-NEG, IRR: HCPCS

## 2022-09-21 PROCEDURE — 80048 BASIC METABOLIC PNL TOTAL CA: CPT

## 2022-09-21 PROCEDURE — C9113 INJ PANTOPRAZOLE SODIUM, VIA: HCPCS

## 2022-09-21 PROCEDURE — 85007 BL SMEAR W/DIFF WBC COUNT: CPT

## 2022-09-21 RX ORDER — DIPHENHYDRAMINE HYDROCHLORIDE 50 MG/ML
25 INJECTION INTRAMUSCULAR; INTRAVENOUS ONCE
Status: COMPLETED | OUTPATIENT
Start: 2022-09-21 | End: 2022-09-21

## 2022-09-21 RX ORDER — SODIUM CHLORIDE 9 MG/ML
75 INJECTION, SOLUTION INTRAVENOUS CONTINUOUS
Status: DISCONTINUED | OUTPATIENT
Start: 2022-09-22 | End: 2022-09-21

## 2022-09-21 RX ORDER — SODIUM CHLORIDE 9 MG/ML
75 INJECTION, SOLUTION INTRAVENOUS CONTINUOUS
Status: DISPENSED | OUTPATIENT
Start: 2022-09-22 | End: 2022-09-22

## 2022-09-21 RX ADMIN — ACETAMINOPHEN 650 MG: 325 TABLET, FILM COATED ORAL at 15:29

## 2022-09-21 RX ADMIN — SODIUM CHLORIDE 125 ML/HR: 0.9 INJECTION, SOLUTION INTRAVENOUS at 06:23

## 2022-09-21 RX ADMIN — DIPHENHYDRAMINE HYDROCHLORIDE 25 MG: 50 INJECTION, SOLUTION INTRAMUSCULAR; INTRAVENOUS at 15:29

## 2022-09-21 RX ADMIN — ATORVASTATIN CALCIUM 80 MG: 80 TABLET, FILM COATED ORAL at 15:33

## 2022-09-21 RX ADMIN — SODIUM CHLORIDE 75 ML/HR: 0.9 INJECTION, SOLUTION INTRAVENOUS at 23:18

## 2022-09-21 RX ADMIN — PHENOBARBITAL 64.8 MG: 64.8 TABLET ORAL at 21:17

## 2022-09-21 RX ADMIN — FOLIC ACID 1 MG: 5 INJECTION, SOLUTION INTRAMUSCULAR; INTRAVENOUS; SUBCUTANEOUS at 08:23

## 2022-09-21 RX ADMIN — ASPIRIN 81 MG: 81 TABLET, COATED ORAL at 08:43

## 2022-09-21 RX ADMIN — PANTOPRAZOLE SODIUM 40 MG: 40 INJECTION, POWDER, FOR SOLUTION INTRAVENOUS at 21:17

## 2022-09-21 RX ADMIN — PANTOPRAZOLE SODIUM 40 MG: 40 INJECTION, POWDER, FOR SOLUTION INTRAVENOUS at 08:23

## 2022-09-21 RX ADMIN — PHENOBARBITAL 64.8 MG: 64.8 TABLET ORAL at 08:43

## 2022-09-21 RX ADMIN — DIPHENHYDRAMINE HYDROCHLORIDE 25 MG: 50 INJECTION, SOLUTION INTRAMUSCULAR; INTRAVENOUS at 17:35

## 2022-09-21 NOTE — PLAN OF CARE
Problem: PAIN - ADULT  Goal: Verbalizes/displays adequate comfort level or baseline comfort level  Description: Interventions:  - Encourage patient to monitor pain and request assistance  - Assess pain using appropriate pain scale  - Administer analgesics based on type and severity of pain and evaluate response  - Implement non-pharmacological measures as appropriate and evaluate response  - Consider cultural and social influences on pain and pain management  - Notify physician/advanced practitioner if interventions unsuccessful or patient reports new pain  Outcome: Progressing     Problem: INFECTION - ADULT  Goal: Absence or prevention of progression during hospitalization  Description: INTERVENTIONS:  - Assess and monitor for signs and symptoms of infection  - Monitor lab/diagnostic results  - Monitor all insertion sites, i e  indwelling lines, tubes, and drains  - Monitor endotracheal if appropriate and nasal secretions for changes in amount and color  - [place] appropriate cooling/warming therapies per order  - Administer medications as ordered  - Instruct and encourage patient and family to use good hand hygiene technique  - Identify and instruct in appropriate isolation precautions for identified infection/condition  Outcome: Progressing  Goal: Absence of fever/infection during neutropenic period  Description: INTERVENTIONS:  - Monitor WBC    Outcome: Progressing     Problem: SAFETY ADULT  Goal: Patient will remain free of falls  Description: INTERVENTIONS:  - Educate patient/family on patient safety including physical limitations  - Instruct patient to call for assistance with activity   - Consult OT/PT to assist with strengthening/mobility   - Keep Call bell within reach  - Keep bed low and locked with side rails adjusted as appropriate  - Keep care items and personal belongings within reach  - Initiate and maintain comfort rounds  - Make Fall Risk Sign visible to staff  - Apply yellow socks and bracelet for high fall risk patients  - Consider moving patient to room near nurses station  Outcome: Progressing  Goal: Maintain or return to baseline ADL function  Description: INTERVENTIONS:  -  Assess patient's ability to carry out ADLs; assess patient's baseline for ADL function and identify physical deficits which impact ability to perform ADLs (bathing, care of mouth/teeth, toileting, grooming, dressing, etc )  - Assess/evaluate cause of self-care deficits   - Assess range of motion  - Assess patient's mobility; develop plan if impaired  - Assess patient's need for assistive devices and provide as appropriate  - Encourage maximum independence but intervene and supervise when necessary  - Involve family in performance of ADLs  - Assess for home care needs following discharge   - Consider OT consult to assist with ADL evaluation and planning for discharge  - Provide patient education as appropriate  Outcome: Progressing  Goal: Maintains/Returns to pre admission functional level  Description: INTERVENTIONS:  - Perform BMAT or MOVE assessment daily    - Set and communicate daily mobility goal to care team and patient/family/caregiver     - Collaborate with rehabilitation services on mobility goals if consulted  - Out of bed for toileting  - Record patient progress and toleration of activity level   Outcome: Progressing     Problem: DISCHARGE PLANNING  Goal: Discharge to home or other facility with appropriate resources  Description: INTERVENTIONS:  - Identify barriers to discharge w/patient and caregiver  - Arrange for needed discharge resources and transportation as appropriate  - Identify discharge learning needs (meds, wound care, etc )  - Arrange for interpretive services to assist at discharge as needed  - Refer to Case Management Department for coordinating discharge planning if the patient needs post-hospital services based on physician/advanced practitioner order or complex needs related to functional status, cognitive ability, or social support system  Outcome: Progressing     Problem: Knowledge Deficit  Goal: Patient/family/caregiver demonstrates understanding of disease process, treatment plan, medications, and discharge instructions  Description: Complete learning assessment and assess knowledge base    Interventions:  - Provide teaching at level of understanding  - Provide teaching via preferred learning methods  Outcome: Progressing     Problem: Potential for Falls  Goal: Patient will remain free of falls  Description: INTERVENTIONS:  - Educate patient/family on patient safety including physical limitations  - Instruct patient to call for assistance with activity   - Consult OT/PT to assist with strengthening/mobility   - Keep Call bell within reach  - Keep bed low and locked with side rails adjusted as appropriate  - Keep care items and personal belongings within reach  - Initiate and maintain comfort rounds  - Make Fall Risk Sign visible to staff  - Apply yellow socks and bracelet for high fall risk patients  - Consider moving patient to room near nurses station  Outcome: Progressing

## 2022-09-21 NOTE — PLAN OF CARE
Problem: PAIN - ADULT  Goal: Verbalizes/displays adequate comfort level or baseline comfort level  Description: Interventions:  - Encourage patient to monitor pain and request assistance  - Assess pain using appropriate pain scale  - Administer analgesics based on type and severity of pain and evaluate response  - Implement non-pharmacological measures as appropriate and evaluate response  - Consider cultural and social influences on pain and pain management  - Notify physician/advanced practitioner if interventions unsuccessful or patient reports new pain  9/21/2022 1824 by Shelby Suarez RN  Outcome: Progressing  9/21/2022 1824 by Shelby Suarez RN  Outcome: Progressing     Problem: INFECTION - ADULT  Goal: Absence or prevention of progression during hospitalization  Description: INTERVENTIONS:  - Assess and monitor for signs and symptoms of infection  - Monitor lab/diagnostic results  - Monitor all insertion sites, i e  indwelling lines, tubes, and drains  - Monitor endotracheal if appropriate and nasal secretions for changes in amount and color  - [place] appropriate cooling/warming therapies per order  - Administer medications as ordered  - Instruct and encourage patient and family to use good hand hygiene technique  - Identify and instruct in appropriate isolation precautions for identified infection/condition  9/21/2022 1824 by Shelby Suarez RN  Outcome: Progressing  9/21/2022 1824 by Shelby Suarez RN  Outcome: Progressing  Goal: Absence of fever/infection during neutropenic period  Description: INTERVENTIONS:  - Monitor WBC    9/21/2022 1824 by Shelby Suarez RN  Outcome: Progressing  9/21/2022 1824 by Shelby Suarez RN  Outcome: Progressing

## 2022-09-21 NOTE — PROGRESS NOTES
INTERNAL MEDICINE RESIDENCY PROGRESS NOTE     Name: Steph Blunt   Age & Sex: 67 y o  male   MRN: [id number]  Unit/Bed#: [facility] 933-01   Encounter: [id number]  Team: SOD Team A    PATIENT INFORMATION     Name: Steph Blunt   Age & Sex: 67 y o  male   MRN: [id number]  Hospital Stay Days: 1    ASSESSMENT/PLAN     Principal Problem:    Dysphagia  Active Problems:    Migration of esophageal stent    Esophageal cancer ([facility] )    Pancytopenia ([facility] )    CAD (coronary artery disease)    HLD (hyperlipidemia)    Seizure disorder ([facility] [address] )    Ventral hernia with obstruction and without gangrene      Migration of esophageal stent  Assessment & Plan  S/p esophageal stent placed in June 2022 2/2 tumor mass affect caused by esophageal adenocarcinoma  During last hospital admission, CT chest showed esophageal stent migration into gastric lumen  Since discharge on Friday, 9/16, patient has been experiencing inability to tolerate PO intake  Repeat CT on current admission shows possible further stent migration  Of note, ventral hernia present and does not seem to be incarcerated  Unlikely cause of current symptoms  He is able to tolerate mild liquid intake  Patient is not experiencing pain at this time  Patient received platelets on 55/54/2217 with subsequent breakout of hives resolved with Benadryl    Plan:  - Received 1 dose of Granix 9/20 and plan for 2nd dose 9/21 in hopes of raising ANC with to level suitable for EGD stent removal  - goal ANC > 1000  - Plan to transfuse of platelets once ANC begins to rise goal >50      Esophageal cancer [facility])  Assessment & Plan  Patient with stage II B esophageal cancer diagnosed in May 2022  Currently undergoing carboplatin pack with taxol chemotherapy as well as radiotherapy  Follows outpatient with heme/onc  Pancytopenia [facility])  Assessment & Plan  Patient has a PMHx of anemia, leukopenia, and thrombocytopenia in setting of esophageal adenocarcinoma   Presented in the ED with Hgb 9 3, WBC 1 95, and platelets of 28  Slightly trending upwards from previous admission last week  Plan:  -For possible stent removal, GI recommend plt > 50 and ANC>1000  -Continue to monitor CBC      Ventral hernia with obstruction and without gangrene  Assessment & Plan  Previously diagnosed in 2015, seen by outpatient surgery, and patient not interested in surgery  Does not show signs of incarceration and unlikely to be cause of current symptoms  Patient denies abdominal pain, N/V/D  Seizure disorder [facility])  Assessment & Plan  Remote history of seizure disorder diagnosed 40 years ago  Patient currently takes phenobarbital 64 8 mg BID  Plan:   -Continue phenobarbital 64 8 mg BID     HLD (hyperlipidemia)  Assessment & Plan  -Continue home Atorvastatin 80 mg daily     CAD (coronary artery disease)  Assessment & Plan  History of CAD s/p stent x 4    Plan:  -Continue ASA 81 mg  -Follow-up outpatient    * Dysphagia  Assessment & Plan  Patient experiencing progressive dysphagia since May 2022 2/2 to esophageal adenocarcinoma  Stent placed in June 2022 with recent migration into gastric lumen not on CT chest  Currently unable to tolerate PO intake of food  Can tolerate small amounts of liquid  Plan:  -Continue home pantoprazole 40 mg   -See plan "migration of esophageal stent"        Disposition: awaiting clearance for GI endoscopic stent removal     SUBJECTIVE     Patient seen and examined  No acute events overnight  He reports having eaten some Jello and coffee  Denies any other symptoms besides dysphagia  Patient denies any bleeding, pains, regurgitating his food, and recurrent hives/itching  Patient reports having appetite  Review of Systems   Constitutional: Negative for chills and fever  Respiratory: Negative for cough and shortness of breath  Cardiovascular: Negative for chest pain  Gastrointestinal: Negative for constipation, diarrhea, nausea and vomiting          Dysphagia   Genitourinary: Negative for difficulty urinating  OBJECTIVE     Vitals:    22 1845 22 2206 22 2253 22 0700   BP: 112/57 103/60 103/60 108/60   BP Location: Right arm  Right arm    Pulse: 80 81 81 83   Resp:     Temp:  98 2 °F (36 8 °C) 98 2 °F (36 8 °C) 97 9 °F (36 6 °C)   TempSrc:   Oral    SpO2: 95% 98%  96%   Weight:   81 6 kg (180 lb)    Height:   6' (1 829 m)       Temperature:   Temp (24hrs), Av 1 °F (36 7 °C), Min:97 9 °F (36 6 °C), Max:98 2 °F (36 8 °C)    Temperature: 97 9 °F (36 6 °C)  Intake & Output:  I/O        07 0700  0701   0700  0701   0700    I V  (mL/kg)  2852 1 (35)     Blood 96 7      IV Piggyback  100     Total Intake(mL/kg) 96 7 (1 2) 2952 1 (36 2)     Net +96 7 +2952 1            Unmeasured Urine Occurrence  2 x         Weights:   IBW (Ideal Body Weight): 77 6 kg    Body mass index is 24 41 kg/m²  Weight (last 2 days)     Date/Time Weight    22 2253 81 6 (180)    22 1530 82 1 (181)        Physical Exam  Constitutional:       General: He is not in acute distress  Appearance: Normal appearance  He is not ill-appearing or toxic-appearing  HENT:      Head: Normocephalic and atraumatic  Cardiovascular:      Rate and Rhythm: Normal rate and regular rhythm  Pulses: Normal pulses  Heart sounds: No murmur heard  No gallop  Pulmonary:      Effort: Pulmonary effort is normal  No respiratory distress  Breath sounds: No wheezing or rales  Abdominal:      General: Abdomen is flat  There is no distension  Tenderness: There is no abdominal tenderness  Hernia: A hernia is present  Musculoskeletal:      Right lower leg: No edema  Left lower leg: No edema  Neurological:      Mental Status: He is alert and oriented to person, place, and time  Psychiatric:         Mood and Affect: Mood normal          Behavior: Behavior normal        LABORATORY DATA     Labs:  I have personally reviewed pertinent reports  Results from last 7 days   Lab Units 09/20/22  0517 09/20/22  0206 09/19/22  1645 09/19/22  0909 09/16/22  0519   WBC Thousand/uL 1 75*  --  1 95* 2 14* 1 60*   HEMOGLOBIN g/dL 8 0*  --  9 3* 8 6* 8 1*   HEMATOCRIT % 24 4*  --  28 2* 26 3* 24 9*   PLATELETS Thousands/uL 41* 28* 29* 27* 19*   MONO PCT % 4  --   --  5 2*      Results from last 7 days   Lab Units 09/21/22  0624 09/20/22  0517 09/19/22  1645 09/19/22  0909   POTASSIUM mmol/L 3 6 3 5 3 7 3 5   CHLORIDE mmol/L 109* 107 105 107   CO2 mmol/L 25 26 27 27   BUN mg/dL 7 7 11 9   CREATININE mg/dL 0 59* 0 57* 0 75 0 67   CALCIUM mg/dL 7 3* 7 2* 8 0* 7 4*   ALK PHOS U/L  --   --  108 91   ALT U/L  --   --  16 16   AST U/L  --   --  16 19     Results from last 7 days   Lab Units 09/20/22  0517   MAGNESIUM mg/dL 2 1          Results from last 7 days   Lab Units 09/20/22  0517   INR  1 07               IMAGING & DIAGNOSTIC TESTING     Radiology Results: I have personally reviewed pertinent reports  CT chest without contrast    Result Date: 9/19/2022  Impression: 1  Diffuse circumferential thickening of the mid-distal esophagus consistent with known malignancy  2   Previously placed esophageal stent again noted to have migrated into the gastric lumen  3   Additional findings as noted  Workstation performed: [id number]     Other Diagnostic Testing: I have personally reviewed pertinent reports      ACTIVE MEDICATIONS     Current Facility-Administered Medications   Medication Dose Route Frequency    acetaminophen (TYLENOL) tablet 650 mg  650 mg Oral Q6H PRN    aspirin (ECOTRIN LOW STRENGTH) EC tablet 81 mg  81 mg Oral Daily    atorvastatin (LIPITOR) tablet 80 mg  80 mg Oral Daily With Dinner    diphenhydrAMINE (BENADRYL) injection 25 mg  25 mg Intravenous Once    enoxaparin (LOVENOX) subcutaneous injection 40 mg  40 mg Subcutaneous Daily    folic acid 1 mg in sodium chloride 0 9 % 50 mL IVPB  1 mg Intravenous Daily    pantoprazole (PROTONIX) injection 40 mg  40 mg Intravenous Q12H [facility]    PHENobarbital tablet 64 8 mg  64 8 mg Oral BID    sodium chloride 0 9 % infusion  125 mL/hr Intravenous Continuous    tbo-filgrastim (GRANIX) subcutaneous injection 480 mcg  480 mcg Subcutaneous Daily       VTE Pharmacologic Prophylaxis: Reason for no pharmacologic prophylaxis holding for thrombocytopenia   VTE Mechanical Prophylaxis: sequential compression device    Portions of the record may have been created with voice recognition software  Occasional wrong word or "sound a like" substitutions may have occurred due to the inherent limitations of voice recognition software    Read the chart carefully and recognize, using context, where substitutions have occurred   ==  Jericho Young, [facility]  Internal Medicine Residency PGY-1

## 2022-09-21 NOTE — RESTORATIVE TECHNICIAN NOTE
Restorative Technician Note      Patient Name: Ana Glasgow     Restorative Tech Visit Date: 9/21/2022  Note Type: Mobility  Patient Position Upon Consult: Supine  Activity Performed: Ambulated  Assistive Device: Roller walker  Patient Position at End of Consult: Supine;  All needs within reach    Suzi Lopez

## 2022-09-21 NOTE — QUICK NOTE
Alerted by nurse that patient finished platelet transfusion after benadryl pretreatment and found to have small hives on his back  Came and evaluated at bedside and patient found to have three  urticarial lesions on his back and two on the right side of his face, all lesions were <1x1cm^2  Patient reports no itching  Vitals stable and patient with normal breathing, non-labored, saturating well  Appears well otherwise on examination  Blood pressure lower, but consistent with patient's baseline with BP mostly in 100s/60s     Vitals:    09/21/22 1706   BP: 95/67   Pulse: 78   Resp:    Temp: 97 9 °F (36 6 °C)   SpO2: 96%     Plan:  - Will give another dose of benadryl 25 mg IV  - Continue vitals q2h for next 4 hours  - instructed close monitoring of respiratory status

## 2022-09-21 NOTE — QUICK NOTE
Pancytopenia suspected to be secondary to chemotherapy  Rest of clinical recommendations as per my initial progress note

## 2022-09-21 NOTE — RESTORATIVE TECHNICIAN NOTE
Restorative Technician Note      Patient Name: Steph Blunt     [employer] Tech Visit Date: 9/21/2022  Note Type: Mobility  Patient Position Upon Consult: Supine  Activity Performed: Ambulated  Assistive Device: Roller walker  Patient Position at End of Consult: Seated edge of bed;  All needs within [facility]

## 2022-09-21 NOTE — PROGRESS NOTES
Patient finished transfusion of platelets, developed hives sporadically over body  Yasmin José notified and came to bedside

## 2022-09-21 NOTE — CASE MANAGEMENT
Case Management Discharge Planning Note    Patient name Thor Hines  Location [facility] 933/[facility] 533-77 MRN [id number]  : 1945 Date 2022       Current Admission Date: 2022  Current Admission Diagnosis:Dysphagia   Patient Active Problem List    Diagnosis Date Noted    Chemotherapy induced neutropenia ([place] [address] ) 09/15/2022    Migration of esophageal stent 2022    Anemia 2022    Hypotension 2022    GERD (gastroesophageal reflux disease) 2022    Vitamin B12 deficiency 2022    Port-A-Cath in place 08/10/2022    Thrombocytopenia ([place] [address] ) 2022    Dysphagia 2022    Pancytopenia ([place] [address] ) 2022    Acute gastric ulcer 2022    Esophageal cancer ([place] [address] ) 2022    Abdominal aortic aneurysm, without rupture ([place] [address] ) 02/10/2022    Paroxysmal atrial fibrillation ([place] [address] ) 02/10/2022    Coronary artery disease of native artery of native heart with stable angina pectoris ([place] [address] ) 02/10/2022    Ischemic cardiomyopathy 2021    Chronic combined systolic and diastolic CHF (congestive heart failure) ([place] [address] ) 2021    Allergic reaction to contrast media 2021    NSTEMI (non-ST elevated myocardial infarction) ([place] [address] ) 2021    Abdominal pain 2020    Hernia, incisional 2019    Ventral hernia with obstruction and without gangrene 2019    CAD (coronary artery disease) 2017    HTN (hypertension) 2017    HLD (hyperlipidemia) 2017    Seizure disorder ([place] [address] ) 2017    S/P AAA (abdominal aortic aneurysm) repair 2017    STEMI (ST elevation myocardial infarction) ([place] [address] ) 2017      LOS (days): 1  Geometric Mean LOS (GMLOS) (days): 4 30  Days to GMLOS:3 4     OBJECTIVE:  Risk of Unplanned Readmission Score: 27 57         Current admission status: Inpatient   Preferred Pharmacy:   [facility]/pharmacy #[id number] [place] [address] [facility] [address] [address]  Phone: [phone] Fax: [facility], [address] [facility] [address] [facility] [address] [place] [address] [address] [address]  Phone: [phone] Fax: [phone]    Primary Care Provider: Lucas Cooper MD    Primary Insurance: MEDICARE  Secondary Insurance: [address] [place] [address],[address] DETAILS:       Additional Comments: Per provider note pt is not cleared for d/c pending "awaiting clearance for GI endoscopic stent removal "

## 2022-09-21 NOTE — SPEECH THERAPY NOTE
Speech Language/Pathology  Speech-Language Pathology Bedside Swallow Evaluation      Patient Name: Ana Glasgow    Today's Date: 9/21/2022     Problem List  Principal Problem:    Dysphagia  Active Problems:    CAD (coronary artery disease)    HLD (hyperlipidemia)    Seizure disorder ([facility] [address] [address] )    Ventral hernia with obstruction and without gangrene    Es[facility] cancer ([facility] )    Pancytopenia ([facility] )    Migration of esophageal stent      Past Medical History  Past Medical History:   Diagnosis Date    Cardiac disease     CHF (congestive heart failure) ([facility] )     Esophageal cancer [facility] )     Hernia of abdominal cavity     Myocardial infarction ([facility] )     last assessed 7/31/14, past, Pt had MI s/p AGUSTIN placed in 2001, refuses to take any other medications for his cardiac disease, only will take seizure med  Understands possible complications from this decision    Seizure [facility])        Past Surgical History  Past Surgical History:   Procedure Laterality Date    ABDOMINAL AORTIC ANEURYSM REPAIR      for dialation or occulsion    CATARACT EXTRACTION      IR PORT PLACEMENT  7/15/2022       Summary   Pt known to department from previous admit  Eats soft foods at home w/ thin liquids normally  No overt difficulty w/ oropharyngeal swallow notes a sticking or pain at the sternum when he has difficulty & when he was unable to take po at home  ? Npo at this time, will defer until after procedure tomorrow  Current Medical Status  Pt is a 67 y o  male who presented to [facility] with difficulty tolerating PO  Patient states that during his recent hospitalization, he was told that his esophageal stent had migrated into his stomach  At that time, he was able to tolerate p o  So he was discharged with outpatient follow-up  Over the past couple days he has had difficulty tolerating p o  Particularly with food has been able to tolerate small amounts of liquids    He has pain over the center of his chest when attempting to swallow  He was advised to go to the ED if he has issues with swallowing so now he is in the emergency department  Denies any symptoms when not attempting to swallow  Plan for stent retrieval and placement (unclear if symptoms are due to new position of stent in gastric lumen or inability of stent to provide physiological swallowing )      Current Precautions:  Fall  Aspiration    Allergies:  No known food allergies    Past medical history:  Please see H&P for details    Special Studies:  CT chest-1  Diffuse circumferential thickening of the mid-distal esophagus consistent with known malignancy  2   Previously placed esophageal stent again noted to have migrated into the gastric lumen  3   Additional findings as noted      Social/Education/Vocational Hx:  Pt lives with family

## 2022-09-22 ENCOUNTER — APPOINTMENT (OUTPATIENT)
Dept: RADIOLOGY | Facility: HOSPITAL | Age: 77
DRG: 919 | End: 2022-09-22
Payer: MEDICARE

## 2022-09-22 ENCOUNTER — ANESTHESIA (INPATIENT)
Dept: GASTROENTEROLOGY | Facility: HOSPITAL | Age: 77
DRG: 919 | End: 2022-09-22
Payer: MEDICARE

## 2022-09-22 ENCOUNTER — APPOINTMENT (OUTPATIENT)
Dept: RADIATION ONCOLOGY | Facility: HOSPITAL | Age: 77
End: 2022-09-22
Payer: MEDICARE

## 2022-09-22 ENCOUNTER — ANESTHESIA EVENT (INPATIENT)
Dept: GASTROENTEROLOGY | Facility: HOSPITAL | Age: 77
DRG: 919 | End: 2022-09-22
Payer: MEDICARE

## 2022-09-22 ENCOUNTER — APPOINTMENT (OUTPATIENT)
Dept: GASTROENTEROLOGY | Facility: HOSPITAL | Age: 77
DRG: 919 | End: 2022-09-22
Payer: MEDICARE

## 2022-09-22 LAB
ABO GROUP BLD BPU: NORMAL
ANION GAP SERPL CALCULATED.3IONS-SCNC: 7 MMOL/L (ref 4–13)
ANISOCYTOSIS BLD QL SMEAR: PRESENT
BASOPHILS # BLD MANUAL: 0 THOUSAND/UL (ref 0–0.1)
BASOPHILS NFR MAR MANUAL: 0 % (ref 0–1)
BPU ID: NORMAL
BUN SERPL-MCNC: 6 MG/DL (ref 5–25)
CALCIUM SERPL-MCNC: 7.3 MG/DL (ref 8.3–10.1)
CHLORIDE SERPL-SCNC: 109 MMOL/L (ref 96–108)
CO2 SERPL-SCNC: 23 MMOL/L (ref 21–32)
CREAT SERPL-MCNC: 0.55 MG/DL (ref 0.6–1.3)
DOHLE BOD BLD QL SMEAR: PRESENT
EOSINOPHIL # BLD MANUAL: 0.15 THOUSAND/UL (ref 0–0.4)
EOSINOPHIL NFR BLD MANUAL: 7 % (ref 0–6)
ERYTHROCYTE [DISTWIDTH] IN BLOOD BY AUTOMATED COUNT: 24.3 % (ref 11.6–15.1)
GFR SERPL CREATININE-BSD FRML MDRD: 100 ML/MIN/1.73SQ M
GLUCOSE SERPL-MCNC: 75 MG/DL (ref 65–140)
HCT VFR BLD AUTO: 23.7 % (ref 36.5–49.3)
HGB BLD-MCNC: 7.7 G/DL (ref 12–17)
HYPERCHROMIA BLD QL SMEAR: PRESENT
LYMPHOCYTES # BLD AUTO: 0.53 THOUSAND/UL (ref 0.6–4.47)
LYMPHOCYTES # BLD AUTO: 24 % (ref 14–44)
MACROCYTES BLD QL AUTO: PRESENT
MCH RBC QN AUTO: 32.1 PG (ref 26.8–34.3)
MCHC RBC AUTO-ENTMCNC: 32.5 G/DL (ref 31.4–37.4)
MCV RBC AUTO: 99 FL (ref 82–98)
METAMYELOCYTES NFR BLD MANUAL: 1 % (ref 0–1)
MONOCYTES # BLD AUTO: 0.09 THOUSAND/UL (ref 0–1.22)
MONOCYTES NFR BLD: 4 % (ref 4–12)
NEUTROPHILS # BLD MANUAL: 1.37 THOUSAND/UL (ref 1.85–7.62)
NEUTS BAND NFR BLD MANUAL: 20 % (ref 0–8)
NEUTS SEG NFR BLD AUTO: 42 % (ref 43–75)
NRBC BLD AUTO-RTO: 1 /100 WBC (ref 0–2)
OVALOCYTES BLD QL SMEAR: PRESENT
PLATELET # BLD AUTO: 77 THOUSANDS/UL (ref 149–390)
PLATELET BLD QL SMEAR: ABNORMAL
POIKILOCYTOSIS BLD QL SMEAR: PRESENT
POTASSIUM SERPL-SCNC: 3.4 MMOL/L (ref 3.5–5.3)
RBC # BLD AUTO: 2.4 MILLION/UL (ref 3.88–5.62)
RBC MORPH BLD: PRESENT
SCHISTOCYTES BLD QL SMEAR: PRESENT
SMUDGE CELLS BLD QL SMEAR: PRESENT
SODIUM SERPL-SCNC: 139 MMOL/L (ref 135–147)
UNIT DISPENSE STATUS: NORMAL
UNIT PRODUCT CODE: NORMAL
UNIT PRODUCT VOLUME: 300 ML
UNIT RH: NORMAL
VARIANT LYMPHS # BLD AUTO: 2 %
WBC # BLD AUTO: 2.21 THOUSAND/UL (ref 4.31–10.16)

## 2022-09-22 PROCEDURE — 99232 SBSQ HOSP IP/OBS MODERATE 35: CPT | Performed by: INTERNAL MEDICINE

## 2022-09-22 PROCEDURE — 85007 BL SMEAR W/DIFF WBC COUNT: CPT

## 2022-09-22 PROCEDURE — 0D758DZ DILATION OF ESOPHAGUS WITH INTRALUMINAL DEVICE, VIA NATURAL OR ARTIFICIAL OPENING ENDOSCOPIC: ICD-10-PCS | Performed by: INTERNAL MEDICINE

## 2022-09-22 PROCEDURE — 71045 X-RAY EXAM CHEST 1 VIEW: CPT

## 2022-09-22 PROCEDURE — 85027 COMPLETE CBC AUTOMATED: CPT

## 2022-09-22 PROCEDURE — C1874 STENT, COATED/COV W/DEL SYS: HCPCS

## 2022-09-22 PROCEDURE — 43240 EGD W/TRANSMURAL DRAIN CYST: CPT | Performed by: INTERNAL MEDICINE

## 2022-09-22 PROCEDURE — 80048 BASIC METABOLIC PNL TOTAL CA: CPT

## 2022-09-22 PROCEDURE — DWY2FZZ PLAQUE RADIATION OF CHEST: ICD-10-PCS | Performed by: INTERNAL MEDICINE

## 2022-09-22 PROCEDURE — C1769 GUIDE WIRE: HCPCS

## 2022-09-22 RX ORDER — ONDANSETRON 2 MG/ML
INJECTION INTRAMUSCULAR; INTRAVENOUS AS NEEDED
Status: DISCONTINUED | OUTPATIENT
Start: 2022-09-22 | End: 2022-09-22

## 2022-09-22 RX ORDER — DEXAMETHASONE SODIUM PHOSPHATE 10 MG/ML
INJECTION, SOLUTION INTRAMUSCULAR; INTRAVENOUS AS NEEDED
Status: DISCONTINUED | OUTPATIENT
Start: 2022-09-22 | End: 2022-09-22

## 2022-09-22 RX ORDER — FENTANYL CITRATE 50 UG/ML
INJECTION, SOLUTION INTRAMUSCULAR; INTRAVENOUS AS NEEDED
Status: DISCONTINUED | OUTPATIENT
Start: 2022-09-22 | End: 2022-09-22

## 2022-09-22 RX ORDER — PROPOFOL 10 MG/ML
INJECTION, EMULSION INTRAVENOUS AS NEEDED
Status: DISCONTINUED | OUTPATIENT
Start: 2022-09-22 | End: 2022-09-22

## 2022-09-22 RX ORDER — LIDOCAINE HYDROCHLORIDE 10 MG/ML
INJECTION, SOLUTION EPIDURAL; INFILTRATION; INTRACAUDAL; PERINEURAL AS NEEDED
Status: DISCONTINUED | OUTPATIENT
Start: 2022-09-22 | End: 2022-09-22

## 2022-09-22 RX ORDER — ROCURONIUM BROMIDE 10 MG/ML
INJECTION, SOLUTION INTRAVENOUS AS NEEDED
Status: DISCONTINUED | OUTPATIENT
Start: 2022-09-22 | End: 2022-09-22

## 2022-09-22 RX ORDER — PANTOPRAZOLE SODIUM 40 MG/1
40 TABLET, DELAYED RELEASE ORAL EVERY 12 HOURS SCHEDULED
Status: DISCONTINUED | OUTPATIENT
Start: 2022-09-22 | End: 2022-09-25 | Stop reason: HOSPADM

## 2022-09-22 RX ADMIN — ROCURONIUM BROMIDE 30 MG: 50 INJECTION INTRAVENOUS at 10:23

## 2022-09-22 RX ADMIN — FENTANYL CITRATE 25 MCG: 50 INJECTION INTRAMUSCULAR; INTRAVENOUS at 10:40

## 2022-09-22 RX ADMIN — FENTANYL CITRATE 25 MCG: 50 INJECTION INTRAMUSCULAR; INTRAVENOUS at 10:25

## 2022-09-22 RX ADMIN — FOLIC ACID 1 MG: 5 INJECTION, SOLUTION INTRAMUSCULAR; INTRAVENOUS; SUBCUTANEOUS at 12:49

## 2022-09-22 RX ADMIN — LIDOCAINE HYDROCHLORIDE 50 MG: 10 INJECTION, SOLUTION EPIDURAL; INFILTRATION; INTRACAUDAL; PERINEURAL at 10:23

## 2022-09-22 RX ADMIN — PHENOBARBITAL 64.8 MG: 64.8 TABLET ORAL at 09:39

## 2022-09-22 RX ADMIN — PROPOFOL 100 MG: 10 INJECTION, EMULSION INTRAVENOUS at 10:23

## 2022-09-22 RX ADMIN — PANTOPRAZOLE SODIUM 40 MG: 40 TABLET, DELAYED RELEASE ORAL at 21:06

## 2022-09-22 RX ADMIN — ONDANSETRON 4 MG: 2 INJECTION INTRAMUSCULAR; INTRAVENOUS at 10:23

## 2022-09-22 RX ADMIN — PHENOBARBITAL 64.8 MG: 64.8 TABLET ORAL at 21:06

## 2022-09-22 RX ADMIN — ASPIRIN 81 MG: 81 TABLET, COATED ORAL at 09:31

## 2022-09-22 RX ADMIN — SUGAMMADEX 200 MG: 100 INJECTION, SOLUTION INTRAVENOUS at 11:03

## 2022-09-22 RX ADMIN — ATORVASTATIN CALCIUM 80 MG: 80 TABLET, FILM COATED ORAL at 17:23

## 2022-09-22 RX ADMIN — DEXAMETHASONE SODIUM PHOSPHATE 4 MG: 10 INJECTION, SOLUTION INTRAMUSCULAR; INTRAVENOUS at 10:23

## 2022-09-22 RX ADMIN — SODIUM CHLORIDE: 0.9 INJECTION, SOLUTION INTRAVENOUS at 10:18

## 2022-09-22 RX ADMIN — PANTOPRAZOLE SODIUM 40 MG: 40 TABLET, DELAYED RELEASE ORAL at 09:39

## 2022-09-22 NOTE — ANESTHESIA POSTPROCEDURE EVALUATION
Post-Op Assessment Note    CV Status:  Stable  Pain Score: 0    Pain management: adequate     Mental Status:  Alert   PONV Controlled:  None   Airway Patency:  Patent  Airway: intubated   Two or more mitigation strategies used for obstructive sleep apnea   Post Op Vitals Reviewed: Yes      Staff: CRNA         No complications documented      BP   112/76   Temp   97 4   Pulse  112   Resp   14   SpO2   100

## 2022-09-22 NOTE — ANESTHESIA PREPROCEDURE EVALUATION
Procedure:  EGD    Relevant Problems   CARDIO   (+) Abdominal aortic aneurysm, without rupture (HCC)   (+) CAD (coronary artery disease)   (+) Coronary artery disease of native artery of native heart with stable angina pectoris (HCC)   (+) HLD (hyperlipidemia)   (+) HTN (hypertension)   (+) NSTEMI (non-ST elevated myocardial infarction) (HCC)   (+) Paroxysmal atrial fibrillation (HCC)   (+) STEMI (ST elevation myocardial infarction) (HCC)      GI/HEPATIC   (+) Acute gastric ulcer   (+) Dysphagia   (+) Esophageal cancer (HCC)   (+) GERD (gastroesophageal reflux disease)      HEMATOLOGY   (+) Anemia   (+) Pancytopenia (HCC)   (+) Thrombocytopenia (HCC)      NEURO/PSYCH   (+) Seizure disorder (HCC)      Other   (+) Ventral hernia with obstruction and without gangrene        Physical Exam    Airway    Mallampati score: I  TM Distance: >3 FB  Neck ROM: full     Dental   lower dentures and upper dentures,     Cardiovascular  Rhythm: regular, Rate: normal, Cardiovascular exam normal    Pulmonary  Pulmonary exam normal Breath sounds clear to auscultation,     Other Findings        Anesthesia Plan  ASA Score- 3     Anesthesia Type- general with ASA Monitors  Additional Monitors:   Airway Plan: ETT  Plan Factors-Exercise tolerance (METS): >4 METS  Chart reviewed  EKG reviewed  Imaging results reviewed  Existing labs reviewed  Patient summary reviewed  Patient is not a current smoker  Patient did not smoke on day of surgery  Obstructive sleep apnea risk education given perioperatively  Induction-     Postoperative Plan- Plan for postoperative opioid use  Informed Consent- Anesthetic plan and risks discussed with patient  I personally reviewed this patient with the CRNA  Discussed and agreed on the Anesthesia Plan with the CRNA  Shantel Heredia

## 2022-09-22 NOTE — MALNUTRITION/BMI
This medical record reflects one or more clinical indicators suggestive of malnutrition  Malnutrition Findings:      Adult Degree of Malnutrition: Other severe protein calorie malnutrition                     360 Statement: Severe malnutrition related to inadequate energy intake in the setting of chronic disease or illness( cancer) r/t prolonged inadequate oral intake as evidenced by <75% of estimated energy needs met > 1 mo, severe unintentional wt loss of 41lbs (18 5%) since 5/2/22 (4 mo), severe trapezieus muscle wasting, severe buccal fat wasting  Treat with pending diet advancement and nutrition cnslg, pt refusing nutrition supplements when offered    BMI Findings: Body mass index is 24 41 kg/m²  See Nutrition note dated 9/22/22  for additional details  Completed nutrition assessment is viewable in the nutrition documentation

## 2022-09-22 NOTE — PROGRESS NOTES
INTERNAL MEDICINE RESIDENCY PROGRESS NOTE     Name: Bard Espinosa   Age & Sex: 67 y o  male   MRN: [id number]  [facility]   Encounter: [id number]  Team: SOD Team A    PATIENT INFORMATION     Name: Bard Espinosa   Age & Sex: 67 y o  male   MRN: [id number]  Hospital Stay Days: 2    ASSESSMENT/PLAN     Principal Problem:    Dysphagia  Active Problems:    Migration of esophageal stent    Esophageal cancer ([facility] [address] )    Pancytopenia ([facility] [address] )    CAD (coronary artery disease)    HLD (hyperlipidemia)    Seizure disorder ([facility] [address] )    Ventral hernia with obstruction and without gangrene      Migration of esophageal stent  Assessment & Plan  S/p esophageal stent placed in June 2022 2/2 tumor mass affect caused by esophageal adenocarcinoma  During last hospital admission, CT chest showed esophageal stent migration into gastric lumen  Since discharge on Friday, 9/16, patient has been experiencing inability to tolerate PO intake  Repeat CT on current admission shows possible further stent migration  Of note, ventral hernia present and does not seem to be incarcerated  Unlikely cause of current symptoms  He is able to tolerate mild liquid intake  Patient is not experiencing pain at this time  Patient received platelets on 90/55/4963 with subsequent breakout of hives resolved with Benadryl  Was given 1 dose of Granix 9/20 with subsequent response with ANC 1570 the day after  Patient received platelets again on 75/00/5843 with pre transfusion Benadryl and Tylenol and was given additional Benadryl for hives following transfusion  Plan:  - will plan for EGD stent removal today if platelets are >18 and ANC remains>1000  - Plan to transfuse of platelets again and give another dose of Granix if values are not at goal      Esophageal cancer [facility])  Assessment & Plan  Patient with stage II B esophageal cancer diagnosed in May 2022  Currently undergoing carboplatin pack with taxol chemotherapy as well as radiotherapy   Follows outpatient with heme/onc  Pancytopenia [facility])  Assessment & Plan  Patient has a PMHx of anemia, leukopenia, and thrombocytopenia in setting of esophageal adenocarcinoma  Presented in the ED with Hgb 9 3, WBC 1 95, and platelets of 28  Slightly trending upwards from previous admission last week  Plan:  -Continue to monitor CBC      Ventral hernia with obstruction and without gangrene  Assessment & Plan  Previously diagnosed in 2015, seen by outpatient surgery, and patient not interested in surgery  Does not show signs of incarceration and unlikely to be cause of current symptoms  Patient denies abdominal pain, N/V/D  Seizure disorder [facility])  Assessment & Plan  Remote history of seizure disorder diagnosed 40 years ago  Patient currently takes phenobarbital 64 8 mg BID  Plan:   -Continue phenobarbital 64 8 mg BID     HLD (hyperlipidemia)  Assessment & Plan  -Continue home Atorvastatin 80 mg daily     CAD (coronary artery disease)  Assessment & Plan  History of CAD s/p stent x 4    Plan:  -Continue ASA 81 mg  -Follow-up outpatient    * Dysphagia  Assessment & Plan  Patient experiencing progressive dysphagia since May 2022 2/2 to esophageal adenocarcinoma  Stent placed in June 2022 with recent migration into gastric lumen not on CT chest  Currently unable to tolerate PO intake of food  Can tolerate small amounts of liquid  Plan:  -Continue home pantoprazole 40 mg   -See plan "migration of esophageal stent"        Disposition:  Plan for EGD stent removal    SUBJECTIVE     Patient seen and examined  No acute events overnight  He reports some hives following platelet transfusion yesterday however they resolved another Benadryl dose  He denies any symptoms today  He reports tolerating liquids yesterday continues to endorse dysphagia  Review of Systems   Constitutional: Negative for chills, fatigue and fever  Respiratory: Negative for shortness of breath      Cardiovascular: Negative for chest pain and leg swelling  Gastrointestinal: Negative for abdominal distention, abdominal pain, constipation, diarrhea and vomiting  Dysphagia       OBJECTIVE     Vitals:    22 1823 22 1825 22 1925 22 2150   BP: 97/51 97/51 101/57 109/58   BP Location:   Left arm    Pulse: 79 86 76 73   Resp:  18 18 18   Temp: 97 9 °F (36 6 °C) 97 9 °F (36 6 °C) 97 9 °F (36 6 °C) 97 5 °F (36 4 °C)   TempSrc:   Skin    SpO2: 94%  95% 98%   Weight:       Height:          Temperature:   Temp (24hrs), Av °F (36 7 °C), Min:97 5 °F (36 4 °C), Max:98 4 °F (36 9 °C)    Temperature: 97 5 °F (36 4 °C)  Intake & Output:  I/O        0701   0700  0701   0700  0701   0700    P  O   180     I V  (mL/kg) 2852 1 (35) 1100 (13 5)     Blood  470     IV Piggyback 100      Total Intake(mL/kg) 2952 1 (36 2) 1750 (21 4)     Net +2952 1 +1750            Unmeasured Urine Occurrence 2 x          Weights:   IBW (Ideal Body Weight): 77 6 kg    Body mass index is 24 41 kg/m²  Weight (last 2 days)     Date/Time Weight    22 2253 81 6 (180)        Physical Exam  Constitutional:       General: He is not in acute distress  Appearance: Normal appearance  He is not ill-appearing or toxic-appearing  HENT:      Head: Normocephalic and atraumatic  Cardiovascular:      Rate and Rhythm: Normal rate and regular rhythm  Heart sounds: No murmur heard  No gallop  Pulmonary:      Effort: Pulmonary effort is normal  No respiratory distress  Breath sounds: No wheezing or rales  Abdominal:      General: Abdomen is flat  There is no distension  Tenderness: There is no abdominal tenderness  There is no guarding  Hernia: A hernia is present  Musculoskeletal:      Right lower leg: No edema  Left lower leg: No edema  Neurological:      Mental Status: He is alert and oriented to person, place, and time     Psychiatric:         Mood and Affect: Mood normal          Behavior: Behavior normal        LABORATORY DATA     Labs: I have personally reviewed pertinent reports  Results from last 7 days   Lab Units 09/21/22  0624 09/20/22  0517 09/20/22  0206 09/19/22  1645 09/19/22  0909   WBC Thousand/uL 2 24* 1 75*  --  1 95* 2 14*   HEMOGLOBIN g/dL 8 6* 8 0*  --  9 3* 8 6*   HEMATOCRIT % 26 8* 24 4*  --  28 2* 26 3*   PLATELETS Thousands/uL 42* 41* 28* 29* 27*   MONO PCT % 4 4  --   --  5      Results from last 7 days   Lab Units 09/21/22  0624 09/20/22  0517 09/19/22  1645 09/19/22  0909   POTASSIUM mmol/L 3 6 3 5 3 7 3 5   CHLORIDE mmol/L 109* 107 105 107   CO2 mmol/L 25 26 27 27   BUN mg/dL 7 7 11 9   CREATININE mg/dL 0 59* 0 57* 0 75 0 67   CALCIUM mg/dL 7 3* 7 2* 8 0* 7 4*   ALK PHOS U/L  --   --  108 91   ALT U/L  --   --  16 16   AST U/L  --   --  16 19     Results from last 7 days   Lab Units 09/20/22  0517   MAGNESIUM mg/dL 2 1          Results from last 7 days   Lab Units 09/20/22  0517   INR  1 07               IMAGING & DIAGNOSTIC TESTING     Radiology Results: I have personally reviewed pertinent reports  CT chest without contrast    Result Date: 9/19/2022  Impression: 1  Diffuse circumferential thickening of the mid-distal esophagus consistent with known malignancy  2   Previously placed esophageal stent again noted to have migrated into the gastric lumen  3   Additional findings as noted  Workstation performed: [id number]     Other Diagnostic Testing: I have personally reviewed pertinent reports      ACTIVE MEDICATIONS     Current Facility-Administered Medications   Medication Dose Route Frequency    acetaminophen (TYLENOL) tablet 650 mg  650 mg Oral Q6H PRN    aspirin (ECOTRIN LOW STRENGTH) EC tablet 81 mg  81 mg Oral Daily    atorvastatin (LIPITOR) tablet 80 mg  80 mg Oral Daily With Dinner    folic acid 1 mg in sodium chloride 0 9 % 50 mL IVPB  1 mg Intravenous Daily    pantoprazole (PROTONIX) injection 40 mg  40 mg Intravenous Q12H ADENIKE    PHENobarbital tablet 64 8 mg  64 8 mg Oral BID       VTE Pharmacologic Prophylaxis: Reason for no pharmacologic prophylaxis Holding for thrombocytopenia  VTE Mechanical Prophylaxis: sequential compression device    Portions of the record may have been created with voice recognition software  Occasional wrong word or "sound a like" substitutions may have occurred due to the inherent limitations of voice recognition software    Read the chart carefully and recognize, using context, where substitutions have occurred   ==  Javon HunterMin[facility]  Internal Medicine Residency PGY-1

## 2022-09-22 NOTE — PROGRESS NOTES
[facility] - Progress Note  Lachelle Meals 67 y o  male MRN: [id number]  Unit/Bed#: [facility] 933-01 Encounter: [id number]      ASSESSMENT & PLAN:    66 y/o male with esophageal adenocarcinoma s/p chemoradiation w thrombocytopenia and neutropenia who presents for esophageal stent migration  Solid Food Dysphagia 2/2 Esophageal Adenocarcinoma s/p Esophageal Stent now with Stent Migration to Stomach  - Plan for EGD with endoscopic stent removal +/- replacement today  - NPO since midnight  - No pain, stable to proceed with procedure  - Platelets > 50 this morning, ANC > 1,000  ______________________________________________________________________    SUBJECTIVE:     Pt received platelets yesterday, at appropriate level today  No questions or concerns about procedure at this time  Scheduled Meds:  Current Facility-Administered Medications   Medication Dose Route Frequency Provider Last Rate    acetaminophen  650 mg Oral Q6H PRN Merlin Labrador, MD      aspirin  81 mg Oral Daily Merlin Labrador, MD      atorvastatin  80 mg Oral Daily With Iris Pendleton MD      folic acid IVPB  1 mg Intravenous Daily Het D Bills, DO 1 mg (09/21/22 3420)    pantoprazole  40 mg Oral Q12H Arthur Rao MD      PHENobarbital  64 8 mg Oral BID Merlin Labrador, MD       Continuous Infusions:   PRN Meds:   acetaminophen    OBJECTIVE:     Objective   Blood pressure 105/65, pulse 91, temperature 98 3 °F (36 8 °C), temperature source Tympanic, resp  rate 18, height 6' (1 829 m), weight 81 6 kg (180 lb), SpO2 99 %  Body mass index is 24 41 kg/m²      Intake/Output Summary (Last 24 hours) at 9/22/2022 1014  Last data filed at 9/21/2022 1923  Gross per 24 hour   Intake 1750 ml   Output --   Net 1750 ml       PHYSICAL EXAM:   General Appearance: Awake and alert, in no acute distress  Abdomen: Soft, non-tender, non-distended; bowel sounds normal; no masses or no organomegaly    Invasive Devices  Report    Central Venous Catheter Line  Duration           Port A Cath 07/15/22 Right Chest 68 days          Peripheral Intravenous Line  Duration           Peripheral IV 09/19/22 Distal;Right;Upper;Ventral (anterior) Arm 2 days                LAB RESULTS:      Lab Units 09/22/22  1078 09/21/22  0624 09/20/22  0517 09/19/22  1645 09/19/22  0909 04/06/21  0506 04/05/21  0637   SODIUM mmol/L 139 139 138 136 137   < > 140   POTASSIUM mmol/L 3 4* 3 6 3 5 3 7 3 5   < > 3 6   CHLORIDE mmol/L 109* 109* 107 105 107   < > 109*   CO2 mmol/L 23 25 26 27 27   < > 26   BUN mg/dL 6 7 7 11 9   < > 14   CREATININE mg/dL 0 55* 0 59* 0 57* 0 75 0 67   < > 0 93   GLUCOSE RANDOM mg/dL 75 82 90 107 103   < > 96   CALCIUM mg/dL 7 3* 7 3* 7 2* 8 0* 7 4*   < > 7 8*   MAGNESIUM mg/dL  --   --  2 1  --   --   --  2 2    < > = values in this interval not displayed  Lab Units 09/19/22  1645 09/19/22  0909 09/08/22  0841 09/06/22  0918 09/02/22  0856 05/02/22  1700 09/02/21  1003 04/02/21  1309 03/25/21  1453   TOTAL PROTEIN g/dL 6 8 6 0* 6 0* 5 9* 5 8*   < > 7 5   < > 7 6   ALBUMIN g/dL 2 5* 2 2* 2 3* 2 4* 2 4*   < > 2 8*   < > 3 6   TOTAL BILIRUBIN mg/dL 0 39 0 39 0 33 0 26 0 28   < > 0 31   < > 1 09*   BILIRUBIN DIRECT mg/dL  --   --   --   --   --   --  0 06  --  0 38*   AST U/L 16 19 14 13 14   < > 19   < > 67*   ALT U/L 16 16 12 12 11*   < > 35   < > 35   ALK PHOS U/L 108 91 95 99 104   < > 199*   < > 153*    < > = values in this interval not displayed             Lab Units 09/22/22  9256 09/21/22  0624 09/20/22  0517 09/20/22  0206 09/19/22  1645 09/19/22  0909   WBC Thousand/uL 2 21* 2 24* 1 75*  --  1 95* 2 14*   HEMOGLOBIN g/dL 7 7* 8 6* 8 0*  --  9 3* 8 6*   HEMATOCRIT % 23 7* 26 8* 24 4*  --  28 2* 26 3*   PLATELETS Thousands/uL 77* 42* 41* 28* 29* 27*   MCV fL 99* 98 98  --  97 96       Lab Results   Component Value Date    IRON 73 09/14/2022    TIBC 133 (L) 09/14/2022    FERRITIN 584 (H) 09/14/2022       Lab Results   Component Value Date    INR 1 07 09/20/2022    INR 1 34 (H) 05/04/2022    INR 1 37 (H) 05/04/2022    PROTIME 14 1 09/20/2022    PROTIME 16 0 (H) 05/04/2022    PROTIME 16 3 (H) 05/04/2022       RADIOLOGY RESULTS:   Procedure: CT chest without contrast    Result Date: 9/19/2022  Narrative: CT CHEST WITHOUT IV CONTRAST INDICATION:   Esophageal cancer, monitor esophageal stent migration  COMPARISON:  CT 9/12/2022 TECHNIQUE: CT examination of the chest was performed without intravenous contrast  Axial, sagittal, and coronal 2D reformatted images were created from the source data and submitted for interpretation  Radiation dose length product (DLP) for this visit:  571 43 mGy-cm   This examination, like all CT scans performed in the [facility], was performed utilizing techniques to minimize radiation dose exposure, including the use of iterative  reconstruction and automated exposure control  FINDINGS: LUNGS:  Diffuse centrilobular emphysema is present  Subpleural bleb anterior right middle lobe noted  Linear scar in the medial left upper lobe is present  Dependent atelectasis in the lung bases noted  Central airways are patent  PLEURA:  Unremarkable  HEART/GREAT VESSELS: Normal size heart with coronary artery calcification and/or stents present  No thoracic aortic aneurysm  MEDIASTINUM AND VINNIE:  Thickening of the mid and distal esophagus consistent with known malignancy  Once again identified is a migrated 23 mm x 12 cm covered esophageal stent within the gastric lumen  No adenopathy  CHEST WALL AND LOWER NECK:  Right chest wall port again noted with catheter entering the internal jugular vein and terminating in the central superior vena cava  VISUALIZED STRUCTURES IN THE UPPER ABDOMEN:  Large ventral epigastric hernia containing loops of large and small bowel with wide neck without evidence for strangulation or obstruction    Left renal cyst  OSSEOUS STRUCTURES: Degenerative change present in the spine with stable mild compression deformities at T7, T9, T11, and T12  Impression: 1  Diffuse circumferential thickening of the mid-distal esophagus consistent with known malignancy  2   Previously placed esophageal stent again noted to have migrated into the gastric lumen  3   Additional findings as noted  Workstation performed: [id number]     Narrative/Impressions - 3 day look back     ROSITA Guillen   PGY-4 Gastroenterology Fellow  [address] [place] [address] [facility]  Available on [email or web address]  [email or web address]

## 2022-09-23 ENCOUNTER — APPOINTMENT (OUTPATIENT)
Dept: RADIATION ONCOLOGY | Facility: HOSPITAL | Age: 77
End: 2022-09-23
Payer: MEDICARE

## 2022-09-23 PROBLEM — E43 SEVERE PROTEIN-CALORIE MALNUTRITION (HCC): Status: ACTIVE | Noted: 2022-09-23

## 2022-09-23 LAB
ANION GAP SERPL CALCULATED.3IONS-SCNC: 6 MMOL/L (ref 4–13)
BASOPHILS # BLD AUTO: 0.02 THOUSANDS/ΜL (ref 0–0.1)
BASOPHILS NFR BLD AUTO: 1 % (ref 0–1)
BUN SERPL-MCNC: 8 MG/DL (ref 5–25)
CALCIUM SERPL-MCNC: 7.8 MG/DL (ref 8.3–10.1)
CHLORIDE SERPL-SCNC: 106 MMOL/L (ref 96–108)
CO2 SERPL-SCNC: 26 MMOL/L (ref 21–32)
CREAT SERPL-MCNC: 0.61 MG/DL (ref 0.6–1.3)
EOSINOPHIL # BLD AUTO: 0.2 THOUSAND/ΜL (ref 0–0.61)
EOSINOPHIL NFR BLD AUTO: 7 % (ref 0–6)
ERYTHROCYTE [DISTWIDTH] IN BLOOD BY AUTOMATED COUNT: 25.1 % (ref 11.6–15.1)
GFR SERPL CREATININE-BSD FRML MDRD: 96 ML/MIN/1.73SQ M
GLUCOSE SERPL-MCNC: 87 MG/DL (ref 65–140)
HCT VFR BLD AUTO: 24.3 % (ref 36.5–49.3)
HGB BLD-MCNC: 7.8 G/DL (ref 12–17)
IMM GRANULOCYTES # BLD AUTO: 0.04 THOUSAND/UL (ref 0–0.2)
IMM GRANULOCYTES NFR BLD AUTO: 1 % (ref 0–2)
LYMPHOCYTES # BLD AUTO: 0.85 THOUSANDS/ΜL (ref 0.6–4.47)
LYMPHOCYTES NFR BLD AUTO: 28 % (ref 14–44)
MCH RBC QN AUTO: 31.3 PG (ref 26.8–34.3)
MCHC RBC AUTO-ENTMCNC: 32.1 G/DL (ref 31.4–37.4)
MCV RBC AUTO: 98 FL (ref 82–98)
MONOCYTES # BLD AUTO: 0.29 THOUSAND/ΜL (ref 0.17–1.22)
MONOCYTES NFR BLD AUTO: 10 % (ref 4–12)
NEUTROPHILS # BLD AUTO: 1.63 THOUSANDS/ΜL (ref 1.85–7.62)
NEUTS SEG NFR BLD AUTO: 53 % (ref 43–75)
NRBC BLD AUTO-RTO: 0 /100 WBCS
PLATELET # BLD AUTO: 86 THOUSANDS/UL (ref 149–390)
POTASSIUM SERPL-SCNC: 3.4 MMOL/L (ref 3.5–5.3)
RBC # BLD AUTO: 2.49 MILLION/UL (ref 3.88–5.62)
SODIUM SERPL-SCNC: 138 MMOL/L (ref 135–147)
TRANSFUSION STATUS PATIENT QL: NORMAL
WBC # BLD AUTO: 3.03 THOUSAND/UL (ref 4.31–10.16)

## 2022-09-23 PROCEDURE — 77386 HB NTSTY MODUL RAD TX DLVR CPLX: CPT | Performed by: RADIOLOGY

## 2022-09-23 PROCEDURE — 92610 EVALUATE SWALLOWING FUNCTION: CPT

## 2022-09-23 PROCEDURE — 99232 SBSQ HOSP IP/OBS MODERATE 35: CPT | Performed by: INTERNAL MEDICINE

## 2022-09-23 PROCEDURE — 85025 COMPLETE CBC W/AUTO DIFF WBC: CPT

## 2022-09-23 PROCEDURE — 80048 BASIC METABOLIC PNL TOTAL CA: CPT

## 2022-09-23 PROCEDURE — 77014 CHG CT GUIDANCE PLACEMENT RAD THERAPY FIELDS: CPT | Performed by: RADIOLOGY

## 2022-09-23 RX ORDER — FOLIC ACID 1 MG/1
1 TABLET ORAL DAILY
Status: DISCONTINUED | OUTPATIENT
Start: 2022-09-23 | End: 2022-09-25 | Stop reason: HOSPADM

## 2022-09-23 RX ORDER — ONDANSETRON 2 MG/ML
4 INJECTION INTRAMUSCULAR; INTRAVENOUS ONCE
Status: COMPLETED | OUTPATIENT
Start: 2022-09-23 | End: 2022-09-23

## 2022-09-23 RX ORDER — ONDANSETRON 2 MG/ML
4 INJECTION INTRAMUSCULAR; INTRAVENOUS EVERY 6 HOURS PRN
Status: DISCONTINUED | OUTPATIENT
Start: 2022-09-23 | End: 2022-09-25 | Stop reason: HOSPADM

## 2022-09-23 RX ORDER — POTASSIUM CHLORIDE 29.8 MG/ML
40 INJECTION INTRAVENOUS ONCE
Status: COMPLETED | OUTPATIENT
Start: 2022-09-23 | End: 2022-09-23

## 2022-09-23 RX ADMIN — POTASSIUM CHLORIDE 40 MEQ: 29.8 INJECTION, SOLUTION INTRAVENOUS at 10:22

## 2022-09-23 RX ADMIN — PANTOPRAZOLE SODIUM 40 MG: 40 TABLET, DELAYED RELEASE ORAL at 08:39

## 2022-09-23 RX ADMIN — ONDANSETRON HYDROCHLORIDE 4 MG: 2 INJECTION, SOLUTION INTRAMUSCULAR; INTRAVENOUS at 15:25

## 2022-09-23 RX ADMIN — PHENOBARBITAL 64.8 MG: 64.8 TABLET ORAL at 20:18

## 2022-09-23 RX ADMIN — ASPIRIN 81 MG: 81 TABLET, COATED ORAL at 08:39

## 2022-09-23 RX ADMIN — FOLIC ACID 1 MG: 1 TABLET ORAL at 10:22

## 2022-09-23 RX ADMIN — ATORVASTATIN CALCIUM 80 MG: 80 TABLET, FILM COATED ORAL at 15:26

## 2022-09-23 RX ADMIN — PANTOPRAZOLE SODIUM 40 MG: 40 TABLET, DELAYED RELEASE ORAL at 20:18

## 2022-09-23 RX ADMIN — PHENOBARBITAL 64.8 MG: 64.8 TABLET ORAL at 08:39

## 2022-09-23 RX ADMIN — ONDANSETRON 4 MG: 2 INJECTION INTRAMUSCULAR; INTRAVENOUS at 03:16

## 2022-09-23 NOTE — DISCHARGE SUMMARY
INTERNAL MEDICINE RESIDENCY DISCHARGE SUMMARY     Molly Albrecht   67 y o  male  MRN: [id number]  Room/Bed: [facility] 933/[facility] 268-37     [address] [place] [address]   Encounter: [id number]    Principal Problem:    Dysphagia  Active Problems:    Migration of esophageal stent    Esophageal cancer (HCC)    Pancytopenia (HCC)    CAD (coronary artery disease)    HLD (hyperlipidemia)    Seizure disorder ([facility] )    Ventral hernia with obstruction and without gangrene    Severe protein-calorie malnutrition ([facility] )      Migration of esophageal stent  Assessment & Plan  S/p esophageal stent placed in June 2022 2/2 tumor mass affect caused by esophageal adenocarcinoma  During last hospital admission, CT chest showed esophageal stent migration into gastric lumen  Since discharge on Friday, 9/16, patient has been experiencing inability to tolerate PO intake  Repeat CT on current admission shows possible further stent migration  Of note, ventral hernia present and does not seem to be incarcerated  Unlikely cause of current symptoms  He is able to tolerate mild liquid intake  Patient is not experiencing pain at this time  Patient received platelets on 64/71/3016 with subsequent breakout of hives resolved with Benadryl  Was given 1 dose of Granix 9/20 with subsequent response with ANC 1570 the day after  Patient received platelets again on 61/63/5680 with pre transfusion Benadryl and Tylenol and was given additional Benadryl for hives following transfusion  EGD with stent removal and placement of new sutured stent on 9/22    Plan:  - patient's diet was advanced to clears; will continue monitor for toleration of oral diet  - will plan for discharge once patient's appetite improves    Pancytopenia ([facility] )  Assessment & Plan  Patient has a PMHx of anemia, leukopenia, and thrombocytopenia in setting of esophageal adenocarcinoma  Presented in the ED with Hgb 9 3, WBC 1 95, and platelets of 28   Slightly trending upwards from previous admission last week  Plan:  -Continue to monitor CBC      Esophageal cancer [facility])  Assessment & Plan  Patient with stage II B esophageal cancer diagnosed in May 2022  Currently undergoing carboplatin pack with taxol chemotherapy as well as radiotherapy  Follows outpatient with heme/onc  Ventral hernia with obstruction and without gangrene  Assessment & Plan  Previously diagnosed in 2015, seen by outpatient surgery, and patient not interested in surgery  Does not show signs of incarceration and unlikely to be cause of current symptoms  Patient denies abdominal pain, N/V/D  Seizure disorder [facility])  Assessment & Plan  Remote history of seizure disorder diagnosed 40 years ago  Patient currently takes phenobarbital 64 8 mg BID  Plan:   -Continue phenobarbital 64 8 mg BID     HLD (hyperlipidemia)  Assessment & Plan  -Continue home Atorvastatin 80 mg daily     CAD (coronary artery disease)  Assessment & Plan  History of CAD s/p stent x 4    Plan:  -Continue ASA 81 mg  -Follow-up outpatient    * Dysphagia  Assessment & Plan  Patient experiencing progressive dysphagia since May 2022 2/2 to esophageal adenocarcinoma  Stent placed in June 2022 with recent migration into gastric lumen not on CT chest  Currently unable to tolerate PO intake of food  Can tolerate small amounts of liquid  Plan:  -Continue home pantoprazole 40 mg   -See plan "migration of esophageal stent"      [address] pt is a 67year old male with PMH esophageal adenocarcinoma on chemotherapy and radiation diagnosed in May 2022, esophageal stent placement in June 2022, CAD s/p stents x 4, and seizure disorder who presents with difficulty tolerating PO intake  During his previous hospitalization is known to have esophageal stent migration on CT scan and was discharged on 09/16/2022 without intervention secondary to pancytopenia    Since discharge he states his unable to tolerate any food and small amounts that he eats gets regurgitated up  He reported tolerating small amounts of liquids  His labs were significant for WBC 1 95, Hgb 9 3, Plt 28, normal troponin's, and a CT confirmed the stent migration into the stomach  Patient denies any nausea, vomiting, abdominal pain, constipation, diarrhea, fevers, chills, body aches, and was hemodynamically stable  Patient was given platelets and subsequently developed hives and transfusion was stopped and he was given Benadryl which resolved the hives  Patient was also found we have neutropenic  The patient was given Granix in hopes of increasing   ANC in preparation for the endoscopic intervention for stent removal   His ANC the following day ines to [phone] and was given a platelet transfusion with Benadryl and Tylenol given prior to transfusion  Patient did experience some hives secondary transfusion which resolved with additional Benadryl  Patient was tolerating p o  Liquids prior to procedure  On 9/22/22 patient underwent endoscopic intervention with removal of the stent stomach and placement of new esophageal stent that was sutured in  Postprocedure patient denies any pain fevers, chills, dysphagia, odynophagia  He did report an episode of feeling nauseous overnight any reported spitting up a lot of saliva that resolved with Zofran  His diet was advanced to a low-fiber low residue dysphagia pureed diet and denies any difficulty however patient continues report decreased appetite  Patient received radiation 9/23/22 as part of cancer treatment  Patient is stable for discharge  On 9/25 he was tolerating a regular diet without difficulty without regurgitation  Discharge he denied any pains, shortness of breath, fevers, chills, nausea, vomiting, constipation, diarrhea, and fatigue  Outpt: Follow up with GI, oncology, and surgery for esophageal cancer  Follow up with PCP for management and monitoring pancytopenia, hypokalemia and chronic problems  BMP and CBC in 1 week and follow with PCP  Pt will need serial neuro exams for monitoring progression of neuropathy 2/2 chemotherapy  Physical Exam  Constitutional:       General: He is not in acute distress  Appearance: Normal appearance  He is not ill-appearing or toxic-appearing  HENT:      Head: Normocephalic and atraumatic  Cardiovascular:      Rate and Rhythm: Normal rate and regular rhythm  Pulses: Normal pulses  Heart sounds: No murmur heard  No gallop  Pulmonary:      Effort: No respiratory distress  Breath sounds: No wheezing or rales  Abdominal:      General: Abdomen is flat  There is no distension  Tenderness: There is no abdominal tenderness  There is no guarding  Hernia: A hernia is present  Musculoskeletal:      Right lower leg: No edema  Left lower leg: No edema  Neurological:      Mental Status: He is alert and oriented to person, place, and time  Comments: Decreased sensation to light touch of distal toes     Psychiatric:         Mood and Affect: Mood normal          Behavior: Behavior normal            DISCHARGE INFORMATION     PCP at Discharge: She Hays MD    Admitting Provider: Nancy Cintron MD  Admission Date: 9/19/2022    Discharge Provider: Rhona Barrera MD  Discharge Date: 9/25/22    Discharge Disposition: Home/Self Care  Discharge Condition: fair  Discharge with Lines: no    Discharge Diet: cardiac diet and as tolerated and recommende per GI  Activity Restrictions: none  Test Results Pending at Discharge: CBC and BMP 1 week    Discharge Diagnoses:  Principal Problem:    Dysphagia  Active Problems:    Migration of esophageal stent    Esophageal cancer (HCC)    Pancytopenia (HCC)    CAD (coronary artery disease)    HLD (hyperlipidemia)    Seizure disorder ([address] )    Ventral hernia with obstruction and without gangrene    Severe protein-calorie malnutrition ([facility] )  Resolved Problems:    * No resolved hospital problems  *      Consulting Providers:      Diagnostic & Therapeutic Procedures Performed:  EGD Fluoro    Result Date: 9/22/2022  Impression: Esophageal stricture from known malignancy extending from 28 to 40 cm Previous stent seen in the stomach and removed using a rat tooth forceps The stenosis was stented with a 23 mm x 125 mm FCSEMS, the stent was affixed using three endoscopic sutures RECOMMENDATION: Follow up with PCP Can have a clear liquid diet today, tomorrow can have pureed diet and the day after start a stent diet (low residue diet)  ATTENDING ATTESTATION:  I was present throughout the entire procedure from insertion to complete withdrawal of the scope  I performed all interventions myself or oversaw the fellow  Alda Olmos MD     CT chest without contrast    Result Date: 9/19/2022  Impression: 1  Diffuse circumferential thickening of the mid-distal esophagus consistent with known malignancy  2   Previously placed esophageal stent again noted to have migrated into the gastric lumen  3   Additional findings as noted  Workstation performed: [id number]     XR chest 1 view    Result Date: 9/22/2022  Impression: Fluoroscopic guidance provided for procedure guidance  Please refer to the separate procedure notes for additional details  Workstation performed: [id number]       Code Status: Level 3 - DNAR and DNI  Advance Directive & Living Will: <no information>  Power of :    POLST:      Medications:  Current Discharge Medication List        Current Discharge Medication List        Current Discharge Medication List      CONTINUE these medications which have NOT CHANGED    Details   aspirin (Aspirin Low Dose) 81 mg EC tablet Take 1 tablet (81 mg total) by mouth daily  Qty: 90 tablet, Refills: 3    Associated Diagnoses: Chronic combined systolic and diastolic CHF (congestive heart failure) ([facility] ); NSTEMI (non-ST elevated myocardial infarction) ([facility] ); Essential hypertension;  Seizure disorder ([facility] ) atorvastatin (LIPITOR) 80 mg tablet Take 1 tablet (80 mg total) by mouth daily with dinner  Qty: 90 tablet, Refills: 4    Comments: DX Code Needed    Associated Diagnoses: Chronic combined systolic and diastolic CHF (congestive heart failure) ([facility] ); NSTEMI (non-ST elevated myocardial infarction) ([facility] ); Essential hypertension; Seizure disorder ([facility])      PHENobarbital 64 8 mg tablet TAKE 1 TABLET (64 8 MG TOTAL) BY MOUTH 2 (TWO) TIMES A DAY  Qty: 180 tablet, Refills: 1    Comments: This request is for a new prescription for a controlled substance as required by Federal/State law  Associated Diagnoses: Seizure disorder ([facility] )      folic acid ( Folic Acid) 1 mg tablet Take 1 tablet (1 mg total) by mouth daily  Qty: 90 tablet, Refills: 1    Associated Diagnoses: Folate deficiency      lidocaine-prilocaine (EMLA) cream Apply to port 30 to 60 min prior to use  Qty: 30 g, Refills: 2    Associated Diagnoses: Esophageal adenocarcinoma (HCC)      midodrine (PROAMATINE) 2 5 mg tablet Take 1 tablet (2 5 mg total) by mouth 3 (three) times a day before meals  Qty: 90 tablet, Refills: 0    Associated Diagnoses: Hypotension      ondansetron (Zofran ODT) 8 mg disintegrating tablet Take 1 tablet (8 mg total) by mouth every 8 (eight) hours as needed for nausea or vomiting  Qty: 30 tablet, Refills: 3    Associated Diagnoses: Esophageal adenocarcinoma (HCC)      pantoprazole (PROTONIX) 40 mg tablet TAKE 1 TABLET BY MOUTH 2 TIMES A DAY BEFORE MEALS    Qty: 180 tablet, Refills: 3    Associated Diagnoses: Esophagitis determined by endoscopy; Duodenitis      potassium chloride (MICRO-K) 10 MEQ CR capsule Take 1 capsule (10 mEq total) by mouth 2 (two) times a day for 7 days  Qty: 14 capsule, Refills: 0    Associated Diagnoses: Hypokalemia      prochlorperazine (COMPAZINE) 10 mg tablet Take 1 tablet (10 mg total) by mouth every 6 (six) hours as needed for nausea or vomiting  Qty: 30 tablet, Refills: 3    Associated Diagnoses: Esophageal adenocarcinoma ([facility] )             Allergies: Allergies   Allergen Reactions    Iodinated Diagnostic Agents Rash     Pt's skin get very red and he gets hot and chills    Clopidogrel Rash       FOLLOW-UP     PCP Outpatient Follow-up:  Warren Miranda MD    Consulting Providers Follow-up:  GI and Surgery and Oncology     Active Issues Requiring Follow-up:   Esophageal Cancer and pancytopenia    Discharge Statement:   I spent 45 minutes minutes discharging the patient  This time was spent on the day of discharge  I had direct contact with the patient on the day of discharge  Additional documentation is required if more than 30 minutes were spent on discharge  Portions of the record may have been created with voice recognition software  Occasional wrong word or "sound a like" substitutions may have occurred due to the inherent limitations of voice recognition software    Read the chart carefully and recognize, using context, where substitutions have occurred     ==  [address], [address]  Internal Medicine Resident PGY-1

## 2022-09-23 NOTE — PROGRESS NOTES
[facility] - Progress Note  Alyssa Ayers 67 y o  male MRN: [id number]  Unit/Bed#: [facility] 933-01 Encounter: [id number]      ASSESSMENT & PLAN:    71-year-old male with esophageal adenocarcinoma status post chemoradiation with thrombocytopenia and neutropenia presents for esophageal stent migration, now status stent removal and replacement  esophageal stent insertion  Solid food dysphagia secondary to esophageal adenocarcinoma  - doing well today  - advance diet to puree, and full as tolerated  - outpatient follow-up with Gastroenterology and Oncology    GI to sign off, please call us back with any further questions or concerns  ______________________________________________________________________    SUBJECTIVE:     Patient underwent stent removal from stomach yesterday with new stent placed in the esophagus without issue  Tolerated clears without any regurgitation yesterday  No chest pain no abdominal pain today, and is eager to advance his diet  Scheduled Meds:  Current Facility-Administered Medications   Medication Dose Route Frequency Provider Last Rate    acetaminophen  650 mg Oral Q6H PRN Carl Villalta MD      aspirin  81 mg Oral Daily Carl Villalta MD      atorvastatin  80 mg Oral Daily With Kim Lewis MD      folic acid  1 mg Oral Daily Hammad Helms MD      pantoprazole  40 mg Oral Q12H Susan Mayes MD      PHENobarbital  64 8 mg Oral BID Carl Villalta MD      potassium chloride  40 mEq Intravenous Once Het YOANNA Bills,        Continuous Infusions:   PRN Meds:   acetaminophen    OBJECTIVE:     Objective   Blood pressure 102/57, pulse 97, temperature 97 7 °F (36 5 °C), resp  rate 18, height 6' (1 829 m), weight 81 6 kg (180 lb), SpO2 (!) 89 %  Body mass index is 24 41 kg/m²      Intake/Output Summary (Last 24 hours) at 9/23/2022 1021  Last data filed at 9/22/2022 1053  Gross per 24 hour   Intake 500 ml   Output --   Net 500 ml       PHYSICAL EXAM:   General Appearance: Awake and alert, in no acute distress  Abdomen: Soft, non-tender, non-distended; bowel sounds normal; no masses or no organomegaly    Invasive Devices  Report    Central Venous Catheter Line  Duration           Port A Cath 07/15/22 Right Chest 69 days          Peripheral Intravenous Line  Duration           Peripheral IV 09/19/22 Distal;Right;Upper;Ventral (anterior) Arm 3 days                LAB RESULTS:      Lab Units 09/23/22  0851 09/22/22  0648 09/21/22  0624 09/20/22  0517 09/19/22  1645 04/06/21  0506 04/05/21  0637   SODIUM mmol/L 138 139 139 138 136   < > 140   POTASSIUM mmol/L 3 4* 3 4* 3 6 3 5 3 7   < > 3 6   CHLORIDE mmol/L 106 109* 109* 107 105   < > 109*   CO2 mmol/L 26 23 25 26 27   < > 26   BUN mg/dL 8 6 7 7 11   < > 14   CREATININE mg/dL 0 61 0 55* 0 59* 0 57* 0 75   < > 0 93   GLUCOSE RANDOM mg/dL 87 75 82 90 107   < > 96   CALCIUM mg/dL 7 8* 7 3* 7 3* 7 2* 8 0*   < > 7 8*   MAGNESIUM mg/dL  --   --   --  2 1  --   --  2 2    < > = values in this interval not displayed  Lab Units 09/19/22  1645 09/19/22  0909 09/08/22  0841 09/06/22  0918 09/02/22  0856 05/02/22  1700 09/02/21  1003 04/02/21  1309 03/25/21  1453   TOTAL PROTEIN g/dL 6 8 6 0* 6 0* 5 9* 5 8*   < > 7 5   < > 7 6   ALBUMIN g/dL 2 5* 2 2* 2 3* 2 4* 2 4*   < > 2 8*   < > 3 6   TOTAL BILIRUBIN mg/dL 0 39 0 39 0 33 0 26 0 28   < > 0 31   < > 1 09*   BILIRUBIN DIRECT mg/dL  --   --   --   --   --   --  0 06  --  0 38*   AST U/L 16 19 14 13 14   < > 19   < > 67*   ALT U/L 16 16 12 12 11*   < > 35   < > 35   ALK PHOS U/L 108 91 95 99 104   < > 199*   < > 153*    < > = values in this interval not displayed             Lab Units 09/23/22  0851 09/22/22  0648 09/21/22  0624 09/20/22  0517 09/20/22  0206 09/19/22  1645   WBC Thousand/uL 3 03* 2 21* 2 24* 1 75*  --  1 95*   HEMOGLOBIN g/dL 7 8* 7 7* 8 6* 8 0*  --  9 3*   HEMATOCRIT % 24 3* 23 7* 26 8* 24 4*  --  28 2*   PLATELETS Thousands/uL 86* 77* 42* 41* 28* 29*   MCV fL 98 99* 98 98  --  97       Lab Results   Component Value Date    IRON 73 09/14/2022    TIBC 133 (L) 09/14/2022    FERRITIN 584 (H) 09/14/2022       Lab Results   Component Value Date    INR 1 07 09/20/2022    INR 1 34 (H) 05/04/2022    INR 1 37 (H) 05/04/2022    PROTIME 14 1 09/20/2022    PROTIME 16 0 (H) 05/04/2022    PROTIME 16 3 (H) 05/04/2022       RADIOLOGY RESULTS:   Procedure: EGD Fluoro    Result Date: 9/22/2022  Narrative: [facility] [address] [place] [address] [phone] DATE OF SERVICE: 9/22/22 PHYSICIAN(S): Attending: Clair Sher MD Fellow: Mary Muñoz MD INDICATION: Dysphagia POST-OP DIAGNOSIS: See the impression below  PREPROCEDURE: Informed consent was obtained for the procedure, including sedation  Risks of perforation, hemorrhage, adverse drug reaction and aspiration were discussed  The patient was placed in the left lateral decubitus position  Patient was explained about the risks and benefits of the procedure  Risks including but not limited to bleeding, infection, and perforation were explained in detail  Also explained about less than 100% sensitivity with the exam and other alternatives  DETAILS OF PROCEDURE: Patient was taken to the procedure room where a time out was performed to confirm correct patient and correct procedure  The patient underwent monitored anesthesia care, which was administered by an anesthesia professional  The patient's blood pressure, heart rate, level of consciousness, respirations and oxygen were monitored throughout the procedure  The scope was advanced to the second part of the duodenum  Retroflexion was performed in the fundus  The patient experienced no blood loss  The procedure was not difficult  The patient tolerated the procedure well  There were no apparent complications   ANESTHESIA INFORMATION: ASA: III Anesthesia Type: General MEDICATIONS: sodium chloride 0 9 % infusion 500 mL*  *From user-documented volume (Totals for administrations occurring from 1017 to 1107 on 09/22/22) FINDINGS: The duodenal bulb and 2nd part of the duodenum appeared normal  The fundus of the stomach, body of the stomach, incisura, antrum and pylorus appeared normal  The previously placed esophageal stent was seen in the stomach and was removed using a rat tooth forceps Malignant-appearing mass (traversable) measuring 50 mm in the lower third of the esophagus (35 cm from the incisors), covering three quarters of the circumference; placed [facility] 23 mm x 13 cm fully covered stent using fluoroscopic guidance and guidewire  Stricture from esophageal mass measuring 5 cm in length from 35 cm proximally to 40 cm distally  Some resistance but traversable with gentle pressure  Placed fully covered stent over guidewire with proximal end of the stent at 30 cm  Position was confirmed endoscopically  The stent was secured in place using endoscopic suturing at three points using three sutures SPECIMENS: * No specimens in log *     Impression: Esophageal stricture from known malignancy extending from 35 to 40 cm Previous stent seen in the stomach and removed using a rat tooth forceps The stenosis was stented with a 23 mm x 125 mm FCSEMS, the stent was affixed using three endoscopic sutures RECOMMENDATION: Follow up with PCP Can have a clear liquid diet today, tomorrow can have pureed diet and the day after start a stent diet (low residue diet)  ATTENDING ATTESTATION:  I was present throughout the entire procedure from insertion to complete withdrawal of the scope  I performed all interventions myself or oversaw the fellow  Fannie Kent MD     Procedure: XR chest 1 view    Result Date: 9/22/2022  Narrative: C-ARM - INDICATION: : Malignant neoplasm of lower third of esophagus (   Procedure guidance  COMPARISON:  Chest CT 9/19/2022   TECHNIQUE: FLUOROSCOPY TIME:   81 7 SEC 11 FLUOROSCOPIC IMAGES FINDINGS: Fluoroscopic guidance provided for procedure guidance  Osseous and soft tissue detail limited by technique  Impression: Fluoroscopic guidance provided for procedure guidance  Please refer to the separate procedure notes for additional details  Workstation performed: [id number]     Narrative/Impressions - 3 day look back     ROSITA Denney   PGY-4 Gastroenterology Fellow  [facility]  Available on [email or web address]

## 2022-09-23 NOTE — DISCHARGE INSTRUCTIONS
Please follow up with PCP in 1 week and make an appointment  Please make a follow up appointment with gastroenterology  Please attend your regular oncology appointments

## 2022-09-23 NOTE — PLAN OF CARE
Problem: PAIN - ADULT  Goal: Verbalizes/displays adequate comfort level or baseline comfort level  Description: Interventions:  - Encourage patient to monitor pain and request assistance  - Assess pain using appropriate pain scale  - Administer analgesics based on type and severity of pain and evaluate response  - Implement non-pharmacological measures as appropriate and evaluate response  - Consider cultural and social influences on pain and pain management  - Notify physician/advanced practitioner if interventions unsuccessful or patient reports new pain  Outcome: Progressing     Problem: SAFETY ADULT  Goal: Patient will remain free of falls  Description: INTERVENTIONS:  - Educate patient/family on patient safety including physical limitations  - Instruct patient to call for assistance with activity   - Consult OT/PT to assist with strengthening/mobility   - Keep Call bell within reach  - Keep bed low and locked with side rails adjusted as appropriate  - Keep care items and personal belongings within reach  - Initiate and maintain comfort rounds  - Make Fall Risk Sign visible to staff  - Offer Toileting every  Hours, in advance of need  - Initiate/Maintain alarm  - Obtain necessary fall risk management equipment:   - Apply yellow socks and bracelet for high fall risk patients  - Consider moving patient to room near nurses station  Outcome: Progressing  Goal: Maintain or return to baseline ADL function  Description: INTERVENTIONS:  -  Assess patient's ability to carry out ADLs; assess patient's baseline for ADL function and identify physical deficits which impact ability to perform ADLs (bathing, care of mouth/teeth, toileting, grooming, dressing, etc )  - Assess/evaluate cause of self-care deficits   - Assess range of motion  - Assess patient's mobility; develop plan if impaired  - Assess patient's need for assistive devices and provide as appropriate  - Encourage maximum independence but intervene and supervise when necessary  - Involve family in performance of ADLs  - Assess for home care needs following discharge   - Consider OT consult to assist with ADL evaluation and planning for discharge  - Provide patient education as appropriate  Outcome: Progressing  Goal: Maintains/Returns to pre admission functional level  Description: INTERVENTIONS:  - Perform BMAT or MOVE assessment daily    - Set and communicate daily mobility goal to care team and patient/family/caregiver  - Collaborate with rehabilitation services on mobility goals if consulted  - Perform Range of Motion  times a day  - Reposition patient every  hours  - Dangle patient  times a day  - Stand patient  times a day  - Ambulate patient  times a day  - Out of bed to chair  times a day   - Out of bed for meal times a day  - Out of bed for toileting  - Record patient progress and toleration of activity level   Outcome: Progressing     Problem: DISCHARGE PLANNING  Goal: Discharge to home or other facility with appropriate resources  Description: INTERVENTIONS:  - Identify barriers to discharge w/patient and caregiver  - Arrange for needed discharge resources and transportation as appropriate  - Identify discharge learning needs (meds, wound care, etc )  - Arrange for interpretive services to assist at discharge as needed  - Refer to Case Management Department for coordinating discharge planning if the patient needs post-hospital services based on physician/advanced practitioner order or complex needs related to functional status, cognitive ability, or social support system  Outcome: Progressing     Problem: Knowledge Deficit  Goal: Patient/family/caregiver demonstrates understanding of disease process, treatment plan, medications, and discharge instructions  Description: Complete learning assessment and assess knowledge base    Interventions:  - Provide teaching at level of understanding  - Provide teaching via preferred learning methods  Outcome: Progressing Problem: Nutrition/Hydration-ADULT  Goal: Nutrient/Hydration intake appropriate for improving, restoring or maintaining nutritional needs  Description: Monitor and assess patient's nutrition/hydration status for malnutrition  Collaborate with interdisciplinary team and initiate plan and interventions as ordered  Monitor patient's weight and dietary intake as ordered or per policy  Utilize nutrition screening tool and intervene as necessary  Determine patient's food preferences and provide high-protein, high-caloric foods as appropriate       INTERVENTIONS:  - Monitor oral intake, urinary output, labs, and treatment plans  - Assess nutrition and hydration status and recommend course of action  - Evaluate amount of meals eaten  - Assist patient with eating if necessary   - Allow adequate time for meals  - Recommend/ encourage appropriate diets, oral nutritional supplements, and vitamin/mineral supplements  - Order, calculate, and assess calorie counts as needed  - Recommend, monitor, and adjust tube feedings and TPN/PPN based on assessed needs  - Assess need for intravenous fluids  - Provide specific nutrition/hydration education as appropriate  - Include patient/family/caregiver in decisions related to nutrition  Outcome: Progressing

## 2022-09-23 NOTE — PLAN OF CARE
Problem: PAIN - ADULT  Goal: Verbalizes/displays adequate comfort level or baseline comfort level  Description: Interventions:  - Encourage patient to monitor pain and request assistance  - Assess pain using appropriate pain scale  - Administer analgesics based on type and severity of pain and evaluate response  - Implement non-pharmacological measures as appropriate and evaluate response  - Consider cultural and social influences on pain and pain management  - Notify physician/advanced practitioner if interventions unsuccessful or patient reports new pain  Outcome: Progressing     Problem: INFECTION - ADULT  Goal: Absence or prevention of progression during hospitalization  Description: INTERVENTIONS:  - Assess and monitor for signs and symptoms of infection  - Monitor lab/diagnostic results  - Monitor all insertion sites, i e  indwelling lines, tubes, and drains  - Monitor endotracheal if appropriate and nasal secretions for changes in amount and color  - Beatrice appropriate cooling/warming therapies per order  - Administer medications as ordered  - Instruct and encourage patient and family to use good hand hygiene technique  - Identify and instruct in appropriate isolation precautions for identified infection/condition  Outcome: Progressing  Goal: Absence of fever/infection during neutropenic period  Description: INTERVENTIONS:  - Monitor WBC    Outcome: Progressing     Problem: SAFETY ADULT  Goal: Patient will remain free of falls  Description: INTERVENTIONS:  - Educate patient/family on patient safety including physical limitations  - Instruct patient to call for assistance with activity   - Consult OT/PT to assist with strengthening/mobility   - Keep Call bell within reach  - Keep bed low and locked with side rails adjusted as appropriate  - Keep care items and personal belongings within reach  - Initiate and maintain comfort rounds  - Make Fall Risk Sign visible to staff  - Offer Toileting every  Hours, in advance of need  - Initiate/Maintain alarm  - Obtain necessary fall risk management equipment:   - Apply yellow socks and bracelet for high fall risk patients  - Consider moving patient to room near nurses station  Outcome: Progressing  Goal: Maintain or return to baseline ADL function  Description: INTERVENTIONS:  -  Assess patient's ability to carry out ADLs; assess patient's baseline for ADL function and identify physical deficits which impact ability to perform ADLs (bathing, care of mouth/teeth, toileting, grooming, dressing, etc )  - Assess/evaluate cause of self-care deficits   - Assess range of motion  - Assess patient's mobility; develop plan if impaired  - Assess patient's need for assistive devices and provide as appropriate  - Encourage maximum independence but intervene and supervise when necessary  - Involve family in performance of ADLs  - Assess for home care needs following discharge   - Consider OT consult to assist with ADL evaluation and planning for discharge  - Provide patient education as appropriate  Outcome: Progressing  Goal: Maintains/Returns to pre admission functional level  Description: INTERVENTIONS:  - Perform BMAT or MOVE assessment daily    - Set and communicate daily mobility goal to care team and patient/family/caregiver  - Collaborate with rehabilitation services on mobility goals if consulted  - Perform Range of Motion  times a day  - Reposition patient every  hours    - Dangle patient  times a day  - Stand patient  times a day  - Ambulate patient  times a day  - Out of bed to chair  times a day   - Out of bed for meals  times a day  - Out of bed for toileting  - Record patient progress and toleration of activity level   Outcome: Progressing     Problem: DISCHARGE PLANNING  Goal: Discharge to home or other facility with appropriate resources  Description: INTERVENTIONS:  - Identify barriers to discharge w/patient and caregiver  - Arrange for needed discharge resources and transportation as appropriate  - Identify discharge learning needs (meds, wound care, etc )  - Arrange for interpretive services to assist at discharge as needed  - Refer to Case Management Department for coordinating discharge planning if the patient needs post-hospital services based on physician/advanced practitioner order or complex needs related to functional status, cognitive ability, or social support system  Outcome: Progressing     Problem: Knowledge Deficit  Goal: Patient/family/caregiver demonstrates understanding of disease process, treatment plan, medications, and discharge instructions  Description: Complete learning assessment and assess knowledge base  Interventions:  - Provide teaching at level of understanding  - Provide teaching via preferred learning methods  Outcome: Progressing     Problem: Potential for Falls  Goal: Patient will remain free of falls  Description: INTERVENTIONS:  - Educate patient/family on patient safety including physical limitations  - Instruct patient to call for assistance with activity   - Consult OT/PT to assist with strengthening/mobility   - Keep Call bell within reach  - Keep bed low and locked with side rails adjusted as appropriate  - Keep care items and personal belongings within reach  - Initiate and maintain comfort rounds  - Make Fall Risk Sign visible to staff  - Offer Toileting every Hours, in advance of need  - Initiate/Maintain alarm  - Obtain necessary fall risk management equipment:  - Apply yellow socks and bracelet for high fall risk patients  - Consider moving patient to room near nurses station  Outcome: Progressing     Problem: Nutrition/Hydration-ADULT  Goal: Nutrient/Hydration intake appropriate for improving, restoring or maintaining nutritional needs  Description: Monitor and assess patient's nutrition/hydration status for malnutrition  Collaborate with interdisciplinary team and initiate plan and interventions as ordered    Monitor patient's weight and dietary intake as ordered or per policy  Utilize nutrition screening tool and intervene as necessary  Determine patient's food preferences and provide high-protein, high-caloric foods as appropriate       INTERVENTIONS:  - Monitor oral intake, urinary output, labs, and treatment plans  - Assess nutrition and hydration status and recommend course of action  - Evaluate amount of meals eaten  - Assist patient with eating if necessary   - Allow adequate time for meals  - Recommend/ encourage appropriate diets, oral nutritional supplements, and vitamin/mineral supplements  - Order, calculate, and assess calorie counts as needed  - Recommend, monitor, and adjust tube feedings and TPN/PPN based on assessed needs  - Assess need for intravenous fluids  - Provide specific nutrition/hydration education as appropriate  - Include patient/family/caregiver in decisions related to nutrition  Outcome: Progressing

## 2022-09-23 NOTE — RESTORATIVE TECHNICIAN NOTE
Restorative Technician Note      Patient Name: Stockville Carry     Restorative Tech Visit Date: 9/23/2022  Note Type: Mobility  Patient Position Upon Consult: Supine  Activity Performed: Ambulated  Assistive Device: Roller walker  Patient Position at End of Consult: Supine;  All needs within [facility]

## 2022-09-23 NOTE — CASE MANAGEMENT
Case Management Discharge Planning Note    Patient name Nicola Franklin  Location [facility] 933/[facility] 426-29 MRN [id number]  : 1945 Date 2022       Current Admission Date: 2022  Current Admission Diagnosis:Dysphagia   Patient Active Problem List    Diagnosis Date Noted    Severe protein-calorie malnutrition ([facility] ) 2022    Chemotherapy induced neutropenia ([place] [address] ) 09/15/2022    Migration of esophageal stent 2022    Anemia 2022    Hypotension 2022    GERD (gastroesophageal reflux disease) 2022    Vitamin B12 deficiency 2022    Port-A-Cath in place 08/10/2022    Thrombocytopenia ([facility] ) 2022    Dysphagia 2022    Pancytopenia ([facility] ) 2022    Acute gastric ulcer 2022    Esophageal cancer ([place] [address] ) 2022    Abdominal aortic aneurysm, without rupture ([place] [address] ) 02/10/2022    Paroxysmal atrial fibrillation ([facility] ) 02/10/2022    Coronary artery disease of native artery of native heart with stable angina pectoris ([facility] ) 02/10/2022    Ischemic cardiomyopathy 2021    Chronic combined systolic and diastolic CHF (congestive heart failure) ([facility] ) 2021    Allergic reaction to contrast media 2021    NSTEMI (non-ST elevated myocardial infarction) ([place] [address] ) 2021    Abdominal pain 2020    Hernia, incisional 2019    Ventral hernia with obstruction and without gangrene 2019    CAD (coronary artery disease) 2017    HTN (hypertension) 2017    HLD (hyperlipidemia) 2017    Seizure disorder ([facility] ) 2017    S/P AAA (abdominal aortic aneurysm) repair 2017    STEMI (ST elevation myocardial infarction) ([facility] ) 2017      LOS (days): 3  Geometric Mean LOS (GMLOS) (days): 4 30  Days to GMLOS:1 5     OBJECTIVE:  Risk of Unplanned Readmission Score: 27 77         Current admission status: Inpatient   Preferred Pharmacy:   [id number] [place], [place] - [address]  [address] [place] [address]  Phone: [phone] Fax: Jimbo Guillermo  [address] [facility] [place] [facility] [address] [facility] [address] [address]  Phone: [phone] Fax: [phone]    Primary Care Provider: Noé Lopez MD    Primary Insurance: MEDICARE  Secondary Insurance: [address] [place] [address],[facility] Floor DETAILS:       Additional Comments: CM was made aware that pt is a possible d/c today pending he tolerates PO intake

## 2022-09-23 NOTE — SPEECH THERAPY NOTE
Speech Language/Pathology  Speech-Language Pathology Bedside Swallow Evaluation      Patient Name: Sage South    Today's Date: 9/23/2022     Problem List  Principal Problem:    Dysphagia  Active Problems:    CAD (coronary artery disease)    HLD (hyperlipidemia)    Seizure disorder ([facility] )    Ventral hernia with obstruction and without gangrene    Esophageal cancer ([facility] )    Pancytopenia ([facility] )    Migration of esophageal stent    Severe protein-calorie malnutrition ([facility] )      Past Medical History  Past Medical History:   Diagnosis Date    Cardiac disease     CHF (congestive heart failure) ([facility] )     Esophageal cancer ([facility] )     Hernia of abdominal cavity     Myocardial infarction ([facility] )     last assessed 7/31/14, past, Pt had MI s/p AGUSTIN placed in 2001, refuses to take any other medications for his cardiac disease, only will take seizure med  Understands possible complications from this decision    Seizure [facility])        Past Surgical History  Past Surgical History:   Procedure Laterality Date    ABDOMINAL AORTIC ANEURYSM REPAIR      for dialation or occulsion    CATARACT EXTRACTION      IR PORT PLACEMENT  7/15/2022       Summary   Pt presented with functional appearing oral and pharyngeal stage swallowing skills with materials administered today  H/o significant esophageal dysphagia as noted in chart  No gross s/s aspiration w/ materials today  Recommended Diet: regular diet and thin liquids   Recommended Form of Meds: as desired   Aspiration precautions and swallowing strategies: upright posture, only feed when fully alert and alternating bites and sips  Other Recommendations: Continue frequent oral care, eval only         Current Medical Status  Pt is a 67 y o  male who presented to [facility] with difficulty tolerating PO  Patient states that during his recent hospitalization, he was told that his esophageal stent had migrated into his stomach   At that time, he was able to tolerate p o   So he was discharged with outpatient follow-up  Talat Middleton the past couple days he has had difficulty tolerating p o  Particularly with food has been able to tolerate small amounts of liquids   He has pain over the center of his chest when attempting to swallow  [facility] was advised to go to the ED if he has issues with swallowing so now he is in the emergency department  Joby Le any symptoms when not attempting to swallow      Plan for stent retrieval and placement (unclear if symptoms are due to new position of stent in gastric lumen or inability of stent to provide physiological swallowing )  EGD with stent removal and placement of new sutured stent on 9/22      Current Precautions:  Fall  Aspiration    Allergies:  No known food allergies    Past medical history:  Please see H&P for details    Special Studies:  CT chest-1  Diffuse circumferential thickening of the mid-distal esophagus consistent with known malignancy  2   Previously placed esophageal stent again noted to have migrated into the gastric lumen  3   Additional findings as noted      Social/Education/Vocational Hx:  Pt lives with family    Swallow Information   Current Risks for Dysphagia & Aspiration: known history of dysphagia  Current Symptoms/Concerns: difficulty  Current Diet: puree, thin   Baseline Diet: regular diet and thin liquids      Baseline Assessment   Behavior/Cognition: alert  Speech/Language Status: able to participate in conversation  Patient Positioning: upright in chair  Pain Status/Interventions/Response to Interventions:   No report of or nonverbal indications of pain         Swallow Mechanism Exam  Facial: symmetrical  Labial: WFL  Lingual: WFL  Velum: symmetrical  Mandible: adequate ROM  Dentition: edentulous  Vocal quality:clear/adequate   Volitional Cough: strong/productive   Respiratory Status: on RA       Consistencies Assessed and Performance   Consistencies Administered: thin liquids and rice krispies w/ milk soaked      Oral Stage: WFL  Mastication was adequate with the materials administered today  Bolus formation and transfer were functional with no significant oral residue noted  No overt s/s reduced oral control  Pharyngeal Stage: WFL  Swallow Mechanics:  Swallowing initiation appeared prompt  Laryngeal rise was palpated and judged to be within functional limits  No coughing, throat clearing, change in vocal quality or respiratory status noted today       Esophageal Concerns: see history for details    Strategies and Efficacy: -    Summary and Recommendations (see above)    Results Reviewed with: patient and RN     Treatment Recommended: no

## 2022-09-23 NOTE — QUICK NOTE
Radiation Oncology Treatment Note    A treatment dose of 180 cGy was administered today to the involved tumor site(s) esophagus    Present total dose at this time is 3240 cGy    Approximately 10 more treatments before completion of treatments or critical review

## 2022-09-23 NOTE — RESTORATIVE TECHNICIAN NOTE
Restorative Technician Note      Patient Name: Jeet Fenton     Restorative Tech Visit Date: 9/23/2022  Note Type: Mobility  Patient Position Upon Consult: Supine  Activity Performed: Ambulated  Assistive Device: Roller walker  Patient Position at End of Consult: Supine;  All needs within [facility]

## 2022-09-23 NOTE — PROGRESS NOTES
INTERNAL MEDICINE RESIDENCY PROGRESS NOTE     Name: Alyssa Ayers   Age & Sex: 67 y o  male   MRN: [id number]  Unit/Bed#: [facility] 933-01   Encounter: [id number]  Team: SOD Team A    PATIENT INFORMATION     Name: Alyssa Ayers   Age & Sex: 67 y o  male   MRN: [id number]  Hospital Stay Days: 3    ASSESSMENT/PLAN     Principal Problem:    Dysphagia  Active Problems:    Migration of esophageal stent    Esophageal cancer (HCC)    Pancytopenia (HCC)    CAD (coronary artery disease)    HLD (hyperlipidemia)    Seizure disorder ([facility] [address] )    Ventral hernia with obstruction and without gangrene    Severe protein-calorie malnutrition ([facility] [address] )      Migration of esophageal stent  Assessment & Plan  S/p esophageal stent placed in June 2022 2/2 tumor mass affect caused by esophageal adenocarcinoma  During last hospital admission, CT chest showed esophageal stent migration into gastric lumen  Since discharge on Friday, 9/16, patient has been experiencing inability to tolerate PO intake  Repeat CT on current admission shows possible further stent migration  Of note, ventral hernia present and does not seem to be incarcerated  Unlikely cause of current symptoms  He is able to tolerate mild liquid intake  Patient is not experiencing pain at this time  Patient received platelets on 34/90/1236 with subsequent breakout of hives resolved with Benadryl  Was given 1 dose of Granix 9/20 with subsequent response with ANC 1570 the day after  Patient received platelets again on 16/18/6970 with pre transfusion Benadryl and Tylenol and was given additional Benadryl for hives following transfusion  EGD with stent removal and placement of new sutured stent on 9/22    Plan:  - patient's diet was advanced to clears; will continue monitor for toleration of oral diet  - will plan for discharge tomorrow once patient's appetite improves    Esophageal cancer [facility])  Assessment & Plan  Patient with stage II B esophageal cancer diagnosed in May 2022   Currently undergoing carboplatin pack with taxol chemotherapy as well as radiotherapy  Follows outpatient with heme/onc  Pancytopenia [facility])  Assessment & Plan  Patient has a PMHx of anemia, leukopenia, and thrombocytopenia in setting of esophageal adenocarcinoma  Presented in the ED with Hgb 9 3, WBC 1 95, and platelets of 28  Slightly trending upwards from previous admission last week  Plan:  -Continue to monitor CBC      Ventral hernia with obstruction and without gangrene  Assessment & Plan  Previously diagnosed in 2015, seen by outpatient surgery, and patient not interested in surgery  Does not show signs of incarceration and unlikely to be cause of current symptoms  Patient denies abdominal pain, N/V/D  Seizure disorder [facility])  Assessment & Plan  Remote history of seizure disorder diagnosed 40 years ago  Patient currently takes phenobarbital 64 8 mg BID  Plan:   -Continue phenobarbital 64 8 mg BID     HLD (hyperlipidemia)  Assessment & Plan  -Continue home Atorvastatin 80 mg daily     CAD (coronary artery disease)  Assessment & Plan  History of CAD s/p stent x 4    Plan:  -Continue ASA 81 mg  -Follow-up outpatient    * Dysphagia  Assessment & Plan  Patient experiencing progressive dysphagia since May 2022 2/2 to esophageal adenocarcinoma  Stent placed in June 2022 with recent migration into gastric lumen not on CT chest  Currently unable to tolerate PO intake of food  Can tolerate small amounts of liquid  Plan:  -Continue home pantoprazole 40 mg   -See plan "migration of esophageal stent"      Disposition: Monitoring for PO diet toleration    SUBJECTIVE     Patient seen and examined  No acute events overnight  He reports doing well with no complaints  He states he has no appetite and had only coffee yesterday  Patient reports reduced appetite and only eating some cereal and coffee today  Patient reports he will try to eat some mashed potatoes today for dinner      Review of Systems   Constitutional: Negative for chills and fever  Respiratory: Negative for cough and shortness of breath  Cardiovascular: Negative for chest pain  Gastrointestinal: Negative for abdominal pain, constipation, diarrhea and vomiting  Genitourinary: Negative for difficulty urinating  OBJECTIVE     Vitals:    22 1142 22 1203 22 1547 22 2114   BP: 98/58 126/66 103/57 102/57   Pulse:   92 87   Resp:   18    Temp:   97 9 °F (36 6 °C) 98 2 °F (36 8 °C)   TempSrc:       SpO2:   97% 96%   Weight:       Height:          Temperature:   Temp (24hrs), Av °F (36 7 °C), Min:97 6 °F (36 4 °C), Max:98 3 °F (36 8 °C)    Temperature: 98 2 °F (36 8 °C)  Intake & Output:  I/O        0701   0700  0701   0700  0701   0700    P  O  180      I V  (mL/kg) 1100 (13 5) 500 (6 1)     Blood 470      IV Piggyback       Total Intake(mL/kg) 1750 (21 4) 500 (6 1)     Net +1750 +500                Weights:   IBW (Ideal Body Weight): 77 6 kg    Body mass index is 24 41 kg/m²  Weight (last 2 days)     None        Physical Exam  Constitutional:       General: He is not in acute distress  Appearance: Normal appearance  He is not ill-appearing or toxic-appearing  HENT:      Head: Normocephalic and atraumatic  Cardiovascular:      Rate and Rhythm: Normal rate and regular rhythm  Heart sounds: No murmur heard  No gallop  Pulmonary:      Effort: No respiratory distress  Breath sounds: No wheezing or rales  Abdominal:      General: Abdomen is flat  There is no distension  Tenderness: There is no abdominal tenderness  There is no guarding  Hernia: A hernia is present  Musculoskeletal:      Right lower leg: No edema  Left lower leg: No edema  Neurological:      Mental Status: He is alert and oriented to person, place, and time  Psychiatric:         Mood and Affect: Mood normal          Behavior: Behavior normal        LABORATORY DATA     Labs:  I have personally reviewed pertinent reports  Results from last 7 days   Lab Units 09/22/22  0648 09/21/22  0624 09/20/22  0517   WBC Thousand/uL 2 21* 2 24* 1 75*   HEMOGLOBIN g/dL 7 7* 8 6* 8 0*   HEMATOCRIT % 23 7* 26 8* 24 4*   PLATELETS Thousands/uL 77* 42* 41*   MONO PCT % 4 4 4      Results from last 7 days   Lab Units 09/22/22  0648 09/21/22  0624 09/20/22  0517 09/19/22  1645 09/19/22  0909   POTASSIUM mmol/L 3 4* 3 6 3 5 3 7 3 5   CHLORIDE mmol/L 109* 109* 107 105 107   CO2 mmol/L 23 25 26 27 27   BUN mg/dL 6 7 7 11 9   CREATININE mg/dL 0 55* 0 59* 0 57* 0 75 0 67   CALCIUM mg/dL 7 3* 7 3* 7 2* 8 0* 7 4*   ALK PHOS U/L  --   --   --  108 91   ALT U/L  --   --   --  16 16   AST U/L  --   --   --  16 19     Results from last 7 days   Lab Units 09/20/22  0517   MAGNESIUM mg/dL 2 1          Results from last 7 days   Lab Units 09/20/22  0517   INR  1 07               IMAGING & DIAGNOSTIC TESTING     Radiology Results: I have personally reviewed pertinent reports  EGD Fluoro    Result Date: 9/22/2022  Impression: Esophageal stricture from known malignancy extending from 28 to 40 cm Previous stent seen in the stomach and removed using a rat tooth forceps The stenosis was stented with a 23 mm x 125 mm FCSEMS, the stent was affixed using three endoscopic sutures RECOMMENDATION: Follow up with PCP Can have a clear liquid diet today, tomorrow can have pureed diet and the day after start a stent diet (low residue diet)  ATTENDING ATTESTATION:  I was present throughout the entire procedure from insertion to complete withdrawal of the scope  I performed all interventions myself or oversaw the fellow  Josep James MD     CT chest without contrast    Result Date: 9/19/2022  Impression: 1  Diffuse circumferential thickening of the mid-distal esophagus consistent with known malignancy  2   Previously placed esophageal stent again noted to have migrated into the gastric lumen  3   Additional findings as noted   Workstation performed: [id number]     XR chest 1 view    Result Date: 9/22/2022  Impression: Fluoroscopic guidance provided for procedure guidance  Please refer to the separate procedure notes for additional details  Workstation performed: [id number]     Other Diagnostic Testing: I have personally reviewed pertinent reports  ACTIVE MEDICATIONS     Current Facility-Administered Medications   Medication Dose Route Frequency    acetaminophen (TYLENOL) tablet 650 mg  650 mg Oral Q6H PRN    aspirin (ECOTRIN LOW STRENGTH) EC tablet 81 mg  81 mg Oral Daily    atorvastatin (LIPITOR) tablet 80 mg  80 mg Oral Daily With Dinner    folic acid 1 mg in sodium chloride 0 9 % 50 mL IVPB  1 mg Intravenous Daily    pantoprazole (PROTONIX) EC tablet 40 mg  40 mg Oral Q12H ADENIKE    PHENobarbital tablet 64 8 mg  64 8 mg Oral BID    potassium chloride 40 mEq IVPB (premix)  40 mEq Intravenous Once       VTE Pharmacologic Prophylaxis: Reason for no pharmacologic prophylaxis held for pancytopenia  VTE Mechanical Prophylaxis: sequential compression device    Portions of the record may have been created with voice recognition software  Occasional wrong word or "sound a like" substitutions may have occurred due to the inherent limitations of voice recognition software    Read the chart carefully and recognize, using context, where substitutions have occurred   ==  Ny Persaud, [address] [facility]  Internal Medicine Residency PGY-1

## 2022-09-24 LAB
ANION GAP SERPL CALCULATED.3IONS-SCNC: 7 MMOL/L (ref 4–13)
BASOPHILS # BLD AUTO: 0.01 THOUSANDS/ΜL (ref 0–0.1)
BASOPHILS NFR BLD AUTO: 0 % (ref 0–1)
BUN SERPL-MCNC: 7 MG/DL (ref 5–25)
CALCIUM SERPL-MCNC: 7.3 MG/DL (ref 8.3–10.1)
CHLORIDE SERPL-SCNC: 105 MMOL/L (ref 96–108)
CO2 SERPL-SCNC: 25 MMOL/L (ref 21–32)
CREAT SERPL-MCNC: 0.5 MG/DL (ref 0.6–1.3)
EOSINOPHIL # BLD AUTO: 0.19 THOUSAND/ΜL (ref 0–0.61)
EOSINOPHIL NFR BLD AUTO: 8 % (ref 0–6)
ERYTHROCYTE [DISTWIDTH] IN BLOOD BY AUTOMATED COUNT: 24.8 % (ref 11.6–15.1)
GFR SERPL CREATININE-BSD FRML MDRD: 104 ML/MIN/1.73SQ M
GLUCOSE SERPL-MCNC: 88 MG/DL (ref 65–140)
HCT VFR BLD AUTO: 24.2 % (ref 36.5–49.3)
HGB BLD-MCNC: 7.8 G/DL (ref 12–17)
IMM GRANULOCYTES # BLD AUTO: 0.02 THOUSAND/UL (ref 0–0.2)
IMM GRANULOCYTES NFR BLD AUTO: 1 % (ref 0–2)
LYMPHOCYTES # BLD AUTO: 0.64 THOUSANDS/ΜL (ref 0.6–4.47)
LYMPHOCYTES NFR BLD AUTO: 27 % (ref 14–44)
MCH RBC QN AUTO: 31.5 PG (ref 26.8–34.3)
MCHC RBC AUTO-ENTMCNC: 32.2 G/DL (ref 31.4–37.4)
MCV RBC AUTO: 98 FL (ref 82–98)
MONOCYTES # BLD AUTO: 0.34 THOUSAND/ΜL (ref 0.17–1.22)
MONOCYTES NFR BLD AUTO: 14 % (ref 4–12)
NEUTROPHILS # BLD AUTO: 1.2 THOUSANDS/ΜL (ref 1.85–7.62)
NEUTS SEG NFR BLD AUTO: 50 % (ref 43–75)
NRBC BLD AUTO-RTO: 1 /100 WBCS
PLATELET # BLD AUTO: 78 THOUSANDS/UL (ref 149–390)
POTASSIUM SERPL-SCNC: 3.3 MMOL/L (ref 3.5–5.3)
RBC # BLD AUTO: 2.48 MILLION/UL (ref 3.88–5.62)
SODIUM SERPL-SCNC: 137 MMOL/L (ref 135–147)
WBC # BLD AUTO: 2.4 THOUSAND/UL (ref 4.31–10.16)

## 2022-09-24 PROCEDURE — 80048 BASIC METABOLIC PNL TOTAL CA: CPT

## 2022-09-24 PROCEDURE — 99232 SBSQ HOSP IP/OBS MODERATE 35: CPT | Performed by: INTERNAL MEDICINE

## 2022-09-24 PROCEDURE — 85025 COMPLETE CBC W/AUTO DIFF WBC: CPT

## 2022-09-24 RX ORDER — POTASSIUM CHLORIDE 29.8 MG/ML
40 INJECTION INTRAVENOUS ONCE
Status: COMPLETED | OUTPATIENT
Start: 2022-09-24 | End: 2022-09-25

## 2022-09-24 RX ADMIN — PHENOBARBITAL 64.8 MG: 64.8 TABLET ORAL at 09:44

## 2022-09-24 RX ADMIN — ASPIRIN 81 MG: 81 TABLET, COATED ORAL at 09:44

## 2022-09-24 RX ADMIN — PANTOPRAZOLE SODIUM 40 MG: 40 TABLET, DELAYED RELEASE ORAL at 09:44

## 2022-09-24 RX ADMIN — PANTOPRAZOLE SODIUM 40 MG: 40 TABLET, DELAYED RELEASE ORAL at 20:38

## 2022-09-24 RX ADMIN — PHENOBARBITAL 64.8 MG: 64.8 TABLET ORAL at 20:38

## 2022-09-24 RX ADMIN — POTASSIUM CHLORIDE 40 MEQ: 29.8 INJECTION, SOLUTION INTRAVENOUS at 09:44

## 2022-09-24 RX ADMIN — FOLIC ACID 1 MG: 1 TABLET ORAL at 09:44

## 2022-09-24 RX ADMIN — ATORVASTATIN CALCIUM 80 MG: 80 TABLET, FILM COATED ORAL at 18:34

## 2022-09-24 NOTE — PLAN OF CARE
Problem: PAIN - ADULT  Goal: Verbalizes/displays adequate comfort level or baseline comfort level  Description: Interventions:  - Encourage patient to monitor pain and request assistance  - Assess pain using appropriate pain scale  - Administer analgesics based on type and severity of pain and evaluate response  - Implement non-pharmacological measures as appropriate and evaluate response  - Consider cultural and social influences on pain and pain management  - Notify physician/advanced practitioner if interventions unsuccessful or patient reports new pain  Outcome: Progressing     Problem: INFECTION - ADULT  Goal: Absence or prevention of progression during hospitalization  Description: INTERVENTIONS:  - Assess and monitor for signs and symptoms of infection  - Monitor lab/diagnostic results  - Monitor all insertion sites, i e  indwelling lines, tubes, and drains  - Monitor endotracheal if appropriate and nasal secretions for changes in amount and color  - [place] appropriate cooling/warming therapies per order  - Administer medications as ordered  - Instruct and encourage patient and family to use good hand hygiene technique  - Identify and instruct in appropriate isolation precautions for identified infection/condition  Outcome: Progressing  Goal: Absence of fever/infection during neutropenic period  Description: INTERVENTIONS:  - Monitor WBC    Outcome: Progressing     Problem: SAFETY ADULT  Goal: Patient will remain free of falls  Description: INTERVENTIONS:  - Educate patient/family on patient safety including physical limitations  - Instruct patient to call for assistance with activity   - Consult OT/PT to assist with strengthening/mobility   - Keep Call bell within reach  - Keep bed low and locked with side rails adjusted as appropriate  - Keep care items and personal belongings within reach  - Initiate and maintain comfort rounds  - Make Fall Risk Sign visible to staff  - Offer Toileting every 2  Hours, in advance of need  - Initiate/Maintain TABS  - Obtain necessary fall risk management equipment  - Apply yellow socks and bracelet for high fall risk patients  - Consider moving patient to room near nurses station  Outcome: Progressing  Goal: Maintain or return to baseline ADL function  Description: INTERVENTIONS:  -  Assess patient's ability to carry out ADLs; assess patient's baseline for ADL function and identify physical deficits which impact ability to perform ADLs (bathing, care of mouth/teeth, toileting, grooming, dressing, etc )  - Assess/evaluate cause of self-care deficits   - Assess range of motion  - Assess patient's mobility; develop plan if impaired  - Assess patient's need for assistive devices and provide as appropriate  - Encourage maximum independence but intervene and supervise when necessary  - Involve family in performance of ADLs  - Assess for home care needs following discharge   - Consider OT consult to assist with ADL evaluation and planning for discharge  - Provide patient education as appropriate  Outcome: Progressing  Goal: Maintains/Returns to pre admission functional level  Description: INTERVENTIONS:  - Perform BMAT or MOVE assessment daily    - Set and communicate daily mobility goal to care team and patient/family/caregiver  - Collaborate with rehabilitation services on mobility goals if consulted  - Perform Range of Motion 2 times a day  - Reposition patient every 2 hours    - Dangle patient 3 times a day  - Stand patient  3 times a day  - Ambulate patient 3 times a day  - Out of bed to chair 3 times a day   - Out of bed for meals 3 times a day  - Out of bed for toileting  - Record patient progress and toleration of activity level   Outcome: Progressing     Problem: DISCHARGE PLANNING  Goal: Discharge to home or other facility with appropriate resources  Description: INTERVENTIONS:  - Identify barriers to discharge w/patient and caregiver  - Arrange for needed discharge resources and transportation as appropriate  - Identify discharge learning needs (meds, wound care, etc )  - Arrange for interpretive services to assist at discharge as needed  - Refer to Case Management Department for coordinating discharge planning if the patient needs post-hospital services based on physician/advanced practitioner order or complex needs related to functional status, cognitive ability, or social support system  Outcome: Progressing     Problem: Knowledge Deficit  Goal: Patient/family/caregiver demonstrates understanding of disease process, treatment plan, medications, and discharge instructions  Description: Complete learning assessment and assess knowledge base  Interventions:  - Provide teaching at level of understanding  - Provide teaching via preferred learning methods  Outcome: Progressing     Problem: Potential for Falls  Goal: Patient will remain free of falls  Description: INTERVENTIONS:  - Educate patient/family on patient safety including physical limitations  - Instruct patient to call for assistance with activity   - Consult OT/PT to assist with strengthening/mobility   - Keep Call bell within reach  - Keep bed low and locked with side rails adjusted as appropriate  - Keep care items and personal belongings within reach  - Initiate and maintain comfort rounds  - Make Fall Risk Sign visible to staff  - Offer Toileting every 2 Hours, in advance of need  - Initiate/Maintain TABS alarm  - Obtain necessary fall risk management equipment  - Apply yellow socks and bracelet for high fall risk patients  - Consider moving patient to room near nurses station  Outcome: Progressing     Problem: Nutrition/Hydration-ADULT  Goal: Nutrient/Hydration intake appropriate for improving, restoring or maintaining nutritional needs  Description: Monitor and assess patient's nutrition/hydration status for malnutrition  Collaborate with interdisciplinary team and initiate plan and interventions as ordered    Monitor patient's weight and dietary intake as ordered or per policy  Utilize nutrition screening tool and intervene as necessary  Determine patient's food preferences and provide high-protein, high-caloric foods as appropriate       INTERVENTIONS:  - Monitor oral intake, urinary output, labs, and treatment plans  - Assess nutrition and hydration status and recommend course of action  - Evaluate amount of meals eaten  - Assist patient with eating if necessary   - Allow adequate time for meals  - Recommend/ encourage appropriate diets, oral nutritional supplements, and vitamin/mineral supplements  - Order, calculate, and assess calorie counts as needed  - Recommend, monitor, and adjust tube feedings and TPN/PPN based on assessed needs  - Assess need for intravenous fluids  - Provide specific nutrition/hydration education as appropriate  - Include patient/family/caregiver in decisions related to nutrition  Outcome: Progressing

## 2022-09-24 NOTE — PROGRESS NOTES
INTERNAL MEDICINE RESIDENCY PROGRESS NOTE     Name: Taz Sandoval   Age & Sex: 67 y o  male   MRN: [id number]  Unit/Bed#: [facility] 933-01   Encounter: [id number]  Team: SOD Team A    PATIENT INFORMATION     Name: Taz Sandoval   Age & Sex: 67 y o  male   MRN: [id number]  Hospital Stay Days: 4    ASSESSMENT/PLAN     Principal Problem:    Dysphagia  Active Problems:    Migration of esophageal stent    Esophageal cancer (HCC)    Pancytopenia ([facility] )    CAD (coronary artery disease)    HLD (hyperlipidemia)    Seizure disorder ([facility] )    Ventral hernia with obstruction and without gangrene    Severe protein-calorie malnutrition ([facility] [address] )      Migration of esophageal stent  Assessment & Plan  S/p esophageal stent placed in June 2022 2/2 tumor mass affect caused by esophageal adenocarcinoma  During last hospital admission, CT chest showed esophageal stent migration into gastric lumen  Since discharge on Friday, 9/16, patient has been experiencing inability to tolerate PO intake  Repeat CT on current admission shows possible further stent migration  Of note, ventral hernia present and does not seem to be incarcerated  Unlikely cause of current symptoms  He is able to tolerate mild liquid intake  Patient is not experiencing pain at this time  Patient received platelets on 36/52/2700 with subsequent breakout of hives resolved with Benadryl  Was given 1 dose of Granix 9/20 with subsequent response with ANC 1570 the day after  Patient received platelets again on 70/42/9255 with pre transfusion Benadryl and Tylenol and was given additional Benadryl for hives following transfusion  EGD with stent removal and placement of new sutured stent on 9/22    Plan:  - patient's diet was advanced to clears; will continue monitor for toleration of oral diet  - will plan for discharge once patient's appetite improves    Esophageal cancer [facility])  Assessment & Plan  Patient with stage II B esophageal cancer diagnosed in May 2022   Currently undergoing carboplatin pack with taxol chemotherapy as well as radiotherapy  Follows outpatient with heme/onc  Pancytopenia [facility])  Assessment & Plan  Patient has a PMHx of anemia, leukopenia, and thrombocytopenia in setting of esophageal adenocarcinoma  Presented in the ED with Hgb 9 3, WBC 1 95, and platelets of 28  Slightly trending upwards from previous admission last week  Plan:  -Continue to monitor CBC      Ventral hernia with obstruction and without gangrene  Assessment & Plan  Previously diagnosed in 2015, seen by outpatient surgery, and patient not interested in surgery  Does not show signs of incarceration and unlikely to be cause of current symptoms  Patient denies abdominal pain, N/V/D  Seizure disorder [facility])  Assessment & Plan  Remote history of seizure disorder diagnosed 40 years ago  Patient currently takes phenobarbital 64 8 mg BID  Plan:   -Continue phenobarbital 64 8 mg BID     HLD (hyperlipidemia)  Assessment & Plan  -Continue home Atorvastatin 80 mg daily     CAD (coronary artery disease)  Assessment & Plan  History of CAD s/p stent x 4    Plan:  -Continue ASA 81 mg  -Follow-up outpatient    * Dysphagia  Assessment & Plan  Patient experiencing progressive dysphagia since May 2022 2/2 to esophageal adenocarcinoma  Stent placed in June 2022 with recent migration into gastric lumen not on CT chest  Currently unable to tolerate PO intake of food  Can tolerate small amounts of liquid  Plan:  -Continue home pantoprazole 40 mg   -See plan "migration of esophageal stent"      Disposition: Discharge once pt tolerates solid diet    SUBJECTIVE     Patient seen and examined  Patient continues to not have an appetite for solid food  He reports drinking coffee and juice  He states he had some vomiting of saliva and some nausea overnight  He reports he was hypertensive and will try to attempt solid food later today  He reports no bowel movements because he is not eating    He reports some substernal pain that he attributes to his procedure  Patient had mashed potatoes for lunch followed by an episode of spitting up large amount of saliva with some associated retrosternal pain without any emesis of food  His diet was advanced to a regular solid diet  He had a 2nd episode prior to dinner he was able to finish most of his solid dinner  He denies any nausea  Patient was agreeable to discharge tomorrow morning as he feels more comfortable with normal solid diet  Review of Systems   Constitutional: Negative for chills and fever  Respiratory: Negative for shortness of breath  Cardiovascular: Negative for chest pain  Gastrointestinal: Negative for abdominal distention, abdominal pain, diarrhea, nausea and vomiting  OBJECTIVE     Vitals:    22 1516 22 2000 22 2144 22 08   BP: 90/59  102/60 103/62   Pulse: 84  93 95   Resp: 16  16 18   Temp: 97 9 °F (36 6 °C)  98 1 °F (36 7 °C) 97 7 °F (36 5 °C)   TempSrc:       SpO2: 97% 98% 98% 94%   Weight:       Height:          Temperature:   Temp (24hrs), Av 9 °F (36 6 °C), Min:97 7 °F (36 5 °C), Max:98 1 °F (36 7 °C)    Temperature: 97 7 °F (36 5 °C)  Intake & Output:  I/O        0701   0700  0701   0700  0701   0700    P  O        I V  (mL/kg) 500 (6 1)      Blood       Total Intake(mL/kg) 500 (6 1)      Net +500             Unmeasured Urine Occurrence  2 x         Weights:   IBW (Ideal Body Weight): 77 6 kg    Body mass index is 24 41 kg/m²  Weight (last 2 days)     None        Physical Exam  Constitutional:       General: He is not in acute distress  Appearance: Normal appearance  He is normal weight  He is not ill-appearing or toxic-appearing  HENT:      Head: Normocephalic and atraumatic  Cardiovascular:      Rate and Rhythm: Normal rate and regular rhythm  Pulmonary:      Effort: Pulmonary effort is normal  No respiratory distress  Breath sounds: No wheezing     Abdominal: General: Abdomen is flat  There is no distension  Tenderness: There is no abdominal tenderness  There is no guarding  Hernia: A hernia is present  Musculoskeletal:      Right lower leg: No edema  Left lower leg: No edema  Neurological:      Mental Status: He is alert and oriented to person, place, and time  Psychiatric:         Mood and Affect: Mood normal          Behavior: Behavior normal        LABORATORY DATA     Labs: I have personally reviewed pertinent reports  Results from last 7 days   Lab Units 09/24/22  0455 09/23/22  0851 09/22/22  0648   WBC Thousand/uL 2 40* 3 03* 2 21*   HEMOGLOBIN g/dL 7 8* 7 8* 7 7*   HEMATOCRIT % 24 2* 24 3* 23 7*   PLATELETS Thousands/uL 78* 86* 77*   NEUTROS PCT % 50 53  --    MONOS PCT % 14* 10  --    MONO PCT %  --   --  4      Results from last 7 days   Lab Units 09/24/22  0455 09/23/22  0851 09/22/22  0648 09/20/22  0517 09/19/22  1645 09/19/22  0909   POTASSIUM mmol/L 3 3* 3 4* 3 4*   < > 3 7 3 5   CHLORIDE mmol/L 105 106 109*   < > 105 107   CO2 mmol/L 25 26 23   < > 27 27   BUN mg/dL 7 8 6   < > 11 9   CREATININE mg/dL 0 50* 0 61 0 55*   < > 0 75 0 67   CALCIUM mg/dL 7 3* 7 8* 7 3*   < > 8 0* 7 4*   ALK PHOS U/L  --   --   --   --  108 91   ALT U/L  --   --   --   --  16 16   AST U/L  --   --   --   --  16 19    < > = values in this interval not displayed  Results from last 7 days   Lab Units 09/20/22  0517   MAGNESIUM mg/dL 2 1          Results from last 7 days   Lab Units 09/20/22  0517   INR  1 07               IMAGING & DIAGNOSTIC TESTING     Radiology Results: I have personally reviewed pertinent reports    EGD Fluoro    Result Date: 9/22/2022  Impression: Esophageal stricture from known malignancy extending from 28 to 40 cm Previous stent seen in the stomach and removed using a rat tooth forceps The stenosis was stented with a 23 mm x 125 mm FCSEMS, the stent was affixed using three endoscopic sutures RECOMMENDATION: Follow up with PCP Can have a clear liquid diet today, tomorrow can have pureed diet and the day after start a stent diet (low residue diet)  ATTENDING ATTESTATION:  I was present throughout the entire procedure from insertion to complete withdrawal of the scope  I performed all interventions myself or oversaw the fellow  Suze Lock MD     CT chest without contrast    Result Date: 9/19/2022  Impression: 1  Diffuse circumferential thickening of the mid-distal esophagus consistent with known malignancy  2   Previously placed esophageal stent again noted to have migrated into the gastric lumen  3   Additional findings as noted  Workstation performed: [id number]     XR chest 1 view    Result Date: 9/22/2022  Impression: Fluoroscopic guidance provided for procedure guidance  Please refer to the separate procedure notes for additional details  Workstation performed: [id number]     Other Diagnostic Testing: I have personally reviewed pertinent reports  ACTIVE MEDICATIONS     Current Facility-Administered Medications   Medication Dose Route Frequency    acetaminophen (TYLENOL) tablet 650 mg  650 mg Oral Q6H PRN    aspirin (ECOTRIN LOW STRENGTH) EC tablet 81 mg  81 mg Oral Daily    atorvastatin (LIPITOR) tablet 80 mg  80 mg Oral Daily With Dinner    folic acid (FOLVITE) tablet 1 mg  1 mg Oral Daily    ondansetron (ZOFRAN) injection 4 mg  4 mg Intravenous Q6H PRN    pantoprazole (PROTONIX) EC tablet 40 mg  40 mg Oral Q12H ADENIKE    PHENobarbital tablet 64 8 mg  64 8 mg Oral BID    potassium chloride 40 mEq IVPB (premix)  40 mEq Intravenous Once       VTE Pharmacologic Prophylaxis: Reason for no pharmacologic prophylaxis Held for thrombocytopenia  VTE Mechanical Prophylaxis: sequential compression device    Portions of the record may have been created with voice recognition software  Occasional wrong word or "sound a like" substitutions may have occurred due to the inherent limitations of voice recognition software    Read the chart carefully and recognize, using context, where substitutions have occurred   ==  Irene Nevarez, [address] [facility]  Internal Medicine Residency PGY-1

## 2022-09-25 VITALS
RESPIRATION RATE: 18 BRPM | BODY MASS INDEX: 24.38 KG/M2 | SYSTOLIC BLOOD PRESSURE: 105 MMHG | DIASTOLIC BLOOD PRESSURE: 65 MMHG | TEMPERATURE: 98.4 F | WEIGHT: 180 LBS | HEART RATE: 90 BPM | OXYGEN SATURATION: 94 % | HEIGHT: 72 IN

## 2022-09-25 LAB
ANION GAP SERPL CALCULATED.3IONS-SCNC: 6 MMOL/L (ref 4–13)
BASOPHILS # BLD AUTO: 0.02 THOUSANDS/ΜL (ref 0–0.1)
BASOPHILS NFR BLD AUTO: 1 % (ref 0–1)
BUN SERPL-MCNC: 8 MG/DL (ref 5–25)
CALCIUM SERPL-MCNC: 7.4 MG/DL (ref 8.3–10.1)
CHLORIDE SERPL-SCNC: 104 MMOL/L (ref 96–108)
CO2 SERPL-SCNC: 27 MMOL/L (ref 21–32)
CREAT SERPL-MCNC: 0.46 MG/DL (ref 0.6–1.3)
EOSINOPHIL # BLD AUTO: 0.16 THOUSAND/ΜL (ref 0–0.61)
EOSINOPHIL NFR BLD AUTO: 7 % (ref 0–6)
ERYTHROCYTE [DISTWIDTH] IN BLOOD BY AUTOMATED COUNT: 24.8 % (ref 11.6–15.1)
GFR SERPL CREATININE-BSD FRML MDRD: 108 ML/MIN/1.73SQ M
GLUCOSE SERPL-MCNC: 100 MG/DL (ref 65–140)
HCT VFR BLD AUTO: 23 % (ref 36.5–49.3)
HGB BLD-MCNC: 7.6 G/DL (ref 12–17)
IMM GRANULOCYTES # BLD AUTO: 0.03 THOUSAND/UL (ref 0–0.2)
IMM GRANULOCYTES NFR BLD AUTO: 1 % (ref 0–2)
LYMPHOCYTES # BLD AUTO: 0.63 THOUSANDS/ΜL (ref 0.6–4.47)
LYMPHOCYTES NFR BLD AUTO: 27 % (ref 14–44)
MCH RBC QN AUTO: 32.2 PG (ref 26.8–34.3)
MCHC RBC AUTO-ENTMCNC: 33 G/DL (ref 31.4–37.4)
MCV RBC AUTO: 98 FL (ref 82–98)
MONOCYTES # BLD AUTO: 0.46 THOUSAND/ΜL (ref 0.17–1.22)
MONOCYTES NFR BLD AUTO: 20 % (ref 4–12)
NEUTROPHILS # BLD AUTO: 1 THOUSANDS/ΜL (ref 1.85–7.62)
NEUTS SEG NFR BLD AUTO: 44 % (ref 43–75)
NRBC BLD AUTO-RTO: 0 /100 WBCS
PLATELET # BLD AUTO: 81 THOUSANDS/UL (ref 149–390)
POTASSIUM SERPL-SCNC: 3.4 MMOL/L (ref 3.5–5.3)
RBC # BLD AUTO: 2.36 MILLION/UL (ref 3.88–5.62)
SODIUM SERPL-SCNC: 137 MMOL/L (ref 135–147)
WBC # BLD AUTO: 2.3 THOUSAND/UL (ref 4.31–10.16)

## 2022-09-25 PROCEDURE — 80048 BASIC METABOLIC PNL TOTAL CA: CPT | Performed by: HOSPITALIST

## 2022-09-25 PROCEDURE — 99238 HOSP IP/OBS DSCHRG MGMT 30/<: CPT | Performed by: INTERNAL MEDICINE

## 2022-09-25 PROCEDURE — 85025 COMPLETE CBC W/AUTO DIFF WBC: CPT | Performed by: HOSPITALIST

## 2022-09-25 RX ORDER — ONDANSETRON 4 MG/1
4 TABLET, FILM COATED ORAL EVERY 8 HOURS PRN
Qty: 20 TABLET | Refills: 0 | Status: SHIPPED | OUTPATIENT
Start: 2022-09-25 | End: 2022-10-20

## 2022-09-25 RX ADMIN — ASPIRIN 81 MG: 81 TABLET, COATED ORAL at 09:34

## 2022-09-25 RX ADMIN — FOLIC ACID 1 MG: 1 TABLET ORAL at 09:34

## 2022-09-25 RX ADMIN — PANTOPRAZOLE SODIUM 40 MG: 40 TABLET, DELAYED RELEASE ORAL at 09:34

## 2022-09-25 RX ADMIN — PHENOBARBITAL 64.8 MG: 64.8 TABLET ORAL at 09:34

## 2022-09-25 NOTE — PLAN OF CARE
Problem: PAIN - ADULT  Goal: Verbalizes/displays adequate comfort level or baseline comfort level  Description: Interventions:  - Encourage patient to monitor pain and request assistance  - Assess pain using appropriate pain scale  - Administer analgesics based on type and severity of pain and evaluate response  - Implement non-pharmacological measures as appropriate and evaluate response  - Consider cultural and social influences on pain and pain management  - Notify physician/advanced practitioner if interventions unsuccessful or patient reports new pain  Outcome: Progressing     Problem: INFECTION - ADULT  Goal: Absence or prevention of progression during hospitalization  Description: INTERVENTIONS:  - Assess and monitor for signs and symptoms of infection  - Monitor lab/diagnostic results  - Monitor all insertion sites, i e  indwelling lines, tubes, and drains  - Monitor endotracheal if appropriate and nasal secretions for changes in amount and color  - [place] appropriate cooling/warming therapies per order  - Administer medications as ordered  - Instruct and encourage patient and family to use good hand hygiene technique  - Identify and instruct in appropriate isolation precautions for identified infection/condition  Outcome: Progressing  Goal: Absence of fever/infection during neutropenic period  Description: INTERVENTIONS:  - Monitor WBC    Outcome: Progressing     Problem: SAFETY ADULT  Goal: Patient will remain free of falls  Description: INTERVENTIONS:  - Educate patient/family on patient safety including physical limitations  - Instruct patient to call for assistance with activity   - Consult OT/PT to assist with strengthening/mobility   - Keep Call bell within reach  - Keep bed low and locked with side rails adjusted as appropriate  - Keep care items and personal belongings within reach  - Initiate and maintain comfort rounds  - Make Fall Risk Sign visible to staff  - Offer Toileting every *** Hours, in advance of need  - Initiate/Maintain ***alarm  - Obtain necessary fall risk management equipment: ***  - Apply yellow socks and bracelet for high fall risk patients  - Consider moving patient to room near nurses station  Outcome: Progressing  Goal: Maintain or return to baseline ADL function  Description: INTERVENTIONS:  -  Assess patient's ability to carry out ADLs; assess patient's baseline for ADL function and identify physical deficits which impact ability to perform ADLs (bathing, care of mouth/teeth, toileting, grooming, dressing, etc )  - Assess/evaluate cause of self-care deficits   - Assess range of motion  - Assess patient's mobility; develop plan if impaired  - Assess patient's need for assistive devices and provide as appropriate  - Encourage maximum independence but intervene and supervise when necessary  - Involve family in performance of ADLs  - Assess for home care needs following discharge   - Consider OT consult to assist with ADL evaluation and planning for discharge  - Provide patient education as appropriate  Outcome: Progressing  Goal: Maintains/Returns to pre admission functional level  Description: INTERVENTIONS:  - Perform BMAT or MOVE assessment daily    - Set and communicate daily mobility goal to care team and patient/family/caregiver  - Collaborate with rehabilitation services on mobility goals if consulted  - Perform Range of Motion *** times a day  - Reposition patient every *** hours    - Dangle patient *** times a day  - Stand patient *** times a day  - Ambulate patient *** times a day  - Out of bed to chair *** times a day   - Out of bed for meals *** times a day  - Out of bed for toileting  - Record patient progress and toleration of activity level   Outcome: Progressing     Problem: DISCHARGE PLANNING  Goal: Discharge to home or other facility with appropriate resources  Description: INTERVENTIONS:  - Identify barriers to discharge w/patient and caregiver  - Arrange for needed discharge resources and transportation as appropriate  - Identify discharge learning needs (meds, wound care, etc )  - Arrange for interpretive services to assist at discharge as needed  - Refer to Case Management Department for coordinating discharge planning if the patient needs post-hospital services based on physician/advanced practitioner order or complex needs related to functional status, cognitive ability, or social support system  Outcome: Progressing     Problem: Knowledge Deficit  Goal: Patient/family/caregiver demonstrates understanding of disease process, treatment plan, medications, and discharge instructions  Description: Complete learning assessment and assess knowledge base  Interventions:  - Provide teaching at level of understanding  - Provide teaching via preferred learning methods  Outcome: Progressing     Problem: Potential for Falls  Goal: Patient will remain free of falls  Description: INTERVENTIONS:  - Educate patient/family on patient safety including physical limitations  - Instruct patient to call for assistance with activity   - Consult OT/PT to assist with strengthening/mobility   - Keep Call bell within reach  - Keep bed low and locked with side rails adjusted as appropriate  - Keep care items and personal belongings within reach  - Initiate and maintain comfort rounds  - Make Fall Risk Sign visible to staff  - Offer Toileting every *** Hours, in advance of need  - Initiate/Maintain ***alarm  - Obtain necessary fall risk management equipment: ***  - Apply yellow socks and bracelet for high fall risk patients  - Consider moving patient to room near nurses station  Outcome: Progressing     Problem: Nutrition/Hydration-ADULT  Goal: Nutrient/Hydration intake appropriate for improving, restoring or maintaining nutritional needs  Description: Monitor and assess patient's nutrition/hydration status for malnutrition   Collaborate with interdisciplinary team and initiate plan and interventions as ordered  Monitor patient's weight and dietary intake as ordered or per policy  Utilize nutrition screening tool and intervene as necessary  Determine patient's food preferences and provide high-protein, high-caloric foods as appropriate       INTERVENTIONS:  - Monitor oral intake, urinary output, labs, and treatment plans  - Assess nutrition and hydration status and recommend course of action  - Evaluate amount of meals eaten  - Assist patient with eating if necessary   - Allow adequate time for meals  - Recommend/ encourage appropriate diets, oral nutritional supplements, and vitamin/mineral supplements  - Order, calculate, and assess calorie counts as needed  - Recommend, monitor, and adjust tube feedings and TPN/PPN based on assessed needs  - Assess need for intravenous fluids  - Provide specific nutrition/hydration education as appropriate  - Include patient/family/caregiver in decisions related to nutrition  Outcome: Progressing

## 2022-09-26 ENCOUNTER — PATIENT OUTREACH (OUTPATIENT)
Dept: HEMATOLOGY ONCOLOGY | Facility: CLINIC | Age: 77
End: 2022-09-26

## 2022-09-26 ENCOUNTER — HOSPITAL ENCOUNTER (OUTPATIENT)
Dept: INFUSION CENTER | Facility: HOSPITAL | Age: 77
End: 2022-09-26

## 2022-09-26 ENCOUNTER — APPOINTMENT (OUTPATIENT)
Dept: RADIATION ONCOLOGY | Facility: HOSPITAL | Age: 77
End: 2022-09-26
Payer: MEDICARE

## 2022-09-26 PROCEDURE — 77014 CHG CT GUIDANCE PLACEMENT RAD THERAPY FIELDS: CPT | Performed by: STUDENT IN AN ORGANIZED HEALTH CARE EDUCATION/TRAINING PROGRAM

## 2022-09-26 PROCEDURE — 77386 HB NTSTY MODUL RAD TX DLVR CPLX: CPT | Performed by: STUDENT IN AN ORGANIZED HEALTH CARE EDUCATION/TRAINING PROGRAM

## 2022-09-26 NOTE — PROGRESS NOTES
MSW met with the pt after his radiation treatment this day  Pt was in a wheelchair and states that he was "feeling weak "  Pt was recently discharged from the hospital and is back at his apartment with his brother  The pt declined any need for any additional services, stating that his brother can help him  Of note, pt's brother goes to dialysis several times per week and the pt is alone  The pt states that he will "stay in bed until my brother gets home "  He did not care to hear about the waiver program or to discuss PT at home  Emotional support was offered and MSW will continue to follow

## 2022-09-27 ENCOUNTER — APPOINTMENT (OUTPATIENT)
Dept: RADIATION ONCOLOGY | Facility: HOSPITAL | Age: 77
End: 2022-09-27
Payer: MEDICARE

## 2022-09-27 ENCOUNTER — TELEPHONE (OUTPATIENT)
Dept: NUTRITION | Facility: CLINIC | Age: 77
End: 2022-09-27

## 2022-09-27 ENCOUNTER — TELEPHONE (OUTPATIENT)
Dept: HEMATOLOGY ONCOLOGY | Facility: CLINIC | Age: 77
End: 2022-09-27

## 2022-09-27 PROCEDURE — 77336 RADIATION PHYSICS CONSULT: CPT | Performed by: INTERNAL MEDICINE

## 2022-09-27 PROCEDURE — 77386 HB NTSTY MODUL RAD TX DLVR CPLX: CPT | Performed by: INTERNAL MEDICINE

## 2022-09-27 PROCEDURE — 77014 CHG CT GUIDANCE PLACEMENT RAD THERAPY FIELDS: CPT | Performed by: INTERNAL MEDICINE

## 2022-09-27 NOTE — TELEPHONE ENCOUNTER
Left VM for patient to let him know that his chemo is canceled for tomorrow because he did not show for his appt or answer a telephone appt  I told the patient we will see him on Friday at 0800 and I will schedule [facility]  I left my direct number for questions/concerns

## 2022-09-27 NOTE — TELEPHONE ENCOUNTER
Spoke with patient to see why he wasn't at his appt today with Dr Prem Iniguez at 1:40pm  Patient stated that he has no ride there because [facility] could not take him to 2 appts in one day  I told the patient that I can work on r/s him, but I need to speak with Dr Kristen Bain regarding his chemo appt tomorrow  Patient verbalized understanding

## 2022-09-27 NOTE — TELEPHONE ENCOUNTER
Hever Kartik today touch base in regards to his nutrition  Erik Moulton explains that he is doing okay at this time  He explains that his esophageal stent was replaced while he was IP as the original one had moved  Since having his stent replaced he has been better able to tolerate oral intakes  He states that he is feeling somewhat better since he has been able to eat more, but he does still feel fatigued  Encouraged liquids with calories (juice, gatorade, milk, Ensure/Boost) and soft/easy to swallow foods (yogurt, applesauce, soup, mashed potatoes with gravy)  Erik Moulton is appreciative of conversation today, encouraged him to reach out with nutrition questions/concerns

## 2022-09-28 ENCOUNTER — HOSPITAL ENCOUNTER (OUTPATIENT)
Dept: INFUSION CENTER | Facility: HOSPITAL | Age: 77
Discharge: HOME | End: 2022-09-28
Attending: INTERNAL MEDICINE

## 2022-09-28 ENCOUNTER — TRANSITIONAL CARE MANAGEMENT (OUTPATIENT)
Dept: INTERNAL MEDICINE CLINIC | Facility: CLINIC | Age: 77
End: 2022-09-28

## 2022-09-28 ENCOUNTER — TELEPHONE (OUTPATIENT)
Dept: HEMATOLOGY ONCOLOGY | Facility: HOSPITAL | Age: 77
End: 2022-09-28

## 2022-09-28 PROCEDURE — 77427 RADIATION TX MANAGEMENT X5: CPT | Performed by: INTERNAL MEDICINE

## 2022-09-28 PROCEDURE — 77014 CHG CT GUIDANCE PLACEMENT RAD THERAPY FIELDS: CPT | Performed by: RADIOLOGY

## 2022-09-28 PROCEDURE — 77386 HB NTSTY MODUL RAD TX DLVR CPLX: CPT | Performed by: RADIOLOGY

## 2022-09-28 NOTE — TELEPHONE ENCOUNTER
Good afternoon! Is there anyway that you can make the appt on 10/4 a little later? He has radiation at [facility] and I need to get him a LYFT to get there  I don't want him to be too late  Thanks!

## 2022-09-29 ENCOUNTER — APPOINTMENT (OUTPATIENT)
Dept: RADIATION ONCOLOGY | Facility: HOSPITAL | Age: 77
End: 2022-09-29
Payer: MEDICARE

## 2022-09-29 PROCEDURE — 77014 CHG CT GUIDANCE PLACEMENT RAD THERAPY FIELDS: CPT | Performed by: INTERNAL MEDICINE

## 2022-09-29 PROCEDURE — 77386 HB NTSTY MODUL RAD TX DLVR CPLX: CPT | Performed by: INTERNAL MEDICINE

## 2022-09-30 ENCOUNTER — APPOINTMENT (OUTPATIENT)
Dept: RADIATION ONCOLOGY | Facility: HOSPITAL | Age: 77
End: 2022-09-30
Payer: MEDICARE

## 2022-09-30 ENCOUNTER — OFFICE VISIT (OUTPATIENT)
Dept: HEMATOLOGY ONCOLOGY | Facility: CLINIC | Age: 77
End: 2022-09-30
Payer: MEDICARE

## 2022-09-30 VITALS
DIASTOLIC BLOOD PRESSURE: 74 MMHG | HEIGHT: 72 IN | HEART RATE: 88 BPM | BODY MASS INDEX: 23.16 KG/M2 | RESPIRATION RATE: 17 BRPM | WEIGHT: 171 LBS | SYSTOLIC BLOOD PRESSURE: 116 MMHG | TEMPERATURE: 97.1 F

## 2022-09-30 DIAGNOSIS — C15.5 MALIGNANT NEOPLASM OF LOWER THIRD OF ESOPHAGUS (HCC): Primary | ICD-10-CM

## 2022-09-30 DIAGNOSIS — C15.9 MALIGNANT NEOPLASM OF ESOPHAGUS, UNSPECIFIED LOCATION (HCC): Primary | ICD-10-CM

## 2022-09-30 LAB
FUNGAL BLOOD CULTURE: NORMAL
TRANSFUSION STATUS PATIENT QL: NORMAL

## 2022-09-30 PROCEDURE — 77014 CHG CT GUIDANCE PLACEMENT RAD THERAPY FIELDS: CPT | Performed by: RADIOLOGY

## 2022-09-30 PROCEDURE — 99214 OFFICE O/P EST MOD 30 MIN: CPT | Performed by: INTERNAL MEDICINE

## 2022-09-30 PROCEDURE — 77386 HB NTSTY MODUL RAD TX DLVR CPLX: CPT | Performed by: RADIOLOGY

## 2022-10-03 ENCOUNTER — APPOINTMENT (OUTPATIENT)
Dept: RADIATION ONCOLOGY | Facility: HOSPITAL | Age: 77
End: 2022-10-03

## 2022-10-03 ENCOUNTER — HOSPITAL ENCOUNTER (OUTPATIENT)
Dept: INFUSION CENTER | Facility: HOSPITAL | Age: 77
Discharge: HOME | End: 2022-10-03
Payer: MEDICARE

## 2022-10-03 ENCOUNTER — TELEPHONE (OUTPATIENT)
Dept: HEMATOLOGY ONCOLOGY | Facility: HOSPITAL | Age: 77
End: 2022-10-03

## 2022-10-03 DIAGNOSIS — C15.9 MALIGNANT NEOPLASM OF ESOPHAGUS, UNSPECIFIED LOCATION (HCC): ICD-10-CM

## 2022-10-03 DIAGNOSIS — E53.8 VITAMIN B12 DEFICIENCY: Primary | ICD-10-CM

## 2022-10-03 DIAGNOSIS — Z95.828 PORT-A-CATH IN PLACE: Primary | ICD-10-CM

## 2022-10-03 LAB
ALBUMIN SERPL BCP-MCNC: 2.5 G/DL (ref 3.5–5)
ALP SERPL-CCNC: 105 U/L (ref 46–116)
ALT SERPL W P-5'-P-CCNC: 11 U/L (ref 12–78)
ANION GAP SERPL CALCULATED.3IONS-SCNC: 4 MMOL/L (ref 4–13)
AST SERPL W P-5'-P-CCNC: 14 U/L (ref 5–45)
BASOPHILS # BLD AUTO: 0.02 THOUSANDS/ΜL (ref 0–0.1)
BASOPHILS NFR BLD AUTO: 1 % (ref 0–1)
BILIRUB SERPL-MCNC: 0.36 MG/DL (ref 0.2–1)
BUN SERPL-MCNC: 10 MG/DL (ref 5–25)
CALCIUM ALBUM COR SERPL-MCNC: 9.3 MG/DL (ref 8.3–10.1)
CALCIUM SERPL-MCNC: 8.1 MG/DL (ref 8.3–10.1)
CHLORIDE SERPL-SCNC: 103 MMOL/L (ref 96–108)
CO2 SERPL-SCNC: 27 MMOL/L (ref 21–32)
CREAT SERPL-MCNC: 0.64 MG/DL (ref 0.6–1.3)
EOSINOPHIL # BLD AUTO: 0.07 THOUSAND/ΜL (ref 0–0.61)
EOSINOPHIL NFR BLD AUTO: 2 % (ref 0–6)
ERYTHROCYTE [DISTWIDTH] IN BLOOD BY AUTOMATED COUNT: 25.7 % (ref 11.6–15.1)
FUNGAL BLOOD CULTURE: NORMAL
GFR SERPL CREATININE-BSD FRML MDRD: 94 ML/MIN/1.73SQ M
GLUCOSE SERPL-MCNC: 97 MG/DL (ref 65–140)
HCT VFR BLD AUTO: 28.9 % (ref 36.5–49.3)
HGB BLD-MCNC: 9.3 G/DL (ref 12–17)
IMM GRANULOCYTES # BLD AUTO: 0.03 THOUSAND/UL (ref 0–0.2)
IMM GRANULOCYTES NFR BLD AUTO: 1 % (ref 0–2)
LYMPHOCYTES # BLD AUTO: 0.31 THOUSANDS/ΜL (ref 0.6–4.47)
LYMPHOCYTES NFR BLD AUTO: 8 % (ref 14–44)
MCH RBC QN AUTO: 32.2 PG (ref 26.8–34.3)
MCHC RBC AUTO-ENTMCNC: 32.2 G/DL (ref 31.4–37.4)
MCV RBC AUTO: 100 FL (ref 82–98)
MONOCYTES # BLD AUTO: 0.52 THOUSAND/ΜL (ref 0.17–1.22)
MONOCYTES NFR BLD AUTO: 14 % (ref 4–12)
NEUTROPHILS # BLD AUTO: 2.73 THOUSANDS/ΜL (ref 1.85–7.62)
NEUTS SEG NFR BLD AUTO: 74 % (ref 43–75)
NRBC BLD AUTO-RTO: 0 /100 WBCS
PLATELET # BLD AUTO: 164 THOUSANDS/UL (ref 149–390)
PMV BLD AUTO: 10.3 FL (ref 8.9–12.7)
POTASSIUM SERPL-SCNC: 3.7 MMOL/L (ref 3.5–5.3)
PROT SERPL-MCNC: 7.7 G/DL (ref 6.4–8.4)
RBC # BLD AUTO: 2.89 MILLION/UL (ref 3.88–5.62)
SODIUM SERPL-SCNC: 134 MMOL/L (ref 135–147)
WBC # BLD AUTO: 3.68 THOUSAND/UL (ref 4.31–10.16)

## 2022-10-03 PROCEDURE — 85025 COMPLETE CBC W/AUTO DIFF WBC: CPT | Performed by: INTERNAL MEDICINE

## 2022-10-03 PROCEDURE — 80053 COMPREHEN METABOLIC PANEL: CPT | Performed by: INTERNAL MEDICINE

## 2022-10-03 RX ORDER — CYANOCOBALAMIN 1000 UG/ML
1000 INJECTION, SOLUTION INTRAMUSCULAR; SUBCUTANEOUS ONCE
Status: CANCELLED | OUTPATIENT
Start: 2022-10-04

## 2022-10-03 RX ORDER — SODIUM CHLORIDE 9 MG/ML
20 INJECTION, SOLUTION INTRAVENOUS ONCE
Status: CANCELLED | OUTPATIENT
Start: 2022-10-04

## 2022-10-03 NOTE — PROGRESS NOTES
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 - [id number]    8/15/2022 - start weekly carbo/taxol/RT    Week 2 held due to cytopenias - FA and b12 deficiencies found and replacement started    Week 3 dose reducation of 20% for taxol, carbo AUC 1 5    Weeks 4-6 held due to cytopenias and hospital admission for dehydration, inability to eat and esophageal stent migration             Esophageal cancer ([facility] [address] )   2022 Initial Diagnosis    Esophageal adenocarcinoma ([facility] [address] )     2022 Biopsy    Esophagus, lower esophageal bx:  - Poorly differentiated carcinoma, consistent with adenocarcinoma         2022 -  Cancer Staged    Staging form: Esophagus - Adenocarcinoma, AJCC 8th Edition  - Clinical stage from 2022: Stage IIB (cT2, cN0, cM0, G3) - Signed by Eduardo Tony MD on 2022  Total positive nodes: 0  Histologic grading system: 3 grade system  HER2 status: Negative  Clinical staging modalities: Biopsy, EUS, Endoscopy, PET/CT       8/15/2022 -  Chemotherapy    CARBOplatin (PARAPLATIN) IVPB (GOG AUC DOSING), 237 6 mg, Intravenous, Once, 4 of 5 cycles  Administration: 237 6 mg (8/15/2022), 190 05 mg (2022)  PACLItaxel (TAXOL) chemo IVPB, 50 mg/m2 = 106 2 mg, Intravenous, Once, 4 of 5 cycles  Dose modification: 40 mg/m2 (original dose 50 mg/m2, Cycle 3, Reason: Neutropenia, Comment: 20% dose reduction due to neutropenia)  Administration: 106 2 mg (8/15/2022), 84 6 mg (2022)  filgrastim (NEUPOGEN) injection Soln, 480 mcg (100 % of original dose 480 mcg), Subcutaneous, Once, 1 of 1 cycle  Dose modification: 480 mcg (original dose 480 mcg, Cycle 4)  tbo-filgrastim (GRANIX), 480 mcg (100 % of original dose 480 mcg), Subcutaneous, Once, 2 of 2 cycles  Dose modification: 480 mcg (original dose 480 mcg, Cycle 2), 480 mcg (original dose 480 mcg, Cycle 4)  Administration: 480 mcg (8/22/2022), 480 mcg (9/7/2022)         Interval History: patient presents for follow up of his cT2N0 esophageal adenocarcinoma  He has had two hospital admissions for dehydration and inability to eat  His esophageal stent migrated and on the second hospital admission it was removed  He is now able to eat and drink  He was non-complaint with his FA at home, but has been compliant with It since discharge from the hospital   He has had all but two of his weekly carbo/taxol doses held due to cytopenias  He does better when he takes small bites of food and chews well  No fevers  No neuropathy  He is in a wheelchair in the office today because he felt to weak to walk in on his own from the parking lot  He does state that he is able to do his ADLs in the home  Most recent [address] = 1 0, hgb = 7 6, plt = 81       REVIEW OF SYSTEMS:  Please note that a 14-point review of systems was performed to include Constitutional, HEENT, Respiratory, CVS, GI, , Musculoskeletal, Integumentary, Neurologic, Rheumatologic, Endocrinologic, Psychiatric, Lymphatic, and Hematologic/Oncologic systems were reviewed and are negative unless otherwise stated in HPI  Positive and negative findings pertinent to this evaluation are incorporated into the history of present illness        ECOG PS: 3    PROBLEM LIST:  Patient Active Problem List   Diagnosis    CAD (coronary artery disease)    HTN (hypertension)    HLD (hyperlipidemia)    Seizure disorder (HCC)    S/P AAA (abdominal aortic aneurysm) repair    STEMI (ST elevation myocardial infarction) ([facility] [address] )    Ventral hernia with obstruction and without gangrene    Hernia, incisional    Abdominal pain    NSTEMI (non-ST elevated myocardial infarction) [facility]    Chronic combined systolic and diastolic CHF (congestive heart failure) (HCC)    Allergic reaction to contrast media    Ischemic cardiomyopathy    Abdominal aortic aneurysm, without rupture    Paroxysmal atrial fibrillation (HCC)    Coronary artery disease of native artery of native heart with stable angina pectoris (HCC)    Esophageal cancer (HCC)    Acute gastric ulcer    Dysphagia    Pancytopenia (HCC)    Thrombocytopenia ([facility] [address] )    Port-A-Cath in place    Vitamin B12 deficiency    Anemia    Hypotension    GERD (gastroesophageal reflux disease)    Migration of esophageal stent    Chemotherapy induced neutropenia (HCC)    Severe protein-calorie malnutrition (HCC)       Assessment / Plan: as of today, he is borderline for chemotherapy  His treatment is Tuesday next week  I am going to have him get another cbc and cmp next week  If his blood counts are better, we will proceed with the last week of treatment  I will also give him an extra 500 ml of fluids next week if there is chair time for it  His radiation ends next week  I will see him back in 3 weeks with a CT and cbc, cmp prior  If everything looks ok, we can proceed with surgery  I stressed the importance of him improving his nutrition prior to surgery  I spent 30 minutes on chart review, face to face counseling time, coordination of care and documentation  Past Medical History:   has a past medical history of Cardiac disease, CHF (congestive heart failure) ([facility] [address] ), Esophageal cancer ([facility] [address] ), Hernia of abdominal cavity, Myocardial infarction [facility]), and [facility] )  PAST SURGICAL HISTORY:   has a past surgical history that includes Abdominal aortic aneurysm repair; Cataract extraction; and IR port placement (7/15/2022)      CURRENT MEDICATIONS  Current Outpatient Medications   Medication Sig Dispense Refill    aspirin (Aspirin Low Dose) 81 mg EC tablet Take 1 tablet (81 mg total) by mouth daily 90 tablet 3    atorvastatin (LIPITOR) 80 mg tablet Take 1 tablet (80 mg total) by mouth daily with dinner 90 tablet 4    ondansetron (ZOFRAN) 4 mg tablet Take 1 tablet (4 mg total) by mouth every 8 (eight) hours as needed for nausea or vomiting 20 tablet 0    PHENobarbital 64 8 mg tablet TAKE 1 TABLET (64 8 MG TOTAL) BY MOUTH 2 (TWO) TIMES A  tablet 1    folic acid (KP Folic Acid) 1 mg tablet Take 1 tablet (1 mg total) by mouth daily (Patient not taking: No sig reported) 90 tablet 1    lidocaine-prilocaine (EMLA) cream Apply to port 30 to 60 min prior to use (Patient not taking: No sig reported) 30 g 2    midodrine (PROAMATINE) 2 5 mg tablet Take 1 tablet (2 5 mg total) by mouth 3 (three) times a day before meals (Patient not taking: No sig reported) 90 tablet 0    ondansetron (Zofran ODT) 8 mg disintegrating tablet Take 1 tablet (8 mg total) by mouth every 8 (eight) hours as needed for nausea or vomiting (Patient not taking: No sig reported) 30 tablet 3    pantoprazole (PROTONIX) 40 mg tablet TAKE 1 TABLET BY MOUTH 2 TIMES A DAY BEFORE MEALS  (Patient not taking: No sig reported) 180 tablet 3    potassium chloride (MICRO-K) 10 MEQ CR capsule Take 1 capsule (10 mEq total) by mouth 2 (two) times a day for 7 days 14 capsule 0    prochlorperazine (COMPAZINE) 10 mg tablet Take 1 tablet (10 mg total) by mouth every 6 (six) hours as needed for nausea or vomiting (Patient not taking: No sig reported) 30 tablet 3     No current facility-administered medications for this visit  [unfilled]    SOCIAL HISTORY:   reports that he quit smoking about 17 years ago  He has never used smokeless tobacco  He reports previous alcohol use  He reports that he does not use drugs  FAMILY HISTORY:  family history includes Hypertension in his father; Kidney disease in his brother  ALLERGIES:  is allergic to iodinated diagnostic agents and clopidogrel        Physical Exam:  Vital Signs:   Visit Vitals  /74   Pulse 88   Temp (!) 97 1 °F (36 2 °C)   Resp 17   Ht 6' (1 829 m)   Wt 77 6 kg (171 lb)   BMI 23 19 kg/m²   Smoking Status Former Smoker   BSA 1 99 m²     Body mass index is 23 19 kg/m²  Body surface area is 1 99 meters squared  GEN: Alert, awake oriented x3, in no acute distress  HEENT- No pallor, icterus, cyanosis, no oral mucosal lesions,   LAD - no palpable cervical, clavicle, axillary, inguinal LAD  Heart- normal S1 S2, regular rate and rhythm, No murmur, rubs     Lungs- clear breathing sound bilateral    Abdomen- soft, Non tender, bowel sounds present, large ventral hernia  Extremities- No cyanosis, clubbing, edema  Neuro- No focal neurological deficit    Labs:  Lab Results   Component Value Date    WBC 3 68 (L) 10/03/2022    HGB 9 3 (L) 10/03/2022    HCT 28 9 (L) 10/03/2022     (H) 10/03/2022     10/03/2022     Lab Results   Component Value Date    SODIUM 134 (L) 10/03/2022    K 3 7 10/03/2022     10/03/2022    CO2 27 10/03/2022    AGAP 4 10/03/2022    BUN 10 10/03/2022    CREATININE 0 64 10/03/2022    GLUC 97 10/03/2022    GLUF 126 (H) 07/30/2021    CALCIUM 8 1 (L) 10/03/2022    AST 14 10/03/2022    ALT 11 (L) 10/03/2022    ALKPHOS 105 10/03/2022    TP 7 7 10/03/2022    TBILI 0 36 10/03/2022    EGFR 94 10/03/2022

## 2022-10-03 NOTE — TELEPHONE ENCOUNTER
Dr Terri Slade added an additional 500 mL bolus over 1 hour  It is added in the plan  Please accommodate for this in the schedule tomorrow at [facility]

## 2022-10-04 ENCOUNTER — APPOINTMENT (OUTPATIENT)
Dept: RADIATION ONCOLOGY | Facility: HOSPITAL | Age: 77
End: 2022-10-04
Attending: INTERNAL MEDICINE

## 2022-10-04 ENCOUNTER — HOSPITAL ENCOUNTER (OUTPATIENT)
Dept: INFUSION CENTER | Facility: HOSPITAL | Age: 77
Discharge: HOME | End: 2022-10-04
Attending: INTERNAL MEDICINE
Payer: MEDICARE

## 2022-10-04 ENCOUNTER — APPOINTMENT (OUTPATIENT)
Dept: RADIATION ONCOLOGY | Facility: HOSPITAL | Age: 77
End: 2022-10-04

## 2022-10-04 ENCOUNTER — PATIENT OUTREACH (OUTPATIENT)
Dept: HEMATOLOGY ONCOLOGY | Facility: CLINIC | Age: 77
End: 2022-10-04

## 2022-10-04 VITALS
HEIGHT: 72 IN | RESPIRATION RATE: 18 BRPM | BODY MASS INDEX: 22.81 KG/M2 | OXYGEN SATURATION: 99 % | WEIGHT: 168.43 LBS | DIASTOLIC BLOOD PRESSURE: 54 MMHG | HEART RATE: 93 BPM | SYSTOLIC BLOOD PRESSURE: 108 MMHG | TEMPERATURE: 97.1 F

## 2022-10-04 DIAGNOSIS — C15.9 MALIGNANT NEOPLASM OF ESOPHAGUS, UNSPECIFIED LOCATION (HCC): Primary | ICD-10-CM

## 2022-10-04 DIAGNOSIS — E53.8 VITAMIN B12 DEFICIENCY: ICD-10-CM

## 2022-10-04 PROCEDURE — 96367 TX/PROPH/DG ADDL SEQ IV INF: CPT

## 2022-10-04 PROCEDURE — 96372 THER/PROPH/DIAG INJ SC/IM: CPT

## 2022-10-04 PROCEDURE — 96417 CHEMO IV INFUS EACH ADDL SEQ: CPT

## 2022-10-04 PROCEDURE — 96413 CHEMO IV INFUSION 1 HR: CPT

## 2022-10-04 RX ORDER — SODIUM CHLORIDE 9 MG/ML
20 INJECTION, SOLUTION INTRAVENOUS ONCE
Status: COMPLETED | OUTPATIENT
Start: 2022-10-04 | End: 2022-10-04

## 2022-10-04 RX ORDER — CYANOCOBALAMIN 1000 UG/ML
1000 INJECTION, SOLUTION INTRAMUSCULAR; SUBCUTANEOUS ONCE
Status: COMPLETED | OUTPATIENT
Start: 2022-10-04 | End: 2022-10-04

## 2022-10-04 RX ORDER — CYANOCOBALAMIN 1000 UG/ML
1000 INJECTION, SOLUTION INTRAMUSCULAR; SUBCUTANEOUS ONCE
OUTPATIENT
Start: 2022-10-11

## 2022-10-04 RX ADMIN — SODIUM CHLORIDE 500 ML: 0.9 INJECTION, SOLUTION INTRAVENOUS at 10:21

## 2022-10-04 RX ADMIN — CYANOCOBALAMIN 1000 MCG: 1000 INJECTION INTRAMUSCULAR; SUBCUTANEOUS at 12:56

## 2022-10-04 RX ADMIN — DEXAMETHASONE SODIUM PHOSPHATE: 10 INJECTION, SOLUTION INTRAMUSCULAR; INTRAVENOUS at 10:22

## 2022-10-04 RX ADMIN — PACLITAXEL 84.6 MG: 6 INJECTION, SOLUTION INTRAVENOUS at 11:45

## 2022-10-04 RX ADMIN — DIPHENHYDRAMINE HYDROCHLORIDE 25 MG: 50 INJECTION, SOLUTION INTRAMUSCULAR; INTRAVENOUS at 10:44

## 2022-10-04 RX ADMIN — SODIUM CHLORIDE 20 ML/HR: 9 INJECTION, SOLUTION INTRAVENOUS at 10:21

## 2022-10-04 RX ADMIN — CARBOPLATIN 192.15 MG: 10 INJECTION, SOLUTION INTRAVENOUS at 12:55

## 2022-10-04 RX ADMIN — FAMOTIDINE 20 MG: 10 INJECTION, SOLUTION INTRAVENOUS at 11:06

## 2022-10-04 NOTE — PROGRESS NOTES
Patient tolerated IV chemotherapy and L arm IM B12 injection without issue  Next appointments confirmed, AVS provided

## 2022-10-04 NOTE — PROGRESS NOTES
Confirmed with Dr Doyle Collet office/Phyllis Santacruz RN that today is patient's last treatment  Disregard Day 1, Cycle 6 in patient plan

## 2022-10-04 NOTE — PROGRESS NOTES
MSW met with the pt prior to his radiation treatment this day  Pt was going to his infusion after his treatment and was expressing his annoyance that this had to be done at another site  MSW apologized for this inconvenience and explained the issues withy scheduling  Pt was in a wheelchair as he arrived with transport and he reports that he is "doing great" at home  MSW offered to apply the pt to the waiver program, explained what that entailed and the services offered and he declined  Pt states that he is "fully independent and needs no help "  Pt states that his brother makes his meals  MSW offered MOW's and he declined this as well  MSW reminded the pt that if he changes his mind, these services are available to him  Support was offered and MSW will continue to follow

## 2022-10-05 ENCOUNTER — APPOINTMENT (OUTPATIENT)
Dept: RADIATION ONCOLOGY | Facility: HOSPITAL | Age: 77
End: 2022-10-05
Attending: INTERNAL MEDICINE

## 2022-10-06 ENCOUNTER — APPOINTMENT (OUTPATIENT)
Dept: RADIATION ONCOLOGY | Facility: HOSPITAL | Age: 77
End: 2022-10-06

## 2022-10-07 ENCOUNTER — APPOINTMENT (OUTPATIENT)
Dept: RADIATION ONCOLOGY | Facility: HOSPITAL | Age: 77
End: 2022-10-07
Attending: INTERNAL MEDICINE

## 2022-10-10 LAB — FUNGAL BLOOD CULTURE: NORMAL

## 2022-10-12 DIAGNOSIS — I95.9 HYPOTENSION: ICD-10-CM

## 2022-10-12 RX ORDER — MIDODRINE HYDROCHLORIDE 2.5 MG/1
2.5 TABLET ORAL
Qty: 270 TABLET | Refills: 1 | Status: SHIPPED | OUTPATIENT
Start: 2022-10-12 | End: 2022-11-11

## 2022-10-17 ENCOUNTER — HOSPITAL ENCOUNTER (INPATIENT)
Facility: HOSPITAL | Age: 77
LOS: 2 days | Discharge: HOME | End: 2022-10-20
Attending: EMERGENCY MEDICINE | Admitting: INTERNAL MEDICINE
Payer: MEDICARE

## 2022-10-17 ENCOUNTER — APPOINTMENT (EMERGENCY)
Dept: RADIOLOGY | Facility: HOSPITAL | Age: 77
End: 2022-10-17
Payer: MEDICARE
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
## 2022-10-17 DIAGNOSIS — R26.2 AMBULATORY DYSFUNCTION: ICD-10-CM

## 2022-10-17 DIAGNOSIS — E43 SEVERE PROTEIN-CALORIE MALNUTRITION (HCC): ICD-10-CM

## 2022-10-17 DIAGNOSIS — R62.7 FAILURE TO THRIVE IN ADULT: Primary | ICD-10-CM

## 2022-10-17 DIAGNOSIS — C15.9 MALIGNANT NEOPLASM OF ESOPHAGUS, UNSPECIFIED LOCATION (HCC): ICD-10-CM

## 2022-10-17 DIAGNOSIS — R53.1 GENERALIZED WEAKNESS: ICD-10-CM

## 2022-10-17 PROBLEM — R11.2 NAUSEA & VOMITING: Status: ACTIVE | Noted: 2022-10-17

## 2022-10-17 LAB
ANION GAP SERPL CALCULATED.3IONS-SCNC: 5 MMOL/L (ref 4–13)
ATRIAL RATE: 125 BPM
BASOPHILS # BLD AUTO: 0.04 THOUSANDS/ÂΜL (ref 0–0.1)
BASOPHILS NFR BLD AUTO: 1 % (ref 0–1)
BUN SERPL-MCNC: 15 MG/DL (ref 5–25)
CALCIUM SERPL-MCNC: 8.7 MG/DL (ref 8.3–10.1)
CHLORIDE SERPL-SCNC: 100 MMOL/L (ref 96–108)
CO2 SERPL-SCNC: 30 MMOL/L (ref 21–32)
CREAT SERPL-MCNC: 0.74 MG/DL (ref 0.6–1.3)
EOSINOPHIL # BLD AUTO: 0.19 THOUSAND/ÂΜL (ref 0–0.61)
EOSINOPHIL NFR BLD AUTO: 4 % (ref 0–6)
ERYTHROCYTE [DISTWIDTH] IN BLOOD BY AUTOMATED COUNT: 25.2 % (ref 11.6–15.1)
FUNGAL BLOOD CULTURE: NORMAL
GFR SERPL CREATININE-BSD FRML MDRD: 88 ML/MIN/1.73SQ M
GLUCOSE SERPL-MCNC: 100 MG/DL (ref 65–140)
HCT VFR BLD AUTO: 29.1 % (ref 36.5–49.3)
HGB BLD-MCNC: 9.5 G/DL (ref 12–17)
IMM GRANULOCYTES # BLD AUTO: 0.01 THOUSAND/UL (ref 0–0.2)
IMM GRANULOCYTES NFR BLD AUTO: 0 % (ref 0–2)
LIPASE SERPL-CCNC: 65 U/L (ref 73–393)
LYMPHOCYTES # BLD AUTO: 0.26 THOUSANDS/ÂΜL (ref 0.6–4.47)
LYMPHOCYTES NFR BLD AUTO: 6 % (ref 14–44)
MCH RBC QN AUTO: 33.3 PG (ref 26.8–34.3)
MCHC RBC AUTO-ENTMCNC: 32.6 G/DL (ref 31.4–37.4)
MCV RBC AUTO: 102 FL (ref 82–98)
MONOCYTES # BLD AUTO: 0.8 THOUSAND/ÂΜL (ref 0.17–1.22)
MONOCYTES NFR BLD AUTO: 17 % (ref 4–12)
NEUTROPHILS # BLD AUTO: 3.47 THOUSANDS/ÂΜL (ref 1.85–7.62)
NEUTS SEG NFR BLD AUTO: 72 % (ref 43–75)
NRBC BLD AUTO-RTO: 0 /100 WBCS
PLATELET # BLD AUTO: 92 THOUSANDS/UL (ref 149–390)
POTASSIUM SERPL-SCNC: 3.1 MMOL/L (ref 3.5–5.3)
QRS AXIS: -19 DEGREES
QRSD INTERVAL: 84 MS
QT INTERVAL: 340 MS
QTC INTERVAL: 404 MS
RBC # BLD AUTO: 2.85 MILLION/UL (ref 3.88–5.62)
SODIUM SERPL-SCNC: 135 MMOL/L (ref 135–147)
T WAVE AXIS: 3 DEGREES
VENTRICULAR RATE: 85 BPM
WBC # BLD AUTO: 4.77 THOUSAND/UL (ref 4.31–10.16)

## 2022-10-17 PROCEDURE — 93005 ELECTROCARDIOGRAM TRACING: CPT

## 2022-10-17 PROCEDURE — 36415 COLL VENOUS BLD VENIPUNCTURE: CPT

## 2022-10-17 PROCEDURE — 85025 COMPLETE CBC W/AUTO DIFF WBC: CPT

## 2022-10-17 PROCEDURE — 99285 EMERGENCY DEPT VISIT HI MDM: CPT | Performed by: EMERGENCY MEDICINE

## 2022-10-17 PROCEDURE — 83690 ASSAY OF LIPASE: CPT

## 2022-10-17 PROCEDURE — 93010 ELECTROCARDIOGRAM REPORT: CPT | Performed by: INTERNAL MEDICINE

## 2022-10-17 PROCEDURE — 80048 BASIC METABOLIC PNL TOTAL CA: CPT | Performed by: EMERGENCY MEDICINE

## 2022-10-17 PROCEDURE — 71045 X-RAY EXAM CHEST 1 VIEW: CPT

## 2022-10-17 RX ORDER — MIDODRINE HYDROCHLORIDE 2.5 MG/1
2.5 TABLET ORAL
Status: DISCONTINUED | OUTPATIENT
Start: 2022-10-17 | End: 2022-10-20 | Stop reason: HOSPADM

## 2022-10-17 RX ORDER — ONDANSETRON 4 MG/1
4 TABLET, ORALLY DISINTEGRATING ORAL EVERY 6 HOURS PRN
Status: DISCONTINUED | OUTPATIENT
Start: 2022-10-17 | End: 2022-10-20 | Stop reason: HOSPADM

## 2022-10-17 RX ORDER — PHENOBARBITAL 64.8 MG/1
64.8 TABLET ORAL 2 TIMES DAILY
Status: DISCONTINUED | OUTPATIENT
Start: 2022-10-17 | End: 2022-10-20 | Stop reason: HOSPADM

## 2022-10-17 RX ORDER — ENOXAPARIN SODIUM 100 MG/ML
40 INJECTION SUBCUTANEOUS DAILY
Status: DISCONTINUED | OUTPATIENT
Start: 2022-10-18 | End: 2022-10-20 | Stop reason: HOSPADM

## 2022-10-17 RX ORDER — POTASSIUM CHLORIDE 750 MG/1
10 TABLET, EXTENDED RELEASE ORAL 2 TIMES DAILY
Status: DISCONTINUED | OUTPATIENT
Start: 2022-10-17 | End: 2022-10-20 | Stop reason: HOSPADM

## 2022-10-17 RX ORDER — DEXTROSE, SODIUM CHLORIDE, SODIUM LACTATE, POTASSIUM CHLORIDE, AND CALCIUM CHLORIDE 5; .6; .31; .03; .02 G/100ML; G/100ML; G/100ML; G/100ML; G/100ML
125 INJECTION, SOLUTION INTRAVENOUS CONTINUOUS
Status: DISPENSED | OUTPATIENT
Start: 2022-10-17 | End: 2022-10-19

## 2022-10-17 RX ORDER — POTASSIUM CHLORIDE 20 MEQ/1
40 TABLET, EXTENDED RELEASE ORAL ONCE
Status: DISCONTINUED | OUTPATIENT
Start: 2022-10-17 | End: 2022-10-17

## 2022-10-17 RX ORDER — ATORVASTATIN CALCIUM 80 MG/1
80 TABLET, FILM COATED ORAL
Status: DISCONTINUED | OUTPATIENT
Start: 2022-10-17 | End: 2022-10-20 | Stop reason: HOSPADM

## 2022-10-17 RX ORDER — ONDANSETRON 2 MG/ML
4 INJECTION INTRAMUSCULAR; INTRAVENOUS ONCE
Status: COMPLETED | OUTPATIENT
Start: 2022-10-17 | End: 2022-10-17

## 2022-10-17 RX ORDER — POTASSIUM CHLORIDE 20MEQ/15ML
40 LIQUID (ML) ORAL ONCE
Status: COMPLETED | OUTPATIENT
Start: 2022-10-17 | End: 2022-10-17

## 2022-10-17 RX ORDER — ASPIRIN 81 MG/1
81 TABLET ORAL DAILY
Status: DISCONTINUED | OUTPATIENT
Start: 2022-10-18 | End: 2022-10-20 | Stop reason: HOSPADM

## 2022-10-17 RX ADMIN — PHENOBARBITAL 64.8 MG: 64.8 TABLET ORAL at 18:30

## 2022-10-17 RX ADMIN — POTASSIUM CHLORIDE 40 MEQ: 20 SOLUTION ORAL at 17:03

## 2022-10-17 RX ADMIN — POTASSIUM CHLORIDE 10 MEQ: 750 TABLET, EXTENDED RELEASE ORAL at 20:21

## 2022-10-17 RX ADMIN — CARBIDOPA AND LEVODOPA 2.5 MG: 50; 200 TABLET, EXTENDED RELEASE ORAL at 20:21

## 2022-10-17 RX ADMIN — ATORVASTATIN CALCIUM 80 MG: 80 TABLET, FILM COATED ORAL at 18:30

## 2022-10-17 RX ADMIN — ONDANSETRON 4 MG: 2 INJECTION INTRAMUSCULAR; INTRAVENOUS at 18:30

## 2022-10-17 RX ADMIN — DEXTROSE, SODIUM CHLORIDE, SODIUM LACTATE, POTASSIUM CHLORIDE, AND CALCIUM CHLORIDE 125 ML/HR: 5; .6; .31; .03; .02 INJECTION, SOLUTION INTRAVENOUS at 18:31

## 2022-10-17 NOTE — ED ATTENDING ATTESTATION
10/17/2022  I, Elon Baumgarten, DO, saw and evaluated the patient  I have discussed the patient with the resident/non-physician practitioner and agree with the resident's/non-physician practitioner's findings, Plan of Care, and MDM as documented in the resident's/non-physician practitioner's note, except where noted  All available labs and Radiology studies were reviewed  I was present for key portions of any procedure(s) performed by the resident/non-physician practitioner and I was immediately available to provide assistance  At this point I agree with the current assessment done in the Emergency Department  I have conducted an independent evaluation of this patient a history and physical is as follows:    History provided by:  Patient  Fatigue  Severity:  Mild  Onset quality:  Gradual  Timing:  Intermittent  Progression:  Waxing and waning  Relieved by:  Nothing  Worsened by:  Nothing  Ineffective treatments:  None tried  Associated symptoms: no abdominal pain, no chest pain, no cough, no diarrhea, no dizziness, no dysuria, no fever, no headaches, no myalgias, no nausea, no shortness of breath and no urgency    Associated symptoms comment:  Falls noted,    are as follows BP 99/52   Pulse 88   Resp 18   SpO2 94%   Review of Systems Review of Systems   Constitutional: Positive for fatigue  Negative for chills and fever  HENT: Negative for rhinorrhea, sore throat and trouble swallowing  Eyes: Negative for pain  Respiratory: Negative for cough, shortness of breath, wheezing and stridor  Cardiovascular: Negative for chest pain and leg swelling  Gastrointestinal: Negative for abdominal pain, diarrhea and nausea  Endocrine: Negative for polyuria  Genitourinary: Negative for dysuria, flank pain and urgency  Musculoskeletal: Negative for joint swelling, myalgias and neck stiffness  Skin: Negative for rash  Allergic/Immunologic: Negative for immunocompromised state     Neurological: Negative for dizziness, syncope, weakness, numbness and headaches  Psychiatric/Behavioral: Negative for confusion and suicidal ideas  All other systems reviewed and are negative  Physical Exam remarkable for Physical Exam  Vitals and nursing note reviewed  Constitutional:       Appearance: Normal appearance  He is well-developed  HENT:      Head: Normocephalic and atraumatic  Nose: Nose normal       Mouth/Throat:      Mouth: Mucous membranes are moist    Eyes:      Extraocular Movements: Extraocular movements intact  Pupils: Pupils are equal, round, and reactive to light  Cardiovascular:      Rate and Rhythm: Normal rate and regular rhythm  Heart sounds: No murmur heard  No friction rub  Pulmonary:      Effort: No respiratory distress  Breath sounds: Normal breath sounds  No wheezing or rales  Abdominal:      General: Bowel sounds are normal  There is no distension  Palpations: Abdomen is soft  Tenderness: There is no abdominal tenderness  Musculoskeletal:         General: No tenderness  Normal range of motion  Cervical back: Normal range of motion and neck supple  Skin:     General: Skin is warm  Findings: No rash  Neurological:      Mental Status: He is alert and oriented to person, place, and time  Psychiatric:         Mood and Affect: Mood normal       Work up and treatment plan discussed with Treatment Team: Attending Provider: Gretchen Fernandes MD; Resident: Serg Oliveira DO; Registered Nurse: Edison Alegria RN; Resident: Julia Barkley MD and agreed upon plan  MDM  Number of Diagnoses or Management Options  Ambulatory dysfunction: new, needed workup  Fall, initial encounter: new, needed workup  Generalized weakness: new, needed workup  Diagnosis management comments: 76M with noted multiple falls, generalized weakness noted currently with esophageal ca ongoing treatment    Difficult ambulation today some slight hypokalemia noted failure to thrive noted will admit for further management and evaluation       Amount and/or Complexity of Data Reviewed  Clinical lab tests: ordered and reviewed  Tests in the radiology section of CPT®: ordered and reviewed  Review and summarize past medical records: yes  Independent visualization of images, tracings, or specimens: yes           Clinical Impression:    Final diagnoses:   Generalized weakness   Ambulatory dysfunction   Fall, initial encounter         Disposition    admitted to the hospital           New Prescriptions:    New Prescriptions    No medications on file            Follow-up Instructions:    No follow-up provider specified        ED Course         Critical Care Time  Procedures

## 2022-10-17 NOTE — H&P
INTERNAL MEDICINE RESIDENCY ADMISSION H&P     Name: Spike Hill   Age & Sex: 67 y o  male   MRN: [id number]  Unit/Bed#: ED 19   Encounter: [id number]  Primary Care Provider: Jhonny Brambila MD    Code Status: Prior  Admission Status: OBSERVATION  Disposition: Patient requires Med/Surg    Admit to team: SOD Team A    ASSESSMENT/PLAN     Principal Problem:    Generalized weakness  Active Problems:    Chronic combined systolic and diastolic CHF (congestive heart failure) (HCC)    Paroxysmal atrial fibrillation (HCC)    Esophageal cancer (HCC)    Nausea & vomiting    Hypotension    Coronary artery disease of native artery of native heart with stable angina pectoris ([facility] [address] )      * Generalized weakness  Assessment & Plan  Patient with 5 days of progressive weakness in the setting of nausea and vomiting related to his chemotherapy and radiation  Likely secondary to lack of p o  Intake  Plan:  · D5 lactated Ringer's  · Zofran IV x1 and po p r n  · PT OT  · Case management    Nausea & vomiting  Assessment & Plan  Patient with nausea and vomiting preventing him from p o  Intake  States that secondary to his recent chemo radiation treatment  Has some associated epigastric tenderness  Plan:  · Check lipase though low likelihood of pancreatitis  · IV fluids  · IV Zofran and p r n  Zofran p o  · Clear liquid diet  · Mag and phos draws in the morning    Esophageal cancer [facility])  Assessment & Plan  Patient with stage II B esophageal cancer diagnosed in May 2022  Has recently completed  carboplatin pack with taxol chemotherapy as well as radiotherapy  Follows outpatient with heme/onc   Cancer treatment is likely contributing to his nausea and vomiting  Paroxysmal atrial fibrillation [facility])  Assessment & Plan  Documented Hx of paroxysmal atrial fibrillation, Julissa Vasc of 5   Previously on warfarin for left apical thrombus, however has since been discontinued by his cardiologist   Last seen by outpatient Cardiology 06/2022  Currently normal sinus in the ED      Plan:  · Patient not currently on any home beta-blocker regiment  · F/u outpatient      Chronic combined systolic and diastolic CHF (congestive heart failure) ([facility])  Assessment & Plan  Wt Readings from Last 3 Encounters:   10/04/22 76 4 kg (168 lb 6 9 oz)   09/30/22 77 6 kg (171 lb)   09/20/22 81 6 kg (180 lb)       Documented history of chronic combined systolic and diastolic heart failure  Last echo performed 03/15/2022 showed LVEF 50% with G1 DD, apical akinesis, T BMP with mild regurg  Patient not fluid overloaded on exam     Plan:  · Continue to monitor          Hypotension  Assessment & Plan  Documented history of hypotension, on midodrine 2 5 mg t i d  Blood pressure in the high 69B low 334F systolic this admission  Plan:  Continue midodrine 2 5 mg t i d     Coronary artery disease of native artery of native heart with stable angina pectoris [facility])  Assessment & Plan  Patient with a history of CAD status post AGUSTIN x4, has follow-up with cardiology as an outpatient  Most recently completed 6 months of dual antiplatelet therapy  Currently on baby aspirin and atorvastatin daily  No chest pain        Plan  · Continue aspirin, statin for secondary prevention        VTE Pharmacologic Prophylaxis: Heparin  VTE Mechanical Prophylaxis: sequential compression device    CHIEF COMPLAINT     Chief Complaint   Patient presents with   • Weakness - Generalized     Generalized weakness  Recently finished radiation for esophageal cancer  HISTORY OF PRESENT ILLNESS     Carlie Kelley is a 67year old male with a past medical history of HLD, CAD, HFpEF 50%, distant hx of seizures on chronic barbiturates, cT2N0 esophageal adenocarcinoma of the distal esophagus s/p paclitaxel and carboplatin chemotherapy with radiation who presents on 10/17/2022 with complaints of generalized weakness       Patient states that over the past 5 days he has been experiencing progressive weakness and difficulty walking  He has associated nausea and vomiting that he attributes to his recent chemotherapy and radiation procedures and has had decreased p o  Intake as result  He believes that this lack of eating has made him weak  He reports falling in his house 2 times today due to his weakness, denies any head strike or loss of consciousness palpitations or seizure-like activity  The please for actually called an initially helped him into his bed, however after his 2nd fall the patient decided to call EMS to [facility] to be checked out  Patient denies any fever chills, cough, shortness of breath or wheeze, chest pain or heart palpitations  He does endorse nausea with vomiting, worse with food intake  He denies any odynophagia or dysphagia  There is no blood in his vomit  States that he has been able to tolerate small amounts of serial over the past 5 days has only been able to drink water    Upon arriving to the ED, patient afebrile, heart rate 75, respiratory rate 18, /56 satting 94% on room air  Labs with only notable for potassium of 3 1   no leukocytosis, hemoglobin 9 5 at baseline, creatinine 0 74 with an EGFR of 88  EKG with normal sinus and normal QTC  Chest x-ray was performed with no acute cardiopulmonary disease and stable distal esophageal stent  Patient received 40 mEq of K-Dur in the ED but did not tolerate it due to his nausea  Patient will be admitted to [place] [facility] for  management of his generalized weakness  Patient is level 3 DNR DNI  REVIEW OF SYSTEMS     Review of Systems   Constitutional: Negative for chills and fever  HENT: Negative for ear pain and sore throat  Eyes: Negative for pain and visual disturbance  Respiratory: Negative for cough, choking, shortness of breath and wheezing  Cardiovascular: Negative for chest pain and palpitations     Gastrointestinal: Positive for abdominal pain (Epigastric ), nausea and vomiting  Negative for blood in stool, constipation and diarrhea  Genitourinary: Negative for dysuria and hematuria  Musculoskeletal: Negative for arthralgias and back pain  Skin: Negative for color change and rash  Neurological: Negative for dizziness, seizures, syncope, light-headedness and headaches  All other systems reviewed and are negative  OBJECTIVE     Vitals:    10/17/22 1324 10/17/22 1559   BP: 102/56 99/52   Pulse: 75 88   Resp: 18 18   SpO2: 94% 94%      Temperature:   No data recorded  Intake & Output:  I/O     None        Weights: There is no height or weight on file to calculate BMI  Weight (last 2 days)     None        Physical Exam  Vitals and nursing note reviewed  Constitutional:       General: He is not in acute distress  Appearance: He is well-developed  He is ill-appearing  HENT:      Head: Normocephalic and atraumatic  Right Ear: External ear normal       Left Ear: External ear normal       Mouth/Throat:      Mouth: Mucous membranes are dry  Eyes:      Extraocular Movements: Extraocular movements intact  Conjunctiva/sclera: Conjunctivae normal       Pupils: Pupils are equal, round, and reactive to light  Cardiovascular:      Rate and Rhythm: Normal rate and regular rhythm  Pulses: Normal pulses  Heart sounds: Normal heart sounds  No murmur heard  No friction rub  No gallop  Pulmonary:      Effort: Pulmonary effort is normal  No respiratory distress  Breath sounds: Normal breath sounds  No wheezing, rhonchi or rales  Abdominal:      General: Bowel sounds are normal  There is no distension  Palpations: Abdomen is soft  There is no mass  Tenderness: There is abdominal tenderness (Mild epigastric)  There is no guarding  Musculoskeletal:         General: No swelling or deformity  Cervical back: Neck supple  Right lower leg: No edema  Left lower leg: No edema  Skin:     General: Skin is warm and dry  Capillary Refill: Capillary refill takes less than 2 seconds  Coloration: Skin is not jaundiced  Findings: No bruising, lesion or rash  Neurological:      General: No focal deficit present  Mental Status: He is alert and oriented to person, place, and time  Motor: Weakness ( generalized) present  HEENT: No scleral icterus, no conjunctivitis, no uveitis  CV: S1, S2 present  Pulm: Clear to auscultation, No rales, rhonchi, crackles  Abd: Tenderness on palpation epigastric region      PAST MEDICAL HISTORY     Past Medical History:   Diagnosis Date   • Cardiac disease    • CHF (congestive heart failure) ([facility] [address] )    • Esophageal cancer (HCC)    • Hernia of abdominal cavity    • Myocardial infarction [facility])     last assessed 14, past, Pt had MI s/p AGUSTIN placed in , refuses to take any other medications for his cardiac disease, only will take seizure med  Understands possible complications from this decision   • Seizure ([facility] )      PAST SURGICAL HISTORY     Past Surgical History:   Procedure Laterality Date   • ABDOMINAL AORTIC ANEURYSM REPAIR      for dialation or occulsion   • CATARACT EXTRACTION     • IR PORT PLACEMENT  7/15/2022     SOCIAL & FAMILY HISTORY     Social History     Substance and Sexual Activity   Alcohol Use Not Currently     Social History     Substance and Sexual Activity   Drug Use Never     Social History     Tobacco Use   Smoking Status Former Smoker   • Quit date: 2005   • Years since quittin 3   Smokeless Tobacco Never Used     Family History   Problem Relation Age of Onset   • Hypertension Father    • Kidney disease Brother      LABORATORY DATA     Labs: I have personally reviewed pertinent reports      Results from last 7 days   Lab Units 10/17/22  1412   WBC Thousand/uL 4 77   HEMOGLOBIN g/dL 9 5*   HEMATOCRIT % 29 1*   PLATELETS Thousands/uL 92*   NEUTROS PCT % 72   MONOS PCT % 17*      Results from last 7 days   Lab Units 10/17/22  1532   POTASSIUM mmol/L 3 1*   CHLORIDE mmol/L 100   CO2 mmol/L 30   BUN mg/dL 15   CREATININE mg/dL 0 74   CALCIUM mg/dL 8 7                          Micro:  Lab Results   Component Value Date    BLOODCX No Growth After 5 Days  09/12/2022    BLOODCX No Growth After 5 Days  09/12/2022    BLOODCX No Growth After 5 Days  09/10/2022    BLOODCX No Growth After 5 Days  09/10/2022     IMAGING & DIAGNOSTIC TESTS     Imaging: I have personally reviewed pertinent reports  XR chest 1 view portable    Result Date: 10/17/2022  Impression: No acute cardiopulmonary disease  Findings are stable Workstation performed: [id number]     EKG, Pathology, and Other Studies: I have personally reviewed pertinent reports  ALLERGIES     Allergies   Allergen Reactions   • Iodinated Diagnostic Agents Rash     Pt's skin get very red and he gets hot and chills   • Clopidogrel Rash     MEDICATIONS PRIOR TO ARRIVAL     Prior to Admission medications    Medication Sig Start Date End Date Taking?  Authorizing Provider   aspirin (Aspirin Low Dose) 81 mg EC tablet Take 1 tablet (81 mg total) by mouth daily 1/24/22   Ladonna More DO   atorvastatin (LIPITOR) 80 mg tablet Take 1 tablet (80 mg total) by mouth daily with dinner 3/11/22   Monica Puente MD   folic acid ( Folic Acid) 1 mg tablet Take 1 tablet (1 mg total) by mouth daily  Patient not taking: No sig reported 8/30/22   Lian Luu, DO   lidocaine-prilocaine (EMLA) cream Apply to port 30 to 60 min prior to use  Patient not taking: No sig reported 7/26/22   Lian Luu, DO   midodrine (PROAMATINE) 2 5 mg tablet TAKE 1 TABLET (2 5 MG TOTAL) BY MOUTH 3 (THREE) TIMES A DAY BEFORE MEALS 10/12/22 11/11/22  Het YOANNA Bills, DO   ondansetron (Zofran ODT) 8 mg disintegrating tablet Take 1 tablet (8 mg total) by mouth every 8 (eight) hours as needed for nausea or vomiting  Patient not taking: No sig reported 7/26/22   Lian Luu, DO   ondansetron (ZOFRAN) 4 mg tablet Take 1 tablet (4 mg total) by mouth every 8 (eight) hours as needed for nausea or vomiting 9/25/22   James Bhardwaj MD   pantoprazole (PROTONIX) 40 mg tablet TAKE 1 TABLET BY MOUTH 2 TIMES A DAY BEFORE MEALS  Patient not taking: No sig reported 5/31/22   Ambrocio Araya DO   PHENobarbital 64 8 mg tablet TAKE 1 TABLET (64 8 MG TOTAL) BY MOUTH 2 (TWO) TIMES A DAY 6/6/22 12/3/22  Madelaine Boeck, DO   potassium chloride (MICRO-K) 10 MEQ CR capsule Take 1 capsule (10 mEq total) by mouth 2 (two) times a day for 7 days 9/16/22 9/23/22  Tracie Bills DO   prochlorperazine (COMPAZINE) 10 mg tablet Take 1 tablet (10 mg total) by mouth every 6 (six) hours as needed for nausea or vomiting  Patient not taking: No sig reported 7/26/22   Dale Carpenter DO     MEDICATIONS ADMINISTERED IN LAST 24 HOURS     Medication Administration - last 24 hours from 10/16/2022 1708 to 10/17/2022 1708       Date/Time Order Dose Route Action Action by     10/17/2022 1658 potassium chloride (K-DUR,KLOR-CON) CR tablet 40 mEq   Oral Canceled Entry Sharmila Hermosillo RN     10/17/2022 1703 potassium chloride oral solution 40 mEq 40 mEq Oral Given Sharmila Hermosillo RN        CURRENT MEDICATIONS     dextrose 5% lactated ringer's, 125 mL/hr, Last Rate: 125 mL/hr (10/17/22 1831)          Admission Time  I spent 20 minutes admitting the patient  This involved direct patient contact where I performed a full history and physical, reviewing previous records, and reviewing laboratory and other diagnostic studies  Portions of the record may have been created with voice recognition software  Occasional wrong word or "sound a like" substitutions may have occurred due to the inherent limitations of voice recognition software    Read the chart carefully and recognize, using context, where substitutions have occurred     ==  [address] [place]  Internal Medicine Residency PGY-2

## 2022-10-18 ENCOUNTER — TELEPHONE (OUTPATIENT)
Dept: HEMATOLOGY ONCOLOGY | Facility: HOSPITAL | Age: 77
End: 2022-10-18

## 2022-10-18 DIAGNOSIS — C15.9 ESOPHAGEAL ADENOCARCINOMA (HCC): Primary | ICD-10-CM

## 2022-10-18 DIAGNOSIS — C15.5 MALIGNANT NEOPLASM OF LOWER THIRD OF ESOPHAGUS (HCC): ICD-10-CM

## 2022-10-18 LAB
ANION GAP SERPL CALCULATED.3IONS-SCNC: 5 MMOL/L (ref 4–13)
BASOPHILS # BLD AUTO: 0.03 THOUSANDS/ÂΜL (ref 0–0.1)
BASOPHILS NFR BLD AUTO: 1 % (ref 0–1)
BUN SERPL-MCNC: 11 MG/DL (ref 5–25)
CALCIUM SERPL-MCNC: 8.1 MG/DL (ref 8.3–10.1)
CHLORIDE SERPL-SCNC: 105 MMOL/L (ref 96–108)
CO2 SERPL-SCNC: 29 MMOL/L (ref 21–32)
CREAT SERPL-MCNC: 0.66 MG/DL (ref 0.6–1.3)
EOSINOPHIL # BLD AUTO: 0.31 THOUSAND/ÂΜL (ref 0–0.61)
EOSINOPHIL NFR BLD AUTO: 7 % (ref 0–6)
ERYTHROCYTE [DISTWIDTH] IN BLOOD BY AUTOMATED COUNT: 24.9 % (ref 11.6–15.1)
GFR SERPL CREATININE-BSD FRML MDRD: 93 ML/MIN/1.73SQ M
GLUCOSE P FAST SERPL-MCNC: 139 MG/DL (ref 65–99)
GLUCOSE SERPL-MCNC: 139 MG/DL (ref 65–140)
HCT VFR BLD AUTO: 26.7 % (ref 36.5–49.3)
HGB BLD-MCNC: 8.8 G/DL (ref 12–17)
IMM GRANULOCYTES # BLD AUTO: 0.03 THOUSAND/UL (ref 0–0.2)
IMM GRANULOCYTES NFR BLD AUTO: 1 % (ref 0–2)
LYMPHOCYTES # BLD AUTO: 0.23 THOUSANDS/ÂΜL (ref 0.6–4.47)
LYMPHOCYTES NFR BLD AUTO: 5 % (ref 14–44)
MAGNESIUM SERPL-MCNC: 1.8 MG/DL (ref 1.6–2.6)
MCH RBC QN AUTO: 33.7 PG (ref 26.8–34.3)
MCHC RBC AUTO-ENTMCNC: 33 G/DL (ref 31.4–37.4)
MCV RBC AUTO: 102 FL (ref 82–98)
MONOCYTES # BLD AUTO: 0.56 THOUSAND/ÂΜL (ref 0.17–1.22)
MONOCYTES NFR BLD AUTO: 13 % (ref 4–12)
NEUTROPHILS # BLD AUTO: 3.19 THOUSANDS/ÂΜL (ref 1.85–7.62)
NEUTS SEG NFR BLD AUTO: 73 % (ref 43–75)
NRBC BLD AUTO-RTO: 0 /100 WBCS
PHOSPHATE SERPL-MCNC: 2.1 MG/DL (ref 2.3–4.1)
PLATELET # BLD AUTO: 96 THOUSANDS/UL (ref 149–390)
POTASSIUM SERPL-SCNC: 3.6 MMOL/L (ref 3.5–5.3)
RBC # BLD AUTO: 2.61 MILLION/UL (ref 3.88–5.62)
SODIUM SERPL-SCNC: 139 MMOL/L (ref 135–147)
WBC # BLD AUTO: 4.35 THOUSAND/UL (ref 4.31–10.16)

## 2022-10-18 PROCEDURE — 97166 OT EVAL MOD COMPLEX 45 MIN: CPT

## 2022-10-18 PROCEDURE — NC001 PR NO CHARGE: Performed by: INTERNAL MEDICINE

## 2022-10-18 PROCEDURE — 99222 1ST HOSP IP/OBS MODERATE 55: CPT | Performed by: INTERNAL MEDICINE

## 2022-10-18 PROCEDURE — 84100 ASSAY OF PHOSPHORUS: CPT

## 2022-10-18 PROCEDURE — 80048 BASIC METABOLIC PNL TOTAL CA: CPT

## 2022-10-18 PROCEDURE — 83735 ASSAY OF MAGNESIUM: CPT

## 2022-10-18 PROCEDURE — 85025 COMPLETE CBC W/AUTO DIFF WBC: CPT

## 2022-10-18 PROCEDURE — 97163 PT EVAL HIGH COMPLEX 45 MIN: CPT

## 2022-10-18 RX ADMIN — CARBIDOPA AND LEVODOPA 2.5 MG: 50; 200 TABLET, EXTENDED RELEASE ORAL at 06:43

## 2022-10-18 RX ADMIN — CARBIDOPA AND LEVODOPA 2.5 MG: 50; 200 TABLET, EXTENDED RELEASE ORAL at 10:23

## 2022-10-18 RX ADMIN — POTASSIUM CHLORIDE 10 MEQ: 750 TABLET, EXTENDED RELEASE ORAL at 09:33

## 2022-10-18 RX ADMIN — PHENOBARBITAL 64.8 MG: 64.8 TABLET ORAL at 17:12

## 2022-10-18 RX ADMIN — POTASSIUM CHLORIDE 10 MEQ: 750 TABLET, EXTENDED RELEASE ORAL at 17:12

## 2022-10-18 RX ADMIN — DEXTROSE, SODIUM CHLORIDE, SODIUM LACTATE, POTASSIUM CHLORIDE, AND CALCIUM CHLORIDE 125 ML/HR: 5; .6; .31; .03; .02 INJECTION, SOLUTION INTRAVENOUS at 22:24

## 2022-10-18 RX ADMIN — ONDANSETRON 4 MG: 4 TABLET, ORALLY DISINTEGRATING ORAL at 12:13

## 2022-10-18 RX ADMIN — ATORVASTATIN CALCIUM 80 MG: 80 TABLET, FILM COATED ORAL at 17:12

## 2022-10-18 RX ADMIN — PHENOBARBITAL 64.8 MG: 64.8 TABLET ORAL at 09:33

## 2022-10-18 RX ADMIN — DEXTROSE, SODIUM CHLORIDE, SODIUM LACTATE, POTASSIUM CHLORIDE, AND CALCIUM CHLORIDE 125 ML/HR: 5; .6; .31; .03; .02 INJECTION, SOLUTION INTRAVENOUS at 03:45

## 2022-10-18 RX ADMIN — ASPIRIN 81 MG: 81 TABLET, COATED ORAL at 09:33

## 2022-10-18 RX ADMIN — CARBIDOPA AND LEVODOPA 2.5 MG: 50; 200 TABLET, EXTENDED RELEASE ORAL at 17:12

## 2022-10-18 RX ADMIN — ENOXAPARIN SODIUM 40 MG: 40 INJECTION SUBCUTANEOUS at 09:31

## 2022-10-18 RX ADMIN — DEXTROSE, SODIUM CHLORIDE, SODIUM LACTATE, POTASSIUM CHLORIDE, AND CALCIUM CHLORIDE 125 ML/HR: 5; .6; .31; .03; .02 INJECTION, SOLUTION INTRAVENOUS at 12:13

## 2022-10-18 NOTE — ASSESSMENT & PLAN NOTE
Documented history of hypotension, on midodrine 2 5 mg t i d  Blood pressure in the high 23C low 991S systolic this admission      Plan:  Continue midodrine 2 5 mg t i d

## 2022-10-18 NOTE — ASSESSMENT & PLAN NOTE
Patient with nausea and vomiting preventing him from p o  Intake  States that secondary to his recent chemo radiation treatment  Has some associated epigastric tenderness  Plan:  · Check lipase though low likelihood of pancreatitis  · IV fluids  · IV Zofran and p r n  Zofran p o    · Mag of 1 8 and Phos of 2 1 10/18/22  · Advance to normal diet

## 2022-10-18 NOTE — PROGRESS NOTES
INTERNAL MEDICINE RESIDENCY PROGRESS NOTE     Name: Morro Franklin   Age & Sex: 67 y o  male   MRN: [id number]  Unit/Bed#: [facility] 629-01   Encounter: [id number]  Team: SOD Team A    PATIENT INFORMATION     Name: Morro Franklin   Age & Sex: 67 y o  male   MRN: [id number]  Hospital Stay Days: 0    ASSESSMENT/PLAN     Principal Problem:    Generalized weakness  Active Problems:    Chronic combined systolic and diastolic CHF (congestive heart failure) ([facility])    Paroxysmal atrial fibrillation ([facility] [address] )    Coronary artery disease of native artery of native heart with stable angina pectoris (HCC)    [facility] ([facility] )    Hypotension    Nausea & vomiting      Nausea & vomiting  Assessment & Plan  Patient with nausea and vomiting preventing him from p o  Intake  States that secondary to his recent chemo radiation treatment  Has some associated epigastric tenderness  Plan:  · Check lipase though low likelihood of pancreatitis  · IV fluids  · IV Zofran and p r n  Zofran p o  · Mag of 1 8 and Phos of 2 1 10/18/22  · Advance to soft diet      Hypotension  Assessment & Plan  Documented history of hypotension, on midodrine 2 5 mg t i d  Blood pressure in the high 85R low 312Q systolic this admission  Plan:  Continue midodrine 2 5 mg t i d     Esophageal cancer [facility])  Assessment & Plan  Patient with stage II B esophageal cancer diagnosed in May 2022  Has recently completed  carboplatin pack with taxol chemotherapy as well as radiotherapy  Follows outpatient with heme/onc   Cancer treatment is likely contributing to his nausea and vomiting  Coronary artery disease of native artery of native heart with stable angina pectoris [facility])  Assessment & Plan  Patient with a history of CAD status post AGUSTIN x4, has follow-up with cardiology as an outpatient  Most recently completed 6 months of dual antiplatelet therapy  Currently on baby aspirin and atorvastatin daily    No chest pain        Plan  · Continue aspirin, statin for secondary prevention      Paroxysmal atrial fibrillation [facility])  Assessment & Plan  Documented Hx of paroxysmal atrial fibrillation, Julissa Vasc of 5  Previously on warfarin for left apical thrombus, however has since been discontinued by his cardiologist   Last seen by outpatient Cardiology 2022  Currently normal sinus in the ED      Plan:  · Patient not currently on any home beta-blocker regiment  · F/u outpatient      Chronic combined systolic and diastolic CHF (congestive heart failure) ([facility])  Assessment & Plan  Wt Readings from Last 3 Encounters:   10/04/22 76 4 kg (168 lb 6 9 oz)   22 77 6 kg (171 lb)   22 81 6 kg (180 lb)       Documented history of chronic combined systolic and diastolic heart failure  Last echo performed 03/15/2022 showed LVEF 50% with G1 DD, apical akinesis, T BMP with mild regurg  Patient not fluid overloaded on exam     Plan:  · Continue to monitor          * Generalized weakness  Assessment & Plan  Patient with 5 days of progressive weakness in the setting of nausea and vomiting related to his chemotherapy and radiation  Likely secondary to lack of p o  Intake  Plan:  · D5 lactated Ringer's  · Zofran IV x1 and po p r n  · PT OT  · Case management      Disposition: continue observation and advance diet as tolerated      SUBJECTIVE     Patient seen and examined  No acute events overnight  Pt denies chest pain, SOB, abd pain, fever, or nausea  Pt does complain of fatigue and weakness       OBJECTIVE     Vitals:    10/18/22 0000 10/18/22 0246 10/18/22 0644 10/18/22 0908   BP:  94/55 95/56 97/56   Pulse:  83 84 83   Resp:    16   Temp:    98 6 °F (37 °C)   SpO2: 92% 98%  95%      Temperature:   Temp (24hrs), Av 5 °F (36 9 °C), Min:98 4 °F (36 9 °C), Max:98 6 °F (37 °C)    Temperature: 98 6 °F (37 °C)  Intake & Output:  I/O       10/16 0701  10/17 0700 10/17 0701  10/18 0700 10/18 0701  10/19 0700    I V   1000 683 3    Total Intake  1000 683 3    Urine   250    Total Output   250    Net  +1000 +433 3               Weights: There is no height or weight on file to calculate BMI  Weight (last 2 days)     None        Physical Exam  Vitals and nursing note reviewed  Constitutional:       General: He is not in acute distress  Appearance: He is ill-appearing  HENT:      Head: Normocephalic and atraumatic  Mouth/Throat:      Mouth: Mucous membranes are dry  Cardiovascular:      Rate and Rhythm: Normal rate and regular rhythm  Pulses: Normal pulses  Heart sounds: Normal heart sounds  Pulmonary:      Effort: Pulmonary effort is normal       Breath sounds: Normal breath sounds  Abdominal:      General: Bowel sounds are normal       Palpations: Abdomen is soft  Hernia: A hernia is present  Skin:     General: Skin is warm and dry  Neurological:      Mental Status: He is alert  LABORATORY DATA     Labs: I have personally reviewed pertinent reports  Results from last 7 days   Lab Units 10/18/22  0642 10/17/22  1412   WBC Thousand/uL 4 35 4 77   HEMOGLOBIN g/dL 8 8* 9 5*   HEMATOCRIT % 26 7* 29 1*   PLATELETS Thousands/uL 96* 92*   NEUTROS PCT % 73 72   MONOS PCT % 13* 17*      Results from last 7 days   Lab Units 10/18/22  0642 10/17/22  1532   POTASSIUM mmol/L 3 6 3 1*   CHLORIDE mmol/L 105 100   CO2 mmol/L 29 30   BUN mg/dL 11 15   CREATININE mg/dL 0 66 0 74   CALCIUM mg/dL 8 1* 8 7     Results from last 7 days   Lab Units 10/18/22  0642   MAGNESIUM mg/dL 1 8     Results from last 7 days   Lab Units 10/18/22  0642   PHOSPHORUS mg/dL 2 1*                    IMAGING & DIAGNOSTIC TESTING     Radiology Results: I have personally reviewed pertinent reports  XR chest 1 view portable    Result Date: 10/17/2022  Impression: No acute cardiopulmonary disease  Findings are stable Workstation performed: [id number]     Other Diagnostic Testing: I have personally reviewed pertinent reports      ACTIVE MEDICATIONS     Current Facility-Administered Medications   Medication Dose Route Frequency   • aspirin (ECOTRIN LOW STRENGTH) EC tablet 81 mg  81 mg Oral Daily   • atorvastatin (LIPITOR) tablet 80 mg  80 mg Oral Daily With Dinner   • dextrose 5 % in lactated Ringer's infusion  125 mL/hr Intravenous Continuous   • enoxaparin (LOVENOX) subcutaneous injection 40 mg  40 mg Subcutaneous Daily   • midodrine (PROAMATINE) tablet 2 5 mg  2 5 mg Oral TID AC   • ondansetron (ZOFRAN-ODT) dispersible tablet 4 mg  4 mg Oral Q6H PRN   • PHENobarbital tablet 64 8 mg  64 8 mg Oral BID   • potassium chloride (K-DUR,KLOR-CON) CR tablet 10 mEq  10 mEq Oral BID       VTE Pharmacologic Prophylaxis: Enoxaparin (Lovenox)  VTE Mechanical Prophylaxis: sequential compression device    Portions of the record may have been created with voice recognition software  Occasional wrong word or "sound a like" substitutions may have occurred due to the inherent limitations of voice recognition software    Read the chart carefully and recognize, using context, where substitutions have occurred   ==  [address]  Internal Medicine SUB I M4

## 2022-10-18 NOTE — ED PROVIDER NOTES
History  Chief Complaint   Patient presents with   • Weakness - Generalized     Generalized weakness  Recently finished radiation for esophageal cancer  Patient is a 65-year-old male with past medical history esophageal cancer on chemotherapy and radiation who presents to the ED for evaluation of generalized weakness with 2 falls at home today  Patient reports intake has been decreasing for the past five days, today has only been able to take small sips of water  No drooling or SOB  One fall this AM that police helped him back in bed for, second fall in the afternoon that EMS brought him to the ED for  No headstrike, LOC, or confusion  Denies any trauma  States he has a high bed and slid onto his butt onto the floor and was unable to get back up which is why he required help  Patient reports this has happened in the past and he has needed admission with help getting proper nutrition  He denies any headache, changes in vision or hearing, dizziness or lightheadedness, chest pain, palpitations, shortness of breath, abdominal pain, nausea or vomiting, diarrhea or constipation, numbness tingling or weakness in extremities, or any other complaints or concerns at this time  Prior to Admission Medications   Prescriptions Last Dose Informant Patient Reported? Taking?    PHENobarbital 64 8 mg tablet 10/17/2022 at Unknown time Self No Yes   Sig: TAKE 1 TABLET (64 8 MG TOTAL) BY MOUTH 2 (TWO) TIMES A DAY   aspirin (Aspirin Low Dose) 81 mg EC tablet 10/17/2022 at Unknown time Self No Yes   Sig: Take 1 tablet (81 mg total) by mouth daily   atorvastatin (LIPITOR) 80 mg tablet 10/17/2022 at Unknown time Self No Yes   Sig: Take 1 tablet (80 mg total) by mouth daily with dinner   folic acid ( Folic Acid) 1 mg tablet Not Taking at Unknown time  No No   Sig: Take 1 tablet (1 mg total) by mouth daily   Patient not taking: No sig reported   lidocaine-prilocaine (EMLA) cream Not Taking at Unknown time  No No   Sig: Apply to port 30 to 60 min prior to use   Patient not taking: No sig reported   midodrine (PROAMATINE) 2 5 mg tablet 10/17/2022 at Unknown time  No Yes   Sig: TAKE 1 TABLET (2 5 MG TOTAL) BY MOUTH 3 (THREE) TIMES A DAY BEFORE MEALS   ondansetron (ZOFRAN) 4 mg tablet Not Taking at Unknown time  No No   Sig: Take 1 tablet (4 mg total) by mouth every 8 (eight) hours as needed for nausea or vomiting   Patient not taking: Reported on 10/17/2022   ondansetron (Zofran ODT) 8 mg disintegrating tablet Not Taking at Unknown time  No No   Sig: Take 1 tablet (8 mg total) by mouth every 8 (eight) hours as needed for nausea or vomiting   Patient not taking: Reported on 10/17/2022   pantoprazole (PROTONIX) 40 mg tablet Not Taking at Unknown time  No No   Sig: TAKE 1 TABLET BY MOUTH 2 TIMES A DAY BEFORE MEALS  Patient not taking: No sig reported   potassium chloride (MICRO-K) 10 MEQ CR capsule   No No   Sig: Take 1 capsule (10 mEq total) by mouth 2 (two) times a day for 7 days   prochlorperazine (COMPAZINE) 10 mg tablet Not Taking at Unknown time  No No   Sig: Take 1 tablet (10 mg total) by mouth every 6 (six) hours as needed for nausea or vomiting   Patient not taking: No sig reported      Facility-Administered Medications: None       Past Medical History:   Diagnosis Date   • Cardiac disease    • CHF (congestive heart failure) (HCC)    • Esophageal cancer (HCC)    • Hernia of abdominal cavity    • Myocardial infarction [facility])     last assessed 7/31/14, past, Pt had MI s/p AGUSTIN placed in 2001, refuses to take any other medications for his cardiac disease, only will take seizure med   Understands possible complications from this decision   • Seizure [facility])        Past Surgical History:   Procedure Laterality Date   • ABDOMINAL AORTIC ANEURYSM REPAIR      for dialation or occulsion   • CATARACT EXTRACTION     • IR PORT PLACEMENT  7/15/2022       Family History   Problem Relation Age of Onset   • Hypertension Father    • Kidney disease Brother I have reviewed and agree with the history as documented  E-Cigarette/Vaping   • E-Cigarette Use Never User      E-Cigarette/Vaping Substances   • Nicotine No    • THC No    • CBD No    • Flavoring No    • Other No    • Unknown No      Social History     Tobacco Use   • Smoking status: Former Smoker     Quit date: 2005     Years since quittin 3   • Smokeless tobacco: Never Used   Vaping Use   • Vaping Use: Never used   Substance Use Topics   • Alcohol use: Not Currently   • Drug use: Never        Review of Systems   Constitutional: Positive for appetite change  Negative for chills and fever  HENT: Negative for ear pain and sore throat  Eyes: Negative for pain and visual disturbance  Respiratory: Negative for cough and shortness of breath  Cardiovascular: Negative for chest pain and palpitations  Gastrointestinal: Negative for abdominal pain and vomiting  Genitourinary: Negative for dysuria and hematuria  Musculoskeletal: Negative for arthralgias and back pain  Skin: Negative for color change and rash  Neurological: Positive for weakness  Negative for seizures  All other systems reviewed and are negative  Physical Exam  ED Triage Vitals [10/17/22 1324]   Temp Pulse Respirations Blood Pressure SpO2   -- 75 18 102/56 94 %      Temp src Heart Rate Source Patient Position - Orthostatic VS BP Location FiO2 (%)   -- -- -- -- --      Pain Score       No Pain             Orthostatic Vital Signs  Vitals:    10/17/22 1324 10/17/22 1559 10/17/22 1814 10/17/22 2043   BP: 102/56 99/52 104/56 99/65   Pulse: 75 88 88 67       Physical Exam  Vitals and nursing note reviewed  Constitutional:       General: He is not in acute distress  Appearance: He is well-developed  HENT:      Head: Normocephalic and atraumatic  Mouth/Throat:      Mouth: Mucous membranes are moist       Pharynx: Oropharynx is clear  Eyes:      Extraocular Movements: Extraocular movements intact  Conjunctiva/sclera: Conjunctivae normal       Pupils: Pupils are equal, round, and reactive to light  Cardiovascular:      Rate and Rhythm: Normal rate and regular rhythm  Pulses: Normal pulses  Heart sounds: Normal heart sounds  No murmur heard  Pulmonary:      Effort: Pulmonary effort is normal  No respiratory distress  Breath sounds: Normal breath sounds  Abdominal:      Palpations: Abdomen is soft  Tenderness: There is no abdominal tenderness  There is no guarding or rebound  Hernia: A hernia is present  Musculoskeletal:         General: Normal range of motion  Cervical back: Normal range of motion and neck supple  No rigidity  Right lower leg: No edema  Left lower leg: No edema  Skin:     General: Skin is warm and dry  Capillary Refill: Capillary refill takes less than 2 seconds  Neurological:      General: No focal deficit present  Mental Status: He is alert and oriented to person, place, and time  Mental status is at baseline  Cranial Nerves: No cranial nerve deficit  Sensory: No sensory deficit     Psychiatric:         Mood and Affect: Mood normal          Behavior: Behavior normal          ED Medications  Medications   enoxaparin (LOVENOX) subcutaneous injection 40 mg (has no administration in time range)   dextrose 5 % in lactated Ringer's infusion (125 mL/hr Intravenous New Bag 10/17/22 1831)   ondansetron (ZOFRAN-ODT) dispersible tablet 4 mg (has no administration in time range)   aspirin (ECOTRIN LOW STRENGTH) EC tablet 81 mg (has no administration in time range)   atorvastatin (LIPITOR) tablet 80 mg (80 mg Oral Given 10/17/22 1830)   midodrine (PROAMATINE) tablet 2 5 mg (2 5 mg Oral Given 10/17/22 2021)   PHENobarbital tablet 64 8 mg (64 8 mg Oral Given 10/17/22 1830)   potassium chloride (K-DUR,KLOR-CON) CR tablet 10 mEq (10 mEq Oral Given 10/17/22 2021)   potassium chloride oral solution 40 mEq (40 mEq Oral Given 10/17/22 1703) ondansetron [facility] injection 4 mg (4 mg Intravenous Given 10/17/22 1830)       Diagnostic Studies  Results Reviewed     Procedure Component Value Units Date/Time    Lipase [[id number]]  (Abnormal) Collected: 10/17/22 1532    Lab Status: Final result Specimen: Blood from Arm, Left Updated: 10/17/22 2154     Lipase 65 u/L     Basic metabolic panel [[id number]]  (Abnormal) Collected: 10/17/22 1532    Lab Status: Final result Specimen: Blood from Arm, Left Updated: 10/17/22 1616     Sodium 135 mmol/L      Potassium 3 1 mmol/L      Chloride 100 mmol/L      CO2 30 mmol/L      ANION GAP 5 mmol/L      BUN 15 mg/dL      Creatinine 0 74 mg/dL      Glucose 100 mg/dL      Calcium 8 7 mg/dL      eGFR 88 ml/min/1 73sq m     Narrative:      [place] guidelines for Chronic Kidney Disease (CKD):   •  Stage 1 with normal or high GFR (GFR > 90 mL/min/1 73 square meters)  •  Stage 2 Mild CKD (GFR = 60-89 mL/min/1 73 square meters)  •  Stage 3A Moderate CKD (GFR = 45-59 mL/min/1 73 square meters)  •  Stage 3B Moderate CKD (GFR = 30-44 mL/min/1 73 square meters)  •  Stage 4 Severe CKD (GFR = 15-29 mL/min/1 73 square meters)  •  Stage 5 End Stage CKD (GFR <15 mL/min/1 73 square meters)  Note: GFR calculation is accurate only with a steady state creatinine    CBC and differential [[id number]]  (Abnormal) Collected: 10/17/22 1412    Lab Status: Final result Specimen: Blood from Arm, Left Updated: 10/17/22 1429     WBC 4 77 Thousand/uL      RBC 2 85 Million/uL      Hemoglobin 9 5 g/dL      Hematocrit 29 1 %       fL      MCH 33 3 pg      MCHC 32 6 g/dL      RDW 25 2 %      Platelets 92 Thousands/uL      nRBC 0 /100 WBCs      Neutrophils Relative 72 %      Immat GRANS % 0 %      Lymphocytes Relative 6 %      Monocytes Relative 17 %      Eosinophils Relative 4 %      Basophils Relative 1 %      Neutrophils Absolute 3 47 Thousands/µL      Immature Grans Absolute 0 01 Thousand/uL      Lymphocytes Absolute 0 26 Thousands/µL      Monocytes Absolute 0 80 Thousand/µL      Eosinophils Absolute 0 19 Thousand/µL      Basophils Absolute 0 04 Thousands/µL                  XR chest 1 view portable   Final Result by Charles Saba MD (10/17 4267)      No acute cardiopulmonary disease  Findings are stable            Workstation performed: [id number]               Procedures  Procedures      ED Course  ED Course as of 10/17/22 2206   Mon Oct 17, 2022   1436 CBC and differential(!)  Reviewed, stable and similar to prior   1505 XR chest 1 view portable  Noted, no acute findings   1506 Patient re-evaluated, reports persistent feeling of generalized weakness  States he thinks he needs to be admitted and would like possible rehab placement and help with nutrition  1621 Potassium(!): 3 1  Noted replenished   1705 Patient re-evaluated, patient reports he has not eaten anything and having to take oral potassium on an empty stomach was difficult  Patient states he is not hungry, but that he will drink water noted to get the taste of the potassium and is not  Patient denies any new or worsening complaints or concerns at this time  Able to tolerate p o  Intake of potassium and water  Identification of Seniors at [address] [place] [address] Most Recent Value   (ISAR) Identification of Seniors at Risk    Before the illness or injury that brought you to the Emergency, did you need someone to help you on a regular basis? 0 Filed at: 10/17/2022 1325   In the last 24 hours, have you needed more help than usual? 0 Filed at: 10/17/2022 1325   Have you been hospitalized for one or more nights during the past 6 months? --   In general, do you see well? 0 Filed at: 10/17/2022 1325   In general, do you have serious problems with your memory?  0 Filed at: 10/17/2022 1325   Do you take more than three different medications every day? --   ISAR Score 0 Filed at: 10/17/2022 1325                              MDM  Number of Diagnoses or Management Options  Ambulatory dysfunction  Fall, initial encounter  Generalized weakness  Diagnosis management comments: Patient is a 65-year-old male with past medical history esophageal cancer on chemotherapy and radiation who presents to the ED for evaluation of generalized weakness with 2 falls at home today  CBC, CMP, EKG, chest x-ray ordered  See ED course for additional details  Discussed results and plan with patient who is agreeable with admission at this time  Case discussed with SOD who will admit patient for further workup  Disposition  Final diagnoses:   Generalized weakness   Ambulatory dysfunction   Fall, initial encounter     Time reflects when diagnosis was documented in both MDM as applicable and the Disposition within this note     Time User Action Codes Description Comment    10/17/2022  5:10 PM DePopeAshley Tae Add [R53 1] Generalized weakness     10/17/2022  5:10 PM DePAshley trayloras Add [R26 2] Ambulatory dysfunction     10/17/2022  5:10 PM DePKenna traylor Add [B81  Jillene Lanius Fall, initial encounter     10/17/2022  9:08 PM Kaiser Shirley Add [E43] Severe protein-calorie malnutrition ([facility] )     10/17/2022  9:08 PM Kaiser Faustd Add [C15 9] Malignant neoplasm of esophagus, unspecified location [facility])       ED Disposition     ED Disposition   Admit    Condition   Stable    Date/Time   Mon Oct 17, 2022  5:10 PM    Comment   Case was discussed with SOD and the patient's admission status was agreed to be Admission Status: observation status to the service of Dr James Araya             Follow-up Information    None         Current Discharge Medication List      CONTINUE these medications which have NOT CHANGED    Details   aspirin (Aspirin Low Dose) 81 mg EC tablet Take 1 tablet (81 mg total) by mouth daily  Qty: 90 tablet, Refills: 3    Associated Diagnoses: Chronic combined systolic and diastolic CHF (congestive heart failure) ([facility] [facility] ); NSTEMI (non-ST elevated myocardial infarction) ([facility] ); Essential hypertension; Seizure disorder (HCC)      atorvastatin (LIPITOR) 80 mg tablet Take 1 tablet (80 mg total) by mouth daily with dinner  Qty: 90 tablet, Refills: 4    Comments: DX Code Needed    Associated Diagnoses: Chronic combined systolic and diastolic CHF (congestive heart failure) ([facility] ); NSTEMI (non-ST elevated myocardial infarction) ([facility] ); Essential hypertension; Seizure disorder (HCC)      midodrine (PROAMATINE) 2 5 mg tablet TAKE 1 TABLET (2 5 MG TOTAL) BY MOUTH 3 (THREE) TIMES A DAY BEFORE MEALS  Qty: 270 tablet, Refills: 1    Associated Diagnoses: Hypotension      PHENobarbital 64 8 mg tablet TAKE 1 TABLET (64 8 MG TOTAL) BY MOUTH 2 (TWO) TIMES A DAY  Qty: 180 tablet, Refills: 1    Comments: This request is for a new prescription for a controlled substance as required by Federal/State law  Associated Diagnoses: Seizure disorder ([facility] )      folic acid ( Folic Acid) 1 mg tablet Take 1 tablet (1 mg total) by mouth daily  Qty: 90 tablet, Refills: 1    Associated Diagnoses: Folate deficiency      lidocaine-prilocaine (EMLA) cream Apply to port 30 to 60 min prior to use  Qty: 30 g, Refills: 2    Associated Diagnoses: Esophageal adenocarcinoma (HCC)      ondansetron (Zofran ODT) 8 mg disintegrating tablet Take 1 tablet (8 mg total) by mouth every 8 (eight) hours as needed for nausea or vomiting  Qty: 30 tablet, Refills: 3    Associated Diagnoses: Esophageal adenocarcinoma (HCC)      ondansetron (ZOFRAN) 4 mg tablet Take 1 tablet (4 mg total) by mouth every 8 (eight) hours as needed for nausea or vomiting  Qty: 20 tablet, Refills: 0    Associated Diagnoses: Nausea      pantoprazole (PROTONIX) 40 mg tablet TAKE 1 TABLET BY MOUTH 2 TIMES A DAY BEFORE MEALS    Qty: 180 tablet, Refills: 3    Associated Diagnoses: Esophagitis determined by endoscopy; Duodenitis      potassium chloride (MICRO-K) 10 MEQ CR capsule Take 1 capsule (10 mEq total) by mouth 2 (two) times a day for 7 days  Qty: 14 capsule, Refills: 0 Associated Diagnoses: Hypokalemia      prochlorperazine (COMPAZINE) 10 mg tablet Take 1 tablet (10 mg total) by mouth every 6 (six) hours as needed for nausea or vomiting  Qty: 30 tablet, Refills: 3    Associated Diagnoses: Esophageal adenocarcinoma ([facility] [address] )           No discharge procedures on file  PDMP Review       Value Time User    PDMP Reviewed  Yes 11/16/2021  1:20 PM Lucas Kiran DO           ED Provider  Attending physically available and evaluated Spike Crimes  I managed the patient along with the ED Attending      Electronically Signed by         Nick Redd DO  10/17/22 2950

## 2022-10-18 NOTE — ASSESSMENT & PLAN NOTE
Patient with 5 days of progressive weakness in the setting of nausea and vomiting related to his chemotherapy and radiation  Likely secondary to lack of p o  Intake  Plan:  · D5 lactated Ringer's  · Zofran IV x1 and po p r n    · PT OT  · Case management

## 2022-10-18 NOTE — ASSESSMENT & PLAN NOTE
Patient with stage II B esophageal cancer diagnosed in May 2022  Has recently completed  carboplatin pack with taxol chemotherapy as well as radiotherapy  Follows outpatient with heme/onc   Cancer treatment is likely contributing to his nausea and vomiting

## 2022-10-18 NOTE — PHYSICAL THERAPY NOTE
PHYSICAL THERAPY EVALUATION  NAME:  Carolina Silva: 10/18/22    AGE:   67 y o  Mrn:   [id number]  ADMIT DX:  Weakness [R53 1]  Generalized weakness [R53 1]  Fall, initial encounter [W19  XXXA]  Ambulatory dysfunction [R26 2]    Past Medical History:   Diagnosis Date    Cardiac disease     CHF (congestive heart failure) ([facility] )     [facility] ([facility] )     Hernia of abdominal cavity     Myocardial infarction [facility])     last assessed 7/31/14, past, Pt had MI s/p AGUSTIN placed in 2001, refuses to take any other medications for his cardiac disease, only will take seizure med  Understands possible complications from this decision    Seizure [facility])        Past Surgical History:   Procedure Laterality Date    ABDOMINAL AORTIC ANEURYSM REPAIR      for dialation or occulsion    CATARACT EXTRACTION      IR PORT PLACEMENT  7/15/2022       Length Of Stay: 0    PHYSICAL THERAPY EVALUATION:        10/18/22 1048   Note Type   Note type Evaluation   Pain Assessment   Pain Assessment Tool 0-10   Pain Score No Pain   Pain Onset/Description Onset: Ongoing;Frequency: Constant/Continuous; Descriptor: Aching   Effect of Pain on Daily Activities increased pain with activity   Patient's Stated Pain Goal No pain   Hospital Pain Intervention(s) Ambulation/increased activity;Repositioned   Restrictions/Precautions   Weight Bearing Precautions Per Order No   Other Precautions Chair Alarm; Bed Alarm; Fall Risk   Home Living   Type of [address] One level;Stairs to enter with rails  (3 SEEMA)   [address] [facility]   Additional Comments Patient reports living with brother who is able to assist as needed    Brother present during PT eval and able to confirm   Prior Function   Level of [place] Independent with functional mobility   Lives With Family  (brother)   Falls in the last 6 months 1 to 4   Comments Patient reports use of a rolling walker for ambulation prior to admission   General   Family/Caregiver Present Yes Cognition   Overall Cognitive Status WFL   Arousal/Participation Alert   Attention Within functional limits   Orientation Level Oriented X4   Memory Within functional limits   Following Commands Follows one step commands without difficulty   RUE Assessment   RUE Assessment WFL   LUE Assessment   LUE Assessment WFL   RLE Assessment   RLE Assessment WFL   Strength RLE   RLE Overall Strength 4/5   LLE Assessment   LLE Assessment WFL   Strength LLE   LLE Overall Strength 4/5   Bed Mobility   Supine to Sit 5  Supervision   Additional items Increased time required   Transfers   Sit to Stand 4  Minimal assistance   Additional items Assist x 1; Increased time required;Verbal cues   Stand to Sit 4  Minimal assistance   Additional items Assist x 1; Increased time required;Verbal cues   Additional Comments VC and TC needed for hand placement during transfers   Ambulation/Elevation   Gait pattern Foward flexed; Short stride   Gait Assistance 5  Supervision   Assistive Device Rolling walker   Distance 75ft x 2   Stair Management Assistance   (Pt denies concerns about steps to enter home)   Balance   Static Sitting Fair   Static Standing Fair -   Ambulatory Fair -   Activity Tolerance   Activity Tolerance Patient limited by fatigue   Medical Staff Made Aware Kina OT; Megan Joshua OT student; OT present for co evaluation due to patient's current medical presentation   Nurse Made Aware Patient appropriate to be seen and mobilized per nursing   Assessment   Prognosis Good   Problem List Decreased mobility; Decreased strength;Decreased endurance   Assessment Pt is 67 y o  male seen for PT evaluation at [facility] on 10/17/2022  Two pt identifiers were used to confirm  Pt presented w/ generalized weakness and 2 falls at home  Pt with a primary dx of:  Generalized weakness  PT now consulted for assessment of mobility and d/c needs  Pt with Up with assistance orders    Pts current co morbidities affecting treatment include: Cardiac disease, CHF, esophageal cancer, seizure, chemotherapy, radiation therapy  Pts current clinical presentation is Unstable/ Unpredictable (high complexity) due to Ongoing medical management for primary dx, Decreased activity tolerance compared to baseline, Fall risk, Continuous pulse oximetry monitoring , increased assistance needed for caregiver current time    Upon evaluation, pt currently is requiring Supervision for bed mobility; Min Ax1 for transfers and Supervision for ambulation w/ RW   Pt presents at PT eval functioning below baseline and currently w/ overall mobility deficits 2* to: BLE weakness, decreased endurance, gait deviations, decreased activity tolerance compared to baseline, fall risk  At conclusion of PT session pt returned back in chair with phone and call bell within reach  Pt denies any further questions at this time  PT is currently recommending Home with increased family support and Outpatient PT    D/C acute care PT at this time due to pt being min A/ Supervision for all mobility and having supportive brother who is able to assist as needed  Pt denies any mobility or safety concerns about returning home at d/c  Recommend pt continues to mobilize with nsg and restorative techs during hospital stay     Barriers to Discharge None   Barriers to Discharge Comments Patient denies any mobility or safety concerns about returning home at time of discharge   Goals   Patient Goals " to get better"   Plan   PT Frequency   (DC IPPT)   Recommendation   PT Discharge Recommendation Home with outpatient rehabilitation   Equipment Recommended [address] [facility] Recommended Wheeled walker   Additional Comments Patient denies any mobility or safety concerns about returning home at time of discharge   Cosmo 8 in Bed Without Bedrails 4   Lying on Back to Sitting on Edge of Flat Bed 4   Moving Bed to Chair 4   Standing Up From Chair 3   Walk in Room 3   Climb 3-5 Stairs 3   Basic Mobility Inpatient Raw Score 21   Basic Mobility Standardized Score 45 55   Highest Level Of Mobility   -HLM Goal 6: Walk 10 steps or more   JH-HLM Achieved 7: Walk 25 feet or more   Modified Blount Scale   Modified Blount Scale 3   Barthel Index   Feeding 10   Bathing 0   Grooming Score 5   Dressing Score 5   Bladder Score 10   Bowels Score 10   Toilet Use Score 10   Transfers (Bed/Chair) Score 10   Mobility (Level Surface) Score 10   Stairs Score 0   Barthel Index Score 70   Portions of the documentation may have been created using voice recognition software  Occasional wrong word or sound alike substitutions may have occurred due to the inherent limitations of the voice recognition software  Read the chart carefully and recognize, using context, where substitutions have occurred      Antonio Antoine DPT

## 2022-10-18 NOTE — CASE MANAGEMENT
Case Management Assessment & Discharge Planning Note    Patient name Morro Franklin  Location [address] [place] [address] [address] [id number]  : 1945 Date 10/18/2022       Current Admission Date: 10/17/2022  Current Admission Diagnosis:Generalized weakness   Patient Active Problem List    Diagnosis Date Noted   • Generalized weakness 10/17/2022   • Nausea & vomiting 10/17/2022   • Severe protein-calorie malnutrition ([facility] ) 2022   • Chemotherapy induced neutropenia [facility] ) 09/15/2022   • Migration of esophageal stent 2022   • Anemia 2022   • Hypotension 2022   • GERD (gastroesophageal reflux disease) 2022   • Vitamin B12 deficiency 2022   • Port-A-Cath in place 08/10/2022   • Thrombocytopenia ([facility] ) 2022   • Dysphagia 2022   • Pancytopenia ([facility] ) 2022   • Acute gastric ulcer 2022   • Esophageal cancer ([facility] ) 2022   • Abdominal aortic aneurysm without rupture 02/10/2022   • Paroxysmal atrial fibrillation ([facility] ) 02/10/2022   • Coronary artery disease of native artery of native heart with stable angina pectoris ([facility] ) 02/10/2022   • Ischemic cardiomyopathy 2021   • Chronic combined systolic and diastolic CHF (congestive heart failure) ([facility] ) 2021   • Allergic reaction to contrast media 2021   • NSTEMI (non-ST elevated myocardial infarction) ([facility] ) 2021   • Abdominal pain 2020   • Hernia, incisional 2019   • Ventral hernia with obstruction and without gangrene 2019   • CAD (coronary artery disease) 2017   • HTN (hypertension) 2017   • HLD (hyperlipidemia) 2017   • Seizure disorder ([facility] ) 2017   • S/P AAA (abdominal aortic aneurysm) repair 2017   • STEMI (ST elevation myocardial infarction) ([facility] ) 2017      LOS (days): 0  Geometric Mean LOS (GMLOS) (days):   Days to GMLOS:     OBJECTIVE:              Current admission status: Observation       Preferred Pharmacy:   [id number]- [address]  [address] [id number]  Phone: [phone] Fax: [place], [place]  [place] [address]inder Medeiros [address] [facility] [address] [address]  Phone: [phone] Fax: [phone]    Primary Care Provider: Nicholas Ramirez MD    Primary Insurance: MEDICARE  Secondary Insurance: [address]    ASSESSMENT:  [address] Representative - Brother   Primary Phone: [phone] (Home)               Readmission Root Cause  30 Day Readmission: Yes  Who directed you to return to the hospital?: Self  Did you understand whom to contact if you had questions or problems?: Yes  Did you get your prescriptions before you left the hospital?: Yes  Were you able to get your prescriptions filled when you left the hospital?: Yes  Did you take your medications as prescribed?: Yes  Were you able to get to your follow-up appointments?: Yes  During previous admission, was a post-acute recommendation made?: No  Patient was readmitted due to: fall  Action Plan: awaiting medical stability    Patient Information  Admitted from[de-identified] Home  Mental Status: Alert  During Assessment patient was accompanied by: Not accompanied during assessment  Assessment information provided by[de-identified] Patient  Primary Caregiver: Self  Support Systems: Family members  Home entry access options   Select all that apply : Stairs  Number of steps to enter home : 2  Do the steps have railings?: Yes  Type of Current Residence: Apartment  Floor Level: 1  Upon entering residence, is there a bedroom on the main floor (no further steps)?: Yes  Upon entering residence, is there a bathroom on the main floor (no further steps)?: Yes  In the last 12 months, was there a time when you were not able to pay the mortgage or rent on time?: No  In the last 12 months, how many places have you lived?: 1  In the last 15 months, was there a time when you did not have a steady place to sleep or slept in a shelter (including now)?: No  Homeless/housing insecurity resource given?: N/A  Living Arrangements: Other (Comment) (lives with brother)    Activities of Daily Living Prior to Admission  Functional Status: Independent  Completes ADLs independently?: Yes  Ambulates independently?: Yes  Does patient currently own DME?: Yes  What DME does the patient currently own?: Walker, Bedside [employer]  Does patient have a history of Outpatient Therapy (PT/OT)?: Yes  Does the patient have a history of Short-Term Rehab?: Yes ([facility])  Does patient have a history of HHC?: No  Does patient currently have [facility]?: No         Patient Information Continued  Income Source: Pension/shelter  Does patient have prescription coverage?: Yes  Within the past 12 months, you worried that your food would run out before you got the money to buy more : Never true  Within the past 12 months, the food you bought just didn't last and you didn't have money to get more : Never true  Food insecurity resource given?: N/A  Does patient receive dialysis treatments?: No  Does patient have a history of substance abuse?: No  Does patient have a history of Mental Health Diagnosis?: No         Means of Transportation  In the past 12 months, has lack of transportation kept you from medical appointments or from getting medications?: No  In the past 12 months, has lack of transportation kept you from meetings, work, or from getting things needed for daily living?: No  Was application for public transport provided?: N/A        DISCHARGE DETAILS:    Discharge planning discussed with[de-identified] Patient  Freedom of Choice: Yes  Comments - Freedom of Choice: Discussed FOC  CM contacted family/caregiver?: No- see comments (declined)        CM reviewed d/c planning process including the following: identifying help at home, patient preference for d/c planning needs, Discharge [facility] Meds to Bed program, availability of treatment team to discuss questions or concerns patient and/or family may have regarding understanding medications and recognizing signs and symptoms once discharged  CM also encouraged patient to follow up with all recommended appointments after discharge  Patient advised of importance for patient and family to participate in managing patient’s medical well being  Information obtained from patient at bedside  Patient reports Jarred Cord, lives in 1st floor apartment with brother, 2 SEEMA    Patient has not received any covid vaccines, and he is not interested in receiving

## 2022-10-18 NOTE — ASSESSMENT & PLAN NOTE
Documented Hx of paroxysmal atrial fibrillation, Julissa Vasc of 5  Previously on warfarin for left apical thrombus, however has since been discontinued by his cardiologist   Last seen by outpatient Cardiology 06/2022   Currently normal sinus in the ED      Plan:  · Patient not currently on any home beta-blocker regiment  · F/u outpatient

## 2022-10-18 NOTE — NURSING NOTE
Made aware of patient IV contrast allergy via RT Elliott  Patient scheduled for Ct of chest abdomen and pelvis  with IV contrast and patient reaction is rash  Message via in basket [facility] text message sent to ordering provider  See Message below  Good Morning Dr Kristie Gruber,                Your patient Sang Fournier  1945 has been scheduled here at [facility] for a CT of  chest abdomen and pelvis on 10/25/22 according to the patient allergy history they are allergic to the IV contrast that is to be used in this study, with his allergy like reaction being rash post contrast administration  With this allergy like reaction our policy places him in the moderate category and recommends giving patient an allergy prep  Upon reviewing the chart in Epic no orders for an allergy prep was noted  Please order an allergy prep for this patient to be sent to their own pharmacy and inform patient of such order  Below you will find the allergy prep that has been approved per policy, please utilize either to your patients benefit  Prednisone 50mg PO 13 hours prior to procedure  Prednisone 50mg PO  7 hours prior to procedure  Prednisone 50mg PO  1 hours prior to procedure  Benadryl 50mg  PO 1 hour prior to procedure                                 OR    Medrol 32mg PO 12 hours prior to procedure  Medrol 32mg PO   2 hours prior to procedure  Benadryl 50mg PO 1 hour prior to procedure    Any questions or for further information see Contrast Allergy Prep and Premedication Guidelines Policy Scope: Network Manual: Radiology Department Manual    If any questions please feel free to respond or call me  Thank you for your time and attention to this matter  May you have a pleasant day,    [email or web address]  Radiology RN for Beena Isabel  78   [phone]     Awaiting MD response  Response from MD Torres [facility])  [id number] Ita Hummel, thank you   I will work on this   20 days left  Sent: 10/18/22 11:59AM  Elijah

## 2022-10-18 NOTE — TELEPHONE ENCOUNTER
I got a call from the [address] [place] [address] that Sharon Brandon has an IV contrast dye allergy  Will cancer CT scan and will order a PET scan instead

## 2022-10-18 NOTE — OCCUPATIONAL THERAPY NOTE
Occupational Therapy Evaluation     Patient Name: Kaylah Sesay  Today's Date: 10/18/2022  Problem List  Principal Problem:    Generalized weakness  Active Problems:    Chronic combined systolic and diastolic CHF (congestive heart failure) ([facility])    Paroxysmal atrial fibrillation ([facility] )    Coronary artery disease of native artery of native heart with stable angina pectoris ([facility] )    Esophageal cancer ([facility] )    Hypotension    Nausea & vomiting    Past Medical History  Past Medical History:   Diagnosis Date    Cardiac disease     CHF (congestive heart failure) ([facility] )     [facility] )     Hernia of abdominal cavity     Myocardial infarction ([facility] )     last assessed 7/31/14, past, Pt had MI s/p AGUSTIN placed in 2001, refuses to take any other medications for his cardiac disease, only will take seizure med  Understands possible complications from this decision    Seizure [facility])      Past Surgical History  Past Surgical History:   Procedure Laterality Date    ABDOMINAL AORTIC ANEURYSM REPAIR      for dialation or occulsion    CATARACT EXTRACTION      IR PORT PLACEMENT  7/15/2022           10/18/22 1047   OT Last Visit   OT Visit Date 10/18/22   Note Type   Note type Evaluation   Pain Assessment   Pain Assessment Tool 0-10   Pain Score No Pain   Hospital Pain Intervention(s) Repositioned; Ambulation/increased activity   Restrictions/Precautions   Weight Bearing Precautions Per Order No   Other Precautions Chair Alarm; Bed Alarm; Fall Risk   Home Living   Type of [facility] One level;Performs ADLs on one level;Stairs to enter with rails  (3STE)   Bathroom Shower/Tub Tub/shower unit   Bathroom Toilet Standard   Bathroom Equipment   (none)   [facility]   Prior Function   Level of [place] Independent with ADLs; Independent with functional mobility; Independent with IADLS   Lives With Family  (brother)   [address] Help From Family   IADLs Independent with meal prep;Independent with medication management; Family/Friend/Other provides transportation  (pt reports brother provides transportation)   Falls in the last 6 months 1 to 4   Vocational Retired   Comments proir to admission pt reports being independent in ADLs/IADLs; brother provides transportation   Lifestyle   Autonomy pt reports being independent in ADLs/IADLs; brother provides transportation   Reciprocal Relationships lives with brother who is home all the time   Service to Others retired;    [address] enjoys doing crossword puzzles   ADL   Eating Assistance 7  [address] [place] [address]  [address]  5  [address]  5  [address]  5  [address]  4  [address] [facility] 4  Minimal Assistance   Bed Mobility   Supine to [address]   Additional items Increased time required   Additional Comments pt was left in bedside chair with chair alarm on and all needs within reach   Transfers   Sit to Stand 4  Minimal assistance   Additional items Assist x 1; Increased time required   Stand to Sit 4  Minimal assistance   Additional items Assist x 1; Increased time required   Functional Mobility   Functional Mobility 5  Supervision   Additional items Rolling walker   Balance   Static Sitting Fair   Static Standing Fair -   Ambulatory Fair -   Activity Tolerance   Activity Tolerance Patient tolerated treatment well   Medical Staff Made Aware PT cotreatment due to pts medical complexity and overall limited activity tolerance   Nurse Made Aware per nursing pt was appropriate to be seen   RUE Assessment   RUE Assessment WFL   LUE Assessment   LUE Assessment WFL   Psychosocial   Psychosocial (WDL) WDL   Cognition   Overall Cognitive Status WFL   Arousal/Participation Alert; Responsive; Cooperative   Attention Within functional limits   Orientation Level Oriented X4   Memory Within functional limits   Following Commands Follows one step commands without difficulty   Comments pt was overall pleasant and cooperative   Assessment   Assessment Pt is a 67 y o  male being seen for an initial Occupational Therapy Evaluation following admission to [facility] after presenting with generalized weakness and 2 falls in 1 day  Pt dx includes generalized weakness with nausea and vomiting secondary to his recent chemotherapy and radiation treatment  Pt  has a past medical history of Cardiac disease, CHF (congestive heart failure) ([facility] ), Esophageal cancer ([facility] ), Hernia of abdominal cavity, Myocardial infarction [facility]), and Seizure ([facility] )  Pt is from a 1 story apartment on the first floor where he lives with his brother who is able to be home with pt  Pt reports prior to admission pt was independent in ADLs/IADLs however pt does not drive and relies on his brother for transportation  Pt also reports using RW for functional mobility at baseline  Pt is requiring supervision for bed mobility and Min Ax1 for transfers and supervision for functional mobility with RW  Pt also requires overall supervision for ADLs  Pt is presenting with the following limitations fall risk, generalized weakness, fatigue, additional time to complete ADLs at this time and decreased activity tolerance/edurance  Pt was educated on support from nursing staff while in the hospital  At the conclusion of the session pt was left in bedside chair with chair alarm on and all needs within reach  Pt reports no further questions or concerns at this time  Recommending pt receives shower chair  The patient's raw score on the AM-PAC Daily Activity inpatient short form is 20, standardized score is 42 03, greater than 39 4  Patients at this level are likely to benefit from discharge to home   Please refer to the recommendation of the Occupational Therapist for safe discharge planning  From an OT perspective pt can return home with increased family support when medically cleared  No further OT acute rehab needed at this time     Goals   Patient Goals to get better   Recommendation   OT Discharge Recommendation No rehabilitation needs   Equipment Recommended Shower/Tub chair with back ($)   AM-PAC Daily Activity Inpatient   Lower Body Dressing 3   Bathing 3   Toileting 3   Upper Body Dressing 3   Grooming 4   Eating 4   Daily Activity Raw Score 20   Daily Activity Standardized Score (Calc for Raw Score >=11) 42 03   AM-PAC Applied Cognition Inpatient   Following a Speech/Presentation 4   Understanding Ordinary Conversation 4   Taking Medications 4   Remembering Where Things Are Placed or Put Away 4   Remembering List of 4-5 Errands 4   Taking Care of Complicated Tasks 4   Applied Cognition Raw Score 24   Applied Cognition Standardized Score 62 21     Ernestina Cuevas,OTS

## 2022-10-18 NOTE — MALNUTRITION/BMI
This medical record reflects one or more clinical indicators suggestive of malnutrition and/or morbid obesity  Malnutrition Findings:   Adult Malnutrition type: Chronic illness  Adult Degree of Malnutrition: Other severe protein calorie malnutrition  Malnutrition Characteristics: Inadequate energy, Weight loss                  360 Statement: Severe pro/rufino malnutrition r/t Esophageal Ca as evidenced by 18% unplanned weight loss since 7/5/22, consuming < 75% energy intake compared to estimated energy needs > 1 month  Treated with Ensure Plus High Protein  tid    See Nutrition note dated 10/18/22 for additional details  Completed nutrition assessment is viewable in the nutrition documentation

## 2022-10-18 NOTE — MALNUTRITION/BMI
This medical record reflects one or more clinical indicators suggestive of malnutrition and/or morbid obesity  Malnutrition Findings:   Adult Malnutrition type: Chronic illness  Adult Degree of Malnutrition: Other severe protein calorie malnutrition  Malnutrition Characteristics: Inadequate energy, Weight loss                  360 Statement: Severe pro/rufino malnutrition r/t Esophageal Ca as evidenced by 18% unplanned weight loss since 7/5/22, consuming < 75% energy intake compared to estimated energy needs > 1 month  Treated with Ensure Clear tid         See Nutrition note dated 10/18/22 for additional details  Completed nutrition assessment is viewable in the nutrition documentation

## 2022-10-18 NOTE — PLAN OF CARE
Problem: Nutrition/Hydration-ADULT  Goal: Nutrient/Hydration intake appropriate for improving, restoring or maintaining nutritional needs  Description: Monitor and assess patient's nutrition/hydration status for malnutrition  Collaborate with interdisciplinary team and initiate plan and interventions as ordered  Monitor patient's weight and dietary intake as ordered or per policy  Utilize nutrition screening tool and intervene as necessary  Determine patient's food preferences and provide high-protein, high-caloric foods as appropriate       INTERVENTIONS:  - Monitor oral intake, urinary output, labs, and treatment plans  - Assess nutrition and hydration status and recommend course of action  - Evaluate amount of meals eaten  - Assist patient with eating if necessary   - Allow adequate time for meals  - Recommend/ encourage appropriate diets, oral nutritional supplements, and vitamin/mineral supplements  - Order, calculate, and assess calorie counts as needed  - Recommend, monitor, and adjust tube feedings and TPN/PPN based on assessed needs  - Assess need for intravenous fluids  - Provide specific nutrition/hydration education as appropriate  - Include patient/family/caregiver in decisions related to nutrition  Outcome: Progressing     Problem: MOBILITY - ADULT  Goal: Maintain or return to baseline ADL function  Description: INTERVENTIONS:  -  Assess patient's ability to carry out ADLs; assess patient's baseline for ADL function and identify physical deficits which impact ability to perform ADLs (bathing, care of mouth/teeth, toileting, grooming, dressing, etc )  - Assess/evaluate cause of self-care deficits   - Assess range of motion  - Assess patient's mobility; develop plan if impaired  - Assess patient's need for assistive devices and provide as appropriate  - Encourage maximum independence but intervene and supervise when necessary  - Involve family in performance of ADLs  - Assess for home care needs following discharge   - Consider OT consult to assist with ADL evaluation and planning for discharge  - Provide patient education as appropriate  Outcome: Progressing  Goal: Maintains/Returns to pre admission functional level  Description: INTERVENTIONS:  - Perform BMAT or MOVE assessment daily    - Set and communicate daily mobility goal to care team and patient/family/caregiver  - Collaborate with rehabilitation services on mobility goals if consulted  - Perform Range of Motion  times a day  - Reposition patient every  hours    - Dangle patient  times a day  - Stand patient  times a day  - Ambulate patient  times a day  - Out of bed to chair  times a day   - Out of bed for meals  times a day  - Out of bed for toileting  - Record patient progress and toleration of activity level   Outcome: Progressing     Problem: PAIN - ADULT  Goal: Verbalizes/displays adequate comfort level or baseline comfort level  Description: Interventions:  - Encourage patient to monitor pain and request assistance  - Assess pain using appropriate pain scale  - Administer analgesics based on type and severity of pain and evaluate response  - Implement non-pharmacological measures as appropriate and evaluate response  - Consider cultural and social influences on pain and pain management  - Notify physician/advanced practitioner if interventions unsuccessful or patient reports new pain  Outcome: Progressing     Problem: INFECTION - ADULT  Goal: Absence or prevention of progression during hospitalization  Description: INTERVENTIONS:  - Assess and monitor for signs and symptoms of infection  - Monitor lab/diagnostic results  - Monitor all insertion sites, i e  indwelling lines, tubes, and drains  - Monitor endotracheal if appropriate and nasal secretions for changes in amount and color  - [place] appropriate cooling/warming therapies per order  - Administer medications as ordered  - Instruct and encourage patient and family to use good hand hygiene technique  - Identify and instruct in appropriate isolation precautions for identified infection/condition  Outcome: Progressing  Goal: Absence of fever/infection during neutropenic period  Description: INTERVENTIONS:  - Monitor WBC    Outcome: Progressing     Problem: SAFETY ADULT  Goal: Maintain or return to baseline ADL function  Description: INTERVENTIONS:  -  Assess patient's ability to carry out ADLs; assess patient's baseline for ADL function and identify physical deficits which impact ability to perform ADLs (bathing, care of mouth/teeth, toileting, grooming, dressing, etc )  - Assess/evaluate cause of self-care deficits   - Assess range of motion  - Assess patient's mobility; develop plan if impaired  - Assess patient's need for assistive devices and provide as appropriate  - Encourage maximum independence but intervene and supervise when necessary  - Involve family in performance of ADLs  - Assess for home care needs following discharge   - Consider OT consult to assist with ADL evaluation and planning for discharge  - Provide patient education as appropriate  Outcome: Progressing  Goal: Maintains/Returns to pre admission functional level  Description: INTERVENTIONS:  - Perform BMAT or MOVE assessment daily    - Set and communicate daily mobility goal to care team and patient/family/caregiver  - Collaborate with rehabilitation services on mobility goals if consulted  - Perform Range of Motion  times a day  - Reposition patient every  hours    - Dangle patient  times a day  - Stand patient  times a day  - Ambulate patient  times a day  - Out of bed to chair  times a day   - Out of bed for meals  times a day  - Out of bed for toileting  - Record patient progress and toleration of activity level   Outcome: Progressing  Goal: Patient will remain free of falls  Description: INTERVENTIONS:  - Educate patient/family on patient safety including physical limitations  - Instruct patient to call for assistance with activity   - Consult OT/PT to assist with strengthening/mobility   - Keep Call bell within reach  - Keep bed low and locked with side rails adjusted as appropriate  - Keep care items and personal belongings within reach  - Initiate and maintain comfort rounds  - Make Fall Risk Sign visible to staff  - Offer Toileting every  Hours, in advance of need  - Initiate/Maintain alarm  - Obtain necessary fall risk management equipment  - Apply yellow socks and bracelet for high fall risk patients  - Consider moving patient to room near nurses station  Outcome: Progressing     Problem: DISCHARGE PLANNING  Goal: Discharge to home or other facility with appropriate resources  Description: INTERVENTIONS:  - Identify barriers to discharge w/patient and caregiver  - Arrange for needed discharge resources and transportation as appropriate  - Identify discharge learning needs (meds, wound care, etc )  - Arrange for interpretive services to assist at discharge as needed  - Refer to Case Management Department for coordinating discharge planning if the patient needs post-hospital services based on physician/advanced practitioner order or complex needs related to functional status, cognitive ability, or social support system  Outcome: Progressing     Problem: Knowledge Deficit  Goal: Patient/family/caregiver demonstrates understanding of disease process, treatment plan, medications, and discharge instructions  Description: Complete learning assessment and assess knowledge base    Interventions:  - Provide teaching at level of understanding  - Provide teaching via preferred learning methods  Outcome: Progressing

## 2022-10-18 NOTE — ASSESSMENT & PLAN NOTE
Patient with a history of CAD status post AGUSTIN x4, has follow-up with cardiology as an outpatient  Most recently completed 6 months of dual antiplatelet therapy  Currently on baby aspirin and atorvastatin daily    No chest pain        Plan  · Continue aspirin, statin for secondary prevention

## 2022-10-18 NOTE — ASSESSMENT & PLAN NOTE
Wt Readings from Last 3 Encounters:   10/04/22 76 4 kg (168 lb 6 9 oz)   09/30/22 77 6 kg (171 lb)   09/20/22 81 6 kg (180 lb)       Documented history of chronic combined systolic and diastolic heart failure  Last echo performed 03/15/2022 showed LVEF 50% with G1 DD, apical akinesis, T BMP with mild regurg    Patient not fluid overloaded on exam     Plan:  · Continue to monitor

## 2022-10-19 PROBLEM — R62.7 FAILURE TO THRIVE IN ADULT: Status: ACTIVE | Noted: 2022-10-19

## 2022-10-19 LAB
ANISOCYTOSIS BLD QL SMEAR: PRESENT
BASOPHILS # BLD MANUAL: 0 THOUSAND/UL (ref 0–0.1)
BASOPHILS NFR MAR MANUAL: 0 % (ref 0–1)
EOSINOPHIL # BLD MANUAL: 0.47 THOUSAND/UL (ref 0–0.4)
EOSINOPHIL NFR BLD MANUAL: 14 % (ref 0–6)
ERYTHROCYTE [DISTWIDTH] IN BLOOD BY AUTOMATED COUNT: 25 % (ref 11.6–15.1)
HCT VFR BLD AUTO: 25.1 % (ref 36.5–49.3)
HGB BLD-MCNC: 8.1 G/DL (ref 12–17)
LYMPHOCYTES # BLD AUTO: 0.13 THOUSAND/UL (ref 0.6–4.47)
LYMPHOCYTES # BLD AUTO: 4 % (ref 14–44)
MACROCYTES BLD QL AUTO: PRESENT
MCH RBC QN AUTO: 33.8 PG (ref 26.8–34.3)
MCHC RBC AUTO-ENTMCNC: 32.3 G/DL (ref 31.4–37.4)
MCV RBC AUTO: 105 FL (ref 82–98)
MONOCYTES # BLD AUTO: 0.2 THOUSAND/UL (ref 0–1.22)
MONOCYTES NFR BLD: 6 % (ref 4–12)
NEUTROPHILS # BLD MANUAL: 2.55 THOUSAND/UL (ref 1.85–7.62)
NEUTS BAND NFR BLD MANUAL: 13 % (ref 0–8)
NEUTS SEG NFR BLD AUTO: 63 % (ref 43–75)
PLATELET # BLD AUTO: 78 THOUSANDS/UL (ref 149–390)
PLATELET BLD QL SMEAR: ABNORMAL
POIKILOCYTOSIS BLD QL SMEAR: PRESENT
RBC # BLD AUTO: 2.4 MILLION/UL (ref 3.88–5.62)
RBC MORPH BLD: PRESENT
SCHISTOCYTES BLD QL SMEAR: PRESENT
WBC # BLD AUTO: 3.35 THOUSAND/UL (ref 4.31–10.16)

## 2022-10-19 PROCEDURE — 99232 SBSQ HOSP IP/OBS MODERATE 35: CPT | Performed by: INTERNAL MEDICINE

## 2022-10-19 PROCEDURE — 85027 COMPLETE CBC AUTOMATED: CPT

## 2022-10-19 PROCEDURE — 85007 BL SMEAR W/DIFF WBC COUNT: CPT

## 2022-10-19 RX ORDER — PANTOPRAZOLE SODIUM 40 MG/1
40 TABLET, DELAYED RELEASE ORAL
Status: DISCONTINUED | OUTPATIENT
Start: 2022-10-19 | End: 2022-10-20 | Stop reason: HOSPADM

## 2022-10-19 RX ORDER — MEGESTROL ACETATE 40 MG/ML
400 SUSPENSION ORAL DAILY
Status: DISCONTINUED | OUTPATIENT
Start: 2022-10-19 | End: 2022-10-19

## 2022-10-19 RX ADMIN — CARBIDOPA AND LEVODOPA 2.5 MG: 50; 200 TABLET, EXTENDED RELEASE ORAL at 06:39

## 2022-10-19 RX ADMIN — PHENOBARBITAL 64.8 MG: 64.8 TABLET ORAL at 08:25

## 2022-10-19 RX ADMIN — CARBIDOPA AND LEVODOPA 2.5 MG: 50; 200 TABLET, EXTENDED RELEASE ORAL at 11:15

## 2022-10-19 RX ADMIN — ATORVASTATIN CALCIUM 80 MG: 80 TABLET, FILM COATED ORAL at 17:29

## 2022-10-19 RX ADMIN — DEXTROSE, SODIUM CHLORIDE, SODIUM LACTATE, POTASSIUM CHLORIDE, AND CALCIUM CHLORIDE 125 ML/HR: 5; .6; .31; .03; .02 INJECTION, SOLUTION INTRAVENOUS at 06:37

## 2022-10-19 RX ADMIN — PANTOPRAZOLE SODIUM 40 MG: 40 TABLET, DELAYED RELEASE ORAL at 11:15

## 2022-10-19 RX ADMIN — POTASSIUM CHLORIDE 10 MEQ: 750 TABLET, EXTENDED RELEASE ORAL at 08:25

## 2022-10-19 RX ADMIN — MEGESTROL ACETATE 400 MG: 40 SUSPENSION ORAL at 14:02

## 2022-10-19 RX ADMIN — POTASSIUM CHLORIDE 10 MEQ: 750 TABLET, EXTENDED RELEASE ORAL at 17:29

## 2022-10-19 RX ADMIN — ASPIRIN 81 MG: 81 TABLET, COATED ORAL at 08:25

## 2022-10-19 RX ADMIN — PHENOBARBITAL 64.8 MG: 64.8 TABLET ORAL at 17:29

## 2022-10-19 RX ADMIN — CARBIDOPA AND LEVODOPA 2.5 MG: 50; 200 TABLET, EXTENDED RELEASE ORAL at 17:29

## 2022-10-19 RX ADMIN — ENOXAPARIN SODIUM 40 MG: 40 INJECTION SUBCUTANEOUS at 08:25

## 2022-10-19 NOTE — PROGRESS NOTES
Pastoral Care Progress Note    10/19/2022  Patient: Lachelle Amin : 1945  Admission Date & Time: 10/17/2022 1315  MRN: [id number] CSN: [id number]                     Father Jayla Morales made long pastoral visit with patient, gave communion, prayed and blessed

## 2022-10-19 NOTE — ASSESSMENT & PLAN NOTE
Patient endorsed that he has an appetite today but continues to feel weak  Concern for severe protein malnutrition due to poor PO intake   Pt did not want ensure or magic cup due to the consistency and coldness of the ice cream      Plan  - Magic Cup and Ensure ordered for calorie supplementation  - Advanced to normal diet   - Started mirtazapine 7 5mg qHS

## 2022-10-19 NOTE — PLAN OF CARE
Problem: Nutrition/Hydration-ADULT  Goal: Nutrient/Hydration intake appropriate for improving, restoring or maintaining nutritional needs  Description: Monitor and assess patient's nutrition/hydration status for malnutrition  Collaborate with interdisciplinary team and initiate plan and interventions as ordered  Monitor patient's weight and dietary intake as ordered or per policy  Utilize nutrition screening tool and intervene as necessary  Determine patient's food preferences and provide high-protein, high-caloric foods as appropriate       INTERVENTIONS:  - Monitor oral intake, urinary output, labs, and treatment plans  - Assess nutrition and hydration status and recommend course of action  - Evaluate amount of meals eaten  - Assist patient with eating if necessary   - Allow adequate time for meals  - Recommend/ encourage appropriate diets, oral nutritional supplements, and vitamin/mineral supplements  - Order, calculate, and assess calorie counts as needed  - Recommend, monitor, and adjust tube feedings and TPN/PPN based on assessed needs  - Assess need for intravenous fluids  - Provide specific nutrition/hydration education as appropriate  - Include patient/family/caregiver in decisions related to nutrition  Outcome: Progressing     Problem: MOBILITY - ADULT  Goal: Maintain or return to baseline ADL function  Description: INTERVENTIONS:  -  Assess patient's ability to carry out ADLs; assess patient's baseline for ADL function and identify physical deficits which impact ability to perform ADLs (bathing, care of mouth/teeth, toileting, grooming, dressing, etc )  - Assess/evaluate cause of self-care deficits   - Assess range of motion  - Assess patient's mobility; develop plan if impaired  - Assess patient's need for assistive devices and provide as appropriate  - Encourage maximum independence but intervene and supervise when necessary  - Involve family in performance of ADLs  - Assess for home care needs following discharge   - Consider OT consult to assist with ADL evaluation and planning for discharge  - Provide patient education as appropriate  Outcome: Progressing     Problem: PAIN - ADULT  Goal: Verbalizes/displays adequate comfort level or baseline comfort level  Description: Interventions:  - Encourage patient to monitor pain and request assistance  - Assess pain using appropriate pain scale  - Administer analgesics based on type and severity of pain and evaluate response  - Implement non-pharmacological measures as appropriate and evaluate response  - Consider cultural and social influences on pain and pain management  - Notify physician/advanced practitioner if interventions unsuccessful or patient reports new pain  Outcome: Progressing     Problem: INFECTION - ADULT  Goal: Absence or prevention of progression during hospitalization  Description: INTERVENTIONS:  - Assess and monitor for signs and symptoms of infection  - Monitor lab/diagnostic results  - Monitor all insertion sites, i e  indwelling lines, tubes, and drains  - Monitor endotracheal if appropriate and nasal secretions for changes in amount and color  - [place] appropriate cooling/warming therapies per order  - Administer medications as ordered  - Instruct and encourage patient and family to use good hand hygiene technique  - Identify and instruct in appropriate isolation precautions for identified infection/condition  Outcome: Progressing     Problem: SAFETY ADULT  Goal: Maintain or return to baseline ADL function  Description: INTERVENTIONS:  -  Assess patient's ability to carry out ADLs; assess patient's baseline for ADL function and identify physical deficits which impact ability to perform ADLs (bathing, care of mouth/teeth, toileting, grooming, dressing, etc )  - Assess/evaluate cause of self-care deficits   - Assess range of motion  - Assess patient's mobility; develop plan if impaired  - Assess patient's need for assistive devices and provide as appropriate  - Encourage maximum independence but intervene and supervise when necessary  - Involve family in performance of ADLs  - Assess for home care needs following discharge   - Consider OT consult to assist with ADL evaluation and planning for discharge  - Provide patient education as appropriate  Outcome: Progressing

## 2022-10-19 NOTE — PROGRESS NOTES
INTERNAL MEDICINE RESIDENCY PROGRESS NOTE     Name: Steph Blunt   Age & Sex: 67 y o  male   MRN: [id number]  Unit/Bed#: [facility] 629-01   Encounter: [id number]  Team: SOD Team A    PATIENT INFORMATION     Name: Steph Blunt   Age & Sex: 67 y o  male   MRN: [id number]  Hospital Stay Days: 1    ASSESSMENT/PLAN     Principal Problem:    Failure to thrive in adult  Active Problems:    Chronic combined systolic and diastolic CHF (congestive heart failure) (HCC)    Paroxysmal atrial fibrillation ([address] )    Coronary artery disease of native artery of native heart with stable angina pectoris (HCC)    Esophageal cancer (HCC)    Hypotension    Severe protein-calorie malnutrition (HCC)    Generalized weakness    Nausea & vomiting      Nausea & vomiting  Assessment & Plan  Patient with nausea and vomiting preventing him from p o  Intake  States that secondary to his recent chemo radiation treatment  Has some associated epigastric tenderness  Plan:  · Check lipase though low likelihood of pancreatitis  · IV fluids  · IV Zofran and p r n  Zofran p o  · Mag of 1 8 and Phos of 2 1 10/18/22  · Advance to soft diet      Generalized weakness  Assessment & Plan  Patient with 5 days of progressive weakness in the setting of nausea and vomiting related to his chemotherapy and radiation  Likely secondary to lack of p o  Intake  Plan:  · D5 lactated Ringer's  · Zofran IV x1 and po p r n  · PT OT  · Case management    Hypotension  Assessment & Plan  Documented history of hypotension, on midodrine 2 5 mg t i d  Blood pressure in the high 71H low 269K systolic this admission  Plan:  Continue midodrine 2 5 mg t i d     Esophageal cancer [facility])  Assessment & Plan  Patient with stage II B esophageal cancer diagnosed in May 2022  Has recently completed  carboplatin pack with taxol chemotherapy as well as radiotherapy  Follows outpatient with heme/onc   Cancer treatment is likely contributing to his nausea and vomiting      Coronary artery disease of native artery of native heart with stable angina pectoris [facility])  Assessment & Plan  Patient with a history of CAD status post AGUSTIN x4, has follow-up with cardiology as an outpatient  Most recently completed 6 months of dual antiplatelet therapy  Currently on baby aspirin and atorvastatin daily  No chest pain        Plan  · Continue aspirin, statin for secondary prevention      Paroxysmal atrial fibrillation [facility])  Assessment & Plan  Documented Hx of paroxysmal atrial fibrillation, Julissa Vasc of 5  Previously on warfarin for left apical thrombus, however has since been discontinued by his cardiologist   Last seen by outpatient Cardiology 06/2022  Currently normal sinus in the ED      Plan:  · Patient not currently on any home beta-blocker regiment  · F/u outpatient      Chronic combined systolic and diastolic CHF (congestive heart failure) ([facility])  Assessment & Plan  Wt Readings from Last 3 Encounters:   10/04/22 76 4 kg (168 lb 6 9 oz)   09/30/22 77 6 kg (171 lb)   09/20/22 81 6 kg (180 lb)       Documented history of chronic combined systolic and diastolic heart failure  Last echo performed 03/15/2022 showed LVEF 50% with G1 DD, apical akinesis, T BMP with mild regurg  Patient not fluid overloaded on exam     Plan:  · Continue to monitor          * Failure to thrive in adult  Assessment & Plan  Patient is refusing to eat and continues to feel weak    Plan  -Megestrol 400 mg ordered  -Magic Cup and Ensure ordered for calorie supplementation       Disposition: continue observation for weakness and poor appetite     SUBJECTIVE     Patient seen and examined  Per nursing the pt had an episode overnight waking up confused and asking for his whiskey  Pt was reoriented and has been comfortable since  Pt denies chest pain, SOB, abd pain, fever, or nausea  Pt complains of fatigue and weakness       OBJECTIVE     Vitals:    10/18/22 2000 10/18/22 2330 10/19/22 0638 10/19/22 0824   BP:  91/55 (!) 81/49 97/56 Pulse:  94 85 (!) 52   Resp:  19     Temp:  99 4 °F (37 4 °C)     SpO2: 94% 98% 94% 90%      Temperature:   Temp (24hrs), Av 4 °F (37 4 °C), Min:99 4 °F (37 4 °C), Max:99 4 °F (37 4 °C)    Temperature: 99 4 °F (37 4 °C)  Intake & Output:  I/O       10/17 0701  10/18 0700 10/18 0701  10/19 0700 10/19 0701  10/20 07    I V  1000 2683  3     Total Intake 1000 2683 3     Urine  550     Stool  0     Total Output  550     Net +1000 +2133 3            Unmeasured Urine Occurrence  1 x     Unmeasured Stool Occurrence  1 x         Weights: There is no height or weight on file to calculate BMI  Weight (last 2 days)     None        Physical Exam  Constitutional:       Appearance: He is ill-appearing  HENT:      Head: Normocephalic and atraumatic  Mouth/Throat:      Mouth: Mucous membranes are dry  Eyes:      Extraocular Movements: Extraocular movements intact  Conjunctiva/sclera: Conjunctivae normal       Pupils: Pupils are equal, round, and reactive to light  Cardiovascular:      Rate and Rhythm: Normal rate and regular rhythm  Pulses: Normal pulses  Heart sounds: Normal heart sounds  Pulmonary:      Effort: Pulmonary effort is normal       Breath sounds: Normal breath sounds  Abdominal:      General: Abdomen is flat  Bowel sounds are normal       Palpations: Abdomen is soft  Tenderness: There is abdominal tenderness  Neurological:      Mental Status: He is alert  LABORATORY DATA     Labs: I have personally reviewed pertinent reports    Results from last 7 days   Lab Units 10/19/22  0714 10/18/22  0642 10/17/22  1412   WBC Thousand/uL 3 35* 4 35 4 77   HEMOGLOBIN g/dL 8 1* 8 8* 9 5*   HEMATOCRIT % 25 1* 26 7* 29 1*   PLATELETS Thousands/uL 78* 96* 92*   NEUTROS PCT %  --  73 72   MONOS PCT %  --  13* 17*   MONO PCT % 6  --   --       Results from last 7 days   Lab Units 10/18/22  0642 10/17/22  1532   POTASSIUM mmol/L 3 6 3 1*   CHLORIDE mmol/L 105 100   CO2 mmol/L 29 30 BUN mg/dL 11 15   CREATININE mg/dL 0 66 0 74   CALCIUM mg/dL 8 1* 8 7     Results from last 7 days   Lab Units 10/18/22  0642   MAGNESIUM mg/dL 1 8     Results from last 7 days   Lab Units 10/18/22  0642   PHOSPHORUS mg/dL 2 1*                    IMAGING & DIAGNOSTIC TESTING     Radiology Results: I have personally reviewed pertinent reports  XR chest 1 view portable    Result Date: 10/17/2022  Impression: No acute cardiopulmonary disease  Findings are stable Workstation performed: [id number]     Other Diagnostic Testing: I have personally reviewed pertinent reports  ACTIVE MEDICATIONS     Current Facility-Administered Medications   Medication Dose Route Frequency   • aspirin (ECOTRIN LOW STRENGTH) EC tablet 81 mg  81 mg Oral Daily   • atorvastatin (LIPITOR) tablet 80 mg  80 mg Oral Daily With Dinner   • dextrose 5 % in lactated Ringer's infusion  125 mL/hr Intravenous Continuous   • enoxaparin (LOVENOX) subcutaneous injection 40 mg  40 mg Subcutaneous Daily   • megestrol (MEGACE) oral suspension 400 mg  400 mg Oral Daily   • midodrine (PROAMATINE) tablet 2 5 mg  2 5 mg Oral TID AC   • ondansetron (ZOFRAN-ODT) dispersible tablet 4 mg  4 mg Oral Q6H PRN   • pantoprazole (PROTONIX) EC tablet 40 mg  40 mg Oral Early Morning   • PHENobarbital tablet 64 8 mg  64 8 mg Oral BID   • potassium chloride (K-DUR,KLOR-CON) CR tablet 10 mEq  10 mEq Oral BID       VTE Pharmacologic Prophylaxis: Enoxaparin (Lovenox)  VTE Mechanical Prophylaxis: sequential compression device    Portions of the record may have been created with voice recognition software  Occasional wrong word or "sound a like" substitutions may have occurred due to the inherent limitations of voice recognition software    Read the chart carefully and recognize, using context, where substitutions have occurred   ==  [facility]  Internal Medicine SUB I M4

## 2022-10-20 ENCOUNTER — TELEPHONE (OUTPATIENT)
Dept: RADIOLOGY | Facility: HOSPITAL | Age: 77
End: 2022-10-20

## 2022-10-20 ENCOUNTER — HOSPITAL ENCOUNTER (INPATIENT)
Facility: HOSPITAL | Age: 77
LOS: 3 days | Discharge: NURSING, REHAB OR LONG-TERM CARE | End: 2022-10-24
Attending: EMERGENCY MEDICINE | Admitting: INTERNAL MEDICINE
Payer: MEDICARE

## 2022-10-20 VITALS
DIASTOLIC BLOOD PRESSURE: 58 MMHG | HEART RATE: 91 BPM | RESPIRATION RATE: 16 BRPM | TEMPERATURE: 98.3 F | SYSTOLIC BLOOD PRESSURE: 95 MMHG | OXYGEN SATURATION: 93 %

## 2022-10-20 DIAGNOSIS — R26.2 AMBULATORY DYSFUNCTION: Primary | ICD-10-CM

## 2022-10-20 DIAGNOSIS — E43 SEVERE PROTEIN-CALORIE MALNUTRITION (HCC): ICD-10-CM

## 2022-10-20 DIAGNOSIS — E87.6 HYPOKALEMIA: ICD-10-CM

## 2022-10-20 DIAGNOSIS — R53.1 GENERAL WEAKNESS: ICD-10-CM

## 2022-10-20 LAB
ANION GAP SERPL CALCULATED.3IONS-SCNC: 2 MMOL/L (ref 4–13)
BASOPHILS # BLD AUTO: 0.04 THOUSANDS/ÂΜL (ref 0–0.1)
BASOPHILS NFR BLD AUTO: 1 % (ref 0–1)
BUN SERPL-MCNC: 7 MG/DL (ref 5–25)
CALCIUM SERPL-MCNC: 7.8 MG/DL (ref 8.3–10.1)
CHLORIDE SERPL-SCNC: 107 MMOL/L (ref 96–108)
CO2 SERPL-SCNC: 28 MMOL/L (ref 21–32)
CREAT SERPL-MCNC: 0.54 MG/DL (ref 0.6–1.3)
EOSINOPHIL # BLD AUTO: 0.36 THOUSAND/ÂΜL (ref 0–0.61)
EOSINOPHIL NFR BLD AUTO: 9 % (ref 0–6)
ERYTHROCYTE [DISTWIDTH] IN BLOOD BY AUTOMATED COUNT: 24.7 % (ref 11.6–15.1)
GFR SERPL CREATININE-BSD FRML MDRD: 101 ML/MIN/1.73SQ M
GLUCOSE SERPL-MCNC: 110 MG/DL (ref 65–140)
HCT VFR BLD AUTO: 26.9 % (ref 36.5–49.3)
HCT VFR BLD AUTO: 27.9 % (ref 36.5–49.3)
HGB BLD-MCNC: 8.9 G/DL (ref 12–17)
HGB BLD-MCNC: 9 G/DL (ref 12–17)
IMM GRANULOCYTES # BLD AUTO: 0.02 THOUSAND/UL (ref 0–0.2)
IMM GRANULOCYTES NFR BLD AUTO: 1 % (ref 0–2)
LYMPHOCYTES # BLD AUTO: 0.31 THOUSANDS/ÂΜL (ref 0.6–4.47)
LYMPHOCYTES NFR BLD AUTO: 8 % (ref 14–44)
MCH RBC QN AUTO: 33.6 PG (ref 26.8–34.3)
MCHC RBC AUTO-ENTMCNC: 32.3 G/DL (ref 31.4–37.4)
MCV RBC AUTO: 104 FL (ref 82–98)
MONOCYTES # BLD AUTO: 0.58 THOUSAND/ÂΜL (ref 0.17–1.22)
MONOCYTES NFR BLD AUTO: 15 % (ref 4–12)
NEUTROPHILS # BLD AUTO: 2.63 THOUSANDS/ÂΜL (ref 1.85–7.62)
NEUTS SEG NFR BLD AUTO: 66 % (ref 43–75)
NRBC BLD AUTO-RTO: 0 /100 WBCS
PLATELET # BLD AUTO: 89 THOUSANDS/UL (ref 149–390)
PLATELET # BLD AUTO: 90 THOUSANDS/UL (ref 149–390)
POTASSIUM SERPL-SCNC: 3.3 MMOL/L (ref 3.5–5.3)
RBC # BLD AUTO: 2.68 MILLION/UL (ref 3.88–5.62)
SODIUM SERPL-SCNC: 137 MMOL/L (ref 135–147)
WBC # BLD AUTO: 3.94 THOUSAND/UL (ref 4.31–10.16)

## 2022-10-20 PROCEDURE — 85025 COMPLETE CBC W/AUTO DIFF WBC: CPT

## 2022-10-20 PROCEDURE — 99238 HOSP IP/OBS DSCHRG MGMT 30/<: CPT | Performed by: INTERNAL MEDICINE

## 2022-10-20 PROCEDURE — 85049 AUTOMATED PLATELET COUNT: CPT

## 2022-10-20 PROCEDURE — 99284 EMERGENCY DEPT VISIT MOD MDM: CPT | Performed by: EMERGENCY MEDICINE

## 2022-10-20 PROCEDURE — 36415 COLL VENOUS BLD VENIPUNCTURE: CPT

## 2022-10-20 PROCEDURE — 80048 BASIC METABOLIC PNL TOTAL CA: CPT

## 2022-10-20 PROCEDURE — 85018 HEMOGLOBIN: CPT

## 2022-10-20 PROCEDURE — 85014 HEMATOCRIT: CPT

## 2022-10-20 PROCEDURE — NC001 PR NO CHARGE: Performed by: INTERNAL MEDICINE

## 2022-10-20 PROCEDURE — 99284 EMERGENCY DEPT VISIT MOD MDM: CPT

## 2022-10-20 RX ORDER — ONDANSETRON 4 MG/1
8 TABLET, ORALLY DISINTEGRATING ORAL EVERY 8 HOURS PRN
Status: DISCONTINUED | OUTPATIENT
Start: 2022-10-20 | End: 2022-10-24 | Stop reason: HOSPADM

## 2022-10-20 RX ORDER — ASPIRIN 81 MG/1
81 TABLET ORAL DAILY
Status: DISCONTINUED | OUTPATIENT
Start: 2022-10-21 | End: 2022-10-24 | Stop reason: HOSPADM

## 2022-10-20 RX ORDER — MIDODRINE HYDROCHLORIDE 2.5 MG/1
2.5 TABLET ORAL
Status: DISCONTINUED | OUTPATIENT
Start: 2022-10-20 | End: 2022-10-24 | Stop reason: HOSPADM

## 2022-10-20 RX ORDER — MIRTAZAPINE 7.5 MG/1
7.5 TABLET, FILM COATED ORAL
Qty: 30 TABLET | Refills: 0 | Status: SHIPPED | OUTPATIENT
Start: 2022-10-20 | End: 2022-11-19

## 2022-10-20 RX ORDER — MIRTAZAPINE 15 MG/1
7.5 TABLET, FILM COATED ORAL
Status: DISCONTINUED | OUTPATIENT
Start: 2022-10-20 | End: 2022-10-20 | Stop reason: HOSPADM

## 2022-10-20 RX ORDER — PHENOBARBITAL 64.8 MG/1
64.8 TABLET ORAL 2 TIMES DAILY
Status: DISCONTINUED | OUTPATIENT
Start: 2022-10-20 | End: 2022-10-24 | Stop reason: HOSPADM

## 2022-10-20 RX ORDER — ATORVASTATIN CALCIUM 80 MG/1
80 TABLET, FILM COATED ORAL
Status: DISCONTINUED | OUTPATIENT
Start: 2022-10-20 | End: 2022-10-24 | Stop reason: HOSPADM

## 2022-10-20 RX ORDER — PANTOPRAZOLE SODIUM 40 MG/1
40 TABLET, DELAYED RELEASE ORAL
Status: DISCONTINUED | OUTPATIENT
Start: 2022-10-21 | End: 2022-10-24 | Stop reason: HOSPADM

## 2022-10-20 RX ORDER — ENOXAPARIN SODIUM 100 MG/ML
40 INJECTION SUBCUTANEOUS DAILY
Status: DISCONTINUED | OUTPATIENT
Start: 2022-10-21 | End: 2022-10-24 | Stop reason: HOSPADM

## 2022-10-20 RX ORDER — MIRTAZAPINE 15 MG/1
7.5 TABLET, FILM COATED ORAL
Status: DISCONTINUED | OUTPATIENT
Start: 2022-10-20 | End: 2022-10-24 | Stop reason: HOSPADM

## 2022-10-20 RX ADMIN — ASPIRIN 81 MG: 81 TABLET, COATED ORAL at 08:30

## 2022-10-20 RX ADMIN — CARBIDOPA AND LEVODOPA 2.5 MG: 50; 200 TABLET, EXTENDED RELEASE ORAL at 11:16

## 2022-10-20 RX ADMIN — ENOXAPARIN SODIUM 40 MG: 40 INJECTION SUBCUTANEOUS at 08:31

## 2022-10-20 RX ADMIN — PHENOBARBITAL 64.8 MG: 64.8 TABLET ORAL at 08:30

## 2022-10-20 RX ADMIN — MIRTAZAPINE 7.5 MG: 15 TABLET, FILM COATED ORAL at 22:48

## 2022-10-20 RX ADMIN — PHENOBARBITAL 64.8 MG: 64.8 TABLET ORAL at 20:27

## 2022-10-20 RX ADMIN — ATORVASTATIN CALCIUM 80 MG: 80 TABLET, FILM COATED ORAL at 15:48

## 2022-10-20 RX ADMIN — PANTOPRAZOLE SODIUM 40 MG: 40 TABLET, DELAYED RELEASE ORAL at 05:07

## 2022-10-20 RX ADMIN — POTASSIUM CHLORIDE 10 MEQ: 750 TABLET, EXTENDED RELEASE ORAL at 08:30

## 2022-10-20 RX ADMIN — CARBIDOPA AND LEVODOPA 2.5 MG: 50; 200 TABLET, EXTENDED RELEASE ORAL at 05:07

## 2022-10-20 NOTE — ASSESSMENT & PLAN NOTE
Patient endorsed improved appetite today but continues to feel weak  Concern for severe protein malnutrition due to poor PO intake    Patient reports 18% weight loss since July 2022, <75% est needs energy intake > 1month  Secondary to malignancy and cancer treatment     Plan  - regular diet with nutritional supplements with Ensure  - will continue mirtazapine 7 5mg qHS

## 2022-10-20 NOTE — PLAN OF CARE
Problem: Nutrition/Hydration-ADULT  Goal: Nutrient/Hydration intake appropriate for improving, restoring or maintaining nutritional needs  Description: Monitor and assess patient's nutrition/hydration status for malnutrition  Collaborate with interdisciplinary team and initiate plan and interventions as ordered  Monitor patient's weight and dietary intake as ordered or per policy  Utilize nutrition screening tool and intervene as necessary  Determine patient's food preferences and provide high-protein, high-caloric foods as appropriate       INTERVENTIONS:  - Monitor oral intake, urinary output, labs, and treatment plans  - Assess nutrition and hydration status and recommend course of action  - Evaluate amount of meals eaten  - Assist patient with eating if necessary   - Allow adequate time for meals  - Recommend/ encourage appropriate diets, oral nutritional supplements, and vitamin/mineral supplements  - Order, calculate, and assess calorie counts as needed  - Recommend, monitor, and adjust tube feedings and TPN/PPN based on assessed needs  - Assess need for intravenous fluids  - Provide specific nutrition/hydration education as appropriate  - Include patient/family/caregiver in decisions related to nutrition  Outcome: Progressing     Problem: MOBILITY - ADULT  Goal: Maintain or return to baseline ADL function  Description: INTERVENTIONS:  -  Assess patient's ability to carry out ADLs; assess patient's baseline for ADL function and identify physical deficits which impact ability to perform ADLs (bathing, care of mouth/teeth, toileting, grooming, dressing, etc )  - Assess/evaluate cause of self-care deficits   - Assess range of motion  - Assess patient's mobility; develop plan if impaired  - Assess patient's need for assistive devices and provide as appropriate  - Encourage maximum independence but intervene and supervise when necessary  - Involve family in performance of ADLs  - Assess for home care needs following discharge   - Consider OT consult to assist with ADL evaluation and planning for discharge  - Provide patient education as appropriate  Outcome: Progressing  Goal: Maintains/Returns to pre admission functional level  Description: INTERVENTIONS:  - Perform BMAT or MOVE assessment daily    - Set and communicate daily mobility goal to care team and patient/family/caregiver  - Collaborate with rehabilitation services on mobility goals if consulted  - Perform Range of Motion 3 times a day  - Reposition patient every 2 hours    - Dangle patient 3 times a day  - Stand patient 3 times a day  - Ambulate patient 3 times a day  - Out of bed to chair 3 times a day   - Out of bed for meals 3 times a day  - Out of bed for toileting  - Record patient progress and toleration of activity level   Outcome: Progressing     Problem: PAIN - ADULT  Goal: Verbalizes/displays adequate comfort level or baseline comfort level  Description: Interventions:  - Encourage patient to monitor pain and request assistance  - Assess pain using appropriate pain scale  - Administer analgesics based on type and severity of pain and evaluate response  - Implement non-pharmacological measures as appropriate and evaluate response  - Consider cultural and social influences on pain and pain management  - Notify physician/advanced practitioner if interventions unsuccessful or patient reports new pain  Outcome: Progressing     Problem: INFECTION - ADULT  Goal: Absence or prevention of progression during hospitalization  Description: INTERVENTIONS:  - Assess and monitor for signs and symptoms of infection  - Monitor lab/diagnostic results  - Monitor all insertion sites, i e  indwelling lines, tubes, and drains  - Monitor endotracheal if appropriate and nasal secretions for changes in amount and color  - [place] appropriate cooling/warming therapies per order  - Administer medications as ordered  - Instruct and encourage patient and family to use good hand hygiene technique  - Identify and instruct in appropriate isolation precautions for identified infection/condition  Outcome: Progressing  Goal: Absence of fever/infection during neutropenic period  Description: INTERVENTIONS:  - Monitor WBC    Outcome: Progressing     Problem: SAFETY ADULT  Goal: Maintain or return to baseline ADL function  Description: INTERVENTIONS:  -  Assess patient's ability to carry out ADLs; assess patient's baseline for ADL function and identify physical deficits which impact ability to perform ADLs (bathing, care of mouth/teeth, toileting, grooming, dressing, etc )  - Assess/evaluate cause of self-care deficits   - Assess range of motion  - Assess patient's mobility; develop plan if impaired  - Assess patient's need for assistive devices and provide as appropriate  - Encourage maximum independence but intervene and supervise when necessary  - Involve family in performance of ADLs  - Assess for home care needs following discharge   - Consider OT consult to assist with ADL evaluation and planning for discharge  - Provide patient education as appropriate  Outcome: Progressing  Goal: Maintains/Returns to pre admission functional level  Description: INTERVENTIONS:  - Perform BMAT or MOVE assessment daily    - Set and communicate daily mobility goal to care team and patient/family/caregiver  - Collaborate with rehabilitation services on mobility goals if consulted  - Perform Range of Motion 3 times a day  - Reposition patient every 2 hours    - Dangle patient 3 times a day  - Stand patient 3 times a day  - Ambulate patient 3 times a day  - Out of bed to chair 3 times a day   - Out of bed for meals 3 times a day  - Out of bed for toileting  - Record patient progress and toleration of activity level   Outcome: Progressing  Goal: Patient will remain free of falls  Description: INTERVENTIONS:  - Educate patient/family on patient safety including physical limitations  - Instruct patient to call for assistance with activity   - Consult OT/PT to assist with strengthening/mobility   - Keep Call bell within reach  - Keep bed low and locked with side rails adjusted as appropriate  - Keep care items and personal belongings within reach  - Initiate and maintain comfort rounds  - Make Fall Risk Sign visible to staff  - Offer Toileting every 2 Hours, in advance of need  - Initiate/Maintain bed alarm  - Obtain necessary fall risk management equipment  - Apply yellow socks and bracelet for high fall risk patients  - Consider moving patient to room near nurses station  Outcome: Progressing     Problem: DISCHARGE PLANNING  Goal: Discharge to home or other facility with appropriate resources  Description: INTERVENTIONS:  - Identify barriers to discharge w/patient and caregiver  - Arrange for needed discharge resources and transportation as appropriate  - Identify discharge learning needs (meds, wound care, etc )  - Arrange for interpretive services to assist at discharge as needed  - Refer to Case Management Department for coordinating discharge planning if the patient needs post-hospital services based on physician/advanced practitioner order or complex needs related to functional status, cognitive ability, or social support system  Outcome: Progressing     Problem: Knowledge Deficit  Goal: Patient/family/caregiver demonstrates understanding of disease process, treatment plan, medications, and discharge instructions  Description: Complete learning assessment and assess knowledge base    Interventions:  - Provide teaching at level of understanding  - Provide teaching via preferred learning methods  Outcome: Progressing     Problem: Prexisting or High Potential for Compromised Skin Integrity  Goal: Skin integrity is maintained or improved  Description: INTERVENTIONS:  - Identify patients at risk for skin breakdown  - Assess and monitor skin integrity  - Assess and monitor nutrition and hydration status  - Monitor labs   - Assess for incontinence   - Turn and reposition patient  - Assist with mobility/ambulation  - Relieve pressure over bony prominences  - Avoid friction and shearing  - Provide appropriate hygiene as needed including keeping skin clean and dry  - Evaluate need for skin moisturizer/barrier cream  - Collaborate with interdisciplinary team   - Patient/family teaching  - Consider wound care consult   Outcome: Progressing

## 2022-10-20 NOTE — PROGRESS NOTES
INTERNAL MEDICINE RESIDENCY PROGRESS NOTE     Name: Sang Quiroz   Age & Sex: 67 y o  male   MRN: [id number]  [facility]   Encounter: [id number]  Team: SOD Team A    PATIENT INFORMATION     Name: Sang Qiuroz   Age & Sex: 67 y o  male   MRN: [id number]  Hospital Stay Days: 2    ASSESSMENT/PLAN     Principal Problem:    Failure to thrive in adult  Active Problems:    Chronic combined systolic and diastolic CHF (congestive heart failure) (HCC)    Paroxysmal atrial fibrillation ([address] )    Coronary artery disease of native artery of native heart with stable angina pectoris (HCC)    Esophageal cancer (HCC)    Hypotension    Severe protein-calorie malnutrition (HCC)    Generalized weakness    Nausea & vomiting      Nausea & vomiting  Assessment & Plan  Patient with nausea and vomiting preventing him from p o  Intake  States that secondary to his recent chemo radiation treatment  Has some associated epigastric tenderness  Plan:  · Check lipase though low likelihood of pancreatitis  · IV fluids  · IV Zofran and p r n  Zofran p o  · Mag of 1 8 and Phos of 2 1 10/18/22  · Advance to normal diet     Generalized weakness  Assessment & Plan  Patient with 5 days of progressive weakness in the setting of nausea and vomiting related to his chemotherapy and radiation  Likely secondary to lack of p o  Intake  Plan:  · D5 lactated Ringer's  · Zofran IV x1 and po p r n  · PT OT  · Case management    Hypotension  Assessment & Plan  Documented history of hypotension, on midodrine 2 5 mg t i d  Blood pressure in the high 69M low 232C systolic this admission  Plan:  Continue midodrine 2 5 mg t i d     Esophageal cancer [facility])  Assessment & Plan  Patient with stage II B esophageal cancer diagnosed in May 2022  Has recently completed  carboplatin pack with taxol chemotherapy as well as radiotherapy  Follows outpatient with heme/onc   Cancer treatment is likely contributing to his nausea and vomiting      Coronary artery disease of native artery of native heart with stable angina pectoris [facility])  Assessment & Plan  Patient with a history of CAD status post AGUSTIN x4, has follow-up with cardiology as an outpatient  Most recently completed 6 months of dual antiplatelet therapy  Currently on baby aspirin and atorvastatin daily  No chest pain        Plan  · Continue aspirin, statin for secondary prevention      Paroxysmal atrial fibrillation [facility])  Assessment & Plan  Documented Hx of paroxysmal atrial fibrillation, Julissa Vasc of 5  Previously on warfarin for left apical thrombus, however has since been discontinued by his cardiologist   Last seen by outpatient Cardiology 06/2022  Currently normal sinus in the ED      Plan:  · Patient not currently on any home beta-blocker regiment  · F/u outpatient      Chronic combined systolic and diastolic CHF (congestive heart failure) ([facility])  Assessment & Plan  Wt Readings from Last 3 Encounters:   10/04/22 76 4 kg (168 lb 6 9 oz)   09/30/22 77 6 kg (171 lb)   09/20/22 81 6 kg (180 lb)       Documented history of chronic combined systolic and diastolic heart failure  Last echo performed 03/15/2022 showed LVEF 50% with G1 DD, apical akinesis, T BMP with mild regurg  Patient not fluid overloaded on exam     Plan:  · Continue to monitor          * Failure to thrive in adult  Assessment & Plan  Patient endorsed that he has an appetite today but continues to feel weak  Concern for severe protein malnutrition due to poor PO intake  Pt did not want ensure or magic cup due to the consistency and coldness of the ice cream      Plan  -Magic Cup and Ensure ordered for calorie supplementation  -Advanced to normal diet       Disposition: continue observation for weakness and poor appetite  If patient is able to tolerate solid food, good to discharge later today  SUBJECTIVE     Patient seen and examined  Pt had an episode of hypotension with a BP of 81/44 last night   Repeat blood pressure showed a 92/52  Pt denies headaches, chest pain, lightheadedness, dizziness, visual changes, SOB, nausea, abd pain  Pt complains of fatigue and weakness  Pt did express that he had an appetite for the first time and requested mac and cheese for lunch  Pending his appetite and tolerance to his lunch, pt may be good for discharge  OBJECTIVE     Vitals:    10/20/22 0008 10/20/22 0008 10/20/22 0105 10/20/22 0813   BP:  (!) 81/44 92/52 95/57   BP Location:   Left arm Left arm   Pulse: 87 90  83   Resp:    17   Temp: 98 °F (36 7 °C) 98 °F (36 7 °C)  98 4 °F (36 9 °C)   TempSrc:    Oral   SpO2: 95% 96%  91%      Temperature:   Temp (24hrs), Av 1 °F (36 7 °C), Min:98 °F (36 7 °C), Max:98 4 °F (36 9 °C)    Temperature: 98 4 °F (36 9 °C)  Intake & Output:  I/O       10/18 0701  10/19 0700 10/19 0701  10/20 0700 10/20 0701  10/21 0700    P  O   100     I V  2683 3 1362 5     Total Intake 2683  3 1462 5     Urine 550 650     Stool 0      Total Output 550 650     Net +2133 3 +812 5            Unmeasured Urine Occurrence 1 x      Unmeasured Stool Occurrence 1 x          Weights: There is no height or weight on file to calculate BMI  Weight (last 2 days)     None        Physical Exam  Constitutional:       General: He is not in acute distress  Appearance: He is ill-appearing  HENT:      Head: Normocephalic and atraumatic  Cardiovascular:      Rate and Rhythm: Normal rate and regular rhythm  Pulses: Normal pulses  Heart sounds: Normal heart sounds  Pulmonary:      Effort: Pulmonary effort is normal       Breath sounds: Normal breath sounds  Abdominal:      General: Abdomen is flat  Bowel sounds are normal       Palpations: Abdomen is soft  Hernia: A hernia is present  Neurological:      Mental Status: He is alert  LABORATORY DATA     Labs: I have personally reviewed pertinent reports    Results from last 7 days   Lab Units 10/20/22  0158 10/20/22  0157 10/19/22  3138 10/18/22  5377 10/17/22  1412   WBC Thousand/uL 3 94*  --  3 35* 4 35 4 77   HEMOGLOBIN g/dL 9 0* 8 9* 8 1* 8 8* 9 5*   HEMATOCRIT % 27 9* 26 9* 25 1* 26 7* 29 1*   PLATELETS Thousands/uL 90*  --  78* 96* 92*   NEUTROS PCT % 66  --   --  73 72   MONOS PCT % 15*  --   --  13* 17*   MONO PCT %  --   --  6  --   --       Results from last 7 days   Lab Units 10/20/22  0158 10/18/22  0642 10/17/22  1532   POTASSIUM mmol/L 3 3* 3 6 3 1*   CHLORIDE mmol/L 107 105 100   CO2 mmol/L 28 29 30   BUN mg/dL 7 11 15   CREATININE mg/dL 0 54* 0 66 0 74   CALCIUM mg/dL 7 8* 8 1* 8 7     Results from last 7 days   Lab Units 10/18/22  0642   MAGNESIUM mg/dL 1 8     Results from last 7 days   Lab Units 10/18/22  0642   PHOSPHORUS mg/dL 2 1*                    IMAGING & DIAGNOSTIC TESTING     Radiology Results: I have personally reviewed pertinent reports  XR chest 1 view portable    Result Date: 10/17/2022  Impression: No acute cardiopulmonary disease  Findings are stable Workstation performed: [id number]     Other Diagnostic Testing: I have personally reviewed pertinent reports      ACTIVE MEDICATIONS     Current Facility-Administered Medications   Medication Dose Route Frequency   • aspirin (ECOTRIN LOW STRENGTH) EC tablet 81 mg  81 mg Oral Daily   • atorvastatin (LIPITOR) tablet 80 mg  80 mg Oral Daily With Dinner   • enoxaparin (LOVENOX) subcutaneous injection 40 mg  40 mg Subcutaneous Daily   • midodrine (PROAMATINE) tablet 2 5 mg  2 5 mg Oral TID AC   • mirtazapine (REMERON) tablet 7 5 mg  7 5 mg Oral HS   • ondansetron (ZOFRAN-ODT) dispersible tablet 4 mg  4 mg Oral Q6H PRN   • pantoprazole (PROTONIX) EC tablet 40 mg  40 mg Oral Early Morning   • PHENobarbital tablet 64 8 mg  64 8 mg Oral BID   • potassium chloride (K-DUR,KLOR-CON) CR tablet 10 mEq  10 mEq Oral BID       VTE Pharmacologic Prophylaxis: Enoxaparin (Lovenox)  VTE Mechanical Prophylaxis: sequential compression device    Portions of the record may have been created with voice recognition software  Occasional wrong word or "sound a like" substitutions may have occurred due to the inherent limitations of voice recognition software    Read the chart carefully and recognize, using context, where substitutions have occurred   ==  [id number]  Internal Medicine SUB I M4

## 2022-10-20 NOTE — QUICK NOTE
Paged by nursing regarding BP of 81/44 and MAP of 56  Appears patient had a few episodes of hypotension over the course of the day yesterday  Per nursing, day team was OK with these pressures  Patient seen and examined at the bedside  Denies feeling headaches, lightheadedness, dizziness, visual changes, shortness of breath  States he feels "great " Alert and oriented to name, date, states he is in a "zoo" though recognizes nurse      Assessment:   -Patient previously on fluids, possible hemodilutional component of Hemoglobin downtrend, 8 1 yesterday from 8 8   -Given heart failure and recent IVF administration, will hold off on IVF at this time  -Receiving 2 5mg Midodrine TID  -Likely multifactorial in the setting of poor PO intake and known history of hypotension  -Repeat manual blood pressure showing 92/52 with MAP of 65    Plan:   -Ordered repeat H+H   -Consideration of additional dose of midodrine or changing timing of medications for bedtime dose

## 2022-10-20 NOTE — ASSESSMENT & PLAN NOTE
Hx stage II B esophageal cancer diagnosed in May 2022  Has recently completed  carboplatin pack with taxol chemotherapy as well as radiotherapy  Follows outpatient with heme/onc  Cancer treatment is likely contributing to his nausea and vomiting      Plan:  Continue outpatient follow-up with Heme-Onc and surgery

## 2022-10-20 NOTE — H&P
INTERNAL MEDICINE RESIDENCY ADMISSION H&P     Name: Brian Garcia   Age & Sex: 67 y o  male   MRN: [id number]  Unit/Bed#: QCD   Encounter: [id number]  Primary Care Provider: Aaron Kebede MD    Code Status: Level 3 - DNAR and DNI  Admission Status: Observation  Disposition: Patient requires Med/Surg    Admit to team: SOD Team A    ASSESSMENT/PLAN     Active Problems:    HTN (hypertension)    Seizure disorder ([facility])    Chronic combined systolic and diastolic CHF (congestive heart failure) ([facility])    Paroxysmal atrial fibrillation ([facility] )    Coronary artery disease of native artery of native heart with stable angina pectoris ([place] [address] )    Esophageal cancer ([place] [address] )    GERD (gastroesophageal reflux disease)    Severe protein-calorie malnutrition ([place] [address] )    Generalized weakness    Failure to thrive in adult    Ambulatory dysfunction    Ambulatory dysfunction  Assessment & Plan  Patient presented to hospital initially on 10/18 for failure to thrive in an adult  He was discharged the afternoon of 10/20  He fell after stepping up his front door and experienced minor injuries  No reported loss of consciousness  Bystander assisted helping him up  Patient is experiencing ambulatory dysfunction and weakness  Given stairways present in home; concern for future fall risk    Plan:  · Consult PT for assessment of patient's ability to climb stairs   · Patient will most likely need post acute rehab    Failure to thrive in adult  Assessment & Plan  Patient endorsed improved appetite today but continues to feel weak  Concern for severe protein malnutrition due to poor PO intake      Plan  - regular diet  - Started mirtazapine 7 5mg qHS    Esophageal cancer ([facility] )  Assessment & Plan  Hx stage II B esophageal cancer diagnosed in May 2022   Has recently completed  carboplatin pack with taxol chemotherapy as well as radiotherapy  Follows outpatient with heme/onc  Cancer treatment is likely contributing to his nausea and vomiting  Plan:  Continue outpatient follow-up with Heme-Onc    Coronary artery disease of native artery of native heart with stable angina pectoris [facility])  Assessment & Plan  Patient with a history of CAD status post AGUSTIN x4, has follow-up with cardiology as an outpatient   Most recently completed 6 months of dual antiplatelet therapy   Currently on baby aspirin and atorvastatin daily   No chest pain        Plan  · Continue aspirin, statin for secondary prevention      Paroxysmal atrial fibrillation ([facility] )  Assessment & Plan  Documented Hx of paroxysmal atrial fibrillation, Omi Vasc of 5  Previously on warfarin for left apical thrombus, however has since been discontinued by his cardiologist   Last seen by outpatient Cardiology 06/2022  Currently normal sinus in the ED      Plan:  · Patient not currently on any home beta-blocker regiment  · F/u outpatient      Chronic combined systolic and diastolic CHF (congestive heart failure) ([facility] )  Assessment & Plan  Wt Readings from Last 3 Encounters:   10/04/22 76 4 kg (168 lb 6 9 oz)   09/30/22 77 6 kg (171 lb)   09/20/22 81 6 kg (180 lb)     Documented history of chronic combined systolic and diastolic heart failure   Last echo performed 03/15/2022 showed LVEF 50% with G1 DD, apical akinesis, T BMP with mild regurg  Patient not fluid overloaded on exam      Plan:  · Continue to monitor        Seizure disorder [facility])  Assessment & Plan  Remote history of seizure disorder diagnosed 40 years ago  Patient currently takes phenobarbital 64 8 mg BID      Plan:   -Continue phenobarbital 64 8 mg BID       VTE Pharmacologic Prophylaxis: Enoxaparin   VTE Mechanical Prophylaxis: SCD or foot pumps    CHIEF COMPLAINT     Chief Complaint   Patient presents with   • Knee Pain     Patient complains of left knee pain when standing  Patient reports his knee gave out and he slowly lowered himself to the ground  Patient able to bend knee without pain  No obvious deformities   -thinners - LOC HISTORY OF PRESENT ILLNESS     Alonso Gonzalez is a 66 yo male with a PMH of CAD, hyperlipidemia, HFpEF, and esophogeal adenocarcinoma (recieves carboplatin and paclitaxel chemo)  He had been admitted to hospital 10/17 for failure to thrive in an adult  He had reported symptoms of increasing weakness and ambulatory problems  He stated that his weakness stemmed from his reduced appetite on account of his chemo treatment for esophageal adenocarcinoma  Inpatient, he was started on a clear diet and was able to tolerate this  He was later escalated to a soft diet, although oral intake was sub-optimal  His appetite later improved after administration of megestrol and he was sent home with 7 5mg mirtazapine qHS on 10/20  During the stay PT evaluated him for ambulatory dysfunction  He was recommended outpatient PT following discharge  He returned to the hospital after experiencing a fall at his front doorstep  He states that his knee gave out due to weakness and he fell  He reported no loss of consciousness  A bystander helped him up, and he was brought to the hospital  On admission, patient appeared in no acute distress  He was lying in bed and his brother was present at bedside  He indicates that there are stairways at home  Due to concern for future fall risk, patient admitted under medicine for re-evaluation by Physical therapy  Patient agreeable for post acute rehab if needed  REVIEW OF SYSTEMS     Review of Systems   Constitutional: Negative for chills and fever  HENT: Negative for ear pain and sore throat  Eyes: Negative  Negative for pain and visual disturbance  Respiratory: Negative  Negative for cough and shortness of breath  Cardiovascular: Negative for chest pain and palpitations  Gastrointestinal: Negative  Negative for abdominal pain and vomiting  Genitourinary: Negative for dysuria and hematuria  Musculoskeletal: Positive for gait problem  Negative for arthralgias and back pain     Skin: Negative for color change and rash  Neurological: Negative for dizziness, seizures, syncope and headaches  All other systems reviewed and are negative  OBJECTIVE   There were no vitals filed for this visit  Temperature:   Temp (24hrs), Av 2 °F (36 8 °C), Min:98 °F (36 7 °C), Max:98 4 °F (36 9 °C)       Intake & Output:  I/O     None        Weights: There is no height or weight on file to calculate BMI  Weight (last 2 days)     None        Physical Exam  Vitals and nursing note reviewed  Constitutional:       Appearance: He is well-developed  He is cachectic  HENT:      Head: Normocephalic and atraumatic  Eyes:      General: No scleral icterus  Conjunctiva/sclera: Conjunctivae normal    Cardiovascular:      Rate and Rhythm: Normal rate and regular rhythm  Heart sounds: No murmur heard  Pulmonary:      Effort: Pulmonary effort is normal  No respiratory distress  Breath sounds: Normal breath sounds  No wheezing or rhonchi  Abdominal:      General: Bowel sounds are normal       Palpations: Abdomen is soft  Tenderness: There is no abdominal tenderness  Musculoskeletal:      Cervical back: Neck supple  Skin:     General: Skin is warm and dry  Coloration: Skin is pale  Skin is not jaundiced  Neurological:      Mental Status: He is alert  Mental status is at baseline  PAST MEDICAL HISTORY     Past Medical History:   Diagnosis Date   • Cardiac disease    • CHF (congestive heart failure) ([facility] )    • Esophageal cancer ([facility] )    • Hernia of abdominal cavity    • Myocardial infarction [facility])     last assessed 14, past, Pt had MI s/p AGUSTIN placed in , refuses to take any other medications for his cardiac disease, only will take seizure med   Understands possible complications from this decision   • Seizure [facility])      PAST SURGICAL HISTORY     Past Surgical History:   Procedure Laterality Date   • ABDOMINAL AORTIC ANEURYSM REPAIR      for dialation or occulsion • CATARACT EXTRACTION     • IR PORT PLACEMENT  7/15/2022     SOCIAL & FAMILY HISTORY     Social History     Substance and Sexual Activity   Alcohol Use Not Currently     Substance and Sexual Activity   Alcohol Use Not Currently        Substance and Sexual Activity   Drug Use Never     Social History     Tobacco Use   Smoking Status Former Smoker   • Quit date: 2005   • Years since quittin 3   Smokeless Tobacco Never Used     Family History   Problem Relation Age of Onset   • Hypertension Father    • Kidney disease Brother      LABORATORY DATA     Labs: I have personally reviewed pertinent reports  Results from last 7 days   Lab Units 10/20/22  0158 10/20/22  0157 10/19/22  0714 10/18/22  0642 10/17/22  1412   WBC Thousand/uL 3 94*  --  3 35* 4 35 4 77   HEMOGLOBIN g/dL 9 0* 8 9* 8 1* 8 8* 9 5*   HEMATOCRIT % 27 9* 26 9* 25 1* 26 7* 29 1*   PLATELETS Thousands/uL 90*  --  78* 96* 92*   NEUTROS PCT % 66  --   --  73 72   MONOS PCT % 15*  --   --  13* 17*   MONO PCT %  --   --  6  --   --       Results from last 7 days   Lab Units 10/20/22  0158 10/18/22  0642 10/17/22  1532   POTASSIUM mmol/L 3 3* 3 6 3 1*   CHLORIDE mmol/L 107 105 100   CO2 mmol/L 28 29 30   BUN mg/dL 7 11 15   CREATININE mg/dL 0 54* 0 66 0 74   CALCIUM mg/dL 7 8* 8 1* 8 7     Results from last 7 days   Lab Units 10/18/22  0642   MAGNESIUM mg/dL 1 8     Results from last 7 days   Lab Units 10/18/22  0642   PHOSPHORUS mg/dL 2 1*                  Micro:  Lab Results   Component Value Date    BLOODCX No Growth After 5 Days  2022    BLOODCX No Growth After 5 Days  2022    BLOODCX No Growth After 5 Days  09/10/2022    BLOODCX No Growth After 5 Days  09/10/2022     IMAGING & DIAGNOSTIC TESTS     Imaging: I have personally reviewed pertinent reports  No results found  EKG, Pathology, and Other Studies: I have personally reviewed pertinent reports       ALLERGIES     Allergies   Allergen Reactions   • Iodinated Diagnostic Agents Rash     Pt's skin get very red and he gets hot and chills   • Clopidogrel Rash     MEDICATIONS PRIOR TO ARRIVAL     Prior to Admission medications    Medication Sig Start Date End Date Taking? Authorizing Provider   aspirin (Aspirin Low Dose) 81 mg EC tablet Take 1 tablet (81 mg total) by mouth daily 1/24/22   Javier Brennan,    atorvastatin (LIPITOR) 80 mg tablet Take 1 tablet (80 mg total) by mouth daily with dinner 3/11/22   Mendel Peñaloza MD   midodrine (PROAMATINE) 2 5 mg tablet TAKE 1 TABLET (2 5 MG TOTAL) BY MOUTH 3 (THREE) TIMES A DAY BEFORE MEALS 10/12/22 11/11/22  Tracie Bills, DO   mirtazapine (REMERON) 7 5 MG tablet Take 1 tablet (7 5 mg total) by mouth daily at bedtime 10/20/22 11/19/22  Bill Gutierrez MD   ondansetron (Zofran ODT) 8 mg disintegrating tablet Take 1 tablet (8 mg total) by mouth every 8 (eight) hours as needed for nausea or vomiting  Patient not taking: Reported on 10/17/2022 7/26/22   Delia Mccarthy DO   pantoprazole (PROTONIX) 40 mg tablet TAKE 1 TABLET BY MOUTH 2 TIMES A DAY BEFORE MEALS    Patient not taking: No sig reported 5/31/22   Ambrocio Araya,    PHENobarbital 64 8 mg tablet TAKE 1 TABLET (64 8 MG TOTAL) BY MOUTH 2 (TWO) TIMES A DAY 6/6/22 12/3/22  Cha Sarkar, DO   folic acid (KP Folic Acid) 1 mg tablet Take 1 tablet (1 mg total) by mouth daily  Patient not taking: No sig reported 8/30/22 10/20/22  Delia Mccarthy DO   lidocaine-prilocaine (EMLA) cream Apply to port 30 to 60 min prior to use  Patient not taking: No sig reported 7/26/22 10/20/22  Delia Mccarthy DO   ondansetron (ZOFRAN) 4 mg tablet Take 1 tablet (4 mg total) by mouth every 8 (eight) hours as needed for nausea or vomiting  Patient not taking: Reported on 10/17/2022 9/25/22 10/20/22  Niru Pena MD   potassium chloride (MICRO-K) 10 MEQ CR capsule Take 1 capsule (10 mEq total) by mouth 2 (two) times a day for 7 days 9/16/22 10/20/22  Tracie Bills DO   prochlorperazine (COMPAZINE) 10 mg tablet Take 1 tablet (10 mg total) by mouth every 6 (six) hours as needed for nausea or vomiting  Patient not taking: No sig reported 7/26/22 10/20/22  Faith Marshall, DO     MEDICATIONS ADMINISTERED IN LAST 24 HOURS     CURRENT MEDICATIONS            Admission Time  I spent 15 minutes admitting the patient  This involved direct patient contact where I performed a full history and physical, reviewing previous records, and reviewing laboratory and other diagnostic studies  Portions of the record may have been created with voice recognition software  Occasional wrong word or "sound a like" substitutions may have occurred due to the inherent limitations of voice recognition software    Read the chart carefully and recognize, using context, where substitutions have occurred     ==  [facility]  Internal Medicine Resident, PGY-2

## 2022-10-20 NOTE — ASSESSMENT & PLAN NOTE
Wt Readings from Last 3 Encounters:   10/22/22 75 kg (165 lb 5 5 oz)   10/04/22 76 4 kg (168 lb 6 9 oz)   09/30/22 77 6 kg (171 lb)     Documented history of chronic combined systolic and diastolic heart failure   Last echo performed 03/15/2022 showed LVEF 50% with G1 DD, apical akinesis, T BMP with mild regurg    Patient not fluid overloaded on exam      Plan:  · Continue to monitor

## 2022-10-20 NOTE — ED PROVIDER NOTES
History  Chief Complaint   Patient presents with   • Knee Pain     Patient complains of left knee pain when standing  Patient reports his knee gave out and he slowly lowered himself to the ground  Patient able to bend knee without pain  No obvious deformities  -thinners - LOC      Patient is a 67year old male who presents the ED for evaluation weakness at home upon discharge from the hospital today  Patient reports he was discharged approximately 1 hour ago and when attempting to walk up the stairs to his apartment he began to feel progressively weak, he could not open the door, his legs gave out and he sat down on his front steps  He denies any bodily injury, trauma to the extremities were head, head straight, or other physical complaints  Patient reports he was unable to get up and a passerby and knee  need to help him  He states they called 911 for him and they brought him back to the ED  Patient reports he believes he was discharged too soon and that he may need physical therapy rehabilitation  He denies any chest pain, palpitations, shortness of breath, headache, changes in vision hearing, dizziness or lightheadedness, nausea or vomiting, abdominal pain, changes in bladder or bowel function, numbness or tingling extremities, or other complaints or concerns at this time  Prior to Admission Medications   Prescriptions Last Dose Informant Patient Reported? Taking?    PHENobarbital 64 8 mg tablet  Self No No   Sig: TAKE 1 TABLET (64 8 MG TOTAL) BY MOUTH 2 (TWO) TIMES A DAY   aspirin (Aspirin Low Dose) 81 mg EC tablet  Self No No   Sig: Take 1 tablet (81 mg total) by mouth daily   atorvastatin (LIPITOR) 80 mg tablet  Self No No   Sig: Take 1 tablet (80 mg total) by mouth daily with dinner   midodrine (PROAMATINE) 2 5 mg tablet   No No   Sig: TAKE 1 TABLET (2 5 MG TOTAL) BY MOUTH 3 (THREE) TIMES A DAY BEFORE MEALS   mirtazapine (REMERON) 7 5 MG tablet   No No   Sig: Take 1 tablet (7 5 mg total) by mouth daily at bedtime   ondansetron (Zofran ODT) 8 mg disintegrating tablet   No No   Sig: Take 1 tablet (8 mg total) by mouth every 8 (eight) hours as needed for nausea or vomiting   Patient not taking: Reported on 10/17/2022   pantoprazole (PROTONIX) 40 mg tablet   No No   Sig: TAKE 1 TABLET BY MOUTH 2 TIMES A DAY BEFORE MEALS  Patient not taking: No sig reported      Facility-Administered Medications: None       Past Medical History:   Diagnosis Date   • Cardiac disease    • CHF (congestive heart failure) ([facility])    • Esophageal cancer ([facility])    • Hernia of abdominal cavity    • Myocardial infarction [facility])     last assessed 14, past, Pt had MI s/p AGUSTIN placed in , refuses to take any other medications for his cardiac disease, only will take seizure med  Understands possible complications from this decision   • Seizure [facility])        Past Surgical History:   Procedure Laterality Date   • ABDOMINAL AORTIC ANEURYSM REPAIR      for dialation or occulsion   • CATARACT EXTRACTION     • IR PORT PLACEMENT  7/15/2022       Family History   Problem Relation Age of Onset   • Hypertension Father    • Kidney disease Brother      I have reviewed and agree with the history as documented  E-Cigarette/Vaping   • E-Cigarette Use Never User      E-Cigarette/Vaping Substances   • Nicotine No    • THC No    • CBD No    • Flavoring No    • Other No    • Unknown No      Social History     Tobacco Use   • Smoking status: Former Smoker     Quit date: 2005     Years since quittin 3   • Smokeless tobacco: Never Used   Vaping Use   • Vaping Use: Never used   Substance Use Topics   • Alcohol use: Not Currently   • Drug use: Never        Review of Systems   Constitutional: Negative for chills and fever  HENT: Negative for ear pain and sore throat  Eyes: Negative for pain and visual disturbance  Respiratory: Negative for cough and shortness of breath  Cardiovascular: Negative for chest pain and palpitations  Gastrointestinal: Negative for abdominal pain and vomiting  Genitourinary: Negative for dysuria and hematuria  Musculoskeletal: Negative for arthralgias and back pain  Skin: Negative for color change and rash  Neurological: Positive for weakness (Generalized)  Negative for seizures  All other systems reviewed and are negative  Physical Exam  ED Triage Vitals   Temp Pulse Resp BP SpO2   -- -- -- -- --      Temp src Heart Rate Source Patient Position - Orthostatic VS BP Location FiO2 (%)   -- -- -- -- --      Pain Score       --             Orthostatic Vital Signs  There were no vitals filed for this visit  Physical Exam  Vitals and nursing note reviewed  Constitutional:       General: He is not in acute distress  Appearance: Normal appearance  He is well-developed  He is not diaphoretic  HENT:      Head: Normocephalic and atraumatic  Eyes:      Extraocular Movements: Extraocular movements intact  Conjunctiva/sclera: Conjunctivae normal    Cardiovascular:      Rate and Rhythm: Normal rate and regular rhythm  Pulses: Normal pulses  Heart sounds: No murmur heard  Pulmonary:      Effort: Pulmonary effort is normal  No respiratory distress  Breath sounds: Normal breath sounds  Abdominal:      Palpations: Abdomen is soft  Tenderness: There is no abdominal tenderness  Musculoskeletal:         General: No signs of injury  Normal range of motion  Cervical back: Normal range of motion and neck supple  No tenderness or bony tenderness  Thoracic back: No tenderness or bony tenderness  Lumbar back: No tenderness or bony tenderness  Right lower leg: No edema  Left lower leg: No edema  Comments: No bruising or evidence of injury, dressing to sacrum with no evidence of ulcer    Skin:     General: Skin is warm and dry  Capillary Refill: Capillary refill takes less than 2 seconds  Neurological:      General: No focal deficit present  Mental Status: He is alert and oriented to person, place, and time  Mental status is at baseline  Psychiatric:         Mood and Affect: Mood normal          Behavior: Behavior normal          ED Medications  Medications   aspirin (ECOTRIN LOW STRENGTH) EC tablet 81 mg (has no administration in time range)   atorvastatin (LIPITOR) tablet 80 mg (has no administration in time range)   midodrine (PROAMATINE) tablet 2 5 mg (has no administration in time range)   mirtazapine (REMERON) tablet 7 5 mg (has no administration in time range)   ondansetron (ZOFRAN-ODT) dispersible tablet 8 mg (has no administration in time range)   pantoprazole (PROTONIX) EC tablet 40 mg (has no administration in time range)   PHENobarbital tablet 64 8 mg (has no administration in time range)   enoxaparin (LOVENOX) subcutaneous injection 40 mg (has no administration in time range)       Diagnostic Studies  Results Reviewed     Procedure Component Value Units Date/Time    Platelet count [[id number]]     Lab Status: No result Specimen: Blood                  No orders to display         Procedures  Procedures      ED Course                             SBIRT 22yo+    Flowsheet Row Most Recent Value   SBIRT (23 yo +)    In order to provide better care to our patients, we are screening all of our patients for alcohol and drug use  Would it be okay to ask you these screening questions? Unable to answer at this time Filed at: 10/20/2022 1709                [facility]  Number of Diagnoses or Management Options  General weakness  Diagnosis management comments: Patient is a 67year old male who presents the ED for evaluation weakness at home upon discharge from the hospital today  Reached out and spoke with provider discharge patient earlier today  States stable evaluate patient at bedside  Patient evaluated by SOD who recommended admission at this time  Patient will be admitted observation, patient is agreeable        Disposition  Final diagnoses: General weakness     Time reflects when diagnosis was documented in both MDM as applicable and the Disposition within this note     Time User Action Codes Description Comment    10/20/2022  6:13 PM [place] Polly Add [R53 1] General weakness     10/20/2022  4:35 PM Neptali Gutierrez Add [X12 4] Ambulatory dysfunction       ED Disposition     ED Disposition   Admit    Condition   Stable    Date/Time   u Oct 20, 2022 1813    Comment   Case was discussed with SOD and the patient's admission status was agreed to be Admission Status: observation status to the service of Dr Barbara Martinez  Follow-up Information    None         Patient's Medications   Discharge Prescriptions    No medications on file     No discharge procedures on file  PDMP Review       Value Time User    PDMP Reviewed  Yes 11/16/2021  1:20 PM Karina Linton DO           ED Provider  Attending physically available and evaluated Sweta Stein  I managed the patient along with the ED Attending      Electronically Signed by         Sharon Neal DO  10/21/22 9346

## 2022-10-20 NOTE — PLAN OF CARE
Problem: Nutrition/Hydration-ADULT  Goal: Nutrient/Hydration intake appropriate for improving, restoring or maintaining nutritional needs  Description: Monitor and assess patient's nutrition/hydration status for malnutrition  Collaborate with interdisciplinary team and initiate plan and interventions as ordered  Monitor patient's weight and dietary intake as ordered or per policy  Utilize nutrition screening tool and intervene as necessary  Determine patient's food preferences and provide high-protein, high-caloric foods as appropriate       INTERVENTIONS:  - Monitor oral intake, urinary output, labs, and treatment plans  - Assess nutrition and hydration status and recommend course of action  - Evaluate amount of meals eaten  - Assist patient with eating if necessary   - Allow adequate time for meals  - Recommend/ encourage appropriate diets, oral nutritional supplements, and vitamin/mineral supplements  - Order, calculate, and assess calorie counts as needed  - Recommend, monitor, and adjust tube feedings and TPN/PPN based on assessed needs  - Assess need for intravenous fluids  - Provide specific nutrition/hydration education as appropriate  - Include patient/family/caregiver in decisions related to nutrition  Outcome: Progressing     Problem: MOBILITY - ADULT  Goal: Maintain or return to baseline ADL function  Description: INTERVENTIONS:  -  Assess patient's ability to carry out ADLs; assess patient's baseline for ADL function and identify physical deficits which impact ability to perform ADLs (bathing, care of mouth/teeth, toileting, grooming, dressing, etc )  - Assess/evaluate cause of self-care deficits   - Assess range of motion  - Assess patient's mobility; develop plan if impaired  - Assess patient's need for assistive devices and provide as appropriate  - Encourage maximum independence but intervene and supervise when necessary  - Involve family in performance of ADLs  - Assess for home care needs following discharge   - Consider OT consult to assist with ADL evaluation and planning for discharge  - Provide patient education as appropriate  Outcome: Progressing  Goal: Maintains/Returns to pre admission functional level  Description: INTERVENTIONS:  - Perform BMAT or MOVE assessment daily    - Set and communicate daily mobility goal to care team and patient/family/caregiver  - Collaborate with rehabilitation services on mobility goals if consulted  - Perform Range of Motion 3 times a day  - Reposition patient every 2 hours    - Dangle patient 3 times a day  - Stand patient 3 times a day  - Ambulate patient 3 times a day  - Out of bed to chair 3 times a day   - Out of bed for meals 3 times a day  - Out of bed for toileting  - Record patient progress and toleration of activity level   Outcome: Progressing     Problem: PAIN - ADULT  Goal: Verbalizes/displays adequate comfort level or baseline comfort level  Description: Interventions:  - Encourage patient to monitor pain and request assistance  - Assess pain using appropriate pain scale  - Administer analgesics based on type and severity of pain and evaluate response  - Implement non-pharmacological measures as appropriate and evaluate response  - Consider cultural and social influences on pain and pain management  - Notify physician/advanced practitioner if interventions unsuccessful or patient reports new pain  Outcome: Progressing     Problem: INFECTION - ADULT  Goal: Absence or prevention of progression during hospitalization  Description: INTERVENTIONS:  - Assess and monitor for signs and symptoms of infection  - Monitor lab/diagnostic results  - Monitor all insertion sites, i e  indwelling lines, tubes, and drains  - Monitor endotracheal if appropriate and nasal secretions for changes in amount and color  - [place] appropriate cooling/warming therapies per order  - Administer medications as ordered  - Instruct and encourage patient and family to use good hand hygiene technique  - Identify and instruct in appropriate isolation precautions for identified infection/condition  Outcome: Progressing  Goal: Absence of fever/infection during neutropenic period  Description: INTERVENTIONS:  - Monitor WBC    Outcome: Progressing     Problem: SAFETY ADULT  Goal: Maintain or return to baseline ADL function  Description: INTERVENTIONS:  -  Assess patient's ability to carry out ADLs; assess patient's baseline for ADL function and identify physical deficits which impact ability to perform ADLs (bathing, care of mouth/teeth, toileting, grooming, dressing, etc )  - Assess/evaluate cause of self-care deficits   - Assess range of motion  - Assess patient's mobility; develop plan if impaired  - Assess patient's need for assistive devices and provide as appropriate  - Encourage maximum independence but intervene and supervise when necessary  - Involve family in performance of ADLs  - Assess for home care needs following discharge   - Consider OT consult to assist with ADL evaluation and planning for discharge  - Provide patient education as appropriate  Outcome: Progressing  Goal: Maintains/Returns to pre admission functional level  Description: INTERVENTIONS:  - Perform BMAT or MOVE assessment daily    - Set and communicate daily mobility goal to care team and patient/family/caregiver  - Collaborate with rehabilitation services on mobility goals if consulted  - Perform Range of Motion 3 times a day  - Reposition patient every 2 hours    - Dangle patient 3 times a day  - Stand patient 3 times a day  - Ambulate patient 3 times a day  - Out of bed to chair 3 times a day   - Out of bed for meals 3 times a day  - Out of bed for toileting  - Record patient progress and toleration of activity level   Outcome: Progressing  Goal: Patient will remain free of falls  Description: INTERVENTIONS:  - Educate patient/family on patient safety including physical limitations  - Instruct patient to call for assistance with activity   - Consult OT/PT to assist with strengthening/mobility   - Keep Call bell within reach  - Keep bed low and locked with side rails adjusted as appropriate  - Keep care items and personal belongings within reach  - Initiate and maintain comfort rounds  - Make Fall Risk Sign visible to staff  - Offer Toileting every *2** Hours, in advance of need  - Initiate/Maintain *bed/chair**alarm  - Obtain necessary fall risk management equipment:   - Apply yellow socks and bracelet for high fall risk patients  - Consider moving patient to room near nurses station  Outcome: Progressing     Problem: DISCHARGE PLANNING  Goal: Discharge to home or other facility with appropriate resources  Description: INTERVENTIONS:  - Identify barriers to discharge w/patient and caregiver  - Arrange for needed discharge resources and transportation as appropriate  - Identify discharge learning needs (meds, wound care, etc )  - Arrange for interpretive services to assist at discharge as needed  - Refer to Case Management Department for coordinating discharge planning if the patient needs post-hospital services based on physician/advanced practitioner order or complex needs related to functional status, cognitive ability, or social support system  Outcome: Progressing     Problem: Knowledge Deficit  Goal: Patient/family/caregiver demonstrates understanding of disease process, treatment plan, medications, and discharge instructions  Description: Complete learning assessment and assess knowledge base    Interventions:  - Provide teaching at level of understanding  - Provide teaching via preferred learning methods  Outcome: Progressing     Problem: Prexisting or High Potential for Compromised Skin Integrity  Goal: Skin integrity is maintained or improved  Description: INTERVENTIONS:  - Identify patients at risk for skin breakdown  - Assess and monitor skin integrity  - Assess and monitor nutrition and hydration status  - Monitor labs   - Assess for incontinence   - Turn and reposition patient  - Assist with mobility/ambulation  - Relieve pressure over bony prominences  - Avoid friction and shearing  - Provide appropriate hygiene as needed including keeping skin clean and dry  - Evaluate need for skin moisturizer/barrier cream  - Collaborate with interdisciplinary team   - Patient/family teaching  - Consider wound care consult   Outcome: Progressing

## 2022-10-20 NOTE — PROGRESS NOTES
Pastoral Care Progress Note    10/20/2022  Patient: Lindsay Merida : 1945  Admission Date & Time: 10/17/2022 1315  MRN: [id number] CSN: [id number]      Ishan Fitzgerald initiated follow up visit to pt  Ishan Fitzgerald provided holy communion, prayer and blessing                 Chaplaincy Interventions Utilized:       Ritual: Provided prayer and Provided ritual         10/20/22 1500   Clinical Encounter Type   Visited With Patient   Routine Visit Follow-up   Faith Encounters   Faith Needs Prayer   Sacramental Encounters   Communion Given Indicator Yes   Sacrament Other Other (Comment)  (blessing)

## 2022-10-20 NOTE — DISCHARGE SUMMARY
INTERNAL MEDICINE RESIDENCY DISCHARGE SUMMARY     Favian Mckay   67 y o  male  MRN: [id number]  Room/Bed: [place] [address]/[facility] [address] [place] [address]   Encounter: [id number]    Principal Problem:    Failure to thrive in adult  Active Problems:    Chronic combined systolic and diastolic CHF (congestive heart failure) (HCC)    Paroxysmal atrial fibrillation ([address] )    Coronary artery disease of native artery of native heart with stable angina pectoris (HCC)    Esophageal cancer (HCC)    Hypotension    Severe protein-calorie malnutrition (HCC)    Generalized weakness    Nausea & vomiting      Nausea & vomiting  Assessment & Plan  Patient with nausea and vomiting preventing him from p o  Intake  States that secondary to his recent chemo radiation treatment  Has some associated epigastric tenderness  Plan:  · Check lipase though low likelihood of pancreatitis  · IV fluids  · IV Zofran and p r n  Zofran p o  · Mag of 1 8 and Phos of 2 1 10/18/22  · Advance to normal diet     Generalized weakness  Assessment & Plan  Patient with 5 days of progressive weakness in the setting of nausea and vomiting related to his chemotherapy and radiation  Likely secondary to lack of p o  Intake  Plan:  · D5 lactated Ringer's  · Zofran IV x1 and po p r n  · PT OT  · Case management    Hypotension  Assessment & Plan  Documented history of hypotension, on midodrine 2 5 mg t i d  Blood pressure in the high 43F low 608B systolic this admission  Plan:  Continue midodrine 2 5 mg t i d     Esophageal cancer [facility])  Assessment & Plan  Patient with stage II B esophageal cancer diagnosed in May 2022  Has recently completed  carboplatin pack with taxol chemotherapy as well as radiotherapy  Follows outpatient with heme/onc   Cancer treatment is likely contributing to his nausea and vomiting      Coronary artery disease of native artery of native heart with stable angina pectoris [facility])  Assessment & Plan  Patient with a history of CAD status post AGUSTIN x4, has follow-up with cardiology as an outpatient  Most recently completed 6 months of dual antiplatelet therapy  Currently on baby aspirin and atorvastatin daily  No chest pain        Plan  · Continue aspirin, statin for secondary prevention      Paroxysmal atrial fibrillation [facility])  Assessment & Plan  Documented Hx of paroxysmal atrial fibrillation, Julissa Vasc of 5  Previously on warfarin for left apical thrombus, however has since been discontinued by his cardiologist   Last seen by outpatient Cardiology 06/2022  Currently normal sinus in the ED      Plan:  · Patient not currently on any home beta-blocker regiment  · F/u outpatient      Chronic combined systolic and diastolic CHF (congestive heart failure) ([facility])  Assessment & Plan  Wt Readings from Last 3 Encounters:   10/04/22 76 4 kg (168 lb 6 9 oz)   09/30/22 77 6 kg (171 lb)   09/20/22 81 6 kg (180 lb)       Documented history of chronic combined systolic and diastolic heart failure  Last echo performed 03/15/2022 showed LVEF 50% with G1 DD, apical akinesis, T BMP with mild regurg  Patient not fluid overloaded on exam     Plan:  · Continue to monitor          * Failure to thrive in adult  Assessment & Plan  Patient endorsed that he has an appetite today but continues to feel weak  Concern for severe protein malnutrition due to poor PO intake   Pt did not want ensure or magic cup due to the consistency and coldness of the ice cream      Plan  -Magic Cup and Ensure ordered for calorie supplementation  -Advanced to normal diet         [address]     H&P Provided by Dr Alarcon Comment Day    "Stu Crespo is a 67year old male with a past medical history of HLD, CAD, HFpEF 50%, distant hx of seizures on chronic barbiturates, cT2N0 esophageal adenocarcinoma of the distal esophagus s/p paclitaxel and carboplatin chemotherapy with radiation who presents on 10/17/2022 with complaints of generalized weakness       Patient states that over the past 5 days he has been experiencing progressive weakness and difficulty walking  He has associated nausea and vomiting that he attributes to his recent chemotherapy and radiation procedures and has had decreased p o  Intake as result  He believes that this lack of eating has made him weak  He reports falling in his house 2 times today due to his weakness, denies any head strike or loss of consciousness palpitations or seizure-like activity  The please for actually called an initially helped him into his bed, however after his 2nd fall the patient decided to call EMS to [facility] to be checked out      Patient denies any fever chills, cough, shortness of breath or wheeze, chest pain or heart palpitations      He does endorse nausea with vomiting, worse with food intake  He denies any odynophagia or dysphagia  There is no blood in his vomit  States that he has been able to tolerate small amounts of serial over the past 5 days has only been able to drink water     Upon arriving to the ED, patient afebrile, heart rate 75, respiratory rate 18, /56 satting 94% on room air      Labs with only notable for potassium of 3 1   no leukocytosis, hemoglobin 9 5 at baseline, creatinine 0 74 with an EGFR of 88      EKG with normal sinus and normal QTC  Chest x-ray was performed with no acute cardiopulmonary disease and stable distal esophageal stent      Patient received 40 mEq of K-Dur in the ED but did not tolerate it due to his nausea      Patient will be admitted to [place] [facility] for  management of his generalized weakness  Patient is level 3 DNR DNI "     Pt continued to be on observation for weakness and generalized weakness  On 10/18 he was transitioned to a soft diet after a day of tolerating clear diet  However the pt continued to have poor PO intake and supplemental calorie supplements (Ensure) was ordered   Pt also rejected the Ensure due to heartburn  On 10/19 pt continued to have poor PO intake and severe protein malnutrition  Pt had an episode of confusion overnight but was reoriented by nursing staff  One dose of megestrol 400 mgwas given to improve appetite as well as magic cup for calorie supplementation  On 10/20 pt expressed improved appetite and ordered a normal lunch which he tolerated well  DISCHARGE INFORMATION     PCP at Discharge: { House Staff:[id number]}    Admitting Provider: [place] Loomis MD  Admission Date: 10/17/2022    Discharge Provider: Cleveland Loomis MD  Discharge Date: 10/20/2022    Discharge Disposition: Home/Self Care  Discharge Condition: good  Discharge with Lines: no    Discharge Diet: regular diet  Activity Restrictions: none  Test Results Pending at Discharge: none     Discharge Diagnoses:  Principal Problem:    Failure to thrive in adult  Active Problems:    Chronic combined systolic and diastolic CHF (congestive heart failure) (HCC)    Paroxysmal atrial fibrillation ([address] )    Coronary artery disease of native artery of native heart with stable angina pectoris (HCC)    Esophageal cancer (HCC)    Hypotension    Severe protein-calorie malnutrition (HCC)    Generalized weakness    Nausea & vomiting  Resolved Problems:    * No resolved hospital problems  *      Consulting Providers:      Diagnostic & Therapeutic Procedures Performed:  XR chest 1 view portable    Result Date: 10/17/2022  Impression: No acute cardiopulmonary disease   Findings are stable Workstation performed: [id number]       Code Status: Level 3 - DNAR and DNI  Advance Directive & Living Will: <no information>  Power of :    POLST:      Medications:  Current Discharge Medication List        Current Discharge Medication List        Current Discharge Medication List      CONTINUE these medications which have NOT CHANGED    Details   aspirin (Aspirin Low Dose) 81 mg EC tablet Take 1 tablet (81 mg total) by mouth daily  Qty: 90 tablet, Refills: 3 Associated Diagnoses: Chronic combined systolic and diastolic CHF (congestive heart failure) ([facility] ); NSTEMI (non-ST elevated myocardial infarction) ([place] [address] ); Essential hypertension; Seizure disorder (HCC)      atorvastatin (LIPITOR) 80 mg tablet Take 1 tablet (80 mg total) by mouth daily with dinner  Qty: 90 tablet, Refills: 4    Comments: DX Code Needed    Associated Diagnoses: Chronic combined systolic and diastolic CHF (congestive heart failure) ([place] [address] ); NSTEMI (non-ST elevated myocardial infarction) ([place] [address] ); Essential hypertension; Seizure disorder (HCC)      midodrine (PROAMATINE) 2 5 mg tablet TAKE 1 TABLET (2 5 MG TOTAL) BY MOUTH 3 (THREE) TIMES A DAY BEFORE MEALS  Qty: 270 tablet, Refills: 1    Associated Diagnoses: Hypotension      PHENobarbital 64 8 mg tablet TAKE 1 TABLET (64 8 MG TOTAL) BY MOUTH 2 (TWO) TIMES A DAY  Qty: 180 tablet, Refills: 1    Comments: This request is for a new prescription for a controlled substance as required by Federal/State law  Associated Diagnoses: Seizure disorder ([facility] )      folic acid ( Folic Acid) 1 mg tablet Take 1 tablet (1 mg total) by mouth daily  Qty: 90 tablet, Refills: 1    Associated Diagnoses: Folate deficiency      lidocaine-prilocaine (EMLA) cream Apply to port 30 to 60 min prior to use  Qty: 30 g, Refills: 2    Associated Diagnoses: Esophageal adenocarcinoma (HCC)      ondansetron (Zofran ODT) 8 mg disintegrating tablet Take 1 tablet (8 mg total) by mouth every 8 (eight) hours as needed for nausea or vomiting  Qty: 30 tablet, Refills: 3    Associated Diagnoses: Esophageal adenocarcinoma (HCC)      ondansetron (ZOFRAN) 4 mg tablet Take 1 tablet (4 mg total) by mouth every 8 (eight) hours as needed for nausea or vomiting  Qty: 20 tablet, Refills: 0    Associated Diagnoses: Nausea      pantoprazole (PROTONIX) 40 mg tablet TAKE 1 TABLET BY MOUTH 2 TIMES A DAY BEFORE MEALS    Qty: 180 tablet, Refills: 3    Associated Diagnoses: Esophagitis determined by endoscopy; Duodenitis      potassium chloride (MICRO-K) 10 MEQ CR capsule Take 1 capsule (10 mEq total) by mouth 2 (two) times a day for 7 days  Qty: 14 capsule, Refills: 0    Associated Diagnoses: Hypokalemia      prochlorperazine (COMPAZINE) 10 mg tablet Take 1 tablet (10 mg total) by mouth every 6 (six) hours as needed for nausea or vomiting  Qty: 30 tablet, Refills: 3    Associated Diagnoses: Esophageal adenocarcinoma (HCC)             Allergies: Allergies   Allergen Reactions   • Iodinated Diagnostic Agents Rash     Pt's skin get very red and he gets hot and chills   • Clopidogrel Rash       FOLLOW-UP     PCP Outpatient Follow-up:  {JULIO CÉSAR TAPIA OUTPATIENT FOLLOW HM:[id number]}    Consulting Providers Follow-up:  {JULIO CÉSAR TAPIA CONSULTING PHYSICIAN FOLLOW UP XPJ/AT:[id number]}     Active Issues Requiring Follow-up:   {JULIO CÉSAR TAPIA ACTIVE ISSUES FOLLOW UP ABDON/RN:[id number]}    Discharge Statement:   I spent 45 minutes minutes discharging the patient  This time was spent on the day of discharge  I had direct contact with the patient on the day of discharge  Additional documentation is required if more than 30 minutes were spent on discharge  Portions of the record may have been created with voice recognition software  Occasional wrong word or "sound a like" substitutions may have occurred due to the inherent limitations of voice recognition software    Read the chart carefully and recognize, using context, where substitutions have occurred     ==  [facility]  Internal Medicine SUB I M4

## 2022-10-20 NOTE — CASE MANAGEMENT
Case Management Discharge Planning Note    Patient name Emmie Valverde  Location [address] [place] [address] 629/[facility] [address]17 MRN [id number]  : 1945 Date 10/20/2022       Current Admission Date: 10/17/2022  Current Admission Diagnosis:Failure to thrive in adult   Patient Active Problem List    Diagnosis Date Noted   • Failure to thrive in adult 10/19/2022   • Generalized weakness 10/17/2022   • Nausea & vomiting 10/17/2022   • Severe protein-calorie malnutrition ([facility] ) 2022   • Chemotherapy induced neutropenia ([email or web address] ) 09/15/2022   • Migration of esophageal stent 2022   • Anemia 2022   • Hypotension 2022   • GERD (gastroesophageal reflux disease) 2022   • Vitamin B12 deficiency 2022   • Port-A-Cath in place 08/10/2022   • Thrombocytopenia ([facility] ) 2022   • Dysphagia 2022   • Pancytopenia ([facility] ) 2022   • Acute gastric ulcer 2022   • Esophageal cancer ([email or web address] ) 2022   • Abdominal aortic aneurysm without rupture 02/10/2022   • Paroxysmal atrial fibrillation ([email or web address] ) 02/10/2022   • Coronary artery disease of native artery of native heart with stable angina pectoris ([email or web address] ) 02/10/2022   • Ischemic cardiomyopathy 2021   • Chronic combined systolic and diastolic CHF (congestive heart failure) ([facility] ) 2021   • Allergic reaction to contrast media 2021   • NSTEMI (non-ST elevated myocardial infarction) ([email or web address] ) 2021   • Abdominal pain 2020   • Hernia, incisional 2019   • Ventral hernia with obstruction and without gangrene 2019   • CAD (coronary artery disease) 2017   • HTN (hypertension) 2017   • HLD (hyperlipidemia) 2017   • Seizure disorder ([facility] ) 2017   • S/P AAA (abdominal aortic aneurysm) repair 2017   • STEMI (ST elevation myocardial infarction) ([facility] ) 2017      LOS (days): 2  Geometric Mean LOS (GMLOS) (days): 3 50  Days to GMLOS:1 4     OBJECTIVE:  Risk of Unplanned Readmission Score: 33 85         Current admission status: Inpatient   Preferred Pharmacy:   [id number], [address]  Phone: [phone] Fax: Jimbo Guillermo [address] [place] [address]  Phone: [phone] Fax: [phone]    Primary Care Provider: Misha Lee MD    Primary Insurance: MEDICARE  Secondary Insurance: [id number]    DISCHARGE DETAILS:    Discharge planning discussed with[de-identified] Patient    Other Referral/Resources/Interventions Provided:  Financial Resources Provided: Indigent Transportation  Referral Comments: RIANNA waiver signed, [place] called, set up for [address], patient in agreement       IMM Given (Date):: 10/20/22  IMM Given to[de-identified] Patient     IMM reviewed with patient, patient agrees with discharge determination

## 2022-10-20 NOTE — ASSESSMENT & PLAN NOTE
Documented Hx of paroxysmal atrial fibrillation, Omi Vasc of 5   Previously on warfarin for left apical thrombus, however has since been discontinued by his cardiologist   Last seen by outpatient Cardiology 06/2022  Currently normal sinus in the ED      Plan:  · Patient not currently on any home beta-blocker regiment  · F/u outpatient

## 2022-10-20 NOTE — ASSESSMENT & PLAN NOTE
Patient presented to hospital initially on 10/18 for failure to thrive in an adult  He was discharged the afternoon of 10/20  He fell after stepping up his front door and experienced minor injuries  No reported loss of consciousness  Bystander assisted helping him up  Patient is experiencing ambulatory dysfunction and weakness    Given stairways present in home; concern for future fall risk    Plan:  · Patient will need post acute rehab per PT  · Plan to transfer patient to [facility] facility

## 2022-10-20 NOTE — DISCHARGE SUMMARY
INTERNAL MEDICINE RESIDENCY DISCHARGE SUMMARY     Steph Blunt   67 y o  male  MRN: [id number]  Room/Bed: [place] [address]/[facility] [address] [place] [address]   Encounter: [id number]    Principal Problem:    Failure to thrive in adult  Active Problems:    Chronic combined systolic and diastolic CHF (congestive heart failure) (HCC)    Paroxysmal atrial fibrillation ([address] )    Coronary artery disease of native artery of native heart with stable angina pectoris (HCC)    Esophageal cancer (HCC)    Hypotension    Severe protein-calorie malnutrition (HCC)    Generalized weakness    Nausea & vomiting      Nausea & vomiting  Assessment & Plan  Patient with nausea and vomiting preventing him from p o  Intake  States that secondary to his recent chemo radiation treatment  Has some associated epigastric tenderness  Plan:  · Check lipase though low likelihood of pancreatitis  · IV fluids  · IV Zofran and p r n  Zofran p o  · Mag of 1 8 and Phos of 2 1 10/18/22  · Advance to normal diet     Generalized weakness  Assessment & Plan  Patient with 5 days of progressive weakness in the setting of nausea and vomiting related to his chemotherapy and radiation  Likely secondary to lack of p o  Intake  Plan:  · D5 lactated Ringer's  · Zofran IV x1 and po p r n  · PT OT  · Case management    Hypotension  Assessment & Plan  Documented history of hypotension, on midodrine 2 5 mg t i d  Blood pressure in the high 27H low 435R systolic this admission  Plan:  Continue midodrine 2 5 mg t i d     Esophageal cancer [facility])  Assessment & Plan  Patient with stage II B esophageal cancer diagnosed in May 2022  Has recently completed  carboplatin pack with taxol chemotherapy as well as radiotherapy  Follows outpatient with heme/onc   Cancer treatment is likely contributing to his nausea and vomiting      Coronary artery disease of native artery of native heart with stable angina pectoris [facility])  Assessment & Plan  Patient with a history of CAD status post AGUSTIN x4, has follow-up with cardiology as an outpatient  Most recently completed 6 months of dual antiplatelet therapy  Currently on baby aspirin and atorvastatin daily  No chest pain        Plan  · Continue aspirin, statin for secondary prevention      Paroxysmal atrial fibrillation [facility])  Assessment & Plan  Documented Hx of paroxysmal atrial fibrillation, Julissa Vasc of 5  Previously on warfarin for left apical thrombus, however has since been discontinued by his cardiologist   Last seen by outpatient Cardiology 06/2022  Currently normal sinus in the ED      Plan:  · Patient not currently on any home beta-blocker regiment  · F/u outpatient      Chronic combined systolic and diastolic CHF (congestive heart failure) ([facility])  Assessment & Plan  Wt Readings from Last 3 Encounters:   10/04/22 76 4 kg (168 lb 6 9 oz)   09/30/22 77 6 kg (171 lb)   09/20/22 81 6 kg (180 lb)       Documented history of chronic combined systolic and diastolic heart failure  Last echo performed 03/15/2022 showed LVEF 50% with G1 DD, apical akinesis, T BMP with mild regurg  Patient not fluid overloaded on exam     Plan:  · Continue to monitor          * Failure to thrive in adult  Assessment & Plan  Patient endorsed that he has an appetite today but continues to feel weak  Concern for severe protein malnutrition due to poor PO intake   Pt did not want ensure or magic cup due to the consistency and coldness of the ice cream      Plan  - Magic Cup and Ensure ordered for calorie supplementation  - Advanced to normal diet   - Started mirtazapine 7 5mg qHS      [address]     H&P Provided by Dr Cyndi Reed Day     "Ankita Elam is a 67year old male with a past medical history of HLD, CAD, HFpEF 50%, distant hx of seizures on chronic barbiturates, cT2N0 esophageal adenocarcinoma of the distal esophagus s/p paclitaxel and carboplatin chemotherapy with radiation who presents on 10/17/2022 with complaints of generalized weakness       Patient states that over the past 5 days he has been experiencing progressive weakness and difficulty walking   He has associated nausea and vomiting that he attributes to his recent chemotherapy and radiation procedures and has had decreased p o  Intake as result  Chaposteve Limons believes that this lack of eating has made him weak   He reports falling in his house 2 times today due to his weakness, denies any head strike or loss of consciousness palpitations or seizure-like activity   The please for actually called an initially helped him into his bed, however after his 2nd fall the patient decided to call EMS to [facility] to be checked out      Patient denies any fever chills, cough, shortness of breath or wheeze, chest pain or heart palpitations      He does endorse nausea with vomiting, worse with food intake   He denies any odynophagia or dysphagia  Verta Charisse is no blood in his vomit   States that he has been able to tolerate small amounts of serial over the past 5 days has only been able to drink water     Upon arriving to the ED, patient afebrile, heart rate 75, respiratory rate 18, /56 satting 94% on room air      Labs with only notable for potassium of 3 1   no leukocytosis, hemoglobin 9 5 at baseline, creatinine 0 74 with an EGFR of 88      EKG with normal sinus and normal QTC  Chest x-ray was performed with no acute cardiopulmonary disease and stable distal esophageal stent      Patient received 40 mEq of K-Dur in the ED but did not tolerate it due to his nausea  Patient will be admitted to [facility]  management of his generalized weakness   Patient is level 3 DNR DNI "     Pt continued to be on observation for weakness and generalized weakness  On 10/18 he was transitioned to a soft diet after a day of tolerating clear diet  However the pt continued to have poor PO intake and supplemental calorie supplements (Ensure) was ordered   Pt also rejected the Ensure due to heartburn  On 10/19 pt continued to have poor PO intake and severe protein malnutrition  Pt had an episode of confusion overnight but was reoriented by nursing staff  One dose of megestrol 400 mgwas given to improve appetite as well as magic cup for calorie supplementation  On 10/20 pt expressed improved appetite and ordered a normal lunch which he tolerated well  Patient will be on mirtazapine 7 5mg qHS for improve appetite  Will set up TCM appointment with our clinic for follow up  DISCHARGE INFORMATION     PCP at Discharge: Amanda Bray MD    Admitting Provider: Lisa Hill MD  Admission Date: 10/17/2022    Discharge Provider: Lisa Hill MD  Discharge Date: 10/20/2022    Discharge Disposition: Home/Self Care  Discharge Condition: good  Discharge with Lines: no    Discharge Diet: regular diet  Activity Restrictions: none  Test Results Pending at Discharge: none    Discharge Diagnoses:  Principal Problem:    Failure to thrive in adult  Active Problems:    Chronic combined systolic and diastolic CHF (congestive heart failure) (HCC)    Paroxysmal atrial fibrillation ([address] )    Coronary artery disease of native artery of native heart with stable angina pectoris (HCC)    Esophageal cancer (HCC)    Hypotension    Severe protein-calorie malnutrition (HCC)    Generalized weakness    Nausea & vomiting  Resolved Problems:    * No resolved hospital problems  *      Consulting Providers:      Diagnostic & Therapeutic Procedures Performed:  XR chest 1 view portable    Result Date: 10/17/2022  Impression: No acute cardiopulmonary disease   Findings are stable Workstation performed: [id number]       Code Status: Level 3 - DNAR and DNI  Advance Directive & Living Will: <no information>  Power of :    POLST:      Medications:  Current Discharge Medication List      STOP taking these medications       folic acid ( Folic Acid) 1 mg tablet Comments:   Reason for Stopping: lidocaine-prilocaine (EMLA) cream Comments:   Reason for Stopping:         ondansetron (ZOFRAN) 4 mg tablet Comments:   Reason for Stopping:         potassium chloride (MICRO-K) 10 MEQ CR capsule Comments:   Reason for Stopping:         prochlorperazine (COMPAZINE) 10 mg tablet Comments:   Reason for Stopping:             Current Discharge Medication List      START taking these medications    Details   mirtazapine (REMERON) 7 5 MG tablet Take 1 tablet (7 5 mg total) by mouth daily at bedtime  Qty: 30 tablet, Refills: 0    Associated Diagnoses: Failure to thrive in adult           Current Discharge Medication List      CONTINUE these medications which have NOT CHANGED    Details   aspirin (Aspirin Low Dose) 81 mg EC tablet Take 1 tablet (81 mg total) by mouth daily  Qty: 90 tablet, Refills: 3    Associated Diagnoses: Chronic combined systolic and diastolic CHF (congestive heart failure) ([facility] ); NSTEMI (non-ST elevated myocardial infarction) ([facility] ); Essential hypertension; Seizure disorder (HCC)      atorvastatin (LIPITOR) 80 mg tablet Take 1 tablet (80 mg total) by mouth daily with dinner  Qty: 90 tablet, Refills: 4    Comments: DX Code Needed    Associated Diagnoses: Chronic combined systolic and diastolic CHF (congestive heart failure) ([facility] ); NSTEMI (non-ST elevated myocardial infarction) ([facility] ); Essential hypertension; Seizure disorder (HCC)      midodrine (PROAMATINE) 2 5 mg tablet TAKE 1 TABLET (2 5 MG TOTAL) BY MOUTH 3 (THREE) TIMES A DAY BEFORE MEALS  Qty: 270 tablet, Refills: 1    Associated Diagnoses: Hypotension      PHENobarbital 64 8 mg tablet TAKE 1 TABLET (64 8 MG TOTAL) BY MOUTH 2 (TWO) TIMES A DAY  Qty: 180 tablet, Refills: 1    Comments: This request is for a new prescription for a controlled substance as required by Federal/State law    Associated Diagnoses: Seizure disorder (HCC)      ondansetron (Zofran ODT) 8 mg disintegrating tablet Take 1 tablet (8 mg total) by mouth every 8 (eight) hours as needed for nausea or vomiting  Qty: 30 tablet, Refills: 3    Associated Diagnoses: Esophageal adenocarcinoma (HCC)      pantoprazole (PROTONIX) 40 mg tablet TAKE 1 TABLET BY MOUTH 2 TIMES A DAY BEFORE MEALS  Qty: 180 tablet, Refills: 3    Associated Diagnoses: Esophagitis determined by endoscopy; Duodenitis             Allergies: Allergies   Allergen Reactions   • Iodinated Diagnostic Agents Rash     Pt's skin get very red and he gets hot and chills   • Clopidogrel Rash       FOLLOW-UP     PCP Outpatient Follow-up: Will set up TCM appointment    Consulting Providers Follow-up:  Marc     Active Issues Requiring Follow-up:   Failure to thrive    Discharge Statement:   I spent 30 minutes minutes discharging the patient  This time was spent on the day of discharge  I had direct contact with the patient on the day of discharge  Additional documentation is required if more than 30 minutes were spent on discharge  Portions of the record may have been created with voice recognition software  Occasional wrong word or "sound a like" substitutions may have occurred due to the inherent limitations of voice recognition software    Read the chart carefully and recognize, using context, where substitutions have occurred     ==  Jiang Dekerrie [facility]  [address]  Internal Medicine Resident PGY-2

## 2022-10-21 ENCOUNTER — TRANSITIONAL CARE MANAGEMENT (OUTPATIENT)
Dept: INTERNAL MEDICINE CLINIC | Facility: CLINIC | Age: 77
End: 2022-10-21

## 2022-10-21 LAB
ANION GAP SERPL CALCULATED.3IONS-SCNC: 6 MMOL/L (ref 4–13)
BASOPHILS # BLD AUTO: 0.02 THOUSANDS/ÂΜL (ref 0–0.1)
BASOPHILS NFR BLD AUTO: 1 % (ref 0–1)
BUN SERPL-MCNC: 9 MG/DL (ref 5–25)
CALCIUM SERPL-MCNC: 7.9 MG/DL (ref 8.3–10.1)
CHLORIDE SERPL-SCNC: 107 MMOL/L (ref 96–108)
CO2 SERPL-SCNC: 25 MMOL/L (ref 21–32)
CREAT SERPL-MCNC: 0.6 MG/DL (ref 0.6–1.3)
EOSINOPHIL # BLD AUTO: 0.33 THOUSAND/ÂΜL (ref 0–0.61)
EOSINOPHIL NFR BLD AUTO: 8 % (ref 0–6)
ERYTHROCYTE [DISTWIDTH] IN BLOOD BY AUTOMATED COUNT: 24.8 % (ref 11.6–15.1)
GFR SERPL CREATININE-BSD FRML MDRD: 96 ML/MIN/1.73SQ M
GLUCOSE SERPL-MCNC: 94 MG/DL (ref 65–140)
HCT VFR BLD AUTO: 30.4 % (ref 36.5–49.3)
HGB BLD-MCNC: 10 G/DL (ref 12–17)
IMM GRANULOCYTES # BLD AUTO: 0.03 THOUSAND/UL (ref 0–0.2)
IMM GRANULOCYTES NFR BLD AUTO: 1 % (ref 0–2)
LYMPHOCYTES # BLD AUTO: 0.27 THOUSANDS/ÂΜL (ref 0.6–4.47)
LYMPHOCYTES NFR BLD AUTO: 7 % (ref 14–44)
MCH RBC QN AUTO: 33.2 PG (ref 26.8–34.3)
MCHC RBC AUTO-ENTMCNC: 32.9 G/DL (ref 31.4–37.4)
MCV RBC AUTO: 101 FL (ref 82–98)
MONOCYTES # BLD AUTO: 0.39 THOUSAND/ÂΜL (ref 0.17–1.22)
MONOCYTES NFR BLD AUTO: 10 % (ref 4–12)
NEUTROPHILS # BLD AUTO: 3.05 THOUSANDS/ÂΜL (ref 1.85–7.62)
NEUTS SEG NFR BLD AUTO: 73 % (ref 43–75)
NRBC BLD AUTO-RTO: 0 /100 WBCS
PLATELET # BLD AUTO: 87 THOUSANDS/UL (ref 149–390)
POTASSIUM SERPL-SCNC: 3.6 MMOL/L (ref 3.5–5.3)
RBC # BLD AUTO: 3.01 MILLION/UL (ref 3.88–5.62)
SODIUM SERPL-SCNC: 138 MMOL/L (ref 135–147)
WBC # BLD AUTO: 4.09 THOUSAND/UL (ref 4.31–10.16)

## 2022-10-21 PROCEDURE — 99222 1ST HOSP IP/OBS MODERATE 55: CPT | Performed by: INTERNAL MEDICINE

## 2022-10-21 PROCEDURE — NC001 PR NO CHARGE: Performed by: INTERNAL MEDICINE

## 2022-10-21 PROCEDURE — 80048 BASIC METABOLIC PNL TOTAL CA: CPT

## 2022-10-21 PROCEDURE — 97163 PT EVAL HIGH COMPLEX 45 MIN: CPT

## 2022-10-21 PROCEDURE — 85025 COMPLETE CBC W/AUTO DIFF WBC: CPT

## 2022-10-21 PROCEDURE — 97167 OT EVAL HIGH COMPLEX 60 MIN: CPT

## 2022-10-21 RX ADMIN — ASPIRIN 81 MG: 81 TABLET, COATED ORAL at 08:11

## 2022-10-21 RX ADMIN — CARBIDOPA AND LEVODOPA 2.5 MG: 50; 200 TABLET, EXTENDED RELEASE ORAL at 17:25

## 2022-10-21 RX ADMIN — ENOXAPARIN SODIUM 40 MG: 40 INJECTION SUBCUTANEOUS at 08:11

## 2022-10-21 RX ADMIN — ATORVASTATIN CALCIUM 80 MG: 80 TABLET, FILM COATED ORAL at 17:25

## 2022-10-21 RX ADMIN — MIRTAZAPINE 7.5 MG: 15 TABLET, FILM COATED ORAL at 21:25

## 2022-10-21 RX ADMIN — PHENOBARBITAL 64.8 MG: 64.8 TABLET ORAL at 17:25

## 2022-10-21 RX ADMIN — CARBIDOPA AND LEVODOPA 2.5 MG: 50; 200 TABLET, EXTENDED RELEASE ORAL at 08:11

## 2022-10-21 RX ADMIN — PHENOBARBITAL 64.8 MG: 64.8 TABLET ORAL at 08:11

## 2022-10-21 RX ADMIN — PANTOPRAZOLE SODIUM 40 MG: 40 TABLET, DELAYED RELEASE ORAL at 05:17

## 2022-10-21 RX ADMIN — CARBIDOPA AND LEVODOPA 2.5 MG: 50; 200 TABLET, EXTENDED RELEASE ORAL at 11:38

## 2022-10-21 NOTE — PLAN OF CARE
Problem: OCCUPATIONAL THERAPY ADULT  Goal: Performs self-care activities at highest level of function for planned discharge setting  See evaluation for individualized goals  Description: Treatment Interventions: ADL retraining, Functional transfer training, UE strengthening/ROM, Endurance training, Patient/family training, Compensatory technique education, Continued evaluation, Energy conservation, Activityengagement          See flowsheet documentation for full assessment, interventions and recommendations  Note: Limitation: Decreased ADL status, Decreased UE strength, Decreased Safe judgement during ADL, Decreased endurance, Decreased self-care trans, Decreased high-level ADLs  Prognosis: Good  Assessment: Pt is a 67 y o  male seen for OT evaluation s/p admit to [facility] on 10/20/2022 w/ Ambulatory dysfunction and s/p fall while entering home  Of note: Pt was recently discharged from this hospital yesterday and returned to ED following a fall while attempting to get in his home  Comorbidities affecting pt's functional performance at time of assessment include: seizure disorder, CHF, paroxysmal atrial fibrillation, CAD, esophageal CA, severe protien malnutrition, MI, hernia of abd cavity  Personal factors affecting pt at time of IE include:steps to enter environment, limited home support, difficulty performing ADLS, difficulty performing IADLS , limited insight into deficits, flat affect, decreased initiation and engagement  and health management   Prior to admission, pt was I with ADLS and most IADLS reports brother assists with driving  Upon evaluation: Pt presents supine and is agreeable to OTIE  Pt requires overall min A for transfers and mobility and S- min A for self care tasks 2* the following deficits impacting occupational performance: weakness, decreased strength, decreased balance, decreased tolerance, impaired attention, impaired initiation, impaired problem solving and decreased safety awareness   Pt resting in chair at end of session with all needs in reach, alarm on  Pt to benefit from continued skilled OT tx while in the hospital to address deficits as defined above and maximize level of functional independence w ADL's and functional mobility  Occupational Performance areas to address include: grooming, bathing/shower, toilet hygiene, dressing, health maintenance, functional mobility, clothing management and social participation  The patient's raw score on the AM-PAC Daily Activity inpatient short form is 18  , standardized score is 38 66  , less than 39 4  Patients at this level are likely to benefit from discharge to post-acute rehabilitation services  Please refer to the recommendation of the Occupational Therapist for safe discharge planning       OT Discharge Recommendation: Post acute rehabilitation services

## 2022-10-21 NOTE — PROGRESS NOTES
Pastoral Care Progress Note    10/21/2022  Patient: Elyse Beltre : 1945  Admission Date & Time: 10/20/2022 1701  MRN: [id number] CSN: [id number]        Father Clemcb Jesus followed up with the patient and had a long pastoral conversation about his current situation

## 2022-10-21 NOTE — ARC ADMISSION
Referral Received for consideration of pt for acute rehab  In reviewing pt case pt lacks acute medical necessity requiring physician over site  Recommend TCF vs SNF rehab at this time  CM updated

## 2022-10-21 NOTE — PLAN OF CARE
Problem: PHYSICAL THERAPY ADULT  Goal: Performs mobility at highest level of function for planned discharge setting  See evaluation for individualized goals  Description: Treatment/Interventions: Functional transfer training, LE strengthening/ROM, Elevations, Therapeutic exercise, Endurance training, Patient/family training, Equipment eval/education, Bed mobility, Gait training, Compensatory technique education, Spoke to nursing, Spoke to case management, OT (balance training)  Equipment Recommended:  (pt already owns RW)       See flowsheet documentation for full assessment, interventions and recommendations  Note: Prognosis: Good  Problem List: Decreased mobility, Decreased strength, Decreased endurance, Impaired balance, Decreased safety awareness  Assessment: Pt is 67 y o  male seen for a high complexity PT evaluation s/p admit to [facility] on 10/20/2022  Pt presenting w/ principal dx of "ambulatory dysfunction " Pt d/c'ed from [facility] 10/20/22 + tripped when attempting to go up the stairs into his apt; per pt he buckled  Please see above for other active problem list / PMH  PT now consulted to assess functional mobility and needs for safe d/c planning  At baseline pt is I w/ ambulation w/o AD community distances + lives in an apt w/ his brother w/ 3 SEEMA; pt reports starting to use RW as of 1 week ago due to increasing weakness  Currently pt requires S for bed skills; MinAx1 for functional transfers; MinAx1 for ambulation w/ RW  Pt presents functioning below baseline and w/ overall mobility deficits 2* to: decreased LE strength; generalized weakness/deconditioning; reliance on more restrictive AD from baseline; decreased endurance; impaired balance; gait deviations; fatigue; impaired safety and judgement; limited insight into current deficits; bed/chair alarms; multiple lines  Pt currently at risk for falls   (Please find additional objective findings from PT assessment regarding body systems outlined above ) Pt will continue to benefit from skilled PT interventions to address stated impairments; to maximize functional potential; for ongoing pt/ family training; and DME needs  PT is currently recommending rehab  Barriers to Discharge: Inaccessible home environment     PT Discharge Recommendation: Post acute rehabilitation services    See flowsheet documentation for full assessment

## 2022-10-21 NOTE — CASE MANAGEMENT
Case Management Discharge Planning Note    Patient name Beatriz Fine  Location [address] [id number] MRN [id number]  : 1945 Date 10/21/2022       Current Admission Date: 10/20/2022  Current Admission Diagnosis:Ambulatory dysfunction   Patient Active Problem List    Diagnosis Date Noted   • Ambulatory dysfunction 10/20/2022   • Failure to thrive in adult 10/19/2022   • Nausea & vomiting 10/17/2022   • Severe protein-calorie malnutrition ([facility] ) 2022   • Chemotherapy induced neutropenia ([facility] ) 09/15/2022   • Migration of esophageal stent 2022   • Anemia 2022   • Hypotension 2022   • Vitamin B12 deficiency 2022   • Port-A-Cath in place 08/10/2022   • Thrombocytopenia ([facility] ) 2022   • Dysphagia 2022   • Pancytopenia ([facility] ) 2022   • Acute gastric ulcer 2022   • Esophageal cancer ([facility] ) 2022   • Abdominal aortic aneurysm without rupture 02/10/2022   • Paroxysmal atrial fibrillation ([facility] ) 02/10/2022   • Coronary artery disease of native artery of native heart with stable angina pectoris ([facility] ) 02/10/2022   • Ischemic cardiomyopathy 2021   • Chronic combined systolic and diastolic CHF (congestive heart failure) ([facility] ) 2021   • Allergic reaction to contrast media 2021   • NSTEMI (non-ST elevated myocardial infarction) ([facility] ) 2021   • Abdominal pain 2020   • Hernia, incisional 2019   • Ventral hernia with obstruction and without gangrene 2019   • CAD (coronary artery disease) 2017   • HLD (hyperlipidemia) 2017   • Seizure disorder ([facility] ) 2017   • S/P AAA (abdominal aortic aneurysm) repair 2017   • STEMI (ST elevation myocardial infarction) ([facility] ) 2017      LOS (days): 0  Geometric Mean LOS (GMLOS) (days):   Days to GMLOS:     OBJECTIVE:            Current admission status: Observation   Preferred Pharmacy:   [facility]/pharmacy #[id number]- [place], [place] - [address] [address]  Phone: [phone] Fax: Jimbo Guillermo [address] [facility] [address] [facility] [address]  Phone: [phone] Fax: [phone]    Primary Care Provider: Nicola Armstrong MD    Primary Insurance: MEDICARE  Secondary Insurance: [id number]    DISCHARGE DETAILS:    Discharge planning discussed with[de-identified] Patient  Freedom of Choice: Yes  Comments - Freedom of Choice: Per ARC, patient recommended TCF/SNF rehab  CM discussed with patient at bedside, would like blanket referrals to see which facilities are able to accept  CM placed blanket STR referral via [address]                                 Other Referral/Resources/Interventions Provided:  Interventions: Short Term Rehab

## 2022-10-21 NOTE — CASE MANAGEMENT
Case Management Assessment & Discharge Planning Note    Patient name Lacie Wills  Location [address] [id number] MRN [id number]  : 1945 Date 10/21/2022       Current Admission Date: 10/20/2022  Current Admission Diagnosis:Ambulatory dysfunction   Patient Active Problem List    Diagnosis Date Noted   • Ambulatory dysfunction 10/20/2022   • Failure to thrive in adult 10/19/2022   • Nausea & vomiting 10/17/2022   • Severe protein-calorie malnutrition ([facility] ) 2022   • Chemotherapy induced neutropenia ([facility] ) 09/15/2022   • Migration of esophageal stent 2022   • Anemia 2022   • Hypotension 2022   • Vitamin B12 deficiency 2022   • Port-A-Cath in place 08/10/2022   • Thrombocytopenia ([facility] ) 2022   • Dysphagia 2022   • Pancytopenia ([facility] ) 2022   • Acute gastric ulcer 2022   • Esophageal cancer ([facility] ) 2022   • Abdominal aortic aneurysm without rupture 02/10/2022   • Paroxysmal atrial fibrillation ([facility] ) 02/10/2022   • Coronary artery disease of native artery of native heart with stable angina pectoris ([facility] ) 02/10/2022   • Ischemic cardiomyopathy 2021   • Chronic combined systolic and diastolic CHF (congestive heart failure) ([facility] ) 2021   • Allergic reaction to contrast media 2021   • NSTEMI (non-ST elevated myocardial infarction) ([place] [address] ) 2021   • Abdominal pain 2020   • Hernia, incisional 2019   • Ventral hernia with obstruction and without gangrene 2019   • CAD (coronary artery disease) 2017   • HLD (hyperlipidemia) 2017   • Seizure disorder ([facility] ) 2017   • S/P AAA (abdominal aortic aneurysm) repair 2017   • STEMI (ST elevation myocardial infarction) ([facility] ) 2017      LOS (days): 0  Geometric Mean LOS (GMLOS) (days):   Days to GMLOS:     OBJECTIVE:              Current admission status: Observation       Preferred Pharmacy:   [id number]- [place], [place] - C/Simon Platt [address] [place], [place] [address] [address]  Phone: [phone] [facility] [address] [facility] [place] [address] [address] [facility] [address] [address]  Phone: [phone] Fax: [phone]    Primary Care Provider: Ye Mercado MD    Primary Insurance: MEDICARE  Secondary Insurance: Linda Ramirez 118:  [address] [facility], [place] [address] Representative - Brother   Primary Phone: [phone] (Home)               Advance Directives  Does patient have a [address]?: No  Was patient offered paperwork?: Yes (declined)  Does patient currently have a Health Care decision maker?: Yes, please see Health Care Proxy section  Does patient have Advance Directives?: No  Was patient offered paperwork?: Yes (declined)  Primary Contact: Veronica Chaney (brother) [phone]         Readmission Root Cause  30 Day Readmission: Yes  Who directed you to return to the hospital?: Self  Did you understand whom to contact if you had questions or problems?: Yes  Did you get your prescriptions before you left the hospital?: Yes  Were you able to get your prescriptions filled when you left the hospital?: Yes  Did you take your medications as prescribed?: Yes  Were you able to get to your follow-up appointments?: Yes  During previous admission, was a post-acute recommendation made?: Yes  What post-acute resources were offered?: OP Therapy  Patient was readmitted due to: fall  Action Plan: rehab placement    Patient Information  Admitted from[de-identified] Home  Mental Status: Alert  During Assessment patient was accompanied by: Not accompanied during assessment  Assessment information provided by[de-identified] Patient  Primary Caregiver: Self  Support Systems: Self, Family members  Home entry access options   Select all that apply : Stairs  Number of steps to enter home : 2  Do the steps have railings?: Yes  Type of Current Residence: Apartment  Floor Level: 1  Upon entering residence, is there a bedroom on the main floor (no further steps)?: Yes  Upon entering residence, is there a bathroom on the main floor (no further steps)?: Yes  In the last 12 months, was there a time when you were not able to pay the mortgage or rent on time?: No  In the last 12 months, how many places have you lived?: 1  In the last 12 months, was there a time when you did not have a steady place to sleep or slept in a shelter (including now)?: No  Homeless/housing insecurity resource given?: N/A  Living Arrangements: Other (Comment) (lives with brother)    Activities of Daily Living Prior to Admission  Functional Status: Independent  Completes ADLs independently?: Yes  Ambulates independently?: Yes  Does patient use assisted devices?: No  Does patient currently own DME?: Yes  What DME does the patient currently own?: Gretchen Self, Bedside [employer]  Does patient have a history of Outpatient Therapy (PT/OT)?: Yes  Does the patient have a history of Short-Term Rehab?: Yes (Colletta Mering)  Does patient have a history of HHC?: No  Does patient currently have [facility]?: No         Patient Information Continued  Income Source: Pension/prison  Does patient have prescription coverage?: Yes ([address])  Within the past 12 months, you worried that your food would run out before you got the money to buy more : Never true  Within the past 12 months, the food you bought just didn't last and you didn't have money to get more : Never true  Food insecurity resource given?: N/A  Does patient receive dialysis treatments?: No  Does patient have a history of substance abuse?: No  Does patient have a history of Mental Health Diagnosis?: No         Means of Transportation  In the past 12 months, has lack of transportation kept you from medical appointments or from getting medications?: No  In the past 12 months, has lack of transportation kept you from meetings, work, or from getting things needed for daily living?: No  Was application for public transport provided?: N/A        DISCHARGE DETAILS:    Discharge planning discussed with[de-identified] Patient  Freedom of Choice: Yes     CM contacted family/caregiver?: No- see comments (declined)  Were Treatment Team discharge recommendations reviewed with patient/caregiver?: Yes  Did patient/caregiver verbalize understanding of patient care needs?: N/A- going to facility  Were patient/caregiver advised of the risks associated with not following Treatment Team discharge recommendations?: Yes    Contacts  Patient Contacts: Rico Cabrera  Relationship to Patient[de-identified] Family  Contact Method: Phone  Phone Number: [phone]  Reason/Outcome: Emergency Contact              Other Referral/Resources/Interventions Provided:  Interventions: Acute Rehab  Referral Comments: Per therapy, patient recommended STR  CM discussed therapy recommendation with patient  Patient stated [facility]  CM placed referral for [facility] via [address]  Per Dr Daniel Gutierrez, patient stable for discharge  Treatment Team Recommendation: Short Term Rehab  Discharge Destination Plan[de-identified] Acute Rehab            Patient is unvaccinated for covid-19  CM reviewed d/c planning process including the following: identifying help at home, patient preference for d/c planning needs, Discharge Lounge, [facility] Meds to Bed program, availability of treatment team to discuss questions or concerns patient and/or family may have regarding understanding medications and recognizing signs and symptoms once discharged  CM also encouraged patient to follow up with all recommended appointments after discharge  Patient advised of importance for patient and family to participate in managing patient’s medical well being

## 2022-10-21 NOTE — PHYSICAL THERAPY NOTE
Physical Therapy Evaluation    Patient's Name: Johnny Headley    Admitting Diagnosis  General weakness [R53 1]  Ambulatory dysfunction [R26 2]    Problem List  Patient Active Problem List   Diagnosis    CAD (coronary artery disease)    HLD (hyperlipidemia)    Seizure disorder (HCC)    S/P AAA (abdominal aortic aneurysm) repair    STEMI (ST elevation myocardial infarction) ([facility] )    Ventral hernia with obstruction and without gangrene    Hernia, incisional    Abdominal pain    NSTEMI (non-ST elevated myocardial infarction) (HCC)    Chronic combined systolic and diastolic CHF (congestive heart failure) (HCC)    Allergic reaction to contrast media    Ischemic cardiomyopathy    Abdominal aortic aneurysm without rupture    Paroxysmal atrial fibrillation ([facility] )    Coronary artery disease of native artery of native heart with stable angina pectoris (HCC)    Esophageal cancer ([facility] )    Acute gastric ulcer    Dysphagia    Pancytopenia (HCC)    Thrombocytopenia ([address] )    Port-A-Cath in place    Vitamin B12 deficiency    Weakness    Anemia    Hypotension    Migration of esophageal stent    Chemotherapy induced neutropenia (HCC)    Severe protein-calorie malnutrition (HCC)    Nausea & vomiting    Failure to thrive in adult    Ambulatory dysfunction       Past Medical History  Past Medical History:   Diagnosis Date    Cardiac disease     CHF (congestive heart failure) ([facility] )     Esophageal cancer ([facility] )     Hernia of abdominal cavity     Myocardial infarction ([facility] )     last assessed 7/31/14, past, Pt had MI s/p AGUSTIN placed in 2001, refuses to take any other medications for his cardiac disease, only will take seizure med   Understands possible complications from this decision    Seizure [facility])        Past Surgical History  Past Surgical History:   Procedure Laterality Date    ABDOMINAL AORTIC ANEURYSM REPAIR      for dialation or occulsion    CATARACT EXTRACTION      IR PORT PLACEMENT  7/15/2022        10/21/22 1116   PT Last Visit PT Visit Date 10/21/22   Note Type   Note type Evaluation   Pain Assessment   Pain Assessment Tool 0-10   Pain Score No Pain   Restrictions/Precautions   Weight Bearing Precautions Per Order No   Other Precautions Chair Alarm; Bed Alarm; Fall Risk   Home Living   Type of [address] One level;Stairs to enter with rails  (3 SEEMA)   Bathroom Shower/Tub Tub/shower unit   Marcell Swenson   Prior Function   Level of [address] with functional mobility   Lives With   (brother)   Vida Campos Help From Family   IADLs Independent with meal prep; Independent with medication management; Family/Friend/Other provides transportation   Falls in the last 6 months 1 to 4   Vocational Retired   Comments Prior to chemo/radiation pt was I w/ ambulation w/o AD, reports starting to use the RW 1 week ago due to feeling weak  General   Family/Caregiver Present No   Cognition   Overall Cognitive Status WFL   Arousal/Participation Alert   Attention Within functional limits   Orientation Level Oriented X4   Memory Within functional limits   Comments pleasant + cooperative   Subjective   Subjective Pt agreeable to mobilize  RLE Assessment   RLE Assessment   (4/5)   LLE Assessment   LLE Assessment   (4/5)   Coordination   Movements are Fluid and Coordinated 1   Bed Mobility   Supine to Sit 5  Supervision   Additional items Increased time required   Sit to Supine Unable to assess   Additional Comments Pt greeted in supine  Transfers   Sit to Stand 4  Minimal assistance   Additional items Assist x 1; Increased time required;Verbal cues   Stand to Sit 4  Minimal assistance   Additional items Assist x 1; Increased time required;Verbal cues   Stand pivot 4  Minimal assistance   Additional items Assist x 1; Increased time required;Verbal cues   Additional Comments RW   Ambulation/Elevation   Gait pattern Excessively slow; Short stride; Foward flexed;Decreased foot clearance;Decreased heel strike  (decreased step length LLE)   Gait Assistance 4  Minimal assist   Additional items Assist x 1;Verbal cues; Tactile cues   Assistive Device Rolling walker   Distance 20'   Stair Management Assistance Not tested  (not appropriate as pt fatigued w/ 20')   Balance   Static Sitting Fair +   Dynamic Sitting Fair   Static Standing Fair -   Dynamic Standing Poor +   Ambulatory Poor +  (RW)   Endurance Deficit   Endurance Deficit Yes   Endurance Deficit Description weakness, fatigue   Activity Tolerance   Activity Tolerance Patient limited by fatigue   Medical Staff Made Aware OT [facility], [address] [place] [address]   Nurse Made Aware yes - cleared for therapy   Assessment   Prognosis Good   Problem List Decreased mobility; Decreased strength;Decreased endurance; Impaired balance;Decreased safety awareness   Assessment Pt is 67 y o  male seen for a high complexity PT evaluation s/p admit to [facility] on 10/20/2022  Pt presenting w/ principal dx of "ambulatory dysfunction " Pt d/[facility]'ed from \[facility]"" 10/20/22 + tripped when attempting to go up the stairs into his apt; per pt he buckled  Please see above for other active problem list / PMH  PT now consulted to assess functional mobility and needs for safe d/c planning  At baseline pt is I w/ ambulation w/o AD community distances + lives in an apt w/ his brother w/ 3 SEEMA; pt reports starting to use RW as of 1 week ago due to increasing weakness  Currently pt requires S for bed skills; MinAx1 for functional transfers; MinAx1 for ambulation w/ RW  Pt presents functioning below baseline and w/ overall mobility deficits 2* to: decreased LE strength; generalized weakness/deconditioning; reliance on more restrictive AD from baseline; decreased endurance; impaired balance; gait deviations; fatigue; impaired safety and judgement; limited insight into current deficits; bed/chair alarms; multiple lines  Pt currently at risk for falls   (Please find additional objective findings from PT assessment regarding body systems outlined above )     Pt will continue to benefit from skilled PT interventions to address stated impairments; to maximize functional potential; for ongoing pt/ family training; and DME needs  PT is currently recommending rehab  Barriers to Discharge Inaccessible home environment   Goals   Patient Goals get stronger   STG Expiration Date 11/04/22   Short Term Goal #1 In 14 days pt will complete: 1) Bed mobility skills with I to facilitate safe return to previous living environment  2) Functional transfers with Bia to facilitate safe return to previous living environment  3) Ambulation with least restrictive ' w/ Bia without LOB for safe ambulation in home/community environment  4) Improve balance scores by 1 grade to decrease fall risk  5) Improve LE strength grades by 1 to increase independence w/ all functional mobility, transfers and gait  6) PT for ongoing pt and family education; DME needs and D/C planning to promote highest level of function in least restrictive environment  7) Stair training up/ down 3 steps with most appropriate technique and Bia for safe access to previous living environment and to increase community access  PT Treatment Day 0   Plan   Treatment/Interventions Functional transfer training;LE strengthening/ROM; Elevations; Therapeutic exercise; Endurance training;Patient/family training;Equipment eval/education; Bed mobility;Gait training; Compensatory technique education;Spoke to nursing;Spoke to case management;OT  (balance training)   PT Frequency 3-5x/wk   Recommendation   PT Discharge Recommendation Post acute rehabilitation services   Equipment Recommended   (pt already owns RW)   AM-PAC Basic Mobility Inpatient   Turning in Bed Without Bedrails 4   Lying on Back to Sitting on Edge of Flat Bed 3   Moving Bed to Chair 3   Standing Up From Chair 3   Walk in Room 3   Climb 3-5 Stairs 3   Basic Mobility Inpatient Raw Score 19   Basic Mobility Standardized Score 42 48   Highest Level Of Mobility   -HLM Goal 6: Walk 10 steps or more   -HLM Achieved 6: Walk 10 steps or more   End of Consult   Patient Position at End of Consult Bedside chair;Bed/Chair alarm activated; All needs within reach  (on waffle cushion)     Riki Martinez, PT, DPT

## 2022-10-21 NOTE — PLAN OF CARE
Problem: MOBILITY - ADULT  Goal: Maintain or return to baseline ADL function  Description: INTERVENTIONS:  -  Assess patient's ability to carry out ADLs; assess patient's baseline for ADL function and identify physical deficits which impact ability to perform ADLs (bathing, care of mouth/teeth, toileting, grooming, dressing, etc )  - Assess/evaluate cause of self-care deficits   - Assess range of motion  - Assess patient's mobility; develop plan if impaired  - Assess patient's need for assistive devices and provide as appropriate  - Encourage maximum independence but intervene and supervise when necessary  - Involve family in performance of ADLs  - Assess for home care needs following discharge   - Consider OT consult to assist with ADL evaluation and planning for discharge  - Provide patient education as appropriate  Outcome: Progressing  Goal: Maintains/Returns to pre admission functional level  Description: INTERVENTIONS:  - Perform BMAT or MOVE assessment daily    - Set and communicate daily mobility goal to care team and patient/family/caregiver     - Collaborate with rehabilitation services on mobility goals if consulted  Problem: Potential for Falls  Goal: Patient will remain free of falls  Description: INTERVENTIONS:  - Educate patient/family on patient safety including physical limitations  - Instruct patient to call for assistance with activity   - Consult OT/PT to assist with strengthening/mobility   - Keep Call bell within reach  - Keep bed low and locked with side rails adjusted as appropriate  - Keep care items and personal belongings within reach  - Initiate and maintain comfort rounds  - Make Fall Risk Sign visible to staff  Problem: Prexisting or High Potential for Compromised Skin Integrity  Goal: Skin integrity is maintained or improved  Description: INTERVENTIONS:  - Identify patients at risk for skin breakdown  - Assess and monitor skin integrity  - Assess and monitor nutrition and hydration status  - Monitor labs   - Assess for incontinence   - Turn and reposition patient  - Assist with mobility/ambulation  - Relieve pressure over bony prominences  - Avoid friction and shearing  - Provide appropriate hygiene as needed including keeping skin clean and dry  - Evaluate need for skin moisturizer/barrier cream  - Collaborate with interdisciplinary team   - Patient/family teaching  - Consider wound care consult   Outcome: Progressing     - Apply yellow socks and bracelet for high fall risk patients  - Consider moving patient to room near nurses station  Outcome: Progressing     - Out of bed for toileting  - Record patient progress and toleration of activity level   Outcome: Progressing

## 2022-10-21 NOTE — ASSESSMENT & PLAN NOTE
Pt continues to feel weak, and failed to walk up the steps near his home   PT/OT consulted and pending placement for SNF     Plan  SNF Rehab

## 2022-10-21 NOTE — PLAN OF CARE
Problem: MOBILITY - ADULT  Goal: Maintain or return to baseline ADL function  Description: INTERVENTIONS:  -  Assess patient's ability to carry out ADLs; assess patient's baseline for ADL function and identify physical deficits which impact ability to perform ADLs (bathing, care of mouth/teeth, toileting, grooming, dressing, etc )  - Assess/evaluate cause of self-care deficits   - Assess range of motion  - Assess patient's mobility; develop plan if impaired  - Assess patient's need for assistive devices and provide as appropriate  - Encourage maximum independence but intervene and supervise when necessary  - Involve family in performance of ADLs  - Assess for home care needs following discharge   - Consider OT consult to assist with ADL evaluation and planning for discharge  - Provide patient education as appropriate  Outcome: Progressing  Goal: Maintains/Returns to pre admission functional level  Description: INTERVENTIONS:  - Perform BMAT or MOVE assessment daily    - Set and communicate daily mobility goal to care team and patient/family/caregiver  - Collaborate with rehabilitation services on mobility goals if consulted  - Perform Range of Motion  times a day  - Reposition patient every  hours    - Dangle patient  times a day  - Stand patient times a day  - Ambulate patient  times a day  - Out of bed to chair  times a day   - Out of bed for meals times a day  - Out of bed for toileting  - Record patient progress and toleration of activity level   Outcome: Progressing     Problem: Potential for Falls  Goal: Patient will remain free of falls  Description: INTERVENTIONS:  - Educate patient/family on patient safety including physical limitations  - Instruct patient to call for assistance with activity   - Consult OT/PT to assist with strengthening/mobility   - Keep Call bell within reach  - Keep bed low and locked with side rails adjusted as appropriate  - Keep care items and personal belongings within reach  - Initiate and maintain comfort rounds  - Make Fall Risk Sign visible to staff  - Offer Toileting every  Hours, in advance of need  - Initiate/Maintainalarm  - Obtain necessary fall risk management equipment  - Apply yellow socks and bracelet for high fall risk patients  - Consider moving patient to room near nurses station  Outcome: Progressing     Problem: Prexisting or High Potential for Compromised Skin Integrity  Goal: Skin integrity is maintained or improved  Description: INTERVENTIONS:  - Identify patients at risk for skin breakdown  - Assess and monitor skin integrity  - Assess and monitor nutrition and hydration status  - Monitor labs   - Assess for incontinence   - Turn and reposition patient  - Assist with mobility/ambulation  - Relieve pressure over bony prominences  - Avoid friction and shearing  - Provide appropriate hygiene as needed including keeping skin clean and dry  - Evaluate need for skin moisturizer/barrier cream  - Collaborate with interdisciplinary team   - Patient/family teaching  - Consider wound care consult   Outcome: Progressing     Problem: Nutrition/Hydration-ADULT  Goal: Nutrient/Hydration intake appropriate for improving, restoring or maintaining nutritional needs  Description: Monitor and assess patient's nutrition/hydration status for malnutrition  Collaborate with interdisciplinary team and initiate plan and interventions as ordered  Monitor patient's weight and dietary intake as ordered or per policy  Utilize nutrition screening tool and intervene as necessary  Determine patient's food preferences and provide high-protein, high-caloric foods as appropriate       INTERVENTIONS:  - Monitor oral intake, urinary output, labs, and treatment plans  - Assess nutrition and hydration status and recommend course of action  - Evaluate amount of meals eaten  - Assist patient with eating if necessary   - Allow adequate time for meals  - Recommend/ encourage appropriate diets, oral nutritional supplements, and vitamin/mineral supplements  - Order, calculate, and assess calorie counts as needed  - Recommend, monitor, and adjust tube feedings and TPN/PPN based on assessed needs  - Assess need for intravenous fluids  - Provide specific nutrition/hydration education as appropriate  - Include patient/family/caregiver in decisions related to nutrition  Outcome: Not Progressing  Pt have supplement ordered, but refused it   Will continue to offer to pt

## 2022-10-21 NOTE — MALNUTRITION/BMI
This medical record reflects one or more clinical indicators suggestive of malnutrition and/or morbid obesity  Malnutrition Findings:   Adult Malnutrition type: Chronic illness  Adult Degree of Malnutrition: Other severe protein calorie malnutrition  Malnutrition Characteristics: Muscle loss, Inadequate energy, Weight loss                  360 Statement: Pt exhibits severe pro/rufino malnutrition r/t Esophageal Ca as evidenced by 52# (24%) weight loss x 5months evidenced by and muscle loss to be treated with diet and supplements    BMI Findings: Body mass index is 22 72 kg/m²  See Nutrition note dated 10/21/2022 for additional details  Completed nutrition assessment is viewable in the nutrition documentation

## 2022-10-21 NOTE — OCCUPATIONAL THERAPY NOTE
Occupational Therapy Evaluation     Patient Name: Lacie Wills  Today's Date: 10/21/2022  Problem List  Principal Problem:    Ambulatory dysfunction  Active Problems:    Seizure disorder ([facility] [address] )    Chronic combined systolic and diastolic CHF (congestive heart failure) (HCC)    Paroxysmal atrial fibrillation ([facility] )    Coronary artery disease of native artery of native heart with stable angina pectoris ([facility] [address] )    Esophageal cancer ([facility] [address] )    Severe protein-calorie malnutrition ([facility] )    Failure to thrive in adult    Past Medical History  Past Medical History:   Diagnosis Date    Cardiac disease     CHF (congestive heart failure) ([facility] )     Esophageal cancer ([facility] [address] )     Hernia of abdominal cavity     Myocardial infarction ([facility] )     last assessed 7/31/14, past, Pt had MI s/p AGUSTIN placed in 2001, refuses to take any other medications for his cardiac disease, only will take seizure med  Understands possible complications from this decision    Seizure [facility])      Past Surgical History  Past Surgical History:   Procedure Laterality Date    ABDOMINAL AORTIC ANEURYSM REPAIR      for dialation or occulsion    CATARACT EXTRACTION      IR PORT PLACEMENT  7/15/2022           10/21/22 1117   OT Last Visit   OT Visit Date 10/21/22   Note Type   Note type Evaluation   Pain Assessment   Pain Score No Pain   Restrictions/Precautions   Weight Bearing Precautions Per Order No   Other Precautions Chair Alarm; Fall Risk   Home Living   Type of [address] [facility] One level;Performs ADLs on one level;Stairs to enter with rails   Bathroom Shower/Tub Tub/shower unit   Bathroom Toilet Standard   Bathroom Equipment   (denies)   [facility]   Additional Comments Pt lives in a one level apt, 3 [address], has RW however reports does not always use   Prior Function   Level of [place] Independent with ADLs; Independent with functional mobility; Needs assistance with IADLS   L[facility] With [facility] From Family   IADLs Independent with meal prep; Independent with medication management; Family/Friend/Other provides transportation   Falls in the last 6 months 1 to 4   Vocational Retired   Comments Pt lives with brother who provides transportation  Pt reports having at least 2 falls in the last 6 months   Lifestyle   Autonomy pt reports being independent in ADLs/IADLs; brother provides transportation   Reciprocal Relationships lives with brother who is home all the time   Service to Others retired;    [address] enjoys doing crossword puzzles   Subjective   Subjective "I need to turn around"   ADL   Where Assessed Chair   Grooming Assistance 5  Supervision/Setup   UB [address] 5  Supervision/Setup   LB [address] 4  Minimal Assistance   [address] 5  Supervision/Setup   LB Dressing Assistance 4  [address]  4  Minimal Assistance   Bed Mobility   Supine to Sit 5  Supervision   Additional items Increased time required   Additional Comments OOB at end of session   Transfers   Sit to Stand 4  Minimal assistance   Additional items Assist x 1; Increased time required   Stand to Sit 4  Minimal assistance   Additional items Assist x 1; Increased time required   Stand pivot 4  Minimal assistance   Additional items Assist x 1; Increased time required   Additional Comments RW, TC and VC required for safety and carryover of techniques   Functional Mobility   Functional Mobility 4  Minimal assistance   Additional Comments less than house hold distances, fatigues very quicky, SOB/SCHOFIELD noted   Additional items Rolling walker   Balance   Static Sitting Fair +   Dynamic Sitting Fair   Static Standing Fair -   Dynamic Standing Poor +   Ambulatory Poor +   RUE Assessment   RUE Assessment WFL   LUE Assessment   LUE Assessment WFL   Psychosocial   Psychosocial (WDL) WDL   Cognition   Overall Cognitive Status WFL   Arousal/Participation Responsive; Alert; Cooperative   Attention Within functional limits   Orientation Level Oriented X4   Memory Within functional limits   Following Commands Follows multistep commands without difficulty   Comments Overall pleasant and cooperative, decreased insight noted at times requires incresed TC and VC   Assessment   Limitation Decreased ADL status; Decreased UE strength;Decreased Safe judgement during ADL;Decreased endurance;Decreased self-care trans;Decreased high-level ADLs   Prognosis Good   Assessment Pt is a 67 y o  male seen for OT evaluation s/p admit to [facility] on 10/20/2022 w/ Ambulatory dysfunction and s/p fall while entering home  Of note: Pt was recently discharged from this hospital yesterday and returned to ED following a fall while attempting to get in his home  Comorbidities affecting pt's functional performance at time of assessment include: seizure disorder, CHF, paroxysmal atrial fibrillation, CAD, esophageal CA, severe protien malnutrition, MI, hernia of abd cavity  Personal factors affecting pt at time of IE include:steps to enter environment, limited home support, difficulty performing ADLS, difficulty performing IADLS , limited insight into deficits, flat affect, decreased initiation and engagement  and health management   Prior to admission, pt was I with ADLS and most IADLS reports brother assists with driving  Upon evaluation: Pt presents supine and is agreeable to OTIE  Pt requires overall min A for transfers and mobility and S- min A for self care tasks 2* the following deficits impacting occupational performance: weakness, decreased strength, decreased balance, decreased tolerance, impaired attention, impaired initiation, impaired problem solving and decreased safety awareness  Pt resting in chair at end of session with all needs in reach, alarm on   Pt to benefit from continued skilled OT tx while in the hospital to address deficits as defined above and maximize level of functional independence w ADL's and functional mobility  Occupational Performance areas to address include: grooming, bathing/shower, toilet hygiene, dressing, health maintenance, functional mobility, clothing management and social participation  The patient's raw score on the AM-PAC Daily Activity inpatient short form is 18  , standardized score is 38 66  , less than 39 4  Patients at this level are likely to benefit from discharge to post-acute rehabilitation services  Please refer to the recommendation of the Occupational Therapist for safe discharge planning  Goals   Patient Goals to get better   Plan   Treatment Interventions ADL retraining;Functional transfer training;UE strengthening/ROM; Endurance training;Patient/family training; Compensatory technique education;Continued evaluation; Energy conservation; Activityengagement   Goal Expiration Date 11/04/22   OT Frequency 2-3x/wk   Recommendation   OT Discharge Recommendation Post acute rehabilitation services   [facility] Daily Activity Inpatient   Lower Body Dressing 2   Bathing 3   Toileting 3   Upper Body Dressing 3   Grooming 3   Eating 4   Daily Activity Raw Score 18   Daily Activity Standardized Score (Calc for Raw Score >=11) 38 66   [facility] Applied Cognition Inpatient   Following a Speech/Presentation 4   Understanding Ordinary Conversation 4   Taking Medications 4   Remembering Where Things Are Placed or Put Away 4   Remembering List of 4-5 Errands 4   Taking Care of Complicated Tasks 3   Applied Cognition Raw Score 23   Applied Cognition Standardized Score 53 08     Goals to be addressed in the next 14 days:    1) Pt will increase activity tolerance to G for 30 min txment sessions  2) Pt will complete UB/LB dressing/self care w/ mod I using adaptive device and DME as needed  3) Pt will complete bathing w/ Mod I w/ use of AE and DME as needed  4) Pt will complete toileting w/ mod I w/ G hygiene/thoroughness using DME as needed  5) Pt will improve functional transfers to Mod I on/off all surfaces using DME as needed w/ G balance/safety   6) Pt will improve functional mobility during ADL/IADL/leisure tasks to Mod I using DME as needed w/ G balance/safety   7) Pt will participate in simulated IADL management task with DME as needed to increase independence to  w/ G safety and endurance  8) Pt will demonstrate G carryover of pt/caregiver education and training as appropriate  9) Pt will demonstrate 100% carryover of energy conservation techniques t/o functional I/ADL/leisure tasks w/o cues s/p skilled education  10) Pt will independently identify and utilize 2-3 coping strategies to increase positive affect and promote overall well-being    11) Pt will engage in ongoing cognitive assessment w/ G participation to assist w/ safe d/c planning/recommendations

## 2022-10-21 NOTE — PROGRESS NOTES
INTERNAL MEDICINE RESIDENCY PROGRESS NOTE     Name: Eric Davies   Age & Sex: 67 y o  male   MRN: [id number]  Unit/Bed#: -01   Encounter: [id number]  Team: SOD Team A    PATIENT INFORMATION     Name: Eric Davies   Age & Sex: 67 y o  male   MRN: [id number]  Hospital Stay Days: 0    ASSESSMENT/PLAN     Principal Problem:    Ambulatory dysfunction  Active Problems:    Seizure disorder ([facility] [address] )    Chronic combined systolic and diastolic CHF (congestive heart failure) ([facility])    Paroxysmal atrial fibrillation ([facility] [address] )    Coronary artery disease of native artery of native heart with stable angina pectoris ([facility] )    Es[facility] ([facility] )    Weakness    Severe protein-calorie malnutrition ([facility] )    Failure to thrive in adult      Failure to thrive in adult  Assessment & Plan  Patient endorsed improved appetite today but continues to feel weak  Concern for severe protein malnutrition due to poor PO intake      Plan  - regular diet  - Started mirtazapine 7 5mg qHS    Weakness  Assessment & Plan  Pt continues to feel weak, and failed to walk up the steps near his home  PT/OT consulted and pending placement for SNF     Plan  SNF Rehab     Esophageal cancer [facility])  Assessment & Plan  Hx stage II B esophageal cancer diagnosed in May 2022  Has recently completed  carboplatin pack with taxol chemotherapy as well as radiotherapy  Follows outpatient with heme/onc  Cancer treatment is likely contributing to his nausea and vomiting      Plan:  Continue outpatient follow-up with Heme-Onc    Coronary artery disease of native artery of native heart with stable angina pectoris [facility])  Assessment & Plan  Patient with a history of CAD status post AGUSTIN x4, has follow-up with cardiology as an outpatient   Most recently completed 6 months of dual antiplatelet therapy   Currently on baby aspirin and atorvastatin daily   No chest pain        Plan  · Continue aspirin, statin for secondary prevention      Paroxysmal atrial fibrillation [facility])  Assessment & Plan  Documented Hx of paroxysmal atrial fibrillation, Omi Vasc of 5  Previously on warfarin for left apical thrombus, however has since been discontinued by his cardiologist   Last seen by outpatient Cardiology 06/2022  Currently normal sinus in the ED      Plan:  · Patient not currently on any home beta-blocker regiment  · F/u outpatient      Chronic combined systolic and diastolic CHF (congestive heart failure) ([address] )  Assessment & Plan  Wt Readings from Last 3 Encounters:   10/04/22 76 4 kg (168 lb 6 9 oz)   09/30/22 77 6 kg (171 lb)   09/20/22 81 6 kg (180 lb)     Documented history of chronic combined systolic and diastolic heart failure   Last echo performed 03/15/2022 showed LVEF 50% with G1 DD, apical akinesis, T BMP with mild regurg  Patient not fluid overloaded on exam      Plan:  · Continue to monitor        Seizure disorder [facility])  Assessment & Plan  Remote history of seizure disorder diagnosed 40 years ago  Patient currently takes phenobarbital 64 8 mg BID      Plan:   -Continue phenobarbital 64 8 mg BID     * Ambulatory dysfunction  Assessment & Plan  Patient presented to hospital initially on 10/18 for failure to thrive in an adult  He was discharged the afternoon of 10/20  He fell after stepping up his front door and experienced minor injuries  No reported loss of consciousness  Bystander assisted helping him up  Patient is experiencing ambulatory dysfunction and weakness  Given stairways present in home; concern for future fall risk    Plan:  · Consult PT for assessment of patient's ability to climb stairs   · Patient will most likely need post acute rehab      Disposition:  Awaiting placement for rehab     SUBJECTIVE     Patient seen and examined  No acute events overnight  Pt endorses feeling weak and tired  Pt denies headache, fever, chills, SOB       OBJECTIVE     Vitals:    10/20/22 2200 10/20/22 2241 10/21/22 0722 10/21/22 1141   BP: 96/57 107/63 100/60 98/60   BP Location: Right arm  Right arm    Pulse: 96 64 68    Resp: 18     Temp:  98 1 °F (36 7 °C) 98 7 °F (37 1 °C)    TempSrc:  Oral Oral    SpO2: 93% 95% 94%    Weight:  76 kg (167 lb 8 8 oz)     Height:  6' (1 829 m)        Temperature:   Temp (24hrs), Av 3 °F (36 8 °C), Min:98 °F (36 7 °C), Max:98 7 °F (37 1 °C)    Temperature: 98 7 °F (37 1 °C)  Intake & Output:  I/O       10/19 0701  10/20 0700 10/20 0701  10/21 0700 10/21 0701  10/22 0700           Unmeasured Urine Occurrence  1 x     Unmeasured Stool Occurrence  1 x         Weights:   IBW (Ideal Body Weight): 77 6 kg    Body mass index is 22 72 kg/m²  Weight (last 2 days)     Date/Time Weight    10/20/22 22:41:41 76 (167 55)        Physical Exam  Constitutional:       General: He is not in acute distress  Appearance: Normal appearance  HENT:      Head: Normocephalic and atraumatic  Cardiovascular:      Rate and Rhythm: Normal rate and regular rhythm  Pulses: Normal pulses  Heart sounds: Normal heart sounds  Pulmonary:      Effort: Pulmonary effort is normal       Breath sounds: Normal breath sounds  Abdominal:      General: Abdomen is flat  Bowel sounds are normal       Palpations: Abdomen is soft  Musculoskeletal:      Comments: BL LE strength 4/5    Neurological:      Mental Status: He is alert  LABORATORY DATA     Labs: I have personally reviewed pertinent reports    Results from last 7 days   Lab Units 10/21/22  0518 10/20/22  2027 10/20/22  0158 10/20/22  0157 10/19/22  0714 10/18/22  0642   WBC Thousand/uL 4 09*  --  3 94*  --  3 35* 4 35   HEMOGLOBIN g/dL 10 0*  --  9 0* 8 9* 8 1* 8 8*   HEMATOCRIT % 30 4*  --  27 9* 26 9* 25 1* 26 7*   PLATELETS Thousands/uL 87* 89* 90*  --  78* 96*   NEUTROS PCT % 73  --  66  --   --  73   MONOS PCT % 10  --  15*  --   --  13*   MONO PCT %  --   --   --   --  6  --       Results from last 7 days   Lab Units 10/21/22  0518 10/20/22  0158 10/18/22  0642   POTASSIUM mmol/L 3 6 3 3* 3 6 CHLORIDE mmol/L 107 107 105   CO2 mmol/L 25 28 29   BUN mg/dL 9 7 11   CREATININE mg/dL 0 60 0 54* 0 66   CALCIUM mg/dL 7 9* 7 8* 8 1*     Results from last 7 days   Lab Units 10/18/22  0642   MAGNESIUM mg/dL 1 8     Results from last 7 days   Lab Units 10/18/22  0642   PHOSPHORUS mg/dL 2 1*                    IMAGING & DIAGNOSTIC TESTING     Radiology Results: I have personally reviewed pertinent reports  No results found  Other Diagnostic Testing: I have personally reviewed pertinent reports  ACTIVE MEDICATIONS     Current Facility-Administered Medications   Medication Dose Route Frequency   • aspirin (ECOTRIN LOW STRENGTH) EC tablet 81 mg  81 mg Oral Daily   • atorvastatin (LIPITOR) tablet 80 mg  80 mg Oral Daily With Dinner   • enoxaparin (LOVENOX) subcutaneous injection 40 mg  40 mg Subcutaneous Daily   • midodrine (PROAMATINE) tablet 2 5 mg  2 5 mg Oral TID AC   • mirtazapine (REMERON) tablet 7 5 mg  7 5 mg Oral HS   • ondansetron (ZOFRAN-ODT) dispersible tablet 8 mg  8 mg Oral Q8H PRN   • pantoprazole (PROTONIX) EC tablet 40 mg  40 mg Oral Early Morning   • PHENobarbital tablet 64 8 mg  64 8 mg Oral BID       VTE Pharmacologic Prophylaxis: Enoxaparin (Lovenox)  VTE Mechanical Prophylaxis: sequential compression device    Portions of the record may have been created with voice recognition software  Occasional wrong word or "sound a like" substitutions may have occurred due to the inherent limitations of voice recognition software    Read the chart carefully and recognize, using context, where substitutions have occurred   ==  [facility]  Internal Medicine SUB I M4

## 2022-10-22 PROCEDURE — 99232 SBSQ HOSP IP/OBS MODERATE 35: CPT | Performed by: INTERNAL MEDICINE

## 2022-10-22 PROCEDURE — 97116 GAIT TRAINING THERAPY: CPT

## 2022-10-22 PROCEDURE — 97530 THERAPEUTIC ACTIVITIES: CPT

## 2022-10-22 RX ADMIN — CARBIDOPA AND LEVODOPA 2.5 MG: 50; 200 TABLET, EXTENDED RELEASE ORAL at 10:37

## 2022-10-22 RX ADMIN — CARBIDOPA AND LEVODOPA 2.5 MG: 50; 200 TABLET, EXTENDED RELEASE ORAL at 06:04

## 2022-10-22 RX ADMIN — ENOXAPARIN SODIUM 40 MG: 40 INJECTION SUBCUTANEOUS at 09:31

## 2022-10-22 RX ADMIN — MIRTAZAPINE 7.5 MG: 15 TABLET, FILM COATED ORAL at 21:07

## 2022-10-22 RX ADMIN — ASPIRIN 81 MG: 81 TABLET, COATED ORAL at 09:31

## 2022-10-22 RX ADMIN — CARBIDOPA AND LEVODOPA 2.5 MG: 50; 200 TABLET, EXTENDED RELEASE ORAL at 15:45

## 2022-10-22 RX ADMIN — PHENOBARBITAL 64.8 MG: 64.8 TABLET ORAL at 09:31

## 2022-10-22 RX ADMIN — ATORVASTATIN CALCIUM 80 MG: 80 TABLET, FILM COATED ORAL at 15:45

## 2022-10-22 RX ADMIN — PANTOPRAZOLE SODIUM 40 MG: 40 TABLET, DELAYED RELEASE ORAL at 05:00

## 2022-10-22 RX ADMIN — PHENOBARBITAL 64.8 MG: 64.8 TABLET ORAL at 18:00

## 2022-10-22 NOTE — PLAN OF CARE
Problem: PHYSICAL THERAPY ADULT  Goal: Performs mobility at highest level of function for planned discharge setting  See evaluation for individualized goals  Description: Treatment/Interventions: Functional transfer training, LE strengthening/ROM, Elevations, Therapeutic exercise, Endurance training, Patient/family training, Equipment eval/education, Bed mobility, Gait training, Compensatory technique education, Spoke to nursing, Spoke to case management, OT (balance training)  Equipment Recommended:  (pt already owns RW)       See flowsheet documentation for full assessment, interventions and recommendations  Outcome: Progressing  Note: Prognosis: Good  Problem List: Decreased mobility, Decreased strength, Decreased endurance, Impaired balance, Decreased safety awareness, Pain  Assessment: Pt seen for PT session w/ focus on bed mobility training, t/f training, + gait training  Pt agreeable to mobilize, understands importance of OOB mobility  Pt's brother present also + appreciative of PT to help get pt stronger  While pt motivated to get stronger, noted that he is much more fatigued than previous session, less conversant  All aspects of functional mobility excessively slow + required rest periods  Encouraged pt to do a second bout of gait training w/ chair follow  Pt agreeable but only able to walk a few steps before needing to sit down in chair again  BP 93/61,   Pt denied dizziness but again reported feeling overall extremely fatigued  Agreeable to sit in chair w/ BLE elevated  RN made aware of findings of today's session  Continue to recommend rehab upon d/c   Barriers to Discharge: Inaccessible home environment     PT Discharge Recommendation: Post acute rehabilitation services    See flowsheet documentation for full assessment

## 2022-10-22 NOTE — PROGRESS NOTES
INTERNAL MEDICINE RESIDENCY PROGRESS NOTE     Name: Samanta Dyer   Age & Sex: 67 y o  male   MRN: [id number]  Unit/Bed#: -01   Encounter: [id number]  Team: SOD Team A    PATIENT INFORMATION     Name: Samanta Dyer   Age & Sex: 67 y o  male   MRN: [id number]  Hospital Stay Days: 1    ASSESSMENT/PLAN     Principal Problem:    Ambulatory dysfunction  Active Problems:    Seizure disorder ([facility] )    Chronic combined systolic and diastolic CHF (congestive heart failure) ([facility])    Paroxysmal atrial fibrillation ([facility] )    Coronary artery disease of native artery of native heart with stable angina pectoris ([facility] )    Esophageal cancer ([facility] )    Weakness    Severe protein-calorie malnutrition ([facility] )    Failure to thrive in adult      * Ambulatory dysfunction  Assessment & Plan  Patient presented to hospital initially on 10/18 for failure to thrive in an adult  He was discharged the afternoon of 10/20  He fell after stepping up his front door and experienced minor injuries  No reported loss of consciousness  Bystander assisted helping him up  Patient is experiencing ambulatory dysfunction and weakness  Given stairways present in home; concern for future fall risk    Plan:  · Consult PT for assessment of patient's ability to climb stairs   · Patient will most likely need post acute rehab    Failure to thrive in adult  Assessment & Plan  Patient endorsed improved appetite today but continues to feel weak  Concern for severe protein malnutrition due to poor PO intake      Plan  - regular diet  - Started mirtazapine 7 5mg qHS    Weakness  Assessment & Plan  Pt continues to feel weak, and failed to walk up the steps near his home  PT/OT consulted and pending placement for SNF     Plan  SNF Rehab     Esophageal cancer [facility])  Assessment & Plan  Hx stage II B esophageal cancer diagnosed in May 2022   Has recently completed  carboplatin pack with taxol chemotherapy as well as radiotherapy  Follows outpatient with heme/onc  Cancer treatment is likely contributing to his nausea and vomiting  Plan:  Continue outpatient follow-up with Heme-Onc    Coronary artery disease of native artery of native heart with stable angina pectoris [facility])  Assessment & Plan  Patient with a history of CAD status post AGUSTIN x4, has follow-up with cardiology as an outpatient   Most recently completed 6 months of dual antiplatelet therapy   Currently on baby aspirin and atorvastatin daily   No chest pain        Plan  · Continue aspirin, statin for secondary prevention      Paroxysmal atrial fibrillation ([facility] )  Assessment & Plan  Documented Hx of paroxysmal atrial fibrillation, Omi Vasc of 5  Previously on warfarin for left apical thrombus, however has since been discontinued by his cardiologist   Last seen by outpatient Cardiology 06/2022  Currently normal sinus in the ED      Plan:  · Patient not currently on any home beta-blocker regiment  · F/u outpatient      Chronic combined systolic and diastolic CHF (congestive heart failure) ([facility] )  Assessment & Plan  Wt Readings from Last 3 Encounters:   10/04/22 76 4 kg (168 lb 6 9 oz)   09/30/22 77 6 kg (171 lb)   09/20/22 81 6 kg (180 lb)     Documented history of chronic combined systolic and diastolic heart failure   Last echo performed 03/15/2022 showed LVEF 50% with G1 DD, apical akinesis, T BMP with mild regurg  Patient not fluid overloaded on exam      Plan:  · Continue to monitor        Seizure disorder [facility])  Assessment & Plan  Remote history of seizure disorder diagnosed 40 years ago  Patient currently takes phenobarbital 64 8 mg BID      Plan:   -Continue phenobarbital 64 8 mg BID       Disposition: Stable for discharge, pending placement  SUBJECTIVE     Patient seen and examined  No acute events overnight  Upon my encounter patient was lying comfortably in hospital bed  Patient presently denies any fever, chills, nausea, vomiting, diarrhea, constipation, chest pain, shortness a breath    Patient otherwise denies any new or worsening complaints  OBJECTIVE     Vitals:    10/22/22 0009 10/22/22 0500 10/22/22 0759 10/22/22 0817   BP: 107/64  (!) 82/53 98/63   BP Location:       Pulse: 95   94   Resp: 20   14   Temp: 98 4 °F (36 9 °C)  98 6 °F (37 °C) 98 6 °F (37 °C)   TempSrc:       SpO2: 94%   95%   Weight:  75 kg (165 lb 5 5 oz)     Height:          Temperature:   Temp (24hrs), Av 6 °F (37 °C), Min:98 4 °F (36 9 °C), Max:98 6 °F (37 °C)    Temperature: 98 6 °F (37 °C)  Intake & Output:  I/O       10/20 0701  10/21 0700 10/21 0701  10/22 07    P  O   180    Total Intake(mL/kg)  180 (2 4)    Urine (mL/kg/hr)  2 (0)    Stool  0    Total Output  2    Net  +178          Unmeasured Urine Occurrence 1 x 4 x    Unmeasured Stool Occurrence 1 x 1 x        Weights:   IBW (Ideal Body Weight): 77 6 kg    Body mass index is 22 42 kg/m²  Weight (last 2 days)     Date/Time Weight    10/22/22 0500 75 (165 35)    10/20/22 22:41:41 76 (167 55)        Physical Exam  Constitutional:       General: He is not in acute distress  Appearance: Normal appearance  He is not ill-appearing  Cardiovascular:      Rate and Rhythm: Normal rate and regular rhythm  Pulses: Normal pulses  Heart sounds: Normal heart sounds  No murmur heard  Pulmonary:      Effort: Pulmonary effort is normal  No respiratory distress  Breath sounds: Normal breath sounds  No wheezing, rhonchi or rales  Abdominal:      General: Abdomen is flat  Bowel sounds are normal  There is no distension  Palpations: Abdomen is soft  Tenderness: There is no abdominal tenderness  Musculoskeletal:      Right lower leg: No edema  Left lower leg: No edema  Neurological:      Mental Status: He is alert and oriented to person, place, and time  Motor: Weakness (b/l LE 4/5) present  Psychiatric:         Mood and Affect: Mood normal          Behavior: Behavior normal          Thought Content:  Thought content normal        LABORATORY DATA Labs: I have personally reviewed pertinent reports  Results from last 7 days   Lab Units 10/21/22  0518 10/20/22  2027 10/20/22  0158 10/20/22  0157 10/19/22  0714 10/18/22  0642   WBC Thousand/uL 4 09*  --  3 94*  --  3 35* 4 35   HEMOGLOBIN g/dL 10 0*  --  9 0* 8 9* 8 1* 8 8*   HEMATOCRIT % 30 4*  --  27 9* 26 9* 25 1* 26 7*   PLATELETS Thousands/uL 87* 89* 90*  --  78* 96*   NEUTROS PCT % 73  --  66  --   --  73   MONOS PCT % 10  --  15*  --   --  13*   MONO PCT %  --   --   --   --  6  --       Results from last 7 days   Lab Units 10/21/22  0518 10/20/22  0158 10/18/22  0642   POTASSIUM mmol/L 3 6 3 3* 3 6   CHLORIDE mmol/L 107 107 105   CO2 mmol/L 25 28 29   BUN mg/dL 9 7 11   CREATININE mg/dL 0 60 0 54* 0 66   CALCIUM mg/dL 7 9* 7 8* 8 1*     Results from last 7 days   Lab Units 10/18/22  0642   MAGNESIUM mg/dL 1 8     Results from last 7 days   Lab Units 10/18/22  0642   PHOSPHORUS mg/dL 2 1*                    IMAGING & DIAGNOSTIC TESTING     Radiology Results: I have personally reviewed pertinent reports  No results found  Other Diagnostic Testing: I have personally reviewed pertinent reports      ACTIVE MEDICATIONS     Current Facility-Administered Medications   Medication Dose Route Frequency   • aspirin (ECOTRIN LOW STRENGTH) EC tablet 81 mg  81 mg Oral Daily   • atorvastatin (LIPITOR) tablet 80 mg  80 mg Oral Daily With Dinner   • enoxaparin (LOVENOX) subcutaneous injection 40 mg  40 mg Subcutaneous Daily   • midodrine (PROAMATINE) tablet 2 5 mg  2 5 mg Oral TID AC   • mirtazapine (REMERON) tablet 7 5 mg  7 5 mg Oral HS   • ondansetron (ZOFRAN-ODT) dispersible tablet 8 mg  8 mg Oral Q8H PRN   • pantoprazole (PROTONIX) EC tablet 40 mg  40 mg Oral Early Morning   • PHENobarbital tablet 64 8 mg  64 8 mg Oral BID        VTE Pharmacologic Prophylaxis: Enoxaparin (Lovenox)  VTE Mechanical Prophylaxis: sequential compression device    Portions of the record may have been created with voice recognition software  Occasional wrong word or "sound a like" substitutions may have occurred due to the inherent limitations of voice recognition software    Read the chart carefully and recognize, using context, where substitutions have occurred   ==  [facility]  Internal Medicine Residency PGY-2

## 2022-10-22 NOTE — PHYSICAL THERAPY NOTE
Physical Therapy Treatment Note    Patient's Name: Perla Johnson  : 1945     10/22/22 1215   PT Last Visit   PT Visit Date 10/22/22   Note Type   Note Type Treatment   Pain Assessment   Pain Assessment Tool 0-10   Pain Score   (did not rate on 1/10 scale)   Pain Location/Orientation Orientation: Left; Location: Arm  (IV site)   Hospital Pain Intervention(s)   (RN notified)   Restrictions/Precautions   Weight Bearing Precautions Per Order No   Other Precautions Chair Alarm; Bed Alarm;Multiple lines; Fall Risk;Pain   General   Chart Reviewed Yes   Additional Pertinent History Vitals assessed seated in chair: BP 93/61,   RN made aware  Response to Previous Treatment Patient reporting fatigue but able to participate  Family/Caregiver Present Yes  (brother)   Subjective   Subjective Pt agreeable to mobilize  Bed Mobility   Supine to Sit 4  Minimal assistance   Additional items Assist x 1; Increased time required;Verbal cues   Sit to Supine Unable to assess   Additional Comments Pt greeted in supine  Transfers   Sit to Stand 4  Minimal assistance   Additional items Assist x 1; Increased time required;Verbal cues   Stand to Sit 4  Minimal assistance   Additional items Assist x 1; Increased time required;Verbal cues   Stand pivot 4  Minimal assistance   Additional items Assist x 1; Increased time required;Verbal cues   Additional Comments RW   Ambulation/Elevation   Gait pattern Excessively slow; Short stride; Foward flexed;Decreased foot clearance   Gait Assistance 4  Minimal assist   Additional items Assist x 1;Verbal cues; Tactile cues   Assistive Device Rolling walker   Distance 25' + 3'   Stair Management Assistance Not tested   Balance   Static Sitting Fair   Dynamic Sitting Fair -   Static Standing Poor +   Dynamic Standing Poor +   Ambulatory Poor +  (RW)   Endurance Deficit   Endurance Deficit Yes   Endurance Deficit Description gross weakness, fatigue   Activity Tolerance   Activity Tolerance Patient limited by fatigue   Medical Staff Made Aware RN Lissette Underwood   Nurse Made Aware yes - updated at end of session re: pt's c/o pain at IV site, that pt is much more fatigued than yesterday, + BP 93/61   Assessment   Prognosis Good   Problem List Decreased mobility; Decreased strength;Decreased endurance; Impaired balance;Decreased safety awareness;Pain   Assessment Pt seen for PT session w/ focus on bed mobility training, t/f training, + gait training  Pt agreeable to mobilize, understands importance of OOB mobility  Pt's brother present also + appreciative of PT to help get pt stronger  While pt motivated to get stronger, noted that he is much more fatigued than previous session, less conversant  All aspects of functional mobility excessively slow + required rest periods  Encouraged pt to do a second bout of gait training w/ chair follow  Pt agreeable but only able to walk a few steps before needing to sit down in chair again  BP 93/61,   Pt denied dizziness but again reported feeling overall extremely fatigued  Agreeable to sit in chair w/ BLE elevated  RN made aware of findings of today's session  Continue to recommend rehab upon d/c    Barriers to Discharge Inaccessible home environment   Goals   Patient Goals get stronger   PT Treatment Day 1   Plan   Treatment/Interventions Functional transfer training;LE strengthening/ROM; Elevations; Therapeutic exercise; Endurance training;Patient/family training;Bed mobility; Equipment eval/education;Gait training; Compensatory technique education;Spoke to nursing;Family  (balance training)   Progress Slow progress, decreased activity tolerance   PT Frequency 3-5x/wk   Recommendation   PT Discharge Recommendation Post acute rehabilitation services   AM-PAC Basic Mobility Inpatient   Turning in Bed Without Bedrails 3   Lying on Back to Sitting on Edge of Flat Bed 3   Moving Bed to Chair 3   Standing Up From Chair 3   Walk in Room 3   Climb 3-5 Stairs 1   Basic Mobility Inpatient Raw Score 16   Basic Mobility Standardized Score 38 32   Highest Level Of Mobility   -[facility] Goal 5: Stand one or more mins   -HLM Achieved 7: Walk 25 feet or more   Education   Education Provided Mobility training;Assistive device   Patient Demonstrates acceptance/verbal understanding;Reinforcement needed   End of Consult   Patient Position at End of Consult Bedside chair;Bed/Chair alarm activated; All needs within reach  (on waffle cushion, BLE elevated, family at bedside)     Vineet Steinberg, PT, DPT

## 2022-10-22 NOTE — CASE MANAGEMENT
Case Management Discharge Planning Note    Patient name Keyon Saunders  Location [address] [id number] MRN [id number]  : 1945 Date 10/22/2022       Current Admission Date: 10/20/2022  Current Admission Diagnosis:Ambulatory dysfunction   Patient Active Problem List    Diagnosis Date Noted   • Ambulatory dysfunction 10/20/2022   • Failure to thrive in adult 10/19/2022   • Nausea & vomiting 10/17/2022   • Severe protein-calorie malnutrition ([facility] ) 2022   • Chemotherapy induced neutropenia ([facility] ) 09/15/2022   • Migration of esophageal stent 2022   • Weakness 2022   • Anemia 2022   • Hypotension 2022   • Vitamin B12 deficiency 2022   • Port-A-Cath in place 08/10/2022   • Thrombocytopenia ([facility] ) 2022   • Dysphagia 2022   • Pancytopenia ([facility] ) 2022   • Acute gastric ulcer 2022   • Esophageal cancer ([facility] ) 2022   • Abdominal aortic aneurysm without rupture 02/10/2022   • Paroxysmal atrial fibrillation ([facility] ) 02/10/2022   • Coronary artery disease of native artery of native heart with stable angina pectoris ([facility] ) 02/10/2022   • Ischemic cardiomyopathy 2021   • Chronic combined systolic and diastolic CHF (congestive heart failure) ([facility] ) 2021   • Allergic reaction to contrast media 2021   • NSTEMI (non-ST elevated myocardial infarction) ([place] [address] ) 2021   • Abdominal pain 2020   • Hernia, incisional 2019   • Ventral hernia with obstruction and without gangrene 2019   • CAD (coronary artery disease) 2017   • HLD (hyperlipidemia) 2017   • Seizure disorder ([facility] ) 2017   • S/P AAA (abdominal aortic aneurysm) repair 2017   • STEMI (ST elevation myocardial infarction) ([facility] ) 2017      LOS (days): 1  Geometric Mean LOS (GMLOS) (days): 2 50  Days to GMLOS:1 5     OBJECTIVE:  Risk of Unplanned Readmission Score: 30 43         Current admission status: Inpatient   Preferred Pharmacy:   [id number]- [place], [address] [address]  Phone: [phone] Fax: Humaira Guillermo [place] [address] [facility] [place] [facility] [address] [address]  Phone: [phone] Fax: [phone]    Primary Care Provider: Luis Gomez MD    Primary Insurance: MEDICARE  Secondary Insurance: [id number]    DISCHARGE DETAILS:         Other Referral/Resources/Interventions Provided:  Interventions: Short Term Rehab  Referral Comments: met with pt at bedside, reviewed accepting STR facilities and provided Patient choice list for [facility]  Pt states he prefers bed offer at [facility]  Reserved and send Deedee Morton a message via [address] as to when they can accomodate the pt  offered to call family for the pt, pt declined and sais his brother just left and he will be calling him later and will update him  Patient choice list left at bedside for pt to review           Treatment Team Recommendation: Short Term Rehab [facility])  Discharge Destination Plan[de-identified] Short Term Rehab ([facility])

## 2022-10-23 LAB
ANION GAP SERPL CALCULATED.3IONS-SCNC: 7 MMOL/L (ref 4–13)
BUN SERPL-MCNC: 15 MG/DL (ref 5–25)
CALCIUM SERPL-MCNC: 8.1 MG/DL (ref 8.3–10.1)
CHLORIDE SERPL-SCNC: 107 MMOL/L (ref 96–108)
CO2 SERPL-SCNC: 23 MMOL/L (ref 21–32)
CREAT SERPL-MCNC: 0.61 MG/DL (ref 0.6–1.3)
ERYTHROCYTE [DISTWIDTH] IN BLOOD BY AUTOMATED COUNT: 24.7 % (ref 11.6–15.1)
GFR SERPL CREATININE-BSD FRML MDRD: 96 ML/MIN/1.73SQ M
GLUCOSE SERPL-MCNC: 96 MG/DL (ref 65–140)
HCT VFR BLD AUTO: 31.8 % (ref 36.5–49.3)
HGB BLD-MCNC: 9.9 G/DL (ref 12–17)
MCH RBC QN AUTO: 32.5 PG (ref 26.8–34.3)
MCHC RBC AUTO-ENTMCNC: 31.1 G/DL (ref 31.4–37.4)
MCV RBC AUTO: 104 FL (ref 82–98)
PLATELET # BLD AUTO: 73 THOUSANDS/UL (ref 149–390)
POTASSIUM SERPL-SCNC: 3.4 MMOL/L (ref 3.5–5.3)
RBC # BLD AUTO: 3.05 MILLION/UL (ref 3.88–5.62)
SARS-COV-2 RNA RESP QL NAA+PROBE: NEGATIVE
SODIUM SERPL-SCNC: 137 MMOL/L (ref 135–147)
WBC # BLD AUTO: 4.66 THOUSAND/UL (ref 4.31–10.16)

## 2022-10-23 PROCEDURE — 99232 SBSQ HOSP IP/OBS MODERATE 35: CPT | Performed by: INTERNAL MEDICINE

## 2022-10-23 PROCEDURE — U0005 INFEC AGEN DETEC AMPLI PROBE: HCPCS | Performed by: STUDENT IN AN ORGANIZED HEALTH CARE EDUCATION/TRAINING PROGRAM

## 2022-10-23 PROCEDURE — 80048 BASIC METABOLIC PNL TOTAL CA: CPT | Performed by: STUDENT IN AN ORGANIZED HEALTH CARE EDUCATION/TRAINING PROGRAM

## 2022-10-23 PROCEDURE — 85027 COMPLETE CBC AUTOMATED: CPT | Performed by: STUDENT IN AN ORGANIZED HEALTH CARE EDUCATION/TRAINING PROGRAM

## 2022-10-23 PROCEDURE — U0003 INFECTIOUS AGENT DETECTION BY NUCLEIC ACID (DNA OR RNA); SEVERE ACUTE RESPIRATORY SYNDROME CORONAVIRUS 2 (SARS-COV-2) (CORONAVIRUS DISEASE [COVID-19]), AMPLIFIED PROBE TECHNIQUE, MAKING USE OF HIGH THROUGHPUT TECHNOLOGIES AS DESCRIBED BY CMS-2020-01-R: HCPCS | Performed by: STUDENT IN AN ORGANIZED HEALTH CARE EDUCATION/TRAINING PROGRAM

## 2022-10-23 RX ORDER — POTASSIUM CHLORIDE 20 MEQ/1
40 TABLET, EXTENDED RELEASE ORAL ONCE
Status: COMPLETED | OUTPATIENT
Start: 2022-10-23 | End: 2022-10-23

## 2022-10-23 RX ADMIN — PANTOPRAZOLE SODIUM 40 MG: 40 TABLET, DELAYED RELEASE ORAL at 05:06

## 2022-10-23 RX ADMIN — PHENOBARBITAL 64.8 MG: 64.8 TABLET ORAL at 18:39

## 2022-10-23 RX ADMIN — ASPIRIN 81 MG: 81 TABLET, COATED ORAL at 08:59

## 2022-10-23 RX ADMIN — CARBIDOPA AND LEVODOPA 2.5 MG: 50; 200 TABLET, EXTENDED RELEASE ORAL at 05:06

## 2022-10-23 RX ADMIN — ENOXAPARIN SODIUM 40 MG: 40 INJECTION SUBCUTANEOUS at 08:59

## 2022-10-23 RX ADMIN — POTASSIUM CHLORIDE 40 MEQ: 1500 TABLET, EXTENDED RELEASE ORAL at 13:40

## 2022-10-23 RX ADMIN — CARBIDOPA AND LEVODOPA 2.5 MG: 50; 200 TABLET, EXTENDED RELEASE ORAL at 18:39

## 2022-10-23 RX ADMIN — CARBIDOPA AND LEVODOPA 2.5 MG: 50; 200 TABLET, EXTENDED RELEASE ORAL at 13:41

## 2022-10-23 RX ADMIN — PHENOBARBITAL 64.8 MG: 64.8 TABLET ORAL at 08:59

## 2022-10-23 RX ADMIN — ATORVASTATIN CALCIUM 80 MG: 80 TABLET, FILM COATED ORAL at 18:38

## 2022-10-23 NOTE — PROGRESS NOTES
INTERNAL MEDICINE RESIDENCY PROGRESS NOTE     Name: Keyon Saunders   Age & Sex: 67 y o  male   MRN: [id number]  Unit/Bed#: -01   Encounter: [id number]  Team: SOD Team A    PATIENT INFORMATION     Name: Keyon Saunders   Age & Sex: 67 y o  male   MRN: [id number]  Hospital Stay Days: 2    ASSESSMENT/PLAN     Principal Problem:    Ambulatory dysfunction  Active Problems:    Seizure disorder ([facility] )    Chronic combined systolic and diastolic CHF (congestive heart failure) ([facility])    Paroxysmal atrial fibrillation ([facility] )    Coronary artery disease of native artery of native heart with stable angina pectoris ([facility] )    Esophageal cancer ([facility] )    Weakness    Severe protein-calorie malnutrition ([facility] )    Failure to thrive in adult      * Ambulatory dysfunction  Assessment & Plan  Patient presented to hospital initially on 10/18 for failure to thrive in an adult  He was discharged the afternoon of 10/20  He fell after stepping up his front door and experienced minor injuries  No reported loss of consciousness  Bystander assisted helping him up  Patient is experiencing ambulatory dysfunction and weakness  Given stairways present in home; concern for future fall risk    Plan:  · Consult PT for assessment of patient's ability to climb stairs   · Patient will most likely need post acute rehab  · Pending placement    Failure to thrive in adult  Assessment & Plan  Patient endorsed improved appetite today but continues to feel weak  Concern for severe protein malnutrition due to poor PO intake      Plan  - regular diet  - Started mirtazapine 7 5mg qHS    Weakness  Assessment & Plan  Pt continues to feel weak, and failed to walk up the steps near his home  PT/OT consulted and pending placement for SNF     Plan  SNF Rehab     Esophageal cancer [facility])  Assessment & Plan  Hx stage II B esophageal cancer diagnosed in May 2022   Has recently completed  carboplatin pack with taxol chemotherapy as well as radiotherapy  Follows outpatient with heme/onc  Cancer treatment is likely contributing to his nausea and vomiting  Plan:  Continue outpatient follow-up with Heme-Onc    Coronary artery disease of native artery of native heart with stable angina pectoris [facility])  Assessment & Plan  Patient with a history of CAD status post AGUSTIN x4, has follow-up with cardiology as an outpatient   Most recently completed 6 months of dual antiplatelet therapy   Currently on baby aspirin and atorvastatin daily   No chest pain        Plan  · Continue aspirin, statin for secondary prevention      Paroxysmal atrial fibrillation ([facility] )  Assessment & Plan  Documented Hx of paroxysmal atrial fibrillation, Omi Vasc of 5  Previously on warfarin for left apical thrombus, however has since been discontinued by his cardiologist   Last seen by outpatient Cardiology 06/2022  Currently normal sinus in the ED      Plan:  · Patient not currently on any home beta-blocker regiment  · F/u outpatient      Chronic combined systolic and diastolic CHF (congestive heart failure) ([facility] )  Assessment & Plan  Wt Readings from Last 3 Encounters:   10/04/22 76 4 kg (168 lb 6 9 oz)   09/30/22 77 6 kg (171 lb)   09/20/22 81 6 kg (180 lb)     Documented history of chronic combined systolic and diastolic heart failure   Last echo performed 03/15/2022 showed LVEF 50% with G1 DD, apical akinesis, T BMP with mild regurg  Patient not fluid overloaded on exam      Plan:  · Continue to monitor        Seizure disorder [facility])  Assessment & Plan  Remote history of seizure disorder diagnosed 40 years ago  Patient currently takes phenobarbital 64 8 mg BID      Plan:   -Continue phenobarbital 64 8 mg BID       Disposition: pending placement     SUBJECTIVE     Patient seen and examined  No acute events overnight  Denies any chest pain, SOB,abdominal lisseth, nausea, vomiting, fever or chills  Does want to get up and move around more  Tolerating diet       OBJECTIVE     Vitals:    10/22/22 1510 10/22/22 1700 10/22/22 2307 10/23/22 0739   BP: 96/61  94/60 94/60   BP Location: Right arm  Right arm    Pulse: 94   93   Resp:   17 17   Temp: 99 5 °F (37 5 °C)  99 2 °F (37 3 °C) 98 3 °F (36 8 °C)   TempSrc: Oral  Oral    SpO2: 95% 95%  95%   Weight:       Height:          Temperature:   Temp (24hrs), Av °F (37 2 °C), Min:98 3 °F (36 8 °C), Max:99 5 °F (37 5 °C)    Temperature: 98 3 °F (36 8 °C)  Intake & Output:  I/O       10/21 0701  10/22 0700 10/22 0701  10/23 0700 10/23 0701  10/24 07    P  O  180 418     Total Intake(mL/kg) 180 (2 4) 418 (5 6)     Urine (mL/kg/hr) 2 (0) 525 (0 3)     Stool 0      Total Output 2 525     Net +178 -107            Unmeasured Urine Occurrence 4 x      Unmeasured Stool Occurrence 1 x          Weights:   IBW (Ideal Body Weight): 77 6 kg    Body mass index is 22 42 kg/m²  Weight (last 2 days)     Date/Time Weight    10/22/22 0500 75 (165 35)        Physical Exam  Constitutional:       General: He is not in acute distress  Appearance: He is not ill-appearing, toxic-appearing or diaphoretic  HENT:      Head: Normocephalic and atraumatic  Cardiovascular:      Rate and Rhythm: Normal rate and regular rhythm  Pulses: Normal pulses  Heart sounds: Normal heart sounds  No murmur heard  No friction rub  No gallop  Pulmonary:      Effort: Pulmonary effort is normal  No respiratory distress  Breath sounds: Normal breath sounds  No rales  Chest:      Chest wall: No tenderness  Abdominal:      General: Bowel sounds are normal       Tenderness: There is no right CVA tenderness or left CVA tenderness  Musculoskeletal:      Right lower leg: No edema  Left lower leg: No edema  Neurological:      General: No focal deficit present  Mental Status: He is oriented to person, place, and time  Psychiatric:         Mood and Affect: Mood normal          Behavior: Behavior normal        LABORATORY DATA     Labs: I have personally reviewed pertinent reports    Results from last 7 days   Lab Units 10/23/22  0506 10/21/22  0518 10/20/22  2027 10/20/22  0158 10/20/22  0157 10/19/22  0714 10/18/22  0642   WBC Thousand/uL 4 66 4 09*  --  3 94*  --  3 35* 4 35   HEMOGLOBIN g/dL 9 9* 10 0*  --  9 0*   < > 8 1* 8 8*   HEMATOCRIT % 31 8* 30 4*  --  27 9*   < > 25 1* 26 7*   PLATELETS Thousands/uL 73* 87* 89* 90*  --  78* 96*   NEUTROS PCT %  --  73  --  66  --   --  73   MONOS PCT %  --  10  --  15*  --   --  13*   MONO PCT %  --   --   --   --   --  6  --     < > = values in this interval not displayed  Results from last 7 days   Lab Units 10/23/22  0506 10/21/22  0518 10/20/22  0158   POTASSIUM mmol/L 3 4* 3 6 3 3*   CHLORIDE mmol/L 107 107 107   CO2 mmol/L 23 25 28   BUN mg/dL 15 9 7   CREATININE mg/dL 0 61 0 60 0 54*   CALCIUM mg/dL 8 1* 7 9* 7 8*     Results from last 7 days   Lab Units 10/18/22  0642   MAGNESIUM mg/dL 1 8     Results from last 7 days   Lab Units 10/18/22  0642   PHOSPHORUS mg/dL 2 1*                    IMAGING & DIAGNOSTIC TESTING     Radiology Results: I have personally reviewed pertinent reports  No results found  Other Diagnostic Testing: I have personally reviewed pertinent reports      ACTIVE MEDICATIONS     Current Facility-Administered Medications   Medication Dose Route Frequency   • aspirin (ECOTRIN LOW STRENGTH) EC tablet 81 mg  81 mg Oral Daily   • atorvastatin (LIPITOR) tablet 80 mg  80 mg Oral Daily With Dinner   • enoxaparin (LOVENOX) subcutaneous injection 40 mg  40 mg Subcutaneous Daily   • midodrine (PROAMATINE) tablet 2 5 mg  2 5 mg Oral TID AC   • mirtazapine (REMERON) tablet 7 5 mg  7 5 mg Oral HS   • ondansetron (ZOFRAN-ODT) dispersible tablet 8 mg  8 mg Oral Q8H PRN   • pantoprazole (PROTONIX) EC tablet 40 mg  40 mg Oral Early Morning   • PHENobarbital tablet 64 8 mg  64 8 mg Oral BID       VTE Pharmacologic Prophylaxis: Enoxaparin (Lovenox)  VTE Mechanical Prophylaxis: sequential compression device    Portions of the record may have been created with voice recognition software  Occasional wrong word or "sound a like" substitutions may have occurred due to the inherent limitations of voice recognition software    Read the chart carefully and recognize, using context, where substitutions have occurred   ==  [facility]  Internal Medicine Residency PGY-3

## 2022-10-23 NOTE — ED ATTENDING ATTESTATION
10/20/2022  IPriscilla MD, saw and evaluated the patient  I have discussed the patient with the resident/non-physician practitioner and agree with the resident's/non-physician practitioner's findings, Plan of Care, and MDM as documented in the resident's/non-physician practitioner's note, except where noted  All available labs and Radiology studies were reviewed  I was present for key portions of any procedure(s) performed by the resident/non-physician practitioner and I was immediately available to provide assistance  At this point I agree with the current assessment done in the Emergency Department  I have conducted an independent evaluation of this patient a history and physical is as follows:    ED Course    58-year-old male presents with generalized weakness at home upon discharge possible today  Patient does admit to having another fall denies any other injuries  Patient offers no complaints at this time  Vital signs reviewed  Impression:  Generalized weakness frequent falls  Discussed plan of care patient will readmit patient to medicine service for PT eval possible rehab placement        Critical Care Time  Procedures

## 2022-10-23 NOTE — CASE MANAGEMENT
Case Management Discharge Planning Note    Patient name Radha Saldana  Location [address] [id number] MRN [id number]  : 1945 Date 10/23/2022       Current Admission Date: 10/20/2022  Current Admission Diagnosis:Ambulatory dysfunction   Patient Active Problem List    Diagnosis Date Noted   • Ambulatory dysfunction 10/20/2022   • Failure to thrive in adult 10/19/2022   • Nausea & vomiting 10/17/2022   • Severe protein-calorie malnutrition ([facility] ) 2022   • Chemotherapy induced neutropenia ([facility] ) 09/15/2022   • Migration of esophageal stent 2022   • Weakness 2022   • Anemia 2022   • Hypotension 2022   • Vitamin B12 deficiency 2022   • Port-A-Cath in place 08/10/2022   • Thrombocytopenia ([facility] ) 2022   • Dysphagia 2022   • Pancytopenia ([facility] ) 2022   • Acute gastric ulcer 2022   • Esophageal cancer ([place] [address] ) 2022   • Abdominal aortic aneurysm without rupture 02/10/2022   • Paroxysmal atrial fibrillation ([place] [address] ) 02/10/2022   • Coronary artery disease of native artery of native heart with stable angina pectoris ([facility] ) 02/10/2022   • Ischemic cardiomyopathy 2021   • Chronic combined systolic and diastolic CHF (congestive heart failure) ([place] [address] ) 2021   • Allergic reaction to contrast media 2021   • NSTEMI (non-ST elevated myocardial infarction) ([place] [address] ) 2021   • Abdominal pain 2020   • Hernia, incisional 2019   • Ventral hernia with obstruction and without gangrene 2019   • CAD (coronary artery disease) 2017   • HLD (hyperlipidemia) 2017   • Seizure disorder ([facility] ) 2017   • S/P AAA (abdominal aortic aneurysm) repair 2017   • STEMI (ST elevation myocardial infarction) ([facility] ) 2017      LOS (days): 2  Geometric Mean LOS (GMLOS) (days): 2 50  Days to GMLOS:0 4     OBJECTIVE:  Risk of Unplanned Readmission Score: 30 86         Current admission status: Inpatient   Preferred Pharmacy:   [facility] #[id number]- [place], [address] [address]  Phone: [phone] Fax: Jimbo Guillermo  Sri Renee [address] [place] [address] [facility] [address]  Phone: [phone] Fax: [phone]    Primary Care Provider: Lennie Singer MD    Primary Insurance: MEDICARE  Secondary Insurance: [id number]    DISCHARGE DETAILS:    Discharge planning discussed with[de-identified] patient  Freedom of Choice: Yes  Comments - Freedom of Choice: [facility]  CM contacted family/caregiver?: No- see comments (pt makes own decisions)                            Other Referral/Resources/Interventions Provided:  Referral Comments: [facility] can accept pt w COVID result, ordered and pending  CM communicated w facility liaison Nat Purdy, requesting pt transport be set up for Monday AM  CM requested transport via [facility], 10am, pending confirmation in system  ETA of Transport (Date): 10/24/22                 IMM Given (Date):: 10/23/22  IMM Given to[de-identified] Patient (placed in MR bin for filing)     Additional Comments: Provider updated  Pt agreeable to dc and transfer to [facility] for [employer]

## 2022-10-24 VITALS
TEMPERATURE: 98.6 F | WEIGHT: 165.34 LBS | HEIGHT: 72 IN | SYSTOLIC BLOOD PRESSURE: 96 MMHG | HEART RATE: 86 BPM | RESPIRATION RATE: 15 BRPM | BODY MASS INDEX: 22.4 KG/M2 | DIASTOLIC BLOOD PRESSURE: 65 MMHG | OXYGEN SATURATION: 98 %

## 2022-10-24 PROBLEM — R53.1 WEAKNESS: Status: RESOLVED | Noted: 2022-09-08 | Resolved: 2022-10-24

## 2022-10-24 PROBLEM — E43 SEVERE PROTEIN-CALORIE MALNUTRITION (HCC): Status: ACTIVE | Noted: 2022-10-24

## 2022-10-24 PROBLEM — E87.6 HYPOKALEMIA: Status: ACTIVE | Noted: 2022-10-24

## 2022-10-24 PROBLEM — E43 SEVERE PROTEIN-CALORIE MALNUTRITION (HCC): Status: RESOLVED | Noted: 2022-09-23 | Resolved: 2022-10-24

## 2022-10-24 LAB
ANION GAP SERPL CALCULATED.3IONS-SCNC: 5 MMOL/L (ref 4–13)
BASOPHILS # BLD AUTO: 0.03 THOUSANDS/ÂΜL (ref 0–0.1)
BASOPHILS NFR BLD AUTO: 1 % (ref 0–1)
BUN SERPL-MCNC: 14 MG/DL (ref 5–25)
CALCIUM SERPL-MCNC: 8.1 MG/DL (ref 8.3–10.1)
CHLORIDE SERPL-SCNC: 107 MMOL/L (ref 96–108)
CO2 SERPL-SCNC: 26 MMOL/L (ref 21–32)
CREAT SERPL-MCNC: 0.59 MG/DL (ref 0.6–1.3)
EOSINOPHIL # BLD AUTO: 0.44 THOUSAND/ÂΜL (ref 0–0.61)
EOSINOPHIL NFR BLD AUTO: 11 % (ref 0–6)
ERYTHROCYTE [DISTWIDTH] IN BLOOD BY AUTOMATED COUNT: 24.5 % (ref 11.6–15.1)
FUNGAL BLOOD CULTURE: NORMAL
GFR SERPL CREATININE-BSD FRML MDRD: 97 ML/MIN/1.73SQ M
GLUCOSE SERPL-MCNC: 93 MG/DL (ref 65–140)
HCT VFR BLD AUTO: 31.6 % (ref 36.5–49.3)
HGB BLD-MCNC: 10.3 G/DL (ref 12–17)
IMM GRANULOCYTES # BLD AUTO: 0.03 THOUSAND/UL (ref 0–0.2)
IMM GRANULOCYTES NFR BLD AUTO: 1 % (ref 0–2)
LYMPHOCYTES # BLD AUTO: 0.29 THOUSANDS/ÂΜL (ref 0.6–4.47)
LYMPHOCYTES NFR BLD AUTO: 7 % (ref 14–44)
MCH RBC QN AUTO: 33.4 PG (ref 26.8–34.3)
MCHC RBC AUTO-ENTMCNC: 32.6 G/DL (ref 31.4–37.4)
MCV RBC AUTO: 103 FL (ref 82–98)
MONOCYTES # BLD AUTO: 0.36 THOUSAND/ÂΜL (ref 0.17–1.22)
MONOCYTES NFR BLD AUTO: 9 % (ref 4–12)
NEUTROPHILS # BLD AUTO: 2.84 THOUSANDS/ÂΜL (ref 1.85–7.62)
NEUTS SEG NFR BLD AUTO: 71 % (ref 43–75)
NRBC BLD AUTO-RTO: 0 /100 WBCS
PLATELET # BLD AUTO: 77 THOUSANDS/UL (ref 149–390)
POTASSIUM SERPL-SCNC: 3.1 MMOL/L (ref 3.5–5.3)
RBC # BLD AUTO: 3.08 MILLION/UL (ref 3.88–5.62)
SODIUM SERPL-SCNC: 138 MMOL/L (ref 135–147)
WBC # BLD AUTO: 3.99 THOUSAND/UL (ref 4.31–10.16)

## 2022-10-24 PROCEDURE — 85025 COMPLETE CBC W/AUTO DIFF WBC: CPT | Performed by: INTERNAL MEDICINE

## 2022-10-24 PROCEDURE — 99238 HOSP IP/OBS DSCHRG MGMT 30/<: CPT | Performed by: INTERNAL MEDICINE

## 2022-10-24 PROCEDURE — 80048 BASIC METABOLIC PNL TOTAL CA: CPT | Performed by: INTERNAL MEDICINE

## 2022-10-24 RX ORDER — POTASSIUM CHLORIDE 20 MEQ/1
40 TABLET, EXTENDED RELEASE ORAL ONCE
Status: COMPLETED | OUTPATIENT
Start: 2022-10-24 | End: 2022-10-24

## 2022-10-24 RX ADMIN — CARBIDOPA AND LEVODOPA 2.5 MG: 50; 200 TABLET, EXTENDED RELEASE ORAL at 12:01

## 2022-10-24 RX ADMIN — PHENOBARBITAL 64.8 MG: 64.8 TABLET ORAL at 09:07

## 2022-10-24 RX ADMIN — ASPIRIN 81 MG: 81 TABLET, COATED ORAL at 09:07

## 2022-10-24 RX ADMIN — ENOXAPARIN SODIUM 40 MG: 40 INJECTION SUBCUTANEOUS at 09:07

## 2022-10-24 RX ADMIN — POTASSIUM CHLORIDE 40 MEQ: 1500 TABLET, EXTENDED RELEASE ORAL at 09:07

## 2022-10-24 RX ADMIN — PANTOPRAZOLE SODIUM 40 MG: 40 TABLET, DELAYED RELEASE ORAL at 05:23

## 2022-10-24 RX ADMIN — CARBIDOPA AND LEVODOPA 2.5 MG: 50; 200 TABLET, EXTENDED RELEASE ORAL at 05:23

## 2022-10-24 NOTE — DISCHARGE SUMMARY
INTERNAL MEDICINE RESIDENCY DISCHARGE SUMMARY     Tata Hu   67 y o  male  MRN: [id number]  Room/Bed: /-94     [facility]    Encounter: [id number]    Principal Problem:    Ambulatory dysfunction secondary to weakness  Active Problems:    Failure to thrive in adult secondary to protein calorie malnutrition    Esophageal cancer (HCC)    Seizure disorder (HCC)    Chronic combined systolic and diastolic CHF (congestive heart failure) ([facility])    Paroxysmal atrial fibrillation ([address] )    Coronary artery disease of native artery of native heart with stable angina pectoris ([facility] )    Severe protein-calorie malnutrition ([facility] )    Hypokalemia      * Ambulatory dysfunction secondary to weakness  Assessment & Plan  Patient presented to hospital initially on 10/18 for failure to thrive in an adult  He was discharged the afternoon of 10/20  He fell after stepping up his front door and experienced minor injuries  No reported loss of consciousness  Bystander assisted helping him up  Patient is experiencing ambulatory dysfunction and weakness  Given stairways present in home; concern for future fall risk    Plan:  · Patient will need post acute rehab per PT  · Plan to transfer patient to [facility] facility    Failure to thrive in adult secondary to protein calorie malnutrition  Assessment & Plan  Patient endorsed improved appetite today but continues to feel weak  Concern for severe protein malnutrition due to poor PO intake  Patient reports 18% weight loss since July 2022, <75% est needs energy intake > 1month  Secondary to malignancy and cancer treatment     Plan  - regular diet with nutritional supplements with Ensure  - will continue mirtazapine 7 5mg qHS    Esophageal cancer ([facility] )  Assessment & Plan  Hx stage II B esophageal cancer diagnosed in May 2022   Has recently completed  carboplatin pack with taxol chemotherapy as well as radiotherapy  Follows outpatient with heme/onc  Cancer treatment is likely contributing to his nausea and vomiting  Plan:  Continue outpatient follow-up with Heme-Onc and surgery     Coronary artery disease of native artery of native heart with stable angina pectoris [facility])  Assessment & Plan  Patient with a history of CAD status post AGUSTIN x4, has follow-up with cardiology as an outpatient   Most recently completed 6 months of dual antiplatelet therapy   Currently on baby aspirin and atorvastatin daily   No chest pain        Plan  · Continue aspirin, statin for secondary prevention      Paroxysmal atrial fibrillation ([address] )  Assessment & Plan  Documented Hx of paroxysmal atrial fibrillation, Omi Vasc of 5  Previously on warfarin for left apical thrombus, however has since been discontinued by his cardiologist   Last seen by outpatient Cardiology 06/2022  Currently normal sinus in the ED      Plan:  · Patient not currently on any home beta-blocker regiment  · F/u outpatient      Chronic combined systolic and diastolic CHF (congestive heart failure) ([facility])  Assessment & Plan  Wt Readings from Last 3 Encounters:   10/22/22 75 kg (165 lb 5 5 oz)   10/04/22 76 4 kg (168 lb 6 9 oz)   09/30/22 77 6 kg (171 lb)     Documented history of chronic combined systolic and diastolic heart failure   Last echo performed 03/15/2022 showed LVEF 50% with G1 DD, apical akinesis, T BMP with mild regurg  Patient not fluid overloaded on exam      Plan:  · Continue to monitor and outpt cardiology follow up        Seizure disorder [facility])  Assessment & Plan  Remote history of seizure disorder diagnosed 40 years ago  Patient currently takes phenobarbital 64 8 mg BID      Plan:   -Continue phenobarbital 64 8 mg BID     Hypokalemia  Assessment & Plan  Given patient's history of hypokalemia in patient, will reassess his potassium in 1 week with a BMP and consider regular p o  supplementation     [address] COURSE     The patient is a 58-year-old male with history of stage II esophageal carcinoma was finished paclitaxel and carboplatin and radiation status post palliative stent, HFpEF, CAD status post 4 drug-eluting stents, hyperlipidemia, AFib, and distant history of seizures on chronic barbiturates  He presented 7 days ago for 5 day history of progressive weakness and difficulty ambulating with nausea and vomiting and decreased p o  Intake which he attributed to chemotherapy  He also reported 2 falls at home with no loss conscious or head strike her seizures  Patient also reported 30 lb unintentional weight loss since July 2022  Patient was found to have failure to thrive with generalized weakness and ambulatory dysfunction likely secondary to malignancy and cancer therapy  Patient was discharged on 10/20/2022 and readmitted the same day because he could not walk up stairs of his home and re-presented the EMS  PT was consulted this admission recommended post acute rehab  Patient was found to be hypokalemic and required multiple repletion of potassium  Patient was also started on Remeron 7 5 hs given his decreased appetite  Patient is currently stable for discharge  He reports improving appetite and weakness  He denies any nausea, vomiting, diarrhea, constipation, and urinary retention  He was having minimal urine output a concern was raised of urinary retention today as he had not urinated and was found to have 350 mL on bladder scan  Patient voided shortly after without difficulty He is agreeable to go to rehab facility  Patient will be discharged to a rehab facility given his progressive weakness and difficulty ambulating  Patient will also require follow-up outpatient for esophageal cancer with heme Onc, surgery, and GI  Given patient's history of hypokalemia in patient, will reassess his potassium in 1 week with a BMP and consider regular p o  supplementation  Physical Exam  Constitutional:       General: He is not in acute distress       Appearance: Normal appearance  He is normal weight  He is not ill-appearing or toxic-appearing  HENT:      Head: Normocephalic and atraumatic  Cardiovascular:      Rate and Rhythm: Normal rate and regular rhythm  Pulmonary:      Effort: Pulmonary effort is normal  No respiratory distress  Breath sounds: No wheezing or rales  Abdominal:      General: Abdomen is flat  There is no distension  Palpations: There is no mass  Tenderness: There is no abdominal tenderness  There is no guarding  Hernia: A hernia is present  Musculoskeletal:      Right lower leg: No edema  Left lower leg: No edema  Neurological:      Mental Status: He is alert and oriented to person, place, and time        Comments: Sensory deficient of distal lower extremities digits   Psychiatric:         Mood and Affect: Mood normal          Behavior: Behavior normal            DISCHARGE INFORMATION     PCP at Discharge: Bess Pina    Admitting Provider: Fabio Fothergill, MD  Admission Date: 10/20/2022    Discharge Provider: No att  providers found  Discharge Date: 10/24/22    Discharge Disposition: Non [facility] Acute Rehab  Discharge Condition: stable  Discharge with Lines: no    Discharge Diet: cardiac diet  Activity Restrictions: as tolerated  Test Results Pending at Discharge: Bmp and 1 week followup outpatient    Discharge Diagnoses:  Principal Problem:    Ambulatory dysfunction secondary to weakness  Active Problems:    Failure to thrive in adult secondary to protein calorie malnutrition    Esophageal cancer (HCC)    Seizure disorder (HCC)    Chronic combined systolic and diastolic CHF (congestive heart failure) (HCC)    Paroxysmal atrial fibrillation ([address] )    Coronary artery disease of native artery of native heart with stable angina pectoris ([address] )    Severe protein-calorie malnutrition ([address] )    Hypokalemia  Resolved Problems:    Weakness    Severe protein-calorie malnutrition ([address] )      Consulting Providers:      Diagnostic & Therapeutic Procedures Performed:  No results found  Code Status: Level 3 - DNAR and DNI  Advance Directive & Living Will: <no information>  Power of :    POLST:      Medications:  Current Discharge Medication List        Current Discharge Medication List        Current Discharge Medication List      CONTINUE these medications which have NOT CHANGED    Details   aspirin (Aspirin Low Dose) 81 mg EC tablet Take 1 tablet (81 mg total) by mouth daily  Qty: 90 tablet, Refills: 3    Associated Diagnoses: Chronic combined systolic and diastolic CHF (congestive heart failure) ([place] [address] ); NSTEMI (non-ST elevated myocardial infarction) ([place] [address] ); Essential hypertension; Seizure disorder (HCC)      atorvastatin (LIPITOR) 80 mg tablet Take 1 tablet (80 mg total) by mouth daily with dinner  Qty: 90 tablet, Refills: 4    Comments: DX Code Needed    Associated Diagnoses: Chronic combined systolic and diastolic CHF (congestive heart failure) ([place] [address] ); NSTEMI (non-ST elevated myocardial infarction) ([place] [address] ); Essential hypertension; Seizure disorder (HCC)      midodrine (PROAMATINE) 2 5 mg tablet TAKE 1 TABLET (2 5 MG TOTAL) BY MOUTH 3 (THREE) TIMES A DAY BEFORE MEALS  Qty: 270 tablet, Refills: 1    Associated Diagnoses: Hypotension      mirtazapine (REMERON) 7 5 MG tablet Take 1 tablet (7 5 mg total) by mouth daily at bedtime  Qty: 30 tablet, Refills: 0    Associated Diagnoses: Failure to thrive in adult      ondansetron (Zofran ODT) 8 mg disintegrating tablet Take 1 tablet (8 mg total) by mouth every 8 (eight) hours as needed for nausea or vomiting  Qty: 30 tablet, Refills: 3    Associated Diagnoses: Esophageal adenocarcinoma (HCC)      pantoprazole (PROTONIX) 40 mg tablet TAKE 1 TABLET BY MOUTH 2 TIMES A DAY BEFORE MEALS    Qty: 180 tablet, Refills: 3    Associated Diagnoses: Esophagitis determined by endoscopy; Duodenitis      PHENobarbital 64 8 mg tablet TAKE 1 TABLET (64 8 MG TOTAL) BY MOUTH 2 (TWO) TIMES A DAY  Qty: 180 tablet, Refills: 1    Comments: This request is for a new prescription for a controlled substance as required by Federal/State law  Associated Diagnoses: Seizure disorder ([facility] )             Allergies: Allergies   Allergen Reactions   • Iodinated Diagnostic Agents Rash     Pt's skin get very red and he gets hot and chills   • Clopidogrel Rash       FOLLOW-UP     PCP Outpatient Follow-up:  yes  [address]    Consulting Providers Follow-up:  none required     Active Issues Requiring Follow-up:   Esophageal cancer, failure to thrive, hypokalemia, CHF    Discharge Statement:   I spent 45 minutes minutes discharging the patient  This time was spent on the day of discharge  I had direct contact with the patient on the day of discharge  Additional documentation is required if more than 30 minutes were spent on discharge  Portions of the record may have been created with voice recognition software  Occasional wrong word or "sound a like" substitutions may have occurred due to the inherent limitations of voice recognition software    Read the chart carefully and recognize, using context, where substitutions have occurred     ==  Tracie LENZ [address] [facility]  Internal Medicine Resident PGY-1

## 2022-10-24 NOTE — PROGRESS NOTES
Pastoral Care Progress Note    10/24/2022  Patient: Owen Hall : 1945  Admission Date & Time: 10/20/2022 1701  MRN: [id number] [facility]: [id number]           10/24/22 1500   Clinical Encounter Type   Visited With Patient   Routine Visit Follow-up   Islam Encounters   Islam Needs Prayer   Sacramental Encounters   Communion Given Indicator Yes     Grisel Cerna visited patient, and offered him prayer and blessings  The patient also received Holy Communion  No other needs were expressed at this time

## 2022-10-24 NOTE — DISCHARGE INSTR - AVS FIRST PAGE
Your hospitalized for weakness causing difficulty with activities of daily living    Please continue taking medications as directed    Please complete lab work and follow-up with her PCP after in 1 week

## 2022-10-24 NOTE — ASSESSMENT & PLAN NOTE
Given patient's history of hypokalemia in patient, will reassess his potassium in 1 week with a BMP and consider regular p o  supplementation

## 2022-10-25 ENCOUNTER — PATIENT OUTREACH (OUTPATIENT)
Dept: HEMATOLOGY ONCOLOGY | Facility: CLINIC | Age: 77
End: 2022-10-25

## 2022-10-25 ENCOUNTER — HOSPITAL ENCOUNTER (OUTPATIENT)
Dept: RADIOLOGY | Facility: HOSPITAL | Age: 77
Discharge: HOME | End: 2022-10-25
Attending: INTERNAL MEDICINE

## 2022-10-25 ENCOUNTER — TRANSITIONAL CARE MANAGEMENT (OUTPATIENT)
Dept: INTERNAL MEDICINE CLINIC | Facility: CLINIC | Age: 77
End: 2022-10-25

## 2022-10-25 NOTE — PROGRESS NOTES
Pt was discharged to [facility] for rehab  He is currently not in any active treatment  At this time there are no issues that this MSW is working on with the pt and therefore this case will be closed  If the pt has OSW needs moving forward, another order for social work can be placed at that time

## 2022-10-28 ENCOUNTER — TELEPHONE (OUTPATIENT)
Dept: HEMATOLOGY ONCOLOGY | Facility: CLINIC | Age: 77
End: 2022-10-28

## 2022-10-28 NOTE — TELEPHONE ENCOUNTER
10/28/22 spoke to pts brother regarding pts upcoming appt  He stated that his brother lives in a nursing home and that he does not know any information on him

## 2022-10-31 ENCOUNTER — TELEPHONE (OUTPATIENT)
Dept: HEMATOLOGY ONCOLOGY | Facility: CLINIC | Age: 77
End: 2022-10-31

## 2022-10-31 LAB — FUNGAL BLOOD CULTURE: NORMAL

## 2022-10-31 NOTE — TELEPHONE ENCOUNTER
10/31/22 Spoke with charge nurse at [facility] and she was not aware of Giovanni's requested scans  She will call us later today to reschedule his appt

## 2022-10-31 NOTE — TELEPHONE ENCOUNTER
Appointment Cancellation Or Reschedule     Person calling In Doc office   If other than patient calling, are they listed on the communication consent form? yes   Provider [facility] Visit Date and Time 11/1 8AM   Office Visit Location [facility]   Did patient want to reschedule their office appointment? If so, when was it scheduled to? Yes, 11[facility]   Did you have STAR scheduled for this appointment? no   Do you need STAR set up for your new appointment? If yes, please send to "PATIENT RIDESHARE" pool for STAR rescheduling no   If you are cancelling appointment, can we notify STAR to cancel ride? If yes, please send to "PATIENT RIDESHARE" pool for STAR to cancel service no   Is this patient calling to reschedule an infusion appointment? no   When is their next infusion appointment? no   Is this patient a Chemo patient? no   Reason for Cancellation or Reschedule Waiting for lab results     If the patient is a treatment patient, please route this to the office nurse  If the patient is not on treatment, please route to the office MA  If the patient is a surgical oncology patient, please route to surg/onc clinical pool

## 2022-11-08 ENCOUNTER — TRANSCRIBE ORDERS (OUTPATIENT)
Dept: HOME HEALTH SERVICES | Facility: HOME HEALTHCARE | Age: 77
End: 2022-11-08

## 2022-11-08 ENCOUNTER — HOME HEALTH ADMISSION (OUTPATIENT)
Dept: HOME HEALTH SERVICES | Facility: HOME HEALTHCARE | Age: 77
End: 2022-11-08

## 2022-11-08 DIAGNOSIS — I50.42 CHRONIC COMBINED SYSTOLIC AND DIASTOLIC CHF (CONGESTIVE HEART FAILURE) (HCC): ICD-10-CM

## 2022-11-08 DIAGNOSIS — D61.818 PANCYTOPENIA (HCC): ICD-10-CM

## 2022-11-08 DIAGNOSIS — I25.10 CORONARY ARTERY DISEASE INVOLVING NATIVE CORONARY ARTERY OF NATIVE HEART WITHOUT ANGINA PECTORIS: ICD-10-CM

## 2022-11-08 DIAGNOSIS — I48.0 PAROXYSMAL ATRIAL FIBRILLATION (HCC): ICD-10-CM

## 2022-11-08 DIAGNOSIS — C15.9 MALIGNANT NEOPLASM OF ESOPHAGUS, UNSPECIFIED LOCATION (HCC): Primary | ICD-10-CM

## 2022-11-08 DIAGNOSIS — G40.909 SEIZURE DISORDER (HCC): ICD-10-CM

## 2022-11-09 ENCOUNTER — APPOINTMENT (EMERGENCY)
Dept: RADIOLOGY | Facility: HOSPITAL | Age: 77
End: 2022-11-09

## 2022-11-09 ENCOUNTER — HOSPITAL ENCOUNTER (INPATIENT)
Facility: HOSPITAL | Age: 77
LOS: 2 days | Discharge: NURSING, REHAB OR LONG-TERM CARE | End: 2022-11-12
Attending: EMERGENCY MEDICINE | Admitting: INTERNAL MEDICINE

## 2022-11-09 DIAGNOSIS — T45.1X5A CHEMOTHERAPY INDUCED NEUTROPENIA (HCC): ICD-10-CM

## 2022-11-09 DIAGNOSIS — U07.1 ACUTE HYPOXEMIC RESPIRATORY FAILURE DUE TO COVID-19 (HCC): Primary | ICD-10-CM

## 2022-11-09 DIAGNOSIS — U07.1 COVID-19: ICD-10-CM

## 2022-11-09 DIAGNOSIS — J96.01 ACUTE HYPOXEMIC RESPIRATORY FAILURE DUE TO COVID-19 (HCC): Primary | ICD-10-CM

## 2022-11-09 DIAGNOSIS — D70.1 CHEMOTHERAPY INDUCED NEUTROPENIA (HCC): ICD-10-CM

## 2022-11-09 DIAGNOSIS — D61.818 PANCYTOPENIA (HCC): ICD-10-CM

## 2022-11-09 PROBLEM — R09.02 HYPOXIA: Status: ACTIVE | Noted: 2022-01-01

## 2022-11-09 LAB
2HR DELTA HS TROPONIN: -3 NG/L
4HR DELTA HS TROPONIN: -5 NG/L
ABO GROUP BLD: NORMAL
ALBUMIN SERPL BCP-MCNC: 1.9 G/DL (ref 3.5–5)
ALP SERPL-CCNC: 94 U/L (ref 46–116)
ALT SERPL W P-5'-P-CCNC: 27 U/L (ref 12–78)
ANION GAP SERPL CALCULATED.3IONS-SCNC: 5 MMOL/L (ref 4–13)
APTT PPP: 38 SECONDS (ref 23–37)
ATRIAL RATE: 192 BPM
BASOPHILS # BLD AUTO: 0.01 THOUSANDS/ÂΜL (ref 0–0.1)
BASOPHILS NFR BLD AUTO: 1 % (ref 0–1)
BILIRUB SERPL-MCNC: 0.35 MG/DL (ref 0.2–1)
BLD GP AB SCN SERPL QL: NEGATIVE
BUN SERPL-MCNC: 17 MG/DL (ref 5–25)
CALCIUM ALBUM COR SERPL-MCNC: 9.4 MG/DL (ref 8.3–10.1)
CALCIUM SERPL-MCNC: 7.7 MG/DL (ref 8.3–10.1)
CARDIAC TROPONIN I PNL SERPL HS: 30 NG/L
CARDIAC TROPONIN I PNL SERPL HS: 32 NG/L
CARDIAC TROPONIN I PNL SERPL HS: 35 NG/L
CHLORIDE SERPL-SCNC: 102 MMOL/L (ref 96–108)
CO2 SERPL-SCNC: 31 MMOL/L (ref 21–32)
CREAT SERPL-MCNC: 0.78 MG/DL (ref 0.6–1.3)
CRP SERPL QL: >90 MG/L
EOSINOPHIL # BLD AUTO: 0.03 THOUSAND/ÂΜL (ref 0–0.61)
EOSINOPHIL NFR BLD AUTO: 2 % (ref 0–6)
ERYTHROCYTE [DISTWIDTH] IN BLOOD BY AUTOMATED COUNT: 21.8 % (ref 11.6–15.1)
FLUAV RNA RESP QL NAA+PROBE: NEGATIVE
FLUBV RNA RESP QL NAA+PROBE: NEGATIVE
GFR SERPL CREATININE-BSD FRML MDRD: 87 ML/MIN/1.73SQ M
GLUCOSE SERPL-MCNC: 105 MG/DL (ref 65–140)
HCT VFR BLD AUTO: 23.1 % (ref 36.5–49.3)
HGB BLD-MCNC: 7.4 G/DL (ref 12–17)
IMM GRANULOCYTES # BLD AUTO: 0.01 THOUSAND/UL (ref 0–0.2)
IMM GRANULOCYTES NFR BLD AUTO: 1 % (ref 0–2)
INR PPP: 1.32 (ref 0.84–1.19)
LYMPHOCYTES # BLD AUTO: 0.27 THOUSANDS/ÂΜL (ref 0.6–4.47)
LYMPHOCYTES NFR BLD AUTO: 14 % (ref 14–44)
MCH RBC QN AUTO: 34.3 PG (ref 26.8–34.3)
MCHC RBC AUTO-ENTMCNC: 32 G/DL (ref 31.4–37.4)
MCV RBC AUTO: 107 FL (ref 82–98)
MONOCYTES # BLD AUTO: 0.15 THOUSAND/ÂΜL (ref 0.17–1.22)
MONOCYTES NFR BLD AUTO: 8 % (ref 4–12)
NEUTROPHILS # BLD AUTO: 1.53 THOUSANDS/ÂΜL (ref 1.85–7.62)
NEUTS SEG NFR BLD AUTO: 74 % (ref 43–75)
NRBC BLD AUTO-RTO: 0 /100 WBCS
NT-PROBNP SERPL-MCNC: 1399 PG/ML
PLATELET # BLD AUTO: 25 THOUSANDS/UL (ref 149–390)
POTASSIUM SERPL-SCNC: 3 MMOL/L (ref 3.5–5.3)
PROCALCITONIN SERPL-MCNC: 0.56 NG/ML
PROT SERPL-MCNC: 6.9 G/DL (ref 6.4–8.4)
PROTHROMBIN TIME: 16.4 SECONDS (ref 11.6–14.5)
QRS AXIS: -41 DEGREES
QRSD INTERVAL: 86 MS
QT INTERVAL: 370 MS
QTC INTERVAL: 464 MS
RBC # BLD AUTO: 2.16 MILLION/UL (ref 3.88–5.62)
RH BLD: POSITIVE
RSV RNA RESP QL NAA+PROBE: NEGATIVE
SARS-COV-2 RNA RESP QL NAA+PROBE: POSITIVE
SODIUM SERPL-SCNC: 138 MMOL/L (ref 135–147)
SPECIMEN EXPIRATION DATE: NORMAL
T WAVE AXIS: 50 DEGREES
VENTRICULAR RATE: 95 BPM
WBC # BLD AUTO: 2 THOUSAND/UL (ref 4.31–10.16)

## 2022-11-09 PROCEDURE — XW033E5 INTRODUCTION OF REMDESIVIR ANTI-INFECTIVE INTO PERIPHERAL VEIN, PERCUTANEOUS APPROACH, NEW TECHNOLOGY GROUP 5: ICD-10-PCS | Performed by: INTERNAL MEDICINE

## 2022-11-09 RX ORDER — PANTOPRAZOLE SODIUM 40 MG/1
40 TABLET, DELAYED RELEASE ORAL
Status: DISCONTINUED | OUTPATIENT
Start: 2022-11-09 | End: 2022-11-10

## 2022-11-09 RX ORDER — POTASSIUM CHLORIDE 20 MEQ/1
40 TABLET, EXTENDED RELEASE ORAL ONCE
Status: COMPLETED | OUTPATIENT
Start: 2022-11-09 | End: 2022-11-09

## 2022-11-09 RX ORDER — PHENOBARBITAL 32.4 MG/1
64.8 TABLET ORAL 2 TIMES DAILY
Status: DISCONTINUED | OUTPATIENT
Start: 2022-11-09 | End: 2022-11-12 | Stop reason: HOSPADM

## 2022-11-09 RX ORDER — ACETAMINOPHEN 325 MG/1
650 TABLET ORAL EVERY 6 HOURS PRN
Status: DISCONTINUED | OUTPATIENT
Start: 2022-11-09 | End: 2022-11-12 | Stop reason: HOSPADM

## 2022-11-09 RX ORDER — ATORVASTATIN CALCIUM 80 MG/1
80 TABLET, FILM COATED ORAL
Status: DISCONTINUED | OUTPATIENT
Start: 2022-11-09 | End: 2022-11-12 | Stop reason: HOSPADM

## 2022-11-09 RX ORDER — DEXAMETHASONE SODIUM PHOSPHATE 4 MG/ML
6 INJECTION, SOLUTION INTRA-ARTICULAR; INTRALESIONAL; INTRAMUSCULAR; INTRAVENOUS; SOFT TISSUE EVERY 24 HOURS
Status: DISCONTINUED | OUTPATIENT
Start: 2022-11-09 | End: 2022-11-11

## 2022-11-09 RX ORDER — MIDODRINE HYDROCHLORIDE 2.5 MG/1
2.5 TABLET ORAL
Status: DISCONTINUED | OUTPATIENT
Start: 2022-11-09 | End: 2022-11-11

## 2022-11-09 RX ORDER — MIRTAZAPINE 15 MG/1
7.5 TABLET, FILM COATED ORAL
Status: DISCONTINUED | OUTPATIENT
Start: 2022-11-09 | End: 2022-11-12 | Stop reason: HOSPADM

## 2022-11-09 RX ADMIN — PHENOBARBITAL 64.8 MG: 32.4 TABLET ORAL at 17:07

## 2022-11-09 RX ADMIN — MIDODRINE HYDROCHLORIDE 2.5 MG: 2.5 TABLET ORAL at 08:42

## 2022-11-09 RX ADMIN — MIRTAZAPINE 7.5 MG: 15 TABLET, FILM COATED ORAL at 21:45

## 2022-11-09 RX ADMIN — POTASSIUM CHLORIDE 40 MEQ: 1500 TABLET, EXTENDED RELEASE ORAL at 23:20

## 2022-11-09 RX ADMIN — ATORVASTATIN CALCIUM 80 MG: 80 TABLET, FILM COATED ORAL at 17:07

## 2022-11-09 RX ADMIN — MIDODRINE HYDROCHLORIDE 2.5 MG: 2.5 TABLET ORAL at 12:52

## 2022-11-09 RX ADMIN — DEXAMETHASONE SODIUM PHOSPHATE 6 MG: 4 INJECTION INTRA-ARTICULAR; INTRALESIONAL; INTRAMUSCULAR; INTRAVENOUS; SOFT TISSUE at 06:11

## 2022-11-09 RX ADMIN — PANTOPRAZOLE SODIUM 40 MG: 40 TABLET, DELAYED RELEASE ORAL at 08:42

## 2022-11-09 RX ADMIN — MIDODRINE HYDROCHLORIDE 2.5 MG: 2.5 TABLET ORAL at 17:00

## 2022-11-09 RX ADMIN — PANTOPRAZOLE SODIUM 40 MG: 40 TABLET, DELAYED RELEASE ORAL at 17:00

## 2022-11-09 RX ADMIN — PHENOBARBITAL 64.8 MG: 32.4 TABLET ORAL at 08:51

## 2022-11-09 RX ADMIN — REMDESIVIR 200 MG: 100 INJECTION, POWDER, LYOPHILIZED, FOR SOLUTION INTRAVENOUS at 23:24

## 2022-11-09 NOTE — ASSESSMENT & PLAN NOTE
History of stage IIB esophageal cancer diagnosed May 2022  He completed carboplatin with Taxol chemotherapy and radiation therapy  Follows outpatient with Hematology/Oncology    Continue outpatient heme/Onc follow-up

## 2022-11-09 NOTE — ASSESSMENT & PLAN NOTE
Patient initially found to be satting in the 80s on EMS arrival   Initially improved on 4 L nasal cannula, able to be weaned to room air  However, while sleeping he did have desaturation to the 80s requiring 2 L nasal cannula  On interview, he is on room air while awake  CXR pulmonary evaluation without alveolar infiltrates    · COVID positive  · Monitor for worsening hypoxia given COVID positive status  · Start IV corticosteroids given hypoxia  · Obtain BNP, CRP, procalcitonin  · Possible component of sleep apnea contributing  · Continuous pulse oximetry  · Wean oxygen as able

## 2022-11-09 NOTE — ASSESSMENT & PLAN NOTE
Found to be COVID positive 11/09/2022  Patient denies dyspnea, fever, but does report cough productive of clear thin sputum for several days    See plan under hypoxia

## 2022-11-09 NOTE — ED PROVIDER NOTES
History  Chief Complaint   Patient presents with   • Medical Problem     Sent from [facility], per EMS they were called for abnormal labs, on arrival they found pt to be lethargic with o2 in the 80s, pt became responsive with 4L nasal cannula, arrives alert and oriented x4, does not want to be here     This is a 65-year-old male with history of esophageal cancer (recently completed carboplatin with Taxol chemotherapy and radiotherapy), CAD, paroxysmal AFib not on anticoagulation, CHF, pancytopenia who presents with abnormal lab values  Per report, patient had blood work performed on 11/7/22  This revealed a hemoglobin of 8 6 and platelets of 17  Apparently, when EMS arrived, patient was "lethargic" with an oxygen in the 80s  Responded well to 4 L nasal cannula  Here, patient is saturating in the mid to upper 90s on room air  He currently has no complaints  Of note, patient has a history of pancytopenia likely related to esophageal cancer  He had similar lab values in September  Prior to Admission Medications   Prescriptions Last Dose Informant Patient Reported? Taking?    PHENobarbital 64 8 mg tablet  Self No Yes   Sig: TAKE 1 TABLET (64 8 MG TOTAL) BY MOUTH 2 (TWO) TIMES A DAY   aspirin (Aspirin Low Dose) 81 mg EC tablet  Self No Yes   Sig: Take 1 tablet (81 mg total) by mouth daily   atorvastatin (LIPITOR) 80 mg tablet  Self No Yes   Sig: Take 1 tablet (80 mg total) by mouth daily with dinner   midodrine (PROAMATINE) 2 5 mg tablet   No Yes   Sig: TAKE 1 TABLET (2 5 MG TOTAL) BY MOUTH 3 (THREE) TIMES A DAY BEFORE MEALS   mirtazapine (REMERON) 7 5 MG tablet   No Yes   Sig: Take 1 tablet (7 5 mg total) by mouth daily at bedtime   ondansetron (Zofran ODT) 8 mg disintegrating tablet Not Taking at Unknown time  No No   Sig: Take 1 tablet (8 mg total) by mouth every 8 (eight) hours as needed for nausea or vomiting   Patient not taking: No sig reported   pantoprazole (PROTONIX) 40 mg tablet Not Taking at Unknown time  No No   Sig: TAKE 1 TABLET BY MOUTH 2 TIMES A DAY BEFORE MEALS  Patient not taking: No sig reported      Facility-Administered Medications: None       Past Medical History:   Diagnosis Date   • Cardiac disease    • CHF (congestive heart failure) (HCC)    • Esophageal cancer (HCC)    • Hernia of abdominal cavity    • Myocardial infarction [facility])     last assessed 14, past, Pt had MI s/p AGUSTIN placed in , refuses to take any other medications for his cardiac disease, only will take seizure med  Understands possible complications from this decision   • Seizure [facility])        Past Surgical History:   Procedure Laterality Date   • ABDOMINAL AORTIC ANEURYSM REPAIR      for dialation or occulsion   • CATARACT EXTRACTION     • IR PORT PLACEMENT  7/15/2022       Family History   Problem Relation Age of Onset   • Hypertension Father    • Kidney disease Brother      I have reviewed and agree with the history as documented  E-Cigarette/Vaping   • E-Cigarette Use Never User      E-Cigarette/Vaping Substances   • Nicotine No    • THC No    • CBD No    • Flavoring No    • Other No    • Unknown No      Social History     Tobacco Use   • Smoking status: Former Smoker     Quit date: 2005     Years since quittin 4   • Smokeless tobacco: Never Used   Vaping Use   • Vaping Use: Never used   Substance Use Topics   • Alcohol use: Not Currently   • Drug use: Never       Review of Systems   Constitutional: Negative for chills, fatigue and fever  HENT: Negative for rhinorrhea, sore throat and trouble swallowing  Eyes: Negative for photophobia and visual disturbance  Respiratory: Negative for cough, chest tightness and shortness of breath  Cardiovascular: Negative for chest pain, palpitations and leg swelling  Gastrointestinal: Negative for abdominal pain, blood in stool, diarrhea, nausea and vomiting  Endocrine: Negative for polyuria     Genitourinary: Negative for dysuria, flank pain and hematuria  Musculoskeletal: Negative for back pain and neck pain  Skin: Negative for color change and rash  Allergic/Immunologic: Negative for immunocompromised state  Neurological: Negative for dizziness, weakness, light-headedness, numbness and headaches  All other systems reviewed and are negative  Physical Exam  Physical Exam  Constitutional:       General: He is not in acute distress  Appearance: Normal appearance  He is well-developed  Comments: Chronically ill-appearing  HENT:      Mouth/Throat:      Pharynx: Uvula midline  Eyes:      General: Lids are normal       Conjunctiva/sclera: Conjunctivae normal       Pupils: Pupils are equal, round, and reactive to light  Neck:      Thyroid: No thyroid mass or thyromegaly  Trachea: Trachea normal    Cardiovascular:      Rate and Rhythm: Normal rate and regular rhythm  Heart sounds: Normal heart sounds  No murmur heard  Pulmonary:      Effort: Pulmonary effort is normal       Breath sounds: Normal breath sounds  Chest:      Comments: Port in right upper chest   Abdominal:      General: Bowel sounds are normal       Palpations: Abdomen is soft  Tenderness: There is no abdominal tenderness  There is no guarding or rebound  Skin:     General: Skin is warm and dry  Neurological:      Mental Status: He is alert  Psychiatric:         Speech: Speech normal          Behavior: Behavior normal  Behavior is cooperative  Thought Content:  Thought content normal          Vital Signs  ED Triage Vitals [11/09/22 0235]   Temperature Pulse Respirations Blood Pressure SpO2   99 3 °F (37 4 °C) 97 16 108/57 96 %      Temp Source Heart Rate Source Patient Position - Orthostatic VS BP Location FiO2 (%)   Oral Monitor -- -- --      Pain Score       --           Vitals:    11/09/22 0235 11/09/22 0315 11/09/22 0415   BP: 108/57 100/51 100/55   Pulse: 97 90 96         Visual Acuity      ED Medications  Medications - No data to display    Diagnostic Studies  Results Reviewed     Procedure Component Value Units Date/Time    NT-BNP PRO [[id number]] Collected: 11/09/22 0304    Lab Status: In process Specimen: Blood from Central Venous Line Updated: 11/09/22 0458    HS Troponin I 4hr [[id number]]     Lab Status: No result Specimen: Blood     HS Troponin I 2hr [[id number]]     Lab Status: No result Specimen: Blood     HS Troponin 0hr (reflex protocol) [[id number]]  (Normal) Collected: 11/09/22 0404    Lab Status: Final result Specimen: Blood from Central Venous Line Updated: 11/09/22 0453     hs TnI 0hr 35 ng/L     CBC and differential [[id number]]  (Abnormal) Collected: 11/09/22 0304    Lab Status: Final result Specimen: Blood from Central Venous Line Updated: 11/09/22 0407     WBC 2 00 Thousand/uL      RBC 2 16 Million/uL      Hemoglobin 7 4 g/dL      Hematocrit 23 1 %       fL      MCH 34 3 pg      MCHC 32 0 g/dL      RDW 21 8 %      Platelets 25 Thousands/uL      nRBC 0 /100 WBCs      Neutrophils Relative 74 %      Immat GRANS % 1 %      Lymphocytes Relative 14 %      Monocytes Relative 8 %      Eosinophils Relative 2 %      Basophils Relative 1 %      Neutrophils Absolute 1 53 Thousands/µL      Immature Grans Absolute 0 01 Thousand/uL      Lymphocytes Absolute 0 27 Thousands/µL      Monocytes Absolute 0 15 Thousand/µL      Eosinophils Absolute 0 03 Thousand/µL      Basophils Absolute 0 01 Thousands/µL     Narrative: This is an appended report  These results have been appended to a previously verified report      FLU/RSV/COVID - if FLU/RSV clinically relevant [[id number]]  (Abnormal) Collected: 11/09/22 0304    Lab Status: Final result Specimen: Nares from Nose Updated: 11/09/22 0353     SARS-CoV-2 Positive     INFLUENZA A PCR Negative     INFLUENZA B PCR Negative     RSV PCR Negative    Narrative:      FOR PEDIATRIC PATIENTS - copy/paste COVID Guidelines URL to browser: [email or web address]    SARS-CoV-2 assay is a Nucleic Acid Amplification assay intended for the  qualitative detection of nucleic acid from SARS-CoV-2 in nasopharyngeal  swabs  Results are for the presumptive identification of SARS-CoV-2 RNA  Positive results are indicative of infection with SARS-CoV-2, the virus  causing COVID-19, but do not rule out bacterial infection or co-infection  with other viruses  Laboratories within the [place] and its  territories are required to report all positive results to the appropriate  public health authorities  Negative results do not preclude SARS-CoV-2  infection and should not be used as the sole basis for treatment or other  patient management decisions  Negative results must be combined with  clinical observations, patient history, and epidemiological information  This test has not been FDA cleared or approved  This test has been authorized by FDA under an Emergency Use Authorization  (EUA)  This test is only authorized for the duration of time the  declaration that circumstances exist justifying the authorization of the  emergency use of an in vitro diagnostic tests for detection of SARS-CoV-2  virus and/or diagnosis of COVID-19 infection under section 564(b)(1) of  the Act, 21 U  S C  494YBY-9(B)(0), unless the authorization is terminated  or revoked sooner  The test has been validated but independent review by FDA  and CLIA is pending  Test performed using [employer] GeneXpert: This RT-PCR assay targets N2,  a region unique to SARS-CoV-2  A conserved region in the E-gene was chosen  for pan-Sarbecovirus detection which includes SARS-CoV-2  According to CMS-2020-01-R, this platform meets the definition of high-throughput technology      Protime-INR [[id number]]  (Abnormal) Collected: 11/09/22 0304    Lab Status: Final result Specimen: Blood from Arm, Left Updated: 11/09/22 0345     Protime 16 4 seconds INR 1 32    APTT [[id number]]  (Abnormal) Collected: 11/09/22 0304    Lab Status: Final result Specimen: Blood from Arm, Left Updated: 11/09/22 0345     PTT 38 seconds     Comprehensive metabolic panel [[id number]]  (Abnormal) Collected: 11/09/22 0304    Lab Status: Final result Specimen: Blood from Central Venous Line Updated: 11/09/22 0335     Sodium 138 mmol/L      Potassium 3 0 mmol/L      Chloride 102 mmol/L      CO2 31 mmol/L      ANION GAP 5 mmol/L      BUN 17 mg/dL      Creatinine 0 78 mg/dL      Glucose 105 mg/dL      Calcium 7 7 mg/dL      Corrected Calcium 9 4 mg/dL      AST --     ALT 27 U/L      Alkaline Phosphatase 94 U/L      Total Protein 6 9 g/dL      Albumin 1 9 g/dL      Total Bilirubin 0 35 mg/dL      eGFR 87 ml/min/1 73sq m     Narrative:      [place] guidelines for Chronic Kidney Disease (CKD):   •  Stage 1 with normal or high GFR (GFR > 90 mL/min/1 73 square meters)  •  Stage 2 Mild CKD (GFR = 60-89 mL/min/1 73 square meters)  •  Stage 3A Moderate CKD (GFR = 45-59 mL/min/1 73 square meters)  •  Stage 3B Moderate CKD (GFR = 30-44 mL/min/1 73 square meters)  •  Stage 4 Severe CKD (GFR = 15-29 mL/min/1 73 square meters)  •  Stage 5 End Stage CKD (GFR <15 mL/min/1 73 square meters)  Note: GFR calculation is accurate only with a steady state creatinine                 XR chest 1 view portable   ED Interpretation by Anjum Singleton MD (11/09 7274)   No acute cardiopulmonary disease as interpreted by myself  Esophageal stent appears in appropriate position                   Procedures  ECG 12 Lead Documentation Only    Date/Time: 11/9/2022 4:23 AM  Performed by: Anjum Singleton MD  Authorized by: Anjum Singleton MD     ECG reviewed by me, the ED Provider: yes    Patient location:  ED  Previous ECG:     Previous ECG:  Unavailable    Comparison to cardiac monitor: Yes    Interpretation:     Interpretation: non-specific    Rate:     ECG rate:  95    ECG rate assessment: normal    Rhythm:     Rhythm: sinus rhythm    Ectopy:     Ectopy: none    QRS:     QRS axis:  Left    QRS intervals:  Normal  Conduction:     Conduction: normal    ST segments:     ST segments:  Normal  T waves:     T waves: normal               ED Course  ED Course as of 11/09/22 0512   Wed Nov 09, 2022   0357 FLU/RSV/COVID - if FLU/RSV clinically relevant(!)  Patient unvaccinated  4894 Patient had an episode of dropping down to 60% on room air while sleeping  No documented history of hypoxia while sleeping on previous hospital stays  When woken up, he does maintain oxygen saturations in the 90s on room air  SBIRT 20yo+    Flowsheet Row Most Recent Value   SBIRT (25 yo +)    In order to provide better care to our patients, we are screening all of our patients for alcohol and drug use  Would it be okay to ask you these screening questions? No Filed at: 11/09/2022 0326                    MDM  Number of Diagnoses or Management Options  Diagnosis management comments: Will recheck labs  Check UA, COVID swab, chest x-ray  Patient has no evidence of bleeding  Disposition pending results        Disposition  Final diagnoses:   Acute hypoxemic respiratory failure due to COVID-19 ([facility] [address] )   COVID-19   Pancytopenia ([facility] [address] )     Time reflects when diagnosis was documented in both MDM as applicable and the Disposition within this note     Time User Action Codes Description Comment    11/9/2022  3:58 AM Anni Linker P Add [U07 1,  J96 01] Acute hypoxemic respiratory failure due to COVID-19 ([address] )     11/9/2022  3:58 AM Anni Linker P Add [U07 1] COVID-19     11/9/2022  3:58 AM Ivania Wills Add [D47 [id number]] Pancytopenia [facility])       ED Disposition     ED Disposition   Admit    Condition   Stable    Date/Time   Wed Nov 9, 2022  3:58 AM    Comment              Follow-up Information    None         Patient's Medications   Discharge Prescriptions    No medications on file       No discharge procedures on file      PDMP Review       Value Time User    PDMP Reviewed  Yes 11/16/2021  1:20 PM Pavel Brown DO          ED Provider  Electronically Signed by           Basil Carreno MD  11/09/22 8545

## 2022-11-09 NOTE — H&P
[address] [facility]  H&P- Bard Grams 1945, 67 y o  male MRN: [id number]  Unit/Bed#: ED 32 Encounter: [id number]  Primary Care Provider: Apryl Dobbs MD   Date and time admitted to hospital: 11/9/2022  2:30 AM    * Hypoxia  Assessment & Plan  Patient initially found to be satting in the 80s on EMS arrival   Initially improved on 4 L nasal cannula, able to be weaned to room air  However, while sleeping he did have desaturation to the 80s requiring 2 L nasal cannula  On interview, he is on room air while awake  CXR pulmonary evaluation without alveolar infiltrates  · COVID positive  · Monitor for worsening hypoxia given COVID positive status  · Start IV corticosteroids given hypoxia  · Obtain BNP, CRP, procalcitonin  · Possible component of sleep apnea contributing  · Continuous pulse oximetry  · Wean oxygen as able        COVID-19  Assessment & Plan  Found to be COVID positive 11/09/2022  Patient denies dyspnea, fever, but does report cough productive of clear thin sputum for several days  See plan under hypoxia    Pancytopenia [facility])  Assessment & Plan  Patient with history of pancytopenia, in setting of esophageal cancer  Noted on labs again today  Hemoglobin 7 4 (most recently 9-10), platelets 25 (most recently 70s), WBC 2 (most recently 3-4)  Possible worsening of leukopenia in the setting of acute infection  Denies melena, hematochezia, hematemesis, hematuria, rash  · Trend CBC  · Hold aspirin  · Continue p o  PPI, low suspicion for GI bleed at this time  · Maintain type and screen  · Transfuse for hemoglobin less than 7      Esophageal cancer [facility])  Assessment & Plan  History of stage IIB esophageal cancer diagnosed May 2022  He completed carboplatin with Taxol chemotherapy and radiation therapy  Follows outpatient with Hematology/Oncology    Continue outpatient heme/Onc follow-up    Seizure disorder [facility])  Assessment & Plan  Continue phenobarbital    HLD (hyperlipidemia)  Assessment & Plan  Continue statin        VTE Prophylaxis: Holding the setting of thrombocytopenia with platelets of 25    / sequential compression device and foot pump applied   Code Status: Level 3 - DNAR and DNI       Anticipated Length of Stay:  Patient will be admitted on an Observation basis with an anticipated length of stay of  less than 2 midnights  Justification for Hospital Stay: Please see detailed plans noted above  Chief Complaint:     Abnormal lab    History of Present Illness:  Emmie Valverde is a 67 y o  male who has past medical history significant for stage IIB esophageal cancer s/p chemotherapy and radiation, pancytopenia presenting for abnormal lab values from [facility] nursing [facility], found to have Cheryl  Patient initially presented from [facility] for abnormal lab value with hemoglobin 8 6 and platelets 17  On recheck here, patient's hemoglobin is 7 4 and platelets 25  He denies any active bleeding including hematemesis, hematochezia, melena, hematuria or petechial rash  He does have a history of pancytopenia, thought to be in the setting of esophageal cancer with chemotherapy, however is not currently on chemotherapy at this time  In the ED, he was also found to be COVID positive with intermittent desaturation to the 80s requiring 2 L nasal cannula at times  He denies fever, chills, dyspnea, chest pain, nausea, vomiting, diarrhea  He does report cough productive of clear thin sputum for several days          Review of Systems:    Constitutional:  Denies fever or chills   Eyes:  Denies change in visual acuity   HENT:  Denies nasal congestion or sore throat   Respiratory:  Reports cough, denies shortness of breath   Cardiovascular:  Denies chest pain or edema   GI:  Denies abdominal pain or bloody stools  :  Denies dysuria   Musculoskeletal:  Denies back pain or joint pain   Integument:  Denies rash   Neurologic:  Denies headache or sensory changes   Endocrine: Denies polyuria or polydipsia   Psychiatric:  Denies depression or anxiety     Past Medical and Surgical History:   Past Medical History:   Diagnosis Date   • Cardiac disease    • CHF (congestive heart failure) ([facility] [address] )    • Esophageal cancer ([facility] [address] )    • Hernia of abdominal cavity    • Myocardial infarction [facility])     last assessed 7/31/14, past, Pt had MI s/p AGUSTIN placed in 2001, refuses to take any other medications for his cardiac disease, only will take seizure med  Understands possible complications from this decision   • Seizure [facility])      Past Surgical History:   Procedure Laterality Date   • ABDOMINAL AORTIC ANEURYSM REPAIR      for dialation or occulsion   • CATARACT EXTRACTION     • IR PORT PLACEMENT  7/15/2022       Meds/Allergies:  No current facility-administered medications on file prior to encounter  Current Outpatient Medications on File Prior to Encounter   Medication Sig Dispense Refill   • aspirin (Aspirin Low Dose) 81 mg EC tablet Take 1 tablet (81 mg total) by mouth daily 90 tablet 3   • atorvastatin (LIPITOR) 80 mg tablet Take 1 tablet (80 mg total) by mouth daily with dinner 90 tablet 4   • midodrine (PROAMATINE) 2 5 mg tablet TAKE 1 TABLET (2 5 MG TOTAL) BY MOUTH 3 (THREE) TIMES A DAY BEFORE MEALS 270 tablet 1   • mirtazapine (REMERON) 7 5 MG tablet Take 1 tablet (7 5 mg total) by mouth daily at bedtime 30 tablet 0   • PHENobarbital 64 8 mg tablet TAKE 1 TABLET (64 8 MG TOTAL) BY MOUTH 2 (TWO) TIMES A  tablet 1   • ondansetron (Zofran ODT) 8 mg disintegrating tablet Take 1 tablet (8 mg total) by mouth every 8 (eight) hours as needed for nausea or vomiting (Patient not taking: No sig reported) 30 tablet 3   • pantoprazole (PROTONIX) 40 mg tablet TAKE 1 TABLET BY MOUTH 2 TIMES A DAY BEFORE MEALS  (Patient not taking: No sig reported) 180 tablet 3         Allergies:    Allergies   Allergen Reactions   • Iodinated Diagnostic Agents Rash     Pt's skin get very red and he gets hot and chills • Clopidogrel Rash       History:  Marital Status: Single     Substance Use History:   Social History     Substance and Sexual Activity   Alcohol Use Not Currently     Social History     Tobacco Use   Smoking Status Former Smoker   • Quit date: 2005   • Years since quittin 4   Smokeless Tobacco Never Used     Social History     Substance and Sexual Activity   Drug Use Never       Family History:  Family History   Problem Relation Age of Onset   • Hypertension Father    • Kidney disease Brother        Physical Exam:     Vitals:   Blood Pressure: 91/52 (22)  Pulse: 84 (22)  Temperature: 99 3 °F (37 4 °C) (22)  Temp Source: Oral (22)  Respirations: 15 (22)  SpO2: 92 % (22)    Constitutional:  Chronic ill-appearing, cachectic male, no acute distress  Eyes:  EOMI, No scleral icterus   HENT:   oropharynx moist, external ears normal, external nose normal   Respiratory:  No respiratory distress, no wheezing or rales  Cardiovascular:  Normal rate, no murmurs   GI:  Soft, nondistended, no guarding   Musculoskeletal:  no tenderness, no deformities  Integument:  Port in place over right upper chest with clean dressing and without surrounding erythema or drainage  no jaundice, no rash   Neurologic:  Alert &awake, communicative, CN 2-12 normal,  no focal deficits noted   Psychiatric:  Speech and behavior appropriate       Lab Results: I have personally reviewed pertinent reports        Results from last 7 days   Lab Units 22  0304   WBC Thousand/uL 2 00*   HEMOGLOBIN g/dL 7 4*   HEMATOCRIT % 23 1*   PLATELETS Thousands/uL 25*   NEUTROS PCT % 74   LYMPHS PCT % 14   MONOS PCT % 8   EOS PCT % 2     Results from last 7 days   Lab Units 22  0304   POTASSIUM mmol/L 3 0*   CHLORIDE mmol/L 102   CO2 mmol/L 31   BUN mg/dL 17   CREATININE mg/dL 0 78   CALCIUM mg/dL 7 7*   ALK PHOS U/L 94   ALT U/L 27     Results from last 7 days   Lab Units 11/09/22  0304   INR  1 32*         Imaging: I have personally reviewed pertinent reports  CXR pending, no consolidation or alveolar infiltrates on preliminary review       Total time for visit, including counseling/coordination of care:  30 minutes  Greater than 50% of this total time spent on direct patient counseling and coorination of care  Epic Records Reviewed as well as Records in Care Everywhere    ** Please Note: Dragon 360 Dictation voice to text software was used in the creation of this document   **

## 2022-11-09 NOTE — ED NOTES
Pt  Put on 2L O2 due to pt's O2 dropping down to 88% when pt  Pt's O2 now 96%  Resting  Pt's brief changed and pt  Repositioned        Morales Demarco  11/09/22 8235

## 2022-11-09 NOTE — ED NOTES
Pt  Changed and repositioned  Pt  Given lunch tray        Evon Hernandez  11/09/22 106 Crystal Alvarezs  11/09/22 9431

## 2022-11-09 NOTE — ASSESSMENT & PLAN NOTE
Patient with history of pancytopenia, in setting of esophageal cancer  Noted on labs again today  Hemoglobin 7 4 (most recently 9-10), platelets 25 (most recently 70s), WBC 2 (most recently 3-4)  Possible worsening of leukopenia in the setting of acute infection  Denies melena, hematochezia, hematemesis, hematuria, rash    · Trend CBC  · Hold aspirin  · Continue p o  PPI, low suspicion for GI bleed at this time  · Maintain type and screen  · Transfuse for hemoglobin less than 7

## 2022-11-10 LAB
ALBUMIN SERPL BCP-MCNC: 1.7 G/DL (ref 3.5–5)
ALP SERPL-CCNC: 93 U/L (ref 46–116)
ALT SERPL W P-5'-P-CCNC: 33 U/L (ref 12–78)
ANION GAP SERPL CALCULATED.3IONS-SCNC: 6 MMOL/L (ref 4–13)
ANISOCYTOSIS BLD QL SMEAR: PRESENT
AST SERPL W P-5'-P-CCNC: 106 U/L (ref 5–45)
BASOPHILS # BLD MANUAL: 0 THOUSAND/UL (ref 0–0.1)
BASOPHILS NFR MAR MANUAL: 0 % (ref 0–1)
BILIRUB SERPL-MCNC: 0.26 MG/DL (ref 0.2–1)
BUN SERPL-MCNC: 15 MG/DL (ref 5–25)
CALCIUM ALBUM COR SERPL-MCNC: 9.2 MG/DL (ref 8.3–10.1)
CALCIUM SERPL-MCNC: 7.4 MG/DL (ref 8.3–10.1)
CHLORIDE SERPL-SCNC: 102 MMOL/L (ref 96–108)
CO2 SERPL-SCNC: 31 MMOL/L (ref 21–32)
CREAT SERPL-MCNC: 0.62 MG/DL (ref 0.6–1.3)
EOSINOPHIL # BLD MANUAL: 0.01 THOUSAND/UL (ref 0–0.4)
EOSINOPHIL NFR BLD MANUAL: 1 % (ref 0–6)
ERYTHROCYTE [DISTWIDTH] IN BLOOD BY AUTOMATED COUNT: 21.8 % (ref 11.6–15.1)
GFR SERPL CREATININE-BSD FRML MDRD: 95 ML/MIN/1.73SQ M
GLUCOSE P FAST SERPL-MCNC: 92 MG/DL (ref 65–99)
GLUCOSE SERPL-MCNC: 92 MG/DL (ref 65–140)
HCT VFR BLD AUTO: 22 % (ref 36.5–49.3)
HGB BLD-MCNC: 7.2 G/DL (ref 12–17)
HYPERCHROMIA BLD QL SMEAR: PRESENT
LYMPHOCYTES # BLD AUTO: 0.11 THOUSAND/UL (ref 0.6–4.47)
LYMPHOCYTES # BLD AUTO: 8 % (ref 14–44)
MACROCYTES BLD QL AUTO: PRESENT
MCH RBC QN AUTO: 35.1 PG (ref 26.8–34.3)
MCHC RBC AUTO-ENTMCNC: 32.7 G/DL (ref 31.4–37.4)
MCV RBC AUTO: 107 FL (ref 82–98)
MONOCYTES # BLD AUTO: 0 THOUSAND/UL (ref 0–1.22)
MONOCYTES NFR BLD: 0 % (ref 4–12)
NEUTROPHILS # BLD MANUAL: 1.26 THOUSAND/UL (ref 1.85–7.62)
NEUTS BAND NFR BLD MANUAL: 31 % (ref 0–8)
NEUTS SEG NFR BLD AUTO: 60 % (ref 43–75)
PLATELET # BLD AUTO: 18 THOUSANDS/UL (ref 149–390)
PLATELET BLD QL SMEAR: ABNORMAL
POTASSIUM SERPL-SCNC: 3 MMOL/L (ref 3.5–5.3)
PROT SERPL-MCNC: 6.3 G/DL (ref 6.4–8.4)
RBC # BLD AUTO: 2.05 MILLION/UL (ref 3.88–5.62)
SODIUM SERPL-SCNC: 139 MMOL/L (ref 135–147)
WBC # BLD AUTO: 1.39 THOUSAND/UL (ref 4.31–10.16)

## 2022-11-10 RX ORDER — POTASSIUM CHLORIDE 29.8 MG/ML
40 INJECTION INTRAVENOUS ONCE
Status: COMPLETED | OUTPATIENT
Start: 2022-11-10 | End: 2022-11-11

## 2022-11-10 RX ORDER — CALCIUM CARBONATE 200(500)MG
500 TABLET,CHEWABLE ORAL 2 TIMES DAILY PRN
Status: DISCONTINUED | OUTPATIENT
Start: 2022-11-10 | End: 2022-11-12 | Stop reason: HOSPADM

## 2022-11-10 RX ORDER — ACETAMINOPHEN 325 MG/1
650 TABLET ORAL ONCE
Status: COMPLETED | OUTPATIENT
Start: 2022-11-10 | End: 2022-11-10

## 2022-11-10 RX ORDER — DIPHENHYDRAMINE HCL 25 MG
25 TABLET ORAL ONCE
Status: COMPLETED | OUTPATIENT
Start: 2022-11-10 | End: 2022-11-10

## 2022-11-10 RX ADMIN — REMDESIVIR 100 MG: 100 INJECTION, POWDER, LYOPHILIZED, FOR SOLUTION INTRAVENOUS at 23:40

## 2022-11-10 RX ADMIN — PHENOBARBITAL 64.8 MG: 32.4 TABLET ORAL at 17:39

## 2022-11-10 RX ADMIN — MIDODRINE HYDROCHLORIDE 2.5 MG: 2.5 TABLET ORAL at 11:33

## 2022-11-10 RX ADMIN — ATORVASTATIN CALCIUM 80 MG: 80 TABLET, FILM COATED ORAL at 17:39

## 2022-11-10 RX ADMIN — MIDODRINE HYDROCHLORIDE 2.5 MG: 2.5 TABLET ORAL at 17:39

## 2022-11-10 RX ADMIN — POTASSIUM CHLORIDE 40 MEQ: 29.8 INJECTION, SOLUTION INTRAVENOUS at 11:33

## 2022-11-10 RX ADMIN — SODIUM CHLORIDE 500 ML: 0.9 INJECTION, SOLUTION INTRAVENOUS at 11:32

## 2022-11-10 RX ADMIN — DIPHENHYDRAMINE HCL 25 MG: 25 TABLET, COATED ORAL at 18:49

## 2022-11-10 RX ADMIN — TBO-FILGRASTIM 480 MCG: 480 INJECTION, SOLUTION SUBCUTANEOUS at 17:39

## 2022-11-10 RX ADMIN — MIRTAZAPINE 7.5 MG: 15 TABLET, FILM COATED ORAL at 23:40

## 2022-11-10 RX ADMIN — ACETAMINOPHEN 650 MG: 325 TABLET, FILM COATED ORAL at 18:49

## 2022-11-10 RX ADMIN — PANTOPRAZOLE SODIUM 40 MG: 40 TABLET, DELAYED RELEASE ORAL at 06:08

## 2022-11-10 RX ADMIN — MIDODRINE HYDROCHLORIDE 2.5 MG: 2.5 TABLET ORAL at 06:08

## 2022-11-10 RX ADMIN — DEXAMETHASONE SODIUM PHOSPHATE 6 MG: 4 INJECTION INTRA-ARTICULAR; INTRALESIONAL; INTRAMUSCULAR; INTRAVENOUS; SOFT TISSUE at 06:08

## 2022-11-10 RX ADMIN — PHENOBARBITAL 64.8 MG: 32.4 TABLET ORAL at 10:00

## 2022-11-10 NOTE — ASSESSMENT & PLAN NOTE
Patient presenting with mild COVID-19 infection  Vaccination status Unvaccinated, per records review  Oxygen requirements, currently on room air  Remdesvir Day #2/5  Decadron Day #2/10  Anticoagulation held due to significant thrombocytopenia  Trend CMP,CBC daily      Overall prognosis remains poor especially in the setting of malignancy, likely patient has been weaned to room air  Close monitor of underlying respiratory status

## 2022-11-10 NOTE — QUICK NOTE
WBC 1 39, PLT 18  Worsening from yesterday  Likely due to COVID infection  Appears like patient not on protonix anymore,will DC it as it can cause thrombocytopenia  and order Tums prn  No reports of bleeding  Continue to monitor CBC

## 2022-11-10 NOTE — PLAN OF CARE
Problem: MOBILITY - ADULT  Goal: Maintain or return to baseline ADL function  Description: INTERVENTIONS:  -  Assess patient's ability to carry out ADLs; assess patient's baseline for ADL function and identify physical deficits which impact ability to perform ADLs (bathing, care of mouth/teeth, toileting, grooming, dressing, etc )  - Assess/evaluate cause of self-care deficits   - Assess range of motion  - Assess patient's mobility; develop plan if impaired  - Assess patient's need for assistive devices and provide as appropriate  - Encourage maximum independence but intervene and supervise when necessary  - Involve family in performance of ADLs  - Assess for home care needs following discharge   - Consider OT consult to assist with ADL evaluation and planning for discharge  - Provide patient education as appropriate  Outcome: Progressing  Goal: Maintains/Returns to pre admission functional level  Description: INTERVENTIONS:  - Perform BMAT or MOVE assessment daily    - Set and communicate daily mobility goal to care team and patient/family/caregiver  - Collaborate with rehabilitation services on mobility goals if consulted  - Perform Range of Motion 2 times a day  - Reposition patient every 2 hours    - Dangle patient 3 times a day  - Stand patient 2 times a day  - Ambulate patient 3 times a day  - Out of bed to chair x1 times a day   - Out of bed for meals 3 times a day  - Out of bed for toileting  - Record patient progress and toleration of activity level   Outcome: Progressing     Problem: Prexisting or High Potential for Compromised Skin Integrity  Goal: Skin integrity is maintained or improved  Description: INTERVENTIONS:  - Identify patients at risk for skin breakdown  - Assess and monitor skin integrity  - Assess and monitor nutrition and hydration status  - Monitor labs   - Assess for incontinence   - Turn and reposition patient  - Assist with mobility/ambulation  - Relieve pressure over bony prominences  - Avoid friction and shearing  - Provide appropriate hygiene as needed including keeping skin clean and dry  - Evaluate need for skin moisturizer/barrier cream  - Collaborate with interdisciplinary team   - Patient/family teaching  - Consider wound care consult   Outcome: Progressing     Problem: Potential for Falls  Goal: Patient will remain free of falls  Description: INTERVENTIONS:  - Educate patient/family on patient safety including physical limitations  - Instruct patient to call for assistance with activity   - Consult OT/PT to assist with strengthening/mobility   - Keep Call bell within reach  - Keep bed low and locked with side rails adjusted as appropriate  - Keep care items and personal belongings within reach  - Initiate and maintain comfort rounds  - Make Fall Risk Sign visible to staff  - Offer Toileting every 2 Hours, in advance of need  - Initiate/Maintain bed alarm  - Obtain necessary fall risk management equipment: Baeta    - Apply yellow socks and bracelet for high fall risk patients  - Consider moving patient to room near nurses station  Outcome: Progressing

## 2022-11-10 NOTE — CONSULTS
Oncology Consult Note  Mason Braxton 67 y o  male MRN: [id number]  Unit/Bed#: [place] [address] 202-01 Encounter: [id number]      Presenting Complaint:  Pancytopenia, esophageal adenocarcinoma on intermittent chemotherapy, COVID-19 infection    Oncology History Overview Note   Nohemy Jose presents for first phone follow up  Rad Txmt: 8 15-10 7 2022  Dx: Esophageal Cancer     Pt is a 67 y o  male with newly diagnosed atleast [id number] esophageal adenocarcinoma of the distal esophagus  EGD/EUS and stent placement overall reveal an ulcerated mass (traversable) measuring 5cm in the lower third of the esophagus (35 - 40 cm from the incisors, covering three quarters of the circumference  A stent was placed spanning 12cm from 4cm proximal to 3 5cm distal to the tumor  PET notable for a minimally avid primary without any identifiable lymph nodes  EUS showed that the mass extended to the muscularis propria and may have involved the adventitia at some areas; therefore is staged at least as a T2  He was counseled regarding preop chemoRT followed by surgery  10 17 22  ED    Admit 10 17-20 22   Dx: Failure to thrive/Esophageal Cancer/CAD/Recent antiplatelet therapy/PAF/CHF/Seizure D/O/Hypokalemia     10 20 22  ED    Admit 10 20-24 22  Pt presented initially on 10 17 for failure to thrive  Discharged on the afternoon of 10 20 22  He fell after stepping up from his front door and experienced minor injuries  No reported LOC  Bystander assisted helping him up  Experiencing ambulatory dysfunction and weakness  Concern for future fall risk  Pt has steps in his home  Suggested: post acute rehab per PT eval    Plan to transfer pt to [facility] facility  Added Dx: Esophageal Cancer, CAD, PAF, CHF, Seizure D/O, Hypokalemia   10 24 22 D/C to [facility]      11 9 22  ED   Sent from [facility], per EMS they were called for abnormal labs, on arrival pt was found to be lethargic w/O2 in the 80's   Pt became responsive with O24LNC, arrives A & O x 4, does not want to be here  Appt    11 14 22 Med Onc-Dr Jia Chester        Esophageal cancer [facility])   5/2/2022 Initial Diagnosis    Esophageal adenocarcinoma ([address] )     5/4/2022 Biopsy    Esophagus, lower esophageal bx:  - Poorly differentiated carcinoma, consistent with adenocarcinoma         5/18/2022 -  Cancer Staged    Staging form: Esophagus - Adenocarcinoma, AJCC 8th Edition  - Clinical stage from 5/18/2022: Stage IIB (cT2, cN0, cM0, G3) - Signed by Abram Quezada MD on 7/5/2022  Total positive nodes: 0  Histologic grading system: 3 grade system  HER2 status: Negative  Clinical staging modalities: Biopsy, EUS, Endoscopy, PET/CT       8/15/2022 -  Chemotherapy    CARBOplatin (PARAPLATIN) IVPB (GOG AUC DOSING), 237 6 mg, Intravenous, Once, 4 of 5 cycles  Administration: 237 6 mg (8/15/2022), 190 05 mg (8/29/2022), 192 15 mg (10/4/2022)  PACLItaxel (TAXOL) chemo IVPB, 50 mg/m2 = 106 2 mg, Intravenous, Once, 4 of 5 cycles  Dose modification: 40 mg/m2 (original dose 50 mg/m2, Cycle 3, Reason: Neutropenia, Comment: 20% dose reduction due to neutropenia)  Administration: 106 2 mg (8/15/2022), 84 6 mg (8/29/2022), 84 6 mg (10/4/2022)  filgrastim (NEUPOGEN) injection Soln, 480 mcg (100 % of original dose 480 mcg), Subcutaneous, Once, 1 of 1 cycle  Dose modification: 480 mcg (original dose 480 mcg, Cycle 4)  tbo-filgrastim (GRANIX), 480 mcg (100 % of original dose 480 mcg), Subcutaneous, Once, 2 of 2 cycles  Dose modification: 480 mcg (original dose 480 mcg, Cycle 2), 480 mcg (original dose 480 mcg, Cycle 4)  Administration: 480 mcg (8/22/2022), 480 mcg (9/7/2022)      Chemotherapy    CARBOplatin (PARAPLATIN) IVPB (GOG AUC DOSING), 237 6 mg, Intravenous, Once, 4 of 5 cycles    Administration: 237 6 mg (8/15/2022), 190 05 mg (8/29/2022), 192 15 mg (10/4/2022)        PACLItaxel (TAXOL) chemo IVPB, 50 mg/m2 = 106 2 mg, Intravenous, Once, 4 of 5 cycles    Dose modification: 40 mg/m2 (original dose 50 mg/m2, Cycle 3, Reason: Neutropenia, Comment: 20% dose reduction due to neutropenia)    Administration: 106 2 mg (8/15/2022), 84 6 mg (8/29/2022), 84 6 mg (10/4/2022)        filgrastim (NEUPOGEN) injection Soln, 480 mcg (100 % of original dose 480 mcg), Subcutaneous, Once, 1 of 1 cycle    Dose modification: 480 mcg (original dose 480 mcg, Cycle 4)        tbo-filgrastim (GRANIX), 480 mcg (100 % of original dose 480 mcg), Subcutaneous, Once, 2 of 2 cycles    Dose modification: 480 mcg (original dose 480 mcg, Cycle 2), 480 mcg (original dose 480 mcg, Cycle 4)    Administration: 480 mcg (8/22/2022), 480 mcg (9/7/2022)      7/2022 - adenocarcinoma of the distal esophagus s/p EUS + stent placement - [id number]    8/15/2022 - start weekly carbo/taxol/RT    Week 2 held due to cytopenias - FA and b12 deficiencies found and replacement started    Week 3 dose reducation of 20% for taxol, carbo AUC 1 5    Weeks 4-6 held due to cytopenias and hospital admission for dehydration, inability to eat and esophageal stent migration           8/15/2022 - 10/7/2022 Radiation    [facility]  This is the current list of radiation treatment:  Plan ID Energy Fractions Dose per Fraction (cGy) Dose Correction (cGy) Total Dose Delivered (cGy) Elapsed Days   CD Esophagus 6X 3 / 3 180 0 540 2   Distal Esoph 6X 25 / 25 180 0 4,500 50      Treatment dates:  8/15/2022 - 10/7/2022                History of Presenting Illness:  65-year-old male with recent history of adenocarcinoma of the distal esophagus status post EUS plus stent placement ([id number]) was admitted on account of shortness of breath, generalized weakness and was found to be COVID-19+  Hematology/oncology was consulted for pancytopenia  As per chart review, patient has had pancytopenia since the beginning of chemotherapy regimen  Patient sees Dr Jia Chester in outpatient settings and was started on folic OEUT/Y19 supplementation  Patient is noncompliant with the medications  He was started on carboplatin/Taxol/radiation in 2022  He completed his radiation but was not able to tolerate chemotherapy regimen  He only received 1 full dose and two reduced dose cycles  He has not received any chemo for the last 1 month  Labs on admission WBC 1 39 H&H 7 ,  platelets 18, ANC 4 60, Abs lymphocytes 0 11, % bands 31  Patient is afebrile and hypotensive  He does not exhibit any signs of sepsis  He is actively being managed for COVID-19 infection  Patient had undergone multiple EGDs studies in recent past   Last was on 2022 which showed stricture from known malignancy and previous stent in the stomach and was removed  Patient has an upcoming surgical intervention coming up regarding his esophageal cancer  He follows up with primary oncology on 2022  Review of Systems - As stated in the HPI otherwise the fourteen point review of systems was negative  ECOG PS: 2    Past Medical History:   Diagnosis Date   • Cardiac disease    • CHF (congestive heart failure) ([facility] [address] )    • Esophageal cancer (HCC)    • Hernia of abdominal cavity    • Myocardial infarction [facility])     last assessed 14, past, Pt had MI s/p AGUSTIN placed in , refuses to take any other medications for his cardiac disease, only will take seizure med   Understands possible complications from this decision   • Seizure [facility])        Social History     Socioeconomic History   • Marital status: Single     Spouse name: None   • Number of children: None   • Years of education: None   • Highest education level: None   Occupational History   • None   Tobacco Use   • Smoking status: Former Smoker     Quit date: 2005     Years since quittin 4   • Smokeless tobacco: Never Used   Vaping Use   • Vaping Use: Never used   Substance and Sexual Activity   • Alcohol use: Not Currently   • Drug use: Never   • Sexual activity: Never   Other Topics Concern   • None Social History Narrative   • None     Social Determinants of Health     Financial Resource Strain: Low Risk    • Difficulty of Paying Living Expenses: Not hard at all   Food Insecurity: No Food Insecurity   • Worried About Running Out of Food in the Last Year: Never true   • Ran Out of Food in the Last Year: Never true   Transportation Needs: No Transportation Needs   • Lack of Transportation (Medical): No   • Lack of Transportation (Non-Medical):  No   Physical Activity: Not on file   Stress: Not on file   Social Connections: Not on file   Intimate Partner Violence: Not on file   Housing Stability: Low Risk    • Unable to Pay for Housing in the Last Year: No   • Number of Places Lived in the Last Year: 1   • Unstable Housing in the Last Year: No       Family History   Problem Relation Age of Onset   • Hypertension Father    • Kidney disease Brother        Allergies   Allergen Reactions   • Iodinated Diagnostic Agents Rash     Pt's skin get very red and he gets hot and chills   • Clopidogrel Rash         Current Facility-Administered Medications:   •  acetaminophen (TYLENOL) tablet 650 mg, 650 mg, Oral, Q6H PRN, Fab Moeller MD  •  atorvastatin (LIPITOR) tablet 80 mg, 80 mg, Oral, Daily With Humza Carlton MD, 80 mg at 11/09/22 1707  •  calcium carbonate (TUMS) chewable tablet 500 mg, 500 mg, Oral, BID PRN, BRIJESH Sexton  •  dexamethasone (DECADRON) injection 6 mg, 6 mg, Intravenous, Q24H, Fab Moeller MD, 6 mg at 11/10/22 0608  •  midodrine (PROAMATINE) tablet 2 5 mg, 2 5 mg, Oral, TID AC, Fab Moeller MD, 2 5 mg at 11/10/22 0608  •  mirtazapine (REMERON) tablet 7 5 mg, 7 5 mg, Oral, HS, Fab Moeller MD, 7 5 mg at 11/09/22 2145  •  PHENobarbital tablet 64 8 mg, 64 8 mg, Oral, BID, Fab Moeller MD, 64 8 mg at 11/10/22 1000  •  potassium chloride 40 mEq IVPB (premix), 40 mEq, Intravenous, Once, Shukri Franks DO  •  [COMPLETED] remdesivir Laura Fujisawa) 200 mg in sodium chloride 0 9 % 290 mL IVPB, 200 mg, Intravenous, Q24H, 200 mg at 11/09/22 6214 **FOLLOWED BY** remdesivir (Veklury) 100 mg in sodium chloride 0 9 % 270 mL IVPB, 100 mg, Intravenous, Q24H, Shukri Nicolas Hof, DO  •  sodium chloride 0 9 % bolus 500 mL, 500 mL, Intravenous, Once, Shukri Huddlestonf, DO      BP (!) 86/50   Pulse 84   Temp 97 7 °F (36 5 °C)   Resp 18   Ht 6' (1 829 m)   Wt 75 kg (165 lb 5 5 oz)   SpO2 95%   BMI 22 42 kg/m²     General Appearance:    Alert, oriented        Eyes:    PERRL   Ears:    Normal external ear canals, both ears   Nose:   Nares normal, septum midline   Throat:   Mucosa moist  Pharynx without injection  Neck:   Supple       Lungs:     Clear to auscultation bilaterally   Chest Wall:    No tenderness or deformity    Heart:    Regular rate and rhythm       Abdomen:     Soft, non-tender, bowel sounds +, no organomegaly           Extremities:   Extremities no cyanosis or edema       Skin:   no rash or icterus      Lymph nodes:   Cervical, supraclavicular, and axillary nodes normal   Neurologic:   CNII-XII intact, normal strength, sensation and reflexes     Throughout               Recent Results (from the past 48 hour(s))   ECG 12 lead    Collection Time: 11/09/22  2:36 AM   Result Value Ref Range    Ventricular Rate 95 BPM    Atrial Rate 192 BPM    WV Interval  ms    QRSD Interval 86 ms    QT Interval 370 ms    QTC Interval 464 ms    P Axis  degrees    QRS Axis -41 degrees    T Wave Axis 50 degrees   CBC and differential    Collection Time: 11/09/22  3:04 AM   Result Value Ref Range    WBC 2 00 (L) 4 31 - 10 16 Thousand/uL    RBC 2 16 (L) 3 88 - 5 62 Million/uL    Hemoglobin 7 4 (L) 12 0 - 17 0 g/dL    Hematocrit 23 1 (L) 36 5 - 49 3 %     (H) 82 - 98 fL    MCH 34 3 26 8 - 34 3 pg    MCHC 32 0 31 4 - 37 4 g/dL    RDW 21 8 (H) 11 6 - 15 1 %    Platelets 25 (LL) 211 - 390 Thousands/uL    nRBC 0 /100 WBCs    Neutrophils Relative 74 43 - 75 %    Immat GRANS % 1 0 - 2 % Lymphocytes Relative 14 14 - 44 %    Monocytes Relative 8 4 - 12 %    Eosinophils Relative 2 0 - 6 %    Basophils Relative 1 0 - 1 %    Neutrophils Absolute 1 53 (L) 1 85 - 7 62 Thousands/µL    Immature Grans Absolute 0 01 0 00 - 0 20 Thousand/uL    Lymphocytes Absolute 0 27 (L) 0 60 - 4 47 Thousands/µL    Monocytes Absolute 0 15 (L) 0 17 - 1 22 Thousand/µL    Eosinophils Absolute 0 03 0 00 - 0 61 Thousand/µL    Basophils Absolute 0 01 0 00 - 0 10 Thousands/µL   Protime-INR    Collection Time: 11/09/22  3:04 AM   Result Value Ref Range    Protime 16 4 (H) 11 6 - 14 5 seconds    INR 1 32 (H) 0 84 - 1 19   APTT    Collection Time: 11/09/22  3:04 AM   Result Value Ref Range    PTT 38 (H) 23 - 37 seconds   Comprehensive metabolic panel    Collection Time: 11/09/22  3:04 AM   Result Value Ref Range    Sodium 138 135 - 147 mmol/L    Potassium 3 0 (L) 3 5 - 5 3 mmol/L    Chloride 102 96 - 108 mmol/L    CO2 31 21 - 32 mmol/L    ANION GAP 5 4 - 13 mmol/L    BUN 17 5 - 25 mg/dL    Creatinine 0 78 0 60 - 1 30 mg/dL    Glucose 105 65 - 140 mg/dL    Calcium 7 7 (L) 8 3 - 10 1 mg/dL    Corrected Calcium 9 4 8 3 - 10 1 mg/dL    AST      ALT 27 12 - 78 U/L    Alkaline Phosphatase 94 46 - 116 U/L    Total Protein 6 9 6 4 - 8 4 g/dL    Albumin 1 9 (L) 3 5 - 5 0 g/dL    Total Bilirubin 0 35 0 20 - 1 00 mg/dL    eGFR 87 ml/min/1 73sq m   Type and screen    Collection Time: 11/09/22  3:04 AM   Result Value Ref Range    ABO Grouping A     Rh Factor Positive     Antibody Screen Negative     Specimen Expiration Date [id number]    FLU/RSV/COVID - if FLU/RSV clinically relevant    Collection Time: 11/09/22  3:04 AM    Specimen: Nose; Nares   Result Value Ref Range    SARS-CoV-2 Positive (A) Negative    INFLUENZA A PCR Negative Negative    INFLUENZA B PCR Negative Negative    RSV PCR Negative Negative   NT-BNP PRO    Collection Time: 11/09/22  3:04 AM   Result Value Ref Range    NT-proBNP 1,399 (H) <450 pg/mL   C-reactive protein Collection Time: 11/09/22  3:04 AM   Result Value Ref Range    CRP >90 0 (H) <3 0 mg/L   Procalcitonin    Collection Time: 11/09/22  3:04 AM   Result Value Ref Range    Procalcitonin 0 56 (H) <=0 25 ng/ml   HS Troponin 0hr (reflex protocol)    Collection Time: 11/09/22  4:04 AM   Result Value Ref Range    hs TnI 0hr 35 "Refer to ACS Flowchart"- see link ng/L   HS Troponin I 2hr    Collection Time: 11/09/22  6:14 AM   Result Value Ref Range    hs TnI 2hr 32 "Refer to ACS Flowchart"- see link ng/L    Delta 2hr hsTnI -3 <20 ng/L   HS Troponin I 4hr    Collection Time: 11/09/22  8:55 AM   Result Value Ref Range    hs TnI 4hr 30 "Refer to ACS Flowchart"- see link ng/L    Delta 4hr hsTnI -5 <20 ng/L   Comprehensive metabolic panel    Collection Time: 11/10/22  5:20 AM   Result Value Ref Range    Sodium 139 135 - 147 mmol/L    Potassium 3 0 (L) 3 5 - 5 3 mmol/L    Chloride 102 96 - 108 mmol/L    CO2 31 21 - 32 mmol/L    ANION GAP 6 4 - 13 mmol/L    BUN 15 5 - 25 mg/dL    Creatinine 0 62 0 60 - 1 30 mg/dL    Glucose 92 65 - 140 mg/dL    Glucose, Fasting 92 65 - 99 mg/dL    Calcium 7 4 (L) 8 3 - 10 1 mg/dL    Corrected Calcium 9 2 8 3 - 10 1 mg/dL     (H) 5 - 45 U/L    ALT 33 12 - 78 U/L    Alkaline Phosphatase 93 46 - 116 U/L    Total Protein 6 3 (L) 6 4 - 8 4 g/dL    Albumin 1 7 (L) 3 5 - 5 0 g/dL    Total Bilirubin 0 26 0 20 - 1 00 mg/dL    eGFR 95 ml/min/1 73sq m   CBC and differential    Collection Time: 11/10/22  5:20 AM   Result Value Ref Range    WBC 1 39 (LL) 4 31 - 10 16 Thousand/uL    RBC 2 05 (L) 3 88 - 5 62 Million/uL    Hemoglobin 7 2 (L) 12 0 - 17 0 g/dL    Hematocrit 22 0 (L) 36 5 - 49 3 %     (H) 82 - 98 fL    MCH 35 1 (H) 26 8 - 34 3 pg    MCHC 32 7 31 4 - 37 4 g/dL    RDW 21 8 (H) 11 6 - 15 1 %    Platelets 18 (LL) 607 - 390 Thousands/uL   Manual Differential(PHLEBS Do Not Order)    Collection Time: 11/10/22  5:20 AM   Result Value Ref Range    Segmented % 60 43 - 75 %    Bands % 31 (H) 0 - 8 %    Lymphocytes % 8 (L) 14 - 44 %    Monocytes % 0 (L) 4 - 12 %    Eosinophils, % 1 0 - 6 %    Basophils % 0 0 - 1 %    Absolute Neutrophils 1 26 (L) 1 85 - 7 62 Thousand/uL    Lymphocytes Absolute 0 11 (L) 0 60 - 4 47 Thousand/uL    Monocytes Absolute 0 00 0 00 - 1 22 Thousand/uL    Eosinophils Absolute 0 01 0 00 - 0 40 Thousand/uL    Basophils Absolute 0 00 0 00 - 0 10 Thousand/uL    Total Counted      Anisocytosis Present     Hypochromia Present     Macrocytes Present     Platelet Estimate Decreased (A) Adequate         XR chest 1 view portable    Result Date: 11/9/2022  Narrative: CHEST INDICATION:   hypoxia  COMPARISON:  10/17/2022 EXAM PERFORMED/VIEWS:  XR CHEST PORTABLE FINDINGS: Cardiomediastinal silhouette appears unremarkable  The lungs are clear  Right Mediport and esophageal stent again noted  No pneumothorax or pleural effusion  Osseous structures appear within normal limits for patient age  Impression: No acute cardiopulmonary disease  Workstation performed: [id number]     XR chest 1 view portable    Result Date: 10/17/2022  Narrative: CHEST INDICATION:   weakness  COMPARISON:  9/19/2022; 9/22/2022 EXAM PERFORMED/VIEWS:  XR CHEST PORTABLE Single view FINDINGS: Right IJ port terminating at cavoatrial junction Cardiomediastinal silhouette appears unremarkable  The lungs are clear  No pneumothorax or pleural effusion  Osseous structures appear within normal limits for patient age  Distal esophageal stent again evident     Impression: No acute cardiopulmonary disease  Findings are stable Workstation performed: [id number]       Assessment and Plan:  58-year-old male with recent history of adenocarcinoma of the distal esophagus status post EUS plus stent placement ([id number]) was admitted on account of shortness of breath, generalized weakness and was found to be COVID-19+  Hematology/oncology was consulted for pancytopenia    As per chart review, patient has had pancytopenia since the beginning of chemotherapy regimen  Patient sees Dr Kristen Bain in outpatient settings and was started on folic QORT/M47 supplementation  Patient is noncompliant with the medications  He was started on carboplatin/Taxol/radiation in August 2022  He completed his radiation but was not able to tolerate chemotherapy regimen  He only received 1 full dose and two reduced dose cycles  He has not received any chemo for the last 1 month  Labs on admission WBC 1 39 H&H 7 2/22,  platelets 18, ANC 6 04, Abs lymphocytes 0 11, % bands 31  Patient is afebrile and hypotensive  He does not exhibit any signs of sepsis  He is actively being managed for COVID-19 infection  Patient had undergone multiple EGDs studies in recent past   Last was on 09/21/2022 which showed stricture from known malignancy and previous stent in the stomach and was removed  Patient has an upcoming surgical intervention coming up regarding his esophageal cancer  He follows up with primary oncology on 11/21/2022  At this point, pancytopenia could be multifactorial in nature  Possible diagnosis include but is not limited to age related, MDS, B12/folate deficiency, active viral infx  A bone marrow biopsy would be warranted in the near future  We would give one unit of granix 480mcg now and tomorrow (11/11/22) and monitor CBC with diff  Hgb has trended down relatively quickly  Give one unit of pRBC to keep Hgb > 7  We will monitor platelets overnight and transfuse if patient becomes symptomatic  Continue with COVID-19 treatment  Hold aspirin and anticoag since plt count is below 20K  SCDs for now  We will reevaluate tomorrow (11/11/22) whether patient would require platelets or not  Hematology oncology on board  Please feel free to reach out with any questions  I spent 40 minutes on chart review, direct face to face counseling, coordination of care and documentation

## 2022-11-10 NOTE — PLAN OF CARE
Problem: MOBILITY - ADULT  Goal: Maintain or return to baseline ADL function  Description: INTERVENTIONS:  -  Assess patient's ability to carry out ADLs; assess patient's baseline for ADL function and identify physical deficits which impact ability to perform ADLs (bathing, care of mouth/teeth, toileting, grooming, dressing, etc )  - Assess/evaluate cause of self-care deficits   - Assess range of motion  - Assess patient's mobility; develop plan if impaired  - Assess patient's need for assistive devices and provide as appropriate  - Encourage maximum independence but intervene and supervise when necessary  - Involve family in performance of ADLs  - Assess for home care needs following discharge   - Consider OT consult to assist with ADL evaluation and planning for discharge  - Provide patient education as appropriate  Outcome: Progressing  Goal: Maintains/Returns to pre admission functional level  Description: INTERVENTIONS:  - Perform BMAT or MOVE assessment daily    - Set and communicate daily mobility goal to care team and patient/family/caregiver  - Collaborate with rehabilitation services on mobility goals if consulted  - Perform Range of Motion 2 times a day  - Reposition patient every 2 hours    - Dangle patient 3 times a day  - Stand patient 2 times a day  - Ambulate patient 3 times a day  - Out of bed to chair x1 times a day   - Out of bed for meals 3 times a day  - Out of bed for toileting  - Record patient progress and toleration of activity level   Outcome: Progressing     Problem: Prexisting or High Potential for Compromised Skin Integrity  Goal: Skin integrity is maintained or improved  Description: INTERVENTIONS:  - Identify patients at risk for skin breakdown  - Assess and monitor skin integrity  - Assess and monitor nutrition and hydration status  - Monitor labs   - Assess for incontinence   - Turn and reposition patient  - Assist with mobility/ambulation  - Relieve pressure over bony prominences  - Avoid friction and shearing  - Provide appropriate hygiene as needed including keeping skin clean and dry  - Evaluate need for skin moisturizer/barrier cream  - Collaborate with interdisciplinary team   - Patient/family teaching  - Consider wound care consult   Outcome: Progressing     Problem: Potential for Falls  Goal: Patient will remain free of falls  Description: INTERVENTIONS:  - Educate patient/family on patient safety including physical limitations  - Instruct patient to call for assistance with activity   - Consult OT/PT to assist with strengthening/mobility   - Keep Call bell within reach  - Keep bed low and locked with side rails adjusted as appropriate  - Keep care items and personal belongings within reach  - Initiate and maintain comfort rounds  - Make Fall Risk Sign visible to staff  - Offer Toileting every 2 Hours, in advance of need  - Initiate/Maintain bed alarm  - Obtain necessary fall risk management equipment: [employer]    - Apply yellow socks and bracelet for high fall risk patients  - Consider moving patient to room near nurses station  Outcome: Progressing

## 2022-11-10 NOTE — PROGRESS NOTES
[facility]  Progress Note - Kirk Left 1945, 67 y o  male MRN: [id number]  Unit/Bed#: [facility] 2 -01 Encounter: [id number]  Primary Care Provider: Tristin Crow MD   Date and time admitted to hospital: 11/9/2022  2:30 AM    * COVID-19  Assessment & Plan  Patient presenting with mild COVID-19 infection  Vaccination status Unvaccinated, per records review  Oxygen requirements, currently on room air  Remdesvir Day #2/5  Decadron Day #2/10  Anticoagulation held due to significant thrombocytopenia  Trend CMP,CBC daily  Overall prognosis remains poor especially in the setting of malignancy, likely patient has been weaned to room air  Close monitor of underlying respiratory status    Hypoxia  Assessment & Plan  Patient initially found to be satting in the [de-identified] on EMS arrival   Initially improved on 4 L nasal cannula, able to be weaned to room air  However, while sleeping he did have desaturation to the 80s requiring 2 L nasal cannula  On interview, he is on room air while awake  CXR pulmonary evaluation without alveolar infiltrates  · COVID positive  · Monitor for worsening hypoxia given COVID positive status  · Consider discontinuation of Decadron within the next 24 hours        Pancytopenia [facility])  Assessment & Plan  Patient with history of pancytopenia, in setting of esophageal cancer  Noted on labs again today  · Trend CBC  · Hold aspirin  · Continue p o  PPI,  · Maintain type and screen  · Transfuse for hemoglobin less than 7    Suspect thrombocytopenia and worsening leukopenia in the setting of COVID-19  Will check iron panel  Additionally consider platelet transfusion for platelet count less than 10,000      Esophageal cancer [facility])  Assessment & Plan  History of stage IIB esophageal cancer diagnosed May 2022  He completed carboplatin with Taxol chemotherapy and radiation therapy      Now with pancytopenia, will place hematology consultation    The patient was changed from observation to inpatient secondary to COVID-19, pancytopenia requiring an additional 2 midnights for treatment and management  For the past family and social history and review of systems please see observation H&P which was dated 11/9/2022, and is located in the patient's chart  I have reviewed the information and there have been no changes  [facility] Progress Note  Patient: Molly Albrecht 67 y o  male   MRN: [id number]  PCP: Amanda Bray MD  Unit/Bed#: [address] 202-01 Encounter: [id number]  Date Of Visit: 11/10/22    Assessment:    Principal Problem:    COVID-19  Active Problems:    HLD (hyperlipidemia)    Seizure disorder (HCC)    Esophageal cancer ([facility] [address] )    Pancytopenia ([facility] [address] )    Hypoxia      Plan:    · Continue remdesivir  · Continue Decadron, likely discontinue if patient is no longer epoch  · Monitor platelet count  ·        VTE Pharmacologic Prophylaxis:   Pharmacologic: Pharmacologic VTE Prophylaxis contraindicated due to Significant thrombocytopenia  Mechanical VTE Prophylaxis in Place: Yes    Patient Centered Rounds: I have performed bedside rounds with nursing staff today  Discussions with Specialists or Other Care Team Provider:  Discussed with     Education and Discussions with Family / Patient:  Patient brother contacted at 6    Time Spent for Care: 45 minutes  More than 50% of total time spent on counseling and coordination of care as described above  Current Length of Stay: 0 day(s)    Current Patient Status: Inpatient   Certification Statement: The patient will continue to require additional inpatient hospital stay due to Thrombocytopenia, COVID-19 in extremely high risk patient    Discharge Plan / Estimated Discharge Date:  48 hours    Code Status: Level 3 - DNAR and DNI      Subjective:   Seen examined, no acute complaints    On room air    A complete and comprehensive 14 point organ system review has been performed and all other systems are negative other than stated above  Objective:     Vitals:   Temp (24hrs), Av 2 °F (36 8 °C), Min:97 7 °F (36 5 °C), Max:98 8 °F (37 1 °C)    Temp:  [97 7 °F (36 5 °C)-98 8 °F (37 1 °C)] 97 7 °F (36 5 °C)  HR:  [83-94] 84  Resp:  [18-20] 18  BP: ()/(49-64) 86/50  SpO2:  [94 %-99 %] 95 %  Body mass index is 22 42 kg/m²       Input and Output Summary (last 24 hours):     No intake or output data in the 24 hours ending 11/10/22 1040    Physical Exam:     General: well appearing, no acute distress  HEENT: atraumatic, PERRLA, moist mucosa, normal pharynx, normal tonsils and adenoids, normal tongue, no fluid in sinuses  Neck: Trachea midline, no carotid bruit, no masses  Respiratory: normal chest wall expansion, CTA B, no r/r/w, no rubs  Cardiovascular: RRR, no m/r/g, Normal S1 and S2  Abdomen: Soft, non-tender, non-distended, normal bowel sounds in all quadrants, no hepatosplenomegaly, no tympany  Rectal: deferred  Musculoskeletal: normal ROM in upper and lower extremities  Integumentary: warm, dry, and pink, with no rash, purpura, or petechia  Heme/Lymph: no lymphadenopathy, no bruises  Neurological: Cranial Nerves II-XII grossly intact, no tics, normal sensation to pressure and light touch  Psychiatric: cooperative with normal mood, affect, and cognition      Additional Data:     Labs:    Results from last 7 days   Lab Units 11/10/22  0520 22  0304   WBC Thousand/uL 1 39* 2 00*   HEMOGLOBIN g/dL 7 2* 7 4*   HEMATOCRIT % 22 0* 23 1*   PLATELETS Thousands/uL 18* 25*   NEUTROS PCT %  --  74   LYMPHS PCT %  --  14   LYMPHO PCT % 8*  --    MONOS PCT %  --  8   MONO PCT % 0*  --    EOS PCT % 1 2     Results from last 7 days   Lab Units 11/10/22  0520   POTASSIUM mmol/L 3 0*   CHLORIDE mmol/L 102   CO2 mmol/L 31   BUN mg/dL 15   CREATININE mg/dL 0 62   CALCIUM mg/dL 7 4*   ALK PHOS U/L 93   ALT U/L 33   AST U/L 106*     Results from last 7 days   Lab Units 22  0304   INR  1 32*       * I Have Reviewed All Lab Data Listed Above  * Additional Pertinent Lab Tests Reviewed: [address] Admission Reviewed    Imaging:    Imaging Reports Reviewed Today Include:  No new imaging  Imaging Personally Reviewed by Myself Includes:  No new imaging    Recent Cultures (last 7 days):           Last 24 Hours Medication List:   Current Facility-Administered Medications   Medication Dose Route Frequency Provider Last Rate   • acetaminophen  650 mg Oral Q6H PRN Michele Aleman MD     • atorvastatin  80 mg Oral Daily With Elen Kinney MD     • calcium carbonate  500 mg Oral BID PRN BRIJESH Sexton     • dexamethasone  6 mg Intravenous Q24H Michele Aleman MD     • midodrine  2 5 mg Oral TID Ashley Islas MD     • mirtazapine  7 5 mg Oral HS Michele Aleman MD     • PHENobarbital  64 8 mg Oral BID Michele Aleman MD     • potassium chloride  40 mEq Intravenous Once Brenda Trotter DO     • remdesivir  100 mg Intravenous Q24H Brenda Trotter DO     • sodium chloride  500 mL Intravenous Once Shukri Ward DO          Today, Patient Was Seen By: Brenda Trotter DO    ** Please Note: This note was completed in part utilizing [employer] Software  Grammatical errors, random word insertions, spelling mistakes, and incomplete sentences may be an occasional consequence of this system secondary to software limitations, ambient noise, and hardware issues  If you have any questions or concerns about the content, text, or information contained within the body of this dictation, please contact the provider for clarification   **

## 2022-11-10 NOTE — ASSESSMENT & PLAN NOTE
Patient initially found to be satting in the 80s on EMS arrival   Initially improved on 4 L nasal cannula, able to be weaned to room air  However, while sleeping he did have desaturation to the 80s requiring 2 L nasal cannula  On interview, he is on room air while awake  CXR pulmonary evaluation without alveolar infiltrates    · COVID positive  · Monitor for worsening hypoxia given COVID positive status  · Consider discontinuation of Decadron within the next 24 hours

## 2022-11-10 NOTE — ASSESSMENT & PLAN NOTE
Patient with history of pancytopenia, in setting of esophageal cancer  Noted on labs again today    · Trend CBC  · Hold aspirin  · Continue p o  PPI,  · Maintain type and screen  · Transfuse for hemoglobin less than 7    Suspect thrombocytopenia and worsening leukopenia in the setting of COVID-19  Will check iron panel  Additionally consider platelet transfusion for platelet count less than 10,000

## 2022-11-10 NOTE — ASSESSMENT & PLAN NOTE
History of stage IIB esophageal cancer diagnosed May 2022  He completed carboplatin with Taxol chemotherapy and radiation therapy      Now with pancytopenia, will place hematology consultation

## 2022-11-11 LAB
ABO GROUP BLD BPU: NORMAL
ABO GROUP BLD BPU: NORMAL
ALBUMIN SERPL BCP-MCNC: 1.9 G/DL (ref 3.5–5)
ALP SERPL-CCNC: 114 U/L (ref 46–116)
ALT SERPL W P-5'-P-CCNC: 32 U/L (ref 12–78)
ANION GAP SERPL CALCULATED.3IONS-SCNC: 8 MMOL/L (ref 4–13)
ANISOCYTOSIS BLD QL SMEAR: PRESENT
AST SERPL W P-5'-P-CCNC: 105 U/L (ref 5–45)
BASOPHILS # BLD MANUAL: 0.05 THOUSAND/UL (ref 0–0.1)
BASOPHILS NFR MAR MANUAL: 1 % (ref 0–1)
BILIRUB SERPL-MCNC: 0.46 MG/DL (ref 0.2–1)
BPU ID: NORMAL
BPU ID: NORMAL
BUN SERPL-MCNC: 15 MG/DL (ref 5–25)
CALCIUM ALBUM COR SERPL-MCNC: 9.6 MG/DL (ref 8.3–10.1)
CALCIUM SERPL-MCNC: 7.9 MG/DL (ref 8.3–10.1)
CHLORIDE SERPL-SCNC: 103 MMOL/L (ref 96–108)
CO2 SERPL-SCNC: 28 MMOL/L (ref 21–32)
CREAT SERPL-MCNC: 0.63 MG/DL (ref 0.6–1.3)
CROSSMATCH: NORMAL
CROSSMATCH: NORMAL
EOSINOPHIL # BLD MANUAL: 0.1 THOUSAND/UL (ref 0–0.4)
EOSINOPHIL NFR BLD MANUAL: 2 % (ref 0–6)
ERYTHROCYTE [DISTWIDTH] IN BLOOD BY AUTOMATED COUNT: 27.1 % (ref 11.6–15.1)
GFR SERPL CREATININE-BSD FRML MDRD: 95 ML/MIN/1.73SQ M
GLUCOSE SERPL-MCNC: 105 MG/DL (ref 65–140)
HCT VFR BLD AUTO: 33.6 % (ref 36.5–49.3)
HGB BLD-MCNC: 11.2 G/DL (ref 12–17)
LYMPHOCYTES # BLD AUTO: 0.14 THOUSAND/UL (ref 0.6–4.47)
LYMPHOCYTES # BLD AUTO: 3 % (ref 14–44)
MACROCYTES BLD QL AUTO: PRESENT
MCH RBC QN AUTO: 32.8 PG (ref 26.8–34.3)
MCHC RBC AUTO-ENTMCNC: 33.3 G/DL (ref 31.4–37.4)
MCV RBC AUTO: 99 FL (ref 82–98)
MONOCYTES # BLD AUTO: 0.14 THOUSAND/UL (ref 0–1.22)
MONOCYTES NFR BLD: 3 % (ref 4–12)
NEUTROPHILS # BLD MANUAL: 4.33 THOUSAND/UL (ref 1.85–7.62)
NEUTS BAND NFR BLD MANUAL: 8 % (ref 0–8)
NEUTS SEG NFR BLD AUTO: 83 % (ref 43–75)
PLATELET # BLD AUTO: 17 THOUSANDS/UL (ref 149–390)
PLATELET BLD QL SMEAR: ABNORMAL
POTASSIUM SERPL-SCNC: 3.7 MMOL/L (ref 3.5–5.3)
PROT SERPL-MCNC: 7.3 G/DL (ref 6.4–8.4)
RBC # BLD AUTO: 3.41 MILLION/UL (ref 3.88–5.62)
SODIUM SERPL-SCNC: 139 MMOL/L (ref 135–147)
UNIT DISPENSE STATUS: NORMAL
UNIT DISPENSE STATUS: NORMAL
UNIT PRODUCT CODE: NORMAL
UNIT PRODUCT CODE: NORMAL
UNIT PRODUCT VOLUME: 350 ML
UNIT PRODUCT VOLUME: 350 ML
UNIT RH: NORMAL
UNIT RH: NORMAL
WBC # BLD AUTO: 4.76 THOUSAND/UL (ref 4.31–10.16)

## 2022-11-11 PROCEDURE — 30233N1 TRANSFUSION OF NONAUTOLOGOUS RED BLOOD CELLS INTO PERIPHERAL VEIN, PERCUTANEOUS APPROACH: ICD-10-PCS | Performed by: INTERNAL MEDICINE

## 2022-11-11 RX ORDER — MIDODRINE HYDROCHLORIDE 5 MG/1
5 TABLET ORAL
Status: DISCONTINUED | OUTPATIENT
Start: 2022-11-11 | End: 2022-11-12 | Stop reason: HOSPADM

## 2022-11-11 RX ADMIN — ATORVASTATIN CALCIUM 80 MG: 80 TABLET, FILM COATED ORAL at 17:29

## 2022-11-11 RX ADMIN — DEXAMETHASONE SODIUM PHOSPHATE 6 MG: 4 INJECTION INTRA-ARTICULAR; INTRALESIONAL; INTRAMUSCULAR; INTRAVENOUS; SOFT TISSUE at 06:23

## 2022-11-11 RX ADMIN — MIDODRINE HYDROCHLORIDE 2.5 MG: 2.5 TABLET ORAL at 06:24

## 2022-11-11 RX ADMIN — TBO-FILGRASTIM 480 MCG: 480 INJECTION, SOLUTION SUBCUTANEOUS at 17:30

## 2022-11-11 RX ADMIN — REMDESIVIR 100 MG: 100 INJECTION, POWDER, LYOPHILIZED, FOR SOLUTION INTRAVENOUS at 23:01

## 2022-11-11 RX ADMIN — MIDODRINE HYDROCHLORIDE 5 MG: 5 TABLET ORAL at 17:00

## 2022-11-11 RX ADMIN — PHENOBARBITAL 64.8 MG: 32.4 TABLET ORAL at 17:29

## 2022-11-11 RX ADMIN — PHENOBARBITAL 64.8 MG: 32.4 TABLET ORAL at 09:05

## 2022-11-11 RX ADMIN — MIDODRINE HYDROCHLORIDE 2.5 MG: 2.5 TABLET ORAL at 12:00

## 2022-11-11 RX ADMIN — MIRTAZAPINE 7.5 MG: 15 TABLET, FILM COATED ORAL at 23:01

## 2022-11-11 NOTE — PLAN OF CARE
Problem: MOBILITY - ADULT  Goal: Maintain or return to baseline ADL function  Description: INTERVENTIONS:  -  Assess patient's ability to carry out ADLs; assess patient's baseline for ADL function and identify physical deficits which impact ability to perform ADLs (bathing, care of mouth/teeth, toileting, grooming, dressing, etc )  - Assess/evaluate cause of self-care deficits   - Assess range of motion  - Assess patient's mobility; develop plan if impaired  - Assess patient's need for assistive devices and provide as appropriate  - Encourage maximum independence but intervene and supervise when necessary  - Involve family in performance of ADLs  - Assess for home care needs following discharge   - Consider OT consult to assist with ADL evaluation and planning for discharge  - Provide patient education as appropriate  Outcome: Progressing  Goal: Maintains/Returns to pre admission functional level  Description: INTERVENTIONS:  - Perform BMAT or MOVE assessment daily    - Set and communicate daily mobility goal to care team and patient/family/caregiver  - Collaborate with rehabilitation services on mobility goals if consulted  - Perform Range of Motion 2 times a day  - Reposition patient every 2 hours    - Dangle patient 2 times a day  - Stand patient x1 times a day  - Ambulate patient 2 times a day  - Out of bed to chair 1 times a day   - Out of bed for meals 3 times a day  - Out of bed for toileting  - Record patient progress and toleration of activity level   Outcome: Progressing

## 2022-11-11 NOTE — PLAN OF CARE
Problem: MOBILITY - ADULT  Goal: Maintain or return to baseline ADL function  Description: INTERVENTIONS:  -  Assess patient's ability to carry out ADLs; assess patient's baseline for ADL function and identify physical deficits which impact ability to perform ADLs (bathing, care of mouth/teeth, toileting, grooming, dressing, etc )  - Assess/evaluate cause of self-care deficits   - Assess range of motion  - Assess patient's mobility; develop plan if impaired  - Assess patient's need for assistive devices and provide as appropriate  - Encourage maximum independence but intervene and supervise when necessary  - Involve family in performance of ADLs  - Assess for home care needs following discharge   - Consider OT consult to assist with ADL evaluation and planning for discharge  - Provide patient education as appropriate  Outcome: Progressing  Goal: Maintains/Returns to pre admission functional level  Description: INTERVENTIONS:  - Perform BMAT or MOVE assessment daily    - Set and communicate daily mobility goal to care team and patient/family/caregiver     - Collaborate with rehabilitation services on mobility goals if consulted     Problem: Prexisting or High Potential for Compromised Skin Integrity  Goal: Skin integrity is maintained or improved  Description: INTERVENTIONS:  - Identify patients at risk for skin breakdown  - Assess and monitor skin integrity  - Assess and monitor nutrition and hydration status  - Monitor labs   - Assess for incontinence   - Turn and reposition patient  - Assist with mobility/ambulation  - Relieve pressure over bony prominences  - Avoid friction and shearing  - Provide appropriate hygiene as needed including keeping skin clean and dry  - Evaluate need for skin moisturizer/barrier cream  - Collaborate with interdisciplinary team   - Patient/family teaching  - Consider wound care consult   Outcome: Progressing     Problem: Potential for Falls  Goal: Patient will remain free of falls  Description: INTERVENTIONS:  - Educate patient/family on patient safety including physical limitations  - Instruct patient to call for assistance with activity   - Consult OT/PT to assist with strengthening/mobility   - Keep Call bell within reach  - Keep bed low and locked with side rails adjusted as appropriate  - Keep care items and personal belongings within reach  - Initiate and maintain comfort rounds  - Make Fall Risk Sign visible to staff  - Offer Toileting every 2 Hours, in advance of need  - Initiate/Maintain bed alarm  - Obtain necessary fall risk management equipment: walker  - Apply yellow socks and bracelet for high fall risk patients  - Consider moving patient to room near nurses station  Outcome: Progressing

## 2022-11-11 NOTE — PROGRESS NOTES
Oncology Progress Note  Shelly Nicholson 67 y o  male MRN: [id number]  Unit/Bed#: [place] [address] [address] [id number] Encounter: [id number]      Oncology History Overview Note   Manju Zhang presents for first phone follow up  Rad Txmt: 8 15-10 7 2022  Dx: Esophageal Cancer     Pt is a 67 y o  male with newly diagnosed atleast tN0H1T7 esophageal adenocarcinoma of the distal esophagus  EGD/EUS and stent placement overall reveal an ulcerated mass (traversable) measuring 5cm in the lower third of the esophagus (35 - 40 cm from the incisors, covering three quarters of the circumference  A stent was placed spanning 12cm from 4cm proximal to 3 5cm distal to the tumor  PET notable for a minimally avid primary without any identifiable lymph nodes  EUS showed that the mass extended to the muscularis propria and may have involved the adventitia at some areas; therefore is staged at least as a T2  He was counseled regarding preop chemoRT followed by surgery  10 17 22  ED    Admit 10 17-20 22   Dx: Failure to thrive/Esophageal Cancer/CAD/Recent antiplatelet therapy/PAF/CHF/Seizure D/O/Hypokalemia     10 20 22  ED    Admit 10 20-24 22  Pt presented initially on 10 17 for failure to thrive  Discharged on the afternoon of 10 20 22  He fell after stepping up from his front door and experienced minor injuries  No reported LOC  Bystander assisted helping him up  Experiencing ambulatory dysfunction and weakness  Concern for future fall risk  Pt has steps in his home  Suggested: post acute rehab per PT eval    Plan to transfer pt to [facility] facility  Added Dx: Esophageal Cancer, CAD, PAF, CHF, Seizure D/O, Hypokalemia   10 24 22 D/C to [facility]      11 9 22  ED   Sent from [facility], per EMS they were called for abnormal labs, on arrival pt was found to be lethargic w/O2 in the 80's  Pt became responsive with O24LNC, arrives A & O x 4, does not want to be here       Appt    11 14 22 Med Onc-Dr Francis Christian        Esophageal cancer ([facility] [address] )   5/2/2022 Initial Diagnosis    Esophageal adenocarcinoma ([facility] [address] )     5/4/2022 Biopsy    Esophagus, lower esophageal bx:  - Poorly differentiated carcinoma, consistent with adenocarcinoma         5/18/2022 -  Cancer Staged    Staging form: Esophagus - Adenocarcinoma, AJCC 8th Edition  - Clinical stage from 5/18/2022: Stage IIB (cT2, cN0, cM0, G3) - Signed by Hernandez Wilkerson MD on 7/5/2022  Total positive nodes: 0  Histologic grading system: 3 grade system  HER2 status: Negative  Clinical staging modalities: Biopsy, EUS, Endoscopy, PET/CT       8/15/2022 -  Chemotherapy    CARBOplatin (PARAPLATIN) IVPB (GOG AUC DOSING), 237 6 mg, Intravenous, Once, 4 of 5 cycles  Administration: 237 6 mg (8/15/2022), 190 05 mg (8/29/2022), 192 15 mg (10/4/2022)  PACLItaxel (TAXOL) chemo IVPB, 50 mg/m2 = 106 2 mg, Intravenous, Once, 4 of 5 cycles  Dose modification: 40 mg/m2 (original dose 50 mg/m2, Cycle 3, Reason: Neutropenia, Comment: 20% dose reduction due to neutropenia)  Administration: 106 2 mg (8/15/2022), 84 6 mg (8/29/2022), 84 6 mg (10/4/2022)  filgrastim (NEUPOGEN) injection Soln, 480 mcg (100 % of original dose 480 mcg), Subcutaneous, Once, 1 of 1 cycle  Dose modification: 480 mcg (original dose 480 mcg, Cycle 4)  tbo-filgrastim (GRANIX), 480 mcg (100 % of original dose 480 mcg), Subcutaneous, Once, 2 of 2 cycles  Dose modification: 480 mcg (original dose 480 mcg, Cycle 2), 480 mcg (original dose 480 mcg, Cycle 4)  Administration: 480 mcg (8/22/2022), 480 mcg (9/7/2022)      Chemotherapy    CARBOplatin (PARAPLATIN) IVPB (GOG AUC DOSING), 237 6 mg, Intravenous, Once, 4 of 5 cycles    Administration: 237 6 mg (8/15/2022), 190 05 mg (8/29/2022), 192 15 mg (10/4/2022)        PACLItaxel (TAXOL) chemo IVPB, 50 mg/m2 = 106 2 mg, Intravenous, Once, 4 of 5 cycles    Dose modification: 40 mg/m2 (original dose 50 mg/m2, Cycle 3, Reason: Neutropenia, Comment: 20% dose reduction due to neutropenia)    Administration: 106 2 mg (8/15/2022), 84 6 mg (8/29/2022), 84 6 mg (10/4/2022)        filgrastim (NEUPOGEN) injection Soln, 480 mcg (100 % of original dose 480 mcg), Subcutaneous, Once, 1 of 1 cycle    Dose modification: 480 mcg (original dose 480 mcg, Cycle 4)        tbo-filgrastim (GRANIX), 480 mcg (100 % of original dose 480 mcg), Subcutaneous, Once, 2 of 2 cycles    Dose modification: 480 mcg (original dose 480 mcg, Cycle 2), 480 mcg (original dose 480 mcg, Cycle 4)    Administration: 480 mcg (8/22/2022), 480 mcg (9/7/2022)      7/2022 - adenocarcinoma of the distal esophagus s/p EUS + stent placement - [id number]    8/15/2022 - start weekly carbo/taxol/RT    Week 2 held due to cytopenias - FA and b12 deficiencies found and replacement started    Week 3 dose reducation of 20% for taxol, carbo AUC 1 5    Weeks 4-6 held due to cytopenias and hospital admission for dehydration, inability to eat and esophageal stent migration           8/15/2022 - 10/7/2022 Radiation    The patient saw @[facility]@ for radiation treatment  This is the current list of radiation treatment:  Plan ID Energy Fractions Dose per Fraction (cGy) Dose Correction (cGy) Total Dose Delivered (cGy) Elapsed Days   CD Esophagus 6X 3 / 3 180 0 540 2   Distal Esoph 6X 25 / 25 180 0 4,500 50      Treatment dates:  8/15/2022 - 10/7/2022                Subjective:  Patient reports that he feels well  After receiving 1 unit of blood he has been more energetic  He denies any shortness of breath, chest pain, headache, nausea, vomiting or bleeding from any place  Objective:    BP (!) 85/52   Pulse 89   Temp 97 5 °F (36 4 °C)   Resp 16   Ht 6' (1 829 m)   Wt 75 kg (165 lb 5 5 oz)   SpO2 95%   BMI 22 42 kg/m²   General Appearance:    Alert, oriented        Eyes:    PERRL   Ears:    Normal external ear canals, both ears   Nose:   Nares normal, septum midline   Throat:   Mucosa moist  Pharynx without injection      Neck:   Supple       Lungs:     Clear to auscultation bilaterally   Chest Wall:    No tenderness or deformity    Heart:    Regular rate and rhythm       Abdomen:     Soft, non-tender, bowel sounds +, no organomegaly           Extremities:   Extremities no cyanosis or edema       Skin:   no rash or icterus      Lymph nodes:   Cervical, supraclavicular, and axillary nodes normal   Neurologic:   CNII-XII intact, normal strength, sensation and reflexes     throughout        Recent Results (from the past 48 hour(s))   Comprehensive metabolic panel    Collection Time: 11/10/22  5:20 AM   Result Value Ref Range    Sodium 139 135 - 147 mmol/L    Potassium 3 0 (L) 3 5 - 5 3 mmol/L    Chloride 102 96 - 108 mmol/L    CO2 31 21 - 32 mmol/L    ANION GAP 6 4 - 13 mmol/L    BUN 15 5 - 25 mg/dL    Creatinine 0 62 0 60 - 1 30 mg/dL    Glucose 92 65 - 140 mg/dL    Glucose, Fasting 92 65 - 99 mg/dL    Calcium 7 4 (L) 8 3 - 10 1 mg/dL    Corrected Calcium 9 2 8 3 - 10 1 mg/dL     (H) 5 - 45 U/L    ALT 33 12 - 78 U/L    Alkaline Phosphatase 93 46 - 116 U/L    Total Protein 6 3 (L) 6 4 - 8 4 g/dL    Albumin 1 7 (L) 3 5 - 5 0 g/dL    Total Bilirubin 0 26 0 20 - 1 00 mg/dL    eGFR 95 ml/min/1 73sq m   CBC and differential    Collection Time: 11/10/22  5:20 AM   Result Value Ref Range    WBC 1 39 (LL) 4 31 - 10 16 Thousand/uL    RBC 2 05 (L) 3 88 - 5 62 Million/uL    Hemoglobin 7 2 (L) 12 0 - 17 0 g/dL    Hematocrit 22 0 (L) 36 5 - 49 3 %     (H) 82 - 98 fL    MCH 35 1 (H) 26 8 - 34 3 pg    MCHC 32 7 31 4 - 37 4 g/dL    RDW 21 8 (H) 11 6 - 15 1 %    Platelets 18 (LL) 268 - 390 Thousands/uL   Manual Differential(PHLEBS Do Not Order)    Collection Time: 11/10/22  5:20 AM   Result Value Ref Range    Segmented % 60 43 - 75 %    Bands % 31 (H) 0 - 8 %    Lymphocytes % 8 (L) 14 - 44 %    Monocytes % 0 (L) 4 - 12 %    Eosinophils, % 1 0 - 6 %    Basophils % 0 0 - 1 %    Absolute Neutrophils 1 26 (L) 1 85 - 7 62 Thousand/uL    Lymphocytes Absolute 0 11 (L) 0 60 - 4 47 Thousand/uL Monocytes Absolute 0 00 0 00 - 1 22 Thousand/uL    Eosinophils Absolute 0 01 0 00 - 0 40 Thousand/uL    Basophils Absolute 0 00 0 00 - 0 10 Thousand/uL    Total Counted      Anisocytosis Present     Hypochromia Present     Macrocytes Present     Platelet Estimate Decreased (A) Adequate   Prepare Leukoreduced RBC: 1 Units    Collection Time: 11/11/22  5:30 AM   Result Value Ref Range    Unit Product Code L0983L03     Unit Number [id number]     Unit ABO A     Unit [facility]     Crossmatch Compatible     Unit Dispense Status Presumed Trans     Unit Product Volume 350 ml   Prepare Leukoreduced RBC: 1 Units    Collection Time: 11/11/22  5:30 AM   Result Value Ref Range    Unit Product Code J6535R50     Unit Number [id number]     Unit ABO A     Unit [facility]     Crossmatch Compatible     Unit Dispense Status Presumed Trans     Unit Product Volume 350 mL   Comprehensive metabolic panel    Collection Time: 11/11/22  8:59 AM   Result Value Ref Range    Sodium 139 135 - 147 mmol/L    Potassium 3 7 3 5 - 5 3 mmol/L    Chloride 103 96 - 108 mmol/L    CO2 28 21 - 32 mmol/L    ANION GAP 8 4 - 13 mmol/L    BUN 15 5 - 25 mg/dL    Creatinine 0 63 0 60 - 1 30 mg/dL    Glucose 105 65 - 140 mg/dL    Calcium 7 9 (L) 8 3 - 10 1 mg/dL    Corrected Calcium 9 6 8 3 - 10 1 mg/dL     (H) 5 - 45 U/L    ALT 32 12 - 78 U/L    Alkaline Phosphatase 114 46 - 116 U/L    Total Protein 7 3 6 4 - 8 4 g/dL    Albumin 1 9 (L) 3 5 - 5 0 g/dL    Total Bilirubin 0 46 0 20 - 1 00 mg/dL    eGFR 95 ml/min/1 73sq m   CBC and differential    Collection Time: 11/11/22  8:59 AM   Result Value Ref Range    WBC 4 76 4 31 - 10 16 Thousand/uL    RBC 3 41 (L) 3 88 - 5 62 Million/uL    Hemoglobin 11 2 (L) 12 0 - 17 0 g/dL    Hematocrit 33 6 (L) 36 5 - 49 3 %    MCV 99 (H) 82 - 98 fL    MCH 32 8 26 8 - 34 3 pg    MCHC 33 3 31 4 - 37 4 g/dL    RDW 27 1 (H) 11 6 - 15 1 %    Platelets 17 (LL) 937 - 390 Thousands/uL   Manual Differential(PHLEBS Do Not Order) Collection Time: 11/11/22  8:59 AM   Result Value Ref Range    Segmented % 83 (H) 43 - 75 %    Bands % 8 0 - 8 %    Lymphocytes % 3 (L) 14 - 44 %    Monocytes % 3 (L) 4 - 12 %    Eosinophils, % 2 0 - 6 %    Basophils % 1 0 - 1 %    Absolute Neutrophils 4 33 1 85 - 7 62 Thousand/uL    Lymphocytes Absolute 0 14 (L) 0 60 - 4 47 Thousand/uL    Monocytes Absolute 0 14 0 00 - 1 22 Thousand/uL    Eosinophils Absolute 0 10 0 00 - 0 40 Thousand/uL    Basophils Absolute 0 05 0 00 - 0 10 Thousand/uL    Total Counted      Anisocytosis Present     Macrocytes Present     Platelet Estimate Decreased (A) Adequate         XR chest 1 view portable    Result Date: 11/9/2022  Narrative: CHEST INDICATION:   hypoxia  COMPARISON:  10/17/2022 EXAM PERFORMED/VIEWS:  XR CHEST PORTABLE FINDINGS: Cardiomediastinal silhouette appears unremarkable  The lungs are clear  Right Mediport and esophageal stent again noted  No pneumothorax or pleural effusion  Osseous structures appear within normal limits for patient age  Impression: No acute cardiopulmonary disease  Workstation performed: [id number]     XR chest 1 view portable    Result Date: 10/17/2022  Narrative: CHEST INDICATION:   weakness  COMPARISON:  9/19/2022; 9/22/2022 EXAM PERFORMED/VIEWS:  XR CHEST PORTABLE Single view FINDINGS: Right IJ port terminating at cavoatrial junction Cardiomediastinal silhouette appears unremarkable  The lungs are clear  No pneumothorax or pleural effusion  Osseous structures appear within normal limits for patient age  Distal esophageal stent again evident     Impression: No acute cardiopulmonary disease  Findings are stable Workstation performed: [id number]         Assessment and Plan :  65-year-old male with recent history of adenocarcinoma of the distal esophagus status post EUS plus stent placement ([id number]) was admitted on account of shortness of breath, generalized weakness and was found to be COVID-19+    Hematology/oncology was consulted for pancytopenia  As per chart review, patient has had pancytopenia since the beginning of chemotherapy regimen  Patient sees Dr Erlin Reyes in outpatient settings and was started on folic IDBX/M04 supplementation  Patient is noncompliant with the medications  He was started on carboplatin/Taxol/radiation in August 2022  He completed his radiation but was not able to tolerate chemotherapy regimen  He only received 1 full dose and two reduced dose cycles  He has not received any chemo for the last 1 month  Labs on admission WBC 1 39 H&H 7 2/22,  platelets 18, ANC 3 03, Abs lymphocytes 0 11, % bands 31  Patient is afebrile and hypotensive  He does not exhibit any signs of sepsis  He is actively being managed for COVID-19 infection  Patient had undergone multiple EGDs studies in recent past   Last was on 09/21/2022 which showed stricture from known malignancy and previous stent in the stomach and was removed  Patient has an upcoming surgical intervention coming up regarding his esophageal cancer  He follows up with primary oncology on 11/21/2022  At this point, pancytopenia could be multifactorial in nature  Possible diagnosis include but is not limited to age related, MDS, B12/folate deficiency, active viral infx  A bone marrow biopsy would be warranted in the near future  Two units of granix given so far  WBC improved to normal range after first dose  Patient given one unit of pRBC with improvement in H/H  Monitor CBC  keep Hgb > 7  We will monitor platelets overnight and transfuse if patient becomes symptomatic  Continue with COVID-19 treatment  Hold aspirin and anticoag since plt count is below 20K  SCDs for now  We will reevaluate tomorrow (11/12/22) whether patient would require platelets or not  Hematology oncology on board  Please feel free to reach out with any questions  I spent 40 minutes in chart review, face to face counseling, coordination of care and documentation

## 2022-11-11 NOTE — CASE MANAGEMENT
Case Management Assessment & Discharge Planning Note    Patient name Lachelle Amin  [facility] 2 MS /[address] Danuta Bansal* MRN [id number]  : 1945 Date 2022       Current Admission Date: 2022  Current Admission Diagnosis:COVID-19   Patient Active Problem List    Diagnosis Date Noted   • COVID-19 2022   • Hypoxia 2022   • Severe protein-calorie malnutrition ([address] ) 10/24/2022   • Hypokalemia 10/24/2022   • Ambulatory dysfunction secondary to weakness 10/20/2022   • Failure to thrive in adult secondary to protein calorie malnutrition 10/19/2022   • Nausea & vomiting 10/17/2022   • Chemotherapy induced neutropenia ([address] ) 09/15/2022   • Migration of esophageal stent 2022   • Anemia 2022   • Hypotension 2022   • Vitamin B12 deficiency 2022   • Port-A-Cath in place 08/10/2022   • Thrombocytopenia ([address] ) 2022   • Dysphagia 2022   • Pancytopenia ([address] ) 2022   • Acute gastric ulcer 2022   • Esophageal cancer ([address] ) 2022   • Abdominal aortic aneurysm without rupture 02/10/2022   • Paroxysmal atrial fibrillation ([address] ) 02/10/2022   • Coronary artery disease of native artery of native heart with stable angina pectoris ([facility] [address] ) 02/10/2022   • Ischemic cardiomyopathy 2021   • Chronic combined systolic and diastolic CHF (congestive heart failure) ([address] ) 2021   • Allergic reaction to contrast media 2021   • NSTEMI (non-ST elevated myocardial infarction) ([address] ) 2021   • Abdominal pain 2020   • Hernia, incisional 2019   • Ventral hernia with obstruction and without gangrene 2019   • CAD (coronary artery disease) 2017   • HLD (hyperlipidemia) 2017   • Seizure disorder ([address] ) 2017   • S/P AAA (abdominal aortic aneurysm) repair 2017   • STEMI (ST elevation myocardial infarction) ([facility] [address] ) 2017      LOS (days): 1  Geometric Mean LOS (GMLOS) (days): 3 60  Days to GMLOS:2 5     OBJECTIVE:  PATIENT READMITTED TO HOSPITAL  Risk of Unplanned Readmission Score: 36 65         Current admission status: Inpatient       Preferred Pharmacy:   [address], [address] [place] [address]  Phone: [phone] Fax: Jimbo Guillermo - [address] [facility]  Phone: [phone] Fax: [phone]    Primary Care Provider: Paul Reilly MD    Primary Insurance: MEDICARE  Secondary Insurance: Linda  [address]:  [address] [address] Representative - Brother   Primary Phone: [phone] (Home)                         Readmission Root Cause  30 Day Readmission: Yes  Who directed you to return to the hospital?: Other (comment) (Patient was at [facility] for rehab)  Did you understand whom to contact if you had questions or problems?: Yes  Did you get your prescriptions before you left the hospital?: Yes  Were you able to get your prescriptions filled when you left the hospital?: Yes  Did you take your medications as prescribed?: Yes  Were you able to get to your follow-up appointments?: Yes  Patient was readmitted due to: Cheryl    Patient Information  Admitted from[de-identified] Facility [facility])  Mental Status: Confused  During Assessment patient was accompanied by: Not accompanied during assessment  Assessment information provided by[de-identified] Other - please comment (chart review at this time)  Support Systems: Family members  Type of Current Residence: Apartment  Upon entering residence, is there a bedroom on the main floor (no further steps)?: Yes  Upon entering residence, is there a bathroom on the main floor (no further steps)?: Yes  In the last 12 months, how many places have you lived?: 1  Living Arrangements: Other (Comment) (lives with brother)    Activities of Daily Living Prior to Admission  Completes ADLs independently?: No  Level of ADL dependence: Assistance  Does the patient have a history of Short-Term Rehab?: Yes (was at [facility] for rehab prior to this admission)  Does patient currently have [address]?: No         Patient Information Continued  Does patient have a history of Mental Health Diagnosis?: No         Means of Transportation  In the past 12 months, has lack of transportation kept you from medical appointments or from getting medications?: No  In the past 12 months, has lack of transportation kept you from meetings, work, or from getting things needed for daily living?: No        DISCHARGE DETAILS:      Contacts  Patient Contacts: Eliud Cheney  Relationship to Patient[de-identified] Family  Contact Method: Phone  Phone Number: [phone]    Discharge Destination Plan[de-identified] Facility Return  Transport at Discharge : [facility]     Patient is currently confused at this time  CM attempted to contact patient's brother x 2 [phone])- messages left requesting a call back to CM  Brother is not presently at bedside  Per chart review, brother does go to dialysis and is not available some days  Patient was at [facility] for rehab and they will accept patient back at discharge- reserved in [place]  Transport will need to be set up at discharge  CM will continue to follow and get in touch with patients family  Epidermal Sutures: 4-0 Ethilon

## 2022-11-12 VITALS
RESPIRATION RATE: 17 BRPM | BODY MASS INDEX: 22.4 KG/M2 | TEMPERATURE: 98.2 F | OXYGEN SATURATION: 93 % | HEIGHT: 72 IN | HEART RATE: 98 BPM | SYSTOLIC BLOOD PRESSURE: 99 MMHG | DIASTOLIC BLOOD PRESSURE: 59 MMHG | WEIGHT: 165.34 LBS

## 2022-11-12 PROBLEM — R09.02 HYPOXIA: Status: RESOLVED | Noted: 2022-11-09 | Resolved: 2022-11-12

## 2022-11-12 LAB
ALBUMIN SERPL BCP-MCNC: 1.7 G/DL (ref 3.5–5)
ALP SERPL-CCNC: 105 U/L (ref 46–116)
ALT SERPL W P-5'-P-CCNC: 29 U/L (ref 12–78)
ANION GAP SERPL CALCULATED.3IONS-SCNC: 7 MMOL/L (ref 4–13)
AST SERPL W P-5'-P-CCNC: 81 U/L (ref 5–45)
BASOPHILS # BLD AUTO: 0.01 THOUSANDS/ÂΜL (ref 0–0.1)
BASOPHILS NFR BLD AUTO: 0 % (ref 0–1)
BILIRUB SERPL-MCNC: 0.35 MG/DL (ref 0.2–1)
BUN SERPL-MCNC: 12 MG/DL (ref 5–25)
CALCIUM ALBUM COR SERPL-MCNC: 9.6 MG/DL (ref 8.3–10.1)
CALCIUM SERPL-MCNC: 7.8 MG/DL (ref 8.3–10.1)
CHLORIDE SERPL-SCNC: 103 MMOL/L (ref 96–108)
CO2 SERPL-SCNC: 29 MMOL/L (ref 21–32)
CREAT SERPL-MCNC: 0.55 MG/DL (ref 0.6–1.3)
EOSINOPHIL # BLD AUTO: 0.16 THOUSAND/ÂΜL (ref 0–0.61)
EOSINOPHIL NFR BLD AUTO: 4 % (ref 0–6)
ERYTHROCYTE [DISTWIDTH] IN BLOOD BY AUTOMATED COUNT: 26.4 % (ref 11.6–15.1)
GFR SERPL CREATININE-BSD FRML MDRD: 100 ML/MIN/1.73SQ M
GLUCOSE SERPL-MCNC: 91 MG/DL (ref 65–140)
HCT VFR BLD AUTO: 31 % (ref 36.5–49.3)
HGB BLD-MCNC: 10.5 G/DL (ref 12–17)
IMM GRANULOCYTES # BLD AUTO: 0.36 THOUSAND/UL (ref 0–0.2)
IMM GRANULOCYTES NFR BLD AUTO: 8 % (ref 0–2)
LYMPHOCYTES # BLD AUTO: 0.42 THOUSANDS/ÂΜL (ref 0.6–4.47)
LYMPHOCYTES NFR BLD AUTO: 9 % (ref 14–44)
MCH RBC QN AUTO: 33.1 PG (ref 26.8–34.3)
MCHC RBC AUTO-ENTMCNC: 33.9 G/DL (ref 31.4–37.4)
MCV RBC AUTO: 98 FL (ref 82–98)
MONOCYTES # BLD AUTO: 0.24 THOUSAND/ÂΜL (ref 0.17–1.22)
MONOCYTES NFR BLD AUTO: 5 % (ref 4–12)
NEUTROPHILS # BLD AUTO: 3.42 THOUSANDS/ÂΜL (ref 1.85–7.62)
NEUTS SEG NFR BLD AUTO: 74 % (ref 43–75)
NRBC BLD AUTO-RTO: 1 /100 WBCS
PLATELET # BLD AUTO: 17 THOUSANDS/UL (ref 149–390)
POTASSIUM SERPL-SCNC: 2.9 MMOL/L (ref 3.5–5.3)
PROT SERPL-MCNC: 6.3 G/DL (ref 6.4–8.4)
RBC # BLD AUTO: 3.17 MILLION/UL (ref 3.88–5.62)
SODIUM SERPL-SCNC: 139 MMOL/L (ref 135–147)
WBC # BLD AUTO: 4.61 THOUSAND/UL (ref 4.31–10.16)

## 2022-11-12 RX ORDER — FOLIC ACID 1 MG/1
1 TABLET ORAL DAILY
Qty: 30 TABLET | Refills: 0
Start: 2022-11-13 | End: 2022-12-13

## 2022-11-12 RX ORDER — FOLIC ACID 1 MG/1
1 TABLET ORAL DAILY
Status: DISCONTINUED | OUTPATIENT
Start: 2022-11-12 | End: 2022-11-12 | Stop reason: HOSPADM

## 2022-11-12 RX ORDER — MIDODRINE HYDROCHLORIDE 5 MG/1
5 TABLET ORAL
Qty: 90 TABLET | Refills: 0
Start: 2022-11-12 | End: 2022-12-12

## 2022-11-12 RX ADMIN — CYANOCOBALAMIN TAB 500 MCG 500 MCG: 500 TAB at 13:44

## 2022-11-12 RX ADMIN — MIDODRINE HYDROCHLORIDE 5 MG: 5 TABLET ORAL at 06:09

## 2022-11-12 RX ADMIN — FOLIC ACID 1 MG: 1 TABLET ORAL at 13:44

## 2022-11-12 RX ADMIN — MIDODRINE HYDROCHLORIDE 5 MG: 5 TABLET ORAL at 12:24

## 2022-11-12 RX ADMIN — PHENOBARBITAL 64.8 MG: 32.4 TABLET ORAL at 08:52

## 2022-11-12 NOTE — ASSESSMENT & PLAN NOTE
Patient presenting with mild COVID-19 infection  Vaccination status Unvaccinated, per records review  Oxygen requirements, currently on room air  Remdesvir Day #3/5  Decadron Day #2/10- discontinued secondary to steroid psychosis  Anticoagulation held due to significant thrombocytopenia  Trend CMP,CBC daily      Overall prognosis remains poor especially in the setting of malignancy, likely patient has been weaned to room air  Close monitor of underlying respiratory status

## 2022-11-12 NOTE — CASE MANAGEMENT
Case Management Discharge Planning Note    Patient name Edenilson Spearing  Location [address] Rico Mckeon* MRN [id number]  : 1945 Date 2022       Current Admission Date: 2022  Current Admission Diagnosis:COVID-19   Patient Active Problem List    Diagnosis Date Noted   • COVID-19 2022   • Hypoxia 2022   • Severe protein-calorie malnutrition ([address] ) 10/24/2022   • Hypokalemia 10/24/2022   • Ambulatory dysfunction secondary to weakness 10/20/2022   • Failure to thrive in adult secondary to protein calorie malnutrition 10/19/2022   • Nausea & vomiting 10/17/2022   • Chemotherapy induced neutropenia ([address] ) 09/15/2022   • Migration of esophageal stent 2022   • Anemia 2022   • Hypotension 2022   • Vitamin B12 deficiency 2022   • Port-A-Cath in place 08/10/2022   • Thrombocytopenia ([address] ) 2022   • Dysphagia 2022   • Pancytopenia ([address] ) 2022   • Acute gastric ulcer 2022   • Esophageal cancer ([address] ) 2022   • Abdominal aortic aneurysm without rupture 02/10/2022   • Paroxysmal atrial fibrillation ([address] ) 02/10/2022   • Coronary artery disease of native artery of native heart with stable angina pectoris ([facility] [address] ) 02/10/2022   • Ischemic cardiomyopathy 2021   • Chronic combined systolic and diastolic CHF (congestive heart failure) ([address] ) 2021   • Allergic reaction to contrast media 2021   • NSTEMI (non-ST elevated myocardial infarction) ([address] ) 2021   • Abdominal pain 2020   • Hernia, incisional 2019   • Ventral hernia with obstruction and without gangrene 2019   • CAD (coronary artery disease) 2017   • HLD (hyperlipidemia) 2017   • Seizure disorder ([address] ) 2017   • S/P AAA (abdominal aortic aneurysm) repair 2017   • STEMI (ST elevation myocardial infarction) ([facility] [address] ) 2017      LOS (days): 2  Geometric Mean LOS (GMLOS) (days): 3 60  Days to GMLOS:1 5     OBJECTIVE:  Risk of Unplanned Readmission Score: 33 48         Current admission status: Inpatient   Preferred Pharmacy:   [address], [address] [place] [address]  Phone: [phone] Fax: Jimbo Guillermo [address] [facility]  Phone: [phone] Fax: [phone]    Primary Care Provider: Columba Oneill MD    Primary Insurance: MEDICARE  Secondary Insurance: [address] [place], [place] [address] DETAILS:    Transport at Discharge : [facility]  Dispatcher Contacted: Yes  Number/Name of Dispatcher: [facility]  Transported by [employer] and Unit #): [place]  ETA of Transport (Date): 11/12/22  ETA of Transport (Time): 1430     Additional Comments: Per Roundtrip, pt to be transported to [facility] via Katie Hare RN, Dr Darien Wick, and [facility] aware of transportation time  CMN in paper chart      Accepting Facility Name, [address] : [facility]  Receiving Facility/Agency Phone Number: [phone]  Facility/Agency Fax Number: [phone]

## 2022-11-12 NOTE — PLAN OF CARE
Problem: MOBILITY - ADULT  Goal: Maintain or return to baseline ADL function  Description: INTERVENTIONS:  -  Assess patient's ability to carry out ADLs; assess patient's baseline for ADL function and identify physical deficits which impact ability to perform ADLs (bathing, care of mouth/teeth, toileting, grooming, dressing, etc )  - Assess/evaluate cause of self-care deficits   - Assess range of motion  - Assess patient's mobility; develop plan if impaired  - Assess patient's need for assistive devices and provide as appropriate  - Encourage maximum independence but intervene and supervise when necessary  - Involve family in performance of ADLs  - Assess for home care needs following discharge   - Consider OT consult to assist with ADL evaluation and planning for discharge  - Provide patient education as appropriate  11/12/2022 1435 by Lashonda Nguyen RN  Outcome: Adequate for Discharge  11/12/2022 1123 by Lashonda Nguyen RN  Outcome: Progressing  Goal: Maintains/Returns to pre admission functional level  Description: INTERVENTIONS:  - Perform BMAT or MOVE assessment daily    - Set and communicate daily mobility goal to care team and patient/family/caregiver  - Collaborate with rehabilitation services on mobility goals if consulted  - Perform Range of Motion 3 times a day  - Reposition patient every 2 hours    - Dangle patient 2 times a day  - Stand patient 3 times a day  - Ambulate patient 3 times a day  - Out of bed to chair 3 times a day   - Out of bed for meals 3 times a day  - Out of bed for toileting  - Record patient progress and toleration of activity level   11/12/2022 1435 by Lashonda Nguyen RN  Outcome: Adequate for Discharge  11/12/2022 1123 by Lashonda Nguyen RN  Outcome: Progressing     Problem: Prexisting or High Potential for Compromised Skin Integrity  Goal: Skin integrity is maintained or improved  Description: INTERVENTIONS:  - Identify patients at risk for skin breakdown  - Assess and monitor skin integrity  - Assess and monitor nutrition and hydration status  - Monitor labs   - Assess for incontinence   - Turn and reposition patient  - Assist with mobility/ambulation  - Relieve pressure over bony prominences  - Avoid friction and shearing  - Provide appropriate hygiene as needed including keeping skin clean and dry  - Evaluate need for skin moisturizer/barrier cream  - Collaborate with interdisciplinary team   - Patient/family teaching  - Consider wound care consult   11/12/2022 1435 by Mitchell Tompkins RN  Outcome: Adequate for Discharge  11/12/2022 1123 by Mitchell Tompkins RN  Outcome: Progressing     Problem: Potential for Falls  Goal: Patient will remain free of falls  Description: INTERVENTIONS:  - Educate patient/family on patient safety including physical limitations  - Instruct patient to call for assistance with activity   - Consult OT/PT to assist with strengthening/mobility   - Keep Call bell within reach  - Keep bed low and locked with side rails adjusted as appropriate  - Keep care items and personal belongings within reach  - Initiate and maintain comfort rounds  - Make Fall Risk Sign visible to staff  - Offer Toileting every 2 Hours, in advance of need  - Initiate/Maintain bed alarm  - Obtain necessary fall risk management equipment: walker  - Apply yellow socks and bracelet for high fall risk patients  - Consider moving patient to room near nurses station  11/12/2022 1435 by Mitchell Tompkins RN  Outcome: Adequate for Discharge  11/12/2022 1123 by Mitchell Tompkins RN  Outcome: Progressing     Problem: RESPIRATORY - ADULT  Goal: Achieves optimal ventilation and oxygenation  Description: INTERVENTIONS:  - Assess for changes in respiratory status  - Assess for changes in mentation and behavior  - Position to facilitate oxygenation and minimize respiratory effort  - Oxygen administered by appropriate delivery if ordered  - Initiate smoking cessation education as indicated  - Encourage broncho-pulmonary hygiene including cough, deep breathe, Incentive Spirometry  - Assess the need for suctioning and aspirate as needed  - Assess and instruct to report SOB or any respiratory difficulty  - Respiratory Therapy support as indicated  11/12/2022 1435 by Karie Shone, RN  Outcome: Adequate for Discharge  11/12/2022 1123 by Karie Shone, RN  Outcome: Progressing

## 2022-11-12 NOTE — ASSESSMENT & PLAN NOTE
History of stage IIB esophageal cancer diagnosed May 2022  He completed carboplatin with Taxol chemotherapy and radiation therapy      Received 2 units of packed red blood cells  [place] growth stimulating factor

## 2022-11-12 NOTE — CASE MANAGEMENT
Case Management Discharge Planning Note    Patient name Bard Grams  Location [facility] 68 2 /South 2 Yaakov Marley* MRN [id number]  : 1945 Date 2022       Current Admission Date: 2022  Current Admission Diagnosis:COVID-19   Patient Active Problem List    Diagnosis Date Noted   • COVID-19 2022   • Hypoxia 2022   • Severe protein-calorie malnutrition ([address] ) 10/24/2022   • Hypokalemia 10/24/2022   • Ambulatory dysfunction secondary to weakness 10/20/2022   • Failure to thrive in adult secondary to protein calorie malnutrition 10/19/2022   • Nausea & vomiting 10/17/2022   • Chemotherapy induced neutropenia ([address] ) 09/15/2022   • Migration of esophageal stent 2022   • Anemia 2022   • Hypotension 2022   • Vitamin B12 deficiency 2022   • Port-A-Cath in place 08/10/2022   • Thrombocytopenia ([address] ) 2022   • Dysphagia 2022   • Pancytopenia ([address] ) 2022   • Acute gastric ulcer 2022   • Esophageal cancer ([facility] ) 2022   • Abdominal aortic aneurysm without rupture 02/10/2022   • Paroxysmal atrial fibrillation ([facility] ) 02/10/2022   • Coronary artery disease of native artery of native heart with stable angina pectoris ([facility] ) 02/10/2022   • Ischemic cardiomyopathy 2021   • Chronic combined systolic and diastolic CHF (congestive heart failure) ([facility] ) 2021   • Allergic reaction to contrast media 2021   • NSTEMI (non-ST elevated myocardial infarction) ([facility] ) 2021   • Abdominal pain 2020   • Hernia, incisional 2019   • Ventral hernia with obstruction and without gangrene 2019   • CAD (coronary artery disease) 2017   • HLD (hyperlipidemia) 2017   • Seizure disorder ([address] ) 2017   • S/P AAA (abdominal aortic aneurysm) repair 2017   • STEMI (ST elevation myocardial infarction) ([facility] ) 2017      LOS (days): 2  Geometric Mean LOS (GMLOS) (days): 3 60  Days to GMLOS:1 6     OBJECTIVE:  Risk of Unplanned Readmission Score: 33 41         Current admission status: Inpatient   Preferred Pharmacy:   [address], [address] [facility]  Phone: [phone] Fax: Jimbo Guillermo [address] [facility] [address] [facility] [address] [facility]  Phone: [phone] Fax: [phone]    Primary Care Provider: Naty Deal MD    Primary Insurance: MEDICARE  Secondary Insurance: [facility]    DISCHARGE DETAILS:     Additional Comments: Per Dr Erick Packer, pt medically stable to return to [facility] today  CM met with pt's brother outside of pt's room, pt's brother confirmed that he would like pt to return to [facility] to complete STR  CM sent message to [facility] via [employer] requesting confirmation that they are able to accept pt back today  CM department to follow

## 2022-11-12 NOTE — PROGRESS NOTES
[address] [place] [address]  Progress Note - Angel Addiosn 1945, 67 y o  male MRN: [id number]  Unit/Bed#: [address] 202[address]01 Encounter: [id number]  Primary Care Provider: Rickie Vivar MD   Date and time admitted to hospital: 11/9/2022  2:30 AM    * COVID-19  Assessment & Plan  Patient presenting with mild COVID-19 infection  Vaccination status Unvaccinated, per records review  Oxygen requirements, currently on room air  Remdesvir Day #3/5  Decadron Day #2/10- discontinued secondary to steroid psychosis  Anticoagulation held due to significant thrombocytopenia  Trend CMP,CBC daily  Overall prognosis remains poor especially in the setting of malignancy, likely patient has been weaned to room air  Close monitor of underlying respiratory status    Hypoxia  Assessment & Plan  · Resolved        Pancytopenia [facility])  Assessment & Plan  Patient with history of pancytopenia, in setting of esophageal cancer  Noted on labs again today  · Trend CBC  · Hold aspirin  · Continue p o  PPI,  · Maintain type and screen  · Transfuse for hemoglobin less than 7    Suspect thrombocytopenia and worsening leukopenia in the setting of COVID-19  Will check iron panel  Additionally consider platelet transfusion for platelet count less than 10,000      Esophageal cancer [facility])  Assessment & Plan  History of stage IIB esophageal cancer diagnosed May 2022  He completed carboplatin with Taxol chemotherapy and radiation therapy      Received 2 units of packed red blood cells  [place] growth stimulating factor    Seizure disorder [facility])  Assessment & Plan  Continue phenobarbital    HLD (hyperlipidemia)  Assessment & Plan  Continue statin      [facility] Progress Note  Patient: Angel Addison 67 y o  male   MRN: [id number]  PCP: Rickie Vivar MD  Unit/Bed#: [address] 202[address]01 Encounter: [id number]  Date Of Visit: 11/11/22    Assessment:    Principal Problem:    COVID-19  Active Problems:    HLD (hyperlipidemia)    Seizure disorder ([facility] )    Esophageal cancer ([facility] )    [facility]enia [facility] )    Hypoxia      Plan:    · Continue current medical managed  · Discharge planning  · Repeat CBC in a m , consideration for platelet transfusion       VTE Pharmacologic Prophylaxis:   Pharmacologic: Pharmacologic VTE Prophylaxis contraindicated due to Thrombocytopenia  Mechanical VTE Prophylaxis in Place: Yes    Patient Centered Rounds: I have performed bedside rounds with nursing staff today  Discussions with Specialists or Other Care Team Provider:  Discussed with     Education and Discussions with Family / Patient:  Patient brother contacted, no answer, no voicemail left    Time Spent for Care: 45 minutes  More than 50% of total time spent on counseling and coordination of care as described above  Current Length of Stay: 1 day(s)    Current Patient Status: Inpatient   Certification Statement: The patient will continue to require additional inpatient hospital stay due to Treatment COVID-19    Discharge Plan / Estimated Discharge Date:  Potentially tomorrow    Code Status: Level 3 - DNAR and DNI      Subjective:   Seen examined, no acute complaints  No difficulty breathing, he is on room air    A complete and comprehensive 14 point organ system review has been performed and all other systems are negative other than stated above  Objective:     Vitals:   Temp (24hrs), Av 8 °F (36 6 °C), Min:97 5 °F (36 4 °C), Max:98 5 °F (36 9 °C)    Temp:  [97 5 °F (36 4 °C)-98 5 °F (36 9 °C)] 97 5 °F (36 4 °C)  HR:  [80-92] 89  Resp:  [16-20] 16  BP: (82-97)/(45-65) 85/52  SpO2:  [91 %-98 %] 95 %  Body mass index is 22 42 kg/m²  Input and Output Summary (last 24 hours):        Intake/Output Summary (Last 24 hours) at 2022 1933  Last data filed at 2022 1500  Gross per 24 hour   Intake 670 83 ml   Output 250 ml   Net 420 83 ml       Physical Exam:     General: well appearing, no acute distress  HEENT: atraumatic, PERRLA, moist mucosa, normal pharynx, normal tonsils and adenoids, normal tongue, no fluid in sinuses  Neck: Trachea midline, no carotid bruit, no masses  Respiratory: normal chest wall expansion, CTA B, no r/r/w, no rubs  Cardiovascular: RRR, no m/r/g, Normal S1 and S2  Abdomen: Soft, non-tender, non-distended, normal bowel sounds in all quadrants, no hepatosplenomegaly, no tympany  Rectal: deferred  Musculoskeletal: normal ROM in upper and lower extremities  Integumentary: warm, dry, and pink, with no rash, purpura, or petechia  Heme/Lymph: no lymphadenopathy, no bruises  Neurological: Cranial Nerves II-XII grossly intact, no tics, normal sensation to pressure and light touch  Psychiatric: cooperative with normal mood, affect, and cognition      Additional Data:     Labs:    Results from last 7 days   Lab Units 11/11/22  0859 11/10/22  0520 11/09/22  0304   WBC Thousand/uL 4 76   < > 2 00*   HEMOGLOBIN g/dL 11 2*   < > 7 4*   HEMATOCRIT % 33 6*   < > 23 1*   PLATELETS Thousands/uL 17*   < > 25*   NEUTROS PCT %  --   --  74   LYMPHS PCT %  --   --  14   LYMPHO PCT % 3*   < >  --    MONOS PCT %  --   --  8   MONO PCT % 3*   < >  --    EOS PCT % 2   < > 2    < > = values in this interval not displayed  Results from last 7 days   Lab Units 11/11/22  0859   POTASSIUM mmol/L 3 7   CHLORIDE mmol/L 103   CO2 mmol/L 28   BUN mg/dL 15   CREATININE mg/dL 0 63   CALCIUM mg/dL 7 9*   ALK PHOS U/L 114   ALT U/L 32   AST U/L 105*     Results from last 7 days   Lab Units 11/09/22  0304   INR  1 32*       * I Have Reviewed All Lab Data Listed Above  * Additional Pertinent Lab Tests Reviewed:  Kin [facility] Admission Reviewed    Imaging:    Imaging Reports Reviewed Today Include:  No new imaging  Imaging Personally Reviewed by Myself Includes:  No new imaging    Recent Cultures (last 7 days):           Last 24 Hours Medication List:   Current Facility-Administered Medications   Medication Dose Route Frequency Provider Last Rate   • acetaminophen  650 mg Oral Q6H PRN Wai Reyes MD     • atorvastatin  80 mg Oral Daily With Allyssa Bray MD     • calcium carbonate  500 mg Oral BID PRN BRIJESH Sexton     • midodrine  5 mg Oral TID AC Shukri Garcia DO     • mirtazapine  7 5 mg Oral HS Wai Reyes MD     • PHENobarbital  64 8 mg Oral BID Wai Reyes MD     • remdesivir  100 mg Intravenous Q24H Rena Ortiz  mg (11/10/22 2260)        Today, Patient Was Seen By: Rena Ortiz DO    ** Please Note: This note was completed in part utilizing M-Modal Fluency Direct Software  Grammatical errors, random word insertions, spelling mistakes, and incomplete sentences may be an occasional consequence of this system secondary to software limitations, ambient noise, and hardware issues  If you have any questions or concerns about the content, text, or information contained within the body of this dictation, please contact the provider for clarification   **

## 2022-11-12 NOTE — DISCHARGE INSTR - AVS FIRST PAGE
Dear Taz Sandoval,     It was our pleasure to care for you here at [facility]  It is our hope that we were always able to exceed the expected standards for your care during your stay  You were hospitalized due to COVID-19, also with pancytopenia requiring blood transfusion and colony growth stimulating factor  You were cared for on the 2nd floor by Gwyn Vazquez DO with the [facility] who covers for your primary care physician (PCP), Jasson Naranjo MD, while you were hospitalized  If you have any questions or concerns related to this hospitalization, you may contact us at [phone]  For follow up as well as any medication refills, we recommend that you follow up with your primary care physician  A registered nurse will reach out to you by phone within a few days after your discharge to answer any additional questions that you may have after going home  However, at this time we provide for you here, the most important instructions / recommendations at discharge:     Notable Medication Adjustments -   Folate and B12  Testing Required after Discharge -   Repeat CBC in a week  Important follow up information -   Follow-up with your typical oncologist in 1-2 weeks  Other Instructions -   None  Please review this entire after visit summary as additional general instructions including medication list, appointments, activity, diet, any pertinent wound care, and other additional recommendations from your care team that may be provided for you        Sincerely,     Gwyn Vazquez DO and Nurse Vonad Nails

## 2022-11-12 NOTE — PLAN OF CARE
Problem: MOBILITY - ADULT  Goal: Maintain or return to baseline ADL function  Description: INTERVENTIONS:  -  Assess patient's ability to carry out ADLs; assess patient's baseline for ADL function and identify physical deficits which impact ability to perform ADLs (bathing, care of mouth/teeth, toileting, grooming, dressing, etc )  - Assess/evaluate cause of self-care deficits   - Assess range of motion  - Assess patient's mobility; develop plan if impaired  - Assess patient's need for assistive devices and provide as appropriate  - Encourage maximum independence but intervene and supervise when necessary  - Involve family in performance of ADLs  - Assess for home care needs following discharge   - Consider OT consult to assist with ADL evaluation and planning for discharge  - Provide patient education as appropriate  Outcome: Progressing  Goal: Maintains/Returns to pre admission functional level  Description: INTERVENTIONS:  - Perform BMAT or MOVE assessment daily    - Set and communicate daily mobility goal to care team and patient/family/caregiver  - Collaborate with rehabilitation services on mobility goals if consulted  - Perform Range of Motion 3 times a day  - Reposition patient every 2 hours    - Dangle patient 2 times a day  - Stand patient 3 times a day  - Ambulate patient 3 times a day  - Out of bed to chair 3 times a day   - Out of bed for meals 3 times a day  - Out of bed for toileting  - Record patient progress and toleration of activity level   Outcome: Progressing

## 2022-11-12 NOTE — NURSING NOTE
Patient discharged to home  Discharge instructions were reviewed with patient and brother  Patient's Port was de-accessed by Harshad Lim RN Padmini Beltran  Report was called to [facility] RN @ receiving facility  Patient was transported via stretcher

## 2022-11-12 NOTE — PLAN OF CARE
Problem: MOBILITY - ADULT  Goal: Maintain or return to baseline ADL function  Description: INTERVENTIONS:  -  Assess patient's ability to carry out ADLs; assess patient's baseline for ADL function and identify physical deficits which impact ability to perform ADLs (bathing, care of mouth/teeth, toileting, grooming, dressing, etc )  - Assess/evaluate cause of self-care deficits   - Assess range of motion  - Assess patient's mobility; develop plan if impaired  - Assess patient's need for assistive devices and provide as appropriate  - Encourage maximum independence but intervene and supervise when necessary  - Involve family in performance of ADLs  - Assess for home care needs following discharge   - Consider OT consult to assist with ADL evaluation and planning for discharge  - Provide patient education as appropriate  Outcome: Progressing  Goal: Maintains/Returns to pre admission functional level  Description: INTERVENTIONS:  - Perform BMAT or MOVE assessment daily    - Set and communicate daily mobility goal to care team and patient/family/caregiver  - Collaborate with rehabilitation services on mobility goals if consulted  - Perform Range of Motion 3 times a day  - Reposition patient every 2 hours    - Dangle patient 2 times a day  - Stand patient 3 times a day  - Ambulate patient 3 times a day  - Out of bed to chair 3 times a day   - Out of bed for meals 3 times a day  - Out of bed for toileting  - Record patient progress and toleration of activity level   Outcome: Progressing     Problem: Prexisting or High Potential for Compromised Skin Integrity  Goal: Skin integrity is maintained or improved  Description: INTERVENTIONS:  - Identify patients at risk for skin breakdown  - Assess and monitor skin integrity  - Assess and monitor nutrition and hydration status  - Monitor labs   - Assess for incontinence   - Turn and reposition patient  - Assist with mobility/ambulation  - Relieve pressure over bony prominences  - Avoid friction and shearing  - Provide appropriate hygiene as needed including keeping skin clean and dry  - Evaluate need for skin moisturizer/barrier cream  - Collaborate with interdisciplinary team   - Patient/family teaching  - Consider wound care consult   Outcome: Progressing     Problem: Potential for Falls  Goal: Patient will remain free of falls  Description: INTERVENTIONS:  - Educate patient/family on patient safety including physical limitations  - Instruct patient to call for assistance with activity   - Consult OT/PT to assist with strengthening/mobility   - Keep Call bell within reach  - Keep bed low and locked with side rails adjusted as appropriate  - Keep care items and personal belongings within reach  - Initiate and maintain comfort rounds  - Make Fall Risk Sign visible to staff  - Offer Toileting every 2 Hours, in advance of need  - Initiate/Maintain bed alarm  - Obtain necessary fall risk management equipment: walker  - Apply yellow socks and bracelet for high fall risk patients  - Consider moving patient to room near nurses station  Outcome: Progressing     Problem: RESPIRATORY - ADULT  Goal: Achieves optimal ventilation and oxygenation  Description: INTERVENTIONS:  - Assess for changes in respiratory status  - Assess for changes in mentation and behavior  - Position to facilitate oxygenation and minimize respiratory effort  - Oxygen administered by appropriate delivery if ordered  - Initiate smoking cessation education as indicated  - Encourage broncho-pulmonary hygiene including cough, deep breathe, Incentive Spirometry  - Assess the need for suctioning and aspirate as needed  - Assess and instruct to report SOB or any respiratory difficulty  - Respiratory Therapy support as indicated  Outcome: Progressing

## 2022-11-12 NOTE — ASSESSMENT & PLAN NOTE
History of stage IIB esophageal cancer diagnosed May 2022  He completed carboplatin with Taxol chemotherapy and radiation therapy      Received 2 units of packed red blood cells  [place] growth stimulating factor  Platelets remain stable Per MD, patient will not be d/c'd today, however, suggested that writer inform/ask clinical/charge to confront situation and ask visitor to leave. Per MD due to patients mentation it would not be safe to send patient home today.

## 2022-11-12 NOTE — ASSESSMENT & PLAN NOTE
Patient presenting with mild COVID-19 infection  Vaccination status Unvaccinated, per records review  Oxygen requirements, currently on room air  Received 4 days of remdesivir  Anticoagulation held due to significant thrombocytopenia

## 2022-11-12 NOTE — ASSESSMENT & PLAN NOTE
Patient with history of pancytopenia, in setting of esophageal cancer  Noted on labs again today    · Hold aspirin  · Continue p o  PPI,  · Maintain type and screen  · Transfuse for hemoglobin less than 7    Suspect thrombocytopenia and worsening leukopenia in the setting of COVID-19  Additionally consider platelet transfusion for platelet count less than 10,000  Will repeat CBC in 1 week

## 2022-11-12 NOTE — DISCHARGE SUMMARY
[facility]  Discharge- Kirk Salcido 1945, 67 y o  male MRN: [id number]  Unit/Bed#: Janie Pinedo [address] [place] [address] [id number] Encounter: [id number]  Primary Care Provider: Tristin Crow MD   Date and time admitted to hospital: 11/9/2022  2:30 AM    Admitting Provider:  Milli Dominguez DO  Discharge Provider:  Milli Dominguez DO  Admission Date: 11/9/2022       Discharge Date: 11/12/22   LOS: 2  Primary Care Physician at Discharge: Tristin Crow MD [address] [place] [address] Street:  Kirk Salcido is a 67 y o  male who presented abnormal labs  Patient has a past medical history of stage II esophageal cancer is status post chemotherapy and radiation  He was noted to have pancytopenia  Incidentally was found to have COVID-19  There is no evidence of any active bleeding, the patient given his high risk features was started on COVID-19 directed treatments including intravenous from status severe and Decadron given he was transiently hypoxic  He was evaluated in consultation by the hematology service, he received colony growth stimulating factor as well as blood cell transfusion  He clinically improved  Pancytopenia is likely multifactorial, likely a component of chemotherapy-induced pancytopenia and recent COVID-19 infection  He did not require platelet transfusion as his platelet count was not less than 10,000 remains stable  The patient will need outpatient follow-up with his typical hematologist oncologist     At the time of discharge patient was tolerating oral diet he remained stable on room air and was medically optimized for discharge home      All questions answered to the patient's satisfaction and discussed with this patient's brother who in agreement with discharge plan    DISCHARGE DIAGNOSES  * COVID-19  Assessment & Plan  Patient presenting with mild COVID-19 infection  Vaccination status Unvaccinated, per records review  Oxygen requirements, currently on room air  Received 4 days of remdesivir  Anticoagulation held due to significant thrombocytopenia      Pancytopenia [facility])  Assessment & Plan  Patient with history of pancytopenia, in setting of esophageal cancer  Noted on labs again today  · Hold aspirin  · Continue p o  PPI,  · Maintain type and screen  · Transfuse for hemoglobin less than 7    Suspect thrombocytopenia and worsening leukopenia in the setting of COVID-19  Additionally consider platelet transfusion for platelet count less than 10,000  Will repeat CBC in 1 week      Esophageal cancer [facility])  Assessment & Plan  History of stage IIB esophageal cancer diagnosed May 2022  He completed carboplatin with Taxol chemotherapy and radiation therapy  Received 2 units of packed red blood cells  McCarr growth stimulating factor  Platelets remain stable    Seizure disorder [facility])  Assessment & Plan  Continue phenobarbital    HLD (hyperlipidemia)  Assessment & Plan  Continue statin    Hypoxia-resolved as of 11/12/2022  Assessment & Plan  · Resolved          CONSULTING PROVIDERS   IP CONSULT TO HEMATOLOGY    PROCEDURES PERFORMED  * No surgery found *    RADIOLOGY RESULTS  XR chest 1 view portable    Impression: No acute cardiopulmonary disease  XR chest 1 view portable    Impression: No acute cardiopulmonary disease   Findings are stable       LABS  Results from last 7 days   Lab Units 11/12/22  0611 11/11/22  0859 11/10/22  0520 11/09/22  0304   WBC Thousand/uL 4 61 4 76 1 39* 2 00*   HEMOGLOBIN g/dL 10 5* 11 2* 7 2* 7 4*   HEMATOCRIT % 31 0* 33 6* 22 0* 23 1*   MCV fL 98 99* 107* 107*   TOTAL NEUT ABS Thousand/uL  --  4 33 1 26*  --    BANDS PCT %  --  8 31*  --    PLATELETS Thousands/uL 17* 17* 18* 25*   INR   --   --   --  1 32*     Results from last 7 days   Lab Units 11/12/22  0611 11/11/22  0859 11/10/22  0520 11/09/22  0304   SODIUM mmol/L 139 139 139 138   POTASSIUM mmol/L 2 9* 3 7 3 0* 3 0*   CHLORIDE mmol/L 103 103 102 102   CO2 mmol/L 29 28 31 31   BUN mg/dL 12 15 15 17   CREATININE mg/dL 0 55* 0 63 0 62 0 78   CALCIUM mg/dL 7 8* 7 9* 7 4* 7 7*   ALBUMIN g/dL 1 7* 1 9* 1 7* 1 9*   TOTAL BILIRUBIN mg/dL 0 35 0 46 0 26 0 35   ALK PHOS U/L 105 114 93 94   ALT U/L 29 32 33 27   AST U/L 81* 105* 106*  --    EGFR ml/min/1 73sq m 100 95 95 87   GLUCOSE RANDOM mg/dL 91 105 92 105     Results from last 7 days   Lab Units 11/09/22 0855 11/09/22 0614 11/09/22  0404   HS TNI 0HR ng/L  --   --  35   HS TNI 2HR ng/L  --  32  --    HS TNI 4HR ng/L 30  --   --      Results from last 7 days   Lab Units 11/09/22  0304   NT-PRO BNP pg/mL 1,399*                      Results from last 7 days   Lab Units 11/09/22  0304   PROCALCITONIN ng/ml 0 56*           Cultures:         Invalid input(s): Dena Vasyl        Results from last 7 days   Lab Units 11/09/22  0304   INFLUENZA A PCR  Negative       PHYSICAL EXAM:  Vitals:   Blood Pressure: 99/59 (11/12/22 0854)  Pulse: 98 (11/12/22 0854)  Temperature: 98 2 °F (36 8 °C) (11/12/22 0854)  Temp Source: Oral (11/12/22 0854)  Respirations: 17 (11/12/22 0854)  Height: 6' (182 9 cm) (11/09/22 0604)  Weight - Scale: 75 kg (165 lb 5 5 oz) (11/09/22 0604)  SpO2: 93 % (11/12/22 0854)      General: well appearing, no acute distress  HEENT: atraumatic, PERRLA, moist mucosa, normal pharynx, normal tonsils and adenoids, normal tongue, no fluid in sinuses  Neck: Trachea midline, no carotid bruit, no masses  Respiratory: normal chest wall expansion, CTA B, no r/r/w, no rubs  Cardiovascular: RRR, no m/r/g, Normal S1 and S2  Abdomen: Soft, non-tender, non-distended, normal bowel sounds in all quadrants, no hepatosplenomegaly, no tympany  Rectal: deferred  Musculoskeletal: normal ROM in upper and lower extremities  Integumentary: warm, dry, and pink, with no rash, purpura, or petechia  Heme/Lymph: no lymphadenopathy, no bruises  Neurological: Cranial Nerves II-XII grossly intact, no tics, normal sensation to pressure and light touch  Psychiatric: cooperative with normal mood, affect, and cognition      Discharge Disposition:  Short-term rehab    Test Results Pending at Discharge:           Medications   · Summary of Medication Adjustments made as a result of this hospitalization:  Discontinue aspirin  · Medication Dosing Tapers - Please refer to Discharge Medication List for details on any medication dosing tapers (if applicable to patient)  · Discharge Medication List: See after visit summary for reconciled discharge medications  Diet restrictions:         Diet Orders   (From admission, onward)             Start     Ordered    11/09/22 0541  Diet Regular; Regular House  Diet effective now        References:    Nutrtion Support Algorithm Enteral vs  Parenteral   Question Answer Comment   Diet Type Regular    Regular Regular House    RD to adjust diet per protocol? Yes        11/09/22 0540              Activity restrictions: No strenuous activity  Discharge Condition: good    Outpatient Follow-Up and Discharge Instructions  See after visit summary section titled Discharge Instructions for information provided to patient and family  Code Status: Level 3 - DNAR and DNI  Discharge Statement   I spent 35 minutes discharging the patient  This time was spent on the day of discharge  Greater than 50% of total time was spent with the patient and / or family counseling and / or coordination of care  ** Please Note: This note was completed in part utilizing M-Modal Fluency Direct Software  Grammatical errors, random word insertions, spelling mistakes, and incomplete sentences may be an occasional consequence of this system secondary to software limitations, ambient noise, and hardware issues  If you have any questions or concerns about the content, text, or information contained within the body of this dictation, please contact the provider for clarification  **

## 2022-11-14 ENCOUNTER — TRANSITIONAL CARE MANAGEMENT (OUTPATIENT)
Dept: INTERNAL MEDICINE CLINIC | Facility: CLINIC | Age: 77
End: 2022-11-14

## 2022-11-14 ENCOUNTER — TELEMEDICINE (OUTPATIENT)
Dept: RADIATION ONCOLOGY | Facility: HOSPITAL | Age: 77
End: 2022-11-14

## 2022-11-14 ENCOUNTER — TELEPHONE (OUTPATIENT)
Dept: HEMATOLOGY ONCOLOGY | Facility: CLINIC | Age: 77
End: 2022-11-14

## 2022-11-14 DIAGNOSIS — C15.9 ESOPHAGEAL ADENOCARCINOMA (HCC): ICD-10-CM

## 2022-11-14 DIAGNOSIS — C15.9 MALIGNANT NEOPLASM OF ESOPHAGUS, UNSPECIFIED LOCATION (HCC): Primary | ICD-10-CM

## 2022-11-14 NOTE — Clinical Note
Jordan Akhtar is a patient who finished chemoRT for esophageal cancer 1 month ago  He's currently in a SNF for physical therapy  I just wanted to make sure he was on the navigation radar for his next imaging and follow-ups per our esophagus protocols  He's scheduled to see Juan Arreaga next week but I expect he may miss this appt due to being at the SNF     Thanks, Mello Vanegas

## 2022-11-14 NOTE — PROGRESS NOTES
Virtual Brief Visit - Radiation Oncology   Carmen Rodriguez 1945 67 y o  male [id number]    Verification of patient location: Patient is located in the following state in which I hold an active license [place]  Encounter Provider: Rand Cespedes MD  Provider Located at   [address] [place] [address]    After connecting through Telephone, the patient was identified by name and date of birth  Patient was informed that this is a telemedicine visit and that the visit is being conducted through Telephone  My office door was closed  No one else was in the room  He acknowledged consent and understanding of privacy and security of the platform  The patient has agreed to participate and understands he can discontinue the visit at any time  Patient is aware this is a billable service  Cancer Staging  Esophageal cancer [facility])  Staging form: Esophagus - Adenocarcinoma, AJCC 8th Edition  - Clinical stage from 5/18/2022: Stage IIB (cT2, cN0, cM0, G3) - Signed by Praveena Wayne MD on 7/5/2022  Total positive nodes: 0  Histologic grading system: 3 grade system  HER2 status: Negative  Clinical staging modalities: Biopsy, EUS, Endoscopy, PET/CT    Assessment/Plan:  Carmen Rodriguez is a 67 y o  male with newly diagnosed atleast [id number] esophageal adenocarcinoma of the distal esophagus  EGD/EUS and stent placement overall reveal an ulcerated mass (traversable) measuring 5cm in the lower third of the esophagus (35 - 40 cm from the incisors, covering three quarters of the circumference  A stent was placed spanning 12cm from 4cm proximal to 3 5cm distal to the tumor  PET notable for a minimally avid primary without any identifiable lymph nodes  EUS showed that the mass extended to the muscularis propria and may have involved the adventitia at some areas; therefore is staged at least as a T2  He was counseled regarding preop chemoRT followed by surgery      He completed RT approximately 1 month ago  He has recovered from usual expected acute toxicity of RT  He feels well overall  He had an interval admission for FTT and COVID-19 and was transitioned to a SNF for physical therapy  He has an appt with Dr Erlin Reyes next week  -Continue clinical and radiographic assessment  -Will engage nurse navigation to ensure patient has next steps arranged given that he is currently in a facility  RTC in 6 months  No orders of the defined types were placed in this encounter  History of Present Illness   Interval History:  Shikha Palm presents for first phone follow up  Rad Txmt: 8 15-10 7 2022  Dx: Esophageal Cancer     Pt is a 67 y o  male with newly diagnosed atleast [id number] esophageal adenocarcinoma of the distal esophagus  EGD/EUS and stent placement overall reveal an ulcerated mass (traversable) measuring 5cm in the lower third of the esophagus (35 - 40 cm from the incisors, covering three quarters of the circumference  A stent was placed spanning 12cm from 4cm proximal to 3 5cm distal to the tumor  PET notable for a minimally avid primary without any identifiable lymph nodes  EUS showed that the mass extended to the muscularis propria and may have involved the adventitia at some areas; therefore is staged at least as a T2  He was counseled regarding preop chemoRT followed by surgery  10 17 22  ED    Admit 10 17-20 22   Dx: Failure to thrive/Esophageal Cancer/CAD/Recent antiplatelet therapy/PAF/CHF/Seizure D/O/Hypokalemia     10 20 22  ED    Admit 10 20-24 22  Pt presented initially on 10 17 for failure to thrive  Discharged on the afternoon of 10 20 22  He fell after stepping up from his front door and experienced minor injuries  No reported LOC  Bystander assisted helping him up  Experiencing ambulatory dysfunction and weakness  Concern for future fall risk  Pt has steps in his home  Suggested: post acute rehab per PT eval    Plan to transfer pt to [facility] facility  Added Dx: Esophageal Cancer, CAD, PAF, CHF, Seizure D/O, Hypokalemia   10 24 22 D/C to [facility]      11 9 22  ED   Sent from [facility], per EMS they were called for abnormal labs, on arrival pt was found to be lethargic w/O2 in the 80's  Pt became responsive with [id number], arrives A & O x 4, does not want to be here  He feels well overall  He denies problems with eating or swallowing  He is fatigued  He is frustrated by moving from facility to facility  Historical Information   Oncology History   Esophageal cancer ([facility] [address] )   5/2/2022 Initial Diagnosis    Esophageal adenocarcinoma ([facility] [address] )     5/4/2022 Biopsy    Esophagus, lower esophageal bx:  - Poorly differentiated carcinoma, consistent with adenocarcinoma         5/18/2022 -  Cancer Staged    Staging form: Esophagus - Adenocarcinoma, AJCC 8th Edition  - Clinical stage from 5/18/2022: Stage IIB (cT2, cN0, cM0, G3) - Signed by Aris Parker MD on 7/5/2022  Total positive nodes: 0  Histologic grading system: 3 grade system  HER2 status: Negative  Clinical staging modalities: Biopsy, EUS, Endoscopy, PET/CT       8/15/2022 -  Chemotherapy    CARBOplatin (PARAPLATIN) IVPB (GOG AUC DOSING), 237 6 mg, Intravenous, Once, 4 of 5 cycles  Administration: 237 6 mg (8/15/2022), 190 05 mg (8/29/2022), 192 15 mg (10/4/2022)  PACLItaxel (TAXOL) chemo IVPB, 50 mg/m2 = 106 2 mg, Intravenous, Once, 4 of 5 cycles  Dose modification: 40 mg/m2 (original dose 50 mg/m2, Cycle 3, Reason: Neutropenia, Comment: 20% dose reduction due to neutropenia)  Administration: 106 2 mg (8/15/2022), 84 6 mg (8/29/2022), 84 6 mg (10/4/2022)  filgrastim (NEUPOGEN) injection Soln, 480 mcg (100 % of original dose 480 mcg), Subcutaneous, Once, 1 of 1 cycle  Dose modification: 480 mcg (original dose 480 mcg, Cycle 4)  tbo-filgrastim (GRANIX), 480 mcg (100 % of original dose 480 mcg), Subcutaneous, Once, 2 of 2 cycles  Dose modification: 480 mcg (original dose 480 mcg, Cycle 2), 480 mcg (original dose 480 mcg, Cycle 4)  Administration: 480 mcg (8/22/2022), 480 mcg (9/7/2022)      Chemotherapy    CARBOplatin (PARAPLATIN) IVPB (GOG AUC DOSING), 237 6 mg, Intravenous, Once, 4 of 5 cycles    Administration: 237 6 mg (8/15/2022), 190 05 mg (8/29/2022), 192 15 mg (10/4/2022)        PACLItaxel (TAXOL) chemo IVPB, 50 mg/m2 = 106 2 mg, Intravenous, Once, 4 of 5 cycles    Dose modification: 40 mg/m2 (original dose 50 mg/m2, Cycle 3, Reason: Neutropenia, Comment: 20% dose reduction due to neutropenia)    Administration: 106 2 mg (8/15/2022), 84 6 mg (8/29/2022), 84 6 mg (10/4/2022)        filgrastim (NEUPOGEN) injection Soln, 480 mcg (100 % of original dose 480 mcg), Subcutaneous, Once, 1 of 1 cycle    Dose modification: 480 mcg (original dose 480 mcg, Cycle 4)        tbo-filgrastim (GRANIX), 480 mcg (100 % of original dose 480 mcg), Subcutaneous, Once, 2 of 2 cycles    Dose modification: 480 mcg (original dose 480 mcg, Cycle 2), 480 mcg (original dose 480 mcg, Cycle 4)    Administration: 480 mcg (8/22/2022), 480 mcg (9/7/2022)      7/2022 - adenocarcinoma of the distal esophagus s/p EUS + stent placement - [id number]    8/15/2022 - start weekly carbo/taxol/RT    Week 2 held due to cytopenias - FA and b12 deficiencies found and replacement started    Week 3 dose reducation of 20% for taxol, carbo AUC 1 5    Weeks 4-6 held due to cytopenias and hospital admission for dehydration, inability to eat and esophageal stent migration           8/15/2022 - 10/7/2022 Radiation    The patient saw @[facility]@ for radiation treatment   This is the current list of radiation treatment:  Plan ID Energy Fractions Dose per Fraction (cGy) Dose Correction (cGy) Total Dose Delivered (cGy) Elapsed Days   CD Esophagus 6X 3 / 3 180 0 540 2   Distal Esoph 6X 25 / 25 180 0 4,500 50      Treatment dates:  8/15/2022 - 10/7/2022              Past Medical History:   Diagnosis Date   • Cardiac disease    • CHF (congestive heart failure) ([facility] [address] ) • Esophageal cancer [facility])    • Hernia of abdominal cavity    • Myocardial infarction [facility])     last assessed 14, past, Pt had MI s/p AGUSTIN placed in , refuses to take any other medications for his cardiac disease, only will take seizure med   Understands possible complications from this decision   • Seizure [facility])      Past Surgical History:   Procedure Laterality Date   • ABDOMINAL AORTIC ANEURYSM REPAIR      for dialation or occulsion   • CATARACT EXTRACTION     • IR PORT PLACEMENT  7/15/2022     Social History   Social History     Substance and Sexual Activity   Alcohol Use Not Currently     Social History     Substance and Sexual Activity   Drug Use Never     Social History     Tobacco Use   Smoking Status Former Smoker   • Quit date: 2005   • Years since quittin 4   Smokeless Tobacco Never Used     Meds/Allergies     Current Outpatient Medications:   •  atorvastatin (LIPITOR) 80 mg tablet, Take 1 tablet (80 mg total) by mouth daily with dinner, Disp: 90 tablet, Rfl: 4  •  cyanocobalamin (VITAMIN B-12) 500 MCG tablet, Take 1 tablet (500 mcg total) by mouth daily Do not start before 2022 , Disp: 30 tablet, Rfl: 0  •  folic acid (FOLVITE) 1 mg tablet, Take 1 tablet (1 mg total) by mouth daily Do not start before 2022 , Disp: 30 tablet, Rfl: 0  •  midodrine (PROAMATINE) 5 mg tablet, Take 1 tablet (5 mg total) by mouth 3 (three) times a day before meals, Disp: 90 tablet, Rfl: 0  •  mirtazapine (REMERON) 7 5 MG tablet, Take 1 tablet (7 5 mg total) by mouth daily at bedtime, Disp: 30 tablet, Rfl: 0  •  PHENobarbital 64 8 mg tablet, TAKE 1 TABLET (64 8 MG TOTAL) BY MOUTH 2 (TWO) TIMES A DAY, Disp: 180 tablet, Rfl: 1  Allergies   Allergen Reactions   • Iodinated Diagnostic Agents Rash     Pt's skin get very red and he gets hot and chills   • Clopidogrel Rash         OBJECTIVE:   RESULTS  Lab Results:   Recent Results (from the past 672 hour(s))   Basic metabolic panel Collection Time: 10/18/22  6:42 AM   Result Value Ref Range    Sodium 139 135 - 147 mmol/L    Potassium 3 6 3 5 - 5 3 mmol/L    Chloride 105 96 - 108 mmol/L    CO2 29 21 - 32 mmol/L    ANION GAP 5 4 - 13 mmol/L    BUN 11 5 - 25 mg/dL    Creatinine 0 66 0 60 - 1 30 mg/dL    Glucose 139 65 - 140 mg/dL    Glucose, Fasting 139 (H) 65 - 99 mg/dL    Calcium 8 1 (L) 8 3 - 10 1 mg/dL    eGFR 93 ml/min/1 73sq m   Magnesium    Collection Time: 10/18/22  6:42 AM   Result Value Ref Range    Magnesium 1 8 1 6 - 2 6 mg/dL   Phosphorus    Collection Time: 10/18/22  6:42 AM   Result Value Ref Range    Phosphorus 2 1 (L) 2 3 - 4 1 mg/dL   CBC and differential    Collection Time: 10/18/22  6:42 AM   Result Value Ref Range    WBC 4 35 4 31 - 10 16 Thousand/uL    RBC 2 61 (L) 3 88 - 5 62 Million/uL    Hemoglobin 8 8 (L) 12 0 - 17 0 g/dL    Hematocrit 26 7 (L) 36 5 - 49 3 %     (H) 82 - 98 fL    MCH 33 7 26 8 - 34 3 pg    MCHC 33 0 31 4 - 37 4 g/dL    RDW 24 9 (H) 11 6 - 15 1 %    Platelets 96 (L) 338 - 390 Thousands/uL    nRBC 0 /100 WBCs    Neutrophils Relative 73 43 - 75 %    Immat GRANS % 1 0 - 2 %    Lymphocytes Relative 5 (L) 14 - 44 %    Monocytes Relative 13 (H) 4 - 12 %    Eosinophils Relative 7 (H) 0 - 6 %    Basophils Relative 1 0 - 1 %    Neutrophils Absolute 3 19 1 85 - 7 62 Thousands/µL    Immature Grans Absolute 0 03 0 00 - 0 20 Thousand/uL    Lymphocytes Absolute 0 23 (L) 0 60 - 4 47 Thousands/µL    Monocytes Absolute 0 56 0 17 - 1 22 Thousand/µL    Eosinophils Absolute 0 31 0 00 - 0 61 Thousand/µL    Basophils Absolute 0 03 0 00 - 0 10 Thousands/µL   CBC and differential    Collection Time: 10/19/22  7:14 AM   Result Value Ref Range    WBC 3 35 (L) 4 31 - 10 16 Thousand/uL    RBC 2 40 (L) 3 88 - 5 62 Million/uL    Hemoglobin 8 1 (L) 12 0 - 17 0 g/dL    Hematocrit 25 1 (L) 36 5 - 49 3 %     (H) 82 - 98 fL    MCH 33 8 26 8 - 34 3 pg    MCHC 32 3 31 4 - 37 4 g/dL    RDW 25 0 (H) 11 6 - 15 1 %    Platelets 78 (L) 149 - 390 Thousands/uL   Manual Differential(PHLEBS Do Not Order)    Collection Time: 10/19/22  7:14 AM   Result Value Ref Range    Segmented % 63 43 - 75 %    Bands % 13 (H) 0 - 8 %    Lymphocytes % 4 (L) 14 - 44 %    Monocytes % 6 4 - 12 %    Eosinophils, % 14 (H) 0 - 6 %    Basophils % 0 0 - 1 %    Absolute Neutrophils 2 55 1 85 - 7 62 Thousand/uL    Lymphocytes Absolute 0 13 (L) 0 60 - 4 47 Thousand/uL    Monocytes Absolute 0 20 0 00 - 1 22 Thousand/uL    Eosinophils Absolute 0 47 (H) 0 00 - 0 40 Thousand/uL    Basophils Absolute 0 00 0 00 - 0 10 Thousand/uL    Total Counted      RBC Morphology Present     Anisocytosis Present     Macrocytes Present     Poikilocytes Present     Schistocytes Present     Platelet Estimate Decreased (A) Adequate   Hemoglobin and hematocrit, blood    Collection Time: 10/20/22  1:57 AM   Result Value Ref Range    Hemoglobin 8 9 (L) 12 0 - 17 0 g/dL    Hematocrit 26 9 (L) 36 5 - 49 3 %   Basic metabolic panel    Collection Time: 10/20/22  1:58 AM   Result Value Ref Range    Sodium 137 135 - 147 mmol/L    Potassium 3 3 (L) 3 5 - 5 3 mmol/L    Chloride 107 96 - 108 mmol/L    CO2 28 21 - 32 mmol/L    ANION GAP 2 (L) 4 - 13 mmol/L    BUN 7 5 - 25 mg/dL    Creatinine 0 54 (L) 0 60 - 1 30 mg/dL    Glucose 110 65 - 140 mg/dL    Calcium 7 8 (L) 8 3 - 10 1 mg/dL    eGFR 101 ml/min/1 73sq m   CBC and differential    Collection Time: 10/20/22  1:58 AM   Result Value Ref Range    WBC 3 94 (L) 4 31 - 10 16 Thousand/uL    RBC 2 68 (L) 3 88 - 5 62 Million/uL    Hemoglobin 9 0 (L) 12 0 - 17 0 g/dL    Hematocrit 27 9 (L) 36 5 - 49 3 %     (H) 82 - 98 fL    MCH 33 6 26 8 - 34 3 pg    MCHC 32 3 31 4 - 37 4 g/dL    RDW 24 7 (H) 11 6 - 15 1 %    Platelets 90 (L) 238 - 390 Thousands/uL    nRBC 0 /100 WBCs    Neutrophils Relative 66 43 - 75 %    Immat GRANS % 1 0 - 2 %    Lymphocytes Relative 8 (L) 14 - 44 %    Monocytes Relative 15 (H) 4 - 12 %    Eosinophils Relative 9 (H) 0 - 6 %    Basophils Relative 1 0 - 1 %    Neutrophils Absolute 2 63 1 85 - 7 62 Thousands/µL    Immature Grans Absolute 0 02 0 00 - 0 20 Thousand/uL    Lymphocytes Absolute 0 31 (L) 0 60 - 4 47 Thousands/µL    Monocytes Absolute 0 58 0 17 - 1 22 Thousand/µL    Eosinophils Absolute 0 36 0 00 - 0 61 Thousand/µL    Basophils Absolute 0 04 0 00 - 0 10 Thousands/µL   Platelet count    Collection Time: 10/20/22  8:27 PM   Result Value Ref Range    Platelets 89 (L) 359 - 390 Thousands/uL   Basic metabolic panel    Collection Time: 10/21/22  5:18 AM   Result Value Ref Range    Sodium 138 135 - 147 mmol/L    Potassium 3 6 3 5 - 5 3 mmol/L    Chloride 107 96 - 108 mmol/L    CO2 25 21 - 32 mmol/L    ANION GAP 6 4 - 13 mmol/L    BUN 9 5 - 25 mg/dL    Creatinine 0 60 0 60 - 1 30 mg/dL    Glucose 94 65 - 140 mg/dL    Calcium 7 9 (L) 8 3 - 10 1 mg/dL    eGFR 96 ml/min/1 73sq m   CBC and differential    Collection Time: 10/21/22  5:18 AM   Result Value Ref Range    WBC 4 09 (L) 4 31 - 10 16 Thousand/uL    RBC 3 01 (L) 3 88 - 5 62 Million/uL    Hemoglobin 10 0 (L) 12 0 - 17 0 g/dL    Hematocrit 30 4 (L) 36 5 - 49 3 %     (H) 82 - 98 fL    MCH 33 2 26 8 - 34 3 pg    MCHC 32 9 31 4 - 37 4 g/dL    RDW 24 8 (H) 11 6 - 15 1 %    Platelets 87 (L) 953 - 390 Thousands/uL    nRBC 0 /100 WBCs    Neutrophils Relative 73 43 - 75 %    Immat GRANS % 1 0 - 2 %    Lymphocytes Relative 7 (L) 14 - 44 %    Monocytes Relative 10 4 - 12 %    Eosinophils Relative 8 (H) 0 - 6 %    Basophils Relative 1 0 - 1 %    Neutrophils Absolute 3 05 1 85 - 7 62 Thousands/µL    Immature Grans Absolute 0 03 0 00 - 0 20 Thousand/uL    Lymphocytes Absolute 0 27 (L) 0 60 - 4 47 Thousands/µL    Monocytes Absolute 0 39 0 17 - 1 22 Thousand/µL    Eosinophils Absolute 0 33 0 00 - 0 61 Thousand/µL    Basophils Absolute 0 02 0 00 - 0 10 Thousands/µL   Basic metabolic panel    Collection Time: 10/23/22  5:06 AM   Result Value Ref Range    Sodium 137 135 - 147 mmol/L    Potassium 3 4 (L) 3 5 - 5 3 mmol/L    Chloride 107 96 - 108 mmol/L    CO2 23 21 - 32 mmol/L    ANION GAP 7 4 - 13 mmol/L    BUN 15 5 - 25 mg/dL    Creatinine 0 61 0 60 - 1 30 mg/dL    Glucose 96 65 - 140 mg/dL    Calcium 8 1 (L) 8 3 - 10 1 mg/dL    eGFR 96 ml/min/1 73sq m   CBC and Platelet    Collection Time: 10/23/22  5:06 AM   Result Value Ref Range    WBC 4 66 4 31 - 10 16 Thousand/uL    RBC 3 05 (L) 3 88 - 5 62 Million/uL    Hemoglobin 9 9 (L) 12 0 - 17 0 g/dL    Hematocrit 31 8 (L) 36 5 - 49 3 %     (H) 82 - 98 fL    MCH 32 5 26 8 - 34 3 pg    MCHC 31 1 (L) 31 4 - 37 4 g/dL    RDW 24 7 (H) 11 6 - 15 1 %    Platelets 73 (L) 586 - 390 Thousands/uL   COVID only    Collection Time: 10/23/22  6:44 PM    Specimen: Nose; Nares   Result Value Ref Range    SARS-CoV-2 Negative Negative   Basic metabolic panel    Collection Time: 10/24/22  2:42 AM   Result Value Ref Range    Sodium 138 135 - 147 mmol/L    Potassium 3 1 (L) 3 5 - 5 3 mmol/L    Chloride 107 96 - 108 mmol/L    CO2 26 21 - 32 mmol/L    ANION GAP 5 4 - 13 mmol/L    BUN 14 5 - 25 mg/dL    Creatinine 0 59 (L) 0 60 - 1 30 mg/dL    Glucose 93 65 - 140 mg/dL    Calcium 8 1 (L) 8 3 - 10 1 mg/dL    eGFR 97 ml/min/1 73sq m   CBC and differential    Collection Time: 10/24/22  2:42 AM   Result Value Ref Range    WBC 3 99 (L) 4 31 - 10 16 Thousand/uL    RBC 3 08 (L) 3 88 - 5 62 Million/uL    Hemoglobin 10 3 (L) 12 0 - 17 0 g/dL    Hematocrit 31 6 (L) 36 5 - 49 3 %     (H) 82 - 98 fL    MCH 33 4 26 8 - 34 3 pg    MCHC 32 6 31 4 - 37 4 g/dL    RDW 24 5 (H) 11 6 - 15 1 %    Platelets 77 (L) 225 - 390 Thousands/uL    nRBC 0 /100 WBCs    Neutrophils Relative 71 43 - 75 %    Immat GRANS % 1 0 - 2 %    Lymphocytes Relative 7 (L) 14 - 44 %    Monocytes Relative 9 4 - 12 %    Eosinophils Relative 11 (H) 0 - 6 %    Basophils Relative 1 0 - 1 %    Neutrophils Absolute 2 84 1 85 - 7 62 Thousands/µL    Immature Grans Absolute 0 03 0 00 - 0 20 Thousand/uL    Lymphocytes Absolute 0 29 (L) 0 60 - 4 47 Thousands/µL    Monocytes Absolute 0 36 0 17 - 1 22 Thousand/µL    Eosinophils Absolute 0 44 0 00 - 0 61 Thousand/µL    Basophils Absolute 0 03 0 00 - 0 10 Thousands/µL   ECG 12 lead    Collection Time: 11/09/22  2:36 AM   Result Value Ref Range    Ventricular Rate 95 BPM    Atrial Rate 192 BPM    NV Interval  ms    QRSD Interval 86 ms    QT Interval 370 ms    QTC Interval 464 ms    P Axis  degrees    QRS Axis -41 degrees    T Wave Axis 50 degrees   CBC and differential    Collection Time: 11/09/22  3:04 AM   Result Value Ref Range    WBC 2 00 (L) 4 31 - 10 16 Thousand/uL    RBC 2 16 (L) 3 88 - 5 62 Million/uL    Hemoglobin 7 4 (L) 12 0 - 17 0 g/dL    Hematocrit 23 1 (L) 36 5 - 49 3 %     (H) 82 - 98 fL    MCH 34 3 26 8 - 34 3 pg    MCHC 32 0 31 4 - 37 4 g/dL    RDW 21 8 (H) 11 6 - 15 1 %    Platelets 25 (LL) 043 - 390 Thousands/uL    nRBC 0 /100 WBCs    Neutrophils Relative 74 43 - 75 %    Immat GRANS % 1 0 - 2 %    Lymphocytes Relative 14 14 - 44 %    Monocytes Relative 8 4 - 12 %    Eosinophils Relative 2 0 - 6 %    Basophils Relative 1 0 - 1 %    Neutrophils Absolute 1 53 (L) 1 85 - 7 62 Thousands/µL    Immature Grans Absolute 0 01 0 00 - 0 20 Thousand/uL    Lymphocytes Absolute 0 27 (L) 0 60 - 4 47 Thousands/µL    Monocytes Absolute 0 15 (L) 0 17 - 1 22 Thousand/µL    Eosinophils Absolute 0 03 0 00 - 0 61 Thousand/µL    Basophils Absolute 0 01 0 00 - 0 10 Thousands/µL   Protime-INR    Collection Time: 11/09/22  3:04 AM   Result Value Ref Range    Protime 16 4 (H) 11 6 - 14 5 seconds    INR 1 32 (H) 0 84 - 1 19   APTT    Collection Time: 11/09/22  3:04 AM   Result Value Ref Range    PTT 38 (H) 23 - 37 seconds   Comprehensive metabolic panel    Collection Time: 11/09/22  3:04 AM   Result Value Ref Range    Sodium 138 135 - 147 mmol/L    Potassium 3 0 (L) 3 5 - 5 3 mmol/L    Chloride 102 96 - 108 mmol/L    CO2 31 21 - 32 mmol/L    ANION GAP 5 4 - 13 mmol/L    BUN 17 5 - 25 mg/dL    Creatinine 0  78 0 60 - 1 30 mg/dL    Glucose 105 65 - 140 mg/dL    Calcium 7 7 (L) 8 3 - 10 1 mg/dL    Corrected Calcium 9 4 8 3 - 10 1 mg/dL    AST      ALT 27 12 - 78 U/L    Alkaline Phosphatase 94 46 - 116 U/L    Total Protein 6 9 6 4 - 8 4 g/dL    Albumin 1 9 (L) 3 5 - 5 0 g/dL    Total Bilirubin 0 35 0 20 - 1 00 mg/dL    eGFR 87 ml/min/1 73sq m   Type and screen    Collection Time: 11/09/22  3:04 AM   Result Value Ref Range    ABO Grouping A     Rh Factor Positive     Antibody Screen Negative     Specimen Expiration Date [id number]    FLU/RSV/COVID - if FLU/RSV clinically relevant    Collection Time: 11/09/22  3:04 AM    Specimen: Nose; Nares   Result Value Ref Range    SARS-CoV-2 Positive (A) Negative    INFLUENZA A PCR Negative Negative    INFLUENZA B PCR Negative Negative    RSV PCR Negative Negative   NT-BNP PRO    Collection Time: 11/09/22  3:04 AM   Result Value Ref Range    NT-proBNP 1,399 (H) <450 pg/mL   C-reactive protein    Collection Time: 11/09/22  3:04 AM   Result Value Ref Range    CRP >90 0 (H) <3 0 mg/L   Procalcitonin    Collection Time: 11/09/22  3:04 AM   Result Value Ref Range    Procalcitonin 0 56 (H) <=0 25 ng/ml   HS Troponin 0hr (reflex protocol)    Collection Time: 11/09/22  4:04 AM   Result Value Ref Range    hs TnI 0hr 35 "Refer to ACS Flowchart"- see link ng/L   HS Troponin I 2hr    Collection Time: 11/09/22  6:14 AM   Result Value Ref Range    hs TnI 2hr 32 "Refer to ACS Flowchart"- see link ng/L    Delta 2hr hsTnI -3 <20 ng/L   HS Troponin I 4hr    Collection Time: 11/09/22  8:55 AM   Result Value Ref Range    hs TnI 4hr 30 "Refer to ACS Flowchart"- see link ng/L    Delta 4hr hsTnI -5 <20 ng/L   Comprehensive metabolic panel    Collection Time: 11/10/22  5:20 AM   Result Value Ref Range    Sodium 139 135 - 147 mmol/L    Potassium 3 0 (L) 3 5 - 5 3 mmol/L    Chloride 102 96 - 108 mmol/L    CO2 31 21 - 32 mmol/L    ANION GAP 6 4 - 13 mmol/L    BUN 15 5 - 25 mg/dL    Creatinine 0 62 0 60 - 1 30 mg/dL    Glucose 92 65 - 140 mg/dL    Glucose, Fasting 92 65 - 99 mg/dL    Calcium 7 4 (L) 8 3 - 10 1 mg/dL    Corrected Calcium 9 2 8 3 - 10 1 mg/dL     (H) 5 - 45 U/L    ALT 33 12 - 78 U/L    Alkaline Phosphatase 93 46 - 116 U/L    Total Protein 6 3 (L) 6 4 - 8 4 g/dL    Albumin 1 7 (L) 3 5 - 5 0 g/dL    Total Bilirubin 0 26 0 20 - 1 00 mg/dL    eGFR 95 ml/min/1 73sq m   CBC and differential    Collection Time: 11/10/22  5:20 AM   Result Value Ref Range    WBC 1 39 (LL) 4 31 - 10 16 Thousand/uL    RBC 2 05 (L) 3 88 - 5 62 Million/uL    Hemoglobin 7 2 (L) 12 0 - 17 0 g/dL    Hematocrit 22 0 (L) 36 5 - 49 3 %     (H) 82 - 98 fL    MCH 35 1 (H) 26 8 - 34 3 pg    MCHC 32 7 31 4 - 37 4 g/dL    RDW 21 8 (H) 11 6 - 15 1 %    Platelets 18 (LL) 566 - 390 Thousands/uL   Manual Differential(PHLEBS Do Not Order)    Collection Time: 11/10/22  5:20 AM   Result Value Ref Range    Segmented % 60 43 - 75 %    Bands % 31 (H) 0 - 8 %    Lymphocytes % 8 (L) 14 - 44 %    Monocytes % 0 (L) 4 - 12 %    Eosinophils, % 1 0 - 6 %    Basophils % 0 0 - 1 %    Absolute Neutrophils 1 26 (L) 1 85 - 7 62 Thousand/uL    Lymphocytes Absolute 0 11 (L) 0 60 - 4 47 Thousand/uL    Monocytes Absolute 0 00 0 00 - 1 22 Thousand/uL    Eosinophils Absolute 0 01 0 00 - 0 40 Thousand/uL    Basophils Absolute 0 00 0 00 - 0 10 Thousand/uL    Total Counted      Anisocytosis Present     Hypochromia Present     Macrocytes Present     Platelet Estimate Decreased (A) Adequate   Prepare Leukoreduced RBC: 1 Units    Collection Time: 11/11/22  5:30 AM   Result Value Ref Range    Unit Product Code V4643Z03     Unit Number [id number]     Unit ABO A     Unit [facility] POS     Crossmatch Compatible     Unit Dispense Status Presumed Trans     Unit Product Volume 350 ml   Prepare Leukoreduced RBC: 1 Units    Collection Time: 11/11/22  5:30 AM   Result Value Ref Range    Unit Product Code V7705K89     Unit Number [id number]     Unit ABO A     Unit [facility] POS Crossmatch Compatible     Unit Dispense Status Presumed Trans     Unit Product Volume 350 mL   Comprehensive metabolic panel    Collection Time: 11/11/22  8:59 AM   Result Value Ref Range    Sodium 139 135 - 147 mmol/L    Potassium 3 7 3 5 - 5 3 mmol/L    Chloride 103 96 - 108 mmol/L    CO2 28 21 - 32 mmol/L    ANION GAP 8 4 - 13 mmol/L    BUN 15 5 - 25 mg/dL    Creatinine 0 63 0 60 - 1 30 mg/dL    Glucose 105 65 - 140 mg/dL    Calcium 7 9 (L) 8 3 - 10 1 mg/dL    Corrected Calcium 9 6 8 3 - 10 1 mg/dL     (H) 5 - 45 U/L    ALT 32 12 - 78 U/L    Alkaline Phosphatase 114 46 - 116 U/L    Total Protein 7 3 6 4 - 8 4 g/dL    Albumin 1 9 (L) 3 5 - 5 0 g/dL    Total Bilirubin 0 46 0 20 - 1 00 mg/dL    eGFR 95 ml/min/1 73sq m   CBC and differential    Collection Time: 11/11/22  8:59 AM   Result Value Ref Range    WBC 4 76 4 31 - 10 16 Thousand/uL    RBC 3 41 (L) 3 88 - 5 62 Million/uL    Hemoglobin 11 2 (L) 12 0 - 17 0 g/dL    Hematocrit 33 6 (L) 36 5 - 49 3 %    MCV 99 (H) 82 - 98 fL    MCH 32 8 26 8 - 34 3 pg    MCHC 33 3 31 4 - 37 4 g/dL    RDW 27 1 (H) 11 6 - 15 1 %    Platelets 17 (LL) 704 - 390 Thousands/uL   Manual Differential(PHLEBS Do Not Order)    Collection Time: 11/11/22  8:59 AM   Result Value Ref Range    Segmented % 83 (H) 43 - 75 %    Bands % 8 0 - 8 %    Lymphocytes % 3 (L) 14 - 44 %    Monocytes % 3 (L) 4 - 12 %    Eosinophils, % 2 0 - 6 %    Basophils % 1 0 - 1 %    Absolute Neutrophils 4 33 1 85 - 7 62 Thousand/uL    Lymphocytes Absolute 0 14 (L) 0 60 - 4 47 Thousand/uL    Monocytes Absolute 0 14 0 00 - 1 22 Thousand/uL    Eosinophils Absolute 0 10 0 00 - 0 40 Thousand/uL    Basophils Absolute 0 05 0 00 - 0 10 Thousand/uL    Total Counted      Anisocytosis Present     Macrocytes Present     Platelet Estimate Decreased (A) Adequate   Comprehensive metabolic panel    Collection Time: 11/12/22  6:11 AM   Result Value Ref Range    Sodium 139 135 - 147 mmol/L    Potassium 2 9 (L) 3 5 - 5 3 mmol/L Chloride 103 96 - 108 mmol/L    CO2 29 21 - 32 mmol/L    ANION GAP 7 4 - 13 mmol/L    BUN 12 5 - 25 mg/dL    Creatinine 0 55 (L) 0 60 - 1 30 mg/dL    Glucose 91 65 - 140 mg/dL    Calcium 7 8 (L) 8 3 - 10 1 mg/dL    Corrected Calcium 9 6 8 3 - 10 1 mg/dL    AST 81 (H) 5 - 45 U/L    ALT 29 12 - 78 U/L    Alkaline Phosphatase 105 46 - 116 U/L    Total Protein 6 3 (L) 6 4 - 8 4 g/dL    Albumin 1 7 (L) 3 5 - 5 0 g/dL    Total Bilirubin 0 35 0 20 - 1 00 mg/dL    eGFR 100 ml/min/1 73sq m   CBC and differential    Collection Time: 11/12/22  6:11 AM   Result Value Ref Range    WBC 4 61 4 31 - 10 16 Thousand/uL    RBC 3 17 (L) 3 88 - 5 62 Million/uL    Hemoglobin 10 5 (L) 12 0 - 17 0 g/dL    Hematocrit 31 0 (L) 36 5 - 49 3 %    MCV 98 82 - 98 fL    MCH 33 1 26 8 - 34 3 pg    MCHC 33 9 31 4 - 37 4 g/dL    RDW 26 4 (H) 11 6 - 15 1 %    Platelets 17 (LL) 246 - 390 Thousands/uL    nRBC 1 /100 WBCs    Neutrophils Relative 74 43 - 75 %    Immat GRANS % 8 (H) 0 - 2 %    Lymphocytes Relative 9 (L) 14 - 44 %    Monocytes Relative 5 4 - 12 %    Eosinophils Relative 4 0 - 6 %    Basophils Relative 0 0 - 1 %    Neutrophils Absolute 3 42 1 85 - 7 62 Thousands/µL    Immature Grans Absolute 0 36 (H) 0 00 - 0 20 Thousand/uL    Lymphocytes Absolute 0 42 (L) 0 60 - 4 47 Thousands/µL    Monocytes Absolute 0 24 0 17 - 1 22 Thousand/µL    Eosinophils Absolute 0 16 0 00 - 0 61 Thousand/µL    Basophils Absolute 0 01 0 00 - 0 10 Thousands/µL     Imaging Studies:XR chest 1 view portable    Result Date: 11/9/2022  Narrative: CHEST INDICATION:   hypoxia  COMPARISON:  10/17/2022 EXAM PERFORMED/VIEWS:  XR CHEST PORTABLE FINDINGS: Cardiomediastinal silhouette appears unremarkable  The lungs are clear  Right Mediport and esophageal stent again noted  No pneumothorax or pleural effusion  Osseous structures appear within normal limits for patient age  Impression: No acute cardiopulmonary disease   Workstation performed: [id number]     XR chest 1 view portable    Result Date: 10/17/2022  Narrative: CHEST INDICATION:   weakness  COMPARISON:  9/19/2022; 9/22/2022 EXAM PERFORMED/VIEWS:  XR CHEST PORTABLE Single view FINDINGS: Right IJ port terminating at cavoatrial junction Cardiomediastinal silhouette appears unremarkable  The lungs are clear  No pneumothorax or pleural effusion  Osseous structures appear within normal limits for patient age  Distal esophageal stent again evident     Impression: No acute cardiopulmonary disease  Findings are stable Workstation performed: [id number]       Gregoria Heart MD  11/14/2022,4:19 PM    VIRTUAL VISIT DISCLAIMER  Patient verbally agrees to participate in [facility]  Pt is aware that [facility] could be limited without vital signs or the ability to perform a full hands-on physical exam  Patient understands he or the provider may request at any time to terminate the video visit and request the patient to seek care or treatment in person  Portions of the record may have been created with voice recognition software  Occasional wrong word or "sound a like" substitutions may have occurred due to the inherent limitations of voice recognition software  Read the chart carefully and recognize, using context, where substitutions have occurred

## 2022-11-14 NOTE — TELEPHONE ENCOUNTER
Received call from [facility] to have patient schedule appointment with Rafat Haynes  I advised it would be EDITA as the patient is already seen by [facility]  She stated she will leave the appointment as is

## 2022-11-17 ENCOUNTER — PATIENT OUTREACH (OUTPATIENT)
Dept: HEMATOLOGY ONCOLOGY | Facility: CLINIC | Age: 77
End: 2022-11-17

## 2022-11-17 NOTE — PROGRESS NOTES
I scheduled patient's PET for [email or web address] SLN and rescheduled the follow up with Dr Elmer Alex to [email or web address]    Called Phoebe to make aware and faxed all appointment information and script for PET to [phone]

## 2022-11-18 ENCOUNTER — TELEPHONE (OUTPATIENT)
Dept: HEMATOLOGY ONCOLOGY | Facility: CLINIC | Age: 77
End: 2022-11-18

## 2022-11-18 NOTE — TELEPHONE ENCOUNTER
Appointment Confirmation (to confirm pre existing appointments - ONLY)  No need to route   Appointment with [facility]   Appointment date & time 12/2/22   Location [place]   Patient verbilized Understanding  Yes

## 2022-11-22 ENCOUNTER — PATIENT OUTREACH (OUTPATIENT)
Dept: HEMATOLOGY ONCOLOGY | Facility: CLINIC | Age: 77
End: 2022-11-22

## 2022-11-22 NOTE — PROGRESS NOTES
Received a telephone call from [email or web address]AK[employer] regarding patients status  Patient's health condition is declining and they are canceling the scan and all his oncology appointments  He will be transitioned to comfort care  I have canceled the PET and f/u with Dr Cuco Ba

## 2022-11-29 ENCOUNTER — HOSPICE ADMISSION (OUTPATIENT)
Dept: HOME HOSPICE | Facility: HOSPICE | Age: 77
End: 2022-11-29

## 2022-11-29 ENCOUNTER — TRANSCRIBE ORDERS (OUTPATIENT)
Dept: HOME HEALTH SERVICES | Facility: HOME HEALTHCARE | Age: 77
End: 2022-11-29

## 2022-11-29 ENCOUNTER — HOME CARE VISIT (OUTPATIENT)
Dept: HOME HEALTH SERVICES | Facility: HOME HEALTHCARE | Age: 77
End: 2022-11-29

## 2022-11-29 DIAGNOSIS — C15.9 MALIGNANT NEOPLASM OF ESOPHAGUS, UNSPECIFIED LOCATION (HCC): Primary | ICD-10-CM

## 2022-11-30 ENCOUNTER — HOME CARE VISIT (OUTPATIENT)
Dept: HOME HEALTH SERVICES | Facility: HOME HEALTHCARE | Age: 77
End: 2022-11-30

## 2022-11-30 VITALS
DIASTOLIC BLOOD PRESSURE: 60 MMHG | WEIGHT: 139.2 LBS | TEMPERATURE: 98.2 F | HEIGHT: 72 IN | BODY MASS INDEX: 18.85 KG/M2 | HEART RATE: 68 BPM | SYSTOLIC BLOOD PRESSURE: 100 MMHG | RESPIRATION RATE: 16 BRPM

## 2022-12-01 ENCOUNTER — HOME CARE VISIT (OUTPATIENT)
Dept: HOME HOSPICE | Facility: HOSPICE | Age: 77
End: 2022-12-01

## 2022-12-02 ENCOUNTER — HOME CARE VISIT (OUTPATIENT)
Dept: HOME HEALTH SERVICES | Facility: HOME HEALTHCARE | Age: 77
End: 2022-12-02

## 2022-12-06 ENCOUNTER — HOME CARE VISIT (OUTPATIENT)
Dept: HOME HOSPICE | Facility: HOSPICE | Age: 77
End: 2022-12-06

## 2022-12-13 ENCOUNTER — HOME CARE VISIT (OUTPATIENT)
Dept: HOME HOSPICE | Facility: HOSPICE | Age: 77
End: 2022-12-13

## 2023-08-10 NOTE — CONSULTS
Consultation - General Cardiology Team 2  Anuja Piña 67 y o  male MRN: [id number]  Unit/Bed#: ED 28 Encounter: [id number]      Consults  PCP: Thien Rai DO   Outpatient Cardiologist: Dr Jaden Chase    History of Present Illness   Physician Requesting Consult: Letta Kanner, MD  Reason for Consult / Principal Problem: Chest Pain    HPI: Anuja Piña is a 67y o  year old male with a history of cardiomyopathy, CAD, three myocardial infarctions, (2010 - AGUSTIN to the mid LAD; 2017 - AGUSTIN to RCA;  04/2021 - AGUSTIN to mid LAD at site of previous stent)  Most recent cath in April shows 25% stenosis of prox L main, 50% stenosis of mid circ, 50% stenosis of mid RCA  The patient states that this morning at 11 AM after his shower, he started feeling a severe substernal chest pressure, non radiating, with associated diaphoresis and shortness of breath  He states that this pain is very similar to his second MI  There was no trigger for this incident  He denies dizziness, numbness tingling, palpitations, abdominal pain or n/v/d, denies back pain or swelling  He states that he took a nitroglycerine at home which did not help, and at that point he called 911  In the ED, patient was placed on heparin, troponins elevated at 3 82, EKG showed sinus 1st degree AV block, with ST and T wave abnormalities suggestive of STEMI  Patient states that he has not been taking his antiplatelet medication prescribed during his past MI as he had an allergic reaction to Plavix  This was changed to [address], but he was told at the pharmacy that this would interfere with his anti-siezure medication and make it difficult for him to sleep  Review of Systems  ROS as noted above         Historical Information   Past Medical History:   Diagnosis Date    Cardiac disease     CHF (congestive heart failure) ([address] )     Hernia of abdominal cavity     Myocardial infarction ([address] )     last assessed 7/31/14, past, Pt had MI s/p AGUSTIN placed in 2001, Preceptor Attestation:   Patient seen, evaluated and discussed with the resident Dr. Griffin. I have verified the content of the note, which accurately reflects my assessment of the patient and the plan of care.  Supervising Physician:Benjamin Rosenstein, MD, MA [facility]             refuses to take any other medications for his cardiac disease, only will take seizure med  Understands possible complications from this decision    Seizure [facility])      Past Surgical History:   Procedure Laterality Date    ABDOMINAL AORTIC ANEURYSM REPAIR      for dialation or occulsion    CATARACT EXTRACTION       Social History     Substance and Sexual Activity   Alcohol Use Not Currently     Social History     Substance and Sexual Activity   Drug Use Never     Social History     Tobacco Use   Smoking Status Former Smoker    Quit date: 2005    Years since quittin 1   Smokeless Tobacco Never Used     Family History: non-contributory    Meds/Allergies   Hospital Medications:   Current Facility-Administered Medications   Medication Dose Route Frequency    heparin (porcine) 25,000 units in 0 45% NaCl 250 mL infusion (premix)  3-20 Units/kg/hr (Order-Specific) Intravenous Titrated    heparin (porcine) injection 2,000 Units  2,000 Units Intravenous Q1H PRN    heparin (porcine) injection 4,000 Units  4,000 Units Intravenous Q1H PRN    ticagrelor (BRILINTA) tablet 180 mg  180 mg Oral Once     Home Medications: (Not in a hospital admission)      Allergies   Allergen Reactions    Iodinated Diagnostic Agents Rash     Pt's skin get very red and he gets hot and chills    Clopidogrel        Objective   Vitals: Blood pressure (!) 85/47, pulse (!) 46, temperature 97 6 °F (36 4 °C), temperature source Oral, resp  rate 20, weight 99 8 kg (220 lb), SpO2 96 %    Orthostatic Blood Pressures      Most Recent Value   Blood Pressure  (!) 85/47 filed at 2021 1430            Invasive Devices     Peripheral Intravenous Line            Peripheral IV 21 Left Antecubital <1 day    Peripheral IV 21 Left Hand <1 day                Physical Exam    GEN: Charleen Nascimento appears well, alert and oriented x 3, pleasant and cooperative   HEENT:  Normocephalic, atraumatic, anicteric, moist mucous membranes  NECK: No JVD or carotid bruits   HEART: regular rate and thythm, normal S1 and S2, no murmurs, clicks, gallops or rubs   LUNGS: Clear to auscultation bilaterally; no wheezes, rales, or rhonchi; respiration nonlabored   ABDOMEN:  Normoactive bowel sounds, soft, no tenderness, no distention, hernia present  EXTREMITIES: peripheral pulses palpable; no edema  NEURO: no gross focal findings; cranial nerves grossly intact   SKIN:  Dry, intact, warm to touch    Lab Results: I have personally reviewed pertinent lab results  Results from last 7 days   Lab Units 21  1212   TROPONIN I ng/mL 3 82*     Results from last 7 days   Lab Units 21  1212   POTASSIUM mmol/L 3 7   CO2 mmol/L 23   CHLORIDE mmol/L 111*   BUN mg/dL 10   CREATININE mg/dL 0 86     Results from last 7 days   Lab Units 21  1212   HEMOGLOBIN g/dL 12 1   HEMATOCRIT % 37 0   PLATELETS Thousands/uL 118*     Results from last 7 days   Lab Units 21  1357   PTT seconds 28             Imaging: I have personally reviewed pertinent reports  Telemetry:   90s with no kelsy or tachycardia  EKG:   Date: 2021  Interpretation:   Sinus rhythm with 1st degree A-V block  Low voltage QRS  ST & T wave abnormality, consider anterior ischemia  Abnormal ECG      Previous STRESS TEST:  No results found for this or any previous visit  No results found for this or any previous visit  No results found for this or any previous visit        Previous Cath/PCI:  Results for orders placed during the hospital encounter of 21    Cardiac catheterization    Narrative  [address]  [phone]    [facility]    Invasive Cardiovascular Lab Complete Report    Patient: Twyla Barry  MR number: [id number]  Account number: [de-identified]  Study date: 2021  Gender: Male  : 1945  Height: 72 in  Weight: 224 4 lb  BSA: 2 24 mï¾²    Allergies: IODINATED DIAGNOSTIC AGENTS, CLOPIDOGREL    Diagnostic Cardiologist:  Shania Rosas DO  Interventional Cardiologist:  Shania Rosas DO    SUMMARY    CORONARY CIRCULATION:  Proximal left main: There was a 25 % stenosis  Mid LAD: There was a 90 % stenosis at the site of a prior stent  Mid circumflex: There was a 50 % stenosis  Mid RCA: There was a 50 % stenosis  1ST LESION INTERVENTIONS:  A balloon angioplasty with stent procedure was performed on the lesion in the mid LAD  A Resolute [place] Rx 3 0 x 18mm drug-eluting stent was placed across the lesion and deployed at a maximum inflation pressure of 18 antony  INDICATIONS:  --  Possible CAD: unstable angina  PROCEDURES PERFORMED    --  Left coronary angiography  --  Right coronary angiography  --  Inpatient  --  Mod Sedation Same Physician Initial 15min  --  Coronary Catheterization (w/o LHC)  --  Mod Sedation Same Physician Add 15min  --  Coronary Drug Eluting Stent w/PTCA  --  Intervention on mid LAD: balloon angioplasty, stent  PROCEDURE: The risks and alternatives of the procedures and conscious sedation were explained to the patient and informed consent was obtained  The patient was brought to the cath lab and placed on the table  The planned puncture sites  were prepped and draped in the usual sterile fashion  --  Right radial artery access  After performing an Vineet's test to verify adequate ulnar artery supply to the hand, the radial site was prepped  The puncture site was infiltrated with local anesthetic  The vessel was accessed using the  modified Seldinger technique, a wire was advanced into the vessel, and a sheath was advanced over the wire into the vessel  --  Left coronary artery angiography  A catheter was advanced over a guidewire into the aorta and positioned in the left coronary artery ostium under fluoroscopic guidance  Angiography was performed  --  Right coronary artery angiography   A catheter was advanced over a guidewire into the aorta and positioned in the right coronary artery ostium under fluoroscopic guidance  Angiography was performed  --  Inpatient  --  Mod Sedation Same Physician Initial 15min  --  Coronary Catheterization (w/o [facility])  --  Mod Sedation Same Physician Add 15min  LESION INTERVENTION: A balloon angioplasty with stent procedure was performed on the lesion in the mid LAD  There was HELIO 3 flow before the procedure and HELIO 3 flow after the procedure  There was no dissection  --  Vessel setup was performed  A 6Fr  Launcher JL 3 5 guiding catheter was used to cannulate the vessel  --  Vessel setup was performed  A Quartics 190cm Pre Shape wire was used to cross the lesion  --  Balloon angioplasty was performed, using a [place] Rx 2 0 x 15mm balloon, with 3 inflations and a maximum inflation pressure of 16 antony  --  A Resolute [place] Rx 3 0 x 18mm drug-eluting stent was placed across the lesion and deployed at a maximum inflation pressure of 18 antony  INTERVENTIONS:  --  Coronary Drug Eluting Stent w/PTCA  PROCEDURE COMPLETION: The patient tolerated the procedure well and was discharged from the cath lab  TIMING: Test started at 13:03  Test concluded at 13:46  HEMOSTASIS: The sheath was removed  The site was compressed with a Hemoband  device  Hemostasis was obtained  MEDICATIONS GIVEN: Solucortef (100mg/2ml), 100 mg, IV, at 12:43  Benadryl (50mg/ml), 25 mg, IV, at 12:43  Pepcid, 20 mg, IV, at 12:43  Versed (2mg/2ml), 2 mg, IV, at 13:02  Fentanyl (1OOmcg/2 ml), 50 mcg,  IV, at 13:03  1% Lidocaine, 1 ml, subcutaneously, at 13:03  Heparin 1000 units/ml, 4,000 units, IV, at 13:08  Verapamil (5mg/2ml), 2 5 mg, IV, at 13:08  Nitroglycerin (200mcg/ml), 200 mcg, at 13:09  Heparin 1000 units/ml, 6,000 units, IV,  at 13:21  Nitroglycerine (200mcg/ml), 200 mcg, at 13:44  CONTRAST GIVEN: 80 ml Omnipaque (350mg I /ml)  40 ml Omnipaque (350mg I /ml)   RADIATION EXPOSURE: Fluoroscopy time: 16 27 min     CORONARY VESSELS:   --  The coronary circulation is right dominant  --  Proximal left main: There was a 25 % stenosis  --  Mid LAD: There was a 90 % stenosis at the site of a prior stent  --  Mid circumflex: There was a 50 % stenosis  --  Mid RCA: There was a 50 % stenosis  IMPRESSIONS:  PCI mid LAD with AGUSTIN    RECOMMENDATIONS  dapt x 1 year, gdmt cad    DISPOSITION:  The patient left the catheterization laboratory in stable condition  Prepared and signed by    Monica Arrington DO  Signed 2021 14:46:50    Study diagram    Angiographic findings  Native coronary lesions:  ï¾·Proximal left main: Lesion 1: 25 % stenosis  ï¾·Mid LAD: Lesion 1: 90 % stenosis, site of prior stent  ï¾·Mid circumflex: Lesion 1: 50 % stenosis  ï¾·Mid RCA: Lesion 1: 50 % stenosis  Intervention results  Native coronary lesions:  ï¾· balloon angioplasty and stent of mid LAD  Stent: Resolute [place] Rx 3 0 x 18mm drug-eluting      Hemodynamic tables    Pressures:  Baseline  Pressures:  - HR: 80  Pressures:  - Rhythm:  Pressures:  -- Aortic Pressure (S/D/M): 92/53/65    Outputs:  Baseline  Outputs:  -- CALCULATIONS: [age] in years[age]  Outputs:  -- CALCULATIONS: Body Surface Area: 2 24  Outputs:  -- CALCULATIONS: Height in cm: 183 00  Outputs:  -- CALCULATIONS: Sex: Male  Outputs:  -- CALCULATIONS: Weight in k 00      ECHO:  Results for orders placed during the hospital encounter of 21    Echo complete with contrast if indicated    Narrative  [address]  [phone]    Transthoracic Echocardiogram  2D, M-mode, Doppler, and Color Doppler    Study date:  2021    Patient: Gailen Meckel  MR number: [id number]  Account number: [de-identified]  : 1945  Age: 67 years  Gender: Male  Status: Outpatient  Location:  Height: 72 in  Weight: 227 lb  BP: 103/ 55 mmHg    Indications: Cardiomyopathy    Diagnoses: I42 9 - Cardiomyopathy, unspecified    Sonographer:  Myrna Johnson RDCS  Primary Physician:  Padmini Sanchez DO  Referring Physician:  Marsha De La Cruz MD  Group:  [facility]  Cardiology Fellow:  Haris Boone MD  Interpreting Physician:  Leon Perkins MD    SUMMARY    LEFT VENTRICLE:  The ventricle was mildly dilated  Systolic function was moderately to markedly reduced  Ejection fraction was estimated to be 40 %  There was akinesis of the apex and the mid to apical walls of the heart  The basal segments were preserved  The changes were consistent with eccentric hypertrophy  Doppler parameters were consistent with abnormal left ventricular relaxation (grade 1 diastolic dysfunction)  RIGHT VENTRICLE:  The size was at the upper limits of normal   Systolic function was mildly reduced  LEFT ATRIUM:  The atrium was mildly dilated  MITRAL VALVE:  There was mild annular calcification  TRICUSPID VALVE:  There was mild regurgitation  PULMONIC VALVE:  There was mild regurgitation  AORTA:  There was mild dilatation of the ascending aorta (36 mm)  RECOMMENDATIONS:  Repeat study with commercial contrast administration is recommended to rule out apical thrombus  HISTORY: PRIOR HISTORY: CAD, AAA, HTN, CHF    PROCEDURE: This was a routine study  The transthoracic approach was used  The study included complete 2D imaging, M-mode, complete spectral Doppler, and color Doppler  The heart rate was 58 bpm, at the start of the study  Images were  obtained from the parasternal, apical, subcostal, and suprasternal notch acoustic windows  Image quality was adequate  LEFT VENTRICLE: The ventricle was mildly dilated  Systolic function was moderately to markedly reduced  Ejection fraction was estimated to be 40 %  There was akinesis of the apex and the mid to apical walls of the heart  The basal segments  were preserved  Wall thickness was normal  The changes were consistent with eccentric hypertrophy   No obvious apical thrombus was noted, however this study is inadequate to rule out thrombus  DOPPLER: Doppler parameters were consistent  with abnormal left ventricular relaxation (grade 1 diastolic dysfunction)  RIGHT VENTRICLE: The size was at the upper limits of normal  Systolic function was mildly reduced  LEFT ATRIUM: The atrium was mildly dilated  RIGHT ATRIUM: Size was normal     MITRAL VALVE: There was mild annular calcification  There was normal leaflet separation  DOPPLER: The transmitral velocity was within the normal range  There was no evidence for stenosis  There was no regurgitation  AORTIC VALVE: The valve was trileaflet  Leaflets exhibited mildly increased thickness  DOPPLER: Transaortic velocity was within the normal range  There was no evidence for stenosis  There was no regurgitation  TRICUSPID VALVE: The valve structure was normal  There was normal leaflet separation  DOPPLER: The transtricuspid velocity was within the normal range  There was no evidence for stenosis  There was mild regurgitation  Estimated peak PA  pressure was 33 mmHg  PULMONIC VALVE: Not well visualized  DOPPLER: The transpulmonic velocity was within the normal range  There was mild regurgitation  PERICARDIUM: There was no pericardial effusion  AORTA: The root exhibited normal size  There was mild dilatation of the ascending aorta (36 mm)      SYSTEM MEASUREMENT TABLES    2D  %FS: 15 85 %  Ao Diam: 3 61 cm  Ao asc: 3 57 cm  EDV(Teich): 142 03 ml  EF Biplane: 42 67 %  EF(Teich): 33 06 %  ESV(Teich): 95 08 ml  IVSd: 1 cm  LA Diam: 4 27 cm  LAAs A4C: 14 41 cm2  LAESV A-L A4C: 38 83 ml  LAESV MOD A4C: 35 04 ml  LALs A4C: 4 54 cm  LVEDV MOD A2C: 105 16 ml  LVEDV MOD A4C: 82 78 ml  LVEDV MOD BP: 93 9 ml  LVEF MOD A2C: 51 2 %  LVEF MOD A4C: 34 96 %  LVESV MOD A2C: 51 31 ml  LVESV MOD A4C: 53 84 ml  LVESV MOD BP: 53 83 ml  LVIDd: 5 41 cm  LVIDs: 4 55 cm  LVLd A2C: 7 69 cm  LVLd A4C: 7 85 cm  LVLs A2C: 7 17 cm  LVLs A4C: 7 64 cm  LVPWd: 0 95 cm  RAEDV A-L: 11 75 ml  RAEDV MOD: 11 87 ml  RALd: 4 12 cm  RVIDd: 3 12 cm  SV MOD A2C: 53 85 ml  SV MOD A4C: 28 94 ml  SV(Teich): 46 95 ml    CW  AV Env  Ti: 380 59 ms  AV VTI: 25 87 cm  AV Vmax: 0 99 m/s  AV Vmean: 0 68 m/s  AV maxPG: 3 92 mmHg  AV meanP 09 mmHg  TR Vmax: 2 64 m/s  TR maxP 95 mmHg    MM  TAPSE: 1 81 cm    PW  E' Sept: 0 06 m/s  E/E' Sept: 10 18  LVOT Env  Ti: 328 26 ms  LVOT VTI: 19 16 cm  LVOT Vmax: 0 93 m/s  LVOT Vmean: 0 58 m/s  LVOT maxPG: 3 46 mmHg  LVOT meanP 61 mmHg  MV A Brendon: 0 78 m/s  MV Dec Branch: 1 72 m/s2  MV DecT: 346 9 ms  MV E Brendon: 0 6 m/s  MV E/A Ratio: 0 77  MV PHT: 100 6 ms  MVA By PHT: 2 19 cm2    [facility]    Prepared and electronically signed by    Corey Moody MD  Signed 2021 17:59:49    No results found for this or any previous visit  HOLTER  No results found for this or any previous visit  VTE Prophylaxis: Heparin       Assessment/Plan     Assessment:    67year old with hx of previous MIx3 s/p AGUSTIN of the LADx2 and RCA, here with typical chest pain and elevated troponins, EKG findings suggestive of STEMI, in the setting of antiplatelet non-compliance  Plan:  Chest Pain, likely Type I STEMI  - Patient on heparin drip  - will load with prasugrel  - Cath team notified  Case discussed and reviewed with Dr Maryam Ray who agrees with my assessment and plan  Thank you for involving us in the care of your patient  Víctorcate Barkley MD  Internal Medicine PGY1    ==========================================================================================    Counseling / Coordination of Care  Total floor / unit time spent today 30 minutes minutes  Greater than 50% of total time was spent with the patient and / or family counseling and / or coordination of care    A    Epic/ Allscripts/Care Everywhere records reviewed: Huyen    ** Please Note: Fluency DirectDictation voice to text software may have been used in the creation of this document   **

## 2023-09-25 NOTE — PLAN OF CARE
----- Message from Willian Juan MD sent at 9/25/2023  9:32 AM CDT -----  Needs additional blood work for slight elevated protein levels. Nothing serious for now. Just want to be sure.    Problem: Nutrition/Hydration-ADULT  Goal: Nutrient/Hydration intake appropriate for improving, restoring or maintaining nutritional needs  Description  Monitor and assess patient's nutrition/hydration status for malnutrition (ex- brittle hair, bruises, dry skin, pale skin and conjunctiva, muscle wasting, smooth red tongue, and disorientation)  Collaborate with interdisciplinary team and initiate plan and interventions as ordered  Monitor patient's weight and dietary intake as ordered or per policy  Utilize nutrition screening tool and intervene per policy  Determine patient's food preferences and provide high-protein, high-caloric foods as appropriate       INTERVENTIONS:  - Monitor oral intake, urinary output, labs, and treatment plans  - Assess nutrition and hydration status and recommend course of action  - Evaluate amount of meals eaten  - Assist patient with eating if necessary   - Allow adequate time for meals  - Recommend/ encourage appropriate diets, oral nutritional supplements, and vitamin/mineral supplements  - Order, calculate, and assess calorie counts as needed  - Recommend, monitor, and adjust tube feedings and TPN/PPN based on assessed needs  - Assess need for intravenous fluids  - Provide specific nutrition/hydration education as appropriate  - Include patient/family/caregiver in decisions related to nutrition  Outcome: Completed

## 2024-03-06 NOTE — ASSESSMENT & PLAN NOTE
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, N0 distal esophageal adenocarcinoma  I discussed treatment of esophageal adenocarcinoma with him and let him know that this was done with a team approach  I recommend induction chemotherapy and radiation therapy followed by possible transhiatal esophagogastrectomy  He has an appointment with medical oncology and radiation oncology at this point which I encouraged him to keep  He has also been in contact with the GI oncology nurse navigator who will help coordinate his appointments  I would like to see him back near the end of his chemotherapy and radiation therapy to assess surgical candidacy  He will definitely need cardiac risk assessment  His midline ventral hernia will also need to be addressed at the time of esophagectomy  We will also have him see Surgical Oncology 
[facility] Assessment Completed on: 06-Mar-2024 01:50
